# Patient Record
Sex: FEMALE | Race: WHITE | NOT HISPANIC OR LATINO | Employment: OTHER | URBAN - METROPOLITAN AREA
[De-identification: names, ages, dates, MRNs, and addresses within clinical notes are randomized per-mention and may not be internally consistent; named-entity substitution may affect disease eponyms.]

---

## 2017-12-13 ENCOUNTER — TELEPHONE (OUTPATIENT)
Dept: INTERNAL MEDICINE | Facility: CLINIC | Age: 77
End: 2017-12-13

## 2017-12-13 ENCOUNTER — OFFICE VISIT (OUTPATIENT)
Dept: URGENT CARE | Facility: CLINIC | Age: 77
End: 2017-12-13
Payer: MEDICARE

## 2017-12-13 ENCOUNTER — HOSPITAL ENCOUNTER (OUTPATIENT)
Dept: RADIOLOGY | Facility: HOSPITAL | Age: 77
Discharge: HOME | End: 2017-12-13
Attending: NURSE PRACTITIONER
Payer: MEDICARE

## 2017-12-13 VITALS
OXYGEN SATURATION: 93 % | TEMPERATURE: 98 F | HEART RATE: 88 BPM | WEIGHT: 216.69 LBS | HEIGHT: 62 IN | SYSTOLIC BLOOD PRESSURE: 152 MMHG | DIASTOLIC BLOOD PRESSURE: 102 MMHG | BODY MASS INDEX: 39.88 KG/M2

## 2017-12-13 DIAGNOSIS — J22 BACTERIAL LOWER RESPIRATORY INFECTION: ICD-10-CM

## 2017-12-13 DIAGNOSIS — R06.2 WHEEZING: ICD-10-CM

## 2017-12-13 DIAGNOSIS — B96.89 BACTERIAL LOWER RESPIRATORY INFECTION: ICD-10-CM

## 2017-12-13 DIAGNOSIS — R06.02 SHORTNESS OF BREATH: Primary | ICD-10-CM

## 2017-12-13 DIAGNOSIS — R05.9 COUGH: ICD-10-CM

## 2017-12-13 PROCEDURE — 94640 AIRWAY INHALATION TREATMENT: CPT | Mod: PBBFAC,PO

## 2017-12-13 PROCEDURE — 96372 THER/PROPH/DIAG INJ SC/IM: CPT | Mod: PBBFAC,59,PO

## 2017-12-13 PROCEDURE — 99214 OFFICE O/P EST MOD 30 MIN: CPT | Mod: PBBFAC,25,PO | Performed by: NURSE PRACTITIONER

## 2017-12-13 PROCEDURE — 99999 PR PBB SHADOW E&M-EST. PATIENT-LVL IV: CPT | Mod: PBBFAC,,, | Performed by: NURSE PRACTITIONER

## 2017-12-13 PROCEDURE — 71020 XR CHEST PA AND LATERAL: CPT | Mod: 26,,, | Performed by: RADIOLOGY

## 2017-12-13 PROCEDURE — 71020 XR CHEST PA AND LATERAL: CPT | Mod: TC,PO

## 2017-12-13 PROCEDURE — 99215 OFFICE O/P EST HI 40 MIN: CPT | Mod: S$PBB,,, | Performed by: NURSE PRACTITIONER

## 2017-12-13 RX ORDER — BETAMETHASONE SODIUM PHOSPHATE AND BETAMETHASONE ACETATE 3; 3 MG/ML; MG/ML
6 INJECTION, SUSPENSION INTRA-ARTICULAR; INTRALESIONAL; INTRAMUSCULAR; SOFT TISSUE
Status: COMPLETED | OUTPATIENT
Start: 2017-12-13 | End: 2017-12-13

## 2017-12-13 RX ORDER — FUROSEMIDE 20 MG/1
20 TABLET ORAL DAILY
Qty: 3 TABLET | Refills: 0 | Status: SHIPPED | OUTPATIENT
Start: 2017-12-13 | End: 2018-03-20 | Stop reason: SDUPTHER

## 2017-12-13 RX ORDER — IPRATROPIUM BROMIDE AND ALBUTEROL SULFATE 2.5; .5 MG/3ML; MG/3ML
3 SOLUTION RESPIRATORY (INHALATION)
Status: COMPLETED | OUTPATIENT
Start: 2017-12-13 | End: 2017-12-13

## 2017-12-13 RX ORDER — ALBUTEROL SULFATE 90 UG/1
1-2 AEROSOL, METERED RESPIRATORY (INHALATION) EVERY 4 HOURS PRN
Qty: 1 INHALER | Refills: 0 | Status: SHIPPED | OUTPATIENT
Start: 2017-12-13 | End: 2017-12-20

## 2017-12-13 RX ORDER — DOXYCYCLINE HYCLATE 100 MG
100 TABLET ORAL EVERY 12 HOURS
Qty: 20 TABLET | Refills: 0 | Status: SHIPPED | OUTPATIENT
Start: 2017-12-13 | End: 2017-12-20

## 2017-12-13 RX ORDER — PREDNISONE 20 MG/1
20 TABLET ORAL DAILY
Qty: 5 TABLET | Refills: 0 | Status: SHIPPED | OUTPATIENT
Start: 2017-12-14 | End: 2017-12-17

## 2017-12-13 RX ADMIN — BETAMETHASONE ACETATE AND BETAMETHASONE SODIUM PHOSPHATE 6 MG: 3; 3 INJECTION, SUSPENSION INTRA-ARTICULAR; INTRALESIONAL; INTRAMUSCULAR; SOFT TISSUE at 03:12

## 2017-12-13 RX ADMIN — IPRATROPIUM BROMIDE AND ALBUTEROL SULFATE 3 ML: 2.5; .5 SOLUTION RESPIRATORY (INHALATION) at 03:12

## 2017-12-13 NOTE — PATIENT INSTRUCTIONS
Go to the ER for any worsening shortness of breath at all or for oxygen level below 88%.     Shortness of Breath (Dyspnea)  Shortness of breath is the feeling that you can't catch your breath or get enough air. It is also known as dyspnea.  Dyspnea can be caused by many different conditions. They include:  · Acute asthma attack.  · Worsening of chronic lung diseases such as chronic bronchitis and emphysema.  · Heart failure. This is when weak heart muscle allows extra fluid to collect in the lungs.  · Panic attacks or anxiety. Fear can cause rapid breathing (hyperventilation).  · Pneumonia, or an infection in the lung tissue.  · Exposure to toxic substances, fumes, smoke, or certain medicines.  · Blood clot in the lung (pulmonary embolism). This is often from a piece of blood clot in a deep vein of the leg (deep vein thrombosis) that breaks off and travels to the lungs.  · Heart attack or heart-related chest pain (angina).  · Anemia.  · Collapsed lung (pneumothorax).  · Dehydration.  · Pregnancy.  Based on your visit today, the exact cause of your shortness of breath is not certain. Your tests dont show any of the serious causes of dyspnea. You may need other tests to find out if you have a serious problem. Its important to watch for any new symptoms or symptoms that get worse. Follow up with your healthcare provider as directed.  Home care  Follow these tips to take care of yourself at home:  · When your symptoms are better, go back to your usual activities.  · If you smoke, you should stop. Join a quit-smoking program or ask your healthcare provider for help.  · Eat a healthy diet and get plenty of sleep.  · Get regular exercise. Talk with your healthcare provider before starting to exercise, especially if you have other medical problems.  · Cut down on the amount of caffeine and stimulants you consume.  Follow-up care  Follow up with your healthcare provider, or as advised.  If tests were done, you will be told  if your treatment needs to be changed. You can call as directed for the results.  (Note: If an X-ray was taken, a specialist will review it. You will be notified of any new findings that may affect your care.)  Call 911 or get immediate medical care  Shortness of breath may be a sign of a serious medical problem. For example, it may be a problem with your heart or lungs. Call 911 if you have worsening shortness of breath or trouble breathing, especially with any of the symptoms below:  · You are confused or its difficult to wake you.  · You faint or lose consciousness.  · You have a fast heartbeat, or your heartbeat is irregular.  · You are coughing up blood.  · You have pain in your chest, arm, shoulder, neck, or upper back.  · You break out in a sweat.  When to seek medical advice  Call your healthcare provider right away if any of these occur:  · Slight shortness of breath or wheezing  · Redness, pain or swelling in your leg, arm, or other body area  · Swelling in both legs or ankles  · Fast weight gain  · Dizziness or weakness  · Fever of 100.4ºF (38ºC) or higher, or as directed by your healthcare provider  Date Last Reviewed: 9/13/2015  © 8128-1621 [employer]. [address] [place] [address], [place] [address]. All rights reserved. This information is not intended as a substitute for professional medical care. Always follow your healthcare professional's instructions.        Bronchitis with Wheezing (Viral or Bacterial: Adult)    Bronchitis is an infection of the air passages. It often occurs during a cold and is usually caused by a virus. Symptoms include cough with mucus (phlegm) and low-grade fever. This illness is contagious during the first few days and is spread through the air by coughing and sneezing, or by direct contact (touching the sick person and then touching your own eyes, nose, or mouth).  If there is a lot of inflammation, air flow is restricted. The air passages may also go into spasm,  especially if you have asthma. This causes wheezing and difficulty breathing even in people who do not have asthma.  Bronchitis usually lasts 7 to 14 days. The wheezing should improve with treatment during the first week. An inhaler is often prescribed to relax the air passages and stop wheezing. Antibiotics will be prescribed if your doctor thinks there is also a secondary bacterial infection.  Home care  · If symptoms are severe, rest at home for the first 2 to 3 days. When you go back to your usual activities, don't let yourself get too tired.  · Do not smoke. Also avoid being exposed to secondhand smoke.  · You may use over-the-counter medicine to control fever or pain, unless another medicine was prescribed. Note: If you have chronic liver or kidney disease or have ever had a stomach ulcer or gastrointestinal bleeding, talk with your healthcare provider before using these medicines. Also talk to your provider if you are taking medicine to prevent blood clots.) Aspirin should never be given to anyone younger than 18 years of age who is ill with a viral infection or fever. It may cause severe liver or brain damage.  · Your appetite may be poor, so a light diet is fine. Avoid dehydration by drinking 6 to 8 glasses of fluids per day (such as water, soft drinks, sports drinks, juices, tea, or soup). Extra fluids will help loosen secretions in the nose and lungs.  · Over-the-counter cough, cold, and sore-throat medicines will not shorten the length of the illness, but they may be helpful to reduce symptoms. (Note: Do not use decongestants if you have high blood pressure.)  · If you were given an inhaler, use it exactly as directed. If you need to use it more often than prescribed, your condition may be worsening. If this happens, contact your healthcare provider.  · If prescribed, finish all antibiotic medicine, even if you are feeling better after only a few days.  Follow-up care  Follow up with your healthcare  provider, or as advised. If you had an X-ray or ECG (electrocardiogram), a specialist will review it. You will be notified of any new findings that may affect your care.  Note: If you are age 65 or older, or if you have a chronic lung disease or condition that affects your immune system, or you smoke, talk to your healthcare provider about having a pneumococcal vaccinations and a yearly influenza vaccination (flu shot).  When to seek medical advice  Call your healthcare provider right away if any of these occur:  · Fever of 100.4°F (38°C) or higher  · Coughing up increasing amounts of colored sputum  · Weakness, drowsiness, headache, facial pain, ear pain, or a stiff neck  Call 911, or get immediate medical care  Contact emergency services right away if any of these occur.  · Coughing up blood  · Worsening weakness, drowsiness, headache, or stiff neck  · Increased wheezing not helped with medication, shortness of breath, or pain with breathing  Date Last Reviewed: 9/13/2015 © 2000-2017 [employer]. [address] [place] [address]. All rights reserved. This information is not intended as a substitute for professional medical care. Always follow your healthcare professional's instructions.        Discharge Instructions for Heart Failure  The heart is a muscle that pumps oxygen-rich blood to all parts of the body. When you have heart failure, the heart is not able to pump as well as it should. Blood and fluid may back up into the lungs (congestive heart failure), and some parts of the body dont get enough oxygen-rich blood to work normally. These problems lead to the symptoms of heart failure. Heart failure can occur due to an injury to the heart or from natural processes.  You can control symptoms of heart failure with some lifestyle changes and by following your doctor's advice.  Home care  Activity  Ask your healthcare provider about an exercise program. You can benefit from simple  activities such as walking or gardening. Exercising most days of the week can make you feel better. Don't be discouraged if your progress is slow at first. Rest as needed. Stop activity if you develop symptoms such as chest pain, lightheadedness, or significant shortness of breath. Find activities that you enjoy, such as brisk walking, dancing, swimming, or gardening. These will help you stay active and strengthen your heart.  Diet  Follow a heart healthy diet. And make sure to limit the salt (sodium) in your diet. Salt causes your body to hold water. This makes your heart work harder as there is more fluid for the heart to pump. Limit your salt by doing the following:  · Limit canned, dried, packaged, and fast foods.  · Don't add salt to your food.  · Season foods with herbs instead of salt.  · Watch how much liquids you drink. Drinking too much can make heart failure worse. Talk with your health care provider about how much you should drink each day.  · Limit the amount of alcohol you drink. It may harm your heart. Women should have no more than 1 drink a day and men should have no more than 2.  · When you eat out, request that your meals have no added salt.  Tobacco  If you smoke, it's very important to quit. Smoking increases your chances of having a heart attack by harming the blood vessels that provide oxygen to your heart. This makes heart failure worse. Quitting smoking is the number one thing you can do to improve your health. Enroll in a stop-smoking program to improve your chances of success. Talk with your healthcare provider about medicines or nicotine replacement therapy to help you quit smoking. Ask your healthcare provider about smoking cessation support groups.  Medicine  Take your medicines exactly as prescribed. Learn the names and purpose of each of your medicines. Keep an accurate medicine list and current dosages with you at all times. Don't skip doses. If you miss a dose of your medicine, take  it as soon as you remember. If you miss a dose and it's almost time for your next dose, just wait and take your next dose at the normal time. Don't take a double dose. If you are unsure, call your doctor's office. Make sure not to mix up your medicines or forget what you've taken the same day.  Weight monitoring  Weigh yourself every day. A sudden weight gain can mean your heart failure is getting worse. Weigh yourself at the same time of day and in the same kind of clothes. Ideally, weigh yourself first thing in the morning after you empty your bladder, but before you eat breakfast. Your healthcare provider will show you how to track your weight. He or she will also discuss with you when you should call if you have a sudden, unexpected increase in your weight.  In general, your healthcare provider may ask you to report if your weight goes up by more than 2 pounds in 1 day,  5 pounds in 1 week, or whatever weight gain you were told by your doctor. This is a sign that you are retaining more fluid than you should be. Clues to weight gain include checking your ankles for swelling, or noticing you are short of breath when you lie down.  Follow-up care  Make a follow-up appointment as directed. Depending on the type and severity of heart failure you have, you may need follow-up as early as 7 days from hospital discharge. Keep appointments for checkups and lab tests that are needed to check your medicines and condition.  Recognize that your health and even survival depend on your following medical recommendations.  Symptoms  Heart failure can cause a variety of symptoms, including:  · Shortness of breath  · Trouble breathing at night, especially when you lie down  · Swelling in the legs and feet or in the belly (abdomen)  · Becoming easily fatigued  · Irregular or rapid heartbeat  · Weakness or lightheadedness  · Swelling of the neck veins  It is important to know what to do if symptoms get worse or if you develop signs  of worsening heart failure.     When to see your healthcare provider  Call your doctor right away if you have any of these signs of worsening heart failure:  · Sudden weight gain (more than 2 pounds in 1 day or 5 pounds in 1 week, or whatever weight gain you were told to report by your doctor)  · Trouble breathing not related to being active  · New or increased swelling of your legs or ankles  · Swelling or pain in your abdomen  · Breathing trouble at night (waking up short of breath, needing more pillows to breathe)  · Frequent coughing that doesn't go away  · Feeling much more tired than usual  Call 911  Call 911 right away if you have:  · Severe shortness of breath, such that you can't catch your breath even while resting  · Severe chest pain that does not resolve with rest or nitroglycerin  · Pink, foamy mucus with cough and shortness of breath  · A continuous rapid or irregular heartbeat  · Passing out or fainting  · Stroke symptoms such as sudden numbness or weakness on one side of your face, arm, or leg or sudden confusion, trouble speaking or vision changes   Date Last Reviewed: 3/21/2016  © 0240-1842 [employer]. [address] [place] [address], [place] [address]. All rights reserved. This information is not intended as a substitute for professional medical care. Always follow your healthcare professional's instructions.

## 2017-12-13 NOTE — TELEPHONE ENCOUNTER
----- Message from Norma Duff sent at 12/13/2017  8:50 AM CST -----  Contact: Lilian/pt daughter   Call caller regarding pt needing to be seen today by Dr. Andrade. Caller states that pt has a bad cough.   [phone]

## 2017-12-14 ENCOUNTER — TELEPHONE (OUTPATIENT)
Dept: URGENT CARE | Facility: CLINIC | Age: 77
End: 2017-12-14

## 2017-12-14 NOTE — TELEPHONE ENCOUNTER
----- Message from [place]ows NP sent at 12/14/2017  7:25 AM CST -----  I reviewed results of Ms. Singh's x-ray with her and her daughter during yesterday's visit. I was very concerned about her so I wanted to see if someone could call and check on her to make sure she was able to make it through the night without going to the hospital. If so, it looks like she doesn't have a follow up appointment scheduled. I'd like to get her seen by primary care before the end of the week for a recheck. If everyone is booked, she could wait until early next week as long as she doesn't have any worsening shortness of breath, in which case, she'd need to go to the ER. Thanks!

## 2017-12-14 NOTE — TELEPHONE ENCOUNTER
Called and spoke to pt's daughter (Lilian). She stated that she did bring Ms. Singh to the ER because her O2 sats were staying very low. She's currently at [facility] being treated.

## 2017-12-18 ENCOUNTER — TELEPHONE (OUTPATIENT)
Dept: INTERNAL MEDICINE | Facility: CLINIC | Age: 77
End: 2017-12-18

## 2017-12-18 NOTE — TELEPHONE ENCOUNTER
----- Message from Miriam Nicole sent at 12/18/2017  8:13 AM CST -----  Contact: Pt/ Lilian (daughter in law  Please give Lilian a call at [phone] regarding a hospital f/u for the pt.

## 2017-12-20 ENCOUNTER — OFFICE VISIT (OUTPATIENT)
Dept: INTERNAL MEDICINE | Facility: CLINIC | Age: 77
End: 2017-12-20
Payer: MEDICARE

## 2017-12-20 VITALS
DIASTOLIC BLOOD PRESSURE: 60 MMHG | HEART RATE: 83 BPM | HEIGHT: 60 IN | BODY MASS INDEX: 41.72 KG/M2 | TEMPERATURE: 98 F | SYSTOLIC BLOOD PRESSURE: 118 MMHG | OXYGEN SATURATION: 90 % | WEIGHT: 212.5 LBS

## 2017-12-20 DIAGNOSIS — I10 HYPERTENSION, UNSPECIFIED TYPE: ICD-10-CM

## 2017-12-20 DIAGNOSIS — R73.03 PRE-DIABETES: ICD-10-CM

## 2017-12-20 DIAGNOSIS — J44.9 COPD MIXED TYPE: Primary | ICD-10-CM

## 2017-12-20 DIAGNOSIS — R06.02 SOB (SHORTNESS OF BREATH) ON EXERTION: ICD-10-CM

## 2017-12-20 PROCEDURE — 99214 OFFICE O/P EST MOD 30 MIN: CPT | Mod: S$PBB,,, | Performed by: PHYSICIAN ASSISTANT

## 2017-12-20 PROCEDURE — 99214 OFFICE O/P EST MOD 30 MIN: CPT | Mod: PBBFAC,PO | Performed by: PHYSICIAN ASSISTANT

## 2017-12-20 PROCEDURE — 99999 PR PBB SHADOW E&M-EST. PATIENT-LVL IV: CPT | Mod: PBBFAC,,, | Performed by: PHYSICIAN ASSISTANT

## 2017-12-20 RX ORDER — IPRATROPIUM BROMIDE AND ALBUTEROL SULFATE 2.5; .5 MG/3ML; MG/3ML
3 SOLUTION RESPIRATORY (INHALATION) EVERY 6 HOURS PRN
COMMUNITY
End: 2018-01-22 | Stop reason: SDUPTHER

## 2017-12-20 RX ORDER — LISINOPRIL 40 MG/1
TABLET ORAL
COMMUNITY
Start: 2017-12-16 | End: 2018-03-20 | Stop reason: SDUPTHER

## 2017-12-20 NOTE — PATIENT INSTRUCTIONS
Using Herbs and Spices    Herbs and spices add flavor to cooking without adding fat or sodium. That's why they're great for healthy cooking. Try these tips to help create tasty, healthy meals.  How much to use?  If a recipe calls for: 1 Tablespoon of fresh herbs You can use: 1 teaspoon of dried herbs Or: 1/4 teaspoon of powdered herbs   Tips for Getting the Most of Herbs and Spices  · Use kitchen perez or a sharp knife to cut leaves of fresh herbs. Cutting the leaves finely will release the most flavor.  · When using whole spices, don't grind them until you need them. Crushing the berries and seeds with a mortar and pestle or grating whole nutmeg or cinnamon sticks just before adding them to your recipe will guarantee the most flavor.  · Dried herbs pack more flavor for the same quantity than fresh herbs, and powdered herbs are more potent than dried flakes. If you are using powdered herbs in a recipe that calls for fresh, you'll want to decrease the amount you add.  · When adding fresh or dried spices and herbs to cold recipes, such as dips or salad dressings, allow the food to sit in the refrigerator for at least a couple of hours before serving so the flavors can blend.  · Add fresh spices and herbs to hot dishes as close to serving time as possible for the most flavorful results. Dried herbs and spices should be added early in the cooking process to prevent a powdery taste.  · The longer dried herbs and spices sit in your cupboard without being used, the more flavor they lose. Whole spices last longer than ground or powdered spices. Store dried herbs and spices in a cool, dry, dark place. Keep powdered herbs and spices for no longer than a year.  Date Last Reviewed: 6/1/2015  © 5765-4028 [employer]. [address] [place] [address], [place] [address]. All rights reserved. This information is not intended as a substitute for professional medical care. Always follow your healthcare professional's  instructions.        Low-Salt Diet  This diet removes foods that are high in salt. It also limits the amount of salt you use when cooking. It is most often used for people with high blood pressure, edema (fluid retention), and kidney, liver, or heart disease.  Table salt contains the mineral sodium. Your body needs sodium to work normally. But too much sodium can make your health problems worse. Your healthcare provider is recommending a low-salt (also called low-sodium) diet for you. Your total daily allowance of salt is 1,500 to 2,300 milligrams (mg). It is less than 1 teaspoon of table salt. This means you can have only about 500 to 700 mg of sodium at each meal. People with certain health problems should limit salt intake to the lower end of the recommended range.    When you cook, dont add much salt. If you can cook without using salt, even better. Dont add salt to your food at the table.  When shopping, read food labels. Salt is often called sodium on the label. Choose foods that are salt-free, low salt, or very low salt. Note that foods with reduced salt may not lower your salt intake enough.    Beans, potatoes, and pasta  Ok: Dry beans, split peas, lentils, potatoes, rice, macaroni, pasta, spaghetti without added salt  Avoid: Potato chips, tortilla chips, and similar products  Breads and cereals  Ok: Low-sodium breads, rolls, cereals, and cakes; low-salt crackers, matzo crackers  Avoid: Salted crackers, pretzels, popcorn, Danish toast, pancakes, muffins  Dairy  Ok: Milk, chocolate milk, hot chocolate mix, low-salt cheeses, and yogurt  Avoid: Processed cheese and cheese spreads; Roquefort, Camembert, and cottage cheese; buttermilk, instant breakfast drink  Desserts  Ok: Ice cream, frozen yogurt, juice bars, gelatin, cookies and pies, sugar, honey, jelly, hard candy  Avoid: Most pies, cakes and cookies prepared or processed with salt; instant pudding  Drinks  Ok: Tea, coffee, fizzy (carbonated) drinks,  juices  Avoid: Flavored coffees, electrolyte replacement drinks, sports drinks  Meats  Ok: All fresh meat, fish, poultry, low-salt tuna, eggs, egg substitute  Avoid: Smoked, pickled, brine-cured, or salted meats and fish. This includes gold, chipped beef, corned beef, hot dogs, deli meats, ham, kosher meats, salt pork, sausage, canned tuna, salted codfish, smoked salmon, herring, sardines, or anchovies.  Seasonings and spices  Ok: Most seasonings are okay. Good substitutes for salt include: fresh herb blends, hot sauce, lemon, garlic, lombardi, vinegar, dry mustard, parsley, cilantro, horseradish, tomato paste, regular margarine, mayonnaise, unsalted butter, cream cheese, vegetable oil, cream, low-salt salad dressing and gravy.  Avoid: Regular ketchup, relishes, pickles, soy sauce, teriyaki sauce, Worcestershire sauce, BBQ sauce, tartar sauce, meat tenderizer, chili sauce, regular gravy, regular salad dressing, salted butter  Soups  Ok: Low-salt soups and broths made with allowed foods  Avoid: Bouillon cubes, soups with smoked or salted meats, regular soup and broth  Vegetables  Ok: Most vegetables are okay; also low-salt tomato and vegetable juices  Avoid: Sauerkraut and other brine-soaked vegetables; pickles and other pickled vegetables; tomato juice, olives  Date Last Reviewed: 8/1/2016 © 2000-2017 [employer]. [address] [place] [address]. All rights reserved. This information is not intended as a substitute for professional medical care. Always follow your healthcare professional's instructions.

## 2017-12-20 NOTE — PROGRESS NOTES
HPI  Review of Systems    Physical Exam    Subjective:      Maura Singh is a 77 y.o. female newly diagnosed COPD who presents from hospital  with new onset COPD and exacerbation.  She is here today to follow up on her recent hospital stay.  She was having some bronchitis symptoms, seen in UC. Eventually worsened and ended up in ER.  There she was admitted.  Hospital course:  . Current symptoms include: dyspnea after 2 blocks, inability to climb stairs and non-productive cough. Symptoms began several days ago. Patient uses 1 pillows at night. Patient currently is on oxygen at 2 L/min per nasal cannula.   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 And OT  . Doing much better with O2     Also found to have elevated A1c     Review of Systems  Constitutional: negative for chills, fevers, malaise and sweats  Eyes: negative for color blindness, contacts/glasses and redness  Ears, nose, mouth, throat, and face: positive for nasal congestion and snoring  Respiratory: positive for cough and dyspnea on exertion  Cardiovascular: negative for chest pain, chest pressure/discomfort, exertional chest pressure/discomfort, lower extremity edema, palpitations and syncope  Gastrointestinal: negative for abdominal pain, bright red blood per rectum, change in bowel habits, constipation, nausea and odynophagia  Hematologic/lymphatic: negative for bleeding, easy bruising, lymphadenopathy and petechiae  Neurological: negative for coordination problems, dizziness, gait problems, memory problems and weakness  Behavioral/Psych: negative for sleep disturbance  Allergic/Immunologic: negative       Objective:      /60   Pulse 83   Temp 97.5 °F (36.4 °C) (Tympanic)   Ht 5' (1.524 m)   Wt 96.4 kg (212 lb 8.4 oz)   SpO2 (!) 90%   BMI 41.51 kg/m²   Oxygen saturation 91% on 1 liters/min via nasal cannula  /60   Pulse 83   Temp 97.5 °F (36.4 °C) (Tympanic)   Ht 5' (1.524 m)   Wt 96.4 kg (212 lb 8.4 oz)   SpO2 (!) 90%   BMI 41.51 kg/m²    General appearance: alert, appears stated age and cooperative  Head: Normocephalic, without obvious abnormality, atraumatic  Eyes: conjunctivae/corneas clear. PERRL, EOM's intact. Fundi benign.  Ears: normal TM's and external ear canals both ears  Nose: Nares normal. Septum midline. Mucosa normal. No drainage or sinus tenderness.  Throat: lips, mucosa, and tongue normal; teeth and gums normal  Neck: no adenopathy, no carotid bruit, supple, symmetrical, trachea midline and thyroid not enlarged, symmetric, no tenderness/mass/nodules  Lungs: diminished breath sounds bibasilar  Heart: regular rate and rhythm, S1, S2 normal, no murmur, click, rub or gallop  Abdomen: soft, non-tender; bowel sounds normal; no masses,  no organomegaly  Extremities: no edema  Pulses: 2+ and symmetric  Skin: Skin color, texture, turgor normal. No rashes or lesions  Lymph nodes: Cervical, supraclavicular, and axillary nodes normal.  Neurologic: Grossly normal       Assessment:      Emphysema and new onset COPD          COPD mixed type  SOB (shortness of breath) on exertion  Hypertension, unspecified type  Pre-diabetes        Plan:   COPD mixed type- -     Ambulatory referral to Pulmonology  -     Ambulatory Referral to Pulmonary Rehab  -     mucus clearing device (ACAPELLA) Janna; 1 Device by Misc.(Non-Drug; Combo Route) route 3 (three) times daily as needed.      SOB (shortness of breath) on exertion  -     Ambulatory Referral to Pulmonary Rehab    Hypertension, unspecified type- well controlled. Cont lisinopril     Pre-diabetes- per hosp recrods. Will get A1c in 3 months to monitor   Diet changes/weight loss to prevent diabetes   -     Hemoglobin A1c; Future      Patient has HH, on O2   Family with her at time of visit   Improving at this time. Want to set up with pulmonary given new dx   Discussed diagnosis, its evaluation, treatment and usual course.  All questions answered.  Referral to pulmonary .  Follow up with PCP in 1 month

## 2018-01-11 ENCOUNTER — PATIENT OUTREACH (OUTPATIENT)
Dept: ADMINISTRATIVE | Facility: HOSPITAL | Age: 78
End: 2018-01-11

## 2018-01-11 NOTE — LETTER
January 16, 2018    Maura Singh  [address]  [place] [address]           [facility]  [address]  [facility] [address]  Phone: [phone] Dear Mrs. Singh:    [facility] is committed to your overall health.  To help you get the most out of each of your visits, we will review your information to make sure you are up to date on all of your recommended tests and/or procedures.      Yessy Andrade MD has found that you may be due for   Health Maintenance Due   Topic    TETANUS VACCINE     Mammogram     DEXA SCAN     Zoster Vaccine     Pneumococcal (65+) (2 of 2 - PPSV23)    Lipid Panel       If you have had any of the above done at another facility, please bring the records or information with you so that your record at [facility] will be complete.    If you are currently taking medication, please bring it with you to your appointment for review.    We will be happy to assist you with scheduling any necessary appointments or you may contact the [facility] appointment desk at [phone] to schedule at your convenience.     Thank you for choosing [facility] for your healthcare needs,    If you have any questions or concerns, please don't hesitate to call.    Sincerely,    LUIS ALBERTO Paulino  Care Coordination Department  [facility]  [phone]

## 2018-01-12 ENCOUNTER — OFFICE VISIT (OUTPATIENT)
Dept: PULMONOLOGY | Facility: CLINIC | Age: 78
End: 2018-01-12
Payer: MEDICARE

## 2018-01-12 VITALS
OXYGEN SATURATION: 86 % | DIASTOLIC BLOOD PRESSURE: 76 MMHG | SYSTOLIC BLOOD PRESSURE: 120 MMHG | BODY MASS INDEX: 41.72 KG/M2 | HEART RATE: 72 BPM | WEIGHT: 212.5 LBS | RESPIRATION RATE: 18 BRPM | HEIGHT: 60 IN

## 2018-01-12 DIAGNOSIS — R09.02 HYPOXEMIA REQUIRING SUPPLEMENTAL OXYGEN: Primary | ICD-10-CM

## 2018-01-12 DIAGNOSIS — Z99.81 HYPOXEMIA REQUIRING SUPPLEMENTAL OXYGEN: Primary | ICD-10-CM

## 2018-01-12 DIAGNOSIS — J44.9 CHRONIC OBSTRUCTIVE PULMONARY DISEASE, UNSPECIFIED COPD TYPE: ICD-10-CM

## 2018-01-12 PROCEDURE — 99999 PR PBB SHADOW E&M-EST. PATIENT-LVL III: CPT | Mod: PBBFAC,,, | Performed by: NURSE PRACTITIONER

## 2018-01-12 PROCEDURE — 99213 OFFICE O/P EST LOW 20 MIN: CPT | Mod: PBBFAC,PO | Performed by: NURSE PRACTITIONER

## 2018-01-12 PROCEDURE — 99214 OFFICE O/P EST MOD 30 MIN: CPT | Mod: S$PBB,,, | Performed by: NURSE PRACTITIONER

## 2018-01-12 RX ORDER — ALBUTEROL SULFATE 90 UG/1
2 AEROSOL, METERED RESPIRATORY (INHALATION) EVERY 6 HOURS PRN
COMMUNITY
End: 2019-09-05

## 2018-01-12 NOTE — LETTER
January 12, 2018      VICKY Brian  [address] [facility] [place] [address]           [facility]  [address] [facility] [address] [facility] [address] [address] [place] [address]  Phone: [phone]  Fax: [phone]          Patient: Maura Singh   MR Number: [id number]   YOB: 1940   Date of Visit: 1/12/2018       Dear Herlinda Ramirez:    Thank you for referring Maura Singh to me for evaluation. Attached you will find relevant portions of my assessment and plan of care.    If you have questions, please do not hesitate to call me. I look forward to following Maura Singh along with you.    Sincerely,    Sandra Stafford, NP    Enclosure  CC:  No Recipients    If you would like to receive this communication electronically, please contact [email or web address] or [phone] to request more information on [employer] access.    For providers and/or their staff who would like to refer a patient to [facility], please contact us through our one-stop-shop provider referral line, [facility] Dong, at [phone].    If you feel you have received this communication in error or would no longer like to receive these types of communications, please e-mail [email or web address]

## 2018-01-12 NOTE — ASSESSMENT & PLAN NOTE
Qualifying saturation 86% 1/12/2018 on room air at rest.  New order to continue NC 2 lm continuous, and obtain small portable, pulse dose acceptable.   Follow up for pft to evaluate for COPD

## 2018-01-12 NOTE — PROGRESS NOTES
Chief Complaint   Patient presents with    COPD     Subjective:      HPI:  77 year old female presents for initial pulmonary visit related to suspected COPD with history with acute respiratory failure with hospitalization and discharge on home oxygen NC 2 lm.   Former smoker quit in 2003, 1/2 pk/day x 48 hrs.   She has never been told she has COPD. Daughter accompanying reports patient is not good about obtaining medical follow up.   They have home O2 sat monitor and with desats below 88% daughter was able to convince patient to report to ER for recent evaluation.  Has never had pft testing.     Family/Medical/Surgical/Social History  has No Known Allergies.  family history includes ALS in her father; Colon cancer in her mother.  has a current medication list which includes the following prescription(s): lisinopril, mucus clearing device, multivitamin, albuterol, albuterol-ipratropium 2.5mg-0.5mg/3ml, and furosemide.   has a past surgical history that includes Appendectomy and Middle ear surgery.   reports that she quit smoking about 15 years ago. Her smoking use included Cigarettes. She started smoking about 63 years ago. She has a 24.00 pack-year smoking history. She has never used smokeless tobacco. She reports that she does not drink alcohol or use drugs.    Review of Systems  Review of Systems   Constitutional: Negative for fever, chills, weight loss, weight gain, activity change, appetite change, fatigue and night sweats.   HENT: Negative.  Negative for postnasal drip, rhinorrhea, sinus pressure, voice change and congestion.    Eyes: Negative for redness and itching.   Respiratory: Positive for dyspnea on extertion. Negative for snoring, cough, sputum production, chest tightness, wheezing, orthopnea, asthma nighttime symptoms, use of rescue inhaler and somnolence. Shortness of breath: only with exertion.    Cardiovascular: Negative.  Negative for chest pain, palpitations and leg swelling.   Genitourinary:  Negative for difficulty urinating and hematuria.   Endocrine: Negative for cold intolerance and heat intolerance.    Musculoskeletal: Negative for arthralgias, gait problem, joint swelling and myalgias.   Skin: Negative.    Gastrointestinal: Negative for nausea, vomiting, abdominal pain and acid reflux.   Neurological: Negative for dizziness, weakness, light-headedness and headaches.   Hematological: Negative for adenopathy. No excessive bruising.   All other systems reviewed and are negative.    Objective:     /76 (BP Location: Right arm, Patient Position: Sitting)   Pulse 72   Resp 18   Ht 5' (1.524 m)   Wt 96.4 kg (212 lb 8.4 oz)   SpO2 (!) 86% Comment: room air at rest. increased to 95% NC 2 lm  BMI 41.51 kg/m²   Physical Exam   Constitutional: She is oriented to person, place, and time. She appears well-developed and well-nourished. She is active and cooperative.  Non-toxic appearance. She does not appear ill. No distress.   HENT:   Head: Normocephalic.   Right Ear: External ear normal.   Left Ear: External ear normal.   Nose: Nose normal.   Mouth/Throat: Oropharynx is clear and moist. No oropharyngeal exudate.   Neck circumference: 48 cm (19 inches).  Mallampati: 4   Eyes: Conjunctivae are normal.   Neck: Normal range of motion. Neck supple.   Cardiovascular: Normal rate, regular rhythm, normal heart sounds and intact distal pulses.    Pulmonary/Chest: Effort normal and breath sounds normal. No stridor.   Abdominal: Soft.   Musculoskeletal: Normal range of motion.   Lymphadenopathy:     She has no cervical adenopathy.   Neurological: She is alert and oriented to person, place, and time.   Skin: Skin is warm and dry.   Psychiatric: She has a normal mood and affect. Her behavior is normal. Judgment and thought content normal.   Vitals reviewed.    Assessment:     1. Hypoxemia requiring supplemental oxygen    2. Chronic obstructive pulmonary disease, unspecified COPD type      Orders Placed This  Encounter   Procedures    OXYGEN FOR HOME USE     Patient has home oxygen NC 2 lm   She continues to qualify with O2 sat at rest 86% 1/12/2018 office visit [facility].  Needs small portable with conserving device or battery operated unit for exertion.     HME: [facility]     Order Specific Question:   Liter Flow     Answer:   2     Order Specific Question:   Duration     Answer:   Continuous     Order Specific Question:   Qualifying SpO2:     Answer:   86%     Order Specific Question:   Testing done at:     Answer:   Rest     Order Specific Question:   Device     Answer:   home concentrator with portable unit     Order Specific Question:   Length of need (in months):     Answer:   99 mos     Order Specific Question:   Patient condition with qualifying saturation     Answer:   COPD     Order Specific Question:   Height:     Answer:   5' (1.524 m)     Order Specific Question:   Weight:     Answer:   96.4 kg (212 lb 8.4 oz)     Order Specific Question:   Alternative treatment measures have been tried or considered and deemed clinically ineffective.     Answer:   Yes     Order Specific Question:   Vendor:     Answer:   [employer]     Order Specific Question:   Expected Date of Delivery:     Answer:   1/12/2018    PULM - Arterial Blood Gases--in addition to PFT only     Standing Status:   Future     Standing Expiration Date:   1/12/2019    Complete PFT with bronchodilator     Standing Status:   Future     Standing Expiration Date:   1/12/2019    Stress test, pulmonary     Standing Status:   Future     Standing Expiration Date:   1/12/2019     Diagnostic Testing Reviewed  All relevant imaging and labs reviewed.  IMAGING:   Chest xray 12/13/2017  Findings: The lungs are well expanded and grossly clear without focal consolidation although evaluation is somewhat limited by overlying soft tissue prominence.   There is tortuosity and atherosclerosis of the aorta. The cardiomediastinal silhouette is mildly enlarged.    There is mild prominence of the central pulmonary vasculature with slightly increased interstitial markings.   Subsegmental atelectasis is seen in the lingula. Remainder stable.  IMPRESSION: Cardiomegaly with prominence of the central pulmonary vasculature and slightly increased interstitial markings may suggest mild component of interstitial edema. No evidence of angelica cardiac decompensation.    Home oxygen qualification:  At rest sat 86% placed on NC 2 lm with increased saturation 95%. (new order for continued home oxygen continuous with small portable patient was requesting).     Plan:     Problem List Items Addressed This Visit     Hypoxemia requiring supplemental oxygen - Primary     Qualifying saturation 86% 1/12/2018 on room air at rest.  New order to continue NC 2 lm continuous, and obtain small portable, pulse dose acceptable.   Follow up for pft to evaluate for COPD          Relevant Orders    OXYGEN FOR HOME USE    PULM - Arterial Blood Gases--in addition to PFT only    Complete PFT with bronchodilator    Stress test, pulmonary      Other Visit Diagnoses     Chronic obstructive pulmonary disease, unspecified COPD type        Relevant Orders    OXYGEN FOR HOME USE    PULM - Arterial Blood Gases--in addition to PFT only    Complete PFT with bronchodilator    Stress test, pulmonary        Return in about 2 months (around 3/12/2018) for COPD follow up, w/review cpft/pul stress/abg.

## 2018-01-22 ENCOUNTER — LAB VISIT (OUTPATIENT)
Dept: LAB | Facility: HOSPITAL | Age: 78
End: 2018-01-22
Attending: FAMILY MEDICINE
Payer: MEDICARE

## 2018-01-22 ENCOUNTER — OFFICE VISIT (OUTPATIENT)
Dept: INTERNAL MEDICINE | Facility: CLINIC | Age: 78
End: 2018-01-22
Payer: MEDICARE

## 2018-01-22 VITALS
TEMPERATURE: 98 F | BODY MASS INDEX: 41.72 KG/M2 | SYSTOLIC BLOOD PRESSURE: 128 MMHG | HEART RATE: 74 BPM | WEIGHT: 212.5 LBS | DIASTOLIC BLOOD PRESSURE: 70 MMHG | HEIGHT: 60 IN | OXYGEN SATURATION: 96 %

## 2018-01-22 DIAGNOSIS — R09.02 HYPOXEMIA REQUIRING SUPPLEMENTAL OXYGEN: Primary | ICD-10-CM

## 2018-01-22 DIAGNOSIS — I73.9 CLAUDICATION OF BOTH LOWER EXTREMITIES: ICD-10-CM

## 2018-01-22 DIAGNOSIS — R09.02 HYPOXEMIA REQUIRING SUPPLEMENTAL OXYGEN: ICD-10-CM

## 2018-01-22 DIAGNOSIS — Z23 NEED FOR PNEUMOCOCCAL VACCINATION: ICD-10-CM

## 2018-01-22 DIAGNOSIS — R73.09 ABNORMAL GLUCOSE: ICD-10-CM

## 2018-01-22 DIAGNOSIS — J44.9 CHRONIC OBSTRUCTIVE PULMONARY DISEASE, UNSPECIFIED COPD TYPE: ICD-10-CM

## 2018-01-22 DIAGNOSIS — E78.5 HYPERLIPIDEMIA, UNSPECIFIED HYPERLIPIDEMIA TYPE: ICD-10-CM

## 2018-01-22 DIAGNOSIS — M79.604 PAIN IN BOTH LOWER EXTREMITIES: ICD-10-CM

## 2018-01-22 DIAGNOSIS — M79.605 PAIN IN BOTH LOWER EXTREMITIES: ICD-10-CM

## 2018-01-22 DIAGNOSIS — Z99.81 HYPOXEMIA REQUIRING SUPPLEMENTAL OXYGEN: Primary | ICD-10-CM

## 2018-01-22 DIAGNOSIS — Z99.81 HYPOXEMIA REQUIRING SUPPLEMENTAL OXYGEN: ICD-10-CM

## 2018-01-22 DIAGNOSIS — Z13.820 ENCOUNTER FOR SCREENING FOR OSTEOPOROSIS: ICD-10-CM

## 2018-01-22 LAB
BASOPHILS # BLD AUTO: 0.06 K/UL
BASOPHILS NFR BLD: 0.5 %
DIFFERENTIAL METHOD: ABNORMAL
EOSINOPHIL # BLD AUTO: 0.4 K/UL
EOSINOPHIL NFR BLD: 3.6 %
ERYTHROCYTE [DISTWIDTH] IN BLOOD BY AUTOMATED COUNT: 15.4 %
ESTIMATED AVG GLUCOSE: 128 MG/DL
HBA1C MFR BLD HPLC: 6.1 %
HCT VFR BLD AUTO: 46.5 %
HGB BLD-MCNC: 15 G/DL
IMM GRANULOCYTES # BLD AUTO: 0.03 K/UL
IMM GRANULOCYTES NFR BLD AUTO: 0.3 %
LYMPHOCYTES # BLD AUTO: 3.1 K/UL
LYMPHOCYTES NFR BLD: 27.1 %
MCH RBC QN AUTO: 30.3 PG
MCHC RBC AUTO-ENTMCNC: 32.3 G/DL
MCV RBC AUTO: 94 FL
MONOCYTES # BLD AUTO: 1.3 K/UL
MONOCYTES NFR BLD: 11.4 %
NEUTROPHILS # BLD AUTO: 6.5 K/UL
NEUTROPHILS NFR BLD: 57.1 %
NRBC BLD-RTO: 0 /100 WBC
PLATELET # BLD AUTO: 257 K/UL
PMV BLD AUTO: 11.1 FL
RBC # BLD AUTO: 4.95 M/UL
WBC # BLD AUTO: 11.42 K/UL

## 2018-01-22 PROCEDURE — 99214 OFFICE O/P EST MOD 30 MIN: CPT | Mod: S$PBB,,, | Performed by: FAMILY MEDICINE

## 2018-01-22 PROCEDURE — 84443 ASSAY THYROID STIM HORMONE: CPT

## 2018-01-22 PROCEDURE — G0009 ADMIN PNEUMOCOCCAL VACCINE: HCPCS | Mod: PBBFAC,PO

## 2018-01-22 PROCEDURE — 36415 COLL VENOUS BLD VENIPUNCTURE: CPT | Mod: PO

## 2018-01-22 PROCEDURE — 80061 LIPID PANEL: CPT

## 2018-01-22 PROCEDURE — 99214 OFFICE O/P EST MOD 30 MIN: CPT | Mod: PBBFAC,PO,25 | Performed by: FAMILY MEDICINE

## 2018-01-22 PROCEDURE — 83036 HEMOGLOBIN GLYCOSYLATED A1C: CPT

## 2018-01-22 PROCEDURE — 80053 COMPREHEN METABOLIC PANEL: CPT

## 2018-01-22 PROCEDURE — 99999 PR PBB SHADOW E&M-EST. PATIENT-LVL IV: CPT | Mod: PBBFAC,,, | Performed by: FAMILY MEDICINE

## 2018-01-22 PROCEDURE — 85025 COMPLETE CBC W/AUTO DIFF WBC: CPT

## 2018-01-22 PROCEDURE — 84439 ASSAY OF FREE THYROXINE: CPT

## 2018-01-22 RX ORDER — IPRATROPIUM BROMIDE AND ALBUTEROL SULFATE 2.5; .5 MG/3ML; MG/3ML
3 SOLUTION RESPIRATORY (INHALATION) EVERY 6 HOURS PRN
Qty: 90 VIAL | Refills: 3 | Status: SHIPPED | OUTPATIENT
Start: 2018-01-22 | End: 2018-11-06

## 2018-01-22 NOTE — PROGRESS NOTES
Subjective:      Patient ID: Maura Singh is a 77 y.o. female.    Chief Complaint: Follow-up ([facility] f/u)    Disclaimer:  This note is prepared using voice recognition software and as such is likely to have errors and has not been proof read. Please contact me for questions.     Maura Singh is a 77 y.o. female coming in today for 1 month follow-up since recent seeing my physician assistant and due to her hospitalization at [facility].  She was diagnosed with COPD while in the hospital and now is currently requiring oxygen area and her pulse ox was 86% on room air at rest.  With 2 L of O2 she's currently breathing around 96% on room air.  She still gets extremely short of breath with minimal exertion.  However she does feel good.  She is running no fever.  She is currently receiving OT and PT through home health.  She does have albuterol at home but recently stopped it in the past week.  She is didn't think that she needed it however she does report that she's had morbid difficult time trying to get a good breath in.  She gets dyspnea even with walking 200 feet.  She did meet with the nurse practitioner in pulmonary and I have her set up to do Pulmicort function tests as well as arterial blood gases and spirometry in March.  Her daughter and the home health physical therapy and occupational therapy do think that she would benefit from pulmonary rehabilitation however she gets leg pain with minimal exertion.  She describes it as an achiness with walking a little bit.  Not burning discomfort.  There is concern for claudication symptoms.  She is currently on lisinopril at 40 mg and Lasix to help with swelling.  She is not on any statin medications at this time.  She would be willing to do blood work today.     While in the hospital she was found to have an elevated A1c consistent with prediabetes.  She's willing to get this lab work today as well.    She declines wanting to do a  mammogram.    She would be willing to do a bone density however with her next set of lab work.             Lab Results   Component Value Date    WBC 11.24 05/24/2016    HGB 15.0 05/24/2016    HCT 46.8 05/24/2016     05/24/2016    CHOL 163 05/24/2016    TRIG 162 (H) 05/24/2016    HDL 42 05/24/2016    ALT 34 05/24/2016    AST 37 05/24/2016     04/26/2016    K 4.6 04/26/2016     04/26/2016    CREATININE 0.8 04/26/2016    BUN 16 04/26/2016    CO2 25 04/26/2016    TSH 1.736 04/26/2016    HGBA1C 6.1 04/26/2016       Review of Systems   Constitutional: Positive for activity change, appetite change and fatigue. Negative for fever and unexpected weight change.   HENT: Positive for hearing loss. Negative for rhinorrhea and trouble swallowing.    Eyes: Positive for visual disturbance. Negative for discharge.   Respiratory: Positive for shortness of breath. Negative for chest tightness and wheezing.    Cardiovascular: Negative for chest pain and palpitations.        Leg pain with walking     Gastrointestinal: Negative for blood in stool, constipation, diarrhea and vomiting.   Endocrine: Negative for polydipsia and polyuria.   Genitourinary: Negative for difficulty urinating, dysuria, hematuria and menstrual problem.   Musculoskeletal: Positive for gait problem. Negative for arthralgias, joint swelling and neck pain.   Neurological: Negative for weakness and headaches.   Psychiatric/Behavioral: Negative for confusion and dysphoric mood.     Objective:     Vitals:    01/22/18 1349   BP: 128/70   Pulse: 74   Temp: 98.3 °F (36.8 °C)   SpO2: 96%  Comment: 2L   Weight: 96.4 kg (212 lb 8.4 oz)   Height: 5' (1.524 m)     Physical Exam   Constitutional: She appears well-developed and well-nourished.   Central obesity, wearing O2 nasal canula.    HENT:   Head: Normocephalic and atraumatic.   Right Ear: Tympanic membrane normal.   Left Ear: Tympanic membrane normal.   Mouth/Throat: Oropharynx is clear and moist.   Eyes:  Conjunctivae and EOM are normal.   Neck: Normal range of motion. Neck supple.   Cardiovascular: Normal rate, regular rhythm and normal heart sounds.    No edema bilaterally, pain in calves with walking.    Pulmonary/Chest: Effort normal. No respiratory distress. She has wheezes. She exhibits no tenderness.   Skin: Skin is warm. There is erythema.   Psychiatric: She has a normal mood and affect. Her behavior is normal.   Nursing note and vitals reviewed.    Assessment:     1. Hypoxemia requiring supplemental oxygen    2. Pain in both lower extremities    3. Claudication of both lower extremities    4. Encounter for screening for osteoporosis    5. Chronic obstructive pulmonary disease, unspecified COPD type    6. Need for pneumococcal vaccination    7. Hyperlipidemia, unspecified hyperlipidemia type    8. Abnormal glucose      Plan:   Maura was seen today for follow-up.    Diagnoses and all orders for this visit:    Hypoxemia requiring supplemental oxygen-continue with O2 at 2 L at this time.  Would restart albuterol treatments at least twice a day consider 3 times a day dosing.  Also want to rule out claudication symptoms before proceeding forward with her PFTs and 6 minute walk test.  We would also want to make sure this is cleared before she starts pulmonary rehabilitation.  Obtain ABIs and refer to cardiology.  -     TSH; Future  -     T4, free; Future  -     Lipid panel; Future  -     Hemoglobin A1c; Future  -     Comprehensive metabolic panel; Future  -     CBC auto differential; Future    Pain in both lower extremities-new concern for claudication obtain ABIs and refer to cardiology  -     Ambulatory referral to Cardiology  -     Cardiology Lab DANIELA Resting, Lower Extremities; Future  -     TSH; Future  -     T4, free; Future  -     Lipid panel; Future  -     Hemoglobin A1c; Future  -     Comprehensive metabolic panel; Future  -     CBC auto differential; Future    Claudication of both lower extremities-new  suspected obtain ABIs and refer to cardiology  -     Ambulatory referral to Cardiology  -     Cardiology Lab DANIELA Resting, Lower Extremities; Future  -     TSH; Future  -     T4, free; Future  -     Lipid panel; Future  -     Hemoglobin A1c; Future  -     Comprehensive metabolic panel; Future  -     CBC auto differential; Future    Encounter for screening for osteoporosis-doing screen prior to next visit  -     DXA Bone Density Spine And Hip; Future    Chronic obstructive pulmonary disease, unspecified COPD type-new diagnosis now requiring O2 at 2 L.  Restart albuterol twice a day dosing has follow-up scheduled with pulmonary in March  -     TSH; Future  -     T4, free; Future  -     Lipid panel; Future  -     Hemoglobin A1c; Future  -     Comprehensive metabolic panel; Future  -     CBC auto differential; Future    Need for pneumococcal vaccination-update today  -     (In Office Administered) Pneumococcal Polysaccharide Vaccine (23 Valent) (SQ/IM)    Hyperlipidemia, unspecified hyperlipidemia type-lab work today not currently on statin therapy  -     TSH; Future  -     T4, free; Future  -     Lipid panel; Future  -     Hemoglobin A1c; Future  -     Comprehensive metabolic panel; Future  -     CBC auto differential; Future    Abnormal glucose-noted with prediabetes in the hospital repeat A1c today  -     TSH; Future  -     T4, free; Future  -     Lipid panel; Future  -     Hemoglobin A1c; Future  -     Comprehensive metabolic panel; Future  -     CBC auto differential; Future    Other orders  -     Cancel: Mammo Digital Screening Bilateral With CAD; Future  -     albuterol-ipratropium 2.5mg-0.5mg/3mL (DUO-NEB) 0.5 mg-3 mg(2.5 mg base)/3 mL nebulizer solution; Take 3 mLs by nebulization every 6 (six) hours as needed for Wheezing or Shortness of Breath. Rescue            No Follow-up on file.

## 2018-01-23 ENCOUNTER — PATIENT MESSAGE (OUTPATIENT)
Dept: INTERNAL MEDICINE | Facility: CLINIC | Age: 78
End: 2018-01-23

## 2018-01-23 LAB
ALBUMIN SERPL BCP-MCNC: 3.4 G/DL
ALP SERPL-CCNC: 73 U/L
ALT SERPL W/O P-5'-P-CCNC: 26 U/L
ANION GAP SERPL CALC-SCNC: 14 MMOL/L
AST SERPL-CCNC: 24 U/L
BILIRUB SERPL-MCNC: 0.3 MG/DL
BUN SERPL-MCNC: 21 MG/DL
CALCIUM SERPL-MCNC: 10.2 MG/DL
CHLORIDE SERPL-SCNC: 101 MMOL/L
CHOLEST SERPL-MCNC: 278 MG/DL
CHOLEST/HDLC SERPL: 6.6 {RATIO}
CO2 SERPL-SCNC: 27 MMOL/L
CREAT SERPL-MCNC: 0.9 MG/DL
EST. GFR  (AFRICAN AMERICAN): >60 ML/MIN/1.73 M^2
EST. GFR  (NON AFRICAN AMERICAN): >60 ML/MIN/1.73 M^2
GLUCOSE SERPL-MCNC: 68 MG/DL
HDLC SERPL-MCNC: 42 MG/DL
HDLC SERPL: 15.1 %
LDLC SERPL CALC-MCNC: ABNORMAL MG/DL
NONHDLC SERPL-MCNC: 236 MG/DL
POTASSIUM SERPL-SCNC: 4.7 MMOL/L
PROT SERPL-MCNC: 7.3 G/DL
SODIUM SERPL-SCNC: 142 MMOL/L
T4 FREE SERPL-MCNC: 1.08 NG/DL
TRIGL SERPL-MCNC: 471 MG/DL
TSH SERPL DL<=0.005 MIU/L-ACNC: 1.62 UIU/ML

## 2018-01-23 NOTE — TELEPHONE ENCOUNTER
Labs show prediabetes with really high triglycerides (nonfasting cholesterol labs). Needs to consider adding medication metformin to help with sugar and lower triglycerides also. If willing will send in. Please inform. Thyroid labs normal.

## 2018-01-25 ENCOUNTER — CLINICAL SUPPORT (OUTPATIENT)
Dept: CARDIOLOGY | Facility: CLINIC | Age: 78
End: 2018-01-25
Attending: FAMILY MEDICINE
Payer: MEDICARE

## 2018-01-25 DIAGNOSIS — M79.604 PAIN IN BOTH LOWER EXTREMITIES: ICD-10-CM

## 2018-01-25 DIAGNOSIS — M79.605 PAIN IN BOTH LOWER EXTREMITIES: ICD-10-CM

## 2018-01-25 DIAGNOSIS — I73.9 CLAUDICATION OF BOTH LOWER EXTREMITIES: ICD-10-CM

## 2018-01-25 PROCEDURE — 93922 UPR/L XTREMITY ART 2 LEVELS: CPT | Mod: PBBFAC,PO | Performed by: INTERNAL MEDICINE

## 2018-01-26 LAB — VASCULAR ANKLE BRACHIAL INDEX (ABI) LEFT: 1.23 (ref 0.9–1.2)

## 2018-01-26 RX ORDER — METFORMIN HYDROCHLORIDE 500 MG/1
TABLET, EXTENDED RELEASE ORAL
Qty: 120 TABLET | Refills: 5 | Status: SHIPPED | OUTPATIENT
Start: 2018-01-26 | End: 2018-03-20

## 2018-01-26 NOTE — TELEPHONE ENCOUNTER
I called and advised patient of results and she expressed understanding and ios willing and said that you can send metformin to her pharmacy and her daughter will pick it up.aa

## 2018-01-29 ENCOUNTER — TELEPHONE (OUTPATIENT)
Dept: INTERNAL MEDICINE | Facility: CLINIC | Age: 78
End: 2018-01-29

## 2018-01-29 ENCOUNTER — PATIENT MESSAGE (OUTPATIENT)
Dept: INTERNAL MEDICINE | Facility: CLINIC | Age: 78
End: 2018-01-29

## 2018-01-29 NOTE — TELEPHONE ENCOUNTER
Right leg DANIELA is abnormal ( bp reading done recently for blood flow). Please proceed with visit with cardiologist Dr Stinson next week to discuss further for additional testing.     Got denial claim on the duoneb medications. Needs to use medicare part B for meds (outpt portion). I'll add code to the form, but may need to go to [facility] to receive. Let me know if willing to do so and I'll send in Rx.

## 2018-01-29 NOTE — TELEPHONE ENCOUNTER
----- Message from Gordon Lopez sent at 1/29/2018  1:37 PM CST -----  Contact: [facility] [phone]  Would like to consult with nurse regarding prior authorization.  Please call back at  [phone] option 3, case number [id number].

## 2018-01-30 ENCOUNTER — TELEPHONE (OUTPATIENT)
Dept: INTERNAL MEDICINE | Facility: CLINIC | Age: 78
End: 2018-01-30

## 2018-01-30 NOTE — TELEPHONE ENCOUNTER
----- Message from Radha Boswell sent at 1/30/2018  9:25 AM CST -----  Contact: [facility] Laine Lopez/Marjan Wise request a call from regarding a PA on Ipratropium Albuterol, ...  Please contact the caller at [phone] Reference Number [id number]

## 2018-01-30 NOTE — TELEPHONE ENCOUNTER
Called and spoke with [facility]. They stated that they do not have this patient listed as a member and she does not fill medications with them .

## 2018-02-05 ENCOUNTER — TELEPHONE (OUTPATIENT)
Dept: INTERNAL MEDICINE | Facility: CLINIC | Age: 78
End: 2018-02-05

## 2018-02-05 NOTE — TELEPHONE ENCOUNTER
I called medicare part D back and was covered under B at the local pharmacy. I called and left message with jcarlos florentino's son and he states that he will advise his sister of this.

## 2018-02-05 NOTE — TELEPHONE ENCOUNTER
----- Message from Ludy Galvan sent at 2/5/2018  7:13 AM CST -----  Contact: [facility]   [phone],,,,,  Ref# [id number] ,,, needs clinical questions answered for a prior authorization,,, please call back

## 2018-02-08 ENCOUNTER — TELEPHONE (OUTPATIENT)
Dept: INTERNAL MEDICINE | Facility: CLINIC | Age: 78
End: 2018-02-08

## 2018-02-08 ENCOUNTER — CLINICAL SUPPORT (OUTPATIENT)
Dept: CARDIOLOGY | Facility: CLINIC | Age: 78
End: 2018-02-08
Payer: MEDICARE

## 2018-02-08 ENCOUNTER — OFFICE VISIT (OUTPATIENT)
Dept: CARDIOLOGY | Facility: CLINIC | Age: 78
End: 2018-02-08
Payer: MEDICARE

## 2018-02-08 VITALS
BODY MASS INDEX: 41.72 KG/M2 | SYSTOLIC BLOOD PRESSURE: 122 MMHG | DIASTOLIC BLOOD PRESSURE: 66 MMHG | HEART RATE: 80 BPM | HEIGHT: 60 IN | WEIGHT: 212.5 LBS

## 2018-02-08 DIAGNOSIS — I10 ESSENTIAL HYPERTENSION: ICD-10-CM

## 2018-02-08 DIAGNOSIS — M79.605 PAIN IN BOTH LOWER EXTREMITIES: ICD-10-CM

## 2018-02-08 DIAGNOSIS — H90.2 CONDUCTIVE HEARING LOSS: ICD-10-CM

## 2018-02-08 DIAGNOSIS — M79.604 PAIN IN BOTH LOWER EXTREMITIES: ICD-10-CM

## 2018-02-08 DIAGNOSIS — M79.604 PAIN IN BOTH LOWER EXTREMITIES: Primary | ICD-10-CM

## 2018-02-08 DIAGNOSIS — R06.02 SHORTNESS OF BREATH: ICD-10-CM

## 2018-02-08 DIAGNOSIS — Z71.89 HEARING AID CONSULTATION: Primary | ICD-10-CM

## 2018-02-08 DIAGNOSIS — M79.605 PAIN IN BOTH LOWER EXTREMITIES: Primary | ICD-10-CM

## 2018-02-08 PROCEDURE — 99204 OFFICE O/P NEW MOD 45 MIN: CPT | Mod: S$PBB,,, | Performed by: INTERNAL MEDICINE

## 2018-02-08 PROCEDURE — 1159F MED LIST DOCD IN RCRD: CPT | Mod: ,,, | Performed by: INTERNAL MEDICINE

## 2018-02-08 PROCEDURE — 93010 ELECTROCARDIOGRAM REPORT: CPT | Mod: ,,, | Performed by: INTERNAL MEDICINE

## 2018-02-08 PROCEDURE — 99213 OFFICE O/P EST LOW 20 MIN: CPT | Mod: PBBFAC,25,PO | Performed by: INTERNAL MEDICINE

## 2018-02-08 PROCEDURE — 1125F AMNT PAIN NOTED PAIN PRSNT: CPT | Mod: ,,, | Performed by: INTERNAL MEDICINE

## 2018-02-08 PROCEDURE — 99999 PR PBB SHADOW E&M-EST. PATIENT-LVL III: CPT | Mod: PBBFAC,,, | Performed by: INTERNAL MEDICINE

## 2018-02-08 PROCEDURE — 93005 ELECTROCARDIOGRAM TRACING: CPT | Mod: PBBFAC,PO | Performed by: INTERNAL MEDICINE

## 2018-02-08 NOTE — LETTER
February 11, 2018      Yessy Andrade MD  [address] [facility]  [address]  [place] [address]           [place]  [address] [facility] [address]  Phone: [phone]  Fax: [phone]          Patient: Maura Singh   MR Number: [id number]   YOB: 1940   Date of Visit: 2/8/2018       Dear Dr. Yessy Andrade:    Thank you for referring Maura Singh to me for evaluation. Attached you will find relevant portions of my assessment and plan of care.    If you have questions, please do not hesitate to call me. I look forward to following Maura Singh along with you.    Sincerely,    Carlos Stinson MD    Enclosure  CC:  No Recipients    If you would like to receive this communication electronically, please contact [email or web address] or [phone] to request more information on [employer] Link access.    For providers and/or their staff who would like to refer a patient to [facility], please contact us through our one-stop-shop provider referral line, [facility], at [phone].    If you feel you have received this communication in error or would no longer like to receive these types of communications, please e-mail [email or web address]

## 2018-02-08 NOTE — TELEPHONE ENCOUNTER
----- Message from Gill Miller sent at 2/8/2018  2:31 PM CST -----  Contact: Lilian Obrien/daughter  Pt is needing to get a new hearing aid for the rt ear.  Daughter is needing Dr Andrade to send an order for a hearing evaluation and hearing aid to [facility] at fax number [phone] so they can call pt from there.  Please call Lilian cardoso back at [phone] to let her know it's been faxed.

## 2018-02-08 NOTE — PROGRESS NOTES
Subjective:   Patient ID:  Muara Singh is a 78 y.o. female who presents for follow-up of Claudication  Pt with B  leg pain x 2 years. DANIELA is normal.Patient denies CP but with FRAGOSO/SOB.  Echo 2 years which normal.    Hypertension   This is a chronic problem. The current episode started more than 1 year ago. The problem has been gradually improving since onset. Associated symptoms include anxiety and shortness of breath. Pertinent negatives include no chest pain or palpitations. Past treatments include ACE inhibitors. The current treatment provides moderate improvement. Compliance problems include exercise.    Shortness of Breath   This is a recurrent problem. The current episode started more than 1 month ago. The problem occurs intermittently. The problem has been gradually improving. Pertinent negatives include no chest pain or leg swelling. The symptoms are aggravated by any activity. The treatment provided moderate relief.       Review of Systems   Constitution: Negative. Negative for weight gain.   HENT: Negative.    Eyes: Negative.    Cardiovascular: Negative.  Negative for chest pain, leg swelling and palpitations.   Respiratory: Positive for shortness of breath.    Endocrine: Negative.    Hematologic/Lymphatic: Negative.    Skin: Negative.    Musculoskeletal: Negative for muscle weakness.   Gastrointestinal: Negative.    Genitourinary: Negative.    Neurological: Negative.  Negative for dizziness.   Psychiatric/Behavioral: Negative.    Allergic/Immunologic: Negative.      Family History   Problem Relation Age of Onset    Colon cancer Mother     ALS Father      Past Medical History:   Diagnosis Date    Deaf     Hypertension     Lung disease     copd     Current Outpatient Prescriptions on File Prior to Visit   Medication Sig Dispense Refill    albuterol (PROAIR HFA) 90 mcg/actuation inhaler Inhale 2 puffs into the lungs every 6 (six) hours as needed for Shortness of Breath. Rescue       albuterol-ipratropium 2.5mg-0.5mg/3mL (DUO-NEB) 0.5 mg-3 mg(2.5 mg base)/3 mL nebulizer solution Take 3 mLs by nebulization every 6 (six) hours as needed for Wheezing or Shortness of Breath. Rescue 90 vial 3    furosemide (LASIX) 20 MG tablet Take 1 tablet (20 mg total) by mouth once daily. 3 tablet 0    lisinopril (PRINIVIL,ZESTRIL) 40 MG tablet       metFORMIN (GLUCOPHAGE-XR) 500 MG 24 hr tablet 1 tab po daily x 1wk, 2 tab po daily 120 tablet 5    mucus clearing device (ACAPELLA) Janna 1 Device by Misc.(Non-Drug; Combo Route) route 3 (three) times daily as needed. 1 Device 0    multivitamin (THERAGRAN) per tablet Take 1 tablet by mouth once daily.       No current facility-administered medications on file prior to visit.      Review of patient's allergies indicates:  No Known Allergies    Objective:     Physical Exam   Constitutional: She is oriented to person, place, and time. She appears well-developed and well-nourished.   HENT:   Head: Normocephalic and atraumatic.   Eyes: Conjunctivae and EOM are normal. Pupils are equal, round, and reactive to light.   Neck: Normal range of motion. Neck supple.   Cardiovascular: Normal rate, regular rhythm, normal heart sounds and intact distal pulses.    Pulmonary/Chest: Effort normal and breath sounds normal.   Abdominal: Soft. Bowel sounds are normal.   Musculoskeletal: Normal range of motion.   Neurological: She is alert and oriented to person, place, and time.   Skin: Skin is warm and dry.   Psychiatric: She has a normal mood and affect.   Nursing note and vitals reviewed.      Assessment:     1. Pain in both lower extremities    2. HTN  3. DM  4. Obesity  5. Ex smoker  6. SOB  Plan:     Pain in both lower extremities  -     SCHEDULED EKG 12-LEAD (to Muse); Future      Continue lisinopril-htn  Exercise  Asa- primary prevention  Stress test

## 2018-02-09 ENCOUNTER — TELEPHONE (OUTPATIENT)
Dept: INTERNAL MEDICINE | Facility: CLINIC | Age: 78
End: 2018-02-09

## 2018-02-09 NOTE — TELEPHONE ENCOUNTER
----- Message from Marine Beal sent at 2/9/2018 11:33 AM CST -----  Contact: jonathan booker-daughter   Would like to consult with nurse regarding a f/uon hearing evaluation faxed to [facility].Please fax number is [phone] [facility] audiology department. Please call back if it has been taken care of at [phone].      Thanks,  Marine Beal

## 2018-02-09 NOTE — TELEPHONE ENCOUNTER
Pt is needing a referral to the [facility] for an audiology test to proceed with getting the testing done and her hearing aids.

## 2018-02-09 NOTE — TELEPHONE ENCOUNTER
----- Message from Gill Miller sent at 2/8/2018  2:31 PM CST -----  Contact: Lilian Obrien/daughter  Pt is needing to get a new hearing aid for the rt ear.  Daughter is needing Dr Andrade to send an order for a hearing evaluation and hearing aid to [facility] at fax number [phone] so they can call pt from there.  Please call Lilian cardoso back at [phone] to let her know it's been faxed.

## 2018-02-09 NOTE — TELEPHONE ENCOUNTER
I called and advised patient's daughter that referral was printed by Herlinda iniguez and faxed to [facility] and she expressed understanding.aa

## 2018-02-14 ENCOUNTER — TELEPHONE (OUTPATIENT)
Dept: INTERNAL MEDICINE | Facility: CLINIC | Age: 78
End: 2018-02-14

## 2018-02-14 NOTE — TELEPHONE ENCOUNTER
Spoke with patient's daughter Lilian.  Lilian states that she doesn't think the A1C is necessary for the patient to have at this time.  She would like to wait until the patient follows up with Dr. Andrade before having the lab drawn.  Appt cancelled.

## 2018-02-14 NOTE — TELEPHONE ENCOUNTER
----- Message from Marine Beal sent at 2/14/2018  4:11 PM CST -----  Contact: jonathan -daughter  Would like to consult with nurse regarding scheduled labs. Please call back at [phone].      Thanks,  Marine Beal

## 2018-03-11 ENCOUNTER — PATIENT MESSAGE (OUTPATIENT)
Dept: PULMONOLOGY | Facility: CLINIC | Age: 78
End: 2018-03-11

## 2018-03-12 ENCOUNTER — TELEPHONE (OUTPATIENT)
Dept: PULMONOLOGY | Facility: CLINIC | Age: 78
End: 2018-03-12

## 2018-03-12 NOTE — TELEPHONE ENCOUNTER
Returned patient daughter call, who stated patient currently in hosp pancreatitis appt cancel and family instructed to call and reschedule once patient D/c from hosp, daughter Lilian stated understanding

## 2018-03-14 ENCOUNTER — TELEPHONE (OUTPATIENT)
Dept: INTERNAL MEDICINE | Facility: CLINIC | Age: 78
End: 2018-03-14

## 2018-03-14 NOTE — TELEPHONE ENCOUNTER
Which hospital? I don't see it in the care everywhere. If I have opening can be seen next week. If not, let me know, and we could put on the PA's schedule for next week.

## 2018-03-14 NOTE — TELEPHONE ENCOUNTER
----- Message from Nataly Candelaria sent at 3/14/2018  3:30 PM CDT -----  Contact: Pt's Daughter  She is calling in regards to the pt was released from the hospital on 03/13/18 and she needs to see her PCP within 7  Days.  She stated it was for pancreitis?    She is requesting for her to be seen sooner than the scheduled appt and can be reached at [phone]

## 2018-03-20 ENCOUNTER — OFFICE VISIT (OUTPATIENT)
Dept: INTERNAL MEDICINE | Facility: CLINIC | Age: 78
End: 2018-03-20
Payer: MEDICARE

## 2018-03-20 VITALS
OXYGEN SATURATION: 96 % | HEIGHT: 60 IN | DIASTOLIC BLOOD PRESSURE: 78 MMHG | SYSTOLIC BLOOD PRESSURE: 120 MMHG | WEIGHT: 203.94 LBS | BODY MASS INDEX: 40.04 KG/M2 | TEMPERATURE: 97 F | HEART RATE: 80 BPM

## 2018-03-20 DIAGNOSIS — E11.9 CONTROLLED TYPE 2 DIABETES MELLITUS WITHOUT COMPLICATION, WITHOUT LONG-TERM CURRENT USE OF INSULIN: ICD-10-CM

## 2018-03-20 DIAGNOSIS — R79.89 ELEVATED TROPONIN: ICD-10-CM

## 2018-03-20 DIAGNOSIS — I21.4 NSTEMI (NON-ST ELEVATED MYOCARDIAL INFARCTION): ICD-10-CM

## 2018-03-20 DIAGNOSIS — Z99.81 HYPOXEMIA REQUIRING SUPPLEMENTAL OXYGEN: ICD-10-CM

## 2018-03-20 DIAGNOSIS — R09.02 HYPOXEMIA REQUIRING SUPPLEMENTAL OXYGEN: ICD-10-CM

## 2018-03-20 DIAGNOSIS — N20.0 KIDNEY STONE ON LEFT SIDE: ICD-10-CM

## 2018-03-20 DIAGNOSIS — E66.01 MORBID OBESITY: ICD-10-CM

## 2018-03-20 DIAGNOSIS — N28.1 BENIGN CYST OF LEFT KIDNEY: ICD-10-CM

## 2018-03-20 DIAGNOSIS — K85.00 IDIOPATHIC ACUTE PANCREATITIS, UNSPECIFIED COMPLICATION STATUS: Primary | ICD-10-CM

## 2018-03-20 DIAGNOSIS — R06.02 SHORTNESS OF BREATH: ICD-10-CM

## 2018-03-20 PROCEDURE — 99215 OFFICE O/P EST HI 40 MIN: CPT | Mod: S$PBB,,, | Performed by: FAMILY MEDICINE

## 2018-03-20 PROCEDURE — 99213 OFFICE O/P EST LOW 20 MIN: CPT | Mod: PBBFAC,PO | Performed by: FAMILY MEDICINE

## 2018-03-20 PROCEDURE — 99999 PR PBB SHADOW E&M-EST. PATIENT-LVL III: CPT | Mod: PBBFAC,,, | Performed by: FAMILY MEDICINE

## 2018-03-20 RX ORDER — LISINOPRIL 40 MG/1
40 TABLET ORAL DAILY
Qty: 90 TABLET | Refills: 3 | Status: SHIPPED | OUTPATIENT
Start: 2018-03-20 | End: 2019-04-02 | Stop reason: SDUPTHER

## 2018-03-20 RX ORDER — PRAVASTATIN SODIUM 20 MG/1
TABLET ORAL
COMMUNITY
Start: 2018-03-15 | End: 2018-05-02

## 2018-03-20 RX ORDER — FUROSEMIDE 20 MG/1
20 TABLET ORAL DAILY
Qty: 90 TABLET | Refills: 3 | Status: SHIPPED | OUTPATIENT
Start: 2018-03-20 | End: 2019-04-02 | Stop reason: SDUPTHER

## 2018-03-20 RX ORDER — LANCETS
EACH MISCELLANEOUS
Qty: 100 EACH | Refills: 3 | Status: SHIPPED | OUTPATIENT
Start: 2018-03-20 | End: 2018-11-06

## 2018-03-20 RX ORDER — INSULIN PUMP SYRINGE, 3 ML
EACH MISCELLANEOUS
Qty: 1 EACH | Refills: 0 | Status: SHIPPED | OUTPATIENT
Start: 2018-03-20 | End: 2018-11-06

## 2018-03-20 RX ORDER — LANCETS
EACH MISCELLANEOUS
Qty: 100 EACH | Refills: 3 | Status: SHIPPED | OUTPATIENT
Start: 2018-03-20 | End: 2018-03-20 | Stop reason: SDUPTHER

## 2018-03-20 RX ORDER — INSULIN PUMP SYRINGE, 3 ML
EACH MISCELLANEOUS
Qty: 1 EACH | Refills: 0 | Status: SHIPPED | OUTPATIENT
Start: 2018-03-20 | End: 2018-03-20 | Stop reason: SDUPTHER

## 2018-03-20 RX ORDER — PANTOPRAZOLE SODIUM 40 MG/1
TABLET, DELAYED RELEASE ORAL
COMMUNITY
Start: 2018-03-15 | End: 2018-11-06

## 2018-03-20 NOTE — PROGRESS NOTES
Subjective:      Patient ID: Maura Singh is a 78 y.o. female.    Chief Complaint: Hospital Follow Up (BRG) and Medication Refill (lasix)    Disclaimer:  This note is prepared using voice recognition software and as such is likely to have errors and has not been proof read. Please contact me for questions.     Patient's coming in today with her daughter Lilian for follow-up from recent hospitalization at [facility].  She was admitted on March 9, 2018 due to severe abdominal pain nausea and vomiting.  She had acute onset of pain on both sides the abdomen.  There is no burning with urination and no blood in the urine.  She was seen in the emergency room and diagnosed via CT scan with acute unconjugated pancreatitis.  She also had a small left kidney stone and a left renal cysts noted also on the CT scan abdomen.  They basically admitted her place her on IV fluids and pain management and made nothing by mouth.  They discontinued her metformin.  In the hospital they did check an A1c which showed at 6.6.  This was her first setting of diagnosis of diabetes.  Prior to that she had a hemoglobin A1c outside of our office at 6.1.  We had started the metformin back in January and she was tolerating it well.  Of note her triglycerides in the past of been as high as in the 470s.  Upon discharge they discontinued the metformin because they were uncertain if the pancreatic test was related at that time.  At this point I don't believe the metformin has caused it however the patient's does not really want to go back on medications if she does not need to.  Since being discharged she is dramatically changed her diet.  She's doing a lower fat diabetic.  She's also lost some weight as well.  She is down 9 pounds since her last visit in January.  She was continued on the lisinopril 40 mg and Lasix 20 mg.  She was started on pravastatin as well.  She's hoping to receive a diabetic glucometers is so that they can start walking  monitoring this.  She does have chronic care home health with PT and nursing.    She is chronically on oxygen therapy due to hypoxemia.  She was unable to get the further evaluation with pulmonary last month because of this hospitalization.  They did meet with the cardiologist and with this admission she had elevated troponin levels.  Her 3 markers did show 0.212 then 0.296 then 0.279.  It was thought that this was secondary due to ischemia from strain but there were no EKG changes.  The label this as an non-STEMI event.  It was recommended that she have cardiology follow-up out's patient.  She does have a nuclear stress test set up for May however at this time I believe this possibly should be moved up to April so that they can look into this further as she does chronically have shortness of breath.  No active chest pain at this time.    For this reason she be willing to me about her pulmonary appointments to May.  Currently she saturating at 96% on 2 L of O2 via nasal cannula.    She's also been assessed for hearing aids.  They fixed her right hearing aid and they're getting a (8.  She's qualified to the emergency center and to especially fun to get this free of charge based on her financial income.        Lab Results   Component Value Date    WBC 11.42 01/22/2018    HGB 15.0 01/22/2018    HCT 46.5 01/22/2018     01/22/2018    CHOL 278 (H) 01/22/2018    TRIG 471 (H) 01/22/2018    HDL 42 01/22/2018    ALT 26 01/22/2018    AST 24 01/22/2018     01/22/2018    K 4.7 01/22/2018     01/22/2018    CREATININE 0.9 01/22/2018    BUN 21 01/22/2018    CO2 27 01/22/2018    TSH 1.622 01/22/2018    HGBA1C 6.1 (H) 01/22/2018       Review of Systems   Constitutional: Positive for activity change, appetite change and fatigue. Negative for chills, fever and unexpected weight change.   HENT: Positive for hearing loss.    Respiratory: Positive for shortness of breath. Negative for apnea, cough and wheezing.     Cardiovascular: Negative for chest pain and palpitations.   Gastrointestinal: Positive for abdominal pain. Negative for abdominal distention, constipation, diarrhea, nausea and vomiting.   Genitourinary: Negative for difficulty urinating.   Neurological: Negative for light-headedness and headaches.   Psychiatric/Behavioral: Negative for sleep disturbance. The patient is nervous/anxious.      Objective:     Vitals:    03/20/18 1120   BP: 120/78   Pulse: 80   Temp: 97.3 °F (36.3 °C)   SpO2: 96%  Comment: 2l of 02   Weight: 92.5 kg (203 lb 14.8 oz)   Height: 5' (1.524 m)     Physical Exam   Constitutional: She appears well-developed and well-nourished.   MO WF wearing nasal canula   HENT:   Head: Normocephalic and atraumatic.   Right Ear: Tympanic membrane normal.   Left Ear: Tympanic membrane normal.   Mouth/Throat: Oropharynx is clear and moist.   Eyes: Conjunctivae and EOM are normal.   Neck: Normal range of motion. Neck supple.   Cardiovascular: Normal rate and regular rhythm.    Pulmonary/Chest: Effort normal. She has decreased breath sounds. She has no wheezes.   Abdominal: Soft. Bowel sounds are normal. She exhibits no distension and no mass. There is tenderness in the epigastric area. There is no rebound and no guarding. No hernia.   Psychiatric: She has a normal mood and affect. Her behavior is normal.   Nursing note and vitals reviewed.    Assessment:     1. Idiopathic acute pancreatitis, unspecified complication status    2. Elevated troponin    3. NSTEMI (non-ST elevated myocardial infarction)    4. Hypoxemia requiring supplemental oxygen    5. Controlled type 2 diabetes mellitus without complication, without long-term current use of insulin    6. Shortness of breath    7. Benign cyst of left kidney    8. Kidney stone on left side    9. Morbid obesity      Plan:   Maura was seen today for hospital follow up and medication refill.    Diagnoses and all orders for this visit:    Idiopathic acute pancreatitis,  unspecified complication status-status post hospitalization at [facility]. from March 9 to March 13.  Patient is to continue low-fat diet.  Comments:  resolving, suspect related to elevated  TG not metformin.  At this time we'll hold off on reinitiating the metformin.    Elevated troponin ×3 while in hospital suspected ischemia due to strain  Comments:  needs to move up nuclear stress test. currently scheduled in MAY will forward to Dr Stinson to schedule for april.  We'll see if this can be done sooner since she still having some shortness of breath issues.    NSTEMI (non-ST elevated myocardial infarction)  Comments:  needs to move up nuclear stress test. currently scheduled in MAY will forward to Dr Stinson to schedule for april.     Hypoxemia requiring supplemental oxygen  Comments:  on 2L nasal canula, continue with get nebulizers, follow-up with cardiac testing and then proceed with pulmonary workup afterwards.  See if we can reschedule this for May    Controlled type 2 diabetes mellitus without complication, without long-term current use of insulin  Comments:  a1c at 6.6 in hospital. off metformin since pancreatitis. needing glucometer.  We'll send in.  Holding off on metformin at this time.  Changing diet.  Will address foot exam and eye exams at her next visit    Shortness of breath  Comments:  with chronic o2 requirement, but now also with nstemi. needing cardiac workup also.  Continue with Lasix at this time  Orders:  -     furosemide (LASIX) 20 MG tablet; Take 1 tablet (20 mg total) by mouth once daily.    Benign cyst of left kidney  Comments:  on imaging from ct scan at [facility].  Not problematic at this time    Kidney stone on left side  Comments:  on imaging from ct scan at [facility].  Not problematic at this time    Morbid obesity    Other orders  -     Discontinue: blood-glucose meter kit; To check BG 1 times daily, to use with insurance preferred meter  -     Discontinue: lancets Misc; To check BG 1 times  daily, to use with insurance preferred meter  -     Discontinue: blood sugar diagnostic Strp; To check BG 1 times daily, to use with insurance preferred meter  -     lisinopril (PRINIVIL,ZESTRIL) 40 MG tablet; Take 1 tablet (40 mg total) by mouth once daily.  -     blood-glucose meter kit; To check BG 1 times daily, to use with insurance preferred meter  -     blood sugar diagnostic Strp; To check BG 1 times daily, to use with insurance preferred meter  -     lancets Misc; To check BG 1 times daily, to use with insurance preferred meter    Time spent: 45 minutes in face to face discussion concerning diagnosis, prognosis, review of lab and test results, benefits of treatment as well as management of disease, counseling of patient and coordination of care between various health care providers . Greater than half the time spent was used for coordination of care and counseling of patient.           Follow-up if symptoms worsen or fail to improve.

## 2018-03-20 NOTE — PROGRESS NOTES
Patient declined to schedule at this time for pulmonary her daughter verbalized that they are going to wait, they are more concerned with cardiac issues at this time.aa

## 2018-03-20 NOTE — Clinical Note
David, can we move up this patient's nuclear stress test? Was in hospital at [facility] for pancreatitis. Had troponoins elevated x 3 all though still 0.2 level and stated NSTEMI. No chest pain, but with sob still and oxygen/resp issues chronically. After your visit, then would proceed with pulm followup in may. Please let me know if you have further questions.  Sincerely,  Yessy Andrade MD

## 2018-03-21 ENCOUNTER — TELEPHONE (OUTPATIENT)
Dept: CARDIOLOGY | Facility: CLINIC | Age: 78
End: 2018-03-21

## 2018-03-21 NOTE — TELEPHONE ENCOUNTER
Called pt and was told to call pt's daughter, Lilian. I called and scheduled her nuclear stress test for 4-3 at 11:30 am Mailing to pt with instructions for test and she agreed. maranda

## 2018-03-27 ENCOUNTER — TELEPHONE (OUTPATIENT)
Dept: INTERNAL MEDICINE | Facility: CLINIC | Age: 78
End: 2018-03-27

## 2018-03-27 DIAGNOSIS — E66.01 MORBID OBESITY: ICD-10-CM

## 2018-03-27 DIAGNOSIS — Z99.81 HYPOXEMIA REQUIRING SUPPLEMENTAL OXYGEN: Primary | ICD-10-CM

## 2018-03-27 DIAGNOSIS — R09.02 HYPOXEMIA REQUIRING SUPPLEMENTAL OXYGEN: Primary | ICD-10-CM

## 2018-03-27 NOTE — TELEPHONE ENCOUNTER
----- Message from Cameron Balderas sent at 3/27/2018 10:18 AM CDT -----  Contact: leatha sibley/ [facility]  She's calling in regards to a request for a walker with wheels, pls fax this to: [phone] attn: Génesis/ bryan# [phone]

## 2018-04-02 ENCOUNTER — TELEPHONE (OUTPATIENT)
Dept: INTERNAL MEDICINE | Facility: CLINIC | Age: 78
End: 2018-04-02

## 2018-04-02 DIAGNOSIS — R26.9 GAIT ABNORMALITY: ICD-10-CM

## 2018-04-02 DIAGNOSIS — I10 ESSENTIAL HYPERTENSION: ICD-10-CM

## 2018-04-02 DIAGNOSIS — R53.1 WEAKNESS: ICD-10-CM

## 2018-04-02 DIAGNOSIS — E66.01 MORBID OBESITY: ICD-10-CM

## 2018-04-02 DIAGNOSIS — Z99.81 HYPOXEMIA REQUIRING SUPPLEMENTAL OXYGEN: Primary | ICD-10-CM

## 2018-04-02 DIAGNOSIS — R09.02 HYPOXEMIA REQUIRING SUPPLEMENTAL OXYGEN: Primary | ICD-10-CM

## 2018-04-02 NOTE — TELEPHONE ENCOUNTER
----- Message from Miriam Nicole sent at 4/2/2018  2:48 PM CDT -----  Contact: [facility] (Kuldeep)  Please give Kuldeep  A call at [phone] regarding the orders for the pt for a rolling walker and states that pt refused because that's no the one you guys talked about and they need a new order for the roll aider with the seat and breaks. Fax to [phone]

## 2018-04-03 ENCOUNTER — HOSPITAL ENCOUNTER (OUTPATIENT)
Dept: RADIOLOGY | Facility: HOSPITAL | Age: 78
Discharge: HOME | End: 2018-04-03
Attending: INTERNAL MEDICINE
Payer: MEDICARE

## 2018-04-03 ENCOUNTER — CLINICAL SUPPORT (OUTPATIENT)
Dept: CARDIOLOGY | Facility: CLINIC | Age: 78
End: 2018-04-03
Attending: INTERNAL MEDICINE
Payer: MEDICARE

## 2018-04-03 DIAGNOSIS — R06.02 SHORTNESS OF BREATH: ICD-10-CM

## 2018-04-03 LAB — DIASTOLIC DYSFUNCTION: NO

## 2018-04-03 PROCEDURE — 78452 HT MUSCLE IMAGE SPECT MULT: CPT | Mod: 26,,, | Performed by: INTERNAL MEDICINE

## 2018-04-03 PROCEDURE — 93018 CV STRESS TEST I&R ONLY: CPT | Mod: S$PBB,,, | Performed by: INTERNAL MEDICINE

## 2018-04-03 PROCEDURE — 93016 CV STRESS TEST SUPVJ ONLY: CPT | Mod: S$PBB,,, | Performed by: INTERNAL MEDICINE

## 2018-04-03 PROCEDURE — A9502 TC99M TETROFOSMIN: HCPCS | Mod: PO

## 2018-04-06 ENCOUNTER — TELEPHONE (OUTPATIENT)
Dept: CARDIOLOGY | Facility: CLINIC | Age: 78
End: 2018-04-06

## 2018-04-06 NOTE — TELEPHONE ENCOUNTER
----- Message from Carlos Stinson MD sent at 4/5/2018  7:29 AM CDT -----  Please tell pt stress test is negative

## 2018-04-19 ENCOUNTER — TELEPHONE (OUTPATIENT)
Dept: INTERNAL MEDICINE | Facility: CLINIC | Age: 78
End: 2018-04-19

## 2018-04-19 ENCOUNTER — PATIENT OUTREACH (OUTPATIENT)
Dept: ADMINISTRATIVE | Facility: HOSPITAL | Age: 78
End: 2018-04-19

## 2018-04-19 NOTE — TELEPHONE ENCOUNTER
Called and spoke with Cherelle. She stated that she had discharged pt last week from PT. They went through an agency name change and they had readmitted her but Cherelle stated that she doesn't need any PT right now. Just an FYI for us.

## 2018-04-19 NOTE — TELEPHONE ENCOUNTER
----- Message from Norma Duff sent at 4/19/2018 11:20 AM CDT -----  Contact: [facility]/ [place]  Caller is calling regarding releaseing pt from Physicals therapy. Caller states that company name change and is requesting Physical Therapy but pt no longer needs it, pt is doing well. Pt was release last week.    [phone]

## 2018-05-02 ENCOUNTER — LAB VISIT (OUTPATIENT)
Dept: LAB | Facility: HOSPITAL | Age: 78
End: 2018-05-02
Attending: FAMILY MEDICINE
Payer: MEDICARE

## 2018-05-02 ENCOUNTER — OFFICE VISIT (OUTPATIENT)
Dept: INTERNAL MEDICINE | Facility: CLINIC | Age: 78
End: 2018-05-02
Payer: MEDICARE

## 2018-05-02 VITALS
DIASTOLIC BLOOD PRESSURE: 82 MMHG | SYSTOLIC BLOOD PRESSURE: 126 MMHG | WEIGHT: 197.31 LBS | HEIGHT: 60 IN | BODY MASS INDEX: 38.74 KG/M2 | TEMPERATURE: 98 F

## 2018-05-02 DIAGNOSIS — Z99.81 HYPOXEMIA REQUIRING SUPPLEMENTAL OXYGEN: ICD-10-CM

## 2018-05-02 DIAGNOSIS — R09.02 HYPOXEMIA REQUIRING SUPPLEMENTAL OXYGEN: ICD-10-CM

## 2018-05-02 DIAGNOSIS — E78.2 MIXED HYPERLIPIDEMIA: ICD-10-CM

## 2018-05-02 DIAGNOSIS — Z59.9 FINANCIAL DIFFICULTIES: ICD-10-CM

## 2018-05-02 DIAGNOSIS — I10 ESSENTIAL HYPERTENSION: ICD-10-CM

## 2018-05-02 DIAGNOSIS — Z23 NEED FOR SHINGLES VACCINE: ICD-10-CM

## 2018-05-02 DIAGNOSIS — H53.9 VISION CHANGES: ICD-10-CM

## 2018-05-02 DIAGNOSIS — K21.9 GASTROESOPHAGEAL REFLUX DISEASE, ESOPHAGITIS PRESENCE NOT SPECIFIED: ICD-10-CM

## 2018-05-02 DIAGNOSIS — Z12.31 ENCOUNTER FOR SCREENING MAMMOGRAM FOR BREAST CANCER: ICD-10-CM

## 2018-05-02 DIAGNOSIS — Z13.820 ENCOUNTER FOR SCREENING FOR OSTEOPOROSIS: ICD-10-CM

## 2018-05-02 DIAGNOSIS — Z00.00 MEDICARE ANNUAL WELLNESS VISIT, SUBSEQUENT: Primary | ICD-10-CM

## 2018-05-02 LAB
ALBUMIN SERPL BCP-MCNC: 3.8 G/DL
ALP SERPL-CCNC: 74 U/L
ALT SERPL W/O P-5'-P-CCNC: 33 U/L
ANION GAP SERPL CALC-SCNC: 12 MMOL/L
AST SERPL-CCNC: 27 U/L
BILIRUB SERPL-MCNC: 0.4 MG/DL
BUN SERPL-MCNC: 24 MG/DL
CALCIUM SERPL-MCNC: 10.2 MG/DL
CHLORIDE SERPL-SCNC: 102 MMOL/L
CHOLEST SERPL-MCNC: 272 MG/DL
CHOLEST/HDLC SERPL: 7.6 {RATIO}
CO2 SERPL-SCNC: 25 MMOL/L
CREAT SERPL-MCNC: 1.1 MG/DL
EST. GFR  (AFRICAN AMERICAN): 55.2 ML/MIN/1.73 M^2
EST. GFR  (NON AFRICAN AMERICAN): 47.9 ML/MIN/1.73 M^2
GLUCOSE SERPL-MCNC: 82 MG/DL
HDLC SERPL-MCNC: 36 MG/DL
HDLC SERPL: 13.2 %
LDLC SERPL CALC-MCNC: 173.6 MG/DL
NONHDLC SERPL-MCNC: 236 MG/DL
POTASSIUM SERPL-SCNC: 4.8 MMOL/L
PROT SERPL-MCNC: 7.6 G/DL
SODIUM SERPL-SCNC: 139 MMOL/L
TRIGL SERPL-MCNC: 312 MG/DL

## 2018-05-02 PROCEDURE — G0439 PPPS, SUBSEQ VISIT: HCPCS | Mod: ,,, | Performed by: FAMILY MEDICINE

## 2018-05-02 PROCEDURE — 80053 COMPREHEN METABOLIC PANEL: CPT

## 2018-05-02 PROCEDURE — 99214 OFFICE O/P EST MOD 30 MIN: CPT | Mod: PBBFAC,PO | Performed by: FAMILY MEDICINE

## 2018-05-02 PROCEDURE — 36415 COLL VENOUS BLD VENIPUNCTURE: CPT | Mod: PO

## 2018-05-02 PROCEDURE — 99214 OFFICE O/P EST MOD 30 MIN: CPT | Mod: S$PBB,25,, | Performed by: FAMILY MEDICINE

## 2018-05-02 PROCEDURE — 99999 PR PBB SHADOW E&M-EST. PATIENT-LVL IV: CPT | Mod: PBBFAC,,, | Performed by: FAMILY MEDICINE

## 2018-05-02 PROCEDURE — 80061 LIPID PANEL: CPT

## 2018-05-02 SDOH — SOCIAL DETERMINANTS OF HEALTH (SDOH): PROBLEM RELATED TO HOUSING AND ECONOMIC CIRCUMSTANCES, UNSPECIFIED: Z59.9

## 2018-05-02 NOTE — PROGRESS NOTES
Maura Singh presented for a follow-up Medicare AWV today. The following components were reviewed and updated:    · Medical history  · Family History  · Social history  · Allergies and Current Medications  · Health Maintenance  · Patient Care Team:  · Yessy Andrade MD as PCP - General (Family Medicine)   · Dr. Stinson cardiology  · K gone nurse practitioner pulmonary    **See Completed Assessments for Annual Wellness visit with in the encounter summary    · Medicare wellness assessment:  ·   Depression screening  · 1. In the past 2 weeks she has the patient felt down, depressed or hopeless? No  · 2. Over the past 2 weeks as the patient felt little interest or pleasure in doing things?  No  ·  Functional Ability/ Safety Screening  · 1. Was the  Patient's timed up and go test unsteady or longer than 30 seconds?  yes   · 2. Has the patient needed help with transportation shopping preparing meals housework laundry medications are managing money?  yes  · 3.  If the patient's home have rugs in the hallway, back grab bars in the bathroom, or poor lighting? No, not necessary per patient.   · 4.has there been any hearing difficulties?  Yes, new hearing aid  · Advance Directives:  · 1. Patient does not have a living will in place.     Vitals:    05/02/18 1314   BP: 126/82   Temp: 97.6 °F (36.4 °C)   TempSrc: Tympanic   Weight: 89.5 kg (197 lb 5 oz)   Height: 5' (1.524 m)     Body mass index is 38.53 kg/m².   ]      Medicare Annual Wellness Visit, Subsequent   Patient Active Problem List   Diagnosis    Hypoxemia requiring supplemental oxygen    Essential hypertension    Morbid obesity    Kidney stone on left side    Benign cyst of left kidney    Weakness    Gait abnormality    Mixed hyperlipidemia         Provided Maura with a 5-10 year written screening schedule and personal prevention plan. Recommendations were developed using the USPSTF age appropriate recommendations. Education, counseling, and referrals were  provided as needed.  After Visit Summary printed and given to patient which includes a list of additional screenings\tests needed.    Health Maintenance   Topic Date Due    TETANUS VACCINE  01/27/1958    Zoster Vaccine  01/27/1999    DEXA SCAN  05/01/2019 (Originally 1/27/1980)    Influenza Vaccine  08/01/2018    Lipid Panel  01/22/2019    Pneumococcal (65+)  Completed     Will refer patient to financial assistance outpatient case management to help assisting in getting her her bone density and mammograms performed because right now she's financially limited and these are her deterrents for obtaining these preventative measures.  Also discussed the shingles vaccine and seemingly get some financial assistance as well.  Follow-up in about 4 months (around 9/2/2018) for chronic issues Dr Andrade.      Yessy Andrade MD

## 2018-05-02 NOTE — PROGRESS NOTES
"Subjective:      Patient ID: Maura Singh is a 78 y.o. female.    Chief Complaint: Follow-up (Cardiac stress testing, vision problems, labs)    Disclaimer:  This note is prepared using voice recognition software and as such is likely to have errors and has not been proof read. Please contact me for questions.     Patient's coming in today with her daughter Lilian for follow-up in the last 3 months.  Since that time she did meet with cardiology.  She is having a nuclear stress test which was negative.  We also did ABIs which showed normal findings on the left and only minimally elevated on the right which was thought due to hard to compress the vessels.  She does occasionally get some cramps in the right leg but otherwise she's doing well.  She was placed on the pravastatin that initially she felt like it made her "burn from the inside".  On her last set of lab work she had significantly elevated triglycerides.  She also recently had a hospitalization for pancreatitis.  Since then she started doing omega 3 fish also supplements but stopped the statin.  She's willing to give it another try if necessary.  She has a follow-up next week with cardiology but she would prefer to hold off because she is financially strained due to her recent hospitalizations.    She also does have prediabetes.  She's been working hard on her diet and doing some changes.  A1c was at 6.1.  She is now on lisinopril 40 mg as well for blood pressure control.  Blood pressures look good.    She is due to get back into see pulmonary due to her chronic respiratory hypoxemic status.  She is on chronic oxygen.    She's also noticed significant vision changes with flashes and floaters recently.  She has difficult time when going from light to dark in seeing distances.  She's uncertain if she may have a cataract.  She is able to read though but is getting more difficult.    She also recently got a new hearing aid from the [employer] program through the " [facility] [place].  He was called the here now program.  They did provide her with a brand-new hearing aid free of charge.  This is made a dramatic impact in her quality of life.    Her biggest deterrent right now for additional testing or workup thus far has been finances.  She does not qualify for Medicaid.  She does only receives a security.  For this reason she's been hesitant to do bone density studies as well as also mammograms.  When I discussed with her if we could possibly get this covered with some financial assistance she states she would be willing to do the procedures but financially right now she just can't afford to do it.  I believe it would be worthwhile to do an outpatient case management to see about getting her preventative exams performed.              Lab Results   Component Value Date    WBC 11.42 01/22/2018    HGB 15.0 01/22/2018    HCT 46.5 01/22/2018     01/22/2018    CHOL 278 (H) 01/22/2018    TRIG 471 (H) 01/22/2018    HDL 42 01/22/2018    ALT 26 01/22/2018    AST 24 01/22/2018     01/22/2018    K 4.7 01/22/2018     01/22/2018    CREATININE 0.9 01/22/2018    BUN 21 01/22/2018    CO2 27 01/22/2018    TSH 1.622 01/22/2018    HGBA1C 6.1 (H) 01/22/2018       Review of Systems   Constitutional: Positive for fatigue. Negative for activity change, appetite change and unexpected weight change.   Respiratory: Negative for apnea, cough, chest tightness and shortness of breath.    Cardiovascular: Negative for chest pain and palpitations.   Gastrointestinal: Negative for abdominal distention and abdominal pain.   Musculoskeletal: Positive for myalgias. Negative for arthralgias.   Neurological: Negative for weakness.   Psychiatric/Behavioral: Negative for sleep disturbance. The patient is not nervous/anxious.      Objective:     Vitals:    05/02/18 1314   BP: 126/82   Temp: 97.6 °F (36.4 °C)   TempSrc: Tympanic   Weight: 89.5 kg (197 lb 5 oz)   Height: 5' (1.524 m)     Physical  Exam   Constitutional: She appears well-developed and well-nourished.   Central obesity using a walker wearing nasal cannula with hearing aid in the right   HENT:   Head: Normocephalic and atraumatic.   Right Ear: Tympanic membrane normal.   Left Ear: Tympanic membrane normal.   Mouth/Throat: Oropharynx is clear and moist.   Eyes: Conjunctivae and EOM are normal.   Neck: Normal range of motion. Neck supple. No thyromegaly present.   Cardiovascular: Normal rate and regular rhythm.    Pulmonary/Chest: Effort normal and breath sounds normal.   Skin: Skin is warm and dry.   Psychiatric: She has a normal mood and affect. Her behavior is normal.   Nursing note and vitals reviewed.    Assessment:     1. Mixed hyperlipidemia    2. Essential hypertension    3. Hypoxemia requiring supplemental oxygen    4. Gastroesophageal reflux disease, esophagitis presence not specified    5. Vision changes    6. Financial difficulties    7. Encounter for screening mammogram for breast cancer    8. Encounter for screening for osteoporosis    9. Need for shingles vaccine      Plan:   Maura was seen today for follow-up.    Diagnoses and all orders for this visit:    Mixed hyperlipidemia- will retry statin, high tg.  If intolerant again we'll give another trial to a secondary statin.  If still intolerant then would consider something like that he or fenofibrate.  -     Lipid panel; Future  -     Comprehensive metabolic panel; Future  -     Ambulatory referral to Outpatient Case Management    Essential hypertension-stable continue with Lasix and lisinopril.  -     Lipid panel; Future  -     Comprehensive metabolic panel; Future  -     Ambulatory referral to Outpatient Case Management    Hypoxemia requiring supplemental oxygen-needs to be rescheduled for pulmonary also for pulmonary function testing.  -     Ambulatory referral to Outpatient Case Management    Gastroesophageal reflux disease, esophagitis presence not specified-improving now since  being on protonic's test is recent pancreatitis  -     Ambulatory referral to Outpatient Case Management    Vision changes-needing referral to optometry for further evaluation possibly for cataracts  -     Ambulatory referral to Optometry  -     Ambulatory referral to Outpatient Case Management    Financial difficulties-patient reports that she is financially limited only receives a security.  Does not qualify for Medicaid.  Even the 20% copayment for some of her additional studies is to limiting for her.  She would be willing to do the mammogram and bone density if we can possibly get this covered.  I'll refer to outpatient case management to see we have any philanthropy funds that may cover her.  -     Ambulatory referral to Outpatient Case Management  -     Mammo Digital Screening Bilat with CAD; Future    Encounter for screening mammogram for breast cancer  -     Ambulatory referral to Outpatient Case Management  -     Mammo Digital Screening Bilat with CAD; Future    Encounter for screening for osteoporosis  -     Ambulatory referral to Outpatient Case Management  -     Mammo Digital Screening Bilat with CAD; Future    Need for shingles vaccine-also would recommend possibly pharmaceutical assistance for financial assistance in obtaining the new shingles vaccine  -     Ambulatory referral to Outpatient Case Management  -     Mammo Digital Screening Bilat with CAD; Future            Follow-up in about 4 months (around 9/2/2018) for chronic issues Dr Andrade.

## 2018-05-03 ENCOUNTER — OUTPATIENT CASE MANAGEMENT (OUTPATIENT)
Dept: ADMINISTRATIVE | Facility: OTHER | Age: 78
End: 2018-05-03

## 2018-05-03 NOTE — PROGRESS NOTES
Thank you for the referral.  Patient has been assigned to Fatoumata Hernandes LMSW for low risk screening for Outpatient Case Management.     Reason for referral: Mixed hyperlipidemia  Essential hypertension  Hypoxemia requiring supplemental oxygen  Gastroesophageal reflux disease, esophagitis presence not specified  Vision changes  Financial difficulties  Encounter for screening mammogram for breast cancer  Encounter for screening for osteoporosis  Need for shingles vaccine    Please contact OPCM at [phone]. [phone] with any questions.    Thank you,    Fatoumata Hernandes LMSW

## 2018-05-03 NOTE — PROGRESS NOTES
This LMSW received a referral on the above patient.   Reason for referral: Mixed hyperlipidemia, Essential hypertension, Hypoxemia requiring supplemental oxygen, Gastroesophageal reflux disease, esophagitis presence not specified, Vision changes, Financial difficulties, Encounter for screening mammogram for breast cancer, Encounter for screening for osteoporosis, Need for shingles vaccine   Name of the community resource that was provided: Medicaid Office, [facility] ([facility]), [facility] Financial Assistance, [facility]-Emergency Assistance, [facility]- Prescription Services  Resource given to: Patient via US Mail    LMSW contacted patient to complete assessment. LMSW attempted to complete assessment with Pt however Pt reported having difficulty hearing LMSW through telephone. Patient requested LMSW spoke to her son to complete assessment. Pt's son completed assessment with LMSW. Pt's son reports he is living with Pt and Pt is currently independent with ADLs. Pt's son reports that he is currently unemployed and does not contribute financially to household. Pt's son reports patient receives 1097 dollars per month in social security. Patient's son reports patient has outstanding medical bills and co-pays she can not afford. Patient's son reports patient has applied for government assistance and has been declined. LMSW advised patient's son that patient may be eligible for partial medicaid and they may want to consider re-applying. LMSW advised patient's son that application for [facility] Financial Assistance Program, Community programs for financial assistance, and information for [facility] on aging for patient to request financial assistance and/or assist with applying for Medicaid will be mailed out to patient. Patient's son stated patient agreeable to receiving resources and also stated that he would have patient's daughter contact LMSW if she identifies any additional  needs from patient. Referral source notified.

## 2018-05-10 ENCOUNTER — OFFICE VISIT (OUTPATIENT)
Dept: OPHTHALMOLOGY | Facility: CLINIC | Age: 78
End: 2018-05-10
Payer: MEDICARE

## 2018-05-10 DIAGNOSIS — Z13.5 GLAUCOMA SCREENING: ICD-10-CM

## 2018-05-10 DIAGNOSIS — H52.7 REFRACTIVE ERROR: ICD-10-CM

## 2018-05-10 DIAGNOSIS — H25.013 CATARACT CORTICAL, SENILE, BILATERAL: ICD-10-CM

## 2018-05-10 DIAGNOSIS — H25.13 CATARACT, NUCLEAR SCLEROTIC SENILE, BILATERAL: Primary | ICD-10-CM

## 2018-05-10 PROCEDURE — 99211 OFF/OP EST MAY X REQ PHY/QHP: CPT | Mod: PBBFAC,PO | Performed by: OPTOMETRIST

## 2018-05-10 PROCEDURE — 92015 DETERMINE REFRACTIVE STATE: CPT | Mod: ,,, | Performed by: OPTOMETRIST

## 2018-05-10 PROCEDURE — 99999 PR PBB SHADOW E&M-EST. PATIENT-LVL I: CPT | Mod: PBBFAC,,, | Performed by: OPTOMETRIST

## 2018-05-10 PROCEDURE — 92004 COMPRE OPH EXAM NEW PT 1/>: CPT | Mod: S$PBB,,, | Performed by: OPTOMETRIST

## 2018-05-10 NOTE — PROGRESS NOTES
HPI     New Patient  Screening for glaucoma  RE  Decreased distance and near vision  Patient has been having flashes and floaters  Last episode was 1 week ago  Patient states episodes last for a second or two  Patient states that episodes occur in both eyes at the same time  Hearing aid batteries are dead patient is not hearing well today  Patient is with assistant today    Last edited by Agustin Duff MA on 5/10/2018 11:05 AM. (History)            Assessment /Plan     For exam results, see Encounter Report.    Cataract, nuclear sclerotic senile, bilateral    Cataract cortical, senile, bilateral    Glaucoma screening    Refractive error      Dense cataracts OU = needs CE consult with Francisco BOWENS.  BL IOP OU with OK CD OU.  OH OK OU otherwise.  Spec Rx optional as she really needs CE.

## 2018-05-10 NOTE — LETTER
May 10, 2018      Yessy Andrade MD  [address]  [address]  [place] [address]           [facility] - [facility]hthal[facility]  [address]  [place] [address]  Phone: [phone]  Fax: [phone]          Patient: Maura Singh   MR Number: [id number]   YOB: 1940   Date of Visit: 5/10/2018       Dear Dr. Yessy Andrade:    Thank you for referring Maura Singh to me for evaluation. Attached you will find relevant portions of my assessment and plan of care.    If you have questions, please do not hesitate to call me. I look forward to following Maura Singh along with you.    Sincerely,    EULALIA Godfrey, OD    Enclosure  CC:  No Recipients    If you would like to receive this communication electronically, please contact [email or web address] or [phone] to request more information on [employer] Link access.    For providers and/or their staff who would like to refer a patient to [facility], please contact us through our one-stop-shop provider referral line, Chery Dong, at [phone].    If you feel you have received this communication in error or would no longer like to receive these types of communications, please e-mail [email or web address]

## 2018-05-16 ENCOUNTER — TELEPHONE (OUTPATIENT)
Dept: INTERNAL MEDICINE | Facility: CLINIC | Age: 78
End: 2018-05-16

## 2018-05-16 NOTE — TELEPHONE ENCOUNTER
Mercy is a  with HH. She is requesting that we send a letter to [employer] for pt for her to be put on the priority list for her power to be turned back on asap if ever an outage. It just needs to state below with the account number on it    Needs letter from us stating that she is continuous oxygen. [employer] number #[id number].       Fax when done to [phone]

## 2018-05-16 NOTE — LETTER
[facility]  [address] [place] [address]  Phone: [phone]  Fax: [phone] May 17, 2018    Maura Singh  [address]  [address]  [place] [address]      To Whom It May Concern:    Maura Singh is on cnotinuous oxygen and needs to be placed on a priority list for power to be turned back on if ever an outage. Entergy # is [id number].     If you have any questions or concerns, please feel free to call my office.    Sincerely,        Yessy Andrade MD

## 2018-06-05 ENCOUNTER — OFFICE VISIT (OUTPATIENT)
Dept: OPHTHALMOLOGY | Facility: CLINIC | Age: 78
End: 2018-06-05
Payer: MEDICARE

## 2018-06-05 ENCOUNTER — TELEPHONE (OUTPATIENT)
Dept: ADMINISTRATIVE | Facility: CLINIC | Age: 78
End: 2018-06-05

## 2018-06-05 DIAGNOSIS — H25.041 POSTERIOR SUBCAPSULAR POLAR SENILE CATARACT OF RIGHT EYE: Primary | ICD-10-CM

## 2018-06-05 DIAGNOSIS — H25.042 POSTERIOR SUBCAPSULAR POLAR SENILE CATARACT OF LEFT EYE: ICD-10-CM

## 2018-06-05 DIAGNOSIS — H40.013 OPEN ANGLE WITH BORDERLINE FINDINGS OF BOTH EYES: ICD-10-CM

## 2018-06-05 PROCEDURE — 99212 OFFICE O/P EST SF 10 MIN: CPT | Mod: PBBFAC,PO,25 | Performed by: OPHTHALMOLOGY

## 2018-06-05 PROCEDURE — 92014 COMPRE OPH EXAM EST PT 1/>: CPT | Mod: S$PBB,,, | Performed by: OPHTHALMOLOGY

## 2018-06-05 PROCEDURE — 99999 PR PBB SHADOW E&M-EST. PATIENT-LVL II: CPT | Mod: PBBFAC,,, | Performed by: OPHTHALMOLOGY

## 2018-06-05 PROCEDURE — 92136 OPHTHALMIC BIOMETRY: CPT | Mod: PBBFAC,PO,RT | Performed by: OPHTHALMOLOGY

## 2018-06-05 RX ORDER — POLYMYXIN B SULFATE AND TRIMETHOPRIM 1; 10000 MG/ML; [USP'U]/ML
1 SOLUTION OPHTHALMIC EVERY 4 HOURS
Qty: 1 BOTTLE | Refills: 1 | Status: SHIPPED | OUTPATIENT
Start: 2018-06-05 | End: 2018-06-15

## 2018-06-05 RX ORDER — KETOROLAC TROMETHAMINE 5 MG/ML
1 SOLUTION OPHTHALMIC 4 TIMES DAILY
Qty: 1 BOTTLE | Refills: 3 | Status: SHIPPED | OUTPATIENT
Start: 2018-06-05 | End: 2018-06-19

## 2018-06-05 RX ORDER — DIFLUPREDNATE OPHTHALMIC 0.5 MG/ML
1 EMULSION OPHTHALMIC 4 TIMES DAILY
Qty: 1 BOTTLE | Refills: 1 | Status: SHIPPED | OUTPATIENT
Start: 2018-06-05 | End: 2018-07-05

## 2018-06-05 NOTE — PROGRESS NOTES
HPI     Patient referred by [facility]  For cataracts ou,     Last edited by BRADLEY Alba on 6/5/2018  2:20 PM. (History)            Assessment /Plan     For exam results, see Encounter Report.      ICD-10-CM ICD-9-CM    1. Posterior subcapsular polar senile cataract of right eye H25.041 366.14 Visually Significant Cataract OD > OS  Patient reports decreased vision consistent with the clinical amount of lenticular opacity, which reaches the level of visual significance and affects activities of daily living including reading and glare. Risks, benefits, and alternatives to cataract surgery were discussed.   The pt expressed a desire to proceed with surgery with the potential for some reasonable degree of visual improvement.    Discussed IOL options and refractive outcomes for this patient.    Phaco right eye,   Block  monofocal IOL.  Will aim for distance  Referral to [facility] for Ophthalmic surgery  Prescriptions sent for preoperative medications  Durezol, Polytrim, and Ketorolac  Explained that patient may need glasses after surgery.  Discussed that vision may be limited by dense cataract     22.0   D/c aspirin          2. Posterior subcapsular polar senile cataract of left eye H25.042 366.14    3. 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 Will follow after phaco       Return to clinic for phaco OD

## 2018-07-06 ENCOUNTER — OFFICE VISIT (OUTPATIENT)
Dept: OPHTHALMOLOGY | Facility: CLINIC | Age: 78
End: 2018-07-06
Payer: MEDICARE

## 2018-07-06 DIAGNOSIS — Z98.890 POST-OPERATIVE STATE: Primary | ICD-10-CM

## 2018-07-06 DIAGNOSIS — Z98.41 STATUS POST RIGHT CATARACT EXTRACTION: ICD-10-CM

## 2018-07-06 PROCEDURE — 99212 OFFICE O/P EST SF 10 MIN: CPT | Mod: PBBFAC,PO | Performed by: OPHTHALMOLOGY

## 2018-07-06 PROCEDURE — 99999 PR PBB SHADOW E&M-EST. PATIENT-LVL II: CPT | Mod: PBBFAC,,, | Performed by: OPHTHALMOLOGY

## 2018-07-06 PROCEDURE — 99024 POSTOP FOLLOW-UP VISIT: CPT | Mod: POP,,, | Performed by: OPHTHALMOLOGY

## 2018-07-06 NOTE — PROGRESS NOTES
HPI     Post-op Evaluation    Additional comments: 1 day phaco OD no complaints per pt           Comments   Last seen 05/10/18 [facility]      1.PCIOL OD +22.0 7/2/2018 CDE 32.56  2.CAT OS        OD Durezol,key,poly QID       Last edited by Day Grover MA on 7/6/2018  2:44 PM. (History)            Assessment /Plan     For exam results, see Encounter Report.      ICD-10-CM ICD-9-CM    1. Post-operative state Z98.890 V45.89 PO Day 1 S/P Phaco/IOL  right eye  Doing well.    Use Durezol QID  Ketorolac qid  Polymyxin B 4 x day  Reinstructed in importance of absolute compliance with Post-OP instructions including medications, shield at bedtime, and limitation of activities. Follow up appointments in approximately one and six weeks or call immediately for increased pain, redness or vision loss.     One week post op instruction form given to the patient today to start on 7/12/18.       2. Status post right cataract extraction Z98.41 V45.61        RETURN TO CLINIC in one week

## 2018-07-13 ENCOUNTER — OFFICE VISIT (OUTPATIENT)
Dept: OPHTHALMOLOGY | Facility: CLINIC | Age: 78
End: 2018-07-13
Payer: MEDICARE

## 2018-07-13 DIAGNOSIS — Z98.41 STATUS POST RIGHT CATARACT EXTRACTION: Primary | ICD-10-CM

## 2018-07-13 DIAGNOSIS — H25.042 POSTERIOR SUBCAPSULAR POLAR SENILE CATARACT OF LEFT EYE: ICD-10-CM

## 2018-07-13 DIAGNOSIS — H40.013 OPEN ANGLE WITH BORDERLINE FINDINGS OF BOTH EYES: ICD-10-CM

## 2018-07-13 PROCEDURE — 92136 OPHTHALMIC BIOMETRY: CPT | Mod: PBBFAC,PO,LT | Performed by: OPHTHALMOLOGY

## 2018-07-13 PROCEDURE — 99212 OFFICE O/P EST SF 10 MIN: CPT | Mod: PBBFAC,PO | Performed by: OPHTHALMOLOGY

## 2018-07-13 PROCEDURE — 99024 POSTOP FOLLOW-UP VISIT: CPT | Mod: POP,,, | Performed by: OPHTHALMOLOGY

## 2018-07-13 PROCEDURE — 99999 PR PBB SHADOW E&M-EST. PATIENT-LVL II: CPT | Mod: PBBFAC,,, | Performed by: OPHTHALMOLOGY

## 2018-07-13 RX ORDER — TIMOLOL MALEATE 5 MG/ML
1 SOLUTION/ DROPS OPHTHALMIC 2 TIMES DAILY
Qty: 10 ML | Refills: 6 | Status: SHIPPED | OUTPATIENT
Start: 2018-07-13 | End: 2018-11-06

## 2018-07-13 RX ORDER — DIFLUPREDNATE OPHTHALMIC 0.5 MG/ML
1 EMULSION OPHTHALMIC 4 TIMES DAILY
Qty: 1 BOTTLE | Refills: 1 | Status: SHIPPED | OUTPATIENT
Start: 2018-07-13 | End: 2018-07-13 | Stop reason: SDUPTHER

## 2018-07-13 RX ORDER — DIFLUPREDNATE OPHTHALMIC 0.5 MG/ML
1 EMULSION OPHTHALMIC 4 TIMES DAILY
Qty: 1 BOTTLE | Refills: 1 | Status: SHIPPED | OUTPATIENT
Start: 2018-07-13 | End: 2018-08-12

## 2018-07-13 NOTE — PROGRESS NOTES
HPI     Patient states the colors are very bright .    Last seen 05/10/18 MLC    1.PCIOL OD +22.0 7/2/2018 CDE 32.56  2.CAT OS    OD Durezol QID     Last edited by Nicko Singh MD on 7/13/2018  2:29 PM. (History)            Assessment /Plan     For exam results, see Encounter Report.      ICD-10-CM ICD-9-CM    1. Status post right cataract extraction Z98.41 V45.61 PO Week 1 S/P Phaco/ IOL right eye:   Doing well with no evidence of infection or abnormal inflammation.     D/C antibiotic drops and ketorolac  Taper Durezol weekly per instruction sheet.  Resume normal activitites and d/c eye shield.  OTC reading glasses can be used until evaluated for final MR.  D/c Shield at night            2. Posterior subcapsular polar senile cataract of left eye H25.042 366.14 Ambulatory Referral to External Surgery      IOL Master - MOD 26 - OS - Left eye      difluprednate (DUREZOL) 0.05 % Drop ophthalmic solution    Visually Significant Cataract OS  Patient reports decreased vision consistent with the clinical amount of lenticular opacity, which reaches the level of visual significance and affects activities of daily living including reading and glare. Risks, benefits, and alternatives to cataract surgery were discussed.   The pt expressed a desire to proceed with surgery with the potential for some reasonable degree of visual improvement.    Discussed IOL options and refractive outcomes for this patient.    Phaco left eye,   Block, viscoat  monofocal IOL.  Will aim for distance  Referral to [facility] for Ophthalmic surgery  Prescriptions sent for preoperative medications  Durezol, Polytrim, and Ketorolac  Explained that patient may need glasses after surgery.    22.5     3. Open angle with borderline findings of both eyes- IOP elevated both eyes. Will start timolol OU. Coag work up after post op's      Add timolol bid OU   Return to clinic for phaco OS

## 2018-08-03 ENCOUNTER — OFFICE VISIT (OUTPATIENT)
Dept: OPHTHALMOLOGY | Facility: CLINIC | Age: 78
End: 2018-08-03
Payer: MEDICARE

## 2018-08-03 DIAGNOSIS — Z98.42 CATARACT EXTRACTION STATUS, LEFT: Primary | ICD-10-CM

## 2018-08-03 DIAGNOSIS — Z98.41 CATARACT EXTRACTION STATUS, RIGHT: ICD-10-CM

## 2018-08-03 PROCEDURE — 99999 PR PBB SHADOW E&M-EST. PATIENT-LVL I: CPT | Mod: PBBFAC,,, | Performed by: OPHTHALMOLOGY

## 2018-08-03 PROCEDURE — 99211 OFF/OP EST MAY X REQ PHY/QHP: CPT | Mod: PBBFAC,PO | Performed by: OPHTHALMOLOGY

## 2018-08-03 PROCEDURE — 99024 POSTOP FOLLOW-UP VISIT: CPT | Mod: POP,,, | Performed by: OPHTHALMOLOGY

## 2018-08-03 NOTE — PROGRESS NOTES
HPI     Post-op Evaluation    Additional comments: 1 day s/p phaco OS            Comments   1.PCIOL OD +22.0 7/2/2018 CDE 32.56  PCIOL OS 8/2/2018          Timolol bid OU -used os only today   OS: durezol qid, poly qid, ket qid       Last edited by Domi Moya MA on 8/3/2018  2:41 PM. (History)            Assessment /Plan     For exam results, see Encounter Report.      ICD-10-CM ICD-9-CM    1. Cataract extraction status, left Z98.42 V45.61 PO Day 1 S/P Phaco/IOL  left eye  Doing well.     2. Cataract extraction status, right Z98.41 V45.61 Doing well- no timolol since Wednesday    cotinine Timolol for now        PO Day 1 S/P Phaco/IOL  left eye  Doing well.    Timolol BID OU     Use Durezol QID x 8/9 then TID OS   Ketorolac qid and stop on 8/8  Polymyxin B 4 x day  Reinstructed in importance of absolute compliance with Post-OP instructions including medications, shield at bedtime, and limitation of activities. Follow up appointments in approximately 10-12 days

## 2018-08-14 ENCOUNTER — OFFICE VISIT (OUTPATIENT)
Dept: OPHTHALMOLOGY | Facility: CLINIC | Age: 78
End: 2018-08-14
Payer: MEDICARE

## 2018-08-14 ENCOUNTER — TELEPHONE (OUTPATIENT)
Dept: ADMINISTRATIVE | Facility: CLINIC | Age: 78
End: 2018-08-14

## 2018-08-14 DIAGNOSIS — Z98.42 CATARACT EXTRACTION STATUS, LEFT: Primary | ICD-10-CM

## 2018-08-14 DIAGNOSIS — Z98.41 CATARACT EXTRACTION STATUS, RIGHT: ICD-10-CM

## 2018-08-14 PROCEDURE — 99999 PR PBB SHADOW E&M-EST. PATIENT-LVL I: CPT | Mod: PBBFAC,,, | Performed by: OPHTHALMOLOGY

## 2018-08-14 PROCEDURE — 99211 OFF/OP EST MAY X REQ PHY/QHP: CPT | Mod: PBBFAC,PO | Performed by: OPHTHALMOLOGY

## 2018-08-14 PROCEDURE — 99024 POSTOP FOLLOW-UP VISIT: CPT | Mod: POP,,, | Performed by: OPHTHALMOLOGY

## 2018-08-14 NOTE — PROGRESS NOTES
HPI     Post-op Evaluation      Additional comments: s/p phaco OS 8/2/2018              Comments     1.PCIOL OD +22.0 7/2/2018 CDE 32.56  PCIOL OS +22.5 SN60WF / CDE: 24.70 / 8/2/2018      Timolol bid OU -using qd sometimes   OS: durezol TID          Last edited by Nicko Singh MD on 8/14/2018  3:15 PM. (History)            Assessment /Plan     For exam results, see Encounter Report.      ICD-10-CM ICD-9-CM    1. Cataract extraction status, left Z98.42 V45.61    2. Cataract extraction status, right Z98.41 V45.61        Doing well but not using Timolol regularly  Durezol bid OU  Timolol q d OU  Return to clinic 4 weeks

## 2018-08-14 NOTE — TELEPHONE ENCOUNTER
Home Health Recert 06/19/2018 to 08/17/2018 with [facility] , Dr Yessy Andrade.  service.

## 2018-09-18 ENCOUNTER — OFFICE VISIT (OUTPATIENT)
Dept: OPHTHALMOLOGY | Facility: CLINIC | Age: 78
End: 2018-09-18
Payer: MEDICARE

## 2018-09-18 DIAGNOSIS — H43.823 VITREOMACULAR ADHESION OF BOTH EYES: ICD-10-CM

## 2018-09-18 DIAGNOSIS — H35.373 EPIRETINAL MEMBRANE (ERM) OF BOTH EYES: ICD-10-CM

## 2018-09-18 DIAGNOSIS — Z98.42 CATARACT EXTRACTION STATUS, LEFT: ICD-10-CM

## 2018-09-18 DIAGNOSIS — Z98.41 CATARACT EXTRACTION STATUS, RIGHT: Primary | ICD-10-CM

## 2018-09-18 PROCEDURE — 99999 PR PBB SHADOW E&M-EST. PATIENT-LVL I: CPT | Mod: PBBFAC,,, | Performed by: OPHTHALMOLOGY

## 2018-09-18 PROCEDURE — 99211 OFF/OP EST MAY X REQ PHY/QHP: CPT | Mod: PBBFAC,PO,25 | Performed by: OPHTHALMOLOGY

## 2018-09-18 PROCEDURE — 92134 CPTRZ OPH DX IMG PST SGM RTA: CPT | Mod: PBBFAC,PO | Performed by: OPHTHALMOLOGY

## 2018-09-18 PROCEDURE — 99024 POSTOP FOLLOW-UP VISIT: CPT | Mod: POP,,, | Performed by: OPHTHALMOLOGY

## 2018-09-18 NOTE — PROGRESS NOTES
HPI     Patient returns for a one moth p.o. Visit, patient stopped her Timolol at   the same time as her Durezol.        Last seen 05/10/18 MLC      1.PCIOL OD +22.0 7/2/2018 CDE 32.56  PCIOL OS +22.5 SN60WF / CDE: 24.70 / 8/2/2018      Timolol bid OU -using qd ( stopped several weeks ago )    Last edited by BRADLEY Alba on 9/18/2018  2:38 PM. (History)            Assessment /Plan     For exam results, see Encounter Report.      ICD-10-CM ICD-9-CM    1. Cataract extraction status, right Z98.41 V45.61 S/p phaco OD 7/2/18. Doing well    2. Cataract extraction status, left Z98.42 V45.61 S/p phaco OS 8/2/2018   3. Epiretinal membrane (ERM) of both eyes H35.373 362.56 Posterior Segment OCT Retina-Both eyes    On MOCT. 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 Follow        PO Month 1 S/P Phaco/IOL bilateral eye:   Doing Well.    Discontinue medications as per taper sheet.  Dispense OCT readers   RTC 6 months with Dr. EULALIA Godfrey

## 2018-11-01 DIAGNOSIS — R09.02 HYPOXEMIA: Primary | ICD-10-CM

## 2018-11-01 PROCEDURE — 99499 UNLISTED E&M SERVICE: CPT | Mod: S$PBB,,, | Performed by: NURSE PRACTITIONER

## 2018-11-06 ENCOUNTER — OFFICE VISIT (OUTPATIENT)
Dept: PULMONOLOGY | Facility: CLINIC | Age: 78
End: 2018-11-06
Payer: MEDICARE

## 2018-11-06 VITALS
BODY MASS INDEX: 39.56 KG/M2 | SYSTOLIC BLOOD PRESSURE: 114 MMHG | HEART RATE: 77 BPM | OXYGEN SATURATION: 94 % | WEIGHT: 201.5 LBS | HEIGHT: 60 IN | DIASTOLIC BLOOD PRESSURE: 68 MMHG | RESPIRATION RATE: 18 BRPM

## 2018-11-06 DIAGNOSIS — Z87.09 HISTORY OF RESPIRATORY FAILURE: Primary | ICD-10-CM

## 2018-11-06 DIAGNOSIS — R09.02 HYPOXEMIA REQUIRING SUPPLEMENTAL OXYGEN: ICD-10-CM

## 2018-11-06 DIAGNOSIS — Z99.81 HYPOXEMIA REQUIRING SUPPLEMENTAL OXYGEN: ICD-10-CM

## 2018-11-06 PROCEDURE — 99999 PR PBB SHADOW E&M-EST. PATIENT-LVL III: CPT | Mod: PBBFAC,,, | Performed by: NURSE PRACTITIONER

## 2018-11-06 PROCEDURE — 90662 IIV NO PRSV INCREASED AG IM: CPT | Mod: PBBFAC,PO

## 2018-11-06 PROCEDURE — 99214 OFFICE O/P EST MOD 30 MIN: CPT | Mod: S$PBB,,, | Performed by: NURSE PRACTITIONER

## 2018-11-06 PROCEDURE — 99213 OFFICE O/P EST LOW 20 MIN: CPT | Mod: PBBFAC,PO | Performed by: NURSE PRACTITIONER

## 2018-11-06 NOTE — ASSESSMENT & PLAN NOTE
Patient refuses 6mwd or over night oximetry or sleep testing.  Home O2 sat monitoring with present level of activity 94-96% per daughter Lilian who is OT.   Patient request d/c order for home oxygen. Order provided.

## 2018-11-06 NOTE — PROGRESS NOTES
Chief Complaint   Patient presents with    Hypoxemia requiring supplemental oxygen     Subjective:      HPI:  77 year old female presents pulmonary visit self referred related request to discontinue home oxygen.   She presents with her daughter, Lilian. Patient has not used home oxygen over the past at least 3 months.   She reports she is not longer shortness of breath as in past when qualified for home oxygen. She cannot stand to wear at night and has monitored with activity level and O2 sat is 94 - 96% with activity and at rest.   Patient is declining additional 6MWD. She is also refuses to have cpft done to evaluate for COPD.   Former smoker quit in 2003, 1/2 pk/day x 48 hrs.   She has never been told she has COPD.    She was on NC 2lm after diagnosis acute respiratory failure with hospitalization and discharge on home oxygen NC 2 lm in December 2017.   She re qualified for home oxygen in Jan at her initial pulmonary visit.  Patient refuses additional testing with home oxygen saturation monitoring by daughter, Lilian who is OT.   Patient will not agree to over night pulse or to sleep apnea testing.     Family/Medical/Surgical/Social History  is allergic to pravastatin.  family history includes ALS in her father; Colon cancer in her mother; Retinal detachment in her son.  has a current medication list which includes the following prescription(s): furosemide, lisinopril, multivitamin, and proair hfa.   has a past surgical history that includes Appendectomy; Middle ear surgery; and Cataract extraction.   reports that she quit smoking about 15 years ago. Her smoking use included cigarettes. She started smoking about 63 years ago. She has a 24.00 pack-year smoking history. she has never used smokeless tobacco. She reports that she does not drink alcohol or use drugs.    Review of Systems  Review of Systems   Constitutional: Negative for fever, chills, weight loss, weight gain, activity change, appetite change, fatigue  and night sweats.   HENT: Negative.  Negative for postnasal drip, rhinorrhea, sinus pressure, voice change and congestion.    Eyes: Negative for redness and itching.   Respiratory: Negative for snoring, cough, sputum production, chest tightness, shortness of breath, wheezing, orthopnea, asthma nighttime symptoms, dyspnea on extertion, use of rescue inhaler and somnolence.    Cardiovascular: Negative.  Negative for chest pain, palpitations and leg swelling.   Genitourinary: Negative for difficulty urinating and hematuria.   Endocrine: Negative for cold intolerance and heat intolerance.    Musculoskeletal: Negative for arthralgias, gait problem, joint swelling and myalgias.   Skin: Negative.    Gastrointestinal: Negative for nausea, vomiting, abdominal pain and acid reflux.   Neurological: Negative for dizziness, weakness, light-headedness and headaches.   Hematological: Negative for adenopathy. No excessive bruising.   All other systems reviewed and are negative.    Objective:     /68   Pulse 77   Resp 18   Ht 5' (1.524 m)   Wt 91.4 kg (201 lb 8 oz)   SpO2 (!) 94%   BMI 39.35 kg/m²   Physical Exam   Constitutional: She is oriented to person, place, and time. She appears well-developed and well-nourished. She is active and cooperative.  Non-toxic appearance. She does not appear ill. No distress.   HENT:   Head: Normocephalic.   Right Ear: External ear normal.   Left Ear: External ear normal.   Nose: Nose normal.   Mouth/Throat: Oropharynx is clear and moist. No oropharyngeal exudate.   Neck circumference: 48 cm (19 inches).  Mallampati: 4   Eyes: Conjunctivae are normal.   Neck: Normal range of motion. Neck supple.   Cardiovascular: Normal rate, regular rhythm, normal heart sounds and intact distal pulses.   Pulmonary/Chest: Effort normal and breath sounds normal. No stridor.   Abdominal: Soft.   Musculoskeletal: Normal range of motion.   Lymphadenopathy:     She has no cervical adenopathy.   Neurological:  She is alert and oriented to person, place, and time.   Skin: Skin is warm and dry.   Psychiatric: She has a normal mood and affect. Her behavior is normal. Judgment and thought content normal.   Vitals reviewed.    Assessment:     1. History of respiratory failure    2. Hypoxemia requiring supplemental oxygen      Orders Placed This Encounter   Procedures    HME - OTHER     Discontinue home oxygen   HME: [facility].     Order Specific Question:   Type of Equipment:     Answer:   Home oxygen concentrator     Order Specific Question:   Height:     Answer:   5' (1.524 m)     Order Specific Question:   Weight:     Answer:   91.4 kg (201 lb 8 oz)     Diagnostic Testing Reviewed  All relevant imaging and labs reviewed.  IMAGING:   Chest xray 12/13/2017  Findings: The lungs are well expanded and grossly clear without focal consolidation although evaluation is somewhat limited by overlying soft tissue prominence.   There is tortuosity and atherosclerosis of the aorta. The cardiomediastinal silhouette is mildly enlarged.   There is mild prominence of the central pulmonary vasculature with slightly increased interstitial markings.   Subsegmental atelectasis is seen in the lingula. Remainder stable.  IMPRESSION: Cardiomegaly with prominence of the central pulmonary vasculature and slightly increased interstitial markings may suggest mild component of interstitial edema. No evidence of angelica cardiac decompensation.     Plan:     Problem List Items Addressed This Visit     History of respiratory failure - Primary     Patient refuses to use home oxygen any longer.  Home sat monitoring 94-96% on room air with exertion and rest  No longer shortness of breath over past 3 months.   Refuses 6mwd or over night oximetry or sleep testing.   Provided order to d/c home oxygen          Relevant Orders    HME - OTHER    RESOLVED: Hypoxemia requiring supplemental oxygen     Patient refuses 6mwd or over night oximetry or sleep  testing.  Home O2 sat monitoring with present level of activity 94-96% per daughter Lilian who is OT.   Patient request d/c order for home oxygen. Order provided.              Patient refuses pft, 6 mwd, over night oximetry or PSG testing.  She reports she is improved, no longer shortness of breath, no coughing or wheezing.   Has albuterol nebs and inhaler on hand if needed. Has not used Albuterol over past 4-6 months.   Provided order for d/c home oxygen. Request follow up on as needed basis.     Follow-up if needed  .

## 2018-11-06 NOTE — ASSESSMENT & PLAN NOTE
Patient refuses to use home oxygen any longer.  Home sat monitoring 94-96% on room air with exertion and rest  No longer shortness of breath over past 3 months.   Refuses 6mwd or over night oximetry or sleep testing.   Provided order to d/c home oxygen

## 2018-12-05 ENCOUNTER — TELEPHONE (OUTPATIENT)
Dept: INTERNAL MEDICINE | Facility: CLINIC | Age: 78
End: 2018-12-05

## 2018-12-05 NOTE — TELEPHONE ENCOUNTER
----- Message from Mariel Sheriff sent at 12/5/2018  8:37 AM CST -----  Contact: [facility]  [facility] is calling in regards to an outstanding order for pt. Please call back at [facility] at [phone].        Thanks,   Mariel Sheriff

## 2018-12-05 NOTE — TELEPHONE ENCOUNTER
Called and spoke with [facility] from . They stated that there is something on the  hub that has not been signed off on yet for pt. They will fax it to us for you to sign.

## 2019-02-05 ENCOUNTER — TELEPHONE (OUTPATIENT)
Dept: ADMINISTRATIVE | Facility: CLINIC | Age: 79
End: 2019-02-05

## 2019-02-05 NOTE — TELEPHONE ENCOUNTER
[facility] Health [facility]Dr[facility] Yessy Andrade. Pt received  services.

## 2019-02-08 ENCOUNTER — TELEPHONE (OUTPATIENT)
Dept: INTERNAL MEDICINE | Facility: CLINIC | Age: 79
End: 2019-02-08

## 2019-02-08 NOTE — TELEPHONE ENCOUNTER
Called and spoke with [facility]. She will fax order to us and I informed that Dr. Andrade will be back in on Monday and will have her sign off.

## 2019-02-08 NOTE — TELEPHONE ENCOUNTER
----- Message from Gordon Lopez sent at 2/8/2019  9:34 AM CST -----  Contact: [facility] -[phone]  Would like to follow-up  with nurse regarding outstanding orders.  Please call back at [phone].  Md Ayo

## 2019-04-02 ENCOUNTER — OFFICE VISIT (OUTPATIENT)
Dept: OPHTHALMOLOGY | Facility: CLINIC | Age: 79
End: 2019-04-02
Payer: MEDICARE

## 2019-04-02 DIAGNOSIS — H43.823 VITREOMACULAR ADHESION OF BOTH EYES: ICD-10-CM

## 2019-04-02 DIAGNOSIS — Z98.41 CATARACT EXTRACTION STATUS, RIGHT: ICD-10-CM

## 2019-04-02 DIAGNOSIS — Z98.42 CATARACT EXTRACTION STATUS, LEFT: Primary | ICD-10-CM

## 2019-04-02 DIAGNOSIS — Z96.1 PSEUDOPHAKIA OF BOTH EYES: ICD-10-CM

## 2019-04-02 DIAGNOSIS — R06.02 SHORTNESS OF BREATH: ICD-10-CM

## 2019-04-02 DIAGNOSIS — H35.373 EPIRETINAL MEMBRANE (ERM) OF BOTH EYES: ICD-10-CM

## 2019-04-02 PROCEDURE — 99999 PR PBB SHADOW E&M-EST. PATIENT-LVL II: ICD-10-PCS | Mod: PBBFAC,,, | Performed by: OPTOMETRIST

## 2019-04-02 PROCEDURE — 92014 PR EYE EXAM, EST PATIENT,COMPREHESV: ICD-10-PCS | Mod: S$PBB,,, | Performed by: OPTOMETRIST

## 2019-04-02 PROCEDURE — 99999 PR PBB SHADOW E&M-EST. PATIENT-LVL II: CPT | Mod: PBBFAC,,, | Performed by: OPTOMETRIST

## 2019-04-02 PROCEDURE — 92014 COMPRE OPH EXAM EST PT 1/>: CPT | Mod: S$PBB,,, | Performed by: OPTOMETRIST

## 2019-04-02 PROCEDURE — 99212 OFFICE O/P EST SF 10 MIN: CPT | Mod: PBBFAC,PN | Performed by: OPTOMETRIST

## 2019-04-02 RX ORDER — FUROSEMIDE 20 MG/1
TABLET ORAL
Qty: 90 TABLET | Refills: 0 | Status: SHIPPED | OUTPATIENT
Start: 2019-04-02 | End: 2019-07-30 | Stop reason: SDUPTHER

## 2019-04-02 RX ORDER — LISINOPRIL 40 MG/1
TABLET ORAL
Qty: 90 TABLET | Refills: 0 | Status: SHIPPED | OUTPATIENT
Start: 2019-04-02 | End: 2019-07-30 | Stop reason: SDUPTHER

## 2019-04-02 NOTE — PROGRESS NOTES
HPI     Last [facility] exam 05/10/2018  6 month post Op  PCIOL OD 07/02/2018  PCIOL OS 08/02/2018  No visual complaints   Uses OTC Readers +1.50      Last edited by Agustin Duff MA on 4/2/2019  8:47 AM. (History)            Assessment /Plan     For exam results, see Encounter Report.    Cataract extraction status, left    Cataract extraction status, right    Epiretinal membrane (ERM) of both eyes    Vitreomacular adhesion of both eyes    Pseudophakia of both eyes      Stable PIOL OU at 6 months FU.  Stable old ERM OU with traction.  OH OK OU otherwise.

## 2019-05-13 ENCOUNTER — TELEPHONE (OUTPATIENT)
Dept: INTERNAL MEDICINE | Facility: CLINIC | Age: 79
End: 2019-05-13

## 2019-05-13 NOTE — TELEPHONE ENCOUNTER
----- Message from Luis Samuels sent at 5/13/2019  2:39 PM CDT -----  Contact: [facility]   Would like to discuss getting handicap parking tag for pt. pls return call.       [phone]

## 2019-05-13 NOTE — TELEPHONE ENCOUNTER
I returned call and advised daughter per Dr Andrade that due to the amount of time it's been since last office visit patient would need to be seen and she can write for tag at that time. I advised daughter and offered to schedule and daughter verbalized that she was driving and she will call back and schedule. Lv 05/2018

## 2019-07-30 DIAGNOSIS — R06.02 SHORTNESS OF BREATH: ICD-10-CM

## 2019-07-30 RX ORDER — LISINOPRIL 40 MG/1
TABLET ORAL
Qty: 90 TABLET | Refills: 0 | Status: SHIPPED | OUTPATIENT
Start: 2019-07-30 | End: 2019-11-01 | Stop reason: SDUPTHER

## 2019-07-30 RX ORDER — FUROSEMIDE 20 MG/1
TABLET ORAL
Qty: 90 TABLET | Refills: 0 | Status: SHIPPED | OUTPATIENT
Start: 2019-07-30 | End: 2020-02-09

## 2019-07-31 ENCOUNTER — EXTERNAL CHRONIC CARE MANAGEMENT (OUTPATIENT)
Dept: PRIMARY CARE CLINIC | Facility: CLINIC | Age: 79
End: 2019-07-31
Payer: MEDICARE

## 2019-07-31 ENCOUNTER — HOSPITAL ENCOUNTER (OUTPATIENT)
Dept: RADIOLOGY | Facility: HOSPITAL | Age: 79
Discharge: HOME | End: 2019-07-31
Attending: NURSE PRACTITIONER
Payer: MEDICARE

## 2019-07-31 ENCOUNTER — OFFICE VISIT (OUTPATIENT)
Dept: INTERNAL MEDICINE | Facility: CLINIC | Age: 79
End: 2019-07-31
Payer: MEDICARE

## 2019-07-31 VITALS
DIASTOLIC BLOOD PRESSURE: 62 MMHG | HEART RATE: 70 BPM | RESPIRATION RATE: 16 BRPM | SYSTOLIC BLOOD PRESSURE: 144 MMHG | TEMPERATURE: 98 F

## 2019-07-31 DIAGNOSIS — M79.672 ACUTE FOOT PAIN, LEFT: Primary | ICD-10-CM

## 2019-07-31 DIAGNOSIS — L53.9 SKIN ERYTHEMA: ICD-10-CM

## 2019-07-31 DIAGNOSIS — N18.30 CKD (CHRONIC KIDNEY DISEASE) STAGE 3, GFR 30-59 ML/MIN: ICD-10-CM

## 2019-07-31 DIAGNOSIS — M79.672 ACUTE FOOT PAIN, LEFT: ICD-10-CM

## 2019-07-31 PROCEDURE — 73610 X-RAY EXAM OF ANKLE: CPT | Mod: 26,LT,, | Performed by: RADIOLOGY

## 2019-07-31 PROCEDURE — 73630 X-RAY EXAM OF FOOT: CPT | Mod: 26,LT,, | Performed by: RADIOLOGY

## 2019-07-31 PROCEDURE — 73630 XR FOOT COMPLETE 3 VIEW LEFT: ICD-10-PCS | Mod: 26,LT,, | Performed by: RADIOLOGY

## 2019-07-31 PROCEDURE — 99490 CHRNC CARE MGMT STAFF 1ST 20: CPT | Mod: PBBFAC,PO | Performed by: FAMILY MEDICINE

## 2019-07-31 PROCEDURE — 99490 PR CHRONIC CARE MGMT, 1ST 20 MIN: ICD-10-PCS | Mod: S$PBB,,, | Performed by: FAMILY MEDICINE

## 2019-07-31 PROCEDURE — 99999 PR PBB SHADOW E&M-EST. PATIENT-LVL IV: ICD-10-PCS | Mod: PBBFAC,,, | Performed by: NURSE PRACTITIONER

## 2019-07-31 PROCEDURE — 99214 PR OFFICE/OUTPT VISIT, EST, LEVL IV, 30-39 MIN: ICD-10-PCS | Mod: S$PBB,,, | Performed by: NURSE PRACTITIONER

## 2019-07-31 PROCEDURE — 73630 X-RAY EXAM OF FOOT: CPT | Mod: TC,FY,PO,LT

## 2019-07-31 PROCEDURE — 99214 OFFICE O/P EST MOD 30 MIN: CPT | Mod: PBBFAC,25,PO | Performed by: NURSE PRACTITIONER

## 2019-07-31 PROCEDURE — 99999 PR PBB SHADOW E&M-EST. PATIENT-LVL IV: CPT | Mod: PBBFAC,,, | Performed by: NURSE PRACTITIONER

## 2019-07-31 PROCEDURE — 99214 OFFICE O/P EST MOD 30 MIN: CPT | Mod: S$PBB,,, | Performed by: NURSE PRACTITIONER

## 2019-07-31 PROCEDURE — 99490 CHRNC CARE MGMT STAFF 1ST 20: CPT | Mod: S$PBB,,, | Performed by: FAMILY MEDICINE

## 2019-07-31 PROCEDURE — 96372 THER/PROPH/DIAG INJ SC/IM: CPT | Mod: PBBFAC,PO

## 2019-07-31 PROCEDURE — 73610 XR ANKLE COMPLETE 3 VIEW LEFT: ICD-10-PCS | Mod: 26,LT,, | Performed by: RADIOLOGY

## 2019-07-31 PROCEDURE — 73610 X-RAY EXAM OF ANKLE: CPT | Mod: TC,FY,PO,LT

## 2019-07-31 RX ORDER — KETOROLAC TROMETHAMINE 30 MG/ML
30 INJECTION, SOLUTION INTRAMUSCULAR; INTRAVENOUS
Status: DISCONTINUED | OUTPATIENT
Start: 2019-07-31 | End: 2019-07-31

## 2019-07-31 RX ORDER — KETOROLAC TROMETHAMINE 30 MG/ML
30 INJECTION, SOLUTION INTRAMUSCULAR; INTRAVENOUS
Status: COMPLETED | OUTPATIENT
Start: 2019-07-31 | End: 2019-07-31

## 2019-07-31 RX ORDER — COLCHICINE 0.6 MG/1
TABLET ORAL
Qty: 10 TABLET | Refills: 0 | Status: SHIPPED | OUTPATIENT
Start: 2019-07-31 | End: 2019-09-05

## 2019-07-31 RX ADMIN — KETOROLAC TROMETHAMINE 30 MG: 60 INJECTION, SOLUTION INTRAMUSCULAR at 12:07

## 2019-07-31 NOTE — PROGRESS NOTES
Subjective:       Patient ID: Maura Singh is a 79 y.o. female.    Chief Complaint: foot pain    Patient comes in today with concern of pain to the left foot and ankle. No trauma. She wears flip flops often. Area is swollen. The swelling is extending up the leg. There is some mild calf pain as well. The sheets hurt to touch her foot. She ate shrimp recently. No hx of gout. There is a red, warm area to the inner ankle.      BP (!) 144/62 (BP Location: Right arm, Patient Position: Sitting)   Pulse 70   Temp 97.5 °F (36.4 °C) (Tympanic)   Resp 16     Review of Systems   Constitutional: Negative for activity change, appetite change, chills, diaphoresis, fatigue, fever and unexpected weight change.   HENT: Positive for hearing loss. Negative for trouble swallowing.    Eyes: Negative for visual disturbance.   Respiratory: Negative for cough, chest tightness, shortness of breath and wheezing.    Cardiovascular: Negative for chest pain, palpitations and leg swelling.   Gastrointestinal: Negative for abdominal distention, abdominal pain, blood in stool, constipation, diarrhea, nausea and vomiting.   Endocrine: Negative for polydipsia and polyuria.   Genitourinary: Negative for decreased urine volume, difficulty urinating, dysuria, frequency, hematuria, menstrual problem and urgency.   Musculoskeletal: Positive for arthralgias, gait problem and joint swelling. Negative for back pain, myalgias, neck pain and neck stiffness.   Skin: Positive for color change. Negative for pallor, rash and wound.   Neurological: Negative for dizziness, syncope, speech difficulty, weakness, light-headedness and headaches.   Psychiatric/Behavioral: Negative for agitation, confusion, dysphoric mood and hallucinations. The patient is not nervous/anxious.        Objective:      Physical Exam   Constitutional: She is oriented to person, place, and time. She appears well-developed and well-nourished. She is cooperative. No distress.   HENT:    Head: Normocephalic and atraumatic.   Eyes: Conjunctivae are normal. Right eye exhibits no discharge. Left eye exhibits no discharge.   Cardiovascular: Normal rate, regular rhythm and normal heart sounds.   No murmur heard.  Pulmonary/Chest: Effort normal and breath sounds normal. No respiratory distress. She has no wheezes. She has no rales. She exhibits no tenderness.   Abdominal: Soft. She exhibits no distension.   Musculoskeletal: She exhibits edema and tenderness.   Patient in wheelchair. She has extreme tenderness generalized to the entire left foot and ankle. Mild tenderness extending up the lower leg and calf. No calf redness or warmth. Patient has strong dorsi and plantar flexion, but it elicits extreme pain. There is an area of erythema and warmth noted to the inner ankle.   Neurological: She is alert and oriented to person, place, and time.   Skin: Skin is warm and dry. No rash noted. She is not diaphoretic. There is erythema.   Psychiatric: She has a normal mood and affect. Her behavior is normal. Judgment and thought content normal.   Nursing note and vitals reviewed.      Assessment:       1. Acute foot pain, left    2. Skin erythema    3. CKD (chronic kidney disease) stage 3, GFR 30-59 ml/min        Plan:       Maura was seen today for foot pain.    Diagnoses and all orders for this visit:    Acute foot pain, left  -     X-Ray Foot Complete Left; Future  -     X-Ray Ankle Complete Left; Future  -     Uric acid; Future  -     D dimer, quantitative; Future  -     CBC auto differential; Future  -     ketorolac injection 30 mg  -     colchicine (COLCRYS) 0.6 mg tablet; Take 2 tablets once, then 1 tablet in 1 hour.    Skin erythema  -     X-Ray Foot Complete Left; Future  -     X-Ray Ankle Complete Left; Future  -     Uric acid; Future  -     D dimer, quantitative; Future  -     CBC auto differential; Future    CKD (chronic kidney disease) stage 3, GFR 30-59 ml/min    xray shows no fracture  Suspect  gout  toradol x 1 today  Colchicine rx given, use sparingly  Rice therapy discussed  Will get labs today as well    X-Ray Ankle Complete Left  Narrative: EXAMINATION:  XR ANKLE COMPLETE 3 VIEW LEFT    CLINICAL HISTORY:  Pain in left foot    TECHNIQUE:  AP, lateral and oblique views of the left ankle were performed.    COMPARISON:  None    FINDINGS:  There is nonspecific soft tissue edema surrounding the ankle.  No acute fractures or dislocations visualized.  Ankle mortise is well maintained.There is a small plantar surface calcaneal enthesophyte present.  Mild degenerative spurring noted at the distal tip of the medial malleolus.  Impression: As Above    Electronically signed by: Luis Burton MD  Date:    07/31/2019  Time:    11:59  X-Ray Foot Complete Left  Narrative: EXAMINATION:  XR FOOT COMPLETE 3 VIEW LEFT    CLINICAL HISTORY:  .  Pain in left foot    TECHNIQUE:  AP, lateral and oblique views of the left foot were performed.    COMPARISON:  None    FINDINGS:  There is nonspecific diffuse soft tissue edema noted along the dorsal surface of the foot.  No acute fractures or dislocations visualized.  Joint spaces are well preserved.  No erosive changes demonstrated. There is a small plantar surface calcaneal enthesophyte present.  Impression: As above.    Electronically signed by: Luis Burton MD  Date:    07/31/2019  Time:    11:58

## 2019-08-01 ENCOUNTER — PATIENT MESSAGE (OUTPATIENT)
Dept: INTERNAL MEDICINE | Facility: CLINIC | Age: 79
End: 2019-08-01

## 2019-08-31 ENCOUNTER — EXTERNAL CHRONIC CARE MANAGEMENT (OUTPATIENT)
Dept: PRIMARY CARE CLINIC | Facility: CLINIC | Age: 79
End: 2019-08-31
Payer: MEDICARE

## 2019-08-31 PROCEDURE — 99490 CHRNC CARE MGMT STAFF 1ST 20: CPT | Mod: S$PBB,,, | Performed by: FAMILY MEDICINE

## 2019-08-31 PROCEDURE — 99490 CHRNC CARE MGMT STAFF 1ST 20: CPT | Mod: PBBFAC,PO | Performed by: FAMILY MEDICINE

## 2019-08-31 PROCEDURE — 99490 PR CHRONIC CARE MGMT, 1ST 20 MIN: ICD-10-PCS | Mod: S$PBB,,, | Performed by: FAMILY MEDICINE

## 2019-09-05 ENCOUNTER — OFFICE VISIT (OUTPATIENT)
Dept: INTERNAL MEDICINE | Facility: CLINIC | Age: 79
End: 2019-09-05
Payer: MEDICARE

## 2019-09-05 ENCOUNTER — LAB VISIT (OUTPATIENT)
Dept: LAB | Facility: HOSPITAL | Age: 79
End: 2019-09-05
Attending: FAMILY MEDICINE
Payer: MEDICARE

## 2019-09-05 VITALS
DIASTOLIC BLOOD PRESSURE: 78 MMHG | HEIGHT: 60 IN | HEART RATE: 68 BPM | TEMPERATURE: 98 F | SYSTOLIC BLOOD PRESSURE: 116 MMHG | WEIGHT: 211.44 LBS | BODY MASS INDEX: 41.51 KG/M2

## 2019-09-05 DIAGNOSIS — I10 ESSENTIAL HYPERTENSION: ICD-10-CM

## 2019-09-05 DIAGNOSIS — M10.9 ACUTE GOUT OF MULTIPLE SITES, UNSPECIFIED CAUSE: ICD-10-CM

## 2019-09-05 DIAGNOSIS — E78.2 MIXED HYPERLIPIDEMIA: ICD-10-CM

## 2019-09-05 DIAGNOSIS — R73.09 ABNORMAL GLUCOSE: ICD-10-CM

## 2019-09-05 DIAGNOSIS — R26.9 GAIT ABNORMALITY: ICD-10-CM

## 2019-09-05 DIAGNOSIS — M10.9 ACUTE GOUT OF MULTIPLE SITES, UNSPECIFIED CAUSE: Primary | ICD-10-CM

## 2019-09-05 DIAGNOSIS — N18.30 CKD (CHRONIC KIDNEY DISEASE) STAGE 3, GFR 30-59 ML/MIN: ICD-10-CM

## 2019-09-05 LAB
25(OH)D3+25(OH)D2 SERPL-MCNC: 26 NG/ML (ref 30–96)
ALBUMIN SERPL BCP-MCNC: 3.9 G/DL (ref 3.5–5.2)
ALP SERPL-CCNC: 67 U/L (ref 55–135)
ALT SERPL W/O P-5'-P-CCNC: 26 U/L (ref 10–44)
ANION GAP SERPL CALC-SCNC: 10 MMOL/L (ref 8–16)
AST SERPL-CCNC: 24 U/L (ref 10–40)
BASOPHILS # BLD AUTO: 0.06 K/UL (ref 0–0.2)
BASOPHILS NFR BLD: 0.7 % (ref 0–1.9)
BILIRUB SERPL-MCNC: 0.4 MG/DL (ref 0.1–1)
BUN SERPL-MCNC: 21 MG/DL (ref 8–23)
CALCIUM SERPL-MCNC: 9.6 MG/DL (ref 8.7–10.5)
CHLORIDE SERPL-SCNC: 103 MMOL/L (ref 95–110)
CHOLEST SERPL-MCNC: 250 MG/DL (ref 120–199)
CHOLEST/HDLC SERPL: 6.3 {RATIO} (ref 2–5)
CO2 SERPL-SCNC: 28 MMOL/L (ref 23–29)
CREAT SERPL-MCNC: 1 MG/DL (ref 0.5–1.4)
DIFFERENTIAL METHOD: ABNORMAL
EOSINOPHIL # BLD AUTO: 0.3 K/UL (ref 0–0.5)
EOSINOPHIL NFR BLD: 3.2 % (ref 0–8)
ERYTHROCYTE [DISTWIDTH] IN BLOOD BY AUTOMATED COUNT: 15.2 % (ref 11.5–14.5)
EST. GFR  (AFRICAN AMERICAN): >60 ML/MIN/1.73 M^2
EST. GFR  (NON AFRICAN AMERICAN): 53.3 ML/MIN/1.73 M^2
ESTIMATED AVG GLUCOSE: 117 MG/DL (ref 68–131)
GLUCOSE SERPL-MCNC: 84 MG/DL (ref 70–110)
HBA1C MFR BLD HPLC: 5.7 % (ref 4–5.6)
HCT VFR BLD AUTO: 46.9 % (ref 37–48.5)
HDLC SERPL-MCNC: 40 MG/DL (ref 40–75)
HDLC SERPL: 16 % (ref 20–50)
HGB BLD-MCNC: 14 G/DL (ref 12–16)
IMM GRANULOCYTES # BLD AUTO: 0.02 K/UL (ref 0–0.04)
IMM GRANULOCYTES NFR BLD AUTO: 0.2 % (ref 0–0.5)
LDLC SERPL CALC-MCNC: 146.2 MG/DL (ref 63–159)
LYMPHOCYTES # BLD AUTO: 2.4 K/UL (ref 1–4.8)
LYMPHOCYTES NFR BLD: 28 % (ref 18–48)
MCH RBC QN AUTO: 31.3 PG (ref 27–31)
MCHC RBC AUTO-ENTMCNC: 29.9 G/DL (ref 32–36)
MCV RBC AUTO: 105 FL (ref 82–98)
MONOCYTES # BLD AUTO: 1 K/UL (ref 0.3–1)
MONOCYTES NFR BLD: 12.2 % (ref 4–15)
NEUTROPHILS # BLD AUTO: 4.7 K/UL (ref 1.8–7.7)
NEUTROPHILS NFR BLD: 55.7 % (ref 38–73)
NONHDLC SERPL-MCNC: 210 MG/DL
NRBC BLD-RTO: 0 /100 WBC
PLATELET # BLD AUTO: 272 K/UL (ref 150–350)
PMV BLD AUTO: 10.8 FL (ref 9.2–12.9)
POTASSIUM SERPL-SCNC: 4.7 MMOL/L (ref 3.5–5.1)
PROT SERPL-MCNC: 7.7 G/DL (ref 6–8.4)
RBC # BLD AUTO: 4.48 M/UL (ref 4–5.4)
SODIUM SERPL-SCNC: 141 MMOL/L (ref 136–145)
T4 FREE SERPL-MCNC: 1.01 NG/DL (ref 0.71–1.51)
TRIGL SERPL-MCNC: 319 MG/DL (ref 30–150)
TSH SERPL DL<=0.005 MIU/L-ACNC: 1.38 UIU/ML (ref 0.4–4)
URATE SERPL-MCNC: 10.1 MG/DL (ref 2.4–5.7)
WBC # BLD AUTO: 8.44 K/UL (ref 3.9–12.7)

## 2019-09-05 PROCEDURE — 99214 PR OFFICE/OUTPT VISIT, EST, LEVL IV, 30-39 MIN: ICD-10-PCS | Mod: S$PBB,,, | Performed by: FAMILY MEDICINE

## 2019-09-05 PROCEDURE — 84443 ASSAY THYROID STIM HORMONE: CPT

## 2019-09-05 PROCEDURE — 83036 HEMOGLOBIN GLYCOSYLATED A1C: CPT

## 2019-09-05 PROCEDURE — 99214 OFFICE O/P EST MOD 30 MIN: CPT | Mod: S$PBB,,, | Performed by: FAMILY MEDICINE

## 2019-09-05 PROCEDURE — 80053 COMPREHEN METABOLIC PANEL: CPT

## 2019-09-05 PROCEDURE — 80061 LIPID PANEL: CPT

## 2019-09-05 PROCEDURE — 99213 OFFICE O/P EST LOW 20 MIN: CPT | Mod: PBBFAC,PO | Performed by: FAMILY MEDICINE

## 2019-09-05 PROCEDURE — 82306 VITAMIN D 25 HYDROXY: CPT

## 2019-09-05 PROCEDURE — 84550 ASSAY OF BLOOD/URIC ACID: CPT

## 2019-09-05 PROCEDURE — 99999 PR PBB SHADOW E&M-EST. PATIENT-LVL III: CPT | Mod: PBBFAC,,, | Performed by: FAMILY MEDICINE

## 2019-09-05 PROCEDURE — 84439 ASSAY OF FREE THYROXINE: CPT

## 2019-09-05 PROCEDURE — 36415 COLL VENOUS BLD VENIPUNCTURE: CPT | Mod: PO

## 2019-09-05 PROCEDURE — 99999 PR PBB SHADOW E&M-EST. PATIENT-LVL III: ICD-10-PCS | Mod: PBBFAC,,, | Performed by: FAMILY MEDICINE

## 2019-09-05 PROCEDURE — 85025 COMPLETE CBC W/AUTO DIFF WBC: CPT

## 2019-09-05 RX ORDER — COLCHICINE 0.6 MG/1
0.6 TABLET ORAL 2 TIMES DAILY
Qty: 60 TABLET | Refills: 1 | Status: SHIPPED | OUTPATIENT
Start: 2019-09-05 | End: 2020-02-10

## 2019-09-05 NOTE — PATIENT INSTRUCTIONS
Self care for gout  Can try Tart / concentrated cherry juice from supermarket     Rest by elevating the affected joint.    An ice pack may help.  Apply the ice pack for 15 minutes at a time.      -Reduce the amount of animal protein you eat.   Organ meats (liver, brains, kidney and sweetbreads), anchovies, herring and mackerel are particularly high in purines.   -Avoid alcohol. Alcohol can inhibit the excretion of uric acid. If you're having a gout attack, it's best to avoid alcohol completely.   -Drink plenty of liquids. Fluids help dilute uric acid in your blood and urine, so be sure you get enough water and other fluids every day and to help avoid forming kidney stones.    Follow up in 1 week if symptoms do not resolve, sooner if worse.      Gout Diet  Gout is a painful condition caused by an excess of uric acid, a waste product made by the body. Uric acid forms crystals that collect in the joints. The immune response to these crystals brings on symptoms of joint pain and swelling. This is called a gout attack. Often, medications and diet changes are combined to manage gout. Below are some guidelines for changing your diet to help you manage gout and prevent attacks. Your health care provider will help you determine the best eating plan for you.     Eating to manage gout  Weight loss for those who are overweight may help reduce gout attacks.  Eat less of these foods  Eating too many foods containing purines may raise the levels of uric acid in your body. This raises your risk for a gout attack. Try to limit these foods and drinks:  · Alcohol, such as beer and red wine. You may be told to avoid alcohol completely.  · Soft drinks that contain sugar or high fructose corn syrup  · Certain fish, including anchovies, sardines, fish eggs, and herring  · Shellfish  · Certain meats, such as red meat, hot dogs, luncheon meats, and turkey  · Organ meats, such as liver, kidneys, and sweetbreads  · Legumes, such as dried  beans and peas  · Other high fat foods such as gravy, whole milk, and high fat cheeses  · Vegetables such as asparagus, cauliflower, spinach, and mushrooms used to be thought to contribute to an increased risk for a gout attack, but recent studies show that high purine vegetables don't increase the risk for a gout attack.  Eat more of these foods  Other foods may be helpful for people with gout. Add some of these foods to your diet:  · Cherries contain chemicals that may lower uric acid.  · Omega fatty acids. These are found in some fatty fish such as salmon, certain oils (flax, olive, or nut), and nuts themselves. Omega fatty acids may help prevent inflammation due to gout.  · Dairy products that are low-fat or fat-free, such as cheese and yogurt  · Complex carbohydrate foods, including whole grains, brown rice, oats, and beans  · Coffee, in moderation  · Water, approximately 64 ounces per day  Follow-up care  Follow up with your healthcare provider as advised.  When to seek medical advice  Call your healthcare provider right away if any of these occur:  · Return of gout symptoms, usually at night:  · Severe pain, swelling, and heat in a joint, especially the base of the big toe  · Affected joint is hard to move  · Skin of the affected joint is purple or red  · Fever of 100.4°F (38°C) or higher  · Pain that doesn't get better even with prescribed medicine   Date Last Reviewed: 1/12/2016  © 8160-4104 [employer]. [address] [place] [address]. All rights reserved. This information is not intended as a substitute for professional medical care. Always follow your healthcare professional's instructions.        Treating Gout Attacks     Raising the joint above the level of your heart can help reduce gout symptoms.     Gout is a disease that affects the joints. It is caused by excess uric acid in your blood stream that may lead to crystals forming in your joints. Left untreated, it can lead to  painful foot and joint deformities and even kidney problems. But, by treating gout early, you can relieve pain and help prevent future problems. Gout can usually be treated with medication and proper diet. In severe cases, surgery may be needed.  Gout attacks are painful and often happen more than once. Taking medications may reduce pain and prevent attacks in the future. There are also some things you can do at home to relieve symptoms.  Medications for gout  Your healthcare provider may prescribe a daily medication to reduce levels of uric acid. Reducing your uric acid levels may help prevent gout attacks. Allopurinol is one commonly used medication taken daily to reduce uric acid levels. Other medications can help relieve pain and swelling during an acute attack. Medicines such as NSAIDs (nonsteroidal anti-inflammatory medicines), steroids, and colchicine may be prescribed for intermittent use to relieve an acute gout attack. Be sure to take your medication as directed.  What you can do  Below are some things you can do at home to relieve gout symptoms. Your healthcare provider may have other tips.  · Rest the painful joint as much as you can.  · Raise the painful joint so it is at a level higher than your heart.  · Use ice for 10 minutes every 1-2 hours as possible.  How can I prevent gout?  With a little effort, you may be able to prevent gout attacks in the future. Here are some things you can do:  · Avoid foods high in purines  ¨ Certain meats (red meat, processed meat, turkey)  ¨ Organ meats (kidney, liver, sweetbread)  ¨ Shellfish (lobster, crab, shrimp, scallop, mussel)  ¨ Certain fish (anchovy, sardine, herring, mackerel)  · Take any medications prescribed by your healthcare provider.  · Lose weight if you need to.  · Reduce high fructose corn syrup in meals and drinks.  · Reduce or eliminate consumption of alcohol, particularly beer, but also red wine and spirits.  · Control blood pressure, diabetes, and  cholesterol.  · Drink plenty of water to help flush uric acid from your body.  Date Last Reviewed: 2/1/2016 © 2000-2017 [email or web address]. [address] [place] [address], [place] [address]. All rights reserved. This information is not intended as a substitute for professional medical care. Always follow your healthcare professional's instructions.        Gout    Gout is an inflammation of a joint due to a build-up of gout crystals in the joint fluid. This occurs when there is an excess of uric acid (a normal waste product) in the body. Uric acid builds up in the body when the kidneys are unable to filter enough of it from the blood. This may occur with age. It is also associated with kidney disease. Gout occurs more often in people with obesity, diabetes, high blood pressure, or high levels of fats in the blood. It may run in families. Gout tends to come and go. A flare up of gout is called an attack. Drinking alcohol or eating certain foods (such as shellfish or foods with additives such as high-fructose corn syrup) may increase uric acid levels in the blood and cause a gout attack.  During a gout attack, the affected joint may become a hot, red, swollen and painful. If you have had one attack of gout, you are likely to have another. An attack of gout can be treated with medicine. If these attacks become frequent, a daily medicine may be prescribed to help the kidneys remove uric acid from the body.  Home care  During a gout attack:  · Rest painful joints. If gout affects the joints of your foot or leg, you may want to use crutches for the first few days to keep from bearing weight on the affected joint.  · When sitting or lying down, raise the painful joint to a level higher than your heart.  · Apply an ice pack (ice cubes in a plastic bag wrapped in a thin towel) over the injured area for 20 minutes every 1 to 2 hours the first day for pain relief. Continue this 3 to 4 times a day for swelling and pain.  · Avoid  alcohol and foods listed below (see Preventing attacks) during a gout attack. Drink extra fluid to help flush the uric acid through your kidneys.  · If you were prescribed a medicine to treat gout, take it as your healthcare provider has instructed. Don't skip doses.  · Take anti-inflammatory medicine as directed.   · If pain medicines have been prescribed, take them exactly as directed.    Preventing attacks  · Minimize or avoid alcohol use. Excess alcohol intake can cause a gout attack.  · Limit these foods and beverages:  ¨ Organ meats, such as kidneys and liver  ¨ Certain seafoods (anchovies, sardines, shrimp, scallops, herring, mackerel)  ¨ Wild game, meat extracts and meat gravies  ¨ Foods and beverages sweetened with high-fructose corn syrup, such as sodas  · Eat a healthy diet including low-fat and nonfat dairy, whole grains, and vegetables.  · If you are overweight, talk to your healthcare provider about a weight reduction plan. Avoid fasting or extreme low calorie diets (less than 900 calories per day). This will increase uric acid levels in the body.  · If you have diabetes or high blood pressure, work with your doctor to manage these conditions.  · Protect the joint from injury. Trauma can trigger a gout attack.  Follow-up care  Follow up with your healthcare provider, or as advised.   When to seek medical advice  Call your healthcare provider if you have any of the following:  · Fever over 100.4°F (38.ºC) with worsening joint pain  · Increasing redness around the joint  · Pain developing in another joint  · Repeated vomiting, abdominal pain, or blood in the vomit or stool (black or red color)  Date Last Reviewed: 3/1/2017  © 0429-8602 [employer]. [address] [place] [id number]. All rights reserved. This information is not intended as a substitute for professional medical care. Always follow your healthcare professional's instructions.        Eating to Prevent Gout  Gout is a  painful form of arthritis caused by an excess of uric acid. This is a waste product made by the body. It builds up in the body and forms crystals that collect in the joints, bringing on a gout attack. Alcohol and certain foods can trigger a gout attack. Below are some guidelines for changing your diet to help you manage gout. Your healthcare provider can work with you to determine the best eating plan for you. Know that diet is only one part of managing gout. Take your medicines as prescribed and follow the other guidelines your healthcare provider has given you.  Foods to limit  Eating too many foods containing purines may increase the levels of uric acid in your body and increase your risk for a gout attack. It may be best to limit these high-purine foods:  · Alcohol (beer, red wine). You may be told to avoid alcohol completely.  · Certain fish (anchovies, sardines, fish roes, herring, tuna, mussels, codfish, scallops, trout, and kp)  · Certain meats (red meat, processed meat, gold, turkey, wild game, and goose)  · Sauces and gravies made with meat  · Organ meats (such as liver, kidneys, sweetbreads, and tripe)  · Legumes (such as dried beans, peas)  · Mushrooms, spinach, asparagus, and cauliflower  · Yeast and yeast extract supplements  Foods to try  Some foods may be helpful for people with gout. You may want to try adding some of the following foods to your diet:  · Dark berries: These include blueberries, blackberries, and cherries. These berries contain chemicals that may lower uric acid.  · Tofu: Tofu, which is made from soy, is a good source of protein. Studies have shown that it may be a better choice than meat for people with gout.  · Omega fatty acids: These acids are found in fatty fish (such as salmon), certain oils (such as flax, olive, or nut oils), or nuts. They may help prevent inflammation due to gout.  The following guidelines are recommended by the American Medical Association for people  with gout. Your diet should be:  · High in fiber, whole grains, fruits, and vegetables.  · Low in protein (15% of calories should come from protein. Choose lean sources such as soy, lean meats, and poultry).  · Low in fat (no more than 30% of calories should come from fat, with only 10% coming from animal fat).   Date Last Reviewed: 6/17/2015  © 5261-5538 [employer]. [address] [place] [address], [place] [address]. All rights reserved. This information is not intended as a substitute for professional medical care. Always follow your healthcare professional's instructions.

## 2019-09-06 NOTE — PROGRESS NOTES
Subjective:      Patient ID: Maura Singh is a 79 y.o. female.    Chief Complaint: Annual Exam    Disclaimer:  This note is prepared using voice recognition software and as such is likely to have errors and has not been proof read. Please contact me for questions.     Patient's coming in today with her daughter Lilian for follow-up with recent visit with my nurse practitioner for toe pain.  Thought to be secondary to gout.  Was prescribed colchicine but for 10 pills was over 60 dollars.  She did take them which helped initially but was unable to continue on the medication.  Uncertain what may be the trigger but she does eat shrimp and other heavy proteins.  Does not drink alcohol.  Is limiting her ability to walk at times.  Has to use a walker regularly.  Is very painful even to light touch.  Was in the mid foot and ankle of the left foot.  Uric acid levels done at that time were around 8.  Needing to repeat levels again today.    Is still on lisinopril for blood pressure.  Is also on Lasix at this time.  Not having issues with swelling of her legs.  Chronically has shortness of breath but does not use oxygen at this time.    Has hyperlipidemia but with previous statin therapy had significant leg cramps.  Has tried some Omega threes in the past.  Needing lab work today.    Has prediabetes.  Been trying to work hard on the diet.    She's also noticed significant vision changes with flashes and floaters recently.  She has difficult time when going from light to dark in seeing distances.  She's uncertain if she may have a cataract.  She is able to read though but is getting more difficult.    She also recently got a new hearing aid from the emerge program through the [facility].  He was called the here now program.  They did provide her with a brand-new hearing aid free of charge.  This is made a dramatic impact in her quality of life.    Her biggest deterrent right now for additional testing or workup thus  far has been finances.  She does not qualify for Medicaid.  She does only receives a security.  For this reason she's been hesitant to do bone density studies as well as also mammograms.  At this time does not want to do further screening measures for bone density mammograms or colonoscopies.    They are asking for a permanent handicap tag since she does not drive and so her daughter can have it for when she is in the car with her.          Lab Results   Component Value Date    WBC 8.44 09/05/2019    HGB 14.0 09/05/2019    HCT 46.9 09/05/2019     09/05/2019    CHOL 250 (H) 09/05/2019    TRIG 319 (H) 09/05/2019    HDL 40 09/05/2019    ALT 26 09/05/2019    AST 24 09/05/2019     09/05/2019    K 4.7 09/05/2019     09/05/2019    CREATININE 1.0 09/05/2019    BUN 21 09/05/2019    CO2 28 09/05/2019    TSH 1.376 09/05/2019    HGBA1C 5.7 (H) 09/05/2019       X-Ray Ankle Complete Left  Narrative: EXAMINATION:  XR ANKLE COMPLETE 3 VIEW LEFT    CLINICAL HISTORY:  Pain in left foot    TECHNIQUE:  AP, lateral and oblique views of the left ankle were performed.    COMPARISON:  None    FINDINGS:  There is nonspecific soft tissue edema surrounding the ankle.  No acute fractures or dislocations visualized.  Ankle mortise is well maintained.There is a small plantar surface calcaneal enthesophyte present.  Mild degenerative spurring noted at the distal tip of the medial malleolus.  Impression: As Above    Electronically signed by: Luis Burton MD  Date:    07/31/2019  Time:    11:59  X-Ray Foot Complete Left  Narrative: EXAMINATION:  XR FOOT COMPLETE 3 VIEW LEFT    CLINICAL HISTORY:  .  Pain in left foot    TECHNIQUE:  AP, lateral and oblique views of the left foot were performed.    COMPARISON:  None    FINDINGS:  There is nonspecific diffuse soft tissue edema noted along the dorsal surface of the foot.  No acute fractures or dislocations visualized.  Joint spaces are well preserved.  No erosive changes  demonstrated. There is a small plantar surface calcaneal enthesophyte present.  Impression: As above.    Electronically signed by: Luis Burton MD  Date:    07/31/2019  Time:    11:58        Review of Systems   Constitutional: Positive for activity change. Negative for unexpected weight change.   HENT: Positive for rhinorrhea. Negative for hearing loss and trouble swallowing.    Eyes: Positive for discharge. Negative for visual disturbance.   Respiratory: Negative for chest tightness and wheezing.    Cardiovascular: Negative for chest pain and palpitations.   Gastrointestinal: Negative for blood in stool, constipation, diarrhea and vomiting.   Endocrine: Negative for polydipsia and polyuria.   Genitourinary: Negative for difficulty urinating, dysuria, hematuria and menstrual problem.   Musculoskeletal: Positive for arthralgias, back pain and gait problem. Negative for joint swelling and neck pain.   Neurological: Positive for weakness. Negative for headaches.   Psychiatric/Behavioral: Negative for confusion and dysphoric mood.     Objective:     Vitals:    09/05/19 0910   BP: 116/78   Pulse: 68   Temp: 97.7 °F (36.5 °C)   TempSrc: Oral   Weight: 95.9 kg (211 lb 6.7 oz)   Height: 5' (1.524 m)     Physical Exam   Constitutional: She is oriented to person, place, and time. She appears well-developed and well-nourished.   HENT:   Head: Normocephalic and atraumatic.   Right Ear: Tympanic membrane normal.   Left Ear: Tympanic membrane normal.   Mouth/Throat: Oropharynx is clear and moist.   Eyes: Conjunctivae and EOM are normal.   Neck: Normal range of motion. Neck supple.   Cardiovascular: Normal rate and regular rhythm.   Pulmonary/Chest: Effort normal and breath sounds normal.   Musculoskeletal:        Lumbar back: She exhibits decreased range of motion, tenderness and pain. She exhibits no bony tenderness.        Left foot: There is decreased range of motion, tenderness, bony tenderness and swelling.    Neurological: She is alert and oriented to person, place, and time. She displays atrophy. A sensory deficit is present. Coordination and gait abnormal.   Psychiatric: She has a normal mood and affect. Her speech is normal and behavior is normal.   Nursing note and vitals reviewed.    Assessment:     1. Acute gout of multiple sites, unspecified cause    2. CKD (chronic kidney disease) stage 3, GFR 30-59 ml/min    3. Mixed hyperlipidemia    4. Essential hypertension    5. Gait abnormality    6. Abnormal glucose      Plan:   Maura was seen today for annual exam.    Diagnoses and all orders for this visit:    Acute gout of multiple sites, unspecified cause- new. Repeat labs.  Will represcribed colchicine an attempt for generic version.  If not can do colchicine with probenecid.  Repeating lab values which showed at 10.  Once this acute gout flare up has improved will need to start daily preventative medicine such as allopurinol.  Provided handouts on dietary measures to prevent gout as well as tart cherry juice.  -     colchicine (COLCRYS) 0.6 mg tablet; Take 1 tablet (0.6 mg total) by mouth 2 (two) times daily. For acute gout flareups; ok for alt capsule if chepaer  -     TSH; Future  -     T4, free; Future  -     Lipid panel; Future  -     Comprehensive metabolic panel; Future  -     CBC auto differential; Future  -     Vitamin D; Future  -     Hemoglobin A1c; Future  -     Uric acid; Future    CKD (chronic kidney disease) stage 3, GFR 30-59 ml/min-repeating labs today checking kidney function encourage hydration with water  -     TSH; Future  -     T4, free; Future  -     Lipid panel; Future  -     Comprehensive metabolic panel; Future  -     CBC auto differential; Future  -     Vitamin D; Future  -     Hemoglobin A1c; Future  -     Uric acid; Future    Mixed hyperlipidemia- declines statin therapy repeating lab work today.  -     TSH; Future  -     T4, free; Future  -     Lipid panel; Future  -     Comprehensive  metabolic panel; Future  -     CBC auto differential; Future  -     Vitamin D; Future  -     Hemoglobin A1c; Future  -     Uric acid; Future    Essential hypertension stable with lisinopril  -     TSH; Future  -     T4, free; Future  -     Lipid panel; Future  -     Comprehensive metabolic panel; Future  -     CBC auto differential; Future  -     Vitamin D; Future  -     Hemoglobin A1c; Future  -     Uric acid; Future    Gait abnormality OK for permanent handicap tag using a walker  -     TSH; Future  -     T4, free; Future  -     Lipid panel; Future  -     Comprehensive metabolic panel; Future  -     CBC auto differential; Future  -     Vitamin D; Future  -     Hemoglobin A1c; Future  -     Uric acid; Future    Abnormal glucose-known prediabetes screening for worsening function  -     TSH; Future  -     T4, free; Future  -     Lipid panel; Future  -     Comprehensive metabolic panel; Future  -     CBC auto differential; Future  -     Vitamin D; Future  -     Hemoglobin A1c; Future  -     Uric acid; Future            Follow up in about 4 weeks (around 10/3/2019) for F/u aleshia mcgovern meds, gout prevention.    Patient Instructions     Self care for gout  Can try Tart / concentrated cherry juice from [employer]     Rest by elevating the affected joint.    An ice pack may help.  Apply the ice pack for 15 minutes at a time.      -Reduce the amount of animal protein you eat.   Organ meats (liver, brains, kidney and sweetbreads), anchovies, herring and mackerel are particularly high in purines.   -Avoid alcohol. Alcohol can inhibit the excretion of uric acid. If you're having a gout attack, it's best to avoid alcohol completely.   -Drink plenty of liquids. Fluids help dilute uric acid in your blood and urine, so be sure you get enough water and other fluids every day and to help avoid forming kidney stones.    Follow up in 1 week if symptoms do not resolve, sooner if worse.      Gout Diet  Gout is a painful condition caused  by an excess of uric acid, a waste product made by the body. Uric acid forms crystals that collect in the joints. The immune response to these crystals brings on symptoms of joint pain and swelling. This is called a gout attack. Often, medications and diet changes are combined to manage gout. Below are some guidelines for changing your diet to help you manage gout and prevent attacks. Your health care provider will help you determine the best eating plan for you.     Eating to manage gout  Weight loss for those who are overweight may help reduce gout attacks.  Eat less of these foods  Eating too many foods containing purines may raise the levels of uric acid in your body. This raises your risk for a gout attack. Try to limit these foods and drinks:  · Alcohol, such as beer and red wine. You may be told to avoid alcohol completely.  · Soft drinks that contain sugar or high fructose corn syrup  · Certain fish, including anchovies, sardines, fish eggs, and herring  · Shellfish  · Certain meats, such as red meat, hot dogs, luncheon meats, and turkey  · Organ meats, such as liver, kidneys, and sweetbreads  · Legumes, such as dried beans and peas  · Other high fat foods such as gravy, whole milk, and high fat cheeses  · Vegetables such as asparagus, cauliflower, spinach, and mushrooms used to be thought to contribute to an increased risk for a gout attack, but recent studies show that high purine vegetables don't increase the risk for a gout attack.  Eat more of these foods  Other foods may be helpful for people with gout. Add some of these foods to your diet:  · Cherries contain chemicals that may lower uric acid.  · Omega fatty acids. These are found in some fatty fish such as salmon, certain oils (flax, olive, or nut), and nuts themselves. Omega fatty acids may help prevent inflammation due to gout.  · Dairy products that are low-fat or fat-free, such as cheese and yogurt  · Complex carbohydrate foods, including whole  grains, brown rice, oats, and beans  · Coffee, in moderation  · Water, approximately 64 ounces per day  Follow-up care  Follow up with your healthcare provider as advised.  When to seek medical advice  Call your healthcare provider right away if any of these occur:  · Return of gout symptoms, usually at night:  · Severe pain, swelling, and heat in a joint, especially the base of the big toe  · Affected joint is hard to move  · Skin of the affected joint is purple or red  · Fever of 100.4°F (38°C) or higher  · Pain that doesn't get better even with prescribed medicine   Date Last Reviewed: 1/12/2016 © 2000-2017 [employer]. [address] [place] [address], [place] [address]. All rights reserved. This information is not intended as a substitute for professional medical care. Always follow your healthcare professional's instructions.        Treating Gout Attacks     Raising the joint above the level of your heart can help reduce gout symptoms.     Gout is a disease that affects the joints. It is caused by excess uric acid in your blood stream that may lead to crystals forming in your joints. Left untreated, it can lead to painful foot and joint deformities and even kidney problems. But, by treating gout early, you can relieve pain and help prevent future problems. Gout can usually be treated with medication and proper diet. In severe cases, surgery may be needed.  Gout attacks are painful and often happen more than once. Taking medications may reduce pain and prevent attacks in the future. There are also some things you can do at home to relieve symptoms.  Medications for gout  Your healthcare provider may prescribe a daily medication to reduce levels of uric acid. Reducing your uric acid levels may help prevent gout attacks. Allopurinol is one commonly used medication taken daily to reduce uric acid levels. Other medications can help relieve pain and swelling during an acute attack. Medicines such as NSAIDs  (nonsteroidal anti-inflammatory medicines), steroids, and colchicine may be prescribed for intermittent use to relieve an acute gout attack. Be sure to take your medication as directed.  What you can do  Below are some things you can do at home to relieve gout symptoms. Your healthcare provider may have other tips.  · Rest the painful joint as much as you can.  · Raise the painful joint so it is at a level higher than your heart.  · Use ice for 10 minutes every 1-2 hours as possible.  How can I prevent gout?  With a little effort, you may be able to prevent gout attacks in the future. Here are some things you can do:  · Avoid foods high in purines  ¨ Certain meats (red meat, processed meat, turkey)  ¨ Organ meats (kidney, liver, sweetbread)  ¨ Shellfish (lobster, crab, shrimp, scallop, mussel)  ¨ Certain fish (anchovy, sardine, herring, mackerel)  · Take any medications prescribed by your healthcare provider.  · Lose weight if you need to.  · Reduce high fructose corn syrup in meals and drinks.  · Reduce or eliminate consumption of alcohol, particularly beer, but also red wine and spirits.  · Control blood pressure, diabetes, and cholesterol.  · Drink plenty of water to help flush uric acid from your body.  Date Last Reviewed: 2/1/2016  © 8203-5819 [employer]. [address] [place] [address], [place] [address]. All rights reserved. This information is not intended as a substitute for professional medical care. Always follow your healthcare professional's instructions.        Gout    Gout is an inflammation of a joint due to a build-up of gout crystals in the joint fluid. This occurs when there is an excess of uric acid (a normal waste product) in the body. Uric acid builds up in the body when the kidneys are unable to filter enough of it from the blood. This may occur with age. It is also associated with kidney disease. Gout occurs more often in people with obesity, diabetes, high blood  pressure, or high levels of fats in the blood. It may run in families. Gout tends to come and go. A flare up of gout is called an attack. Drinking alcohol or eating certain foods (such as shellfish or foods with additives such as high-fructose corn syrup) may increase uric acid levels in the blood and cause a gout attack.  During a gout attack, the affected joint may become a hot, red, swollen and painful. If you have had one attack of gout, you are likely to have another. An attack of gout can be treated with medicine. If these attacks become frequent, a daily medicine may be prescribed to help the kidneys remove uric acid from the body.  Home care  During a gout attack:  · Rest painful joints. If gout affects the joints of your foot or leg, you may want to use crutches for the first few days to keep from bearing weight on the affected joint.  · When sitting or lying down, raise the painful joint to a level higher than your heart.  · Apply an ice pack (ice cubes in a plastic bag wrapped in a thin towel) over the injured area for 20 minutes every 1 to 2 hours the first day for pain relief. Continue this 3 to 4 times a day for swelling and pain.  · Avoid alcohol and foods listed below (see Preventing attacks) during a gout attack. Drink extra fluid to help flush the uric acid through your kidneys.  · If you were prescribed a medicine to treat gout, take it as your healthcare provider has instructed. Don't skip doses.  · Take anti-inflammatory medicine as directed.   · If pain medicines have been prescribed, take them exactly as directed.    Preventing attacks  · Minimize or avoid alcohol use. Excess alcohol intake can cause a gout attack.  · Limit these foods and beverages:  ¨ Organ meats, such as kidneys and liver  ¨ Certain seafoods (anchovies, sardines, shrimp, scallops, herring, mackerel)  ¨ Wild game, meat extracts and meat gravies  ¨ Foods and beverages sweetened with high-fructose corn syrup, such as  sodas  · Eat a healthy diet including low-fat and nonfat dairy, whole grains, and vegetables.  · If you are overweight, talk to your healthcare provider about a weight reduction plan. Avoid fasting or extreme low calorie diets (less than 900 calories per day). This will increase uric acid levels in the body.  · If you have diabetes or high blood pressure, work with your doctor to manage these conditions.  · Protect the joint from injury. Trauma can trigger a gout attack.  Follow-up care  Follow up with your healthcare provider, or as advised.   When to seek medical advice  Call your healthcare provider if you have any of the following:  · Fever over 100.4°F (38.ºC) with worsening joint pain  · Increasing redness around the joint  · Pain developing in another joint  · Repeated vomiting, abdominal pain, or blood in the vomit or stool (black or red color)  Date Last Reviewed: 3/1/2017  © 5156-7989 [employer]. [address] [place] [address]. All rights reserved. This information is not intended as a substitute for professional medical care. Always follow your healthcare professional's instructions.        Eating to Prevent Gout  Gout is a painful form of arthritis caused by an excess of uric acid. This is a waste product made by the body. It builds up in the body and forms crystals that collect in the joints, bringing on a gout attack. Alcohol and certain foods can trigger a gout attack. Below are some guidelines for changing your diet to help you manage gout. Your healthcare provider can work with you to determine the best eating plan for you. Know that diet is only one part of managing gout. Take your medicines as prescribed and follow the other guidelines your healthcare provider has given you.  Foods to limit  Eating too many foods containing purines may increase the levels of uric acid in your body and increase your risk for a gout attack. It may be best to limit these high-purine  foods:  · Alcohol (beer, red wine). You may be told to avoid alcohol completely.  · Certain fish (anchovies, sardines, fish roes, herring, tuna, mussels, codfish, scallops, trout, and kp)  · Certain meats (red meat, processed meat, gold, turkey, wild game, and goose)  · Sauces and gravies made with meat  · Organ meats (such as liver, kidneys, sweetbreads, and tripe)  · Legumes (such as dried beans, peas)  · Mushrooms, spinach, asparagus, and cauliflower  · Yeast and yeast extract supplements  Foods to try  Some foods may be helpful for people with gout. You may want to try adding some of the following foods to your diet:  · Dark berries: These include blueberries, blackberries, and cherries. These berries contain chemicals that may lower uric acid.  · Tofu: Tofu, which is made from soy, is a good source of protein. Studies have shown that it may be a better choice than meat for people with gout.  · Omega fatty acids: These acids are found in fatty fish (such as salmon), certain oils (such as flax, olive, or nut oils), or nuts. They may help prevent inflammation due to gout.  The following guidelines are recommended by the American Medical Association for people with gout. Your diet should be:  · High in fiber, whole grains, fruits, and vegetables.  · Low in protein (15% of calories should come from protein. Choose lean sources such as soy, lean meats, and poultry).  · Low in fat (no more than 30% of calories should come from fat, with only 10% coming from animal fat).   Date Last Reviewed: 6/17/2015  © 8031-3702 [employer]. [address] [place] [address], [place] [address]. All rights reserved. This information is not intended as a substitute for professional medical care. Always follow your healthcare professional's instructions.

## 2019-09-10 ENCOUNTER — PATIENT OUTREACH (OUTPATIENT)
Dept: ADMINISTRATIVE | Facility: HOSPITAL | Age: 79
End: 2019-09-10

## 2019-09-30 ENCOUNTER — EXTERNAL CHRONIC CARE MANAGEMENT (OUTPATIENT)
Dept: PRIMARY CARE CLINIC | Facility: CLINIC | Age: 79
End: 2019-09-30
Payer: MEDICARE

## 2019-09-30 PROCEDURE — 99490 CHRNC CARE MGMT STAFF 1ST 20: CPT | Mod: PBBFAC,PO | Performed by: FAMILY MEDICINE

## 2019-09-30 PROCEDURE — 99490 PR CHRONIC CARE MGMT, 1ST 20 MIN: ICD-10-PCS | Mod: S$PBB,,, | Performed by: FAMILY MEDICINE

## 2019-09-30 PROCEDURE — 99490 CHRNC CARE MGMT STAFF 1ST 20: CPT | Mod: S$PBB,,, | Performed by: FAMILY MEDICINE

## 2019-10-02 ENCOUNTER — OFFICE VISIT (OUTPATIENT)
Dept: INTERNAL MEDICINE | Facility: CLINIC | Age: 79
End: 2019-10-02
Payer: MEDICARE

## 2019-10-02 VITALS
BODY MASS INDEX: 41.2 KG/M2 | WEIGHT: 209.88 LBS | HEART RATE: 66 BPM | TEMPERATURE: 98 F | SYSTOLIC BLOOD PRESSURE: 118 MMHG | DIASTOLIC BLOOD PRESSURE: 60 MMHG | RESPIRATION RATE: 12 BRPM | HEIGHT: 60 IN

## 2019-10-02 DIAGNOSIS — M10.9 ACUTE GOUT, UNSPECIFIED CAUSE, UNSPECIFIED SITE: Primary | ICD-10-CM

## 2019-10-02 PROCEDURE — 99213 OFFICE O/P EST LOW 20 MIN: CPT | Mod: PBBFAC,PO | Performed by: NURSE PRACTITIONER

## 2019-10-02 PROCEDURE — 99214 OFFICE O/P EST MOD 30 MIN: CPT | Mod: S$PBB,,, | Performed by: NURSE PRACTITIONER

## 2019-10-02 PROCEDURE — 99999 PR PBB SHADOW E&M-EST. PATIENT-LVL III: ICD-10-PCS | Mod: PBBFAC,,, | Performed by: NURSE PRACTITIONER

## 2019-10-02 PROCEDURE — 90662 IIV NO PRSV INCREASED AG IM: CPT | Mod: PBBFAC,PO

## 2019-10-02 PROCEDURE — 99999 PR PBB SHADOW E&M-EST. PATIENT-LVL III: CPT | Mod: PBBFAC,,, | Performed by: NURSE PRACTITIONER

## 2019-10-02 PROCEDURE — 99214 PR OFFICE/OUTPT VISIT, EST, LEVL IV, 30-39 MIN: ICD-10-PCS | Mod: S$PBB,,, | Performed by: NURSE PRACTITIONER

## 2019-10-02 RX ORDER — ALLOPURINOL 100 MG/1
100 TABLET ORAL DAILY
Qty: 30 TABLET | Refills: 1 | Status: SHIPPED | OUTPATIENT
Start: 2019-10-02 | End: 2019-12-20

## 2019-10-02 NOTE — PROGRESS NOTES
Subjective:       Patient ID: Maura Singh is a 79 y.o. female.    Chief Complaint: Gout (follow up )    79 year old female here follow up weight and gout. Wanting to start allopurinol for prevention. Took colchicine for gout flare which helped.  She does enjoy seafood and fish.  She has been trying to change her diet which has been difficult.  Gout flare is resolved at this time and she feels much improved.    Patient wants her flu shot today    Lab Results       Component                Value               Date                       URICACID                 10.1 (H)            09/05/2019              Lab Results       Component                Value               Date                       CREATININE               1.0                 09/05/2019                 BUN                      21                  09/05/2019                 NA                       141                 09/05/2019                 K                        4.7                 09/05/2019                 CL                       103                 09/05/2019                 CO2                      28                  09/05/2019                Wt Readings from Last 10 Encounters:  10/02/19 : 95.2 kg (209 lb 14.1 oz)  09/05/19 : 95.9 kg (211 lb 6.7 oz)  11/06/18 : 91.4 kg (201 lb 8 oz)  05/02/18 : 89.5 kg (197 lb 5 oz)  03/20/18 : 92.5 kg (203 lb 14.8 oz)  02/08/18 : 96.4 kg (212 lb 8.4 oz)  01/22/18 : 96.4 kg (212 lb 8.4 oz)  01/12/18 : 96.4 kg (212 lb 8.4 oz)  12/20/17 : 96.4 kg (212 lb 8.4 oz)  12/13/17 : 98.3 kg (216 lb 11.4 oz)          /60 (BP Location: Right arm, Patient Position: Sitting, BP Method: Large (Manual))   Pulse 66   Temp 97.5 °F (36.4 °C) (Oral)   Resp 12   Ht 5' (1.524 m)   Wt 95.2 kg (209 lb 14.1 oz)   BMI 40.99 kg/m²     Review of Systems   Constitutional: Negative for activity change, appetite change, chills, diaphoresis, fatigue, fever and unexpected weight change.   HENT: Positive for hearing loss.  Negative for trouble swallowing.    Eyes: Negative for discharge and visual disturbance.   Respiratory: Negative for cough, chest tightness, shortness of breath and wheezing.    Cardiovascular: Negative for chest pain, palpitations and leg swelling.   Gastrointestinal: Negative for abdominal distention, abdominal pain, blood in stool, constipation, diarrhea, nausea and vomiting.   Endocrine: Negative for polydipsia and polyuria.   Genitourinary: Negative for decreased urine volume, difficulty urinating, dysuria, frequency, hematuria, menstrual problem and urgency.   Musculoskeletal: Positive for arthralgias. Negative for joint swelling and neck pain.   Neurological: Negative for dizziness, syncope, speech difficulty, weakness and light-headedness.   Psychiatric/Behavioral: Negative for agitation, confusion, dysphoric mood and hallucinations. The patient is not nervous/anxious.        Objective:      Physical Exam   Constitutional: She is oriented to person, place, and time. She appears well-developed and well-nourished. She is cooperative. No distress.   HENT:   Head: Normocephalic and atraumatic.   Eyes: Conjunctivae are normal. Right eye exhibits no discharge. Left eye exhibits no discharge.   Cardiovascular: Normal rate, regular rhythm and normal heart sounds.   No murmur heard.  Pulmonary/Chest: Effort normal and breath sounds normal. No respiratory distress. She has no wheezes. She has no rales. She exhibits no tenderness.   Abdominal: Soft. She exhibits no distension.   Musculoskeletal: Normal range of motion. She exhibits no edema or tenderness.   Neurological: She is alert and oriented to person, place, and time.   Skin: Skin is warm and dry. No rash noted. She is not diaphoretic.   Psychiatric: She has a normal mood and affect. Her behavior is normal. Judgment and thought content normal.   Nursing note and vitals reviewed.      Assessment:       1. Acute gout, unspecified cause, unspecified site         Plan:       Maura was seen today for gout.    Diagnoses and all orders for this visit:    Acute gout, unspecified cause, unspecified site  -     allopurinol (ZYLOPRIM) 100 MG tablet; Take 1 tablet (100 mg total) by mouth once daily.  -     Comprehensive metabolic panel; Future  -     Uric acid; Future     Start allopurinol q.day  Gout diet reviewed  Will recheck metabolic panel with uric acid in 1 month to determine if dose adjustment is necessary  Goal is uric acid less than 6

## 2019-10-02 NOTE — PATIENT INSTRUCTIONS
Eating to Prevent Gout  Gout is a painful form of arthritis caused by an excess of uric acid. This is a waste product made by the body. It builds up in the body and forms crystals that collect in the joints, bringing on a gout attack. Alcohol and certain foods can trigger a gout attack. Below are some guidelines for changing your diet to help you manage gout. Your healthcare provider can work with you to determine the best eating plan for you. Know that diet is only one part of managing gout. Take your medicines as prescribed and follow the other guidelines your healthcare provider has given you.  Foods to limit  Eating too many foods containing purines may increase the levels of uric acid in your body and increase your risk for a gout attack. It may be best to limit these high-purine foods:  · Alcohol (beer, red wine). You may be told to avoid alcohol completely.  · Certain fish (anchovies, sardines, fish roes, herring, tuna, mussels, codfish, scallops, trout, and kp)  · Certain meats (red meat, processed meat, gold, turkey, wild game, and goose)  · Sauces and gravies made with meat  · Organ meats (such as liver, kidneys, sweetbreads, and tripe)  · Legumes (such as dried beans, peas)  · Mushrooms, spinach, asparagus, and cauliflower  · Yeast and yeast extract supplements  Foods to try  Some foods may be helpful for people with gout. You may want to try adding some of the following foods to your diet:  · Dark berries: These include blueberries, blackberries, and cherries. These berries contain chemicals that may lower uric acid.  · Tofu: Tofu, which is made from soy, is a good source of protein. Studies have shown that it may be a better choice than meat for people with gout.  · Omega fatty acids: These acids are found in fatty fish (such as salmon), certain oils (such as flax, olive, or nut oils), or nuts. They may help prevent inflammation due to gout.  The following guidelines are recommended by the  American Medical Association for people with gout. Your diet should be:  · High in fiber, whole grains, fruits, and vegetables.  · Low in protein (15% of calories should come from protein. Choose lean sources such as soy, lean meats, and poultry).  · Low in fat (no more than 30% of calories should come from fat, with only 10% coming from animal fat).   Date Last Reviewed: 6/17/2015  © 8534-1855 [employer] [address] [place] [address], [place] [address]. All rights reserved. This information is not intended as a substitute for professional medical care. Always follow your healthcare professional's instructions.

## 2019-10-25 ENCOUNTER — LAB VISIT (OUTPATIENT)
Dept: LAB | Facility: HOSPITAL | Age: 79
End: 2019-10-25
Attending: NURSE PRACTITIONER
Payer: MEDICARE

## 2019-10-25 DIAGNOSIS — M10.9 ACUTE GOUT, UNSPECIFIED CAUSE, UNSPECIFIED SITE: ICD-10-CM

## 2019-10-25 LAB
ALBUMIN SERPL BCP-MCNC: 3.7 G/DL (ref 3.5–5.2)
ALP SERPL-CCNC: 80 U/L (ref 55–135)
ALT SERPL W/O P-5'-P-CCNC: 30 U/L (ref 10–44)
ANION GAP SERPL CALC-SCNC: 10 MMOL/L (ref 8–16)
AST SERPL-CCNC: 33 U/L (ref 10–40)
BILIRUB SERPL-MCNC: 0.3 MG/DL (ref 0.1–1)
BUN SERPL-MCNC: 29 MG/DL (ref 8–23)
CALCIUM SERPL-MCNC: 9.9 MG/DL (ref 8.7–10.5)
CHLORIDE SERPL-SCNC: 103 MMOL/L (ref 95–110)
CO2 SERPL-SCNC: 27 MMOL/L (ref 23–29)
CREAT SERPL-MCNC: 1.3 MG/DL (ref 0.5–1.4)
EST. GFR  (AFRICAN AMERICAN): 44.8 ML/MIN/1.73 M^2
EST. GFR  (NON AFRICAN AMERICAN): 38.8 ML/MIN/1.73 M^2
GLUCOSE SERPL-MCNC: 110 MG/DL (ref 70–110)
POTASSIUM SERPL-SCNC: 4.8 MMOL/L (ref 3.5–5.1)
PROT SERPL-MCNC: 7.7 G/DL (ref 6–8.4)
SODIUM SERPL-SCNC: 140 MMOL/L (ref 136–145)
URATE SERPL-MCNC: 8.3 MG/DL (ref 2.4–5.7)

## 2019-10-25 PROCEDURE — 84550 ASSAY OF BLOOD/URIC ACID: CPT

## 2019-10-25 PROCEDURE — 36415 COLL VENOUS BLD VENIPUNCTURE: CPT | Mod: PO

## 2019-10-25 PROCEDURE — 80053 COMPREHEN METABOLIC PANEL: CPT

## 2019-10-31 ENCOUNTER — EXTERNAL CHRONIC CARE MANAGEMENT (OUTPATIENT)
Dept: PRIMARY CARE CLINIC | Facility: CLINIC | Age: 79
End: 2019-10-31
Payer: MEDICARE

## 2019-10-31 PROCEDURE — 99490 CHRNC CARE MGMT STAFF 1ST 20: CPT | Mod: PBBFAC,PO | Performed by: FAMILY MEDICINE

## 2019-10-31 PROCEDURE — 99490 CHRNC CARE MGMT STAFF 1ST 20: CPT | Mod: S$PBB,,, | Performed by: FAMILY MEDICINE

## 2019-10-31 PROCEDURE — 99490 PR CHRONIC CARE MGMT, 1ST 20 MIN: ICD-10-PCS | Mod: S$PBB,,, | Performed by: FAMILY MEDICINE

## 2019-11-01 ENCOUNTER — OFFICE VISIT (OUTPATIENT)
Dept: INTERNAL MEDICINE | Facility: CLINIC | Age: 79
End: 2019-11-01
Payer: MEDICARE

## 2019-11-01 VITALS
HEART RATE: 74 BPM | DIASTOLIC BLOOD PRESSURE: 68 MMHG | HEIGHT: 60 IN | WEIGHT: 209.44 LBS | SYSTOLIC BLOOD PRESSURE: 120 MMHG | BODY MASS INDEX: 41.12 KG/M2 | RESPIRATION RATE: 16 BRPM | TEMPERATURE: 98 F

## 2019-11-01 DIAGNOSIS — N18.30 CKD (CHRONIC KIDNEY DISEASE) STAGE 3, GFR 30-59 ML/MIN: ICD-10-CM

## 2019-11-01 DIAGNOSIS — M10.9 ACUTE GOUT, UNSPECIFIED CAUSE, UNSPECIFIED SITE: Primary | ICD-10-CM

## 2019-11-01 DIAGNOSIS — I10 ESSENTIAL HYPERTENSION: ICD-10-CM

## 2019-11-01 PROCEDURE — 99213 PR OFFICE/OUTPT VISIT, EST, LEVL III, 20-29 MIN: ICD-10-PCS | Mod: S$PBB,,, | Performed by: NURSE PRACTITIONER

## 2019-11-01 PROCEDURE — 99214 OFFICE O/P EST MOD 30 MIN: CPT | Mod: PBBFAC,PO | Performed by: NURSE PRACTITIONER

## 2019-11-01 PROCEDURE — 99999 PR PBB SHADOW E&M-EST. PATIENT-LVL IV: ICD-10-PCS | Mod: PBBFAC,,, | Performed by: NURSE PRACTITIONER

## 2019-11-01 PROCEDURE — 99999 PR PBB SHADOW E&M-EST. PATIENT-LVL IV: CPT | Mod: PBBFAC,,, | Performed by: NURSE PRACTITIONER

## 2019-11-01 PROCEDURE — 99213 OFFICE O/P EST LOW 20 MIN: CPT | Mod: S$PBB,,, | Performed by: NURSE PRACTITIONER

## 2019-11-01 RX ORDER — LISINOPRIL 40 MG/1
40 TABLET ORAL DAILY
Qty: 90 TABLET | Refills: 3 | Status: SHIPPED | OUTPATIENT
Start: 2019-11-01 | End: 2021-02-04 | Stop reason: SDUPTHER

## 2019-11-01 NOTE — PROGRESS NOTES
Subjective:       Patient ID: Maura Singh is a 79 y.o. female.    Chief Complaint: Follow-up    Patient comes in today for follow up after starting allopurinol. Allopurinol was started 10/2 for elevated uric acid. Repeat kidney functioning is worsening. She takes lasix daily. No swelling. No shortness of breath. She drinks about 2-3 bottles of water a day. No gout flares.     Lab Results       Component                Value               Date                       URICACID                 8.3 (H)             10/25/2019              Lab Results       Component                Value               Date                       CREATININE               1.3                 10/25/2019                 BUN                      29 (H)              10/25/2019                 NA                       140                 10/25/2019                 K                        4.8                 10/25/2019                 CL                       103                 10/25/2019                 CO2                      27                  10/25/2019                    /68 (BP Location: Left arm, Patient Position: Sitting)   Pulse 74   Temp 97.6 °F (36.4 °C) (Oral)   Resp 16   Ht 5' (1.524 m)   Wt 95 kg (209 lb 7 oz)   BMI 40.90 kg/m²     Review of Systems   Constitutional: Negative for activity change, appetite change, chills, diaphoresis, fatigue, fever and unexpected weight change.   HENT: Negative for trouble swallowing.    Eyes: Negative for visual disturbance.   Respiratory: Negative for cough, chest tightness, shortness of breath and wheezing.    Cardiovascular: Negative for chest pain, palpitations and leg swelling.   Gastrointestinal: Negative for abdominal distention, abdominal pain, blood in stool, constipation, diarrhea, nausea and vomiting.   Endocrine: Negative for polydipsia and polyuria.   Genitourinary: Negative for decreased urine volume, difficulty urinating, dysuria, frequency, hematuria, menstrual  problem and urgency.   Musculoskeletal: Positive for arthralgias. Negative for joint swelling and neck pain.   Neurological: Negative.  Negative for dizziness, syncope, speech difficulty, weakness, light-headedness and headaches.   Psychiatric/Behavioral: Negative for agitation, confusion, dysphoric mood and hallucinations. The patient is not nervous/anxious.        Objective:      Physical Exam   Constitutional: She is oriented to person, place, and time. She appears well-developed and well-nourished. She is cooperative. No distress.   HENT:   Head: Normocephalic and atraumatic.   Eyes: Conjunctivae are normal. Right eye exhibits no discharge. Left eye exhibits no discharge.   Cardiovascular: Normal rate, regular rhythm and normal heart sounds.   No murmur heard.  Pulmonary/Chest: Effort normal and breath sounds normal. No respiratory distress. She has no wheezes. She has no rales. She exhibits no tenderness.   Abdominal: Soft. She exhibits no distension.   Musculoskeletal: Normal range of motion.   Neurological: She is alert and oriented to person, place, and time.   Skin: Skin is warm and dry. No rash noted. She is not diaphoretic.   Psychiatric: She has a normal mood and affect. Her behavior is normal. Judgment and thought content normal.   Nursing note and vitals reviewed.      Assessment:       1. Acute gout, unspecified cause, unspecified site    2. CKD (chronic kidney disease) stage 3, GFR 30-59 ml/min    3. Essential hypertension        Plan:       Maura was seen today for follow-up.    Diagnoses and all orders for this visit:    Acute gout, unspecified cause, unspecified site  -     Comprehensive metabolic panel; Future  -     Uric acid; Future    CKD (chronic kidney disease) stage 3, GFR 30-59 ml/min  -     Comprehensive metabolic panel; Future  -     Uric acid; Future    Essential hypertension  -     lisinopril (PRINIVIL,ZESTRIL) 40 MG tablet; Take 1 tablet (40 mg total) by mouth once daily.      Patient  Instructions   You need 4-5 bottles of water a day  Increase fluids  Repeat labs in 6 weeks    Reviewed labs with Dr. Andrade. She wants patient to hydrate and continue allopurinol at current dose  Low sodium diet

## 2019-11-30 ENCOUNTER — EXTERNAL CHRONIC CARE MANAGEMENT (OUTPATIENT)
Dept: PRIMARY CARE CLINIC | Facility: CLINIC | Age: 79
End: 2019-11-30
Payer: MEDICARE

## 2019-11-30 PROCEDURE — 99490 CHRNC CARE MGMT STAFF 1ST 20: CPT | Mod: S$PBB,,, | Performed by: FAMILY MEDICINE

## 2019-11-30 PROCEDURE — 99490 CHRNC CARE MGMT STAFF 1ST 20: CPT | Mod: PBBFAC,PO | Performed by: FAMILY MEDICINE

## 2019-11-30 PROCEDURE — 99490 PR CHRONIC CARE MGMT, 1ST 20 MIN: ICD-10-PCS | Mod: S$PBB,,, | Performed by: FAMILY MEDICINE

## 2019-12-13 ENCOUNTER — LAB VISIT (OUTPATIENT)
Dept: LAB | Facility: HOSPITAL | Age: 79
End: 2019-12-13
Attending: NURSE PRACTITIONER
Payer: MEDICARE

## 2019-12-13 DIAGNOSIS — N18.30 CKD (CHRONIC KIDNEY DISEASE) STAGE 3, GFR 30-59 ML/MIN: ICD-10-CM

## 2019-12-13 DIAGNOSIS — M10.9 ACUTE GOUT, UNSPECIFIED CAUSE, UNSPECIFIED SITE: ICD-10-CM

## 2019-12-13 LAB
ALBUMIN SERPL BCP-MCNC: 3 G/DL (ref 3.5–5.2)
ALP SERPL-CCNC: 114 U/L (ref 55–135)
ALT SERPL W/O P-5'-P-CCNC: 35 U/L (ref 10–44)
ANION GAP SERPL CALC-SCNC: 8 MMOL/L (ref 8–16)
AST SERPL-CCNC: 37 U/L (ref 10–40)
BILIRUB SERPL-MCNC: 0.5 MG/DL (ref 0.1–1)
BUN SERPL-MCNC: 21 MG/DL (ref 8–23)
CALCIUM SERPL-MCNC: 9.9 MG/DL (ref 8.7–10.5)
CHLORIDE SERPL-SCNC: 102 MMOL/L (ref 95–110)
CO2 SERPL-SCNC: 30 MMOL/L (ref 23–29)
CREAT SERPL-MCNC: 1.1 MG/DL (ref 0.5–1.4)
EST. GFR  (AFRICAN AMERICAN): 54.8 ML/MIN/1.73 M^2
EST. GFR  (NON AFRICAN AMERICAN): 47.5 ML/MIN/1.73 M^2
GLUCOSE SERPL-MCNC: 104 MG/DL (ref 70–110)
POTASSIUM SERPL-SCNC: 4.7 MMOL/L (ref 3.5–5.1)
PROT SERPL-MCNC: 7.5 G/DL (ref 6–8.4)
SODIUM SERPL-SCNC: 140 MMOL/L (ref 136–145)
URATE SERPL-MCNC: 7.8 MG/DL (ref 2.4–5.7)

## 2019-12-13 PROCEDURE — 84550 ASSAY OF BLOOD/URIC ACID: CPT

## 2019-12-13 PROCEDURE — 36415 COLL VENOUS BLD VENIPUNCTURE: CPT | Mod: PO

## 2019-12-13 PROCEDURE — 80053 COMPREHEN METABOLIC PANEL: CPT

## 2019-12-20 DIAGNOSIS — M10.9 ACUTE GOUT, UNSPECIFIED CAUSE, UNSPECIFIED SITE: ICD-10-CM

## 2019-12-20 RX ORDER — ALLOPURINOL 100 MG/1
TABLET ORAL
Qty: 30 TABLET | Refills: 0 | Status: SHIPPED | OUTPATIENT
Start: 2019-12-20 | End: 2020-02-10

## 2019-12-31 ENCOUNTER — EXTERNAL CHRONIC CARE MANAGEMENT (OUTPATIENT)
Dept: PRIMARY CARE CLINIC | Facility: CLINIC | Age: 79
End: 2019-12-31
Payer: MEDICARE

## 2019-12-31 PROCEDURE — 99490 CHRNC CARE MGMT STAFF 1ST 20: CPT | Mod: S$PBB,,, | Performed by: FAMILY MEDICINE

## 2019-12-31 PROCEDURE — 99490 PR CHRONIC CARE MGMT, 1ST 20 MIN: ICD-10-PCS | Mod: S$PBB,,, | Performed by: FAMILY MEDICINE

## 2019-12-31 PROCEDURE — 99490 CHRNC CARE MGMT STAFF 1ST 20: CPT | Mod: PBBFAC,PO | Performed by: FAMILY MEDICINE

## 2020-01-31 ENCOUNTER — EXTERNAL CHRONIC CARE MANAGEMENT (OUTPATIENT)
Dept: PRIMARY CARE CLINIC | Facility: CLINIC | Age: 80
End: 2020-01-31
Payer: MEDICARE

## 2020-01-31 PROCEDURE — 99490 CHRNC CARE MGMT STAFF 1ST 20: CPT | Mod: S$PBB,,, | Performed by: FAMILY MEDICINE

## 2020-01-31 PROCEDURE — 99490 PR CHRONIC CARE MGMT, 1ST 20 MIN: ICD-10-PCS | Mod: S$PBB,,, | Performed by: FAMILY MEDICINE

## 2020-01-31 PROCEDURE — 99490 CHRNC CARE MGMT STAFF 1ST 20: CPT | Mod: PBBFAC,PO | Performed by: FAMILY MEDICINE

## 2020-02-04 ENCOUNTER — HOSPITAL ENCOUNTER (INPATIENT)
Facility: HOSPITAL | Age: 80
LOS: 2 days | Discharge: HOME | DRG: 435 | End: 2020-02-07
Attending: EMERGENCY MEDICINE | Admitting: INTERNAL MEDICINE
Payer: MEDICARE

## 2020-02-04 ENCOUNTER — OFFICE VISIT (OUTPATIENT)
Dept: INTERNAL MEDICINE | Facility: CLINIC | Age: 80
DRG: 435 | End: 2020-02-04
Payer: MEDICARE

## 2020-02-04 VITALS
HEART RATE: 76 BPM | TEMPERATURE: 98 F | SYSTOLIC BLOOD PRESSURE: 136 MMHG | BODY MASS INDEX: 39.35 KG/M2 | WEIGHT: 201.5 LBS | RESPIRATION RATE: 20 BRPM | DIASTOLIC BLOOD PRESSURE: 72 MMHG

## 2020-02-04 DIAGNOSIS — R10.11 RIGHT UPPER QUADRANT PAIN: ICD-10-CM

## 2020-02-04 DIAGNOSIS — R06.02 SHORTNESS OF BREATH: ICD-10-CM

## 2020-02-04 DIAGNOSIS — R06.82 TACHYPNEA: ICD-10-CM

## 2020-02-04 DIAGNOSIS — R59.0 MEDIASTINAL LYMPHADENOPATHY: ICD-10-CM

## 2020-02-04 DIAGNOSIS — R06.02 SOB (SHORTNESS OF BREATH): Primary | ICD-10-CM

## 2020-02-04 DIAGNOSIS — R91.8 LUNG NODULES: Primary | ICD-10-CM

## 2020-02-04 DIAGNOSIS — R06.2 WHEEZING: ICD-10-CM

## 2020-02-04 DIAGNOSIS — R06.00 DYSPNEA: ICD-10-CM

## 2020-02-04 DIAGNOSIS — Z87.891 HISTORY OF SMOKING: ICD-10-CM

## 2020-02-04 DIAGNOSIS — R04.2 HEMOPTYSIS: ICD-10-CM

## 2020-02-04 DIAGNOSIS — R53.1 WEAKNESS: ICD-10-CM

## 2020-02-04 DIAGNOSIS — R06.03 RESPIRATORY DISTRESS: ICD-10-CM

## 2020-02-04 DIAGNOSIS — R63.0 DECREASED APPETITE: ICD-10-CM

## 2020-02-04 DIAGNOSIS — R10.11 RUQ ABDOMINAL PAIN: ICD-10-CM

## 2020-02-04 DIAGNOSIS — I10 ESSENTIAL HYPERTENSION: ICD-10-CM

## 2020-02-04 DIAGNOSIS — R16.0 LIVER MASSES: ICD-10-CM

## 2020-02-04 PROBLEM — C80.1 MALIGNANCY: Status: ACTIVE | Noted: 2020-02-04

## 2020-02-04 LAB
ABO + RH BLD: NORMAL
ALBUMIN SERPL BCP-MCNC: 3.1 G/DL (ref 3.5–5.2)
ALP SERPL-CCNC: 139 U/L (ref 55–135)
ALT SERPL W/O P-5'-P-CCNC: 30 U/L (ref 10–44)
ANION GAP SERPL CALC-SCNC: 11 MMOL/L (ref 8–16)
APTT BLDCRRT: 27.6 SEC (ref 21–32)
AST SERPL-CCNC: 53 U/L (ref 10–40)
BASOPHILS # BLD AUTO: 0.11 K/UL (ref 0–0.2)
BASOPHILS NFR BLD: 1 % (ref 0–1.9)
BILIRUB SERPL-MCNC: 0.5 MG/DL (ref 0.1–1)
BILIRUB UR QL STRIP: NEGATIVE
BLD GP AB SCN CELLS X3 SERPL QL: NORMAL
BNP SERPL-MCNC: 64 PG/ML (ref 0–99)
BUN SERPL-MCNC: 20 MG/DL (ref 8–23)
CALCIUM SERPL-MCNC: 9.6 MG/DL (ref 8.7–10.5)
CHLORIDE SERPL-SCNC: 103 MMOL/L (ref 95–110)
CLARITY UR: CLEAR
CO2 SERPL-SCNC: 23 MMOL/L (ref 23–29)
COLOR UR: YELLOW
CREAT SERPL-MCNC: 1.1 MG/DL (ref 0.5–1.4)
DIFFERENTIAL METHOD: ABNORMAL
EOSINOPHIL # BLD AUTO: 0.4 K/UL (ref 0–0.5)
EOSINOPHIL NFR BLD: 3.2 % (ref 0–8)
ERYTHROCYTE [DISTWIDTH] IN BLOOD BY AUTOMATED COUNT: 14.9 % (ref 11.5–14.5)
EST. GFR  (AFRICAN AMERICAN): 54 ML/MIN/1.73 M^2
EST. GFR  (NON AFRICAN AMERICAN): 47 ML/MIN/1.73 M^2
GLUCOSE SERPL-MCNC: 84 MG/DL (ref 70–110)
GLUCOSE UR QL STRIP: NEGATIVE
HCT VFR BLD AUTO: 46.6 % (ref 37–48.5)
HGB BLD-MCNC: 14.6 G/DL (ref 12–16)
HGB UR QL STRIP: NEGATIVE
IMM GRANULOCYTES # BLD AUTO: 0.05 K/UL (ref 0–0.04)
IMM GRANULOCYTES NFR BLD AUTO: 0.4 % (ref 0–0.5)
INR PPP: 1.1 (ref 0.8–1.2)
KETONES UR QL STRIP: NEGATIVE
LEUKOCYTE ESTERASE UR QL STRIP: NEGATIVE
LYMPHOCYTES # BLD AUTO: 2.6 K/UL (ref 1–4.8)
LYMPHOCYTES NFR BLD: 22.5 % (ref 18–48)
MCH RBC QN AUTO: 28.1 PG (ref 27–31)
MCHC RBC AUTO-ENTMCNC: 31.3 G/DL (ref 32–36)
MCV RBC AUTO: 90 FL (ref 82–98)
MONOCYTES # BLD AUTO: 1.5 K/UL (ref 0.3–1)
MONOCYTES NFR BLD: 12.9 % (ref 4–15)
NEUTROPHILS # BLD AUTO: 6.9 K/UL (ref 1.8–7.7)
NEUTROPHILS NFR BLD: 60 % (ref 38–73)
NITRITE UR QL STRIP: NEGATIVE
NRBC BLD-RTO: 0 /100 WBC
PH UR STRIP: 6 [PH] (ref 5–8)
PLATELET # BLD AUTO: 355 K/UL (ref 150–350)
PMV BLD AUTO: 10.8 FL (ref 9.2–12.9)
POTASSIUM SERPL-SCNC: 4.8 MMOL/L (ref 3.5–5.1)
PROT SERPL-MCNC: 7.8 G/DL (ref 6–8.4)
PROT UR QL STRIP: NEGATIVE
PROTHROMBIN TIME: 11.4 SEC (ref 9–12.5)
RBC # BLD AUTO: 5.19 M/UL (ref 4–5.4)
SODIUM SERPL-SCNC: 137 MMOL/L (ref 136–145)
SP GR UR STRIP: 1.01 (ref 1–1.03)
TROPONIN I SERPL DL<=0.01 NG/ML-MCNC: <0.006 NG/ML (ref 0–0.03)
URN SPEC COLLECT METH UR: NORMAL
UROBILINOGEN UR STRIP-ACNC: 1 EU/DL
WBC # BLD AUTO: 11.49 K/UL (ref 3.9–12.7)

## 2020-02-04 PROCEDURE — 94640 AIRWAY INHALATION TREATMENT: CPT

## 2020-02-04 PROCEDURE — 80053 COMPREHEN METABOLIC PANEL: CPT

## 2020-02-04 PROCEDURE — 25000242 PHARM REV CODE 250 ALT 637 W/ HCPCS: Performed by: EMERGENCY MEDICINE

## 2020-02-04 PROCEDURE — 96361 HYDRATE IV INFUSION ADD-ON: CPT

## 2020-02-04 PROCEDURE — 63600175 PHARM REV CODE 636 W HCPCS: Performed by: EMERGENCY MEDICINE

## 2020-02-04 PROCEDURE — 99999 PR PBB SHADOW E&M-EST. PATIENT-LVL III: CPT | Mod: PBBFAC,,, | Performed by: NURSE PRACTITIONER

## 2020-02-04 PROCEDURE — 85610 PROTHROMBIN TIME: CPT

## 2020-02-04 PROCEDURE — 25000003 PHARM REV CODE 250: Performed by: INTERNAL MEDICINE

## 2020-02-04 PROCEDURE — 99999 PR PBB SHADOW E&M-EST. PATIENT-LVL III: ICD-10-PCS | Mod: PBBFAC,,, | Performed by: NURSE PRACTITIONER

## 2020-02-04 PROCEDURE — 96376 TX/PRO/DX INJ SAME DRUG ADON: CPT | Performed by: EMERGENCY MEDICINE

## 2020-02-04 PROCEDURE — 99285 EMERGENCY DEPT VISIT HI MDM: CPT | Mod: 25,27

## 2020-02-04 PROCEDURE — 85025 COMPLETE CBC W/AUTO DIFF WBC: CPT

## 2020-02-04 PROCEDURE — 99213 OFFICE O/P EST LOW 20 MIN: CPT | Mod: PBBFAC,PO,25 | Performed by: NURSE PRACTITIONER

## 2020-02-04 PROCEDURE — 83880 ASSAY OF NATRIURETIC PEPTIDE: CPT

## 2020-02-04 PROCEDURE — 85730 THROMBOPLASTIN TIME PARTIAL: CPT

## 2020-02-04 PROCEDURE — 96374 THER/PROPH/DIAG INJ IV PUSH: CPT

## 2020-02-04 PROCEDURE — 27000221 HC OXYGEN, UP TO 24 HOURS

## 2020-02-04 PROCEDURE — 93005 ELECTROCARDIOGRAM TRACING: CPT

## 2020-02-04 PROCEDURE — 25500020 PHARM REV CODE 255: Performed by: EMERGENCY MEDICINE

## 2020-02-04 PROCEDURE — 81003 URINALYSIS AUTO W/O SCOPE: CPT

## 2020-02-04 PROCEDURE — 93010 EKG 12-LEAD: ICD-10-PCS | Mod: ,,, | Performed by: INTERNAL MEDICINE

## 2020-02-04 PROCEDURE — 86850 RBC ANTIBODY SCREEN: CPT

## 2020-02-04 PROCEDURE — 99900035 HC TECH TIME PER 15 MIN (STAT)

## 2020-02-04 PROCEDURE — G0378 HOSPITAL OBSERVATION PER HR: HCPCS

## 2020-02-04 PROCEDURE — 99214 OFFICE O/P EST MOD 30 MIN: CPT | Mod: S$PBB,,, | Performed by: NURSE PRACTITIONER

## 2020-02-04 PROCEDURE — 99214 PR OFFICE/OUTPT VISIT, EST, LEVL IV, 30-39 MIN: ICD-10-PCS | Mod: S$PBB,,, | Performed by: NURSE PRACTITIONER

## 2020-02-04 PROCEDURE — 63600175 PHARM REV CODE 636 W HCPCS: Performed by: INTERNAL MEDICINE

## 2020-02-04 PROCEDURE — 93010 ELECTROCARDIOGRAM REPORT: CPT | Mod: ,,, | Performed by: INTERNAL MEDICINE

## 2020-02-04 PROCEDURE — 84484 ASSAY OF TROPONIN QUANT: CPT

## 2020-02-04 RX ORDER — METHYLPREDNISOLONE SOD SUCC 125 MG
125 VIAL (EA) INJECTION
Status: COMPLETED | OUTPATIENT
Start: 2020-02-04 | End: 2020-02-04

## 2020-02-04 RX ORDER — IPRATROPIUM BROMIDE AND ALBUTEROL SULFATE 2.5; .5 MG/3ML; MG/3ML
3 SOLUTION RESPIRATORY (INHALATION) EVERY 6 HOURS
Status: DISCONTINUED | OUTPATIENT
Start: 2020-02-05 | End: 2020-02-07 | Stop reason: HOSPADM

## 2020-02-04 RX ORDER — MAG HYDROX/ALUMINUM HYD/SIMETH 200-200-20
30 SUSPENSION, ORAL (FINAL DOSE FORM) ORAL EVERY 6 HOURS PRN
Status: DISCONTINUED | OUTPATIENT
Start: 2020-02-04 | End: 2020-02-07 | Stop reason: HOSPADM

## 2020-02-04 RX ORDER — DIPHENHYDRAMINE HCL 25 MG
25 CAPSULE ORAL EVERY 6 HOURS PRN
Status: DISCONTINUED | OUTPATIENT
Start: 2020-02-04 | End: 2020-02-07 | Stop reason: HOSPADM

## 2020-02-04 RX ORDER — HYDRALAZINE HYDROCHLORIDE 20 MG/ML
10 INJECTION INTRAMUSCULAR; INTRAVENOUS EVERY 8 HOURS PRN
Status: DISCONTINUED | OUTPATIENT
Start: 2020-02-04 | End: 2020-02-07 | Stop reason: HOSPADM

## 2020-02-04 RX ORDER — GUAIFENESIN 100 MG/5ML
200 SOLUTION ORAL EVERY 4 HOURS PRN
Status: DISCONTINUED | OUTPATIENT
Start: 2020-02-04 | End: 2020-02-07 | Stop reason: HOSPADM

## 2020-02-04 RX ORDER — LABETALOL 200 MG/1
200 TABLET, FILM COATED ORAL EVERY 12 HOURS
Status: DISCONTINUED | OUTPATIENT
Start: 2020-02-04 | End: 2020-02-07 | Stop reason: HOSPADM

## 2020-02-04 RX ORDER — ACETAMINOPHEN 325 MG/1
650 TABLET ORAL EVERY 6 HOURS PRN
Status: DISCONTINUED | OUTPATIENT
Start: 2020-02-04 | End: 2020-02-07 | Stop reason: HOSPADM

## 2020-02-04 RX ORDER — IPRATROPIUM BROMIDE AND ALBUTEROL SULFATE 2.5; .5 MG/3ML; MG/3ML
3 SOLUTION RESPIRATORY (INHALATION)
Status: COMPLETED | OUTPATIENT
Start: 2020-02-04 | End: 2020-02-04

## 2020-02-04 RX ORDER — ONDANSETRON 2 MG/ML
4 INJECTION INTRAMUSCULAR; INTRAVENOUS EVERY 8 HOURS PRN
Status: DISCONTINUED | OUTPATIENT
Start: 2020-02-04 | End: 2020-02-07 | Stop reason: HOSPADM

## 2020-02-04 RX ORDER — LISINOPRIL 20 MG/1
40 TABLET ORAL DAILY
Status: DISCONTINUED | OUTPATIENT
Start: 2020-02-05 | End: 2020-02-07 | Stop reason: HOSPADM

## 2020-02-04 RX ADMIN — IPRATROPIUM BROMIDE AND ALBUTEROL SULFATE 3 ML: .5; 3 SOLUTION RESPIRATORY (INHALATION) at 11:02

## 2020-02-04 RX ADMIN — IPRATROPIUM BROMIDE AND ALBUTEROL SULFATE 3 ML: .5; 3 SOLUTION RESPIRATORY (INHALATION) at 03:02

## 2020-02-04 RX ADMIN — SODIUM CHLORIDE 500 ML: 0.9 INJECTION, SOLUTION INTRAVENOUS at 05:02

## 2020-02-04 RX ADMIN — LABETALOL HYDROCHLORIDE 200 MG: 200 TABLET, FILM COATED ORAL at 11:02

## 2020-02-04 RX ADMIN — IPRATROPIUM BROMIDE AND ALBUTEROL SULFATE 3 ML: .5; 3 SOLUTION RESPIRATORY (INHALATION) at 04:02

## 2020-02-04 RX ADMIN — METHYLPREDNISOLONE SODIUM SUCCINATE 80 MG: 40 INJECTION, POWDER, FOR SOLUTION INTRAMUSCULAR; INTRAVENOUS at 10:02

## 2020-02-04 RX ADMIN — METHYLPREDNISOLONE SODIUM SUCCINATE 125 MG: 125 INJECTION, POWDER, FOR SOLUTION INTRAMUSCULAR; INTRAVENOUS at 05:02

## 2020-02-04 RX ADMIN — IOHEXOL 100 ML: 350 INJECTION, SOLUTION INTRAVENOUS at 05:02

## 2020-02-04 NOTE — PROGRESS NOTES
Subjective:       Patient ID: Maura Singh is a 80 y.o. female.    Chief Complaint: Wheezing    Patient comes in today with family member with concern of respiratory distress, shortness of breath, chest tightness, she does not feel like she is getting enough air.  She states she has had a cough ongoing for a month with wheezing that is gotten worse over the last 2 weeks and respiratory distress has worsen day by day over the last week.  Her sats today are 89-91% on room air.  She is tachypneic and is not able to speak a complete sentence without stopping to take a breath.  She had to be dropped off at the front of the building today and was not able to walk from the parking lot to the building.  She admits that her cough is productive and she coughed up blood a few times over the last few weeks.    She has decreased appetite, nausea, early satiety as well as right upper quadrant/lower rib area pain ongoing for the last week.  She is not able to lay on her right side in the bed due to the pain. She states even that she would hurt to touch the right upper part of her abdomen.    Wheezing    Associated symptoms include abdominal pain, coughing, diarrhea, headaches, shortness of breath and vomiting. Pertinent negatives include no chest pain, chills or fever.   Abdominal Pain   This is a new problem. The current episode started more than 1 month ago. The onset quality is undetermined. The problem occurs daily. The most recent episode lasted 1 hours. The problem has been gradually worsening. The pain is located in the generalized abdominal region. The pain is at a severity of 8/10. The pain is moderate. The quality of the pain is a sensation of fullness. Associated symptoms include arthralgias, belching, diarrhea, flatus, frequency, headaches, myalgias, nausea, vomiting and weight loss. Pertinent negatives include no anorexia, constipation, dysuria, fever, hematochezia, hematuria or melena. The pain is aggravated by  coughing and eating. The pain is relieved by nothing. She has tried nothing for the symptoms. Her past medical history is significant for abdominal surgery, GERD and pancreatitis. There is no history of colon cancer, Crohn's disease, gallstones, irritable bowel syndrome, PUD or ulcerative colitis. Patient's medical history does not include kidney stones and UTI.       /72 (BP Location: Left arm, Patient Position: Sitting)   Pulse 76   Temp 97.5 °F (36.4 °C) (Oral)   Resp 20   Wt 91.4 kg (201 lb 8 oz)   BMI 39.35 kg/m²     Review of Systems   Constitutional: Positive for activity change, appetite change and weight loss. Negative for chills, diaphoresis, fatigue and fever.   HENT: Negative.    Eyes: Negative for visual disturbance.   Respiratory: Positive for cough, chest tightness, shortness of breath and wheezing. Negative for apnea, choking and stridor.    Cardiovascular: Negative for chest pain, palpitations and leg swelling.   Gastrointestinal: Positive for abdominal pain, diarrhea, flatus, nausea and vomiting. Negative for abdominal distention, anorexia, blood in stool, constipation, hematochezia and melena.   Genitourinary: Positive for frequency. Negative for decreased urine volume, difficulty urinating, dysuria, hematuria and urgency.   Musculoskeletal: Positive for arthralgias, gait problem and myalgias.   Neurological: Positive for weakness and headaches. Negative for dizziness, syncope, speech difficulty and light-headedness.   Psychiatric/Behavioral: Negative for agitation, confusion and hallucinations. The patient is not nervous/anxious.        Objective:      Physical Exam   Constitutional: She is oriented to person, place, and time. She appears well-developed and well-nourished. She appears distressed.   HENT:   Head: Normocephalic and atraumatic.   Mouth/Throat: No oropharyngeal exudate.   Eyes: Conjunctivae are normal. Right eye exhibits no discharge. Left eye exhibits no discharge.    Cardiovascular: Normal rate, regular rhythm and normal heart sounds.   No murmur heard.  1+ le edema   Pulmonary/Chest: Accessory muscle usage present. Tachypnea noted. She is in respiratory distress. She has decreased breath sounds (generalized tight breath sounds). She has no wheezes.   Abdominal: Soft. She exhibits no distension. There is tenderness in the right upper quadrant.   Musculoskeletal: Normal range of motion. She exhibits no edema or tenderness.   Neurological: She is alert and oriented to person, place, and time.   Skin: Skin is warm and dry. No rash noted. She is not diaphoretic. No erythema.   Psychiatric: She has a normal mood and affect. Her behavior is normal. Judgment and thought content normal.   Nursing note and vitals reviewed.      Assessment:       1. SOB (shortness of breath)    2. Respiratory distress    3. Tachypnea    4. Hemoptysis    5. RUQ abdominal pain    6. Decreased appetite    7. Weakness        Plan:       Maura was seen today for wheezing.    Diagnoses and all orders for this visit:    SOB (shortness of breath)    Respiratory distress    Tachypnea    Hemoptysis    RUQ abdominal pain    Decreased appetite    Weakness    Patient needs Emergency Room for stat work up for respiratory distress  Family member will bring her to the Emergency Room via private vehicle to [facility]

## 2020-02-04 NOTE — ED PROVIDER NOTES
"SCRIBE #1 NOTE: I, Montserrat Rivero, am scribing for, and in the presence of, Ruth Monroy DO. I have scribed the entire note.       History     Chief Complaint   Patient presents with    Shortness of Breath     85% on room air, placed on 2 L NC in triage, hx of COPD     Review of patient's allergies indicates:   Allergen Reactions    Pravastatin Other (See Comments)     Burning/flushing    Allopurinol analogues Other (See Comments)     "makes me sick and feel crazy"         History of Present Illness     HPI    2/4/2020, 3:27 PM  History obtained from the patient and daughter      History of Present Illness: Maura Singh is a 80 y.o. female patient with a PMHx of HTN, arthritis, DM, COPD, and s/p APPY who presents to the Emergency Department for evaluation of SOB which onset suddenly this afternoon. Pt reports that she went to her doctor today for RUQ abdominal pain with accompanying hemoptysis who noticed that the pt had labored breathing and referred the pt to the ED. Upon pt arrival, she had an O2 sat of 85% on room air. Pt was then placed on 2 L nasal cannula with O2 sat improvement. Pt is not on home oxygen. Additionally, pt states that she has intermittent hemoptysis that developed approximately one month ago with the last occurrence 4 days ago. RUQ pain is rated a 5/10. Symptoms are constant and moderate in severity. No mitigating or exacerbating factors reported. Associated sxs include productive cough, RUQ abdominal pain, nausea, wheezing, and hemoptysis "mostly blood". Patient denies any hematochezia, anticoagulant use, fever/ chills, CP, palpations, numbness, HA, dizziness, rash, wound, diarrhea, emesis, back/ neck pain, sore throat, congestion, dysuria, hematuria, localized weakness, easily bruising, and all other sxs at this time. No prior tx reported. No further complaints or concerns at this time.  Patient has at least a 40 pack year history of smoking    Arrival mode: Personal vehicle  " "    PCP: Yessy Andrade MD        Past Medical History:  Past Medical History:   Diagnosis Date    Arthritis     Deaf     Diabetes mellitus     Hypertension     Lung disease     copd       Past Surgical History:  Past Surgical History:   Procedure Laterality Date    APPENDECTOMY      CATARACT EXTRACTION      MIDDLE EAR SURGERY           Family History:  Family History   Problem Relation Age of Onset    Colon cancer Mother     ALS Father     Retinal detachment Son        Social History:  Social History     Tobacco Use    Smoking status: Former Smoker     Packs/day: 0.50     Years: 48.00     Pack years: 24.00     Types: Cigarettes     Start date:      Last attempt to quit:      Years since quittin.1    Smokeless tobacco: Never Used   Substance and Sexual Activity    Alcohol use: No     Frequency: Never     Drinks per session: Patient refused     Binge frequency: Patient refused    Drug use: No    Sexual activity: Never        Review of Systems     Review of Systems   Constitutional: Negative for chills and fever.        - Anticoagulant use   HENT: Negative for congestion and sore throat.    Respiratory: Positive for cough (productive), shortness of breath and wheezing.         + Hemoptysis "mostly blood"   Cardiovascular: Negative for chest pain and palpitations.   Gastrointestinal: Positive for abdominal pain (RUQ) and nausea. Negative for blood in stool, diarrhea and vomiting.   Genitourinary: Negative for dysuria and hematuria.   Musculoskeletal: Negative for back pain and neck pain.   Skin: Negative for rash and wound.   Neurological: Negative for dizziness, weakness, numbness and headaches.   Hematological: Does not bruise/bleed easily.   All other systems reviewed and are negative.     Physical Exam     Initial Vitals [20 1450]   BP Pulse Resp Temp SpO2   (!) 177/96 81 18 98.2 °F (36.8 °C) (!) 86 %      MAP       --          Physical Exam  Nursing Notes and Vital Signs " Reviewed.   Constitutional: Patient is in no acute distress. Well-developed and well-nourished.  Head: Atraumatic. Normocephalic.  Eyes: PERRL. EOM intact. Conjunctivae are not pale. No scleral icterus.  ENT: Mucous membranes are moist. Oropharynx is clear and symmetric.    Neck: Supple. Full ROM. No lymphadenopathy.  Cardiovascular: Regular rate. Regular rhythm. No murmurs, rubs, or gallops. Distal pulses are 2+ and symmetric.  Pulmonary/Chest: No respiratory distress. No rales. Bilateral wheezes.  Abdominal: Soft and non-distended.  There is RUQ tenderness to palpation.  No rebound, guarding, or rigidity. Good bowel sounds.  Genitourinary: No CVA tenderness.  Musculoskeletal: Moves all extremities. No obvious deformities. No edema. No calf tenderness.  Skin: Warm and dry.  Neurological:  Alert, awake, and appropriate.  Normal speech.  No acute focal neurological deficits are appreciated.  Psychiatric: Normal affect. Good eye contact. Appropriate in content.     ED Course   Procedures  ED Vital Signs:  Vitals:    02/04/20 1450 02/04/20 1500 02/04/20 1516 02/04/20 1556   BP: (!) 177/96      Pulse: 81 75  77   Resp: 18   20   Temp: 98.2 °F (36.8 °C)      TempSrc: Oral      SpO2: (!) 86% 96%  (!) 94%   Weight:   93.5 kg (206 lb 1.6 oz)    Height: 5' (1.524 m)       02/04/20 1601 02/04/20 1602 02/04/20 1615 02/04/20 1616   BP:  (!) 183/86  (!) 164/71   Pulse: 80 80  80   Resp: 20      Temp:       TempSrc:       SpO2: (!) 92% (!) 92% (!) 92%    Weight:       Height:        02/04/20 1630 02/04/20 1631 02/04/20 1701 02/04/20 1716   BP:  (!) 161/74 (!) 163/72 (!) 153/77   Pulse: 83  89 86   Resp:   20 (!) 21   Temp:       TempSrc:       SpO2: (!) 91%  (!) 91% (!) 92%   Weight:       Height:        02/04/20 2030   BP: (!) 197/86   Pulse: 88   Resp: 20   Temp: 97.4 °F (36.3 °C)   TempSrc: Oral   SpO2: (!) 93%   Weight: 93.1 kg (205 lb 4 oz)   Height: 5' (1.524 m)       Abnormal Lab Results:  Labs Reviewed   CBC W/ AUTO  DIFFERENTIAL - Abnormal; Notable for the following components:       Result Value    Mean Corpuscular Hemoglobin Conc 31.3 (*)     RDW 14.9 (*)     Platelets 355 (*)     Immature Grans (Abs) 0.05 (*)     Mono # 1.5 (*)     All other components within normal limits   COMPREHENSIVE METABOLIC PANEL - Abnormal; Notable for the following components:    Albumin 3.1 (*)     Alkaline Phosphatase 139 (*)     AST 53 (*)     eGFR if  54 (*)     eGFR if non  47 (*)     All other components within normal limits   PROTIME-INR   APTT   URINALYSIS, REFLEX TO URINE CULTURE    Narrative:     Preferred Collection Type->Urine, Clean Catch   TROPONIN I   B-TYPE NATRIURETIC PEPTIDE   TYPE & SCREEN        All Lab Results:  Results for orders placed or performed during the hospital encounter of 02/04/20   CBC auto differential   Result Value Ref Range    WBC 11.49 3.90 - 12.70 K/uL    RBC 5.19 4.00 - 5.40 M/uL    Hemoglobin 14.6 12.0 - 16.0 g/dL    Hematocrit 46.6 37.0 - 48.5 %    Mean Corpuscular Volume 90 82 - 98 fL    Mean Corpuscular Hemoglobin 28.1 27.0 - 31.0 pg    Mean Corpuscular Hemoglobin Conc 31.3 (L) 32.0 - 36.0 g/dL    RDW 14.9 (H) 11.5 - 14.5 %    Platelets 355 (H) 150 - 350 K/uL    MPV 10.8 9.2 - 12.9 fL    Immature Granulocytes 0.4 0.0 - 0.5 %    Gran # (ANC) 6.9 1.8 - 7.7 K/uL    Immature Grans (Abs) 0.05 (H) 0.00 - 0.04 K/uL    Lymph # 2.6 1.0 - 4.8 K/uL    Mono # 1.5 (H) 0.3 - 1.0 K/uL    Eos # 0.4 0.0 - 0.5 K/uL    Baso # 0.11 0.00 - 0.20 K/uL    nRBC 0 0 /100 WBC    Gran% 60.0 38.0 - 73.0 %    Lymph% 22.5 18.0 - 48.0 %    Mono% 12.9 4.0 - 15.0 %    Eosinophil% 3.2 0.0 - 8.0 %    Basophil% 1.0 0.0 - 1.9 %    Differential Method Automated    Comprehensive metabolic panel   Result Value Ref Range    Sodium 137 136 - 145 mmol/L    Potassium 4.8 3.5 - 5.1 mmol/L    Chloride 103 95 - 110 mmol/L    CO2 23 23 - 29 mmol/L    Glucose 84 70 - 110 mg/dL    BUN, Bld 20 8 - 23 mg/dL    Creatinine 1.1  0.5 - 1.4 mg/dL    Calcium 9.6 8.7 - 10.5 mg/dL    Total Protein 7.8 6.0 - 8.4 g/dL    Albumin 3.1 (L) 3.5 - 5.2 g/dL    Total Bilirubin 0.5 0.1 - 1.0 mg/dL    Alkaline Phosphatase 139 (H) 55 - 135 U/L    AST 53 (H) 10 - 40 U/L    ALT 30 10 - 44 U/L    Anion Gap 11 8 - 16 mmol/L    eGFR if African American 54 (A) >60 mL/min/1.73 m^2    eGFR if non African American 47 (A) >60 mL/min/1.73 m^2   Protime-INR   Result Value Ref Range    Prothrombin Time 11.4 9.0 - 12.5 sec    INR 1.1 0.8 - 1.2   APTT   Result Value Ref Range    aPTT 27.6 21.0 - 32.0 sec   Urinalysis, Reflex to Urine Culture Urine, Clean Catch   Result Value Ref Range    Specimen UA Urine, Clean Catch     Color, UA Yellow Yellow, Straw, Tiffanie    Appearance, UA Clear Clear    pH, UA 6.0 5.0 - 8.0    Specific Gravity, UA 1.015 1.005 - 1.030    Protein, UA Negative Negative    Glucose, UA Negative Negative    Ketones, UA Negative Negative    Bilirubin (UA) Negative Negative    Occult Blood UA Negative Negative    Nitrite, UA Negative Negative    Urobilinogen, UA 1.0 <2.0 EU/dL    Leukocytes, UA Negative Negative   Troponin I   Result Value Ref Range    Troponin I <0.006 0.000 - 0.026 ng/mL   Brain natriuretic peptide   Result Value Ref Range    BNP 64 0 - 99 pg/mL   Type & Screen   Result Value Ref Range    Group & Rh A POS     Indirect Zachariah NEG          Imaging Results:  Imaging Results          CT Abdomen Pelvis With Contrast (Final result)  Result time 02/04/20 18:32:55   Procedure changed from CT Abdomen Pelvis  Without Contrast     Final result by David Champion MD (02/04/20 18:32:55)                 Impression:      Hepatomegaly with multiple low-density hypoenhancing lesions consistent with hepatic metastatic disease.      Electronically signed by: David Champion MD  Date:    02/04/2020  Time:    18:32             Narrative:    EXAMINATION:  CT ABDOMEN PELVIS WITH CONTRAST    CLINICAL HISTORY:  Right upper quadrant pain.   Cholecystitis.    TECHNIQUE:  Standard contrast enhanced CT performed with 100cc's Omnipaque 350 with reformats.  All CT scans at this facility are performed  using dose modulation techniques as appropriate to performed exam including the following:  automated exposure control; adjustment of mA and/or kV according to the patients size (this includes techniques or standardized protocols for targeted exams where dose is matched to indication/reason for exam: i.e. extremities or head);  iterative reconstruction technique.    COMPARISON:  None    FINDINGS:  The liver is abnormal.  The liver is enlarged with multiple low-attenuation lesions consistent with hepatic metastatic disease.  The gallbladder is distended.    The spleen is nonenlarged.    The adrenal glands appear normal.    Small renal cysts are present.    Pancreas is grossly unremarkable at this time.    Several aortocaval/mesenteric lymph nodes are present.  One measures 1.9 cm in size.    The bowel loops are nondilated.    There is evidence of diverticulosis of the sigmoid colon and of the left colon.    Within the pelvis the uterus is present.    Lumbar degenerative disc disease.                               CTA Chest Non-Coronary (PE Study) (Final result)  Result time 02/04/20 18:29:33    Final result by David Champion MD (02/04/20 18:29:33)                 Impression:      No evidence of acute pulmonary artery embolism.    Cardiomegaly with aortic atherosclerosis.    Mediastinal lymphadenopathy with right hilar soft tissue mass/lymphadenopathy.  Associated postobstructive collapse right anterior segment lower lobe.  Considerations include sarcoidosis versus neoplasm.  Follow-up suggested.    All CT scans at this facility use dose modulation, iterative reconstruction and/or weight based dosing when appropriate to reduce radiation dose to as low as reasonably achievable.      Electronically signed by: David Champion  MD  Date:    02/04/2020  Time:    18:29             Narrative:    EXAMINATION:  CTA CHEST NON CORONARY    CLINICAL HISTORY:  Shortness of breath.  Hemoptysis;    TECHNIQUE:  Standard CTA of the chest performed with 100 ml Omnipaque 350  utilizing 3-D MIP reformations.    COMPARISON:  Chest x-ray 12/13/2017.    FINDINGS:  Cardiac size is enlarged with evidence of coronary artery and aortic vascular calcification.    The pulmonary arteries reveal normal enhancement with no definitive filling defects or evidence of acute pulmonary artery embolism.    There are enlarged mediastinal lymph nodes.  Left AP window lymph node 2.2 cm in size.  Right hilar lymph node 2.6 cm in size.  Subcarinal lymph node 2.5 cm in size.  Right infrahilar soft tissue 3.3 x 3.0 cm in size with associated postobstructive collapse involving the anterior segments of the right lower lobe.    Note that prior chest x-ray reveal prominence of the hilar regions.  Considerations include lymphadenopathy secondary to sarcoidosis.  Neoplasm is also consideration and follow-up suggested.    No acute infiltrate is seen at this time.    In the upper abdomen the liver appears enlarged.    No pleural effusion.    No pneumothorax.                               US Abdomen Limited (Final result)  Result time 02/04/20 17:59:14    Final result by David Champion MD (02/04/20 17:59:14)                 Impression:      Multiple liver lesions suspicious for hepatic metastatic disease.  Follow-up CT of the abdomen and pelvis with contrast recommended.      Electronically signed by: David Champion MD  Date:    02/04/2020  Time:    17:59             Narrative:    EXAMINATION:  US ABDOMEN LIMITED    CLINICAL HISTORY:  Right upper quadrant pain    COMPARISON:  None    FINDINGS:  The liver is grossly abnormal.  There are multiple focal lesions most compatible with hepatic metastatic disease.  The largest is up to 7.2 cm in size.  The liver is overall enlarged as  well.  The gallbladder reveals mild wall thickening with minimal sludge.  No stones.  The common bile duct is 4.3 mm.    The portal vein is grossly normal.  The pancreas is normal.  The right kidney is normal at 10.6 cm.    No ascites.  The aorta and IVC are normal.  The adrenals are not included.                               X-Ray Chest AP Portable (Final result)  Result time 02/04/20 16:03:29    Final result by AJAY Castaneda Sr., MD (02/04/20 16:03:29)                 Impression:      1. There is no focal pulmonary infiltrate visualized.  2. The size of the heart is prominent.  This may be secondary to magnification.  .      Electronically signed by: Harris Castaneda MD  Date:    02/04/2020  Time:    16:03             Narrative:    EXAMINATION:  XR CHEST AP PORTABLE    CLINICAL HISTORY:  Dyspnea, unspecified    COMPARISON:  12/13/2017    FINDINGS:  The size of the heart is prominent.  There is no focal pulmonary infiltrate visualized.  There is no pneumothorax.  The costophrenic angles are sharp.                                 The EKG was ordered, reviewed, and independently interpreted by the ED provider.  Interpretation time: 19:14  Rate: 81 BPM  Rhythm: SR with occasional PVCs  Interpretation: Septal infarct, age undetermined. Abnormal ECG. No STEMI.             The Emergency Provider reviewed the vital signs and test results, which are outlined above.     ED Discussion     7:23 PM: Re-evaluated pt. I have discussed test results, shared treatment plan, and the need for admission with patient and family at bedside. Pt and family express understanding at this time and agree with all information. All questions answered. Pt and family have no further questions or concerns at this time. Pt is ready for admit.    7:23 PM: Discussed case with Elodia Mcclain NP, (Hospital Medicine). Elodia Mcclain NP, agrees with current care and management of pt and accepts admission.   Admitting Service: [facility]  Medicine  Admitting Physician: Dr. Berkowitz  Admit to: Observation/ Tele    7:34 PM: Re-evaluated pt. Discussed in detail CT findings, including hepatic metastatic disease. All questions were answered. Pt had no episodes of hemoptysis during ED stay.         Medical Decision Making:   Initial Assessment:   Patient is a 80-year-old female with 40 pack year history of tobacco abuse presenting to emergency department with 1 month cough, recent hemoptysis, and right upper quadrant pain.  Differential Diagnosis:   Cholecystitis, biliary colic, pulmonary embolism, pneumonia, endobronchial mass, lung cancer,  Independently Interpreted Test(s):   I have ordered and independently interpreted EKG Reading(s) - see prior notes  Clinical Tests:   Lab Tests: Ordered and Reviewed  Radiological Study: Ordered and Reviewed  Medical Tests: Ordered and Reviewed  ED Management:  Patient noted to be wheezing.  Patient given 125 mg of Solu-Medrol as well as breathing treatment.  Patient underwent CTA of the chest which was negative for pulmonary embolism.  Postobstructive atelectasis was noted as well as significant mediastinal lymphadenopathy.  Ultrasound of the gallbladder showed liver Mets.  CTA of the abdomen was concerning for metastasis.  Given patient requiring breathing treatments and occasional supplemental oxygen, was recommended that the patient be placed in the hospital.  Case was discussed with hospital medicine recommended observation status.           ED Medication(s):  Medications   hydrALAZINE injection 10 mg (has no administration in time range)   acetaminophen tablet 650 mg (has no administration in time range)   ondansetron injection 4 mg (has no administration in time range)   diphenhydrAMINE capsule 25 mg (has no administration in time range)   aluminum-magnesium hydroxide-simethicone 200-200-20 mg/5 mL suspension 30 mL (has no administration in time range)   albuterol-ipratropium 2.5 mg-0.5 mg/3 mL nebulizer solution  3 mL (has no administration in time range)   guaifenesin 100 mg/5 ml syrup 200 mg (has no administration in time range)   methylPREDNISolone sodium succinate injection 80 mg (has no administration in time range)   albuterol-ipratropium 2.5 mg-0.5 mg/3 mL nebulizer solution 3 mL (3 mLs Nebulization Given 2/4/20 1601)   sodium chloride 0.9% bolus 500 mL (0 mLs Intravenous Stopped 2/4/20 2004)   methylPREDNISolone sodium succinate injection 125 mg (125 mg Intravenous Given 2/4/20 1746)   iohexol (OMNIPAQUE 350) injection 100 mL (100 mLs Intravenous Given 2/4/20 1756)       Current Discharge Medication List                  Scribe Attestation:   Scribe #1: I performed the above scribed service and the documentation accurately describes the services I performed. I attest to the accuracy of the note.     Attending:   Physician Attestation Statement for Scribe #1: I, Ruth Monroy DO, personally performed the services described in this documentation, as scribed by Montserrat Rivero, in my presence, and it is both accurate and complete.           Clinical Impression       ICD-10-CM ICD-9-CM   1. Hemoptysis R04.2 786.30   2. Right upper quadrant pain R10.11 789.01   3. Dyspnea R06.00 786.09   4. Liver masses R16.0 573.8   5. Mediastinal lymphadenopathy R59.0 785.6   6. Wheezing R06.2 786.07   7. History of smoking Z87.891 V15.82       Disposition:   Disposition: Placed in Observation  Condition: Fair         Ruth Monroy DO  02/04/20 2201

## 2020-02-05 PROBLEM — R91.8 LUNG NODULES: Status: ACTIVE | Noted: 2020-02-05

## 2020-02-05 PROBLEM — R04.2 HEMOPTYSIS: Status: ACTIVE | Noted: 2020-02-05

## 2020-02-05 PROBLEM — J98.11 ATELECTASIS OF RIGHT LUNG: Status: ACTIVE | Noted: 2020-02-05

## 2020-02-05 PROBLEM — R06.02 SHORTNESS OF BREATH: Status: ACTIVE | Noted: 2020-02-05

## 2020-02-05 PROBLEM — R59.0 MEDIASTINAL LYMPHADENOPATHY: Status: ACTIVE | Noted: 2020-02-05

## 2020-02-05 LAB
ALBUMIN SERPL BCP-MCNC: 2.9 G/DL (ref 3.5–5.2)
ALP SERPL-CCNC: 123 U/L (ref 55–135)
ALT SERPL W/O P-5'-P-CCNC: 27 U/L (ref 10–44)
ANION GAP SERPL CALC-SCNC: 10 MMOL/L (ref 8–16)
AST SERPL-CCNC: 43 U/L (ref 10–40)
BASOPHILS # BLD AUTO: 0.02 K/UL (ref 0–0.2)
BASOPHILS NFR BLD: 0.3 % (ref 0–1.9)
BILIRUB SERPL-MCNC: 0.4 MG/DL (ref 0.1–1)
BUN SERPL-MCNC: 20 MG/DL (ref 8–23)
CALCIUM SERPL-MCNC: 9.5 MG/DL (ref 8.7–10.5)
CHLORIDE SERPL-SCNC: 106 MMOL/L (ref 95–110)
CO2 SERPL-SCNC: 23 MMOL/L (ref 23–29)
CREAT SERPL-MCNC: 1 MG/DL (ref 0.5–1.4)
DIFFERENTIAL METHOD: ABNORMAL
EOSINOPHIL # BLD AUTO: 0 K/UL (ref 0–0.5)
EOSINOPHIL NFR BLD: 0 % (ref 0–8)
ERYTHROCYTE [DISTWIDTH] IN BLOOD BY AUTOMATED COUNT: 15.2 % (ref 11.5–14.5)
EST. GFR  (AFRICAN AMERICAN): >60 ML/MIN/1.73 M^2
EST. GFR  (NON AFRICAN AMERICAN): 53 ML/MIN/1.73 M^2
GLUCOSE SERPL-MCNC: 151 MG/DL (ref 70–110)
HCT VFR BLD AUTO: 45.1 % (ref 37–48.5)
HGB BLD-MCNC: 13.8 G/DL (ref 12–16)
IMM GRANULOCYTES # BLD AUTO: 0.03 K/UL (ref 0–0.04)
IMM GRANULOCYTES NFR BLD AUTO: 0.5 % (ref 0–0.5)
LYMPHOCYTES # BLD AUTO: 0.8 K/UL (ref 1–4.8)
LYMPHOCYTES NFR BLD: 12.6 % (ref 18–48)
MAGNESIUM SERPL-MCNC: 2 MG/DL (ref 1.6–2.6)
MCH RBC QN AUTO: 27.8 PG (ref 27–31)
MCHC RBC AUTO-ENTMCNC: 30.6 G/DL (ref 32–36)
MCV RBC AUTO: 91 FL (ref 82–98)
MONOCYTES # BLD AUTO: 0.1 K/UL (ref 0.3–1)
MONOCYTES NFR BLD: 1.6 % (ref 4–15)
NEUTROPHILS # BLD AUTO: 5.5 K/UL (ref 1.8–7.7)
NEUTROPHILS NFR BLD: 85 % (ref 38–73)
NRBC BLD-RTO: 0 /100 WBC
PHOSPHATE SERPL-MCNC: 4.5 MG/DL (ref 2.7–4.5)
PLATELET # BLD AUTO: 323 K/UL (ref 150–350)
PMV BLD AUTO: 10.4 FL (ref 9.2–12.9)
POTASSIUM SERPL-SCNC: 4.6 MMOL/L (ref 3.5–5.1)
PROT SERPL-MCNC: 7.6 G/DL (ref 6–8.4)
RBC # BLD AUTO: 4.96 M/UL (ref 4–5.4)
SODIUM SERPL-SCNC: 139 MMOL/L (ref 136–145)
WBC # BLD AUTO: 6.43 K/UL (ref 3.9–12.7)

## 2020-02-05 PROCEDURE — 27000646 HC AEROBIKA DEVICE

## 2020-02-05 PROCEDURE — 96376 TX/PRO/DX INJ SAME DRUG ADON: CPT | Performed by: EMERGENCY MEDICINE

## 2020-02-05 PROCEDURE — 27000190 HC CPAP FULL FACE MASK W/VALVE

## 2020-02-05 PROCEDURE — 25000242 PHARM REV CODE 250 ALT 637 W/ HCPCS: Performed by: INTERNAL MEDICINE

## 2020-02-05 PROCEDURE — 63600175 PHARM REV CODE 636 W HCPCS: Performed by: INTERNAL MEDICINE

## 2020-02-05 PROCEDURE — 85025 COMPLETE CBC W/AUTO DIFF WBC: CPT

## 2020-02-05 PROCEDURE — 99223 PR INITIAL HOSPITAL CARE,LEVL III: ICD-10-PCS | Mod: ,,, | Performed by: INTERNAL MEDICINE

## 2020-02-05 PROCEDURE — 94640 AIRWAY INHALATION TREATMENT: CPT

## 2020-02-05 PROCEDURE — 25000003 PHARM REV CODE 250: Performed by: INTERNAL MEDICINE

## 2020-02-05 PROCEDURE — 27000221 HC OXYGEN, UP TO 24 HOURS

## 2020-02-05 PROCEDURE — 99223 1ST HOSP IP/OBS HIGH 75: CPT | Mod: ,,, | Performed by: INTERNAL MEDICINE

## 2020-02-05 PROCEDURE — 84100 ASSAY OF PHOSPHORUS: CPT

## 2020-02-05 PROCEDURE — 94660 CPAP INITIATION&MGMT: CPT

## 2020-02-05 PROCEDURE — 83735 ASSAY OF MAGNESIUM: CPT

## 2020-02-05 PROCEDURE — 21400001 HC TELEMETRY ROOM

## 2020-02-05 PROCEDURE — 94761 N-INVAS EAR/PLS OXIMETRY MLT: CPT

## 2020-02-05 PROCEDURE — 94664 DEMO&/EVAL PT USE INHALER: CPT

## 2020-02-05 PROCEDURE — 36415 COLL VENOUS BLD VENIPUNCTURE: CPT

## 2020-02-05 PROCEDURE — 80053 COMPREHEN METABOLIC PANEL: CPT

## 2020-02-05 PROCEDURE — 99900035 HC TECH TIME PER 15 MIN (STAT)

## 2020-02-05 RX ORDER — SODIUM CHLORIDE FOR INHALATION 3 %
4 VIAL, NEBULIZER (ML) INHALATION EVERY 12 HOURS
Status: DISCONTINUED | OUTPATIENT
Start: 2020-02-05 | End: 2020-02-07 | Stop reason: HOSPADM

## 2020-02-05 RX ORDER — AZITHROMYCIN 250 MG/1
500 TABLET, FILM COATED ORAL DAILY
Status: DISCONTINUED | OUTPATIENT
Start: 2020-02-05 | End: 2020-02-07 | Stop reason: HOSPADM

## 2020-02-05 RX ADMIN — SODIUM CHLORIDE 30 MG/ML INHALATION SOLUTION: 30 SOLUTION INHALANT at 07:02

## 2020-02-05 RX ADMIN — METHYLPREDNISOLONE SODIUM SUCCINATE 80 MG: 40 INJECTION, POWDER, FOR SOLUTION INTRAMUSCULAR; INTRAVENOUS at 02:02

## 2020-02-05 RX ADMIN — METHYLPREDNISOLONE SODIUM SUCCINATE 80 MG: 40 INJECTION, POWDER, FOR SOLUTION INTRAMUSCULAR; INTRAVENOUS at 10:02

## 2020-02-05 RX ADMIN — IPRATROPIUM BROMIDE AND ALBUTEROL SULFATE 3 ML: .5; 3 SOLUTION RESPIRATORY (INHALATION) at 07:02

## 2020-02-05 RX ADMIN — LABETALOL HYDROCHLORIDE 200 MG: 200 TABLET, FILM COATED ORAL at 08:02

## 2020-02-05 RX ADMIN — LISINOPRIL 40 MG: 20 TABLET ORAL at 08:02

## 2020-02-05 RX ADMIN — METHYLPREDNISOLONE SODIUM SUCCINATE 80 MG: 40 INJECTION, POWDER, FOR SOLUTION INTRAMUSCULAR; INTRAVENOUS at 05:02

## 2020-02-05 RX ADMIN — IPRATROPIUM BROMIDE AND ALBUTEROL SULFATE 3 ML: .5; 3 SOLUTION RESPIRATORY (INHALATION) at 02:02

## 2020-02-05 RX ADMIN — AZITHROMYCIN MONOHYDRATE 500 MG: 250 TABLET ORAL at 04:02

## 2020-02-05 NOTE — ASSESSMENT & PLAN NOTE
No known prior history of malignancy.  CT chest/CT abdomen today reveals right hilar mass concerning for neoplasm with hypodense lesions in the liver concerning for metastases.  Consult Oncology for evaluation/recommendations.

## 2020-02-05 NOTE — ASSESSMENT & PLAN NOTE
Etiology unclear. SARCOIDOSIS suspected.     OBSERVATION:  Recommend completing a 14 day course of antibiotics.   Followed by  O/P PET scan.

## 2020-02-05 NOTE — HPI
Ms. Singh is a 80-year-old elderly  female with PMH significant for COPD, former smoker, HTN, has been complaining of worsening shortness of breath, dyspnea on exertion for the past few weeks.  She was seen at her PCPs office earlier today and she was in mild respiratory distress with wheezing, hence sent to the ED for further evaluation.  Patient is little hard of hearing especially on her right.  Most of the information obtained from speaking to family members at the bedside.  Patient denies fever, chills, but complained of 1-2 episodes of hemoptysis yesterday and earlier today.  Denies chest pain, but complains of intermittently dry nonproductive cough.  In the ED CTA chest revealed mediastinal lymphadenopathy with right hilar soft tissue mass/lymphadenopathy.  Associated postobstructive collapse right anterior segment lower lobe.  Considerations include sarcoidosis versus neoplasm.  CT abdomen reveals hypodense lesions in the liver, concerning for metastatic disease.  Patient has no known prior history of malignancy.  Patient not on blood thinners.    Admitting diagnosis hemoptysis, CT chest/abdomen concerning for metastatic disease.

## 2020-02-05 NOTE — ASSESSMENT & PLAN NOTE
Hold home dose ACEI due to cough.  Monitor BP closely and make adjustments as needed.  Hydralazine 10 mg IV every 6 hr if SBP greater than 160.

## 2020-02-05 NOTE — HOSPITAL COURSE
2/5: Seen and examined at bedside. Hospital chart reviewed. No acute interval detrimental events noted. She reports that she  has slightly improved. Denies any hemoptysis.     2/6: Seen and examined at bedside. Hospital chart reviewed. Interval developments noted. Liver metastatic disease suspected. She reports that she has moderately improved. Denies hemoptysis.     2/7: Seen and examined at bedside. Hospital chart reviewed. Interval developments noted. S/P CT liver biopsy. She reports that she has significantly  improved. Denies hemoptysis.

## 2020-02-05 NOTE — PLAN OF CARE
Pt AAOx4 .POC reviewed with pt daughter. Pt verbalized understanding.   Pt remains free of injuries and falls; fall precaution in place   Family reports pt is Deaf. Hearing aid in right ear only.  SR on tele monitor. Hr 70's-80's   3L O2 via NC  IV saline locked; intact  No C/O of pain  Pt turns independently   Bed low, side rails up x2,   BSC placed b/c pt is extremely SOB with activity   non slip socks refused, call light in reach   Reminded to call for assistance  Hourly rounding complete will continue to monitor

## 2020-02-05 NOTE — SUBJECTIVE & OBJECTIVE
"Oncology Treatment Plan:   [No treatment plan]    Medications:  Continuous Infusions:  Scheduled Meds:   albuterol-ipratropium  3 mL Nebulization Q6H    azithromycin  500 mg Oral Daily    labetalol  200 mg Oral Q12H    lisinopril  40 mg Oral Daily    methylPREDNISolone sodium succinate  80 mg Intravenous Q8H    sodium chloride 3%  4 mL Nebulization Q12H     PRN Meds:acetaminophen, aluminum-magnesium hydroxide-simethicone, diphenhydrAMINE, guaifenesin 100 mg/5 ml, hydrALAZINE, ondansetron     Review of patient's allergies indicates:   Allergen Reactions    Pravastatin Other (See Comments)     Burning/flushing    Allopurinol analogues Other (See Comments)     "makes me sick and feel crazy"        Past Medical History:   Diagnosis Date    Arthritis     Deaf     Diabetes mellitus     Hypertension     Lung disease     copd     Past Surgical History:   Procedure Laterality Date    APPENDECTOMY      CATARACT EXTRACTION      MIDDLE EAR SURGERY       Family History     Problem Relation (Age of Onset)    ALS Father    Colon cancer Mother    Retinal detachment Son        Tobacco Use    Smoking status: Former Smoker     Packs/day: 0.50     Years: 48.00     Pack years: 24.00     Types: Cigarettes     Start date:      Last attempt to quit:      Years since quittin.1    Smokeless tobacco: Never Used   Substance and Sexual Activity    Alcohol use: No     Frequency: Never     Drinks per session: Patient refused     Binge frequency: Patient refused    Drug use: No    Sexual activity: Never       Review of Systems   Constitutional: Positive for activity change, appetite change and fatigue. Negative for chills, diaphoresis, fever and unexpected weight change.   HENT: Negative for congestion, mouth sores, nosebleeds, sore throat, trouble swallowing and voice change.    Eyes: Negative for photophobia and visual disturbance.   Respiratory: Positive for cough (1-2 episodes of hemoptysis ), shortness of " breath and wheezing. Negative for chest tightness.    Cardiovascular: Negative for chest pain and leg swelling.   Gastrointestinal: Positive for abdominal pain. Negative for abdominal distention, anal bleeding, blood in stool, constipation, diarrhea, nausea, rectal pain and vomiting.   Genitourinary: Negative for difficulty urinating, dysuria and hematuria.   Musculoskeletal: Negative for arthralgias, back pain and myalgias.   Skin: Negative for pallor, rash and wound.   Neurological: Positive for weakness. Negative for dizziness, syncope and headaches.   Hematological: Negative for adenopathy. Does not bruise/bleed easily.   Psychiatric/Behavioral: The patient is nervous/anxious.      Objective:     Vital Signs (Most Recent):  Temp: 97.4 °F (36.3 °C) (02/05/20 1603)  Pulse: 82 (02/05/20 1603)  Resp: 18 (02/05/20 1603)  BP: (!) 117/59 (02/05/20 1603)  SpO2: (!) 90 % (02/05/20 1603) Vital Signs (24h Range):  Temp:  [97.4 °F (36.3 °C)-98.1 °F (36.7 °C)] 97.4 °F (36.3 °C)  Pulse:  [67-89] 82  Resp:  [18-21] 18  SpO2:  [88 %-96 %] 90 %  BP: (117-197)/(59-86) 117/59     Weight: 91.4 kg (201 lb 8 oz)  Body mass index is 39.35 kg/m².  Body surface area is 1.97 meters squared.      Intake/Output Summary (Last 24 hours) at 2/5/2020 1643  Last data filed at 2/5/2020 0400  Gross per 24 hour   Intake 480 ml   Output 700 ml   Net -220 ml       Physical Exam   Constitutional: She is oriented to person, place, and time. She appears well-developed and well-nourished. She is cooperative. She appears ill. Nasal cannula in place.   HENT:   Head: Normocephalic.   Right Ear: External ear normal. Decreased hearing (hearing aid) is noted.   Left Ear: External ear normal. Decreased hearing is noted.   Nose: Nose normal.   Mouth/Throat: Oropharynx is clear and moist.   Eyes: Conjunctivae, EOM and lids are normal. Right eye exhibits no discharge. Left eye exhibits no discharge. No scleral icterus.   Neck: Normal range of motion. No thyroid  mass present.   Cardiovascular: Normal rate, regular rhythm and normal heart sounds.   Pulmonary/Chest: Effort normal. No respiratory distress. She has decreased breath sounds. She has wheezes. She has no rhonchi. She has no rales.   Abdominal: Soft. She exhibits no distension. Bowel sounds are decreased. There is tenderness.   Genitourinary:   Genitourinary Comments: deferred   Musculoskeletal: Normal range of motion. She exhibits no edema.   Lymphadenopathy:        Head (right side): No submandibular, no preauricular and no posterior auricular adenopathy present.        Head (left side): No submandibular, no preauricular and no posterior auricular adenopathy present.        Right cervical: No superficial cervical adenopathy present.       Left cervical: No superficial cervical adenopathy present.   Neurological: She is alert and oriented to person, place, and time.   Skin: Skin is warm and dry.   Psychiatric: She has a normal mood and affect. Her speech is normal and behavior is normal. Thought content normal.   Vitals reviewed.      Significant Labs:   BMP:   Recent Labs   Lab 02/04/20  1547 02/05/20 0524   GLU 84 151*    139   K 4.8 4.6    106   CO2 23 23   BUN 20 20   CREATININE 1.1 1.0   CALCIUM 9.6 9.5   MG  --  2.0   , CBC:   Recent Labs   Lab 02/04/20  1547 02/05/20  0524   WBC 11.49 6.43   HGB 14.6 13.8   HCT 46.6 45.1   * 323   , LFTs:   Recent Labs   Lab 02/04/20  1547 02/05/20  0524   ALT 30 27   AST 53* 43*   ALKPHOS 139* 123   BILITOT 0.5 0.4   PROT 7.8 7.6   ALBUMIN 3.1* 2.9*    and All pertinent labs from the last 24 hours have been reviewed.    Diagnostic Results:  I have reviewed all pertinent imaging results/findings within the past 24 hours.

## 2020-02-05 NOTE — ASSESSMENT & PLAN NOTE
Right infrahilar soft tissue 3.3 x 3.0 cm in size with associated postobstructive collapse involving the anterior segments of the right lower lobe.  Etiology unclear. Infectious,Inflammatory vrs Malignancy.      Recommend completing a 14 day course of antibiotics.   Followed by  O/P PET scan.

## 2020-02-05 NOTE — HPI
80 y.o female seen by PCP on yesterday with c/o gradually worsening SOB. Associated symptoms include cough with hemoptysis, intermittent abdominal pain, decreased appetite, fatigue. Patient was advised to report to Emergency Department for further evaluation of shortness of breath. Symptoms are constant and moderate in severity. SOB worse with exertion. No relieving factors reported.     ED workup significant for: Abdominal US revealed: multiple liver lesions concerning for hepatic metastatic disease. CTA was negative for PE but did reveal Mediastinal lymphadenopathy with right hilar soft tissue mass/lymphadenopathy.  Associated postobstructive collapse right anterior segment lower lobe.  Considerations include sarcoidosis versus neoplasm. CT abdomen/pelvis showed: Hepatomegaly with multiple low-density hypoenhancing lesions consistent with hepatic metastatic disease.    Patient admits to not being up to date with routine preventative screenings. Has never had Colonoscopy. Reports having mammogram several years ago. Unable to recall date.     Hematology/Oncology consulted for further evaluation of abnormal findings discovered on imaging studies.

## 2020-02-05 NOTE — ASSESSMENT & PLAN NOTE
Complaining of RUQ pain  Abdominal ultrasound reveals multiple liver lesions suspicious for hepatic metastatic disease.

## 2020-02-05 NOTE — H&P
[facility] Hospital Medicine  History & Physical    Patient Name: Maura Singh  MRN: [id number]  Admission Date: 2/4/2020  Attending Physician: Christian Berkowitz MD  Primary Care Provider: Yessy Andrade MD         Patient information was obtained from patient, relative(s), past medical records and ER records.     Subjective:     Principal Problem:Cough with hemoptysis    Chief Complaint:   Chief Complaint   Patient presents with    Shortness of Breath     85% on room air, placed on 2 L NC in triage, hx of COPD        HPI: Ms. Singh is a 80-year-old elderly  female with PMH significant for COPD, former smoker, HTN, has been complaining of worsening shortness of breath, dyspnea on exertion for the past few weeks.  She was seen at her PCPs office earlier today and she was in mild respiratory distress with wheezing, hence sent to the ED for further evaluation.  Patient is little hard of hearing especially on her right.  Most of the information obtained from speaking to family members at the bedside.  Patient denies fever, chills, but complained of 1-2 episodes of hemoptysis yesterday and earlier today.  Denies chest pain, but complains of intermittently dry nonproductive cough.  In the ED CTA chest revealed mediastinal lymphadenopathy with right hilar soft tissue mass/lymphadenopathy.  Associated postobstructive collapse right anterior segment lower lobe.  Considerations include sarcoidosis versus neoplasm.  CT abdomen reveals hypodense lesions in the liver, concerning for metastatic disease.  Patient has no known prior history of malignancy.  Patient not on blood thinners.    Admitting diagnosis hemoptysis, CT chest/abdomen concerning for metastatic disease.    Past Medical History:   Diagnosis Date    Arthritis     Deaf     Diabetes mellitus     Hypertension     Lung disease     copd       Past Surgical History:   Procedure Laterality Date    APPENDECTOMY      CATARACT EXTRACTION   "    MIDDLE EAR SURGERY         Review of patient's allergies indicates:   Allergen Reactions    Pravastatin Other (See Comments)     Burning/flushing    Allopurinol analogues Other (See Comments)     "makes me sick and feel crazy"       No current facility-administered medications on file prior to encounter.      Current Outpatient Medications on File Prior to Encounter   Medication Sig    allopurinol (ZYLOPRIM) 100 MG tablet TAKE 1 TABLET BY MOUTH ONCE DAILY    colchicine (COLCRYS) 0.6 mg tablet Take 1 tablet (0.6 mg total) by mouth 2 (two) times daily. For acute gout flareups; ok for alt capsule if chepaer    furosemide (LASIX) 20 MG tablet TAKE 1 TABLET BY MOUTH ONCE DAILY    lisinopril (PRINIVIL,ZESTRIL) 40 MG tablet Take 1 tablet (40 mg total) by mouth once daily.    multivitamin (THERAGRAN) per tablet Take 1 tablet by mouth once daily.     Family History     Problem Relation (Age of Onset)    ALS Father    Colon cancer Mother    Retinal detachment Son        Tobacco Use    Smoking status: Former Smoker     Packs/day: 0.50     Years: 48.00     Pack years: 24.00     Types: Cigarettes     Start date:      Last attempt to quit:      Years since quittin.1    Smokeless tobacco: Never Used   Substance and Sexual Activity    Alcohol use: No     Frequency: Never     Drinks per session: Patient refused     Binge frequency: Patient refused    Drug use: No    Sexual activity: Never     Review of Systems   Constitutional: Positive for fatigue. Negative for chills, diaphoresis and fever.   HENT: Negative.  Negative for congestion, nosebleeds and sinus pressure.    Eyes: Negative.  Negative for visual disturbance.   Respiratory: Positive for cough (blood), shortness of breath and wheezing. Negative for chest tightness.    Cardiovascular: Negative.  Negative for chest pain, palpitations and leg swelling.   Gastrointestinal: Positive for abdominal pain (generalized). Negative for diarrhea, nausea and " vomiting.   Endocrine: Negative.  Negative for polyuria.   Genitourinary: Negative.  Negative for dysuria, flank pain, frequency and urgency.   Musculoskeletal: Negative.  Negative for back pain, joint swelling and neck stiffness.   Skin: Negative.  Negative for color change, pallor and rash.   Allergic/Immunologic: Negative.  Negative for immunocompromised state.   Neurological: Negative.  Negative for dizziness, syncope, speech difficulty, numbness and headaches.   Hematological: Negative.  Negative for adenopathy. Does not bruise/bleed easily.   Psychiatric/Behavioral: Negative.  Negative for confusion, decreased concentration and hallucinations. The patient is not nervous/anxious.    All other systems reviewed and are negative.    Objective:     Vital Signs (Most Recent):  Temp: 97.4 °F (36.3 °C) (02/04/20 2030)  Pulse: 88 (02/04/20 2030)  Resp: 20 (02/04/20 2030)  BP: (!) 197/86 (02/04/20 2030)  SpO2: (!) 93 % (02/04/20 2030) Vital Signs (24h Range):  Temp:  [97.4 °F (36.3 °C)-98.2 °F (36.8 °C)] 97.4 °F (36.3 °C)  Pulse:  [75-89] 88  Resp:  [18-21] 20  SpO2:  [86 %-96 %] 93 %  BP: (136-197)/(71-96) 197/86     Weight: 93.1 kg (205 lb 4 oz)  Body mass index is 40.08 kg/m².    Physical Exam   Constitutional: She is oriented to person, place, and time. No distress.   Appears comfortable, in no respiratory distress.  Currently on oxygen nasal cannula.  Multiple family members at the bedside.   HENT:   Head: Normocephalic and atraumatic.   Eyes: Conjunctivae and EOM are normal. No scleral icterus.   Neck: Normal range of motion. Neck supple. No thyromegaly present.   Cardiovascular: Normal rate, regular rhythm, normal heart sounds and intact distal pulses.   No murmur heard.  Pulmonary/Chest: Effort normal. No respiratory distress. She has wheezes. She has no rales. She exhibits no tenderness.   Abdominal: Soft. She exhibits no distension. There is tenderness (RUQ).   Musculoskeletal: Normal range of motion. She  exhibits no edema or tenderness.   Neurological: She is alert and oriented to person, place, and time. No cranial nerve deficit. She exhibits normal muscle tone. Coordination normal.   Skin: Skin is warm and dry. She is not diaphoretic. No erythema.   Psychiatric: She has a normal mood and affect. Her behavior is normal. Thought content normal.   Nursing note and vitals reviewed.        CRANIAL NERVES     CN III, IV, VI   Extraocular motions are normal.        Significant Labs:   Bilirubin:   Recent Labs   Lab 02/04/20  1547   BILITOT 0.5     CBC:   Recent Labs   Lab 02/04/20  1547   WBC 11.49   HGB 14.6   HCT 46.6   *     CMP:   Recent Labs   Lab 02/04/20  1547      K 4.8      CO2 23   GLU 84   BUN 20   CREATININE 1.1   CALCIUM 9.6   PROT 7.8   ALBUMIN 3.1*   BILITOT 0.5   ALKPHOS 139*   AST 53*   ALT 30   ANIONGAP 11   EGFRNONAA 47*     Cardiac Markers:   Recent Labs   Lab 02/04/20  1547   BNP 64     Lactic Acid: No results for input(s): LACTATE in the last 48 hours.  Lipase: No results for input(s): LIPASE in the last 48 hours.  Urine Studies:   Recent Labs   Lab 02/04/20  1600   COLORU Yellow   APPEARANCEUA Clear   PHUR 6.0   SPECGRAV 1.015   PROTEINUA Negative   GLUCUA Negative   KETONESU Negative   BILIRUBINUA Negative   OCCULTUA Negative   NITRITE Negative   UROBILINOGEN 1.0   LEUKOCYTESUR Negative     All pertinent labs within the past 24 hours have been reviewed.    Significant Imaging: I have reviewed and interpreted all pertinent imaging results/findings within the past 24 hours.     Imaging Results          CT Abdomen Pelvis With Contrast (Final result)  Result time 02/04/20 18:32:55   Procedure changed from CT Abdomen Pelvis  Without Contrast     Final result by David Champion MD (02/04/20 18:32:55)                 Impression:      Hepatomegaly with multiple low-density hypoenhancing lesions consistent with hepatic metastatic disease.      Electronically signed by: David  MD Akira  Date:    02/04/2020  Time:    18:32             Narrative:    EXAMINATION:  CT ABDOMEN PELVIS WITH CONTRAST    CLINICAL HISTORY:  Right upper quadrant pain.  Cholecystitis.    TECHNIQUE:  Standard contrast enhanced CT performed with 100cc's Omnipaque 350 with reformats.  All CT scans at this facility are performed  using dose modulation techniques as appropriate to performed exam including the following:  automated exposure control; adjustment of mA and/or kV according to the patients size (this includes techniques or standardized protocols for targeted exams where dose is matched to indication/reason for exam: i.e. extremities or head);  iterative reconstruction technique.    COMPARISON:  None    FINDINGS:  The liver is abnormal.  The liver is enlarged with multiple low-attenuation lesions consistent with hepatic metastatic disease.  The gallbladder is distended.    The spleen is nonenlarged.    The adrenal glands appear normal.    Small renal cysts are present.    Pancreas is grossly unremarkable at this time.    Several aortocaval/mesenteric lymph nodes are present.  One measures 1.9 cm in size.    The bowel loops are nondilated.    There is evidence of diverticulosis of the sigmoid colon and of the left colon.    Within the pelvis the uterus is present.    Lumbar degenerative disc disease.                               CTA Chest Non-Coronary (PE Study) (Final result)  Result time 02/04/20 18:29:33    Final result by David Champion MD (02/04/20 18:29:33)                 Impression:      No evidence of acute pulmonary artery embolism.    Cardiomegaly with aortic atherosclerosis.    Mediastinal lymphadenopathy with right hilar soft tissue mass/lymphadenopathy.  Associated postobstructive collapse right anterior segment lower lobe.  Considerations include sarcoidosis versus neoplasm.  Follow-up suggested.    All CT scans at this facility use dose modulation, iterative reconstruction and/or weight  based dosing when appropriate to reduce radiation dose to as low as reasonably achievable.      Electronically signed by: David Champion MD  Date:    02/04/2020  Time:    18:29             Narrative:    EXAMINATION:  CTA CHEST NON CORONARY    CLINICAL HISTORY:  Shortness of breath.  Hemoptysis;    TECHNIQUE:  Standard CTA of the chest performed with 100 ml Omnipaque 350  utilizing 3-D MIP reformations.    COMPARISON:  Chest x-ray 12/13/2017.    FINDINGS:  Cardiac size is enlarged with evidence of coronary artery and aortic vascular calcification.    The pulmonary arteries reveal normal enhancement with no definitive filling defects or evidence of acute pulmonary artery embolism.    There are enlarged mediastinal lymph nodes.  Left AP window lymph node 2.2 cm in size.  Right hilar lymph node 2.6 cm in size.  Subcarinal lymph node 2.5 cm in size.  Right infrahilar soft tissue 3.3 x 3.0 cm in size with associated postobstructive collapse involving the anterior segments of the right lower lobe.    Note that prior chest x-ray reveal prominence of the hilar regions.  Considerations include lymphadenopathy secondary to sarcoidosis.  Neoplasm is also consideration and follow-up suggested.    No acute infiltrate is seen at this time.    In the upper abdomen the liver appears enlarged.    No pleural effusion.    No pneumothorax.                               US Abdomen Limited (Final result)  Result time 02/04/20 17:59:14    Final result by David Champion MD (02/04/20 17:59:14)                 Impression:      Multiple liver lesions suspicious for hepatic metastatic disease.  Follow-up CT of the abdomen and pelvis with contrast recommended.      Electronically signed by: David Champion MD  Date:    02/04/2020  Time:    17:59             Narrative:    EXAMINATION:  US ABDOMEN LIMITED    CLINICAL HISTORY:  Right upper quadrant pain    COMPARISON:  None    FINDINGS:  The liver is grossly abnormal.  There are multiple  focal lesions most compatible with hepatic metastatic disease.  The largest is up to 7.2 cm in size.  The liver is overall enlarged as well.  The gallbladder reveals mild wall thickening with minimal sludge.  No stones.  The common bile duct is 4.3 mm.    The portal vein is grossly normal.  The pancreas is normal.  The right kidney is normal at 10.6 cm.    No ascites.  The aorta and IVC are normal.  The adrenals are not included.                               X-Ray Chest AP Portable (Final result)  Result time 02/04/20 16:03:29    Final result by AJAY Castaneda Sr., MD (02/04/20 16:03:29)                 Impression:      1. There is no focal pulmonary infiltrate visualized.  2. The size of the heart is prominent.  This may be secondary to magnification.  .      Electronically signed by: Harris Castaneda MD  Date:    02/04/2020  Time:    16:03             Narrative:    EXAMINATION:  XR CHEST AP PORTABLE    CLINICAL HISTORY:  Dyspnea, unspecified    COMPARISON:  12/13/2017    FINDINGS:  The size of the heart is prominent.  There is no focal pulmonary infiltrate visualized.  There is no pneumothorax.  The costophrenic angles are sharp.                                I have independently reviewed and interpreted the EKG.     I have independently reviewed all pertinent labs within the past 24 hours.    I have independently reviewed, visualized and interpreted all pertinent imaging results within the past 24 hours and discussed the findings with the ED physician, Dr. REYES            Assessment/Plan:     * Cough with hemoptysis  Former smoker quit about 15 years ago.  At least two episodes of hemoptysis over the last two days.  Consult Pulmonary.  CT chest/abdomen concerning for metastatic disease (new)      Malignancy  No known prior history of malignancy.  CT chest/CT abdomen today reveals right hilar mass concerning for neoplasm with hypodense lesions in the liver concerning for metastases.  Consult Oncology for  evaluation/recommendations.        Right upper quadrant pain  Complaining of RUQ pain  Abdominal ultrasound reveals multiple liver lesions suspicious for hepatic metastatic disease.      Essential hypertension  Hold home dose ACEI due to cough.  Monitor BP closely and make adjustments as needed.  Hydralazine 10 mg IV every 6 hr if SBP greater than 160.        VTE Risk Mitigation (From admission, onward)         Ordered     Place sequential compression device  Until discontinued      02/04/20 2041                   Christian Berkowitz MD  Department of Hospital Medicine   [facility]

## 2020-02-05 NOTE — ASSESSMENT & PLAN NOTE
2/5/20  --Patient reports noting 2 episodes in the past of hemoptysis. No hemoptysis at present. Monitor

## 2020-02-05 NOTE — HPI
80 year old female who  has a past medical history of Arthritis, Deaf, Diabetes mellitus, Hypertension, and Lung disease admitted with progressive dyspnea and an intermittent  Non productive cough with  1-2 episodes of hemoptysis. Denies chest pain. She has not had any episode of hemoptysis in the past 5 days.

## 2020-02-05 NOTE — H&P (VIEW-ONLY)
[facility] -   Hematology/Oncology  Consult Note    Patient Name: Maura Singh  MRN: [id number]  Admission Date: 2/4/2020  Hospital Length of Stay: 0 days  Code Status: No Order   Attending Provider: Richard Brink MD  Consulting Provider: Ana Callejas NP  Primary Care Physician: Yessy Andrade MD  Principal Problem:Shortness of breath    Consults  Subjective:     HPI:  80 y.o female seen by PCP on yesterday with c/o gradually worsening SOB. Associated symptoms include cough with hemoptysis, intermittent abdominal pain, decreased appetite, fatigue. Patient was advised to report to Emergency Department for further evaluation of shortness of breath. Symptoms are constant and moderate in severity. SOB worse with exertion. No relieving factors reported.     ED workup significant for: Abdominal US revealed: multiple liver lesions concerning for hepatic metastatic disease. CTA was negative for PE but did reveal Mediastinal lymphadenopathy with right hilar soft tissue mass/lymphadenopathy.  Associated postobstructive collapse right anterior segment lower lobe.  Considerations include sarcoidosis versus neoplasm. CT abdomen/pelvis showed: Hepatomegaly with multiple low-density hypoenhancing lesions consistent with hepatic metastatic disease.    Patient admits to not being up to date with routine preventative screenings. Has never had Colonoscopy. Reports having mammogram several years ago. Unable to recall date.     Hematology/Oncology consulted for further evaluation of abnormal findings discovered on imaging studies.     Oncology Treatment Plan:   [No treatment plan]    Medications:  Continuous Infusions:  Scheduled Meds:   albuterol-ipratropium  3 mL Nebulization Q6H    azithromycin  500 mg Oral Daily    labetalol  200 mg Oral Q12H    lisinopril  40 mg Oral Daily    methylPREDNISolone sodium succinate  80 mg Intravenous Q8H    sodium chloride 3%  4 mL Nebulization Q12H     PRN  "Meds:acetaminophen, aluminum-magnesium hydroxide-simethicone, diphenhydrAMINE, guaifenesin 100 mg/5 ml, hydrALAZINE, ondansetron     Review of patient's allergies indicates:   Allergen Reactions    Pravastatin Other (See Comments)     Burning/flushing    Allopurinol analogues Other (See Comments)     "makes me sick and feel crazy"        Past Medical History:   Diagnosis Date    Arthritis     Deaf     Diabetes mellitus     Hypertension     Lung disease     copd     Past Surgical History:   Procedure Laterality Date    APPENDECTOMY      CATARACT EXTRACTION      MIDDLE EAR SURGERY       Family History     Problem Relation (Age of Onset)    ALS Father    Colon cancer Mother    Retinal detachment Son        Tobacco Use    Smoking status: Former Smoker     Packs/day: 0.50     Years: 48.00     Pack years: 24.00     Types: Cigarettes     Start date:      Last attempt to quit:      Years since quittin.1    Smokeless tobacco: Never Used   Substance and Sexual Activity    Alcohol use: No     Frequency: Never     Drinks per session: Patient refused     Binge frequency: Patient refused    Drug use: No    Sexual activity: Never       Review of Systems   Constitutional: Positive for activity change, appetite change and fatigue. Negative for chills, diaphoresis, fever and unexpected weight change.   HENT: Negative for congestion, mouth sores, nosebleeds, sore throat, trouble swallowing and voice change.    Eyes: Negative for photophobia and visual disturbance.   Respiratory: Positive for cough (1-2 episodes of hemoptysis ), shortness of breath and wheezing. Negative for chest tightness.    Cardiovascular: Negative for chest pain and leg swelling.   Gastrointestinal: Positive for abdominal pain. Negative for abdominal distention, anal bleeding, blood in stool, constipation, diarrhea, nausea, rectal pain and vomiting.   Genitourinary: Negative for difficulty urinating, dysuria and hematuria. "   Musculoskeletal: Negative for arthralgias, back pain and myalgias.   Skin: Negative for pallor, rash and wound.   Neurological: Positive for weakness. Negative for dizziness, syncope and headaches.   Hematological: Negative for adenopathy. Does not bruise/bleed easily.   Psychiatric/Behavioral: The patient is nervous/anxious.      Objective:     Vital Signs (Most Recent):  Temp: 97.4 °F (36.3 °C) (02/05/20 1603)  Pulse: 82 (02/05/20 1603)  Resp: 18 (02/05/20 1603)  BP: (!) 117/59 (02/05/20 1603)  SpO2: (!) 90 % (02/05/20 1603) Vital Signs (24h Range):  Temp:  [97.4 °F (36.3 °C)-98.1 °F (36.7 °C)] 97.4 °F (36.3 °C)  Pulse:  [67-89] 82  Resp:  [18-21] 18  SpO2:  [88 %-96 %] 90 %  BP: (117-197)/(59-86) 117/59     Weight: 91.4 kg (201 lb 8 oz)  Body mass index is 39.35 kg/m².  Body surface area is 1.97 meters squared.      Intake/Output Summary (Last 24 hours) at 2/5/2020 1643  Last data filed at 2/5/2020 0400  Gross per 24 hour   Intake 480 ml   Output 700 ml   Net -220 ml       Physical Exam   Constitutional: She is oriented to person, place, and time. She appears well-developed and well-nourished. She is cooperative. She appears ill. Nasal cannula in place.   HENT:   Head: Normocephalic.   Right Ear: External ear normal. Decreased hearing (hearing aid) is noted.   Left Ear: External ear normal. Decreased hearing is noted.   Nose: Nose normal.   Mouth/Throat: Oropharynx is clear and moist.   Eyes: Conjunctivae, EOM and lids are normal. Right eye exhibits no discharge. Left eye exhibits no discharge. No scleral icterus.   Neck: Normal range of motion. No thyroid mass present.   Cardiovascular: Normal rate, regular rhythm and normal heart sounds.   Pulmonary/Chest: Effort normal. No respiratory distress. She has decreased breath sounds. She has wheezes. She has no rhonchi. She has no rales.   Abdominal: Soft. She exhibits no distension. Bowel sounds are decreased. There is tenderness.   Genitourinary:   Genitourinary  Comments: deferred   Musculoskeletal: Normal range of motion. She exhibits no edema.   Lymphadenopathy:        Head (right side): No submandibular, no preauricular and no posterior auricular adenopathy present.        Head (left side): No submandibular, no preauricular and no posterior auricular adenopathy present.        Right cervical: No superficial cervical adenopathy present.       Left cervical: No superficial cervical adenopathy present.   Neurological: She is alert and oriented to person, place, and time.   Skin: Skin is warm and dry.   Psychiatric: She has a normal mood and affect. Her speech is normal and behavior is normal. Thought content normal.   Vitals reviewed.      Significant Labs:   BMP:   Recent Labs   Lab 02/04/20  1547 02/05/20  0524   GLU 84 151*    139   K 4.8 4.6    106   CO2 23 23   BUN 20 20   CREATININE 1.1 1.0   CALCIUM 9.6 9.5   MG  --  2.0   , CBC:   Recent Labs   Lab 02/04/20  1547 02/05/20  0524   WBC 11.49 6.43   HGB 14.6 13.8   HCT 46.6 45.1   * 323   , LFTs:   Recent Labs   Lab 02/04/20  1547 02/05/20  0524   ALT 30 27   AST 53* 43*   ALKPHOS 139* 123   BILITOT 0.5 0.4   PROT 7.8 7.6   ALBUMIN 3.1* 2.9*    and All pertinent labs from the last 24 hours have been reviewed.    Diagnostic Results:  I have reviewed all pertinent imaging results/findings within the past 24 hours.    Assessment/Plan:     Mediastinal lymphadenopathy  2/5/20  ------CT chest shows---Mediastinal lymphadenopathy with right hilar soft tissue mass/lymphadenopathy.  Associated postobstructive collapse right anterior segment lower lobe.  Considerations include sarcoidosis versus neoplasm.  CT abdomen shows--Hepatomegaly with multiple low-density hypoenhancing lesions consistent with hepatic metastatic disease.  --Findings are concerning for potential metastatic disease versus inflammatory. Pulmonology following and treating patient for possible sarcoidosis with IV antibiotics and steroids.  Would repeat imaging of chest following antibiotic course. Would recommend IR consult for liver biopsy. Would do PET CT outpatient for further evaluation. Will need tissue diagnosis prior to any treatment recommendations. Discussed in detail that biopsy warranted to rule out or confirm potential malignancy  --daily Cbc, cmp  --supplemental oxygen PRN  --supportive care     Cough with hemoptysis  2/5/20  --Patient reports noting 2 episodes in the past of hemoptysis. No hemoptysis at present. Monitor         Thank you for your consult. I will follow-up with patient. Please contact us if you have any additional questions.    Ana Callejas NP  Hematology/Oncology  [facility]

## 2020-02-05 NOTE — PLAN OF CARE
SW met with patient at bedside to assess for discharge planning.  Patient was alert and oriented.  Patient reported having to use oxygen and a rollator on a consistent basis before coming to the hospital, but denied the use of all other assistive equipment and home health services.  Patient stated that she lives at home alone, and does not have any help at home, but stated that she does have ongoing support from her daughter who also helps her to manage her healthcare.  Patient denied the need for any assistive equipment, home health services, and all other community resources upon discharge unless recommended by hospital staff.  Patient needs are undetermined at this time.  SW provided a transitional care folder, information on advanced directives, information on pharmacy bedside delivery, and discharge planning begins on admission with contact information for any needs/questions.     D/C Plan:  Home  PCP:  Dr. Yessy Andrade  Preferred Pharmacy:    [facility] [id number] [address] [facility]  [address] [facility] [address]  Phone: [phone] Fax: [phone]    Discharge transportation:  Family  [facility]:  Active  Pharmacy Bedside Delivery:  Yes       02/05/20 4570   Discharge Assessment   Assessment Type Discharge Planning Assessment   Confirmed/corrected address and phone number on facesheet? Yes   Assessment information obtained from? Patient   Expected Length of Stay (days)   (TBD)   Communicated expected length of stay with patient/caregiver yes   Prior to hospitilization cognitive status: Alert/Oriented   Prior to hospitalization functional status: Independent   Current cognitive status: Alert/Oriented   Current Functional Status: Independent   Facility Arrived From: Home   Lives With alone   Able to Return to Prior Arrangements no   Is patient able to care for self after discharge? Yes   Who are your caregiver(s) and their phone number(s)? Lilian Obrien (daughter)  [phone]   Patient's perception of discharge disposition home or selfcare   Readmission Within the Last 30 Days no previous admission in last 30 days   Patient currently being followed by outpatient case management? No   Patient currently receives any other outside agency services? No   Equipment Currently Used at Home rollator   Do you have any problems affording any of your prescribed medications? No   Is the patient taking medications as prescribed? yes   Does the patient have transportation home? Yes   Transportation Anticipated family or friend will provide   Dialysis Name and Scheduled days n/a   Does the patient receive services at the [facility]? No   Discharge Plan A Home

## 2020-02-05 NOTE — ASSESSMENT & PLAN NOTE
2/5/20  ------CT chest shows---Mediastinal lymphadenopathy with right hilar soft tissue mass/lymphadenopathy.  Associated postobstructive collapse right anterior segment lower lobe.  Considerations include sarcoidosis versus neoplasm.  CT abdomen shows--Hepatomegaly with multiple low-density hypoenhancing lesions consistent with hepatic metastatic disease.  --Findings are concerning for potential metastatic disease versus inflammatory. Pulmonology following and treating patient for possible sarcoidosis with IV antibiotics and steroids. Would repeat imaging of chest following antibiotic course. Would recommend IR consult for liver biopsy. Would do PET CT outpatient for further evaluation. Will need tissue diagnosis prior to any treatment recommendations. Discussed in detail that biopsy warranted to rule out or confirm potential malignancy  --daily Cbc, cmp  --supplemental oxygen PRN  --supportive care

## 2020-02-05 NOTE — CONSULTS
[facility] -   Hematology/Oncology  Consult Note    Patient Name: Maura Singh  MRN: [id number]  Admission Date: 2/4/2020  Hospital Length of Stay: 0 days  Code Status: No Order   Attending Provider: Richard Brink MD  Consulting Provider: Ana Callejas NP  Primary Care Physician: Yessy Andrade MD  Principal Problem:Shortness of breath    Consults  Subjective:     HPI:  80 y.o female seen by PCP on yesterday with c/o gradually worsening SOB. Associated symptoms include cough with hemoptysis, intermittent abdominal pain, decreased appetite, fatigue. Patient was advised to report to Emergency Department for further evaluation of shortness of breath. Symptoms are constant and moderate in severity. SOB worse with exertion. No relieving factors reported.     ED workup significant for: Abdominal US revealed: multiple liver lesions concerning for hepatic metastatic disease. CTA was negative for PE but did reveal Mediastinal lymphadenopathy with right hilar soft tissue mass/lymphadenopathy.  Associated postobstructive collapse right anterior segment lower lobe.  Considerations include sarcoidosis versus neoplasm. CT abdomen/pelvis showed: Hepatomegaly with multiple low-density hypoenhancing lesions consistent with hepatic metastatic disease.    Patient admits to not being up to date with routine preventative screenings. Has never had Colonoscopy. Reports having mammogram several years ago. Unable to recall date.     Hematology/Oncology consulted for further evaluation of abnormal findings discovered on imaging studies.     Oncology Treatment Plan:   [No treatment plan]    Medications:  Continuous Infusions:  Scheduled Meds:   albuterol-ipratropium  3 mL Nebulization Q6H    azithromycin  500 mg Oral Daily    labetalol  200 mg Oral Q12H    lisinopril  40 mg Oral Daily    methylPREDNISolone sodium succinate  80 mg Intravenous Q8H    sodium chloride 3%  4 mL Nebulization Q12H     PRN  "Meds:acetaminophen, aluminum-magnesium hydroxide-simethicone, diphenhydrAMINE, guaifenesin 100 mg/5 ml, hydrALAZINE, ondansetron     Review of patient's allergies indicates:   Allergen Reactions    Pravastatin Other (See Comments)     Burning/flushing    Allopurinol analogues Other (See Comments)     "makes me sick and feel crazy"        Past Medical History:   Diagnosis Date    Arthritis     Deaf     Diabetes mellitus     Hypertension     Lung disease     copd     Past Surgical History:   Procedure Laterality Date    APPENDECTOMY      CATARACT EXTRACTION      MIDDLE EAR SURGERY       Family History     Problem Relation (Age of Onset)    ALS Father    Colon cancer Mother    Retinal detachment Son        Tobacco Use    Smoking status: Former Smoker     Packs/day: 0.50     Years: 48.00     Pack years: 24.00     Types: Cigarettes     Start date:      Last attempt to quit:      Years since quittin.1    Smokeless tobacco: Never Used   Substance and Sexual Activity    Alcohol use: No     Frequency: Never     Drinks per session: Patient refused     Binge frequency: Patient refused    Drug use: No    Sexual activity: Never       Review of Systems   Constitutional: Positive for activity change, appetite change and fatigue. Negative for chills, diaphoresis, fever and unexpected weight change.   HENT: Negative for congestion, mouth sores, nosebleeds, sore throat, trouble swallowing and voice change.    Eyes: Negative for photophobia and visual disturbance.   Respiratory: Positive for cough (1-2 episodes of hemoptysis ), shortness of breath and wheezing. Negative for chest tightness.    Cardiovascular: Negative for chest pain and leg swelling.   Gastrointestinal: Positive for abdominal pain. Negative for abdominal distention, anal bleeding, blood in stool, constipation, diarrhea, nausea, rectal pain and vomiting.   Genitourinary: Negative for difficulty urinating, dysuria and hematuria. "   Musculoskeletal: Negative for arthralgias, back pain and myalgias.   Skin: Negative for pallor, rash and wound.   Neurological: Positive for weakness. Negative for dizziness, syncope and headaches.   Hematological: Negative for adenopathy. Does not bruise/bleed easily.   Psychiatric/Behavioral: The patient is nervous/anxious.      Objective:     Vital Signs (Most Recent):  Temp: 97.4 °F (36.3 °C) (02/05/20 1603)  Pulse: 82 (02/05/20 1603)  Resp: 18 (02/05/20 1603)  BP: (!) 117/59 (02/05/20 1603)  SpO2: (!) 90 % (02/05/20 1603) Vital Signs (24h Range):  Temp:  [97.4 °F (36.3 °C)-98.1 °F (36.7 °C)] 97.4 °F (36.3 °C)  Pulse:  [67-89] 82  Resp:  [18-21] 18  SpO2:  [88 %-96 %] 90 %  BP: (117-197)/(59-86) 117/59     Weight: 91.4 kg (201 lb 8 oz)  Body mass index is 39.35 kg/m².  Body surface area is 1.97 meters squared.      Intake/Output Summary (Last 24 hours) at 2/5/2020 1643  Last data filed at 2/5/2020 0400  Gross per 24 hour   Intake 480 ml   Output 700 ml   Net -220 ml       Physical Exam   Constitutional: She is oriented to person, place, and time. She appears well-developed and well-nourished. She is cooperative. She appears ill. Nasal cannula in place.   HENT:   Head: Normocephalic.   Right Ear: External ear normal. Decreased hearing (hearing aid) is noted.   Left Ear: External ear normal. Decreased hearing is noted.   Nose: Nose normal.   Mouth/Throat: Oropharynx is clear and moist.   Eyes: Conjunctivae, EOM and lids are normal. Right eye exhibits no discharge. Left eye exhibits no discharge. No scleral icterus.   Neck: Normal range of motion. No thyroid mass present.   Cardiovascular: Normal rate, regular rhythm and normal heart sounds.   Pulmonary/Chest: Effort normal. No respiratory distress. She has decreased breath sounds. She has wheezes. She has no rhonchi. She has no rales.   Abdominal: Soft. She exhibits no distension. Bowel sounds are decreased. There is tenderness.   Genitourinary:   Genitourinary  Comments: deferred   Musculoskeletal: Normal range of motion. She exhibits no edema.   Lymphadenopathy:        Head (right side): No submandibular, no preauricular and no posterior auricular adenopathy present.        Head (left side): No submandibular, no preauricular and no posterior auricular adenopathy present.        Right cervical: No superficial cervical adenopathy present.       Left cervical: No superficial cervical adenopathy present.   Neurological: She is alert and oriented to person, place, and time.   Skin: Skin is warm and dry.   Psychiatric: She has a normal mood and affect. Her speech is normal and behavior is normal. Thought content normal.   Vitals reviewed.      Significant Labs:   BMP:   Recent Labs   Lab 02/04/20  1547 02/05/20  0524   GLU 84 151*    139   K 4.8 4.6    106   CO2 23 23   BUN 20 20   CREATININE 1.1 1.0   CALCIUM 9.6 9.5   MG  --  2.0   , CBC:   Recent Labs   Lab 02/04/20  1547 02/05/20  0524   WBC 11.49 6.43   HGB 14.6 13.8   HCT 46.6 45.1   * 323   , LFTs:   Recent Labs   Lab 02/04/20  1547 02/05/20  0524   ALT 30 27   AST 53* 43*   ALKPHOS 139* 123   BILITOT 0.5 0.4   PROT 7.8 7.6   ALBUMIN 3.1* 2.9*    and All pertinent labs from the last 24 hours have been reviewed.    Diagnostic Results:  I have reviewed all pertinent imaging results/findings within the past 24 hours.    Assessment/Plan:     Mediastinal lymphadenopathy  2/5/20  ------CT chest shows---Mediastinal lymphadenopathy with right hilar soft tissue mass/lymphadenopathy.  Associated postobstructive collapse right anterior segment lower lobe.  Considerations include sarcoidosis versus neoplasm.  CT abdomen shows--Hepatomegaly with multiple low-density hypoenhancing lesions consistent with hepatic metastatic disease.  --Findings are concerning for potential metastatic disease versus inflammatory. Pulmonology following and treating patient for possible sarcoidosis with IV antibiotics and steroids.  Would repeat imaging of chest following antibiotic course. Would recommend IR consult for liver biopsy. Would do PET CT outpatient for further evaluation. Will need tissue diagnosis prior to any treatment recommendations. Discussed in detail that biopsy warranted to rule out or confirm potential malignancy  --daily Cbc, cmp  --supplemental oxygen PRN  --supportive care     Cough with hemoptysis  2/5/20  --Patient reports noting 2 episodes in the past of hemoptysis. No hemoptysis at present. Monitor         Thank you for your consult. I will follow-up with patient. Please contact us if you have any additional questions.    Ana Callejas NP  Hematology/Oncology  [facility]

## 2020-02-05 NOTE — CONSULTS
"[facility] -   Pulmonology  Consult Note    Patient Name: Maura Singh  MRN: [id number]  Admission Date: 2020  Hospital Length of Stay: 0 days  Code Status: No Order  Attending Physician: Richard Brink MD  Primary Care Provider: Yessy Andrade MD   Principal Problem: Shortness of breath      Subjective:     HPI:  80 year old female who  has a past medical history of Arthritis, Deaf, Diabetes mellitus, Hypertension, and Lung disease admitted with progressive dyspnea and an intermittent  Non productive cough with  1-2 episodes of hemoptysis. Denies chest pain. She has not had any episode of hemoptysis in the past 5 days.     Past Medical History:   Diagnosis Date    Arthritis     Deaf     Diabetes mellitus     Hypertension     Lung disease     copd       Past Surgical History:   Procedure Laterality Date    APPENDECTOMY      CATARACT EXTRACTION      MIDDLE EAR SURGERY         Review of patient's allergies indicates:   Allergen Reactions    Pravastatin Other (See Comments)     Burning/flushing    Allopurinol analogues Other (See Comments)     "makes me sick and feel crazy"           Medications:  Continuous Infusions:  Scheduled Meds:   albuterol-ipratropium  3 mL Nebulization Q6H    labetalol  200 mg Oral Q12H    lisinopril  40 mg Oral Daily    methylPREDNISolone sodium succinate  80 mg Intravenous Q8H     PRN Meds:acetaminophen, aluminum-magnesium hydroxide-simethicone, diphenhydrAMINE, guaifenesin 100 mg/5 ml, hydrALAZINE, ondansetron       Family History     Problem Relation (Age of Onset)    ALS Father    Colon cancer Mother    Retinal detachment Son        Tobacco Use    Smoking status: Former Smoker     Packs/day: 0.50     Years: 48.00     Pack years: 24.00     Types: Cigarettes     Start date:      Last attempt to quit:      Years since quittin.1    Smokeless tobacco: Never Used   Substance and Sexual Activity    Alcohol use: No     Frequency: Never     " Drinks per session: Patient refused     Binge frequency: Patient refused    Drug use: No    Sexual activity: Never         Review of Systems  Objective:     Vital Signs (Most Recent):  Temp: 97.5 °F (36.4 °C) (02/05/20 0723)  Pulse: 67 (02/05/20 0900)  Resp: 19 (02/05/20 0723)  BP: 135/61 (02/05/20 0723)  SpO2: 95 % (02/05/20 0723) Vital Signs (24h Range):  Temp:  [97.4 °F (36.3 °C)-98.2 °F (36.8 °C)] 97.5 °F (36.4 °C)  Pulse:  [67-89] 67  Resp:  [18-21] 19  SpO2:  [86 %-96 %] 95 %  BP: (128-197)/(61-96) 135/61     Weight: 91.4 kg (201 lb 8 oz)  Body mass index is 39.35 kg/m².      Intake/Output Summary (Last 24 hours) at 2/5/2020 0954  Last data filed at 2/5/2020 0400  Gross per 24 hour   Intake 480 ml   Output 700 ml   Net -220 ml       Physical Exam   Constitutional: She is oriented to person, place, and time. No distress.   HENT:   Head: Normocephalic and atraumatic.   Eyes: Conjunctivae and EOM are normal. No scleral icterus.   Neck: Normal range of motion. Neck supple. No thyromegaly present.   Cardiovascular: Normal rate, regular rhythm, normal heart sounds and intact distal pulses.   No murmur heard.  Pulmonary/Chest: Effort normal. No respiratory distress. She has wheezes. She has no rales. She exhibits no tenderness.   Abdominal: Soft. She exhibits no distension. There is tenderness (RUQ).   Musculoskeletal: Normal range of motion. She exhibits no edema or tenderness.   Neurological: She is alert and oriented to person, place, and time. No cranial nerve deficit. She exhibits normal muscle tone. Coordination normal.   Skin: Skin is warm and dry. She is not diaphoretic. No erythema.   Psychiatric: She has a normal mood and affect. Her behavior is normal. Thought content normal.   Nursing note and vitals reviewed.      Vents:  Oxygen Concentration (%): 32 (02/05/20 0704)    Lines/Drains/Airways     Peripheral Intravenous Line                 Peripheral IV - Single Lumen 02/04/20 1548 20 G Left Antecubital  less than 1 day                Significant Labs:    CBC/Anemia Profile:  Recent Labs   Lab 02/04/20  1547 02/05/20  0524   WBC 11.49 6.43   HGB 14.6 13.8   HCT 46.6 45.1   * 323   MCV 90 91   RDW 14.9* 15.2*        Chemistries:  Recent Labs   Lab 02/04/20  1547 02/05/20  0524    139   K 4.8 4.6    106   CO2 23 23   BUN 20 20   CREATININE 1.1 1.0   CALCIUM 9.6 9.5   ALBUMIN 3.1* 2.9*   PROT 7.8 7.6   BILITOT 0.5 0.4   ALKPHOS 139* 123   ALT 30 27   AST 53* 43*   MG  --  2.0   PHOS  --  4.5     Bilirubin:   Recent Labs   Lab 02/04/20  1547 02/05/20  0524   BILITOT 0.5 0.4     Coagulation:   Recent Labs   Lab 02/04/20  1547   INR 1.1   APTT 27.6     Troponin:   Recent Labs   Lab 02/04/20  1547   TROPONINI <0.006     Urine Studies:   Recent Labs   Lab 02/04/20  1600   COLORU Yellow   APPEARANCEUA Clear   PHUR 6.0   SPECGRAV 1.015   PROTEINUA Negative   GLUCUA Negative   KETONESU Negative   BILIRUBINUA Negative   OCCULTUA Negative   NITRITE Negative   UROBILINOGEN 1.0   LEUKOCYTESUR Negative       Significant Imaging:     CTA CHEST NON CORONARY: 2/4/20:   Cardiac size is enlarged with evidence of coronary artery and aortic vascular calcification.    The pulmonary arteries reveal normal enhancement with no definitive filling defects or evidence of acute pulmonary artery embolism.    There are enlarged mediastinal lymph nodes.  Left AP window lymph node 2.2 cm in size.  Right hilar lymph node 2.6 cm in size.  Subcarinal lymph node 2.5 cm in size.  Right infrahilar soft tissue 3.3 x 3.0 cm in size with associated postobstructive collapse involving the anterior segments of the right lower lobe.    Note that prior chest x-ray reveal prominence of the hilar regions.  Considerations include lymphadenopathy secondary to sarcoidosis.  Neoplasm is also consideration and follow-up suggested.    No acute infiltrate is seen at this time.    In the upper abdomen the liver appears enlarged.    No pleural  effusion.    No pneumothorax.        Assessment/Plan:     * Shortness of breath  Supplement oxygenation. Keep SAO2 >= 92%.   Consider BIPAP 10/5 QHS and PRN.   Bronchodilators per orders.  AZITHROMYCIN 500 mg PO DAILY X 10 days.   Systemic steroids per orders.         Mediastinal lymphadenopathy  Etiology unclear. SARCOIDOSIS suspected.     OBSERVATION:  Recommend completing a 14 day course of antibiotics.   Followed by  O/P PET scan.    Lung nodules  Right infrahilar soft tissue 3.3 x 3.0 cm in size with associated postobstructive collapse involving the anterior segments of the right lower lobe.  Etiology unclear. Infectious,Inflammatory vrs Malignancy.      Recommend completing a 14 day course of antibiotics.   Followed by  O/P PET scan.      Atelectasis of right lung    HYPERSAL NEBS BID  ACAPELLA Rx BID  Respiratory therapy    Cough with hemoptysis  She has not had any hemoptysis in the past 5 days.   Continue to observe for recurrence.           Thank you for your consult. I will follow-up with patient. Please contact us if you have any additional questions.     Jaden Hobson MD  Pulmonology  [facility] -

## 2020-02-05 NOTE — SUBJECTIVE & OBJECTIVE
"Past Medical History:   Diagnosis Date    Arthritis     Deaf     Diabetes mellitus     Hypertension     Lung disease     copd       Past Surgical History:   Procedure Laterality Date    APPENDECTOMY      CATARACT EXTRACTION      MIDDLE EAR SURGERY         Review of patient's allergies indicates:   Allergen Reactions    Pravastatin Other (See Comments)     Burning/flushing    Allopurinol analogues Other (See Comments)     "makes me sick and feel crazy"           Medications:  Continuous Infusions:  Scheduled Meds:   albuterol-ipratropium  3 mL Nebulization Q6H    labetalol  200 mg Oral Q12H    lisinopril  40 mg Oral Daily    methylPREDNISolone sodium succinate  80 mg Intravenous Q8H     PRN Meds:acetaminophen, aluminum-magnesium hydroxide-simethicone, diphenhydrAMINE, guaifenesin 100 mg/5 ml, hydrALAZINE, ondansetron       Family History     Problem Relation (Age of Onset)    ALS Father    Colon cancer Mother    Retinal detachment Son        Tobacco Use    Smoking status: Former Smoker     Packs/day: 0.50     Years: 48.00     Pack years: 24.00     Types: Cigarettes     Start date:      Last attempt to quit:      Years since quittin.1    Smokeless tobacco: Never Used   Substance and Sexual Activity    Alcohol use: No     Frequency: Never     Drinks per session: Patient refused     Binge frequency: Patient refused    Drug use: No    Sexual activity: Never         Review of Systems  Objective:     Vital Signs (Most Recent):  Temp: 97.5 °F (36.4 °C) (20 0723)  Pulse: 67 (20 0900)  Resp: 19 (20 0723)  BP: 135/61 (20 0723)  SpO2: 95 % (20 07) Vital Signs (24h Range):  Temp:  [97.4 °F (36.3 °C)-98.2 °F (36.8 °C)] 97.5 °F (36.4 °C)  Pulse:  [67-89] 67  Resp:  [18-21] 19  SpO2:  [86 %-96 %] 95 %  BP: (128-197)/(61-96) 135/61     Weight: 91.4 kg (201 lb 8 oz)  Body mass index is 39.35 kg/m².      Intake/Output Summary (Last 24 hours) at 2020 0954  Last data " filed at 2/5/2020 0400  Gross per 24 hour   Intake 480 ml   Output 700 ml   Net -220 ml       Physical Exam   Constitutional: She is oriented to person, place, and time. No distress.   HENT:   Head: Normocephalic and atraumatic.   Eyes: Conjunctivae and EOM are normal. No scleral icterus.   Neck: Normal range of motion. Neck supple. No thyromegaly present.   Cardiovascular: Normal rate, regular rhythm, normal heart sounds and intact distal pulses.   No murmur heard.  Pulmonary/Chest: Effort normal. No respiratory distress. She has wheezes. She has no rales. She exhibits no tenderness.   Abdominal: Soft. She exhibits no distension. There is tenderness (RUQ).   Musculoskeletal: Normal range of motion. She exhibits no edema or tenderness.   Neurological: She is alert and oriented to person, place, and time. No cranial nerve deficit. She exhibits normal muscle tone. Coordination normal.   Skin: Skin is warm and dry. She is not diaphoretic. No erythema.   Psychiatric: She has a normal mood and affect. Her behavior is normal. Thought content normal.   Nursing note and vitals reviewed.      Vents:  Oxygen Concentration (%): 32 (02/05/20 0704)    Lines/Drains/Airways     Peripheral Intravenous Line                 Peripheral IV - Single Lumen 02/04/20 1548 20 G Left Antecubital less than 1 day                Significant Labs:    CBC/Anemia Profile:  Recent Labs   Lab 02/04/20  1547 02/05/20  0524   WBC 11.49 6.43   HGB 14.6 13.8   HCT 46.6 45.1   * 323   MCV 90 91   RDW 14.9* 15.2*        Chemistries:  Recent Labs   Lab 02/04/20  1547 02/05/20  0524    139   K 4.8 4.6    106   CO2 23 23   BUN 20 20   CREATININE 1.1 1.0   CALCIUM 9.6 9.5   ALBUMIN 3.1* 2.9*   PROT 7.8 7.6   BILITOT 0.5 0.4   ALKPHOS 139* 123   ALT 30 27   AST 53* 43*   MG  --  2.0   PHOS  --  4.5     Bilirubin:   Recent Labs   Lab 02/04/20  1547 02/05/20  0524   BILITOT 0.5 0.4     Coagulation:   Recent Labs   Lab 02/04/20  1547   INR 1.1    APTT 27.6     Troponin:   Recent Labs   Lab 02/04/20  1547   TROPONINI <0.006     Urine Studies:   Recent Labs   Lab 02/04/20  1600   COLORU Yellow   APPEARANCEUA Clear   PHUR 6.0   SPECGRAV 1.015   PROTEINUA Negative   GLUCUA Negative   KETONESU Negative   BILIRUBINUA Negative   OCCULTUA Negative   NITRITE Negative   UROBILINOGEN 1.0   LEUKOCYTESUR Negative       Significant Imaging:     CTA CHEST NON CORONARY: 2/4/20:   Cardiac size is enlarged with evidence of coronary artery and aortic vascular calcification.    The pulmonary arteries reveal normal enhancement with no definitive filling defects or evidence of acute pulmonary artery embolism.    There are enlarged mediastinal lymph nodes.  Left AP window lymph node 2.2 cm in size.  Right hilar lymph node 2.6 cm in size.  Subcarinal lymph node 2.5 cm in size.  Right infrahilar soft tissue 3.3 x 3.0 cm in size with associated postobstructive collapse involving the anterior segments of the right lower lobe.    Note that prior chest x-ray reveal prominence of the hilar regions.  Considerations include lymphadenopathy secondary to sarcoidosis.  Neoplasm is also consideration and follow-up suggested.    No acute infiltrate is seen at this time.    In the upper abdomen the liver appears enlarged.    No pleural effusion.    No pneumothorax.

## 2020-02-05 NOTE — SUBJECTIVE & OBJECTIVE
"Past Medical History:   Diagnosis Date    Arthritis     Deaf     Diabetes mellitus     Hypertension     Lung disease     copd       Past Surgical History:   Procedure Laterality Date    APPENDECTOMY      CATARACT EXTRACTION      MIDDLE EAR SURGERY         Review of patient's allergies indicates:   Allergen Reactions    Pravastatin Other (See Comments)     Burning/flushing    Allopurinol analogues Other (See Comments)     "makes me sick and feel crazy"       No current facility-administered medications on file prior to encounter.      Current Outpatient Medications on File Prior to Encounter   Medication Sig    allopurinol (ZYLOPRIM) 100 MG tablet TAKE 1 TABLET BY MOUTH ONCE DAILY    colchicine (COLCRYS) 0.6 mg tablet Take 1 tablet (0.6 mg total) by mouth 2 (two) times daily. For acute gout flareups; ok for alt capsule if chepaer    furosemide (LASIX) 20 MG tablet TAKE 1 TABLET BY MOUTH ONCE DAILY    lisinopril (PRINIVIL,ZESTRIL) 40 MG tablet Take 1 tablet (40 mg total) by mouth once daily.    multivitamin (THERAGRAN) per tablet Take 1 tablet by mouth once daily.     Family History     Problem Relation (Age of Onset)    ALS Father    Colon cancer Mother    Retinal detachment Son        Tobacco Use    Smoking status: Former Smoker     Packs/day: 0.50     Years: 48.00     Pack years: 24.00     Types: Cigarettes     Start date:      Last attempt to quit:      Years since quittin.1    Smokeless tobacco: Never Used   Substance and Sexual Activity    Alcohol use: No     Frequency: Never     Drinks per session: Patient refused     Binge frequency: Patient refused    Drug use: No    Sexual activity: Never     Review of Systems   Constitutional: Positive for fatigue. Negative for chills, diaphoresis and fever.   HENT: Negative.  Negative for congestion, nosebleeds and sinus pressure.    Eyes: Negative.  Negative for visual disturbance.   Respiratory: Positive for cough (blood), shortness of " breath and wheezing. Negative for chest tightness.    Cardiovascular: Negative.  Negative for chest pain, palpitations and leg swelling.   Gastrointestinal: Positive for abdominal pain (generalized). Negative for diarrhea, nausea and vomiting.   Endocrine: Negative.  Negative for polyuria.   Genitourinary: Negative.  Negative for dysuria, flank pain, frequency and urgency.   Musculoskeletal: Negative.  Negative for back pain, joint swelling and neck stiffness.   Skin: Negative.  Negative for color change, pallor and rash.   Allergic/Immunologic: Negative.  Negative for immunocompromised state.   Neurological: Negative.  Negative for dizziness, syncope, speech difficulty, numbness and headaches.   Hematological: Negative.  Negative for adenopathy. Does not bruise/bleed easily.   Psychiatric/Behavioral: Negative.  Negative for confusion, decreased concentration and hallucinations. The patient is not nervous/anxious.    All other systems reviewed and are negative.    Objective:     Vital Signs (Most Recent):  Temp: 97.4 °F (36.3 °C) (02/04/20 2030)  Pulse: 88 (02/04/20 2030)  Resp: 20 (02/04/20 2030)  BP: (!) 197/86 (02/04/20 2030)  SpO2: (!) 93 % (02/04/20 2030) Vital Signs (24h Range):  Temp:  [97.4 °F (36.3 °C)-98.2 °F (36.8 °C)] 97.4 °F (36.3 °C)  Pulse:  [75-89] 88  Resp:  [18-21] 20  SpO2:  [86 %-96 %] 93 %  BP: (136-197)/(71-96) 197/86     Weight: 93.1 kg (205 lb 4 oz)  Body mass index is 40.08 kg/m².    Physical Exam   Constitutional: She is oriented to person, place, and time. No distress.   Appears comfortable, in no respiratory distress.  Currently on oxygen nasal cannula.  Multiple family members at the bedside.   HENT:   Head: Normocephalic and atraumatic.   Eyes: Conjunctivae and EOM are normal. No scleral icterus.   Neck: Normal range of motion. Neck supple. No thyromegaly present.   Cardiovascular: Normal rate, regular rhythm, normal heart sounds and intact distal pulses.   No murmur  heard.  Pulmonary/Chest: Effort normal. No respiratory distress. She has wheezes. She has no rales. She exhibits no tenderness.   Abdominal: Soft. She exhibits no distension. There is tenderness (RUQ).   Musculoskeletal: Normal range of motion. She exhibits no edema or tenderness.   Neurological: She is alert and oriented to person, place, and time. No cranial nerve deficit. She exhibits normal muscle tone. Coordination normal.   Skin: Skin is warm and dry. She is not diaphoretic. No erythema.   Psychiatric: She has a normal mood and affect. Her behavior is normal. Thought content normal.   Nursing note and vitals reviewed.        CRANIAL NERVES     CN III, IV, VI   Extraocular motions are normal.        Significant Labs:   Bilirubin:   Recent Labs   Lab 02/04/20  1547   BILITOT 0.5     CBC:   Recent Labs   Lab 02/04/20  1547   WBC 11.49   HGB 14.6   HCT 46.6   *     CMP:   Recent Labs   Lab 02/04/20  1547      K 4.8      CO2 23   GLU 84   BUN 20   CREATININE 1.1   CALCIUM 9.6   PROT 7.8   ALBUMIN 3.1*   BILITOT 0.5   ALKPHOS 139*   AST 53*   ALT 30   ANIONGAP 11   EGFRNONAA 47*     Cardiac Markers:   Recent Labs   Lab 02/04/20  1547   BNP 64     Lactic Acid: No results for input(s): LACTATE in the last 48 hours.  Lipase: No results for input(s): LIPASE in the last 48 hours.  Urine Studies:   Recent Labs   Lab 02/04/20  1600   COLORU Yellow   APPEARANCEUA Clear   PHUR 6.0   SPECGRAV 1.015   PROTEINUA Negative   GLUCUA Negative   KETONESU Negative   BILIRUBINUA Negative   OCCULTUA Negative   NITRITE Negative   UROBILINOGEN 1.0   LEUKOCYTESUR Negative     All pertinent labs within the past 24 hours have been reviewed.    Significant Imaging: I have reviewed and interpreted all pertinent imaging results/findings within the past 24 hours.     Imaging Results          CT Abdomen Pelvis With Contrast (Final result)  Result time 02/04/20 18:32:55   Procedure changed from CT Abdomen Pelvis  Without  Contrast     Final result by David Champion MD (02/04/20 18:32:55)                 Impression:      Hepatomegaly with multiple low-density hypoenhancing lesions consistent with hepatic metastatic disease.      Electronically signed by: David Champion MD  Date:    02/04/2020  Time:    18:32             Narrative:    EXAMINATION:  CT ABDOMEN PELVIS WITH CONTRAST    CLINICAL HISTORY:  Right upper quadrant pain.  Cholecystitis.    TECHNIQUE:  Standard contrast enhanced CT performed with 100cc's Omnipaque 350 with reformats.  All CT scans at this facility are performed  using dose modulation techniques as appropriate to performed exam including the following:  automated exposure control; adjustment of mA and/or kV according to the patients size (this includes techniques or standardized protocols for targeted exams where dose is matched to indication/reason for exam: i.e. extremities or head);  iterative reconstruction technique.    COMPARISON:  None    FINDINGS:  The liver is abnormal.  The liver is enlarged with multiple low-attenuation lesions consistent with hepatic metastatic disease.  The gallbladder is distended.    The spleen is nonenlarged.    The adrenal glands appear normal.    Small renal cysts are present.    Pancreas is grossly unremarkable at this time.    Several aortocaval/mesenteric lymph nodes are present.  One measures 1.9 cm in size.    The bowel loops are nondilated.    There is evidence of diverticulosis of the sigmoid colon and of the left colon.    Within the pelvis the uterus is present.    Lumbar degenerative disc disease.                               CTA Chest Non-Coronary (PE Study) (Final result)  Result time 02/04/20 18:29:33    Final result by David Champion MD (02/04/20 18:29:33)                 Impression:      No evidence of acute pulmonary artery embolism.    Cardiomegaly with aortic atherosclerosis.    Mediastinal lymphadenopathy with right hilar soft tissue  mass/lymphadenopathy.  Associated postobstructive collapse right anterior segment lower lobe.  Considerations include sarcoidosis versus neoplasm.  Follow-up suggested.    All CT scans at this facility use dose modulation, iterative reconstruction and/or weight based dosing when appropriate to reduce radiation dose to as low as reasonably achievable.      Electronically signed by: David Champion MD  Date:    02/04/2020  Time:    18:29             Narrative:    EXAMINATION:  CTA CHEST NON CORONARY    CLINICAL HISTORY:  Shortness of breath.  Hemoptysis;    TECHNIQUE:  Standard CTA of the chest performed with 100 ml Omnipaque 350  utilizing 3-D MIP reformations.    COMPARISON:  Chest x-ray 12/13/2017.    FINDINGS:  Cardiac size is enlarged with evidence of coronary artery and aortic vascular calcification.    The pulmonary arteries reveal normal enhancement with no definitive filling defects or evidence of acute pulmonary artery embolism.    There are enlarged mediastinal lymph nodes.  Left AP window lymph node 2.2 cm in size.  Right hilar lymph node 2.6 cm in size.  Subcarinal lymph node 2.5 cm in size.  Right infrahilar soft tissue 3.3 x 3.0 cm in size with associated postobstructive collapse involving the anterior segments of the right lower lobe.    Note that prior chest x-ray reveal prominence of the hilar regions.  Considerations include lymphadenopathy secondary to sarcoidosis.  Neoplasm is also consideration and follow-up suggested.    No acute infiltrate is seen at this time.    In the upper abdomen the liver appears enlarged.    No pleural effusion.    No pneumothorax.                               US Abdomen Limited (Final result)  Result time 02/04/20 17:59:14    Final result by David Champion MD (02/04/20 17:59:14)                 Impression:      Multiple liver lesions suspicious for hepatic metastatic disease.  Follow-up CT of the abdomen and pelvis with contrast recommended.      Electronically  signed by: David Champion MD  Date:    02/04/2020  Time:    17:59             Narrative:    EXAMINATION:  US ABDOMEN LIMITED    CLINICAL HISTORY:  Right upper quadrant pain    COMPARISON:  None    FINDINGS:  The liver is grossly abnormal.  There are multiple focal lesions most compatible with hepatic metastatic disease.  The largest is up to 7.2 cm in size.  The liver is overall enlarged as well.  The gallbladder reveals mild wall thickening with minimal sludge.  No stones.  The common bile duct is 4.3 mm.    The portal vein is grossly normal.  The pancreas is normal.  The right kidney is normal at 10.6 cm.    No ascites.  The aorta and IVC are normal.  The adrenals are not included.                               X-Ray Chest AP Portable (Final result)  Result time 02/04/20 16:03:29    Final result by AJAY Castaneda Sr., MD (02/04/20 16:03:29)                 Impression:      1. There is no focal pulmonary infiltrate visualized.  2. The size of the heart is prominent.  This may be secondary to magnification.  .      Electronically signed by: Harris Castaneda MD  Date:    02/04/2020  Time:    16:03             Narrative:    EXAMINATION:  XR CHEST AP PORTABLE    CLINICAL HISTORY:  Dyspnea, unspecified    COMPARISON:  12/13/2017    FINDINGS:  The size of the heart is prominent.  There is no focal pulmonary infiltrate visualized.  There is no pneumothorax.  The costophrenic angles are sharp.                                I have independently reviewed and interpreted the EKG.     I have independently reviewed all pertinent labs within the past 24 hours.    I have independently reviewed, visualized and interpreted all pertinent imaging results within the past 24 hours and discussed the findings with the ED physician, Dr. REYES

## 2020-02-05 NOTE — ASSESSMENT & PLAN NOTE
Supplement oxygenation. Keep SAO2 >= 92%.   Consider BIPAP 10/5 QHS and PRN.   Bronchodilators per orders.  AZITHROMYCIN 500 mg PO DAILY X 10 days.   Systemic steroids per orders.

## 2020-02-06 LAB
ALBUMIN SERPL BCP-MCNC: 3.1 G/DL (ref 3.5–5.2)
ALP SERPL-CCNC: 108 U/L (ref 55–135)
ALT SERPL W/O P-5'-P-CCNC: 25 U/L (ref 10–44)
ANION GAP SERPL CALC-SCNC: 10 MMOL/L (ref 8–16)
AST SERPL-CCNC: 33 U/L (ref 10–40)
BILIRUB SERPL-MCNC: 0.4 MG/DL (ref 0.1–1)
BUN SERPL-MCNC: 33 MG/DL (ref 8–23)
CALCIUM SERPL-MCNC: 10 MG/DL (ref 8.7–10.5)
CHLORIDE SERPL-SCNC: 106 MMOL/L (ref 95–110)
CO2 SERPL-SCNC: 23 MMOL/L (ref 23–29)
CREAT SERPL-MCNC: 1 MG/DL (ref 0.5–1.4)
EST. GFR  (AFRICAN AMERICAN): >60 ML/MIN/1.73 M^2
EST. GFR  (NON AFRICAN AMERICAN): 53 ML/MIN/1.73 M^2
GLUCOSE SERPL-MCNC: 146 MG/DL (ref 70–110)
POTASSIUM SERPL-SCNC: 4.6 MMOL/L (ref 3.5–5.1)
PROT SERPL-MCNC: 7.6 G/DL (ref 6–8.4)
SODIUM SERPL-SCNC: 139 MMOL/L (ref 136–145)

## 2020-02-06 PROCEDURE — 88307 TISSUE EXAM BY PATHOLOGIST: CPT | Mod: 26,,, | Performed by: PATHOLOGY

## 2020-02-06 PROCEDURE — 27000646 HC AEROBIKA DEVICE

## 2020-02-06 PROCEDURE — 36415 COLL VENOUS BLD VENIPUNCTURE: CPT

## 2020-02-06 PROCEDURE — 25000003 PHARM REV CODE 250: Performed by: INTERNAL MEDICINE

## 2020-02-06 PROCEDURE — 99900035 HC TECH TIME PER 15 MIN (STAT)

## 2020-02-06 PROCEDURE — 94761 N-INVAS EAR/PLS OXIMETRY MLT: CPT

## 2020-02-06 PROCEDURE — 88341 PR IHC OR ICC EACH ADD'L SINGLE ANTIBODY  STAINPR: ICD-10-PCS | Mod: 26,,, | Performed by: PATHOLOGY

## 2020-02-06 PROCEDURE — 21400001 HC TELEMETRY ROOM

## 2020-02-06 PROCEDURE — 80053 COMPREHEN METABOLIC PANEL: CPT

## 2020-02-06 PROCEDURE — 88341 IMHCHEM/IMCYTCHM EA ADD ANTB: CPT | Mod: 26,,, | Performed by: PATHOLOGY

## 2020-02-06 PROCEDURE — 63600175 PHARM REV CODE 636 W HCPCS: Performed by: INTERNAL MEDICINE

## 2020-02-06 PROCEDURE — 99232 PR SUBSEQUENT HOSPITAL CARE,LEVL II: ICD-10-PCS | Mod: ,,, | Performed by: INTERNAL MEDICINE

## 2020-02-06 PROCEDURE — 27000221 HC OXYGEN, UP TO 24 HOURS

## 2020-02-06 PROCEDURE — 99232 SBSQ HOSP IP/OBS MODERATE 35: CPT | Mod: ,,, | Performed by: INTERNAL MEDICINE

## 2020-02-06 PROCEDURE — 88341 IMHCHEM/IMCYTCHM EA ADD ANTB: CPT | Mod: 59 | Performed by: PATHOLOGY

## 2020-02-06 PROCEDURE — 63600175 PHARM REV CODE 636 W HCPCS: Performed by: EMERGENCY MEDICINE

## 2020-02-06 PROCEDURE — 99233 SBSQ HOSP IP/OBS HIGH 50: CPT | Mod: ,,, | Performed by: INTERNAL MEDICINE

## 2020-02-06 PROCEDURE — 94640 AIRWAY INHALATION TREATMENT: CPT

## 2020-02-06 PROCEDURE — 88307 PR  SURG PATH,LEVEL V: ICD-10-PCS | Mod: 26,,, | Performed by: PATHOLOGY

## 2020-02-06 PROCEDURE — 94664 DEMO&/EVAL PT USE INHALER: CPT

## 2020-02-06 PROCEDURE — 25000242 PHARM REV CODE 250 ALT 637 W/ HCPCS: Performed by: INTERNAL MEDICINE

## 2020-02-06 PROCEDURE — 25000003 PHARM REV CODE 250: Performed by: EMERGENCY MEDICINE

## 2020-02-06 PROCEDURE — 94799 UNLISTED PULMONARY SVC/PX: CPT

## 2020-02-06 PROCEDURE — 88342 IMHCHEM/IMCYTCHM 1ST ANTB: CPT | Performed by: PATHOLOGY

## 2020-02-06 PROCEDURE — 88307 TISSUE EXAM BY PATHOLOGIST: CPT | Performed by: PATHOLOGY

## 2020-02-06 PROCEDURE — 99233 PR SUBSEQUENT HOSPITAL CARE,LEVL III: ICD-10-PCS | Mod: ,,, | Performed by: INTERNAL MEDICINE

## 2020-02-06 PROCEDURE — 88342 IMHCHEM/IMCYTCHM 1ST ANTB: CPT | Mod: 26,,, | Performed by: PATHOLOGY

## 2020-02-06 PROCEDURE — 63600175 PHARM REV CODE 636 W HCPCS: Performed by: RADIOLOGY

## 2020-02-06 PROCEDURE — 88342 CHG IMMUNOCYTOCHEMISTRY: ICD-10-PCS | Mod: 26,,, | Performed by: PATHOLOGY

## 2020-02-06 RX ORDER — FENTANYL CITRATE 50 UG/ML
INJECTION, SOLUTION INTRAMUSCULAR; INTRAVENOUS CODE/TRAUMA/SEDATION MEDICATION
Status: COMPLETED | OUTPATIENT
Start: 2020-02-06 | End: 2020-02-06

## 2020-02-06 RX ORDER — MIDAZOLAM HYDROCHLORIDE 1 MG/ML
INJECTION INTRAMUSCULAR; INTRAVENOUS CODE/TRAUMA/SEDATION MEDICATION
Status: COMPLETED | OUTPATIENT
Start: 2020-02-06 | End: 2020-02-06

## 2020-02-06 RX ADMIN — LISINOPRIL 40 MG: 20 TABLET ORAL at 08:02

## 2020-02-06 RX ADMIN — FOLIC ACID: 5 INJECTION, SOLUTION INTRAMUSCULAR; INTRAVENOUS; SUBCUTANEOUS at 05:02

## 2020-02-06 RX ADMIN — DIPHENHYDRAMINE HYDROCHLORIDE 25 MG: 25 CAPSULE ORAL at 10:02

## 2020-02-06 RX ADMIN — AZITHROMYCIN MONOHYDRATE 500 MG: 250 TABLET ORAL at 08:02

## 2020-02-06 RX ADMIN — FENTANYL CITRATE 25 MCG: 0.05 INJECTION, SOLUTION INTRAMUSCULAR; INTRAVENOUS at 12:02

## 2020-02-06 RX ADMIN — IPRATROPIUM BROMIDE AND ALBUTEROL SULFATE 3 ML: .5; 3 SOLUTION RESPIRATORY (INHALATION) at 07:02

## 2020-02-06 RX ADMIN — METHYLPREDNISOLONE SODIUM SUCCINATE 80 MG: 40 INJECTION, POWDER, FOR SOLUTION INTRAMUSCULAR; INTRAVENOUS at 06:02

## 2020-02-06 RX ADMIN — SODIUM CHLORIDE 30 MG/ML INHALATION SOLUTION: 30 SOLUTION INHALANT at 07:02

## 2020-02-06 RX ADMIN — IPRATROPIUM BROMIDE AND ALBUTEROL SULFATE 3 ML: .5; 3 SOLUTION RESPIRATORY (INHALATION) at 01:02

## 2020-02-06 RX ADMIN — LABETALOL HYDROCHLORIDE 200 MG: 200 TABLET, FILM COATED ORAL at 10:02

## 2020-02-06 RX ADMIN — SODIUM CHLORIDE 30 MG/ML INHALATION SOLUTION 4 ML: 30 SOLUTION INHALANT at 07:02

## 2020-02-06 RX ADMIN — METHYLPREDNISOLONE SODIUM SUCCINATE 80 MG: 40 INJECTION, POWDER, FOR SOLUTION INTRAMUSCULAR; INTRAVENOUS at 02:02

## 2020-02-06 RX ADMIN — MIDAZOLAM HYDROCHLORIDE 0.5 MG: 1 INJECTION, SOLUTION INTRAMUSCULAR; INTRAVENOUS at 12:02

## 2020-02-06 RX ADMIN — LABETALOL HYDROCHLORIDE 200 MG: 200 TABLET, FILM COATED ORAL at 08:02

## 2020-02-06 NOTE — ASSESSMENT & PLAN NOTE
Right infrahilar soft tissue 3.3 x 3.0 cm in size with associated postobstructive collapse involving the anterior segments of the right lower lobe.  Etiology unclear. Infectious,Inflammatory vrs Malignancy.      Recommend completing a 14 day course of antibiotics.   Followed by  O/P PET scan.    Bronchoscopy will be considered after PET scan.

## 2020-02-06 NOTE — SUBJECTIVE & OBJECTIVE
Interval History: She has not had any episode of hemoptysis in the past 5 days. Has abnormal CT Chest and Abdomen with R hilar mass and Post obstuctive pneumonia and hypodense Liver lesions. Pt is feeling fine, on IV Abx. Cough, SOB improved. Appreciate Dr. Hobson and Jenifer.     Review of Systems   Constitutional: Positive for activity change, appetite change and fatigue. Negative for chills, diaphoresis, fever and unexpected weight change.   HENT: Negative for congestion, mouth sores, nosebleeds, sore throat, trouble swallowing and voice change.    Eyes: Negative for photophobia and visual disturbance.   Respiratory: Positive for cough (1-2 episodes of hemoptysis ), shortness of breath and wheezing. Negative for chest tightness.    Cardiovascular: Negative for chest pain and leg swelling.   Gastrointestinal: Positive for abdominal pain. Negative for abdominal distention, anal bleeding, blood in stool, constipation, diarrhea, nausea, rectal pain and vomiting.   Genitourinary: Negative for difficulty urinating, dysuria and hematuria.   Musculoskeletal: Negative for arthralgias, back pain and myalgias.   Skin: Negative for pallor, rash and wound.   Neurological: Positive for weakness. Negative for dizziness, syncope and headaches.   Hematological: Negative for adenopathy. Does not bruise/bleed easily.   Psychiatric/Behavioral: The patient is nervous/anxious.      Objective:     Vital Signs (Most Recent):  Temp: 97.4 °F (36.3 °C) (02/05/20 1603)  Pulse: 82 (02/05/20 1603)  Resp: 18 (02/05/20 1603)  BP: (!) 117/59 (02/05/20 1603)  SpO2: (!) 90 % (02/05/20 1603) Vital Signs (24h Range):  Temp:  [97.4 °F (36.3 °C)-98.1 °F (36.7 °C)] 97.4 °F (36.3 °C)  Pulse:  [67-88] 82  Resp:  [18-20] 18  SpO2:  [88 %-96 %] 90 %  BP: (117-197)/(59-86) 117/59     Weight: 91.4 kg (201 lb 8 oz)  Body mass index is 39.35 kg/m².    Intake/Output Summary (Last 24 hours) at 2/5/2020 1827  Last data filed at 2/5/2020 0400  Gross per 24 hour    Intake 480 ml   Output 700 ml   Net -220 ml      Physical Exam   Constitutional: She is oriented to person, place, and time. She appears well-developed and well-nourished. She is cooperative. She appears ill. Nasal cannula in place.   HENT:   Head: Normocephalic.   Right Ear: External ear normal. Decreased hearing (hearing aid) is noted.   Left Ear: External ear normal. Decreased hearing is noted.   Nose: Nose normal.   Mouth/Throat: Oropharynx is clear and moist.   Eyes: Conjunctivae, EOM and lids are normal. Right eye exhibits no discharge. Left eye exhibits no discharge. No scleral icterus.   Neck: Normal range of motion. No thyroid mass present.   Cardiovascular: Normal rate, regular rhythm and normal heart sounds.   Pulmonary/Chest: Effort normal. No respiratory distress. She has decreased breath sounds. She has wheezes. She has no rhonchi. She has no rales.   Abdominal: Soft. She exhibits no distension. Bowel sounds are decreased. There is tenderness.   Genitourinary:   Genitourinary Comments: deferred   Musculoskeletal: Normal range of motion. She exhibits no edema.   Lymphadenopathy:        Head (right side): No submandibular, no preauricular and no posterior auricular adenopathy present.        Head (left side): No submandibular, no preauricular and no posterior auricular adenopathy present.        Right cervical: No superficial cervical adenopathy present.       Left cervical: No superficial cervical adenopathy present.   Neurological: She is alert and oriented to person, place, and time.   Skin: Skin is warm and dry.   Psychiatric: She has a normal mood and affect. Her speech is normal and behavior is normal. Thought content normal.   Nursing note and vitals reviewed.      Significant Labs:   ABGs:   Blood Culture:   BMP:   Recent Labs   Lab 02/05/20  0524   *      K 4.6      CO2 23   BUN 20   CREATININE 1.0   CALCIUM 9.5   MG 2.0     CBC:   Recent Labs   Lab 02/04/20  1547  02/05/20  0524   WBC 11.49 6.43   HGB 14.6 13.8   HCT 46.6 45.1   * 323     CMP:   Recent Labs   Lab 02/04/20  1547 02/05/20 0524    139   K 4.8 4.6    106   CO2 23 23   GLU 84 151*   BUN 20 20   CREATININE 1.1 1.0   CALCIUM 9.6 9.5   PROT 7.8 7.6   ALBUMIN 3.1* 2.9*   BILITOT 0.5 0.4   ALKPHOS 139* 123   AST 53* 43*   ALT 30 27   ANIONGAP 11 10   EGFRNONAA 47* 53*     Lactic Acid:   Magnesium:   Recent Labs   Lab 02/05/20 0524   MG 2.0     Respiratory Culture:   Troponin:   Recent Labs   Lab 02/04/20  1547   TROPONINI <0.006     All pertinent labs within the past 24 hours have been reviewed.    Significant Imaging: I have reviewed all pertinent imaging results/findings within the past 24 hours.

## 2020-02-06 NOTE — SUBJECTIVE & OBJECTIVE
Interval History: Seen and examined at bedside. Hospital chart reviewed. Interval developments noted. Liver metastatic disease suspected. She reports that she has moderately improved. Denies hemoptysis.     Review of Systems   Constitutional: Positive for activity change, appetite change and fatigue. Negative for chills, diaphoresis, fever and unexpected weight change.   HENT: Negative for congestion, mouth sores, nosebleeds, sore throat, trouble swallowing and voice change.    Eyes: Negative for photophobia and visual disturbance.   Respiratory: Positive for cough (1-2 episodes of hemoptysis ), shortness of breath and wheezing. Negative for chest tightness.    Cardiovascular: Negative for chest pain and leg swelling.   Gastrointestinal: Positive for abdominal pain. Negative for abdominal distention, anal bleeding, blood in stool, constipation, diarrhea, nausea, rectal pain and vomiting.   Genitourinary: Negative for difficulty urinating, dysuria and hematuria.   Musculoskeletal: Negative for arthralgias, back pain and myalgias.   Skin: Negative for pallor, rash and wound.   Neurological: Positive for weakness. Negative for dizziness, syncope and headaches.   Hematological: Negative for adenopathy. Does not bruise/bleed easily.   Psychiatric/Behavioral: The patient is nervous/anxious.          Medications:  Continuous Infusions:  Scheduled Meds:   albuterol-ipratropium  3 mL Nebulization Q6H    azithromycin  500 mg Oral Daily    labetalol  200 mg Oral Q12H    lisinopril  40 mg Oral Daily    methylPREDNISolone sodium succinate  80 mg Intravenous Q8H    sodium chloride 3%  4 mL Nebulization Q12H     PRN Meds:acetaminophen, aluminum-magnesium hydroxide-simethicone, diphenhydrAMINE, guaifenesin 100 mg/5 ml, hydrALAZINE, ondansetron       Objective:     Vital Signs (Most Recent):  Temp: 97.7 °F (36.5 °C) (02/06/20 0744)  Pulse: 77 (02/06/20 0744)  Resp: 16 (02/06/20 0744)  BP: 134/62 (02/06/20 0744)  SpO2: (!) 91 %  (02/06/20 0744) Vital Signs (24h Range):  Temp:  [97.4 °F (36.3 °C)-98.9 °F (37.2 °C)] 97.7 °F (36.5 °C)  Pulse:  [71-91] 77  Resp:  [16-24] 16  SpO2:  [88 %-92 %] 91 %  BP: (117-135)/(58-76) 134/62     Weight: 94.1 kg (207 lb 7.3 oz)  Body mass index is 40.52 kg/m².      Intake/Output Summary (Last 24 hours) at 2/6/2020 1003  Last data filed at 2/6/2020 0600  Gross per 24 hour   Intake 506 ml   Output 1200 ml   Net -694 ml       Physical Exam   Constitutional: She is oriented to person, place, and time. No distress.   HENT:   Head: Normocephalic and atraumatic.   Eyes: Conjunctivae and EOM are normal. No scleral icterus.   Neck: Normal range of motion. Neck supple. No thyromegaly present.   Cardiovascular: Normal rate, regular rhythm, normal heart sounds and intact distal pulses.   No murmur heard.  Pulmonary/Chest: Effort normal. No respiratory distress. She has wheezes. She has no rales. She exhibits no tenderness.   Abdominal: Soft. She exhibits no distension. There is tenderness (RUQ).   Musculoskeletal: Normal range of motion. She exhibits no edema or tenderness.   Neurological: She is alert and oriented to person, place, and time. No cranial nerve deficit. She exhibits normal muscle tone. Coordination normal.   Skin: Skin is warm and dry. She is not diaphoretic. No erythema.   Psychiatric: She has a normal mood and affect. Her behavior is normal. Thought content normal.   Nursing note and vitals reviewed.      Vents:  Oxygen Concentration (%): 40 (02/06/20 0720)    Lines/Drains/Airways     Peripheral Intravenous Line                 Peripheral IV - Single Lumen 02/04/20 1548 20 G Left Antecubital 1 day                Significant Labs:    CBC/Anemia Profile:  Recent Labs   Lab 02/04/20  1547 02/05/20  0524   WBC 11.49 6.43   HGB 14.6 13.8   HCT 46.6 45.1   * 323   MCV 90 91   RDW 14.9* 15.2*        Chemistries:  Recent Labs   Lab 02/04/20  1547 02/05/20  0524    139   K 4.8 4.6    106   CO2  23 23   BUN 20 20   CREATININE 1.1 1.0   CALCIUM 9.6 9.5   ALBUMIN 3.1* 2.9*   PROT 7.8 7.6   BILITOT 0.5 0.4   ALKPHOS 139* 123   ALT 30 27   AST 53* 43*   MG  --  2.0   PHOS  --  4.5           Significant Imaging:      CT Abdomen Pelvis With Contrast : 02/04/20:      The liver is abnormal.  The liver is enlarged with multiple low-attenuation lesions consistent with hepatic metastatic disease.  The gallbladder is distended.    The spleen is nonenlarged.    The adrenal glands appear normal.    Small renal cysts are present.    Pancreas is grossly unremarkable at this time.    Several aortocaval/mesenteric lymph nodes are present.  One measures 1.9 cm in size.    The bowel loops are nondilated.    There is evidence of diverticulosis of the sigmoid colon and of the left colon.    Within the pelvis the uterus is present.    Lumbar degenerative disc disease.    CTA Chest Non-Coronary (PE Study) : 02/04/20:       No evidence of acute pulmonary artery embolism.    Cardiomegaly with aortic atherosclerosis.    Mediastinal lymphadenopathy with right hilar soft tissue mass/lymphadenopathy.  Associated postobstructive collapse right anterior segment lower lobe.  Considerations include sarcoidosis versus neoplasm.  Follow-up suggested.

## 2020-02-06 NOTE — INTERVAL H&P NOTE
The patient has been examined and the H&P has been reviewed:    I concur with the findings and no changes have occurred since H&P was written.        Active Hospital Problems    Diagnosis  POA    *Shortness of breath [R06.02]  Yes    Atelectasis of right lung [J98.11]  Yes    Lung nodules [R91.8]  Yes    Mediastinal lymphadenopathy [R59.0]  Yes    Right upper quadrant pain [R10.11]  Yes    Cough with hemoptysis [R04.2]  Yes    Malignancy [C80.1]  Yes    Essential hypertension [I10]  Yes      Resolved Hospital Problems   No resolved problems to display.

## 2020-02-06 NOTE — PLAN OF CARE
Pt tolerates nebs and 02 well, did not tolerate Bipap, NC in use, family at [facility].

## 2020-02-06 NOTE — PLAN OF CARE
Problem: Adult Inpatient Plan of Care  Goal: Plan of Care Review  Outcome: Ongoing, Progressing   Pt in bed AAOx4.   Plan of Care reviewed, Verbalized understanding.  Patient remained free from falls, fall precautions in place.   Pt is NSR on monitor. VSS.   PIV CDI.   Call bell and personal belongings within reach.   Hourly rounding complete. Reminded to call for assistance.   No complaints at this time.   Will continue to monitor.

## 2020-02-06 NOTE — ASSESSMENT & PLAN NOTE
Former smoker quit about 15 years ago.  At least two episodes of hemoptysis over the last two days.  Consult Pulmonary.  CT chest/abdomen concerning for metastatic disease (new)    Await further evaluation

## 2020-02-06 NOTE — ASSESSMENT & PLAN NOTE
--Patient reports noting 2 episodes in the past of hemoptysis. No hemoptysis at present. Monitor

## 2020-02-06 NOTE — ASSESSMENT & PLAN NOTE
Complaining of RUQ pain  Abdominal ultrasound reveals multiple liver lesions suspicious for hepatic metastatic disease.    Will review with Radiology and oncology again

## 2020-02-06 NOTE — ASSESSMENT & PLAN NOTE
No known prior history of malignancy.  CT chest/CT abdomen today reveals right hilar mass concerning for neoplasm with hypodense lesions in the liver concerning for metastases.  Consult Oncology for evaluation/recommendations.    May need Liver biopsy or a trial of abx followed by out pt PET/CT to r/o malignancy

## 2020-02-06 NOTE — ASSESSMENT & PLAN NOTE
Etiology unclear. SARCOIDOSIS suspected.     OBSERVATION:  Recommend completing a 14 day course of antibiotics.   Followed by  O/P PET scan.    Bronchoscopy with tissue sampling will be considered after PET scan.

## 2020-02-06 NOTE — PLAN OF CARE
Patient in bed resting quiet. Easily aroused. AAOX4. Denies chest pain, SOB, palpitations, dizziness, weakness, numbness, tingling. No acute distress noted. Cardiac monitoring HR 80s sinus rhythm. Turn q2. 2L oxygen in use.  Plan of care reviewed, verbalized understanding. Patient educated on frequent weight shifting to prevent pressure injuries. Safety measures in place, bed in lowest position, wheels locked, call light with in reach, non-skid socks in use, SCDs in use. Will continue to monitor.

## 2020-02-06 NOTE — PROGRESS NOTES
[facility] Hospital Medicine  Progress Note    Patient Name: Maura Singh  MRN: [id number]  Patient Class: IP- Inpatient   Admission Date: 2/4/2020  Length of Stay: 0 days  Attending Physician: Richard Brink MD  Primary Care Provider: Yessy Andrade MD        Subjective:     Principal Problem:Shortness of breath        HPI:  Ms. Singh is a 80-year-old elderly  female with PMH significant for COPD, former smoker, HTN, has been complaining of worsening shortness of breath, dyspnea on exertion for the past few weeks.  She was seen at her PCPs office earlier today and she was in mild respiratory distress with wheezing, hence sent to the ED for further evaluation.  Patient is little hard of hearing especially on her right.  Most of the information obtained from speaking to family members at the bedside.  Patient denies fever, chills, but complained of 1-2 episodes of hemoptysis yesterday and earlier today.  Denies chest pain, but complains of intermittently dry nonproductive cough.  In the ED CTA chest revealed mediastinal lymphadenopathy with right hilar soft tissue mass/lymphadenopathy.  Associated postobstructive collapse right anterior segment lower lobe.  Considerations include sarcoidosis versus neoplasm.  CT abdomen reveals hypodense lesions in the liver, concerning for metastatic disease.  Patient has no known prior history of malignancy.  Patient not on blood thinners.    Admitting diagnosis hemoptysis, CT chest/abdomen concerning for metastatic disease.    Overview/Hospital Course:  80 year old female w hx of Arthritis, Deaf, DM, HTN, and Lung disease, ex smoker, quit 15 years ago, admitted with progressive dyspnea and an intermittent non productive cough with 1-2 episodes of hemoptysis. Denies chest pain. She has not had any episode of hemoptysis in the past 5 days. Has abnormal CT Chest and Abdomen with R hilar mass and Post obstuctive pneumonia and hypodense Liver lesions.  Pt is feeling fine, on IV Abx. Cough, SOB improved. Appreciate Dr. Hobson and Jenifer.        Interval History: She has not had any episode of hemoptysis in the past 5 days. Has abnormal CT Chest and Abdomen with R hilar mass and Post obstuctive pneumonia and hypodense Liver lesions. Pt is feeling fine, on IV Abx. Cough, SOB improved. Appreciate Dr. Hobson and Jenifer.     Review of Systems   Constitutional: Positive for activity change, appetite change and fatigue. Negative for chills, diaphoresis, fever and unexpected weight change.   HENT: Negative for congestion, mouth sores, nosebleeds, sore throat, trouble swallowing and voice change.    Eyes: Negative for photophobia and visual disturbance.   Respiratory: Positive for cough (1-2 episodes of hemoptysis ), shortness of breath and wheezing. Negative for chest tightness.    Cardiovascular: Negative for chest pain and leg swelling.   Gastrointestinal: Positive for abdominal pain. Negative for abdominal distention, anal bleeding, blood in stool, constipation, diarrhea, nausea, rectal pain and vomiting.   Genitourinary: Negative for difficulty urinating, dysuria and hematuria.   Musculoskeletal: Negative for arthralgias, back pain and myalgias.   Skin: Negative for pallor, rash and wound.   Neurological: Positive for weakness. Negative for dizziness, syncope and headaches.   Hematological: Negative for adenopathy. Does not bruise/bleed easily.   Psychiatric/Behavioral: The patient is nervous/anxious.      Objective:     Vital Signs (Most Recent):  Temp: 97.4 °F (36.3 °C) (02/05/20 1603)  Pulse: 82 (02/05/20 1603)  Resp: 18 (02/05/20 1603)  BP: (!) 117/59 (02/05/20 1603)  SpO2: (!) 90 % (02/05/20 1603) Vital Signs (24h Range):  Temp:  [97.4 °F (36.3 °C)-98.1 °F (36.7 °C)] 97.4 °F (36.3 °C)  Pulse:  [67-88] 82  Resp:  [18-20] 18  SpO2:  [88 %-96 %] 90 %  BP: (117-197)/(59-86) 117/59     Weight: 91.4 kg (201 lb 8 oz)  Body mass index is 39.35 kg/m².    Intake/Output  Summary (Last 24 hours) at 2/5/2020 1827  Last data filed at 2/5/2020 0400  Gross per 24 hour   Intake 480 ml   Output 700 ml   Net -220 ml      Physical Exam   Constitutional: She is oriented to person, place, and time. She appears well-developed and well-nourished. She is cooperative. She appears ill. Nasal cannula in place.   HENT:   Head: Normocephalic.   Right Ear: External ear normal. Decreased hearing (hearing aid) is noted.   Left Ear: External ear normal. Decreased hearing is noted.   Nose: Nose normal.   Mouth/Throat: Oropharynx is clear and moist.   Eyes: Conjunctivae, EOM and lids are normal. Right eye exhibits no discharge. Left eye exhibits no discharge. No scleral icterus.   Neck: Normal range of motion. No thyroid mass present.   Cardiovascular: Normal rate, regular rhythm and normal heart sounds.   Pulmonary/Chest: Effort normal. No respiratory distress. She has decreased breath sounds. She has wheezes. She has no rhonchi. She has no rales.   Abdominal: Soft. She exhibits no distension. Bowel sounds are decreased. There is tenderness.   Genitourinary:   Genitourinary Comments: deferred   Musculoskeletal: Normal range of motion. She exhibits no edema.   Lymphadenopathy:        Head (right side): No submandibular, no preauricular and no posterior auricular adenopathy present.        Head (left side): No submandibular, no preauricular and no posterior auricular adenopathy present.        Right cervical: No superficial cervical adenopathy present.       Left cervical: No superficial cervical adenopathy present.   Neurological: She is alert and oriented to person, place, and time.   Skin: Skin is warm and dry.   Psychiatric: She has a normal mood and affect. Her speech is normal and behavior is normal. Thought content normal.   Nursing note and vitals reviewed.      Significant Labs:   ABGs:   Blood Culture:   BMP:   Recent Labs   Lab 02/05/20  0524   *      K 4.6      CO2 23   BUN 20    CREATININE 1.0   CALCIUM 9.5   MG 2.0     CBC:   Recent Labs   Lab 02/04/20  1547 02/05/20  0524   WBC 11.49 6.43   HGB 14.6 13.8   HCT 46.6 45.1   * 323     CMP:   Recent Labs   Lab 02/04/20  1547 02/05/20  0524    139   K 4.8 4.6    106   CO2 23 23   GLU 84 151*   BUN 20 20   CREATININE 1.1 1.0   CALCIUM 9.6 9.5   PROT 7.8 7.6   ALBUMIN 3.1* 2.9*   BILITOT 0.5 0.4   ALKPHOS 139* 123   AST 53* 43*   ALT 30 27   ANIONGAP 11 10   EGFRNONAA 47* 53*     Lactic Acid:   Magnesium:   Recent Labs   Lab 02/05/20  0524   MG 2.0     Respiratory Culture:   Troponin:   Recent Labs   Lab 02/04/20  1547   TROPONINI <0.006     All pertinent labs within the past 24 hours have been reviewed.    Significant Imaging: I have reviewed all pertinent imaging results/findings within the past 24 hours.      Assessment/Plan:      * Shortness of breath        Malignancy  No known prior history of malignancy.  CT chest/CT abdomen today reveals right hilar mass concerning for neoplasm with hypodense lesions in the liver concerning for metastases.  Consult Oncology for evaluation/recommendations.    May need Liver biopsy or a trial of abx followed by out pt PET/CT to r/o malignancy      Cough with hemoptysis  Former smoker quit about 15 years ago.  At least two episodes of hemoptysis over the last two days.  Consult Pulmonary.  CT chest/abdomen concerning for metastatic disease (new)    Await further evaluation       Right upper quadrant pain  Complaining of RUQ pain  Abdominal ultrasound reveals multiple liver lesions suspicious for hepatic metastatic disease.    Will review with Radiology and oncology again     Essential hypertension  Hold home dose ACEI due to cough.  Monitor BP closely and make adjustments as needed.  Hydralazine 10 mg IV every 6 hr if SBP greater than 160.        VTE Risk Mitigation (From admission, onward)         Ordered     Place sequential compression device  Until discontinued      02/04/20 2041                       Richard Brink MD  Department of [facility]   [facility]

## 2020-02-06 NOTE — ASSESSMENT & PLAN NOTE
2/6/20  ------CT chest shows---Mediastinal lymphadenopathy with right hilar soft tissue mass/lymphadenopathy.  Associated postobstructive collapse right anterior segment lower lobe.  Considerations include sarcoidosis versus neoplasm.  CT abdomen shows--Hepatomegaly with multiple low-density hypoenhancing lesions consistent with hepatic metastatic disease.  --Findings are concerning for potential metastatic disease versus inflammatory. Pulmonology following and treating patient for possible sarcoidosis with IV antibiotics and steroids. Would repeat imaging of chest following antibiotic course.  Patient having CT guided liver biopsy today per IR. Will await pathology for further recommendations. Will plan for outpatient PET  --daily Cbc, cmp  --supplemental oxygen PRN  --supportive care

## 2020-02-06 NOTE — PROGRESS NOTES
[facility] -   Hematology/Oncology  Progress Note    Patient Name: Maura Singh  Admission Date: 2/4/2020  Hospital Length of Stay: 1 days  Code Status: No Order     Subjective:     HPI:  80 y.o female seen by PCP on yesterday with c/o gradually worsening SOB. Associated symptoms include cough with hemoptysis, intermittent abdominal pain, decreased appetite, fatigue. Patient was advised to report to Emergency Department for further evaluation of shortness of breath. Symptoms are constant and moderate in severity. SOB worse with exertion. No relieving factors reported.     ED workup significant for: Abdominal US revealed: multiple liver lesions concerning for hepatic metastatic disease. CTA was negative for PE but did reveal Mediastinal lymphadenopathy with right hilar soft tissue mass/lymphadenopathy.  Associated postobstructive collapse right anterior segment lower lobe.  Considerations include sarcoidosis versus neoplasm. CT abdomen/pelvis showed: Hepatomegaly with multiple low-density hypoenhancing lesions consistent with hepatic metastatic disease.    Patient admits to not being up to date with routine preventative screenings. Has never had Colonoscopy. Reports having mammogram several years ago. Unable to recall date.     Hematology/Oncology consulted for further evaluation of abnormal findings discovered on imaging studies.     No new subjective & objective note has been filed under this hospital service since the last note was generated.    Assessment/Plan:     * Shortness of breath  --Increased oxygen requirements overnight. Discussed with hospital medicine team recommendations for repeat chest x ray. Supplemental oxygen PRN    Mediastinal lymphadenopathy  2/6/20  ------CT chest shows---Mediastinal lymphadenopathy with right hilar soft tissue mass/lymphadenopathy.  Associated postobstructive collapse right anterior segment lower lobe.  Considerations include sarcoidosis versus  neoplasm.  CT abdomen shows--Hepatomegaly with multiple low-density hypoenhancing lesions consistent with hepatic metastatic disease.  --Findings are concerning for potential metastatic disease versus inflammatory. Pulmonology following and treating patient for possible sarcoidosis with IV antibiotics and steroids. Would repeat imaging of chest following antibiotic course.  Patient having CT guided liver biopsy today per IR. Will await pathology for further recommendations. Will plan for outpatient PET  --daily Cbc, cmp  --supplemental oxygen PRN  --supportive care     Cough with hemoptysis    --Patient reports noting 2 episodes in the past of hemoptysis. No hemoptysis at present. Monitor       Thank you for your consult. I will follow-up with patient. Please contact us if you have any additional questions.     Ana Callejas NP  Hematology/Oncology  [facility] [place]

## 2020-02-06 NOTE — SUBJECTIVE & OBJECTIVE
"Interval history: Increase in oxygen requirements overnight to do oxygen desaturation. Reports intolerance to Bipap due to discomfort which caused her oxygen saturation to decrease. Supplemental oxygen increased from 3 to 5L per nasal cannula. Plans for CT guided liver biopsy today per IR.   Oncology Treatment Plan:   [No treatment plan]    Medications:  Continuous Infusions:  Scheduled Meds:   albuterol-ipratropium  3 mL Nebulization Q6H    azithromycin  500 mg Oral Daily    labetalol  200 mg Oral Q12H    lisinopril  40 mg Oral Daily    methylPREDNISolone sodium succinate  80 mg Intravenous Q8H    sodium chloride 3%  4 mL Nebulization Q12H     PRN Meds:acetaminophen, aluminum-magnesium hydroxide-simethicone, diphenhydrAMINE, fentaNYL, guaifenesin 100 mg/5 ml, hydrALAZINE, midazolam, ondansetron     Review of patient's allergies indicates:   Allergen Reactions    Pravastatin Other (See Comments)     Burning/flushing    Allopurinol analogues Other (See Comments)     "makes me sick and feel crazy"        Past Medical History:   Diagnosis Date    Arthritis     Deaf     Diabetes mellitus     Hypertension     Lung disease     copd     Past Surgical History:   Procedure Laterality Date    APPENDECTOMY      CATARACT EXTRACTION      MIDDLE EAR SURGERY       Family History     Problem Relation (Age of Onset)    ALS Father    Colon cancer Mother    Retinal detachment Son        Tobacco Use    Smoking status: Former Smoker     Packs/day: 0.50     Years: 48.00     Pack years: 24.00     Types: Cigarettes     Start date:      Last attempt to quit:      Years since quittin.1    Smokeless tobacco: Never Used   Substance and Sexual Activity    Alcohol use: No     Frequency: Never     Drinks per session: Patient refused     Binge frequency: Patient refused    Drug use: No    Sexual activity: Never       Review of Systems   Constitutional: Positive for activity change, appetite change and fatigue. " Negative for chills, diaphoresis, fever and unexpected weight change.   HENT: Negative for congestion, mouth sores, nosebleeds, sore throat, trouble swallowing and voice change.    Eyes: Negative for photophobia and visual disturbance.   Respiratory: Positive for cough (1-2 episodes of hemoptysis ) and shortness of breath. Negative for chest tightness and wheezing.    Cardiovascular: Negative for chest pain, palpitations and leg swelling.   Gastrointestinal: Positive for abdominal pain. Negative for abdominal distention, anal bleeding, blood in stool, constipation, diarrhea, nausea, rectal pain and vomiting.   Genitourinary: Negative for difficulty urinating, dysuria and hematuria.   Musculoskeletal: Negative for arthralgias, back pain and myalgias.   Skin: Negative for pallor, rash and wound.   Neurological: Positive for weakness. Negative for dizziness, syncope and headaches.   Hematological: Negative for adenopathy. Does not bruise/bleed easily.   Psychiatric/Behavioral: The patient is nervous/anxious.      Objective:     Vital Signs (Most Recent):  Temp: 97.4 °F (36.3 °C) (02/06/20 1303)  Pulse: 81 (02/06/20 1303)  Resp: (!) 25 (02/06/20 1246)  BP: (!) 159/75 (02/06/20 1303)  SpO2: (!) 91 % (02/06/20 1303) Vital Signs (24h Range):  Temp:  [97.4 °F (36.3 °C)-98.9 °F (37.2 °C)] 97.4 °F (36.3 °C)  Pulse:  [70-91] 81  Resp:  [16-25] 25  SpO2:  [88 %-92 %] 91 %  BP: (115-159)/(58-75) 159/75     Weight: 94.1 kg (207 lb 7.3 oz)  Body mass index is 40.52 kg/m².  Body surface area is 2 meters squared.      Intake/Output Summary (Last 24 hours) at 2/6/2020 1315  Last data filed at 2/6/2020 0600  Gross per 24 hour   Intake 506 ml   Output 1200 ml   Net -694 ml       Physical Exam   Constitutional: She is oriented to person, place, and time. She appears well-developed and well-nourished. She is cooperative. She appears ill. Nasal cannula in place.   HENT:   Head: Normocephalic.   Right Ear: External ear normal. Decreased  hearing (hearing aid) is noted.   Left Ear: External ear normal. Decreased hearing is noted.   Nose: Nose normal.   Mouth/Throat: Oropharynx is clear and moist.   Eyes: Conjunctivae, EOM and lids are normal. Right eye exhibits no discharge. Left eye exhibits no discharge. No scleral icterus.   Neck: Normal range of motion. No thyroid mass present.   Cardiovascular: Normal rate, regular rhythm and normal heart sounds.   Pulmonary/Chest: Effort normal. No respiratory distress. She has decreased breath sounds. She has wheezes. She has no rhonchi. She has no rales.   Abdominal: Soft. She exhibits no distension. Bowel sounds are decreased. There is tenderness.   Genitourinary:   Genitourinary Comments: deferred   Musculoskeletal: Normal range of motion. She exhibits no edema.   Lymphadenopathy:        Head (right side): No submandibular, no preauricular and no posterior auricular adenopathy present.        Head (left side): No submandibular, no preauricular and no posterior auricular adenopathy present.        Right cervical: No superficial cervical adenopathy present.       Left cervical: No superficial cervical adenopathy present.   Neurological: She is alert and oriented to person, place, and time.   Skin: Skin is warm and dry.   Psychiatric: She has a normal mood and affect. Her speech is normal and behavior is normal. Thought content normal.   Vitals reviewed.      Significant Labs:   BMP:   Recent Labs   Lab 02/04/20  1547 02/05/20 0524   GLU 84 151*    139   K 4.8 4.6    106   CO2 23 23   BUN 20 20   CREATININE 1.1 1.0   CALCIUM 9.6 9.5   MG  --  2.0   , CBC:   Recent Labs   Lab 02/04/20  1547 02/05/20 0524   WBC 11.49 6.43   HGB 14.6 13.8   HCT 46.6 45.1   * 323   , LFTs:   Recent Labs   Lab 02/04/20  1547 02/05/20 0524   ALT 30 27   AST 53* 43*   ALKPHOS 139* 123   BILITOT 0.5 0.4   PROT 7.8 7.6   ALBUMIN 3.1* 2.9*    and All pertinent labs from the last 24 hours have been  reviewed.    Diagnostic Results:  I have reviewed all pertinent imaging results/findings within the past 24 hours.

## 2020-02-06 NOTE — PROGRESS NOTES
[facility] -   Hematology/Oncology  Progress Note    Patient Name: Maura Singh  Admission Date: 2/4/2020  Hospital Length of Stay: 1 days  Code Status: No Order     Subjective:     HPI:  80 y.o female seen by PCP on yesterday with c/o gradually worsening SOB. Associated symptoms include cough with hemoptysis, intermittent abdominal pain, decreased appetite, fatigue. Patient was advised to report to Emergency Department for further evaluation of shortness of breath. Symptoms are constant and moderate in severity. SOB worse with exertion. No relieving factors reported.     ED workup significant for: Abdominal US revealed: multiple liver lesions concerning for hepatic metastatic disease. CTA was negative for PE but did reveal Mediastinal lymphadenopathy with right hilar soft tissue mass/lymphadenopathy.  Associated postobstructive collapse right anterior segment lower lobe.  Considerations include sarcoidosis versus neoplasm. CT abdomen/pelvis showed: Hepatomegaly with multiple low-density hypoenhancing lesions consistent with hepatic metastatic disease.    Patient admits to not being up to date with routine preventative screenings. Has never had Colonoscopy. Reports having mammogram several years ago. Unable to recall date.     Hematology/Oncology consulted for further evaluation of abnormal findings discovered on imaging studies.     Interval history: Increase in oxygen requirements overnight to do oxygen desaturation. Reports intolerance to Bipap due to discomfort which caused her oxygen saturation to decrease. Supplemental oxygen increased from 3 to 5L per nasal cannula. Plans for CT guided liver biopsy today per IR.   Oncology Treatment Plan:   [No treatment plan]    Medications:  Continuous Infusions:  Scheduled Meds:   albuterol-ipratropium  3 mL Nebulization Q6H    azithromycin  500 mg Oral Daily    labetalol  200 mg Oral Q12H    lisinopril  40 mg Oral Daily    methylPREDNISolone sodium  "succinate  80 mg Intravenous Q8H    sodium chloride 3%  4 mL Nebulization Q12H     PRN Meds:acetaminophen, aluminum-magnesium hydroxide-simethicone, diphenhydrAMINE, fentaNYL, guaifenesin 100 mg/5 ml, hydrALAZINE, midazolam, ondansetron     Review of patient's allergies indicates:   Allergen Reactions    Pravastatin Other (See Comments)     Burning/flushing    Allopurinol analogues Other (See Comments)     "makes me sick and feel crazy"        Past Medical History:   Diagnosis Date    Arthritis     Deaf     Diabetes mellitus     Hypertension     Lung disease     copd     Past Surgical History:   Procedure Laterality Date    APPENDECTOMY      CATARACT EXTRACTION      MIDDLE EAR SURGERY       Family History     Problem Relation (Age of Onset)    ALS Father    Colon cancer Mother    Retinal detachment Son        Tobacco Use    Smoking status: Former Smoker     Packs/day: 0.50     Years: 48.00     Pack years: 24.00     Types: Cigarettes     Start date:      Last attempt to quit:      Years since quittin.1    Smokeless tobacco: Never Used   Substance and Sexual Activity    Alcohol use: No     Frequency: Never     Drinks per session: Patient refused     Binge frequency: Patient refused    Drug use: No    Sexual activity: Never       Review of Systems   Constitutional: Positive for activity change, appetite change and fatigue. Negative for chills, diaphoresis, fever and unexpected weight change.   HENT: Negative for congestion, mouth sores, nosebleeds, sore throat, trouble swallowing and voice change.    Eyes: Negative for photophobia and visual disturbance.   Respiratory: Positive for cough (1-2 episodes of hemoptysis ) and shortness of breath. Negative for chest tightness and wheezing.    Cardiovascular: Negative for chest pain, palpitations and leg swelling.   Gastrointestinal: Positive for abdominal pain. Negative for abdominal distention, anal bleeding, blood in stool, constipation, " diarrhea, nausea, rectal pain and vomiting.   Genitourinary: Negative for difficulty urinating, dysuria and hematuria.   Musculoskeletal: Negative for arthralgias, back pain and myalgias.   Skin: Negative for pallor, rash and wound.   Neurological: Positive for weakness. Negative for dizziness, syncope and headaches.   Hematological: Negative for adenopathy. Does not bruise/bleed easily.   Psychiatric/Behavioral: The patient is nervous/anxious.      Objective:     Vital Signs (Most Recent):  Temp: 97.4 °F (36.3 °C) (02/06/20 1303)  Pulse: 81 (02/06/20 1303)  Resp: (!) 25 (02/06/20 1246)  BP: (!) 159/75 (02/06/20 1303)  SpO2: (!) 91 % (02/06/20 1303) Vital Signs (24h Range):  Temp:  [97.4 °F (36.3 °C)-98.9 °F (37.2 °C)] 97.4 °F (36.3 °C)  Pulse:  [70-91] 81  Resp:  [16-25] 25  SpO2:  [88 %-92 %] 91 %  BP: (115-159)/(58-75) 159/75     Weight: 94.1 kg (207 lb 7.3 oz)  Body mass index is 40.52 kg/m².  Body surface area is 2 meters squared.      Intake/Output Summary (Last 24 hours) at 2/6/2020 1315  Last data filed at 2/6/2020 0600  Gross per 24 hour   Intake 506 ml   Output 1200 ml   Net -694 ml       Physical Exam   Constitutional: She is oriented to person, place, and time. She appears well-developed and well-nourished. She is cooperative. She appears ill. Nasal cannula in place.   HENT:   Head: Normocephalic.   Right Ear: External ear normal. Decreased hearing (hearing aid) is noted.   Left Ear: External ear normal. Decreased hearing is noted.   Nose: Nose normal.   Mouth/Throat: Oropharynx is clear and moist.   Eyes: Conjunctivae, EOM and lids are normal. Right eye exhibits no discharge. Left eye exhibits no discharge. No scleral icterus.   Neck: Normal range of motion. No thyroid mass present.   Cardiovascular: Normal rate, regular rhythm and normal heart sounds.   Pulmonary/Chest: Effort normal. No respiratory distress. She has decreased breath sounds. She has wheezes. She has no rhonchi. She has no rales.    Abdominal: Soft. She exhibits no distension. Bowel sounds are decreased. There is tenderness.   Genitourinary:   Genitourinary Comments: deferred   Musculoskeletal: Normal range of motion. She exhibits no edema.   Lymphadenopathy:        Head (right side): No submandibular, no preauricular and no posterior auricular adenopathy present.        Head (left side): No submandibular, no preauricular and no posterior auricular adenopathy present.        Right cervical: No superficial cervical adenopathy present.       Left cervical: No superficial cervical adenopathy present.   Neurological: She is alert and oriented to person, place, and time.   Skin: Skin is warm and dry.   Psychiatric: She has a normal mood and affect. Her speech is normal and behavior is normal. Thought content normal.   Vitals reviewed.      Significant Labs:   BMP:   Recent Labs   Lab 02/04/20 1547 02/05/20 0524   GLU 84 151*    139   K 4.8 4.6    106   CO2 23 23   BUN 20 20   CREATININE 1.1 1.0   CALCIUM 9.6 9.5   MG  --  2.0   , CBC:   Recent Labs   Lab 02/04/20 1547 02/05/20 0524   WBC 11.49 6.43   HGB 14.6 13.8   HCT 46.6 45.1   * 323   , LFTs:   Recent Labs   Lab 02/04/20  1547 02/05/20 0524   ALT 30 27   AST 53* 43*   ALKPHOS 139* 123   BILITOT 0.5 0.4   PROT 7.8 7.6   ALBUMIN 3.1* 2.9*    and All pertinent labs from the last 24 hours have been reviewed.    Diagnostic Results:  I have reviewed all pertinent imaging results/findings within the past 24 hours.    Assessment/Plan:     * Shortness of breath  --Increased oxygen requirements overnight. Discussed with hospital medicine team recommendations for repeat chest x ray. Supplemental oxygen PRN    Mediastinal lymphadenopathy  2/6/20  ------CT chest shows---Mediastinal lymphadenopathy with right hilar soft tissue mass/lymphadenopathy.  Associated postobstructive collapse right anterior segment lower lobe.  Considerations include sarcoidosis versus neoplasm.  CT abdomen  shows--Hepatomegaly with multiple low-density hypoenhancing lesions consistent with hepatic metastatic disease.  --Findings are concerning for potential metastatic disease versus inflammatory. Pulmonology following and treating patient for possible sarcoidosis with IV antibiotics and steroids. Would repeat imaging of chest following antibiotic course.  Patient having CT guided liver biopsy today per IR. Will await pathology for further recommendations. Will plan for outpatient PET  --daily Cbc, cmp  --supplemental oxygen PRN  --supportive care     Cough with hemoptysis    --Patient reports noting 2 episodes in the past of hemoptysis. No hemoptysis at present. Monitor         Thank you for your consult. I will follow-up with patient. Please contact us if you have any additional questions.     Ana Callejas NP  Hematology/Oncology  [facility]

## 2020-02-06 NOTE — PLAN OF CARE
CM met with patient/daughter at the bedside to discuss the discharge plan and needs.  Patient is having more testing to determine plan of care.  Daughter states that she may end up needing home hospice but is unsure a this time.  They would likely use [facility] if hospice is the plan.  Patient will have equipment needs, including oxygen, at discharge.  CM will continue to follow.     02/06/20 1024   Discharge Reassessment   Assessment Type Discharge Planning Reassessment   Provided patient/caregiver education on the expected discharge date and the discharge plan Yes   Do you have any problems affording any of your prescribed medications? No   Discharge Plan A Home with family;Home Health   Discharge Plan B Hospice/home   DME Needed Upon Discharge  oxygen;wheelchair   Patient choice form signed by patient/caregiver N/A   Anticipated Discharge Disposition Home-Health   Can the patient answer the patient profile reliably? Yes, cognitively intact

## 2020-02-06 NOTE — ASSESSMENT & PLAN NOTE
--Increased oxygen requirements overnight. Discussed with hospital medicine team recommendations for repeat chest x ray. Supplemental oxygen PRN

## 2020-02-06 NOTE — PROGRESS NOTES
Pt tolerated Bipap for 2hours and went back to NC, resting quietly Bipap at bedside, family with pt.

## 2020-02-06 NOTE — HOSPITAL COURSE
80 year old female w hx of Arthritis, Deaf, DM, HTN, and Lung disease, ex smoker, quit 15 years ago, admitted with progressive dyspnea and an intermittent non productive cough with 1-2 episodes of hemoptysis. Denies chest pain. She has not had any episode of hemoptysis in the past 5 days. Has abnormal CT Chest and Abdomen with R hilar mass and Post obstuctive pneumonia and hypodense Liver lesions. Pt is feeling fine, on IV Abx. Cough, SOB improved. Appreciate Dr. Hobson and Jenifer.     2/6- looks and feels much better, good spirits, just had Liver Biopsy by IR- tolerated it well, no post op pain. Eating drinking well, oxygenating well, hemodynamically stable.   2/7- looks better, sitting up comfortably in bed, no abd pain, no cough or hemoptysis. She qualified for home O2. She is eating drinking well, walking around well. Pulm and oncology have seen and cleared her already. She was seen and examined and deemed stable for discharge home today. She will have her Pathology results in about 5-7 days and will then follow up with Dr. Burgess and Joce. She will be on a 2 week course of oral Zithromax and may get out pt PET scan then.

## 2020-02-06 NOTE — SEDATION DOCUMENTATION
Pt in ct on table supine with bilateral arms above head, vs monitor in place, vss.  Pt and family verbalized understanding of procedure.

## 2020-02-06 NOTE — PLAN OF CARE
Patient requested a wheel chair to assist her in being mobile during long distances.  Hospital staff was notified.

## 2020-02-06 NOTE — OP NOTE
Pre Op Diagnosis: liver mass     Post Op Diagnosis: same     Procedure:  Liver mass biopsy     Procedure performed by: Kristal CRAIG, Marine RUDOLPH     Written Informed Consent Obtained: Yes     Specimen Removed:  yes     Estimated Blood Loss:  minimal     Findings: Local anesthesia and moderate sedation were used.     The patient tolerated the procedure well and there were no complications.      Sterile technique was performed in the mid upper abdomen, lidocaine was used as a local anesthetic.  Multiple samples taken from the liver mass.  Pt tolerated the procedure well without immediate complications.  Please see radiologist report for details. F/u with PCP and/or ordering physician.

## 2020-02-06 NOTE — PROGRESS NOTES
[facility] -   Pulmonology  Progress Note    Patient Name: Maura Singh  MRN: [id number]  Admission Date: 2/4/2020  Hospital Length of Stay: 1 days  Code Status: No Order  Attending Provider: Richard Brink MD  Primary Care Provider: Yessy Andrade MD   Principal Problem: Shortness of breath    Subjective:     Interval History: Seen and examined at bedside. Hospital chart reviewed. Interval developments noted. Liver metastatic disease suspected. She reports that she has moderately improved. Denies hemoptysis.     Review of Systems   Constitutional: Positive for activity change, appetite change and fatigue. Negative for chills, diaphoresis, fever and unexpected weight change.   HENT: Negative for congestion, mouth sores, nosebleeds, sore throat, trouble swallowing and voice change.    Eyes: Negative for photophobia and visual disturbance.   Respiratory: Positive for cough (1-2 episodes of hemoptysis ), shortness of breath and wheezing. Negative for chest tightness.    Cardiovascular: Negative for chest pain and leg swelling.   Gastrointestinal: Positive for abdominal pain. Negative for abdominal distention, anal bleeding, blood in stool, constipation, diarrhea, nausea, rectal pain and vomiting.   Genitourinary: Negative for difficulty urinating, dysuria and hematuria.   Musculoskeletal: Negative for arthralgias, back pain and myalgias.   Skin: Negative for pallor, rash and wound.   Neurological: Positive for weakness. Negative for dizziness, syncope and headaches.   Hematological: Negative for adenopathy. Does not bruise/bleed easily.   Psychiatric/Behavioral: The patient is nervous/anxious.          Medications:  Continuous Infusions:  Scheduled Meds:   albuterol-ipratropium  3 mL Nebulization Q6H    azithromycin  500 mg Oral Daily    labetalol  200 mg Oral Q12H    lisinopril  40 mg Oral Daily    methylPREDNISolone sodium succinate  80 mg Intravenous Q8H    sodium chloride 3%  4 mL  Nebulization Q12H     PRN Meds:acetaminophen, aluminum-magnesium hydroxide-simethicone, diphenhydrAMINE, guaifenesin 100 mg/5 ml, hydrALAZINE, ondansetron       Objective:     Vital Signs (Most Recent):  Temp: 97.7 °F (36.5 °C) (02/06/20 0744)  Pulse: 77 (02/06/20 0744)  Resp: 16 (02/06/20 0744)  BP: 134/62 (02/06/20 0744)  SpO2: (!) 91 % (02/06/20 0744) Vital Signs (24h Range):  Temp:  [97.4 °F (36.3 °C)-98.9 °F (37.2 °C)] 97.7 °F (36.5 °C)  Pulse:  [71-91] 77  Resp:  [16-24] 16  SpO2:  [88 %-92 %] 91 %  BP: (117-135)/(58-76) 134/62     Weight: 94.1 kg (207 lb 7.3 oz)  Body mass index is 40.52 kg/m².      Intake/Output Summary (Last 24 hours) at 2/6/2020 1003  Last data filed at 2/6/2020 0600  Gross per 24 hour   Intake 506 ml   Output 1200 ml   Net -694 ml       Physical Exam   Constitutional: She is oriented to person, place, and time. No distress.   HENT:   Head: Normocephalic and atraumatic.   Eyes: Conjunctivae and EOM are normal. No scleral icterus.   Neck: Normal range of motion. Neck supple. No thyromegaly present.   Cardiovascular: Normal rate, regular rhythm, normal heart sounds and intact distal pulses.   No murmur heard.  Pulmonary/Chest: Effort normal. No respiratory distress. She has wheezes. She has no rales. She exhibits no tenderness.   Abdominal: Soft. She exhibits no distension. There is tenderness (RUQ).   Musculoskeletal: Normal range of motion. She exhibits no edema or tenderness.   Neurological: She is alert and oriented to person, place, and time. No cranial nerve deficit. She exhibits normal muscle tone. Coordination normal.   Skin: Skin is warm and dry. She is not diaphoretic. No erythema.   Psychiatric: She has a normal mood and affect. Her behavior is normal. Thought content normal.   Nursing note and vitals reviewed.      Vents:  Oxygen Concentration (%): 40 (02/06/20 0720)    Lines/Drains/Airways     Peripheral Intravenous Line                 Peripheral IV - Single Lumen 02/04/20 6120  20 G Left Antecubital 1 day                Significant Labs:    CBC/Anemia Profile:  Recent Labs   Lab 02/04/20  1547 02/05/20  0524   WBC 11.49 6.43   HGB 14.6 13.8   HCT 46.6 45.1   * 323   MCV 90 91   RDW 14.9* 15.2*        Chemistries:  Recent Labs   Lab 02/04/20  1547 02/05/20  0524    139   K 4.8 4.6    106   CO2 23 23   BUN 20 20   CREATININE 1.1 1.0   CALCIUM 9.6 9.5   ALBUMIN 3.1* 2.9*   PROT 7.8 7.6   BILITOT 0.5 0.4   ALKPHOS 139* 123   ALT 30 27   AST 53* 43*   MG  --  2.0   PHOS  --  4.5           Significant Imaging:      CT Abdomen Pelvis With Contrast : 02/04/20:      The liver is abnormal.  The liver is enlarged with multiple low-attenuation lesions consistent with hepatic metastatic disease.  The gallbladder is distended.    The spleen is nonenlarged.    The adrenal glands appear normal.    Small renal cysts are present.    Pancreas is grossly unremarkable at this time.    Several aortocaval/mesenteric lymph nodes are present.  One measures 1.9 cm in size.    The bowel loops are nondilated.    There is evidence of diverticulosis of the sigmoid colon and of the left colon.    Within the pelvis the uterus is present.    Lumbar degenerative disc disease.    CTA Chest Non-Coronary (PE Study) : 02/04/20:       No evidence of acute pulmonary artery embolism.    Cardiomegaly with aortic atherosclerosis.    Mediastinal lymphadenopathy with right hilar soft tissue mass/lymphadenopathy.  Associated postobstructive collapse right anterior segment lower lobe.  Considerations include sarcoidosis versus neoplasm.  Follow-up suggested.                Assessment/Plan:     * Shortness of breath  Supplement oxygenation. Keep SAO2 >= 92%.   Consider BIPAP 10/5 QHS and PRN.   Bronchodilators per orders.  AZITHROMYCIN 500 mg PO DAILY X 10 days.   Systemic steroids per orders.         Mediastinal lymphadenopathy  Etiology unclear. SARCOIDOSIS suspected.     OBSERVATION:  Recommend completing a 14 day  course of antibiotics.   Followed by  O/P PET scan.    Bronchoscopy with tissue sampling will be considered after PET scan.     Lung nodules  Right infrahilar soft tissue 3.3 x 3.0 cm in size with associated postobstructive collapse involving the anterior segments of the right lower lobe.  Etiology unclear. Infectious,Inflammatory vrs Malignancy.      Recommend completing a 14 day course of antibiotics.   Followed by  O/P PET scan.    Bronchoscopy will be considered after PET scan.      Atelectasis of right lung    HYPERSAL NEBS BID  ACAPELLA Rx BID  Respiratory therapy    Cough with hemoptysis  She has not had any hemoptysis in the past 5 days.   Continue to observe for recurrence.              Jaden Hobson MD  Pulmonology  [facility] [place]

## 2020-02-06 NOTE — PLAN OF CARE
POC reviewed with pt verbalized understanding  Pt remained free from fall, precautions in place  Pt is on heart monitor   VS monitor   Pt able to turn self   Pt had liver bx today and did well   Not other complaints at this time. Call bell in belongings in reach, hourly rounding complete, reminded to call for assist will continue monitor.

## 2020-02-07 VITALS
WEIGHT: 202.19 LBS | TEMPERATURE: 97 F | OXYGEN SATURATION: 100 % | HEART RATE: 68 BPM | HEIGHT: 60 IN | DIASTOLIC BLOOD PRESSURE: 54 MMHG | BODY MASS INDEX: 39.69 KG/M2 | SYSTOLIC BLOOD PRESSURE: 108 MMHG | RESPIRATION RATE: 18 BRPM

## 2020-02-07 DIAGNOSIS — R06.02 SHORTNESS OF BREATH: ICD-10-CM

## 2020-02-07 PROBLEM — R04.2 COUGH WITH HEMOPTYSIS: Status: RESOLVED | Noted: 2020-02-04 | Resolved: 2020-02-07

## 2020-02-07 PROBLEM — R10.11 RIGHT UPPER QUADRANT PAIN: Status: RESOLVED | Noted: 2020-02-04 | Resolved: 2020-02-07

## 2020-02-07 LAB
BASOPHILS # BLD AUTO: 0.01 K/UL (ref 0–0.2)
BASOPHILS NFR BLD: 0.1 % (ref 0–1.9)
DIFFERENTIAL METHOD: ABNORMAL
EOSINOPHIL # BLD AUTO: 0 K/UL (ref 0–0.5)
EOSINOPHIL NFR BLD: 0 % (ref 0–8)
ERYTHROCYTE [DISTWIDTH] IN BLOOD BY AUTOMATED COUNT: 15.6 % (ref 11.5–14.5)
HCT VFR BLD AUTO: 43.5 % (ref 37–48.5)
HGB BLD-MCNC: 13.3 G/DL (ref 12–16)
IMM GRANULOCYTES # BLD AUTO: 0.08 K/UL (ref 0–0.04)
IMM GRANULOCYTES NFR BLD AUTO: 0.5 % (ref 0–0.5)
LYMPHOCYTES # BLD AUTO: 1.1 K/UL (ref 1–4.8)
LYMPHOCYTES NFR BLD: 6.9 % (ref 18–48)
MCH RBC QN AUTO: 28.2 PG (ref 27–31)
MCHC RBC AUTO-ENTMCNC: 30.6 G/DL (ref 32–36)
MCV RBC AUTO: 92 FL (ref 82–98)
MONOCYTES # BLD AUTO: 1.2 K/UL (ref 0.3–1)
MONOCYTES NFR BLD: 7.6 % (ref 4–15)
NEUTROPHILS # BLD AUTO: 13 K/UL (ref 1.8–7.7)
NEUTROPHILS NFR BLD: 84.9 % (ref 38–73)
NRBC BLD-RTO: 0 /100 WBC
PLATELET # BLD AUTO: 333 K/UL (ref 150–350)
PMV BLD AUTO: 10.8 FL (ref 9.2–12.9)
RBC # BLD AUTO: 4.72 M/UL (ref 4–5.4)
WBC # BLD AUTO: 15.28 K/UL (ref 3.9–12.7)

## 2020-02-07 PROCEDURE — 85025 COMPLETE CBC W/AUTO DIFF WBC: CPT

## 2020-02-07 PROCEDURE — 27000646 HC AEROBIKA DEVICE

## 2020-02-07 PROCEDURE — 99232 SBSQ HOSP IP/OBS MODERATE 35: CPT | Mod: ,,, | Performed by: INTERNAL MEDICINE

## 2020-02-07 PROCEDURE — 25000242 PHARM REV CODE 250 ALT 637 W/ HCPCS: Performed by: INTERNAL MEDICINE

## 2020-02-07 PROCEDURE — 94664 DEMO&/EVAL PT USE INHALER: CPT

## 2020-02-07 PROCEDURE — 99233 PR SUBSEQUENT HOSPITAL CARE,LEVL III: ICD-10-PCS | Mod: ,,, | Performed by: INTERNAL MEDICINE

## 2020-02-07 PROCEDURE — 94640 AIRWAY INHALATION TREATMENT: CPT

## 2020-02-07 PROCEDURE — 36415 COLL VENOUS BLD VENIPUNCTURE: CPT

## 2020-02-07 PROCEDURE — 99900035 HC TECH TIME PER 15 MIN (STAT)

## 2020-02-07 PROCEDURE — 94799 UNLISTED PULMONARY SVC/PX: CPT

## 2020-02-07 PROCEDURE — 27000221 HC OXYGEN, UP TO 24 HOURS

## 2020-02-07 PROCEDURE — 25000003 PHARM REV CODE 250: Performed by: INTERNAL MEDICINE

## 2020-02-07 PROCEDURE — 99232 PR SUBSEQUENT HOSPITAL CARE,LEVL II: ICD-10-PCS | Mod: ,,, | Performed by: INTERNAL MEDICINE

## 2020-02-07 PROCEDURE — 94761 N-INVAS EAR/PLS OXIMETRY MLT: CPT

## 2020-02-07 PROCEDURE — 99233 SBSQ HOSP IP/OBS HIGH 50: CPT | Mod: ,,, | Performed by: INTERNAL MEDICINE

## 2020-02-07 RX ORDER — AZITHROMYCIN 500 MG/1
500 TABLET, FILM COATED ORAL DAILY
Qty: 12 TABLET | Refills: 0 | Status: SHIPPED | OUTPATIENT
Start: 2020-02-08 | End: 2020-03-05

## 2020-02-07 RX ORDER — LABETALOL 200 MG/1
200 TABLET, FILM COATED ORAL EVERY 12 HOURS
Qty: 60 TABLET | Refills: 11 | Status: SHIPPED | OUTPATIENT
Start: 2020-02-07 | End: 2020-02-10

## 2020-02-07 RX ADMIN — LISINOPRIL 40 MG: 20 TABLET ORAL at 08:02

## 2020-02-07 RX ADMIN — SODIUM CHLORIDE 30 MG/ML INHALATION SOLUTION 4 ML: 30 SOLUTION INHALANT at 07:02

## 2020-02-07 RX ADMIN — IPRATROPIUM BROMIDE AND ALBUTEROL SULFATE 3 ML: .5; 3 SOLUTION RESPIRATORY (INHALATION) at 07:02

## 2020-02-07 RX ADMIN — IPRATROPIUM BROMIDE AND ALBUTEROL SULFATE 3 ML: .5; 3 SOLUTION RESPIRATORY (INHALATION) at 12:02

## 2020-02-07 RX ADMIN — LABETALOL HYDROCHLORIDE 200 MG: 200 TABLET, FILM COATED ORAL at 08:02

## 2020-02-07 RX ADMIN — AZITHROMYCIN MONOHYDRATE 500 MG: 250 TABLET ORAL at 08:02

## 2020-02-07 NOTE — PLAN OF CARE
CM met with patient and Lilian (daughter on phone) regarding the discharge plan. Patient does not feel home health services are necessary at this time.  Lilian stated that she can check vitals and oxygen levels as needed.  Patient will need home oxygen and has been made aware that the portable oxygen will be delivered to the bedside prior to discharge.  Patient already has a bedside commode and rollator.  No other needs at this time.     02/07/20 1111   Discharge Reassessment   Assessment Type Discharge Planning Reassessment   Provided patient/caregiver education on the expected discharge date and the discharge plan Yes   Do you have any problems affording any of your prescribed medications? No   Discharge Plan A Home with family   DME Needed Upon Discharge  oxygen   Patient choice form signed by patient/caregiver N/A   Anticipated Discharge Disposition Home   Can the patient/caregiver answer the patient profile reliably? Yes, cognitively intact

## 2020-02-07 NOTE — SUBJECTIVE & OBJECTIVE
Interval History: looks and feels much better, good spirits, just had Liver Biopsy by IR- tolerated it well, no post op pain. Eating drinking well, oxygenating well, hemodynamically stable.     Review of Systems   Constitutional: Positive for activity change, appetite change and fatigue. Negative for chills, diaphoresis, fever and unexpected weight change.   HENT: Negative for congestion, mouth sores, nosebleeds, sore throat, trouble swallowing and voice change.    Eyes: Negative for photophobia and visual disturbance.   Respiratory: Negative for cough (1-2 episodes of hemoptysis ), chest tightness, shortness of breath and wheezing.    Cardiovascular: Negative for chest pain, palpitations and leg swelling.   Gastrointestinal: Positive for abdominal pain. Negative for abdominal distention, anal bleeding, blood in stool, constipation, diarrhea, nausea, rectal pain and vomiting.   Genitourinary: Negative for difficulty urinating, dysuria and hematuria.   Musculoskeletal: Negative for arthralgias, back pain and myalgias.   Skin: Negative for pallor, rash and wound.   Neurological: Positive for weakness. Negative for dizziness, syncope and headaches.   Hematological: Negative for adenopathy. Does not bruise/bleed easily.   Psychiatric/Behavioral: The patient is not nervous/anxious.      Objective:     Vital Signs (Most Recent):  Temp: 97.6 °F (36.4 °C) (02/06/20 1642)  Pulse: 80 (02/06/20 1642)  Resp: 18 (02/06/20 1357)  BP: (!) 150/67 (02/06/20 1642)  SpO2: 95 % (02/06/20 1642) Vital Signs (24h Range):  Temp:  [97.4 °F (36.3 °C)-98.9 °F (37.2 °C)] 97.6 °F (36.4 °C)  Pulse:  [70-91] 80  Resp:  [16-25] 18  SpO2:  [89 %-95 %] 95 %  BP: (115-159)/(58-75) 150/67     Weight: 94.1 kg (207 lb 7.3 oz)  Body mass index is 40.52 kg/m².    Intake/Output Summary (Last 24 hours) at 2/6/2020 1807  Last data filed at 2/6/2020 0600  Gross per 24 hour   Intake --   Output 700 ml   Net -700 ml      Physical Exam   Constitutional: She is  oriented to person, place, and time. She appears well-developed and well-nourished. She is cooperative. She appears ill. Nasal cannula in place.   HENT:   Head: Normocephalic.   Right Ear: External ear normal. Decreased hearing (hearing aid) is noted.   Left Ear: External ear normal. Decreased hearing is noted.   Nose: Nose normal.   Mouth/Throat: Oropharynx is clear and moist.   Eyes: Conjunctivae, EOM and lids are normal. Right eye exhibits no discharge. Left eye exhibits no discharge. No scleral icterus.   Neck: Normal range of motion. No thyroid mass present.   Cardiovascular: Normal rate, regular rhythm and normal heart sounds.   Pulmonary/Chest: Effort normal. No respiratory distress. She has decreased breath sounds. She has no wheezes. She has no rhonchi. She has no rales.   Abdominal: Soft. She exhibits no distension. Bowel sounds are decreased. There is tenderness.   Genitourinary:   Genitourinary Comments: deferred   Musculoskeletal: Normal range of motion. She exhibits no edema.   Lymphadenopathy:        Head (right side): No submandibular, no preauricular and no posterior auricular adenopathy present.        Head (left side): No submandibular, no preauricular and no posterior auricular adenopathy present.        Right cervical: No superficial cervical adenopathy present.       Left cervical: No superficial cervical adenopathy present.   Neurological: She is alert and oriented to person, place, and time.   Skin: Skin is warm and dry.   Psychiatric: She has a normal mood and affect. Her speech is normal and behavior is normal. Thought content normal.   Nursing note and vitals reviewed.      Significant Labs:   BMP:   Recent Labs   Lab 02/05/20 0524 02/06/20  1344   * 146*    139   K 4.6 4.6    106   CO2 23 23   BUN 20 33*   CREATININE 1.0 1.0   CALCIUM 9.5 10.0   MG 2.0  --      CBC:   Recent Labs   Lab 02/05/20 0524   WBC 6.43   HGB 13.8   HCT 45.1        CMP:   Recent Labs    Lab 02/05/20  0524 02/06/20  1344    139   K 4.6 4.6    106   CO2 23 23   * 146*   BUN 20 33*   CREATININE 1.0 1.0   CALCIUM 9.5 10.0   PROT 7.6 7.6   ALBUMIN 2.9* 3.1*   BILITOT 0.4 0.4   ALKPHOS 123 108   AST 43* 33   ALT 27 25   ANIONGAP 10 10   EGFRNONAA 53* 53*     Coagulation:   Lactic Acid:   Magnesium:   Recent Labs   Lab 02/05/20 0524   MG 2.0     All pertinent labs within the past 24 hours have been reviewed.    Significant Imaging: I have reviewed all pertinent imaging results/findings within the past 24 hours.

## 2020-02-07 NOTE — PROGRESS NOTES
Home Oxygen Evaluation    Date Performed: 2/7/2020    1) Patient's Home O2 Sat on room air, while at rest: 88%        If O2 sats on room air at rest are 88% or below, patient qualifies. No additional testing needed. Document N/A in steps 2 and 3. If 89% or above, complete steps 2.      2) Patient's O2 Sat on room air while exercising:         If O2 sats on room air while exercising remain 89% or above patient does not qualify, no further testing needed Document N/A in step 3. If O2 sats on room air while exercising are 88% or below, continue to step 3.      3) Patient's O2 Sat while exercising on O2: na       (Must show improvement from #2 for patients to qualify)    If O2 sats improve on oxygen, patient qualifies for portable oxygen. If not, the patient does not qualify.      Pt dropped to 88% post 1 min on ra at rest.

## 2020-02-07 NOTE — ASSESSMENT & PLAN NOTE
Supplement oxygenation. Keep SAO2 >= 92%.   Consider BIPAP 10/5 QHS and PRN.   Bronchodilators per orders.  AZITHROMYCIN 500 mg PO DAILY X 10 days.

## 2020-02-07 NOTE — ASSESSMENT & PLAN NOTE
--Patient reports significant improvement in ease of breathing today with supplemental oxygen per nasal cannula. Desats on RA but will discharge with home Supplemental oxygen PRN

## 2020-02-07 NOTE — PLAN OF CARE
Dx sob, hemoptysis  Poc instructed on dbc 10 x q1h wa. Instructed on turnign q2h. Instructed on neb tx. Instructed on iv site care. O2 at 5l nc. Up to bsc with assist. Instructed on fall risk and precautions. Water, phone and call light in reach.

## 2020-02-07 NOTE — ASSESSMENT & PLAN NOTE
Former smoker quit about 15 years ago.  At least two episodes of hemoptysis over the last two days.  Consult Pulmonary.  CT chest/abdomen concerning for metastatic disease (new)    Await further evaluation     Improved, s/p Liver biopsy  On Zithrimax

## 2020-02-07 NOTE — SUBJECTIVE & OBJECTIVE
Interval History: Seen and examined at bedside. Hospital chart reviewed. Interval developments noted. S/P CT liver biopsy. She reports that she has significantly  improved. Denies hemoptysis.     Review of Systems   Constitutional: Positive for activity change, appetite change and fatigue. Negative for chills, diaphoresis, fever and unexpected weight change.   HENT: Negative for congestion, mouth sores, nosebleeds, sore throat, trouble swallowing and voice change.    Eyes: Negative for photophobia and visual disturbance.   Respiratory: Positive for cough (1-2 episodes of hemoptysis ), shortness of breath and wheezing. Negative for chest tightness.    Cardiovascular: Negative for chest pain and leg swelling.   Gastrointestinal: Positive for abdominal pain. Negative for abdominal distention, anal bleeding, blood in stool, constipation, diarrhea, nausea, rectal pain and vomiting.   Genitourinary: Negative for difficulty urinating, dysuria and hematuria.   Musculoskeletal: Negative for arthralgias, back pain and myalgias.   Skin: Negative for pallor, rash and wound.   Neurological: Positive for weakness. Negative for dizziness, syncope and headaches.   Hematological: Negative for adenopathy. Does not bruise/bleed easily.   Psychiatric/Behavioral: The patient is nervous/anxious.            Medications:  Continuous Infusions:  Scheduled Meds:   albuterol-ipratropium  3 mL Nebulization Q6H    azithromycin  500 mg Oral Daily    labetalol  200 mg Oral Q12H    lisinopril  40 mg Oral Daily    sodium chloride 3%  4 mL Nebulization Q12H     PRN Meds:acetaminophen, aluminum-magnesium hydroxide-simethicone, diphenhydrAMINE, fentaNYL, guaifenesin 100 mg/5 ml, hydrALAZINE, midazolam, ondansetron       Objective:     Vital Signs (Most Recent):  Temp: 97.4 °F (36.3 °C) (02/07/20 0718)  Pulse: 79 (02/07/20 0718)  Resp: 19 (02/07/20 0718)  BP: (!) 133/59 (02/07/20 0718)  SpO2: (!) 92 % (02/07/20 0718) Vital Signs (24h  Range):  Temp:  [97 °F (36.1 °C)-97.6 °F (36.4 °C)] 97.4 °F (36.3 °C)  Pulse:  [71-88] 79  Resp:  [17-25] 19  SpO2:  [89 %-97 %] 92 %  BP: (114-159)/(56-75) 133/59     Weight: 91.7 kg (202 lb 2.6 oz)  Body mass index is 39.48 kg/m².      Intake/Output Summary (Last 24 hours) at 2/7/2020 0905  Last data filed at 2/7/2020 0400  Gross per 24 hour   Intake 360 ml   Output 700 ml   Net -340 ml       Physical Exam   Constitutional: She is oriented to person, place, and time. No distress.   HENT:   Head: Normocephalic and atraumatic.   Eyes: Conjunctivae and EOM are normal. No scleral icterus.   Neck: Normal range of motion. Neck supple. No thyromegaly present.   Cardiovascular: Normal rate, regular rhythm, normal heart sounds and intact distal pulses.   No murmur heard.  Pulmonary/Chest: Effort normal. No respiratory distress. She has wheezes. She has no rales. She exhibits no tenderness.   Abdominal: Soft. She exhibits no distension. There is tenderness (RUQ).   Musculoskeletal: Normal range of motion. She exhibits no edema or tenderness.   Neurological: She is alert and oriented to person, place, and time. No cranial nerve deficit. She exhibits normal muscle tone. Coordination normal.   Skin: Skin is warm and dry. She is not diaphoretic. No erythema.   Psychiatric: She has a normal mood and affect. Her behavior is normal. Thought content normal.   Nursing note and vitals reviewed.      Vents:  Oxygen Concentration (%): 40 (02/07/20 0709)    Lines/Drains/Airways     Peripheral Intravenous Line                 Peripheral IV - Single Lumen 02/04/20 1548 20 G Left Antecubital 2 days                Significant Labs:    CBC/Anemia Profile:  Recent Labs   Lab 02/07/20  0410   WBC 15.28*   HGB 13.3   HCT 43.5      MCV 92   RDW 15.6*        Chemistries:  Recent Labs   Lab 02/06/20  1344      K 4.6      CO2 23   BUN 33*   CREATININE 1.0   CALCIUM 10.0   ALBUMIN 3.1*   PROT 7.6   BILITOT 0.4   ALKPHOS 108   ALT  25   AST 33     Bilirubin:   Recent Labs   Lab 02/04/20  1547 02/05/20  0524 02/06/20  1344   BILITOT 0.5 0.4 0.4       Significant Imaging:    Reviewed recent imaging studies. Appear stable other than abnormalities addressed in plan.

## 2020-02-07 NOTE — ASSESSMENT & PLAN NOTE
No known prior history of malignancy.  CT chest/CT abdomen today reveals right hilar mass concerning for neoplasm with hypodense lesions in the liver concerning for metastases.  Consult Oncology for evaluation/recommendations.    May need Liver biopsy or a trial of abx followed by out pt PET/CT to r/o malignancy    S/p Liver biopsy

## 2020-02-07 NOTE — PROGRESS NOTES
[facility] -   Pulmonology  Progress Note    Patient Name: Maura Singh  MRN: [id number]  Admission Date: 2/4/2020  Hospital Length of Stay: 2 days  Code Status: No Order  Attending Provider: Richard Brink MD  Primary Care Provider: Yessy Andrade MD   Principal Problem: Cough with hemoptysis    Subjective:     Interval History: Seen and examined at bedside. Hospital chart reviewed. Interval developments noted. S/P CT liver biopsy. She reports that she has significantly  improved. Denies hemoptysis.     Review of Systems   Constitutional: Positive for activity change, appetite change and fatigue. Negative for chills, diaphoresis, fever and unexpected weight change.   HENT: Negative for congestion, mouth sores, nosebleeds, sore throat, trouble swallowing and voice change.    Eyes: Negative for photophobia and visual disturbance.   Respiratory: Positive for cough (1-2 episodes of hemoptysis ), shortness of breath and wheezing. Negative for chest tightness.    Cardiovascular: Negative for chest pain and leg swelling.   Gastrointestinal: Positive for abdominal pain. Negative for abdominal distention, anal bleeding, blood in stool, constipation, diarrhea, nausea, rectal pain and vomiting.   Genitourinary: Negative for difficulty urinating, dysuria and hematuria.   Musculoskeletal: Negative for arthralgias, back pain and myalgias.   Skin: Negative for pallor, rash and wound.   Neurological: Positive for weakness. Negative for dizziness, syncope and headaches.   Hematological: Negative for adenopathy. Does not bruise/bleed easily.   Psychiatric/Behavioral: The patient is nervous/anxious.            Medications:  Continuous Infusions:  Scheduled Meds:   albuterol-ipratropium  3 mL Nebulization Q6H    azithromycin  500 mg Oral Daily    labetalol  200 mg Oral Q12H    lisinopril  40 mg Oral Daily    sodium chloride 3%  4 mL Nebulization Q12H     PRN Meds:acetaminophen, aluminum-magnesium  hydroxide-simethicone, diphenhydrAMINE, fentaNYL, guaifenesin 100 mg/5 ml, hydrALAZINE, midazolam, ondansetron       Objective:     Vital Signs (Most Recent):  Temp: 97.4 °F (36.3 °C) (02/07/20 0718)  Pulse: 79 (02/07/20 0718)  Resp: 19 (02/07/20 0718)  BP: (!) 133/59 (02/07/20 0718)  SpO2: (!) 92 % (02/07/20 0718) Vital Signs (24h Range):  Temp:  [97 °F (36.1 °C)-97.6 °F (36.4 °C)] 97.4 °F (36.3 °C)  Pulse:  [71-88] 79  Resp:  [17-25] 19  SpO2:  [89 %-97 %] 92 %  BP: (114-159)/(56-75) 133/59     Weight: 91.7 kg (202 lb 2.6 oz)  Body mass index is 39.48 kg/m².      Intake/Output Summary (Last 24 hours) at 2/7/2020 0905  Last data filed at 2/7/2020 0400  Gross per 24 hour   Intake 360 ml   Output 700 ml   Net -340 ml       Physical Exam   Constitutional: She is oriented to person, place, and time. No distress.   HENT:   Head: Normocephalic and atraumatic.   Eyes: Conjunctivae and EOM are normal. No scleral icterus.   Neck: Normal range of motion. Neck supple. No thyromegaly present.   Cardiovascular: Normal rate, regular rhythm, normal heart sounds and intact distal pulses.   No murmur heard.  Pulmonary/Chest: Effort normal. No respiratory distress. She has wheezes. She has no rales. She exhibits no tenderness.   Abdominal: Soft. She exhibits no distension. There is tenderness (RUQ).   Musculoskeletal: Normal range of motion. She exhibits no edema or tenderness.   Neurological: She is alert and oriented to person, place, and time. No cranial nerve deficit. She exhibits normal muscle tone. Coordination normal.   Skin: Skin is warm and dry. She is not diaphoretic. No erythema.   Psychiatric: She has a normal mood and affect. Her behavior is normal. Thought content normal.   Nursing note and vitals reviewed.      Vents:  Oxygen Concentration (%): 40 (02/07/20 0709)    Lines/Drains/Airways     Peripheral Intravenous Line                 Peripheral IV - Single Lumen 02/04/20 1548 20 G Left Antecubital 2 days                 Significant Labs:    CBC/Anemia Profile:  Recent Labs   Lab 02/07/20  0410   WBC 15.28*   HGB 13.3   HCT 43.5      MCV 92   RDW 15.6*        Chemistries:  Recent Labs   Lab 02/06/20  1344      K 4.6      CO2 23   BUN 33*   CREATININE 1.0   CALCIUM 10.0   ALBUMIN 3.1*   PROT 7.6   BILITOT 0.4   ALKPHOS 108   ALT 25   AST 33     Bilirubin:   Recent Labs   Lab 02/04/20  1547 02/05/20  0524 02/06/20  1344   BILITOT 0.5 0.4 0.4       Significant Imaging:    CTA Chest Non-Coronary (PE Study): 02/04/20:    Cardiac size is enlarged with evidence of coronary artery and aortic vascular calcification.    The pulmonary arteries reveal normal enhancement with no definitive filling defects or evidence of acute pulmonary artery embolism.    There are enlarged mediastinal lymph nodes.  Left AP window lymph node 2.2 cm in size.  Right hilar lymph node 2.6 cm in size.  Subcarinal lymph node 2.5 cm in size.  Right infrahilar soft tissue 3.3 x 3.0 cm in size with associated postobstructive collapse involving the anterior segments of the right lower lobe.    Note that prior chest x-ray reveal prominence of the hilar regions.  Considerations include lymphadenopathy secondary to sarcoidosis.  Neoplasm is also consideration and follow-up suggested.    No acute infiltrate is seen at this time.    In the upper abdomen the liver appears enlarged.    No pleural effusion.    No pneumothorax              Assessment/Plan:     * Cough with hemoptysis  She has not had any hemoptysis in the past 5 days.   Continue to observe for recurrence.       Liver masses  S/P CT liver biopsy.  Pathology awaited.  Oncology evaluating and assisting with management.     Shortness of breath  Supplement oxygenation. Keep SAO2 >= 92%.   Consider BIPAP 10/5 QHS and PRN.   Bronchodilators per orders.  AZITHROMYCIN 500 mg PO DAILY X 10 days.           Mediastinal lymphadenopathy  Etiology unclear. SARCOIDOSIS suspected.      OBSERVATION:  Recommend completing a 14 day course of antibiotics.   Followed by  O/P PET scan.    Bronchoscopy with tissue sampling will be considered after PET scan.     Lung nodules  Right infrahilar soft tissue 3.3 x 3.0 cm in size with associated postobstructive collapse involving the anterior segments of the right lower lobe.  Etiology unclear. Infectious,Inflammatory vrs Malignancy.      Recommend completing a 14 day course of antibiotics.   Followed by  O/P PET scan.    Bronchoscopy will be considered after PET scan.      Atelectasis of right lung    HYPERSAL NEBS BID  ACAPELLA Rx BID  Respiratory therapy         Jaden Hobson MD  Pulmonology  [facility] -

## 2020-02-07 NOTE — NURSING
Discharge instructions reviewed with patient and family, family and patient verbalize understanding,  PIV removed, catheter intact.  Tele box removed and returned to monitor room.  Stressed importance to keep and attend all follow up appointments.  Educated patient on the need to make prescribed medication meds.  Instructed patient to call when Oxygen arrives.

## 2020-02-07 NOTE — NURSING
Patient's O2 has been delivered to the bedside. Daughter and granddaughter at bedside to transport patient home. All belongings sent with patient. Pt wheeled down to front lobby for discharge.

## 2020-02-07 NOTE — SUBJECTIVE & OBJECTIVE
"Interval history: No acute events overnight. S/p Ct guided liver biopsy with pending resuts. Reports significant improvement in SOB. Breathing comfortably on 5L supplemental O2 per nasal cannula. Home oxygen eval complete and she will discharge with home O2. Reports resolution of abdominal pain. Feels well overall. Sitting up in chair.   Oncology Treatment Plan:   [No treatment plan]    Medications:  Continuous Infusions:  Scheduled Meds:   albuterol-ipratropium  3 mL Nebulization Q6H    azithromycin  500 mg Oral Daily    labetalol  200 mg Oral Q12H    lisinopril  40 mg Oral Daily    sodium chloride 3%  4 mL Nebulization Q12H     PRN Meds:acetaminophen, aluminum-magnesium hydroxide-simethicone, diphenhydrAMINE, fentaNYL, guaifenesin 100 mg/5 ml, hydrALAZINE, midazolam, ondansetron     Review of patient's allergies indicates:   Allergen Reactions    Pravastatin Other (See Comments)     Burning/flushing    Allopurinol analogues Other (See Comments)     "makes me sick and feel crazy"        Past Medical History:   Diagnosis Date    Arthritis     Deaf     Diabetes mellitus     Hypertension     Lung disease     copd     Past Surgical History:   Procedure Laterality Date    APPENDECTOMY      CATARACT EXTRACTION      MIDDLE EAR SURGERY       Family History     Problem Relation (Age of Onset)    ALS Father    Colon cancer Mother    Retinal detachment Son        Tobacco Use    Smoking status: Former Smoker     Packs/day: 0.50     Years: 48.00     Pack years: 24.00     Types: Cigarettes     Start date:      Last attempt to quit:      Years since quittin.1    Smokeless tobacco: Never Used   Substance and Sexual Activity    Alcohol use: No     Frequency: Never     Drinks per session: Patient refused     Binge frequency: Patient refused    Drug use: No    Sexual activity: Never       Review of Systems   Constitutional: Positive for activity change and appetite change. Negative for chills, " diaphoresis, fatigue, fever and unexpected weight change.   HENT: Negative for congestion, mouth sores, nosebleeds, sore throat, trouble swallowing and voice change.    Eyes: Negative for photophobia and visual disturbance.   Respiratory: Positive for cough (1-2 episodes of hemoptysis ). Negative for chest tightness, shortness of breath and wheezing.    Cardiovascular: Negative for chest pain, palpitations and leg swelling.   Gastrointestinal: Negative for abdominal distention, abdominal pain, anal bleeding, blood in stool, constipation, diarrhea, nausea, rectal pain and vomiting.   Genitourinary: Negative for difficulty urinating, dysuria and hematuria.   Musculoskeletal: Negative for arthralgias, back pain and myalgias.   Skin: Negative for pallor, rash and wound.   Neurological: Positive for weakness. Negative for dizziness, syncope and headaches.   Hematological: Negative for adenopathy. Does not bruise/bleed easily.   Psychiatric/Behavioral: The patient is nervous/anxious.      Objective:     Vital Signs (Most Recent):  Temp: 97.4 °F (36.3 °C) (02/07/20 0718)  Pulse: 75 (02/07/20 0945)  Resp: 19 (02/07/20 0718)  BP: 136/68 (02/07/20 0945)  SpO2: (!) 94 % (02/07/20 0945) Vital Signs (24h Range):  Temp:  [97 °F (36.1 °C)-97.6 °F (36.4 °C)] 97.4 °F (36.3 °C)  Pulse:  [71-88] 75  Resp:  [17-25] 19  SpO2:  [89 %-97 %] 94 %  BP: (114-159)/(56-75) 136/68     Weight: 91.7 kg (202 lb 2.6 oz)  Body mass index is 39.48 kg/m².  Body surface area is 1.97 meters squared.      Intake/Output Summary (Last 24 hours) at 2/7/2020 1054  Last data filed at 2/7/2020 0400  Gross per 24 hour   Intake 360 ml   Output 700 ml   Net -340 ml       Physical Exam   Constitutional: She is oriented to person, place, and time. She appears well-developed and well-nourished. She is cooperative. She appears ill. Nasal cannula in place.   HENT:   Head: Normocephalic.   Right Ear: External ear normal. Decreased hearing (hearing aid) is noted.   Left  Ear: External ear normal. Decreased hearing is noted.   Nose: Nose normal.   Mouth/Throat: Oropharynx is clear and moist.   Eyes: Conjunctivae, EOM and lids are normal. Right eye exhibits no discharge. Left eye exhibits no discharge. No scleral icterus.   Neck: Normal range of motion. No thyroid mass present.   Cardiovascular: Normal rate, regular rhythm and normal heart sounds.   Pulmonary/Chest: Effort normal. No respiratory distress. She has decreased breath sounds. She has no wheezes. She has no rhonchi. She has no rales.   Abdominal: Soft. She exhibits no distension. Bowel sounds are decreased. There is no tenderness.   Genitourinary:   Genitourinary Comments: deferred   Musculoskeletal: Normal range of motion. She exhibits no edema.   Lymphadenopathy:        Head (right side): No submandibular, no preauricular and no posterior auricular adenopathy present.        Head (left side): No submandibular, no preauricular and no posterior auricular adenopathy present.        Right cervical: No superficial cervical adenopathy present.       Left cervical: No superficial cervical adenopathy present.   Neurological: She is alert and oriented to person, place, and time.   Skin: Skin is warm and dry.   Psychiatric: She has a normal mood and affect. Her speech is normal and behavior is normal. Thought content normal.   Vitals reviewed.      Significant Labs:   BMP:   Recent Labs   Lab 02/06/20  1344   *      K 4.6      CO2 23   BUN 33*   CREATININE 1.0   CALCIUM 10.0   , CBC:   Recent Labs   Lab 02/07/20  0410   WBC 15.28*   HGB 13.3   HCT 43.5      , LFTs:   Recent Labs   Lab 02/06/20  1344   ALT 25   AST 33   ALKPHOS 108   BILITOT 0.4   PROT 7.6   ALBUMIN 3.1*    and All pertinent labs from the last 24 hours have been reviewed.    Diagnostic Results:  I have reviewed all pertinent imaging results/findings within the past 24 hours.

## 2020-02-07 NOTE — PROGRESS NOTES
RT states pt refuse bipap. Pt states she can not wear it. No distress noted at this time.pt up to br./ tolerated.

## 2020-02-07 NOTE — PROGRESS NOTES
[facility] Hospital Medicine  Progress Note    Patient Name: Maura Singh  MRN: [id number]  Patient Class: IP- Inpatient   Admission Date: 2/4/2020  Length of Stay: 1 days  Attending Physician: Richard Brink MD  Primary Care Provider: Yessy Andrade MD        Subjective:     Principal Problem:Cough with hemoptysis        HPI:  Ms. Singh is a 80-year-old elderly  female with PMH significant for COPD, former smoker, HTN, has been complaining of worsening shortness of breath, dyspnea on exertion for the past few weeks.  She was seen at her PCPs office earlier today and she was in mild respiratory distress with wheezing, hence sent to the ED for further evaluation.  Patient is little hard of hearing especially on her right.  Most of the information obtained from speaking to family members at the bedside.  Patient denies fever, chills, but complained of 1-2 episodes of hemoptysis yesterday and earlier today.  Denies chest pain, but complains of intermittently dry nonproductive cough.  In the ED CTA chest revealed mediastinal lymphadenopathy with right hilar soft tissue mass/lymphadenopathy.  Associated postobstructive collapse right anterior segment lower lobe.  Considerations include sarcoidosis versus neoplasm.  CT abdomen reveals hypodense lesions in the liver, concerning for metastatic disease.  Patient has no known prior history of malignancy.  Patient not on blood thinners.    Admitting diagnosis hemoptysis, CT chest/abdomen concerning for metastatic disease.    Overview/Hospital Course:  80 year old female w hx of Arthritis, Deaf, DM, HTN, and Lung disease, ex smoker, quit 15 years ago, admitted with progressive dyspnea and an intermittent non productive cough with 1-2 episodes of hemoptysis. Denies chest pain. She has not had any episode of hemoptysis in the past 5 days. Has abnormal CT Chest and Abdomen with R hilar mass and Post obstuctive pneumonia and hypodense Liver  lesions. Pt is feeling fine, on IV Abx. Cough, SOB improved. Appreciate Dr. Hobson and Jenifer.     2/6- looks and feels much better, good spirits, just had Liver Biopsy by IR- tolerated it well, no post op pain. Eating drinking well, oxygenating well, hemodynamically stable.       Interval History: looks and feels much better, good spirits, just had Liver Biopsy by IR- tolerated it well, no post op pain. Eating drinking well, oxygenating well, hemodynamically stable.     Review of Systems   Constitutional: Positive for activity change, appetite change and fatigue. Negative for chills, diaphoresis, fever and unexpected weight change.   HENT: Negative for congestion, mouth sores, nosebleeds, sore throat, trouble swallowing and voice change.    Eyes: Negative for photophobia and visual disturbance.   Respiratory: Negative for cough (1-2 episodes of hemoptysis ), chest tightness, shortness of breath and wheezing.    Cardiovascular: Negative for chest pain, palpitations and leg swelling.   Gastrointestinal: Positive for abdominal pain. Negative for abdominal distention, anal bleeding, blood in stool, constipation, diarrhea, nausea, rectal pain and vomiting.   Genitourinary: Negative for difficulty urinating, dysuria and hematuria.   Musculoskeletal: Negative for arthralgias, back pain and myalgias.   Skin: Negative for pallor, rash and wound.   Neurological: Positive for weakness. Negative for dizziness, syncope and headaches.   Hematological: Negative for adenopathy. Does not bruise/bleed easily.   Psychiatric/Behavioral: The patient is not nervous/anxious.      Objective:     Vital Signs (Most Recent):  Temp: 97.6 °F (36.4 °C) (02/06/20 1642)  Pulse: 80 (02/06/20 1642)  Resp: 18 (02/06/20 1357)  BP: (!) 150/67 (02/06/20 1642)  SpO2: 95 % (02/06/20 1642) Vital Signs (24h Range):  Temp:  [97.4 °F (36.3 °C)-98.9 °F (37.2 °C)] 97.6 °F (36.4 °C)  Pulse:  [70-91] 80  Resp:  [16-25] 18  SpO2:  [89 %-95 %] 95 %  BP:  (115-159)/(58-75) 150/67     Weight: 94.1 kg (207 lb 7.3 oz)  Body mass index is 40.52 kg/m².    Intake/Output Summary (Last 24 hours) at 2/6/2020 1807  Last data filed at 2/6/2020 0600  Gross per 24 hour   Intake --   Output 700 ml   Net -700 ml      Physical Exam   Constitutional: She is oriented to person, place, and time. She appears well-developed and well-nourished. She is cooperative. She appears ill. Nasal cannula in place.   HENT:   Head: Normocephalic.   Right Ear: External ear normal. Decreased hearing (hearing aid) is noted.   Left Ear: External ear normal. Decreased hearing is noted.   Nose: Nose normal.   Mouth/Throat: Oropharynx is clear and moist.   Eyes: Conjunctivae, EOM and lids are normal. Right eye exhibits no discharge. Left eye exhibits no discharge. No scleral icterus.   Neck: Normal range of motion. No thyroid mass present.   Cardiovascular: Normal rate, regular rhythm and normal heart sounds.   Pulmonary/Chest: Effort normal. No respiratory distress. She has decreased breath sounds. She has no wheezes. She has no rhonchi. She has no rales.   Abdominal: Soft. She exhibits no distension. Bowel sounds are decreased. There is tenderness.   Genitourinary:   Genitourinary Comments: deferred   Musculoskeletal: Normal range of motion. She exhibits no edema.   Lymphadenopathy:        Head (right side): No submandibular, no preauricular and no posterior auricular adenopathy present.        Head (left side): No submandibular, no preauricular and no posterior auricular adenopathy present.        Right cervical: No superficial cervical adenopathy present.       Left cervical: No superficial cervical adenopathy present.   Neurological: She is alert and oriented to person, place, and time.   Skin: Skin is warm and dry.   Psychiatric: She has a normal mood and affect. Her speech is normal and behavior is normal. Thought content normal.   Nursing note and vitals reviewed.      Significant Labs:   BMP:    Recent Labs   Lab 02/05/20  0524 02/06/20  1344   * 146*    139   K 4.6 4.6    106   CO2 23 23   BUN 20 33*   CREATININE 1.0 1.0   CALCIUM 9.5 10.0   MG 2.0  --      CBC:   Recent Labs   Lab 02/05/20  0524   WBC 6.43   HGB 13.8   HCT 45.1        CMP:   Recent Labs   Lab 02/05/20  0524 02/06/20  1344    139   K 4.6 4.6    106   CO2 23 23   * 146*   BUN 20 33*   CREATININE 1.0 1.0   CALCIUM 9.5 10.0   PROT 7.6 7.6   ALBUMIN 2.9* 3.1*   BILITOT 0.4 0.4   ALKPHOS 123 108   AST 43* 33   ALT 27 25   ANIONGAP 10 10   EGFRNONAA 53* 53*     Coagulation:   Lactic Acid:   Magnesium:   Recent Labs   Lab 02/05/20  0524   MG 2.0     All pertinent labs within the past 24 hours have been reviewed.    Significant Imaging: I have reviewed all pertinent imaging results/findings within the past 24 hours.      Assessment/Plan:      * Cough with hemoptysis/ R/O Metastatic Lung Ca  Former smoker quit about 15 years ago.  At least two episodes of hemoptysis over the last two days.  Consult Pulmonary.  CT chest/abdomen concerning for metastatic disease (new)    Await further evaluation     Improved, s/p Liver biopsy  On Zithrimax    Malignancy  No known prior history of malignancy.  CT chest/CT abdomen today reveals right hilar mass concerning for neoplasm with hypodense lesions in the liver concerning for metastases.  Consult Oncology for evaluation/recommendations.    May need Liver biopsy or a trial of abx followed by out pt PET/CT to r/o malignancy    S/p Liver biopsy      R Post Obstructive Pneumonia sec to R Hilar mass/ nodule  Need tissue diagnosis for lung ca dx  S/p Liver biopsy  Started on Zithromax, nebs  Will add IS, Acapela  May need XRT once diagnosis is established if obstruction persists      Right upper quadrant pain  Complaining of RUQ pain  Abdominal ultrasound reveals multiple liver lesions suspicious for hepatic metastatic disease.    Will review with Radiology and oncology  again     Under control with current meds    Shortness of breath        Essential hypertension  Hold home dose ACEI due to cough.  Monitor BP closely and make adjustments as needed.  Hydralazine 10 mg IV every 6 hr if SBP greater than 160.        VTE Risk Mitigation (From admission, onward)         Ordered     Place sequential compression device  Until discontinued      02/04/20 2041                      Richard Brink MD  Department of Hospital Medicine   [facility]

## 2020-02-07 NOTE — ASSESSMENT & PLAN NOTE
Complaining of RUQ pain  Abdominal ultrasound reveals multiple liver lesions suspicious for hepatic metastatic disease.    Will review with Radiology and oncology again     Under control with current meds

## 2020-02-07 NOTE — ASSESSMENT & PLAN NOTE
Need tissue diagnosis for lung ca dx  S/p Liver biopsy  Started on Zithromax, nebs  Will add IS, Acapela  May need XRT once diagnosis is established if obstruction persists

## 2020-02-07 NOTE — ASSESSMENT & PLAN NOTE
2/7/20  ------CT chest shows---Mediastinal lymphadenopathy with right hilar soft tissue mass/lymphadenopathy.  Associated postobstructive collapse right anterior segment lower lobe.  Considerations include sarcoidosis versus neoplasm.  CT abdomen shows--Hepatomegaly with multiple low-density hypoenhancing lesions consistent with hepatic metastatic disease.  --Findings are concerning for potential metastatic disease versus inflammatory. Pulmonology following and treating patient for possible sarcoidosis with IV antibiotics and steroids. Patient improving clinically.  Patient s/p CT guided liver biopsy on yesterday. Awaiting pathology for further recommendations. Will plan for outpatient PET. Per review of Pulmonology note possible bronchoscopy outpatient  --daily Cbc, cmp. Laboratory review stable. Increase in WBC today. No fever. Likely steroid induced. No S&S to suggest infectious etiology   --supplemental oxygen PRN. Home O2 eval complete. Will discharge with home O2  --supportive care

## 2020-02-07 NOTE — DISCHARGE SUMMARY
[facility] Hospital Medicine  Discharge Summary      Patient Name: Maura Singh  MRN: [id number]  Admission Date: 2/4/2020  Hospital Length of Stay: 2 days  Discharge Date and Time:  02/07/2020 11:50 AM  Attending Physician: Richard Brink MD   Discharging Provider: Richard Brink MD  Primary Care Provider: Yessy Andrade MD      HPI:   Ms. Singh is a 80-year-old elderly  female with PMH significant for COPD, former smoker, HTN, has been complaining of worsening shortness of breath, dyspnea on exertion for the past few weeks.  She was seen at her PCPs office earlier today and she was in mild respiratory distress with wheezing, hence sent to the ED for further evaluation.  Patient is little hard of hearing especially on her right.  Most of the information obtained from speaking to family members at the bedside.  Patient denies fever, chills, but complained of 1-2 episodes of hemoptysis yesterday and earlier today.  Denies chest pain, but complains of intermittently dry nonproductive cough.  In the ED CTA chest revealed mediastinal lymphadenopathy with right hilar soft tissue mass/lymphadenopathy.  Associated postobstructive collapse right anterior segment lower lobe.  Considerations include sarcoidosis versus neoplasm.  CT abdomen reveals hypodense lesions in the liver, concerning for metastatic disease.  Patient has no known prior history of malignancy.  Patient not on blood thinners.    Admitting diagnosis hemoptysis, CT chest/abdomen concerning for metastatic disease.    * No surgery found *      Hospital Course:   80 year old female w hx of Arthritis, Deaf, DM, HTN, and Lung disease, ex smoker, quit 15 years ago, admitted with progressive dyspnea and an intermittent non productive cough with 1-2 episodes of hemoptysis. Denies chest pain. She has not had any episode of hemoptysis in the past 5 days. Has abnormal CT Chest and Abdomen with R hilar mass and Post obstuctive pneumonia  and hypodense Liver lesions. Pt is feeling fine, on IV Abx. Cough, SOB improved. Appreciate Dr. Gomez.     2/6- looks and feels much better, good spirits, just had Liver Biopsy by IR- tolerated it well, no post op pain. Eating drinking well, oxygenating well, hemodynamically stable.   2/7- looks better, sitting up comfortably in bed, no abd pain, no cough or hemoptysis. She qualified for home O2. She is eating drinking well, walking around well. Pulm and oncology have seen and cleared her already. She was seen and examined and deemed stable for discharge home today. She will have her Pathology results in about 5-7 days and will then follow up with Dr. Burgess and Joce. She will be on a 2 week course of oral Zithromax and may get out pt PET scan then.       Consults:   Consults (From admission, onward)        Status Ordering Provider     Inpatient consult to Oncology  Once     Provider:  Gill Burgess MD    Acknowledged ELISA CASTILLO     Inpatient consult to Pulmonology  Once     Provider:  Jaden Hobson MD    Acknowledged ELISA CASTILLO     IP consult to case management  Once     Provider:  (Not yet assigned)    Completed ELISA CASTILLO          No new Assessment & Plan notes have been filed under this hospital service since the last note was generated.  Service: Hospital Medicine    Final Active Diagnoses:    Diagnosis Date Noted POA    Malignancy [C80.1] 02/04/2020 Yes    Atelectasis of right lung [J98.11] 02/05/2020 Yes    Mediastinal lymphadenopathy [R59.0] 02/05/2020 Yes    Liver masses [R16.0]  Yes    Lung nodules [R91.8] 02/05/2020 Yes    Shortness of breath [R06.02] 02/05/2020 Yes    Essential hypertension [I10] 02/08/2018 Yes      Problems Resolved During this Admission:    Diagnosis Date Noted Date Resolved POA    PRINCIPAL PROBLEM:  Cough with hemoptysis [R04.2] 02/04/2020 02/07/2020 Yes    Right upper quadrant pain [R10.11] 02/04/2020 02/07/2020 Yes       Discharged Condition:  stable    Disposition: Home or Self Care    Follow Up:  Follow-up Information     Yessy Andrade MD. Schedule an appointment as soon as possible for a visit in 3 days.    Specialty:  Family Medicine  Why:  Hospital follow up  Contact information:  [id number] [facility]  [address]  [place] [id number] [phone]             Jaden Hobson MD. Schedule an appointment as soon as possible for a visit in 2 weeks.    Specialty:  Pulmonary Disease  Why:  Hospital follow up  Contact information:  [id number]  [place] [id number] [phone]                 Patient Instructions:      OXYGEN FOR HOME USE     Order Specific Question Answer Comments   Liter Flow 2    Duration Continuous    Qualifying SpO2: 88    Testing done at: Rest    Route nasal cannula    Portable mode: pulse dose acceptable    Device home concentrator with portable unit    Length of need (in months): 99 mos    Patient condition with qualifying saturation COPD lung cancer   Height: 5' (1.524 m)    Weight: 91.7 kg (202 lb 2.6 oz)    Does patient have medical equipment at home? rollator    Alternative treatment measures have been tried or considered and deemed clinically ineffective. Yes      Diet Cardiac     Activity as tolerated       Significant Diagnostic Studies: Labs:   Results for orders placed or performed during the hospital encounter of 02/04/20   CBC auto differential   Result Value Ref Range    WBC 11.49 3.90 - 12.70 K/uL    RBC 5.19 4.00 - 5.40 M/uL    Hemoglobin 14.6 12.0 - 16.0 g/dL    Hematocrit 46.6 37.0 - 48.5 %    Mean Corpuscular Volume 90 82 - 98 fL    Mean Corpuscular Hemoglobin 28.1 27.0 - 31.0 pg    Mean Corpuscular Hemoglobin Conc 31.3 (L) 32.0 - 36.0 g/dL    RDW 14.9 (H) 11.5 - 14.5 %    Platelets 355 (H) 150 - 350 K/uL    MPV 10.8 9.2 - 12.9 fL    Immature Granulocytes 0.4 0.0 - 0.5 %    Gran # (ANC) 6.9 1.8 - 7.7 K/uL    Immature Grans (Abs) 0.05 (H) 0.00 - 0.04 K/uL    Lymph # 2.6 1.0 - 4.8 K/uL    Mono # 1.5 (H) 0.3 -  1.0 K/uL    Eos # 0.4 0.0 - 0.5 K/uL    Baso # 0.11 0.00 - 0.20 K/uL    nRBC 0 0 /100 WBC    Gran% 60.0 38.0 - 73.0 %    Lymph% 22.5 18.0 - 48.0 %    Mono% 12.9 4.0 - 15.0 %    Eosinophil% 3.2 0.0 - 8.0 %    Basophil% 1.0 0.0 - 1.9 %    Differential Method Automated    Comprehensive metabolic panel   Result Value Ref Range    Sodium 137 136 - 145 mmol/L    Potassium 4.8 3.5 - 5.1 mmol/L    Chloride 103 95 - 110 mmol/L    CO2 23 23 - 29 mmol/L    Glucose 84 70 - 110 mg/dL    BUN, Bld 20 8 - 23 mg/dL    Creatinine 1.1 0.5 - 1.4 mg/dL    Calcium 9.6 8.7 - 10.5 mg/dL    Total Protein 7.8 6.0 - 8.4 g/dL    Albumin 3.1 (L) 3.5 - 5.2 g/dL    Total Bilirubin 0.5 0.1 - 1.0 mg/dL    Alkaline Phosphatase 139 (H) 55 - 135 U/L    AST 53 (H) 10 - 40 U/L    ALT 30 10 - 44 U/L    Anion Gap 11 8 - 16 mmol/L    eGFR if African American 54 (A) >60 mL/min/1.73 m^2    eGFR if non African American 47 (A) >60 mL/min/1.73 m^2   Protime-INR   Result Value Ref Range    Prothrombin Time 11.4 9.0 - 12.5 sec    INR 1.1 0.8 - 1.2   APTT   Result Value Ref Range    aPTT 27.6 21.0 - 32.0 sec   Urinalysis, Reflex to Urine Culture Urine, Clean Catch   Result Value Ref Range    Specimen UA Urine, Clean Catch     Color, UA Yellow Yellow, Straw, Tiffanie    Appearance, UA Clear Clear    pH, UA 6.0 5.0 - 8.0    Specific Gravity, UA 1.015 1.005 - 1.030    Protein, UA Negative Negative    Glucose, UA Negative Negative    Ketones, UA Negative Negative    Bilirubin (UA) Negative Negative    Occult Blood UA Negative Negative    Nitrite, UA Negative Negative    Urobilinogen, UA 1.0 <2.0 EU/dL    Leukocytes, UA Negative Negative   Troponin I   Result Value Ref Range    Troponin I <0.006 0.000 - 0.026 ng/mL   Brain natriuretic peptide   Result Value Ref Range    BNP 64 0 - 99 pg/mL   CBC auto differential   Result Value Ref Range    WBC 6.43 3.90 - 12.70 K/uL    RBC 4.96 4.00 - 5.40 M/uL    Hemoglobin 13.8 12.0 - 16.0 g/dL    Hematocrit 45.1 37.0 - 48.5 %    Mean  Corpuscular Volume 91 82 - 98 fL    Mean Corpuscular Hemoglobin 27.8 27.0 - 31.0 pg    Mean Corpuscular Hemoglobin Conc 30.6 (L) 32.0 - 36.0 g/dL    RDW 15.2 (H) 11.5 - 14.5 %    Platelets 323 150 - 350 K/uL    MPV 10.4 9.2 - 12.9 fL    Immature Granulocytes 0.5 0.0 - 0.5 %    Gran # (ANC) 5.5 1.8 - 7.7 K/uL    Immature Grans (Abs) 0.03 0.00 - 0.04 K/uL    Lymph # 0.8 (L) 1.0 - 4.8 K/uL    Mono # 0.1 (L) 0.3 - 1.0 K/uL    Eos # 0.0 0.0 - 0.5 K/uL    Baso # 0.02 0.00 - 0.20 K/uL    nRBC 0 0 /100 WBC    Gran% 85.0 (H) 38.0 - 73.0 %    Lymph% 12.6 (L) 18.0 - 48.0 %    Mono% 1.6 (L) 4.0 - 15.0 %    Eosinophil% 0.0 0.0 - 8.0 %    Basophil% 0.3 0.0 - 1.9 %    Differential Method Automated    Magnesium   Result Value Ref Range    Magnesium 2.0 1.6 - 2.6 mg/dL   Phosphorus   Result Value Ref Range    Phosphorus 4.5 2.7 - 4.5 mg/dL   Comprehensive metabolic panel   Result Value Ref Range    Sodium 139 136 - 145 mmol/L    Potassium 4.6 3.5 - 5.1 mmol/L    Chloride 106 95 - 110 mmol/L    CO2 23 23 - 29 mmol/L    Glucose 151 (H) 70 - 110 mg/dL    BUN, Bld 20 8 - 23 mg/dL    Creatinine 1.0 0.5 - 1.4 mg/dL    Calcium 9.5 8.7 - 10.5 mg/dL    Total Protein 7.6 6.0 - 8.4 g/dL    Albumin 2.9 (L) 3.5 - 5.2 g/dL    Total Bilirubin 0.4 0.1 - 1.0 mg/dL    Alkaline Phosphatase 123 55 - 135 U/L    AST 43 (H) 10 - 40 U/L    ALT 27 10 - 44 U/L    Anion Gap 10 8 - 16 mmol/L    eGFR if African American >60 >60 mL/min/1.73 m^2    eGFR if non African American 53 (A) >60 mL/min/1.73 m^2   Comprehensive metabolic panel   Result Value Ref Range    Sodium 139 136 - 145 mmol/L    Potassium 4.6 3.5 - 5.1 mmol/L    Chloride 106 95 - 110 mmol/L    CO2 23 23 - 29 mmol/L    Glucose 146 (H) 70 - 110 mg/dL    BUN, Bld 33 (H) 8 - 23 mg/dL    Creatinine 1.0 0.5 - 1.4 mg/dL    Calcium 10.0 8.7 - 10.5 mg/dL    Total Protein 7.6 6.0 - 8.4 g/dL    Albumin 3.1 (L) 3.5 - 5.2 g/dL    Total Bilirubin 0.4 0.1 - 1.0 mg/dL    Alkaline Phosphatase 108 55 - 135 U/L    AST  33 10 - 40 U/L    ALT 25 10 - 44 U/L    Anion Gap 10 8 - 16 mmol/L    eGFR if African American >60 >60 mL/min/1.73 m^2    eGFR if non African American 53 (A) >60 mL/min/1.73 m^2   CBC auto differential   Result Value Ref Range    WBC 15.28 (H) 3.90 - 12.70 K/uL    RBC 4.72 4.00 - 5.40 M/uL    Hemoglobin 13.3 12.0 - 16.0 g/dL    Hematocrit 43.5 37.0 - 48.5 %    Mean Corpuscular Volume 92 82 - 98 fL    Mean Corpuscular Hemoglobin 28.2 27.0 - 31.0 pg    Mean Corpuscular Hemoglobin Conc 30.6 (L) 32.0 - 36.0 g/dL    RDW 15.6 (H) 11.5 - 14.5 %    Platelets 333 150 - 350 K/uL    MPV 10.8 9.2 - 12.9 fL    Immature Granulocytes 0.5 0.0 - 0.5 %    Gran # (ANC) 13.0 (H) 1.8 - 7.7 K/uL    Immature Grans (Abs) 0.08 (H) 0.00 - 0.04 K/uL    Lymph # 1.1 1.0 - 4.8 K/uL    Mono # 1.2 (H) 0.3 - 1.0 K/uL    Eos # 0.0 0.0 - 0.5 K/uL    Baso # 0.01 0.00 - 0.20 K/uL    nRBC 0 0 /100 WBC    Gran% 84.9 (H) 38.0 - 73.0 %    Lymph% 6.9 (L) 18.0 - 48.0 %    Mono% 7.6 4.0 - 15.0 %    Eosinophil% 0.0 0.0 - 8.0 %    Basophil% 0.1 0.0 - 1.9 %    Differential Method Automated    Type & Screen   Result Value Ref Range    Group & Rh A POS     Indirect Zachariah NEG      All labs within the past 24 hours have been reviewed   Imaging Results          CT Abdomen Pelvis With Contrast (Final result)  Result time 02/04/20 18:32:55   Procedure changed from CT Abdomen Pelvis  Without Contrast     Final result by David Champion MD (02/04/20 18:32:55)                 Impression:      Hepatomegaly with multiple low-density hypoenhancing lesions consistent with hepatic metastatic disease.      Electronically signed by: David Champion MD  Date:    02/04/2020  Time:    18:32             Narrative:    EXAMINATION:  CT ABDOMEN PELVIS WITH CONTRAST    CLINICAL HISTORY:  Right upper quadrant pain.  Cholecystitis.    TECHNIQUE:  Standard contrast enhanced CT performed with 100cc's Omnipaque 350 with reformats.  All CT scans at this facility are performed  using  dose modulation techniques as appropriate to performed exam including the following:  automated exposure control; adjustment of mA and/or kV according to the patients size (this includes techniques or standardized protocols for targeted exams where dose is matched to indication/reason for exam: i.e. extremities or head);  iterative reconstruction technique.    COMPARISON:  None    FINDINGS:  The liver is abnormal.  The liver is enlarged with multiple low-attenuation lesions consistent with hepatic metastatic disease.  The gallbladder is distended.    The spleen is nonenlarged.    The adrenal glands appear normal.    Small renal cysts are present.    Pancreas is grossly unremarkable at this time.    Several aortocaval/mesenteric lymph nodes are present.  One measures 1.9 cm in size.    The bowel loops are nondilated.    There is evidence of diverticulosis of the sigmoid colon and of the left colon.    Within the pelvis the uterus is present.    Lumbar degenerative disc disease.                               CTA Chest Non-Coronary (PE Study) (Final result)  Result time 02/04/20 18:29:33    Final result by David Champion MD (02/04/20 18:29:33)                 Impression:      No evidence of acute pulmonary artery embolism.    Cardiomegaly with aortic atherosclerosis.    Mediastinal lymphadenopathy with right hilar soft tissue mass/lymphadenopathy.  Associated postobstructive collapse right anterior segment lower lobe.  Considerations include sarcoidosis versus neoplasm.  Follow-up suggested.    All CT scans at this facility use dose modulation, iterative reconstruction and/or weight based dosing when appropriate to reduce radiation dose to as low as reasonably achievable.      Electronically signed by: David Champion MD  Date:    02/04/2020  Time:    18:29             Narrative:    EXAMINATION:  CTA CHEST NON CORONARY    CLINICAL HISTORY:  Shortness of breath.  Hemoptysis;    TECHNIQUE:  Standard CTA of the  chest performed with 100 ml Omnipaque 350  utilizing 3-D MIP reformations.    COMPARISON:  Chest x-ray 12/13/2017.    FINDINGS:  Cardiac size is enlarged with evidence of coronary artery and aortic vascular calcification.    The pulmonary arteries reveal normal enhancement with no definitive filling defects or evidence of acute pulmonary artery embolism.    There are enlarged mediastinal lymph nodes.  Left AP window lymph node 2.2 cm in size.  Right hilar lymph node 2.6 cm in size.  Subcarinal lymph node 2.5 cm in size.  Right infrahilar soft tissue 3.3 x 3.0 cm in size with associated postobstructive collapse involving the anterior segments of the right lower lobe.    Note that prior chest x-ray reveal prominence of the hilar regions.  Considerations include lymphadenopathy secondary to sarcoidosis.  Neoplasm is also consideration and follow-up suggested.    No acute infiltrate is seen at this time.    In the upper abdomen the liver appears enlarged.    No pleural effusion.    No pneumothorax.                               US Abdomen Limited (Final result)  Result time 02/04/20 17:59:14    Final result by David Champion MD (02/04/20 17:59:14)                 Impression:      Multiple liver lesions suspicious for hepatic metastatic disease.  Follow-up CT of the abdomen and pelvis with contrast recommended.      Electronically signed by: David Champion MD  Date:    02/04/2020  Time:    17:59             Narrative:    EXAMINATION:  US ABDOMEN LIMITED    CLINICAL HISTORY:  Right upper quadrant pain    COMPARISON:  None    FINDINGS:  The liver is grossly abnormal.  There are multiple focal lesions most compatible with hepatic metastatic disease.  The largest is up to 7.2 cm in size.  The liver is overall enlarged as well.  The gallbladder reveals mild wall thickening with minimal sludge.  No stones.  The common bile duct is 4.3 mm.    The portal vein is grossly normal.  The pancreas is normal.  The right kidney  is normal at 10.6 cm.    No ascites.  The aorta and IVC are normal.  The adrenals are not included.                               X-Ray Chest AP Portable (Final result)  Result time 02/04/20 16:03:29    Final result by AJAY Castaneda Sr., MD (02/04/20 16:03:29)                 Impression:      1. There is no focal pulmonary infiltrate visualized.  2. The size of the heart is prominent.  This may be secondary to magnification.  .      Electronically signed by: Harris Castaneda MD  Date:    02/04/2020  Time:    16:03             Narrative:    EXAMINATION:  XR CHEST AP PORTABLE    CLINICAL HISTORY:  Dyspnea, unspecified    COMPARISON:  12/13/2017    FINDINGS:  The size of the heart is prominent.  There is no focal pulmonary infiltrate visualized.  There is no pneumothorax.  The costophrenic angles are sharp.                                Radiology:   Pending Diagnostic Studies:     Procedure Component Value Units Date/Time    5-HIAA Plasma (Neuroendocrine) [[id number]]     Order Status:  Sent Lab Status:  No result     Specimen:  Blood     Specimen to Pathology, Radiology Liver biopsy [[id number]] Collected:  02/06/20 1230    Order Status:  Sent Lab Status:  In process Updated:  02/06/20 1310         Medications:  Reconciled Home Medications:      Medication List      START taking these medications    azithromycin 500 MG tablet  Commonly known as:  ZITHROMAX  Take 1 tablet (500 mg total) by mouth once daily.  Start taking on:  February 8, 2020     labetalol 200 MG tablet  Commonly known as:  NORMODYNE  Take 1 tablet (200 mg total) by mouth every 12 (twelve) hours.        CONTINUE taking these medications    allopurinoL 100 MG tablet  Commonly known as:  ZYLOPRIM  TAKE 1 TABLET BY MOUTH ONCE DAILY     colchicine 0.6 mg tablet  Commonly known as:  COLCRYS  Take 1 tablet (0.6 mg total) by mouth 2 (two) times daily. For acute gout flareups; ok for alt capsule if chepaer     furosemide 20 MG tablet  Commonly known as:   LASIX  TAKE 1 TABLET BY MOUTH ONCE DAILY     lisinopril 40 MG tablet  Commonly known as:  PRINIVIL,ZESTRIL  Take 1 tablet (40 mg total) by mouth once daily.     multivitamin per tablet  Commonly known as:  THERAGRAN  Take 1 tablet by mouth once daily.            Indwelling Lines/Drains at time of discharge:   Lines/Drains/Airways     None                 Time spent on the discharge of patient: 51 minutes  Patient was seen and examined on the date of discharge and determined to be suitable for discharge.         Richard Brink MD  Department of Hospital Medicine  [facility]

## 2020-02-07 NOTE — PLAN OF CARE
CM received a secure chat from [facility].  Oxygen can not be provided by [facility] because patient had home oxygen through [facility] for 11 months.  Alicja will contact [facility] and forward the order to [facility].

## 2020-02-07 NOTE — PROGRESS NOTES
[facility] -   Hematology/Oncology  Progress Note    Patient Name: Maura Singh  Admission Date: 2/4/2020  Hospital Length of Stay: 2 days  Code Status: No Order     Subjective:     HPI:  80 y.o female seen by PCP on yesterday with c/o gradually worsening SOB. Associated symptoms include cough with hemoptysis, intermittent abdominal pain, decreased appetite, fatigue. Patient was advised to report to Emergency Department for further evaluation of shortness of breath. Symptoms are constant and moderate in severity. SOB worse with exertion. No relieving factors reported.     ED workup significant for: Abdominal US revealed: multiple liver lesions concerning for hepatic metastatic disease. CTA was negative for PE but did reveal Mediastinal lymphadenopathy with right hilar soft tissue mass/lymphadenopathy.  Associated postobstructive collapse right anterior segment lower lobe.  Considerations include sarcoidosis versus neoplasm. CT abdomen/pelvis showed: Hepatomegaly with multiple low-density hypoenhancing lesions consistent with hepatic metastatic disease.    Patient admits to not being up to date with routine preventative screenings. Has never had Colonoscopy. Reports having mammogram several years ago. Unable to recall date.     Hematology/Oncology consulted for further evaluation of abnormal findings discovered on imaging studies.     Interval history: No acute events overnight. S/p Ct guided liver biopsy with pending resuts. Reports significant improvement in SOB. Breathing comfortably on 5L supplemental O2 per nasal cannula. Home oxygen eval complete and she will discharge with home O2. Reports resolution of abdominal pain. Feels well overall. Sitting up in chair.   Oncology Treatment Plan:   [No treatment plan]    Medications:  Continuous Infusions:  Scheduled Meds:   albuterol-ipratropium  3 mL Nebulization Q6H    azithromycin  500 mg Oral Daily    labetalol  200 mg Oral Q12H    lisinopril   "40 mg Oral Daily    sodium chloride 3%  4 mL Nebulization Q12H     PRN Meds:acetaminophen, aluminum-magnesium hydroxide-simethicone, diphenhydrAMINE, fentaNYL, guaifenesin 100 mg/5 ml, hydrALAZINE, midazolam, ondansetron     Review of patient's allergies indicates:   Allergen Reactions    Pravastatin Other (See Comments)     Burning/flushing    Allopurinol analogues Other (See Comments)     "makes me sick and feel crazy"        Past Medical History:   Diagnosis Date    Arthritis     Deaf     Diabetes mellitus     Hypertension     Lung disease     copd     Past Surgical History:   Procedure Laterality Date    APPENDECTOMY      CATARACT EXTRACTION      MIDDLE EAR SURGERY       Family History     Problem Relation (Age of Onset)    ALS Father    Colon cancer Mother    Retinal detachment Son        Tobacco Use    Smoking status: Former Smoker     Packs/day: 0.50     Years: 48.00     Pack years: 24.00     Types: Cigarettes     Start date:      Last attempt to quit:      Years since quittin.1    Smokeless tobacco: Never Used   Substance and Sexual Activity    Alcohol use: No     Frequency: Never     Drinks per session: Patient refused     Binge frequency: Patient refused    Drug use: No    Sexual activity: Never       Review of Systems   Constitutional: Positive for activity change and appetite change. Negative for chills, diaphoresis, fatigue, fever and unexpected weight change.   HENT: Negative for congestion, mouth sores, nosebleeds, sore throat, trouble swallowing and voice change.    Eyes: Negative for photophobia and visual disturbance.   Respiratory: Positive for cough (1-2 episodes of hemoptysis ). Negative for chest tightness, shortness of breath and wheezing.    Cardiovascular: Negative for chest pain, palpitations and leg swelling.   Gastrointestinal: Negative for abdominal distention, abdominal pain, anal bleeding, blood in stool, constipation, diarrhea, nausea, rectal pain and " vomiting.   Genitourinary: Negative for difficulty urinating, dysuria and hematuria.   Musculoskeletal: Negative for arthralgias, back pain and myalgias.   Skin: Negative for pallor, rash and wound.   Neurological: Positive for weakness. Negative for dizziness, syncope and headaches.   Hematological: Negative for adenopathy. Does not bruise/bleed easily.   Psychiatric/Behavioral: The patient is nervous/anxious.      Objective:     Vital Signs (Most Recent):  Temp: 97.4 °F (36.3 °C) (02/07/20 0718)  Pulse: 75 (02/07/20 0945)  Resp: 19 (02/07/20 0718)  BP: 136/68 (02/07/20 0945)  SpO2: (!) 94 % (02/07/20 0945) Vital Signs (24h Range):  Temp:  [97 °F (36.1 °C)-97.6 °F (36.4 °C)] 97.4 °F (36.3 °C)  Pulse:  [71-88] 75  Resp:  [17-25] 19  SpO2:  [89 %-97 %] 94 %  BP: (114-159)/(56-75) 136/68     Weight: 91.7 kg (202 lb 2.6 oz)  Body mass index is 39.48 kg/m².  Body surface area is 1.97 meters squared.      Intake/Output Summary (Last 24 hours) at 2/7/2020 1054  Last data filed at 2/7/2020 0400  Gross per 24 hour   Intake 360 ml   Output 700 ml   Net -340 ml       Physical Exam   Constitutional: She is oriented to person, place, and time. She appears well-developed and well-nourished. She is cooperative. She appears ill. Nasal cannula in place.   HENT:   Head: Normocephalic.   Right Ear: External ear normal. Decreased hearing (hearing aid) is noted.   Left Ear: External ear normal. Decreased hearing is noted.   Nose: Nose normal.   Mouth/Throat: Oropharynx is clear and moist.   Eyes: Conjunctivae, EOM and lids are normal. Right eye exhibits no discharge. Left eye exhibits no discharge. No scleral icterus.   Neck: Normal range of motion. No thyroid mass present.   Cardiovascular: Normal rate, regular rhythm and normal heart sounds.   Pulmonary/Chest: Effort normal. No respiratory distress. She has decreased breath sounds. She has no wheezes. She has no rhonchi. She has no rales.   Abdominal: Soft. She exhibits no  distension. Bowel sounds are decreased. There is no tenderness.   Genitourinary:   Genitourinary Comments: deferred   Musculoskeletal: Normal range of motion. She exhibits no edema.   Lymphadenopathy:        Head (right side): No submandibular, no preauricular and no posterior auricular adenopathy present.        Head (left side): No submandibular, no preauricular and no posterior auricular adenopathy present.        Right cervical: No superficial cervical adenopathy present.       Left cervical: No superficial cervical adenopathy present.   Neurological: She is alert and oriented to person, place, and time.   Skin: Skin is warm and dry.   Psychiatric: She has a normal mood and affect. Her speech is normal and behavior is normal. Thought content normal.   Vitals reviewed.      Significant Labs:   BMP:   Recent Labs   Lab 02/06/20  1344   *      K 4.6      CO2 23   BUN 33*   CREATININE 1.0   CALCIUM 10.0   , CBC:   Recent Labs   Lab 02/07/20  0410   WBC 15.28*   HGB 13.3   HCT 43.5      , LFTs:   Recent Labs   Lab 02/06/20  1344   ALT 25   AST 33   ALKPHOS 108   BILITOT 0.4   PROT 7.6   ALBUMIN 3.1*    and All pertinent labs from the last 24 hours have been reviewed.    Diagnostic Results:  I have reviewed all pertinent imaging results/findings within the past 24 hours.    Assessment/Plan:     Liver masses  --s/p liver biopsy with pending pathology    Shortness of breath  --Patient reports significant improvement in ease of breathing today with supplemental oxygen per nasal cannula. Desats on RA but will discharge with home Supplemental oxygen PRN    Mediastinal lymphadenopathy  2/7/20  ------CT chest shows---Mediastinal lymphadenopathy with right hilar soft tissue mass/lymphadenopathy.  Associated postobstructive collapse right anterior segment lower lobe.  Considerations include sarcoidosis versus neoplasm.  CT abdomen shows--Hepatomegaly with multiple low-density hypoenhancing lesions  consistent with hepatic metastatic disease.  --Findings are concerning for potential metastatic disease versus inflammatory. Pulmonology following and treating patient for possible sarcoidosis with IV antibiotics and steroids. Patient improving clinically.  Patient s/p CT guided liver biopsy on yesterday. Awaiting pathology for further recommendations. Will plan for outpatient PET. Per review of Pulmonology note possible bronchoscopy outpatient  --daily Cbc, cmp. Laboratory review stable. Increase in WBC today. No fever. Likely steroid induced. No S&S to suggest infectious etiology   --supplemental oxygen PRN. Home O2 eval complete. Will discharge with home O2  --supportive care         Thank you for your consult. I will follow-up with patient. Please contact us if you have any additional questions.     Ana Callejas NP  Hematology/Oncology  [facility] [place]

## 2020-02-08 ENCOUNTER — PATIENT MESSAGE (OUTPATIENT)
Dept: INTERNAL MEDICINE | Facility: CLINIC | Age: 80
End: 2020-02-08

## 2020-02-09 RX ORDER — FUROSEMIDE 20 MG/1
TABLET ORAL
Qty: 90 TABLET | Refills: 0 | Status: SHIPPED | OUTPATIENT
Start: 2020-02-09 | End: 2020-02-10

## 2020-02-10 ENCOUNTER — OFFICE VISIT (OUTPATIENT)
Dept: INTERNAL MEDICINE | Facility: CLINIC | Age: 80
End: 2020-02-10
Payer: MEDICARE

## 2020-02-10 ENCOUNTER — PATIENT MESSAGE (OUTPATIENT)
Dept: INTERNAL MEDICINE | Facility: CLINIC | Age: 80
End: 2020-02-10

## 2020-02-10 ENCOUNTER — TELEPHONE (OUTPATIENT)
Dept: HEMATOLOGY/ONCOLOGY | Facility: CLINIC | Age: 80
End: 2020-02-10

## 2020-02-10 VITALS
HEIGHT: 60 IN | TEMPERATURE: 98 F | OXYGEN SATURATION: 93 % | BODY MASS INDEX: 39.48 KG/M2 | HEART RATE: 70 BPM | SYSTOLIC BLOOD PRESSURE: 90 MMHG | DIASTOLIC BLOOD PRESSURE: 48 MMHG

## 2020-02-10 DIAGNOSIS — R16.0 LIVER MASSES: ICD-10-CM

## 2020-02-10 DIAGNOSIS — J44.9 CHRONIC OBSTRUCTIVE PULMONARY DISEASE, UNSPECIFIED COPD TYPE: ICD-10-CM

## 2020-02-10 DIAGNOSIS — E66.01 MORBID (SEVERE) OBESITY DUE TO EXCESS CALORIES: ICD-10-CM

## 2020-02-10 DIAGNOSIS — I95.9 HYPOTENSION, UNSPECIFIED HYPOTENSION TYPE: Primary | ICD-10-CM

## 2020-02-10 DIAGNOSIS — R53.1 WEAKNESS: ICD-10-CM

## 2020-02-10 DIAGNOSIS — I50.32 CHRONIC DIASTOLIC CONGESTIVE HEART FAILURE: ICD-10-CM

## 2020-02-10 DIAGNOSIS — R91.8 LUNG NODULES: ICD-10-CM

## 2020-02-10 DIAGNOSIS — E11.9 TYPE 2 DIABETES MELLITUS WITHOUT COMPLICATION, WITHOUT LONG-TERM CURRENT USE OF INSULIN: ICD-10-CM

## 2020-02-10 DIAGNOSIS — R59.0 MEDIASTINAL LYMPHADENOPATHY: ICD-10-CM

## 2020-02-10 DIAGNOSIS — A09 DIARRHEA OF INFECTIOUS ORIGIN: ICD-10-CM

## 2020-02-10 DIAGNOSIS — R06.02 SHORTNESS OF BREATH: ICD-10-CM

## 2020-02-10 PROCEDURE — S0119 ONDANSETRON 4 MG: HCPCS | Mod: PBBFAC,PO

## 2020-02-10 PROCEDURE — 99214 OFFICE O/P EST MOD 30 MIN: CPT | Mod: PBBFAC,PO | Performed by: FAMILY MEDICINE

## 2020-02-10 PROCEDURE — 99999 PR PBB SHADOW E&M-EST. PATIENT-LVL IV: CPT | Mod: PBBFAC,,, | Performed by: FAMILY MEDICINE

## 2020-02-10 PROCEDURE — 99999 PR PBB SHADOW E&M-EST. PATIENT-LVL IV: ICD-10-PCS | Mod: PBBFAC,,, | Performed by: FAMILY MEDICINE

## 2020-02-10 PROCEDURE — 99215 PR OFFICE/OUTPT VISIT, EST, LEVL V, 40-54 MIN: ICD-10-PCS | Mod: S$PBB,,, | Performed by: FAMILY MEDICINE

## 2020-02-10 PROCEDURE — 99215 OFFICE O/P EST HI 40 MIN: CPT | Mod: S$PBB,,, | Performed by: FAMILY MEDICINE

## 2020-02-10 RX ORDER — ONDANSETRON 4 MG/1
4 TABLET, ORALLY DISINTEGRATING ORAL ONCE
Qty: 1 TABLET | Refills: 0 | Status: SHIPPED | OUTPATIENT
Start: 2020-02-10 | End: 2020-02-10

## 2020-02-10 RX ORDER — ONDANSETRON 4 MG/1
4 TABLET, ORALLY DISINTEGRATING ORAL
Status: COMPLETED | OUTPATIENT
Start: 2020-02-10 | End: 2020-02-10

## 2020-02-10 RX ADMIN — ONDANSETRON 4 MG: 4 TABLET, ORALLY DISINTEGRATING ORAL at 02:02

## 2020-02-10 NOTE — TELEPHONE ENCOUNTER
----- Message from Gill Burgess MD sent at 2/7/2020  4:30 PM CST -----  Regarding: RE: New Patient  Yes that is correct, as long as she is discharged as expected would be best later I week and please place reminder day before to check path is back. No labs needed  ty  ----- Message -----  From: Marine Zamudio MA  Sent: 2/7/2020   3:52 PM CST  To: Gill Burgess MD  Subject: New Patient                                      I received a call from this patients grand daughter. She stated that this patient has been seeing you in the Hosp and had a liver bx yesterday. Wanted to schedule an appt with you late next week to go over the path results, once they come back. I didn't see any documentation about a Hemo/Onc follow up needed, so I wanted to get your ok before scheduling the patient. Please advise.  Thanks    Would like return call - Lilian (Patients daughter) [phone]

## 2020-02-10 NOTE — PLAN OF CARE
Feb10 Hospital Follow Up with Yessy Andrade MD   Monday Feb 10, 2020 1:00 PM   Arrive at check-in approximately 15 minutes before your scheduled appointment time. Bring all outside medical records and imaging, along with a list of your current medications and insurance card. [facility]   [id number]  [place] [address]   [phone]      Oxygen delivered by [facility].  Patient declined home health services     02/10/20 0713   Final Note   Assessment Type Final Discharge Note   Anticipated Discharge Disposition Home   Hospital Follow Up  Appt(s) scheduled? Yes   Right Care Referral Info   Post Acute Recommendation No Care

## 2020-02-10 NOTE — PROGRESS NOTES
Subjective:      Patient ID: Maura Singh is a 80 y.o. female.    Chief Complaint: Hospital Follow Up    Disclaimer:  This note is prepared using voice recognition software and as such is likely to have errors and has not been proof read. Please contact me for questions.     Transitional Care Note    Family and/or Caretaker present at visit?  Yes.  Diagnostic tests reviewed/disposition: I have reviewed all completed as well as pending diagnostic tests at the time of discharge.  Disease/illness education:  Metastatic liver condition, lung collapse  Home health/community services discussion/referrals: Patient does not have home health established from hospital visit.  They do not need home health.  If needed, we will set up home health for the patient.   Establishment or re-establishment of referral orders for community resources: No other necessary community resources.   Discussion with other health care providers: will discuss with pulm and hem/onc.     Wt Readings from Last 10 Encounters:  02/06/20 : 91.7 kg (202 lb 2.6 oz)  02/04/20 : 91.4 kg (201 lb 8 oz)  11/01/19 : 95 kg (209 lb 7 oz)  10/02/19 : 95.2 kg (209 lb 14.1 oz)  09/05/19 : 95.9 kg (211 lb 6.7 oz)  11/06/18 : 91.4 kg (201 lb 8 oz)  05/02/18 : 89.5 kg (197 lb 5 oz)  03/20/18 : 92.5 kg (203 lb 14.8 oz)  02/08/18 : 96.4 kg (212 lb 8.4 oz)  01/22/18 : 96.4 kg (212 lb 8.4 oz)    Daughter Lilian is here with her today.  Basically the patient came home on Friday right after her liver biopsy.  Had a lot of significant pain and discomfort but was not sent home with any pain medicine.  While in the hospital she did have significantly elevated blood pressure upon admission due to significant amount of right upper quadrant pain thus she was sent home also with the addition of labetalol true 100 mg twice daily.  Since coming home they sent a message is well stating that she was having a lot of still discomfort in the right upper quadrant with some difficulty  breathing at times but she has been doing her 5 L oxygen.  Today she is actually much more comfortable.  They have been giving her Tylenol seems to be doing okay for her however this morning she had extremely loose bowel movement where she had to get into the bathtub get cleaned up as well.  They were concerned about potentially antibiotic induced diarrhea.  She has had very limited p.o. intake.  She cannot tolerate intake sweet.  Everything seems to make her very nauseated as well.  She does not have any nausea medicine.  At home initially was 102/58 however they did go ahead and give her her lisinopril 40 and the labetalol this a.m..  She did not take a Lasix though.  Initially blood pressure 90/48 here in confirmed at 86/48.  She is in a wheelchair we are elevated Ng her legs and she is able to do okay.  She has not been taking much p.o. intake though.  She is willing to try to take in some salt to her foods as well.  Ultimately in the hospital she had significant amount of right upper quadrant pain due to metastatic lesions noted in the liver with a collapsed lung on the right.  She is scheduled to see Pulmonary and Hematology and Oncology next week.  They declined home health as her daughter is a physical therapist with several home health agencies and they feel like they have a good support system at this time.  The patient herself does not want to do any invasive procedures or treatments even in the setting of a cancerous process.  They just want to be able to keep her comfortable as well.  She is not currently taking any of her gout medicines at this time.  She did take the azithromycin the 500 mg tablet this a.m. on an empty stomach.  We did discuss that this may be part of the issue with the nausea as well and that they will start to move this during lunchtime as well.  They are willing to take also Zofran today to see if this will help with her p.o. intake as well.  Otherwise below is a copy of her  discharge summary at this time.    Patient Name: Maura Singh  MRN: [id number]  Admission Date: 2/4/2020  Hospital Length of Stay: 2 days  Discharge Date and Time:  02/07/2020 11:50 AM  Attending Physician: Richard Brink MD   Discharging Provider: Richard Brikn MD  Primary Care Provider: Yessy Andrade MD        HPI:   Ms. Singh is a 80-year-old elderly  female with PMH significant for COPD, former smoker, HTN, has been complaining of worsening shortness of breath, dyspnea on exertion for the past few weeks.  She was seen at her PCPs office earlier today and she was in mild respiratory distress with wheezing, hence sent to the ED for further evaluation.  Patient is little hard of hearing especially on her right.  Most of the information obtained from speaking to family members at the bedside.  Patient denies fever, chills, but complained of 1-2 episodes of hemoptysis yesterday and earlier today.  Denies chest pain, but complains of intermittently dry nonproductive cough.  In the ED CTA chest revealed mediastinal lymphadenopathy with right hilar soft tissue mass/lymphadenopathy.  Associated postobstructive collapse right anterior segment lower lobe.  Considerations include sarcoidosis versus neoplasm.  CT abdomen reveals hypodense lesions in the liver, concerning for metastatic disease.  Patient has no known prior history of malignancy.  Patient not on blood thinners.     Admitting diagnosis hemoptysis, CT chest/abdomen concerning for metastatic disease.     * No surgery found *       Hospital Course:   80 year old female w hx of Arthritis, Deaf, DM, HTN, and Lung disease, ex smoker, quit 15 years ago, admitted with progressive dyspnea and an intermittent non productive cough with 1-2 episodes of hemoptysis. Denies chest pain. She has not had any episode of hemoptysis in the past 5 days. Has abnormal CT Chest and Abdomen with R hilar mass and Post obstuctive pneumonia and hypodense Liver lesions. Pt  is feeling fine, on IV Abx. Cough, SOB improved. Appreciate Dr. Gomez.     2/6- looks and feels much better, good spirits, just had Liver Biopsy by IR- tolerated it well, no post op pain. Eating drinking well, oxygenating well, hemodynamically stable.   2/7- looks better, sitting up comfortably in bed, no abd pain, no cough or hemoptysis. She qualified for home O2. She is eating drinking well, walking around well. Pulm and oncology have seen and cleared her already. She was seen and examined and deemed stable for discharge home today. She will have her Pathology results in about 5-7 days and will then follow up with Dr. Burgess and Joce. She will be on a 2 week course of oral Zithromax and may get out pt PET scan then.        Consults:   Consults (From admission, onward)        Status Ordering Provider        Inpatient consult to Oncology  Once    Provider:  Gill Burgess MD   Acknowledged ELISA CASTILLO        Inpatient consult to Pulmonology  Once    Provider:  Jaden Hobson MD   Acknowledged ELISA CASTILLO        IP consult to case management  Once    Provider:  (Not yet assigned)   Completed ELISA CASTILLO           No new Assessment & Plan notes have been filed under this hospital service since the last note was generated.  Service: Hospital Medicine     Final Active Diagnoses:    Diagnosis Date Noted POA   Malignancy (C80.1) 02/04/2020 Yes   Atelectasis of right lung (J98.11) 02/05/2020 Yes   Mediastinal lymphadenopathy (R59.0) 02/05/2020 Yes   Liver masses (R16.0)   Yes   Lung nodules (R91.8) 02/05/2020 Yes   Shortness of breath (R06.02) 02/05/2020 Yes   Essential hypertension (I10) 02/08/2018 Yes     Problems Resolved During this Admission:    Diagnosis Date Noted Date Resolved POA   PRINCIPAL PROBLEM:  Cough with hemoptysis (R04.2) 02/04/2020 02/07/2020 Yes   Right upper quadrant pain (R10.11) 02/04/2020 02/07/2020 Yes        Discharged Condition: stable     Disposition: Home or Self  Care     Follow Up:  Follow-up Information       Yessy Andrade MD. Schedule an appointment as soon as possible for a visit in 3 days.   Specialty:  Family Medicine  Why:  Hospital follow up  Contact information:  [address]  [address]  [place] [id number] [phone]                   Jaden Hobson MD. Schedule an appointment as soon as possible for a visit in 2 weeks.   Specialty:  Pulmonary Disease  Why:  Hospital follow up  Contact information:  [address]  [place] [id number] [phone]             Lab Results   Component Value Date    WBC 15.28 (H) 02/07/2020    HGB 13.3 02/07/2020    HCT 43.5 02/07/2020     02/07/2020    CHOL 250 (H) 09/05/2019    TRIG 319 (H) 09/05/2019    HDL 40 09/05/2019    ALT 25 02/06/2020    AST 33 02/06/2020     02/06/2020    K 4.6 02/06/2020     02/06/2020    CREATININE 1.0 02/06/2020    BUN 33 (H) 02/06/2020    CO2 23 02/06/2020    TSH 1.376 09/05/2019    INR 1.1 02/04/2020    HGBA1C 5.7 (H) 09/05/2019       CT Biopsy Liver  Narrative: EXAMINATION:  CT BIOPSY LIVER (XPD)    CLINICAL HISTORY:  LIVER LESIONS;    TECHNIQUE:  After the risks, benefits and options of the planned procedure were explained in full detail, signed informed written consent was obtained. The patient was placed supine on the CT table. The left hepatic lobe was adequately localized. The skin was marked with indelible ink. The area was prepped and draped in the usual sterile fashion. A timeout was performed, documented in the nurse's notes.    SEDATION TIME: 30 minutes.  Under physician supervision, IV Versed 1 mg and fentanyl 0.5 mg was administered for moderate sedation.  Pulse oximetry, heart rate and blood pressure were continuously monitored by an independent trained observer present spending the entire length of the procedure time face to face with the patient.    Using CT guidance, an 18 gauge coaxial needle was advanced into the left hepatic lobe liver mass.  Four  18-gauge core biopsies were obtained. The biopsy specimens were then sent to pathology confirming satisfactory tissue sampling.  Pathologist was present during the exam to confirm specimens.  After all needles were removed, post biopsy CT images demonstrates no complications. A sterile dressing was applied    FINDINGS:  The patient tolerated the procedure well without complication, departing the CT suite in unchanged condition.  Impression: 1. Successful CT guided general biopsy of the left hepatic lobe liver mass.  Analysis of the tissue is pending.    All CT scans at this facility use dose modulation, iterative reconstruction, and/or weight based dosing when appropriate to reduce radiation dose to as low as reasonably achievable.    Electronically signed by: Ross Giraldo MD  Date:    02/06/2020  Time:    14:26        Review of Systems   Constitutional: Positive for activity change, appetite change, fatigue and unexpected weight change. Negative for chills, diaphoresis and fever.   HENT: Positive for hearing loss.    Respiratory: Positive for shortness of breath. Negative for cough and chest tightness.    Cardiovascular: Negative for chest pain.   Gastrointestinal: Positive for abdominal distention, abdominal pain, diarrhea, nausea and vomiting. Negative for constipation.   Neurological: Positive for weakness. Negative for dizziness and headaches.   Psychiatric/Behavioral: Negative for decreased concentration, dysphoric mood and sleep disturbance.     Objective:     Vitals:    02/10/20 1326   BP: (!) 90/48   BP Location: Left arm   Patient Position: Sitting   BP Method: Large (Manual)   Pulse: 70   Temp: 98.2 °F (36.8 °C)   SpO2: (!) 93%  Comment: 5L   Weight: Comment: unable to stand no entergy bp low   Height: 5' (1.524 m)     Physical Exam   Constitutional: She appears well-developed and well-nourished. No distress.   In wheelchair, wearing nasal canula, on 5L. No acute distress.    HENT:   Head: Normocephalic  and atraumatic.   Right Ear: Tympanic membrane normal.   Left Ear: Tympanic membrane normal.   Mouth/Throat: Oropharynx is clear and moist.   Eyes: Conjunctivae and EOM are normal.   Neck: Normal range of motion. Neck supple.   Cardiovascular: Normal rate and regular rhythm.   Pulmonary/Chest: Effort normal. She has wheezes. She exhibits no tenderness.   Abdominal: Bowel sounds are normal. She exhibits distension and mass. There is tenderness. There is guarding.   RUQ   Psychiatric: She has a normal mood and affect. Her behavior is normal.   Nursing note and vitals reviewed.    Assessment:     1. Hypotension, unspecified hypotension type    2. Liver masses    3. Lung nodules    4. Mediastinal lymphadenopathy    5. Shortness of breath    6. Weakness    7. Diarrhea of infectious origin    8. Type 2 diabetes mellitus without complication, without long-term current use of insulin    9. Morbid (severe) obesity due to excess calories    10. Chronic diastolic congestive heart failure    11. Chronic obstructive pulmonary disease, unspecified COPD type      Plan:   Maura was seen today for hospital follow up.    Diagnoses and all orders for this visit:    Hypotension, unspecified hypotension type- new. D/c labetalol. Hold lasix, hold lisiopril for sbp < 120/80. Increase po intake. Given zofran in office. followup if not improving in 1-2 days. also with diarrhea this am. followup with hem/on an pulm next week. Declines hh. Discussed pallative care with Dr Villareal after biopsy results.     Liver masses- new diagnosis. Awaiting biopsy results.     Lung nodules- new diagnosis. Awaiting biopsy results.     Mediastinal lymphadenopathy- new diagnosis. Awaiting biopsy results.     Shortness of breath- new diagnosis. Awaiting biopsy results. On 5 l Nasal canula. Seeing pulm next week.     Weakness- due to above, hypotension, poor po intake.     Diarrhea of infectious origin- screen with abx, send for culture.   -     CLOSTRIDIUM  DIFFICILE; Future  -     Stool culture; Future    Type 2 diabetes mellitus without complication, without long-term current use of insulin - labs reviewed.     Morbid (severe) obesity due to excess calories- labs reviewed, unable to exercise at this time.     Chronic diastolic congestive heart failure- monitor po intake. If bp, not improveing, then return for 500mL of ns in 24 hrs.     Chronic obstructive pulmonary disease, unspecified COPD type- noted.     Other orders  -     ondansetron disintegrating tablet 4 mg  -     ondansetron (ZOFRAN-ODT) 4 MG TbDL; Take 1 tablet (4 mg total) by mouth once. for 1 dose            Follow up in about 4 weeks (around 3/9/2020) for F/u WT, Labs, Meds Dr Andrade.    Patient Instructions   k[place]uc[place] - fermented tea in produce section.   Wholly guacomole- single servings.     2 % greek yogurt to use as replacement for sour cream.   Chicken broth  Rice buttered.    Mashed potatoes.   rotissarie chicken.     Hold bp meds if BP < 120/80.  Stop labetalol.     To consult with Dr Cyn Villareal at [facility].         Time spent: 40 minutes in face to face discussion concerning diagnosis, prognosis, review of lab and test results, benefits of treatment as well as management of disease, counseling of patient and coordination of care between various health care providers . Greater than half the time spent was used for coordination of care and counseling of patient.

## 2020-02-10 NOTE — PATIENT INSTRUCTIONS
k[place]uc[place] - fermented tea in produce section.   Wholly guacomole- single servings.     2 % greek yogurt to use as replacement for sour cream.   Chicken broth  Rice buttered.    Mashed potatoes.   rotissarie chicken.     Hold bp meds if BP < 120/80.  Stop labetalol.     To consult with [place] Cyn Villareal [place] [facility] [place]

## 2020-02-10 NOTE — TELEPHONE ENCOUNTER
New patient appt made 2/18/20. Called and spoke with Daughter, Lilian and advised of Date/Time. Verbalized understanding//bdm

## 2020-02-12 LAB
FINAL PATHOLOGIC DIAGNOSIS: NORMAL
GROSS: NORMAL

## 2020-02-16 NOTE — PHYSICIAN QUERY
PT Name: Maura Singh  MR #: [id number]    Physician Query Form - Pathology Findings Clarification     CDS/: Tiffanie Bailon               Contact information:[email or web address]  This form is a permanent document in the medical record.     Query Date: February 16, 2020      By submitting this query, we are merely seeking further clarification of documentation.  Please utilize your independent clinical judgment when addressing the question(s) below.      The medical record contains the following:     Findings Supporting Clinical Information Location in Medical Record   Consistent with hepatocellular carcinoma, moderate to poorly differentiated  The biopsy shows nests of tumor cells with deeply eosinophilic granular   cytoplasm. Immunostains performed shows results as:   HepPar - Focal granular positive   CK7 and TTF-1 - Negative    CT abdomen reveals hypodense lesions in the liver, concerning for metastatic disease. Path report 2/6              DS 2/7     Please document the clinical significance of the Pathologists findings of __hepatocellular carcinoma__.    [ xx  ] I agree with the Pathology Findings   [   ] I do not agree with the Pathology Findings   [   ] Other/Clarification of Findings:   [  ] Clinically Undetermined       Please document in your progress notes daily for the duration of treatment until resolved and include in your discharge summary.

## 2020-02-17 ENCOUNTER — OFFICE VISIT (OUTPATIENT)
Dept: PULMONOLOGY | Facility: CLINIC | Age: 80
End: 2020-02-17
Payer: MEDICARE

## 2020-02-17 ENCOUNTER — LAB VISIT (OUTPATIENT)
Dept: LAB | Facility: HOSPITAL | Age: 80
End: 2020-02-17
Attending: INTERNAL MEDICINE
Payer: MEDICARE

## 2020-02-17 VITALS
RESPIRATION RATE: 20 BRPM | HEIGHT: 60 IN | WEIGHT: 198.75 LBS | OXYGEN SATURATION: 97 % | SYSTOLIC BLOOD PRESSURE: 132 MMHG | BODY MASS INDEX: 39.02 KG/M2 | HEART RATE: 66 BPM | DIASTOLIC BLOOD PRESSURE: 70 MMHG

## 2020-02-17 DIAGNOSIS — R06.00 DYSPNEA AND RESPIRATORY ABNORMALITIES: Chronic | ICD-10-CM

## 2020-02-17 DIAGNOSIS — R06.89 DYSPNEA AND RESPIRATORY ABNORMALITIES: Chronic | ICD-10-CM

## 2020-02-17 DIAGNOSIS — R59.0 MEDIASTINAL LYMPHADENOPATHY: Primary | Chronic | ICD-10-CM

## 2020-02-17 DIAGNOSIS — C22.0 HEPATOCELLULAR CARCINOMA: Chronic | ICD-10-CM

## 2020-02-17 LAB
CRP SERPL-MCNC: 123.3 MG/L (ref 0–8.2)
ERYTHROCYTE [SEDIMENTATION RATE] IN BLOOD BY WESTERGREN METHOD: 71 MM/HR (ref 0–20)

## 2020-02-17 PROCEDURE — 86255 FLUORESCENT ANTIBODY SCREEN: CPT | Mod: 91

## 2020-02-17 PROCEDURE — 99215 OFFICE O/P EST HI 40 MIN: CPT | Mod: S$PBB,,, | Performed by: INTERNAL MEDICINE

## 2020-02-17 PROCEDURE — 99215 PR OFFICE/OUTPT VISIT, EST, LEVL V, 40-54 MIN: ICD-10-PCS | Mod: S$PBB,,, | Performed by: INTERNAL MEDICINE

## 2020-02-17 PROCEDURE — 86140 C-REACTIVE PROTEIN: CPT

## 2020-02-17 PROCEDURE — 85651 RBC SED RATE NONAUTOMATED: CPT

## 2020-02-17 PROCEDURE — 86038 ANTINUCLEAR ANTIBODIES: CPT

## 2020-02-17 PROCEDURE — 36415 COLL VENOUS BLD VENIPUNCTURE: CPT

## 2020-02-17 PROCEDURE — 99999 PR PBB SHADOW E&M-EST. PATIENT-LVL III: ICD-10-PCS | Mod: PBBFAC,,, | Performed by: INTERNAL MEDICINE

## 2020-02-17 PROCEDURE — 99213 OFFICE O/P EST LOW 20 MIN: CPT | Mod: PBBFAC | Performed by: INTERNAL MEDICINE

## 2020-02-17 PROCEDURE — 99999 PR PBB SHADOW E&M-EST. PATIENT-LVL III: CPT | Mod: PBBFAC,,, | Performed by: INTERNAL MEDICINE

## 2020-02-17 NOTE — ASSESSMENT & PLAN NOTE
Etiology unclear.  Multifactorial etiology suspected.  Likely contributors to etiology (checked)    [x] Pulmonary airway disease    [x]  Pulmonary parenchymal disease  [x] Pulmonary vascular disease   [x] Pleural disease  [] Pulmonary vasculitis  [] Hypoventilation ( chest wall deformity, neuromuscular disease, obesity etc)  [] Anemia  [] Thyroid disease.  [] Cardiac illess  []         Sleep disorder    EVALUATION:  [x]        Complete PFT with bronchodilator.  []        Bronchial challenge with methacholine.   [x]        Stress test, pulmonary.  [x]        PULM - Arterial Blood Gases  []        Chest X Ray  []        CT scan of chest.   [x]        [facility]  [x]        Sedimentation rate  [x]        C-reactive protein  [x]        Anti-neutrophilic cytoplasmic antibody          PLAN:  Discussed diagnosis, its evaluation, treatment and usual course.  All questions answered.        Call if shortness of breath worsens, blood in sputum, change in character of cough, development of fever or chills, inability to maintain nutrition and hydration.     Re evaluate in four weeks with results.

## 2020-02-17 NOTE — PROGRESS NOTES
Post hospital discharge pulmonary follow up note      Maura Singh is a 80 y.o. female    ADMIT DATE: 2/4/20    DISCHARGE DATE: 2/7/20     ADMISSION DIAGNOSIS:  Progressive dyspnea and an intermittent non productive cough with 1-2 episodes of hemoptysis    DISCHARGE DIAGNOSIS: Same    Procedures Performed During Admission: CT biopsy LIVER    Treatment at Discharge: .       Medication List           Accurate as of February 17, 2020  3:16 PM. If you have any questions, ask your nurse or doctor.               CONTINUE taking these medications    azithromycin 500 MG tablet  Commonly known as:  ZITHROMAX  Take 1 tablet (500 mg total) by mouth once daily.     lisinopril 40 MG tablet  Commonly known as:  PRINIVIL,ZESTRIL  Take 1 tablet (40 mg total) by mouth once daily.     multivitamin per tablet  Commonly known as:  THERAGRAN          Today she reports that she is 75% improved.      Problem list     Patient Active Problem List   Diagnosis    Essential hypertension    Morbid obesity    Kidney stone on left side    Benign cyst of left kidney    Weakness    Gait abnormality    Mixed hyperlipidemia    Gastroesophageal reflux disease    Vision changes    Financial difficulties    History of respiratory failure    Hemoptysis    Atelectasis of right lung    Lung nodules    Mediastinal lymphadenopathy    Dyspnea and respiratory abnormalities    Liver masses    Type 2 diabetes mellitus without complication, without long-term current use of insulin    Chronic diastolic congestive heart failure    Chronic obstructive pulmonary disease    Hepatocellular carcinoma       Problem list has been reviewed.    Subjective:       HPI    Patients reports stable  NYHA II dyspnea    The patient does not have currently have symptoms / an exacerbation.       No shortness or breath.     Dyspnea Characteristics:   Exertional Dyspnea   Dyspnea Duration:  Chronic  Dyspnea Severity:  STAN 6  Dyspnea Timing:  Exertional  only  Dyspnea Contributing Factors:  Tobacco Abuse   Dyspnea Associated Symptoms:   Cough and  Wheezing       Modified Davon Dyspnea Scale      0  Nothing at all    0.5  Very, very slight (just noticeable)    1  Very slight    2  Slight    3  Moderate    4 Somewhat severe    5 Severe      6    7  Very severe    8    9   Very, very severe (almost maximal)  10  Maximal       Immunization status reviewed and up to date.      A full  review of systems, past , family  and social histories was performed except as mentioned in the note above, these are non contributory to the main issues discussed today.      Review of Systems   Constitutional: Positive for weight loss (4lbs). Negative for chills and fever.   HENT: Positive for hearing loss. Negative for congestion, ear pain, nosebleeds and sinus pain.    Eyes: Negative for blurred vision, double vision and photophobia.   Respiratory: Positive for shortness of breath. Negative for cough, hemoptysis, sputum production and wheezing.    Cardiovascular: Positive for orthopnea. Negative for chest pain, palpitations and claudication.   Gastrointestinal: Negative for abdominal pain, diarrhea, heartburn, nausea and vomiting.   Genitourinary: Negative for dysuria and urgency.   Musculoskeletal: Positive for joint pain. Negative for back pain, myalgias and neck pain.   Neurological: Positive for dizziness. Negative for tingling and headaches.   Endo/Heme/Allergies: Negative for environmental allergies. Does not bruise/bleed easily.   Psychiatric/Behavioral: The patient is nervous/anxious.        Objective:      Vitals:    02/17/20 1426   BP: 132/70   Pulse: 66   Resp: 20   SpO2: 97%   Weight: 90.2 kg (198 lb 11.9 oz)   Height: 5' (1.524 m)     Body mass index is 38.81 kg/m².    Physical Exam   Constitutional: She is oriented to person, place, and time. She appears well-developed and well-nourished. She is active and cooperative.  Non-toxic appearance. She does not appear ill. No  "distress.   HENT:   Head: Normocephalic.   Mouth/Throat: No oropharyngeal exudate.     Mallampati: 4   Eyes: Conjunctivae are normal.   Neck: Normal range of motion. Neck supple.   16"   Cardiovascular: Normal rate and regular rhythm.   Pulmonary/Chest: Effort normal and breath sounds normal. No stridor.   Abdominal: Soft. Bowel sounds are normal.   Musculoskeletal: Normal range of motion.   Lymphadenopathy:     She has no cervical adenopathy.   Neurological: She is alert and oriented to person, place, and time.   Skin: Skin is warm and dry. Capillary refill takes less than 2 seconds.   Psychiatric: She has a normal mood and affect. Her behavior is normal. Judgment and thought content normal.   Vitals reviewed.           Lab Review   Lab Results   Component Value Date     02/06/2020    K 4.6 02/06/2020     02/06/2020    CO2 23 02/06/2020    BUN 33 (H) 02/06/2020    CREATININE 1.0 02/06/2020    CALCIUM 10.0 02/06/2020     Lab Results   Component Value Date    TROPONINI <0.006 02/04/2020     Lab Results   Component Value Date    WBC 15.28 (H) 02/07/2020    HGB 13.3 02/07/2020    HCT 43.5 02/07/2020    MCV 92 02/07/2020     02/07/2020     Lab Results   Component Value Date    CHOL 250 (H) 09/05/2019    TRIG 319 (H) 09/05/2019    HDL 40 09/05/2019          CTA Chest Non-Coronary (PE Study): 02/04/20:     Cardiac size is enlarged with evidence of coronary artery and aortic vascular calcification.    The pulmonary arteries reveal normal enhancement with no definitive filling defects or evidence of acute pulmonary artery embolism.    There are enlarged mediastinal lymph nodes.  Left AP window lymph node 2.2 cm in size.  Right hilar lymph node 2.6 cm in size.  Subcarinal lymph node 2.5 cm in size.  Right infrahilar soft tissue 3.3 x 3.0 cm in size with associated postobstructive collapse involving the anterior segments of the right lower lobe.    Note that prior chest x-ray reveal prominence of the hilar " regions.  Considerations include lymphadenopathy secondary to sarcoidosis.  Neoplasm is also consideration and follow-up suggested.    No acute infiltrate is seen at this time.    In the upper abdomen the liver appears enlarged.    No pleural effusion.    No pneumothorax  Assessment / Plan :       Problem List Items Addressed This Visit        Oncology    Hepatocellular carcinoma    Overview     Consistent with hepatocellular carcinoma, moderate to poorly differentiated     The biopsy shows nests of tumor cells with deeply eosinophilic granular   cytoplasm. Immunostains performed shows results as:   HepPar - Focal granular positive   CK7 and TTF-1 - Negative          Current Assessment & Plan     Plan of management per ONCOLOGY.         Relevant Orders    NM PET CT Routine Skull to Mid Thigh       Other    Mediastinal lymphadenopathy - Primary    Current Assessment & Plan     NM PET to further evaluate.    After PET scan will refer for EBUS TBNA .               Relevant Orders    NM PET CT Routine Skull to Mid Thigh    Dyspnea and respiratory abnormalities    Current Assessment & Plan     Etiology unclear.  Multifactorial etiology suspected.  Likely contributors to etiology (checked)    [x] Pulmonary airway disease    [x]  Pulmonary parenchymal disease  [x] Pulmonary vascular disease   [x] Pleural disease  [] Pulmonary vasculitis  [] Hypoventilation ( chest wall deformity, neuromuscular disease, obesity etc)  [] Anemia  [] Thyroid disease.  [] Cardiac illess  []         Sleep disorder    EVALUATION:  [x]        Complete PFT with bronchodilator.  []        Bronchial challenge with methacholine.   [x]        Stress test, pulmonary.  [x]        PULM - Arterial Blood Gases  []        Chest X Ray  []        CT scan of chest.   [x]        CAMILLE  [x]        Sedimentation rate  [x]        C-reactive protein  [x]        Anti-neutrophilic cytoplasmic antibody          PLAN:  Discussed diagnosis, its evaluation, treatment and  usual course.  All questions answered.        Call if shortness of breath worsens, blood in sputum, change in character of cough, development of fever or chills, inability to maintain nutrition and hydration.     Re evaluate in four weeks with results.               Relevant Orders    CAMILLE Screen w/Reflex    C-reactive protein    Sedimentation rate    Anti-neutrophilic cytoplasmic antibody    Complete PFT with bronchodilator    PULM - Arterial Blood Gases--in addition to PFT only    Pulmonary stress test            TIME SPENT WITH PATIENT: Time spent: 50 minutes in face to face  discussion concerning diagnosis, prognosis, review of lab and test results, benefits of treatment as well as management of disease, counseling of patient and coordination of care between various health  care providers . Greater than half the time spent was used for coordination of care and counseling of patient.     Follow up in about 1 month (around 3/17/2020) for Shortness of breath, Mediastinal Adenopathy, HCC.

## 2020-02-17 NOTE — PATIENT INSTRUCTIONS
Lung Anatomy  Your lungs take air in to give your body oxygen, which the body needs to work. Your lungs, like all the tissues in your body, are made up of billions of tiny specialized cells. Old lung cells die and are replaced by new, identical lung cells. This natural process helps ensure healthy lungs.    Date Last Reviewed: 11/1/2016  © 9728-3491 [employer]. [address] [place] [address], [place] [address]. All rights reserved. This information is not intended as a substitute for professional medical care. Always follow your healthcare professional's instructions.

## 2020-02-18 ENCOUNTER — OFFICE VISIT (OUTPATIENT)
Dept: HEMATOLOGY/ONCOLOGY | Facility: CLINIC | Age: 80
End: 2020-02-18
Payer: MEDICARE

## 2020-02-18 ENCOUNTER — TELEPHONE (OUTPATIENT)
Dept: RADIOLOGY | Facility: HOSPITAL | Age: 80
End: 2020-02-18

## 2020-02-18 VITALS
DIASTOLIC BLOOD PRESSURE: 61 MMHG | OXYGEN SATURATION: 96 % | HEIGHT: 60 IN | TEMPERATURE: 97 F | WEIGHT: 199.5 LBS | BODY MASS INDEX: 39.17 KG/M2 | HEART RATE: 67 BPM | SYSTOLIC BLOOD PRESSURE: 135 MMHG

## 2020-02-18 DIAGNOSIS — R59.0 MEDIASTINAL LYMPHADENOPATHY: ICD-10-CM

## 2020-02-18 DIAGNOSIS — Z99.81 OXYGEN DEPENDENT: ICD-10-CM

## 2020-02-18 DIAGNOSIS — R10.9 ABDOMINAL PAIN, UNSPECIFIED ABDOMINAL LOCATION: ICD-10-CM

## 2020-02-18 DIAGNOSIS — R91.8 MASS OF RIGHT LUNG: ICD-10-CM

## 2020-02-18 DIAGNOSIS — C22.0 HCC (HEPATOCELLULAR CARCINOMA): Primary | ICD-10-CM

## 2020-02-18 LAB — ANA SER QL IF: NORMAL

## 2020-02-18 PROCEDURE — 99215 OFFICE O/P EST HI 40 MIN: CPT | Mod: S$PBB,,, | Performed by: INTERNAL MEDICINE

## 2020-02-18 PROCEDURE — 99215 OFFICE O/P EST HI 40 MIN: CPT | Mod: PBBFAC | Performed by: INTERNAL MEDICINE

## 2020-02-18 PROCEDURE — 99999 PR PBB SHADOW E&M-EST. PATIENT-LVL V: ICD-10-PCS | Mod: PBBFAC,,, | Performed by: INTERNAL MEDICINE

## 2020-02-18 PROCEDURE — 99215 PR OFFICE/OUTPT VISIT, EST, LEVL V, 40-54 MIN: ICD-10-PCS | Mod: S$PBB,,, | Performed by: INTERNAL MEDICINE

## 2020-02-18 PROCEDURE — 99999 PR PBB SHADOW E&M-EST. PATIENT-LVL V: CPT | Mod: PBBFAC,,, | Performed by: INTERNAL MEDICINE

## 2020-02-18 RX ORDER — LIDOCAINE 50 MG/G
1 PATCH TOPICAL DAILY
Qty: 15 PATCH | Refills: 0 | Status: SHIPPED | OUTPATIENT
Start: 2020-02-18 | End: 2020-02-28

## 2020-02-19 ENCOUNTER — HOSPITAL ENCOUNTER (OUTPATIENT)
Dept: RADIOLOGY | Facility: HOSPITAL | Age: 80
Discharge: HOME | End: 2020-02-19
Attending: INTERNAL MEDICINE
Payer: MEDICARE

## 2020-02-19 DIAGNOSIS — R59.0 MEDIASTINAL LYMPHADENOPATHY: ICD-10-CM

## 2020-02-19 DIAGNOSIS — C22.0 HEPATOCELLULAR CARCINOMA: Chronic | ICD-10-CM

## 2020-02-19 DIAGNOSIS — R59.0 MEDIASTINAL LYMPHADENOPATHY: Chronic | ICD-10-CM

## 2020-02-19 DIAGNOSIS — C22.0 HEPATOCELLULAR CARCINOMA: ICD-10-CM

## 2020-02-19 LAB
ANCA AB TITR SER IF: NORMAL TITER
P-ANCA TITR SER IF: NORMAL TITER

## 2020-02-19 PROCEDURE — 78815 PET IMAGE W/CT SKULL-THIGH: CPT | Mod: 26,PI,, | Performed by: RADIOLOGY

## 2020-02-19 PROCEDURE — 78815 PET IMAGE W/CT SKULL-THIGH: CPT | Mod: TC

## 2020-02-19 PROCEDURE — A9552 F18 FDG: HCPCS

## 2020-02-19 PROCEDURE — 78815 NM PET CT ROUTINE: ICD-10-PCS | Mod: 26,PI,, | Performed by: RADIOLOGY

## 2020-02-20 ENCOUNTER — PATIENT MESSAGE (OUTPATIENT)
Dept: HEMATOLOGY/ONCOLOGY | Facility: CLINIC | Age: 80
End: 2020-02-20

## 2020-02-20 PROBLEM — C22.0 HCC (HEPATOCELLULAR CARCINOMA): Status: ACTIVE | Noted: 2020-02-20

## 2020-02-20 PROCEDURE — G0180 MD CERTIFICATION HHA PATIENT: HCPCS | Mod: ,,, | Performed by: INTERNAL MEDICINE

## 2020-02-20 PROCEDURE — G0180 PR HOME HEALTH MD CERTIFICATION: ICD-10-PCS | Mod: ,,, | Performed by: INTERNAL MEDICINE

## 2020-02-20 NOTE — PLAN OF CARE
START OFF PATHWAY REGIMEN - Other Dx            Nivolumab (Opdivo(R))           Additional Orders: Severe immune-mediated reactions can occur (e.g.   pneumonitis, colitis, and hepatitis). See prescribing information for more   details including monitoring and required immediate management with steroids.   Monitor thyroid, renal, liver function tests, glucose, and sodium at baseline   and periodically during therapy.    **Always confirm dose/schedule in your pharmacy ordering system**    Patient Characteristics:  Intent of Therapy:  Non-Curative / Palliative Intent, Discussed with Patient

## 2020-02-20 NOTE — PROGRESS NOTES
Subjective:      DATE OF VISIT: 2/18/20     ?  Patient ID:?Maura Singh is a 80 y.o. female.?? MR#: [id number]   ?   REFERRING PROVIDER: Gill Burgess MD  [id number] [place] [address]     ? Primary Care Providers:  Yessy Andrade MD, MD (General)     CHIEF COMPLAINT: ???  Outpatient follow-up, establish care with Medical Oncology?   ?   ONCOLOGIC DIAGNOSIS:  Metastatic malignancy, biopsy proven hepatocellular carcinoma in liver  ?   CURRENT TREATMENT: TBD    ?   ONCOLOGIC HISTORY:    Ms. Singh was admitted to [facility] on 02/04/2020 with gradually progressive shortness of breath, cough with hemoptysis along with intermittent abdominal pain, anorexia and fatigue.  In the emergency room she had workup including abdominal ultrasound showing multiple liver lesions concerning for metastatic disease.  CTA was negative for pulmonary embolism but revealed mediastinal adenopathy with right hilar soft tissue mass cannot exclude lymphadenopathy along with postobstructive collapse of right anterior segment lower lobe.  Hepatomegaly was noted with multiple low-density hypoenhancing lesions consistent with hepatic metastatic disease.    During her admission she was treated with antibiotics for postobstructive pneumonia and recommended to continue 14 day course per Dr. Hobson in pulmonology who was consulted.    Note: She never had a colonoscopy.  Mammogram last several years ago.      HPI    Today I had the pleasure of seeing again in 1st outpatient follow-up Ms. Singh and her family who I met while she was inpatient during initial finding of metastatic disease.  She continues on 3 L continuous oxygen to maintain oxygen saturations are 97%.  She does note some improvement in shortness of breath/dyspnea on exertion but does have some residual pain at right /central abdominal region.  She has bilateral ankle edema which she was suspicious of gout but denies any prior history of gout no  notable associated joint tenderness/erythema.  No fevers or chills.  She has completed antibiotic course.    Review of Systems    ?   A comprehensive 14-point review of systems was reviewed with patient and was negative other than as specified above.   ?   PAST MEDICAL HISTORY:   Past Medical History:   Diagnosis Date    Arthritis     Deaf     Diabetes mellitus     Hypertension     Lung disease     copd    ?     PAST SURGICAL HISTORY:   Past Surgical History:   Procedure Laterality Date    APPENDECTOMY      CATARACT EXTRACTION      MIDDLE EAR SURGERY        ?   ALLERGIES:   Allergies as of 2020 - Reviewed 2020   Allergen Reaction Noted    Pravastatin Other (See Comments) 2018    Allopurinol analogues Other (See Comments) 2020      ?   MEDICATIONS:?   No outpatient medications have been marked as taking for the 20 encounter (Office Visit) with Gill Burgess MD.      ?   SOCIAL HISTORY:?   Social History     Tobacco Use    Smoking status: Former Smoker     Packs/day: 0.50     Years: 48.00     Pack years: 24.00     Types: Cigarettes     Start date:      Last attempt to quit:      Years since quittin.1    Smokeless tobacco: Never Used   Substance Use Topics    Alcohol use: No     Frequency: Never     Drinks per session: Patient refused     Binge frequency: Patient refused      ?      ?   FAMILY HISTORY:   family history includes ALS in her father; Colon cancer in her mother; Retinal detachment in her son.   ?        Objective:      Physical Exam      ?   Vitals:    20 1435   BP: 135/61   Pulse: 67   Temp: 97.4 °F (36.3 °C)      ?   ECOG:?1-2  General appearance: Generally well appearing, in no acute distress, 3 L supplemental oxygen.   Head, eyes, ears, nose, and throat: Pupils round and equally reactive to light. Oropharynx clear with moist mucous membranes.   Lymph: No palpable cervical or supraclavicular lymphadenopathy.   Cardiovascular: Regular rate  and rhythm, S1, S2, no audible murmurs.   Respiratory:  Diminished air movement in right lower lung field, occasional rhonchi.    Abdomen: Bowel sounds present, soft, nontender, nondistended. No palpable hepatosplenomegaly.   Extremities: Warm, with 1+ symmetric edema in ankles  Neurologic: Alert and oriented.  Sitting in wheelchair  Skin: No rashes, ecchymoses or petechial lesion.     ?   Laboratory:  ?   No visits with results within 1 Day(s) from this visit.   Latest known visit with results is:   Lab Visit on 02/17/2020   Component Date Value Ref Range Status    CAMILLE Screen 02/17/2020 Negative <1:80  Negative <1:80 Final    CRP 02/17/2020 123.3* 0.0 - 8.2 mg/L Final    Sed Rate 02/17/2020 71* 0 - 20 mm/Hr Final    Cytoplasmic Neutrophilic Ab 02/17/2020 <1:20  <1:20 Titer Final    Perinuclear (P-ANCA) 02/17/2020 <1:20  <1:20 Titer Final      ?   Tumor markers   ? No results found for: AFP    ?   Imaging:  ?  02/19/2019   NM PET CT ROUTINE    CLINICAL HISTORY:  Lung nodules;  Localized enlarged lymph nodes    TECHNIQUE:  Segmented attenuation corrected 3-D PET imaging was obtained from the skull base through the mid thighs utilizing 12.26 mCi F-18-FDG.  Noncontrast CT imaging was performed for attenuation correction, diagnosis, and anatomical fusion with PET.    COMPARISON:  02/04/2020 CT chest abdomen pelvis.    FINDINGS:  Head/neck: There is normal physiologic FDG uptake noted within the visualized brain parenchyma. Right supraclavicular lymphadenopathy with SUV max of 4.0.  No other FDG avid lymphadenopathy within the neck.    Chest: Hypermetabolic right infrahilar lung mass measuring 3.5 cm with SUV max of 10.3.  Contiguous tumor or lymphadenopathy in the more central right hilum with SUV max of 8.7.  Partial postobstructive collapse of the anterior segment right lower lobe with associated small focus of hypermetabolism exhibiting an SUV max of 2.9. Hypermetabolic lymphadenopathy in the subcarinal space  with SUV max of 9.0 and right and left prevascular space with SUV max of 7.3.  Multiple additional small mildly FDG avid nodes elsewhere in the mediastinum and left hilum.    Abdomen/Pelvis: Enlarged liver with innumerable conglomerated hypermetabolic masses throughout the right and left hepatic lobes.  SUV max is 15.3.  Multiple FDG avid abdominal lymph nodes with SUV max 3.0 left retrocrural, 4.1 pericaval, 3.7 portacaval, 3.7 gastrohepatic, and 4.7 left periaortic.  Additional mildly enlarged FDG avid left inguinal node with SUV max of 3.3. In the ascending colon with SUV max of 10.8.  No bowel obstruction.    Skeletal: Multiple hypermetabolic intraosseous lesions involving the cervical and thoracic spine, bilateral ribs, left L5-S1 facet joint, right acetabulum, left sacrum, left iliac bone, and right proximal femoral shaft.      Impression       1. Hypermetabolic right infrahilar lung mass and associated partial postobstructive collapse right lower lobe.  2. Small hypermetabolic focus right lower lobe possibly early metastasis.  3. Hypermetabolic masslike thickening ascending colon suspicious for neoplasm.  4. Multifocal FDG avid lissette metastases neck, chest, abdomen, and pelvis as noted.  5. Extensive FDG avid hepatic metastases.  6. Widespread FDG avid osseous metastases.            Pathology:    02/06/2020 liver biopsy  LIVER, BIOPSY:   Consistent with hepatocellular carcinoma, moderate to poorly differentiated     The biopsy shows nests of tumor cells with deeply eosinophilic granular   cytoplasm. Immunostains performed shows results as:   HepPar - Focal granular positive   CK7 and TTF-1 - Negative    ?   Assessment/Plan:       1. HCC (hepatocellular carcinoma)    2. Abdominal pain, unspecified abdominal location    3. Mass of right lung    4. Mediastinal lymphadenopathy    5. Oxygen dependent          Plan:     # metastatic disease:  I had detailed discussion today with patient and her family regarding  imaging findings including multifocal liver lesions, right hilar mass with associated adenopathy.  I reviewed results of CT-guided biopsy of liver lesion which was felt consistent with hepatocellular carcinoma.  I did discuss my concern for 2nd primary lung malignancy given pattern of spread and smoking history.  I have discussed his case with her pulmonologist Dr. Hobson and requested consideration of endobronchial biopsy of right hilar tissue to clarify whether there is a secondary pulmonary malignancy.  Additionally given intraluminal mass I would like to discuss whether any pulmonary intervention may be considered to open up the airway to relieve postobstructive collapse as she currently requires 3-5 L supplemental oxygen.  Inpatient family understand importance of clarifying pulmonary process prior to initiation of systemic therapy which will be guided by Pulmonary biopsy. I would like to initiate therapy for her metastatic malignancy as soon as possible. I reached out to pulmonology to discuss these concerns.   Following our visit patient had PET-CT per Dr. Hobson which is notable for avid right hilar soft tissue mass as well as diffuse lymphadenopathy involving right hilar region with postobstructive collapse.  There is also diffuse avid lymphadenopathy in bilateral mediastinum including subcarinal and left prevascular region.  Multifocal hepatic avid lesions.   I have also reached out to pathology to be sure no additional markers may be needed to clarify this diagnosis on liver pathology.    # abdominal pain:  Very focal abdominal pain may be related to tumor burden.  Prescribed trial of lidocaine patch.  Referral to palliative care.    # oxygen dependence, functional status:  Referral to home health.      Follow-Up:   Following pulmonary biopsy pathology result    Addendum:  Since our visit patient had PET-CT showing diffuse metastatic disease multifocal with large disease burden in liver as well as in lung  multiple lymph nodes and bones. I discussed case with Dr. Elkins; while bronchoscopy can be considered given multiple other disease sites amenable to biopsy and would not be planned for intervention/stent given risk with such intervention I agree that should we feel repeat biopsy needed alternative site is favored. I did write to reading pathologist and was told that immunoprofile is suggestive of liver primary.  I discussed at length with patient and her daughter that it is reasonable given predominant burden of disease in liver and multifocal disease with multiple metastatic deposits that this could all be single process of hepatocellular carcinoma.  Patient agreed and was not interested in pursuing additional biopsies.  AFP tumor marker lab not drawn during last visit will get when she is next in the building.  She does understand that we would not be able to exclude 2nd type of malignancy however given her current available biopsy most suggestive of hepatocellular carcinoma it does make sense to proceed with treatment.  I am concerned about extent of her disease and further progression; additional biopsy would delay initiation of therapy.  She is in agreement with proceeding.  I discussed use of immunotherapy which I would prefer given her other medical comorbidities and functional status.  Treatment plan placed for first-line immunotherapy with nivolumab for palliative treatment of her hepatocellular carcinoma.

## 2020-02-21 ENCOUNTER — OFFICE VISIT (OUTPATIENT)
Dept: HEMATOLOGY/ONCOLOGY | Facility: CLINIC | Age: 80
End: 2020-02-21
Payer: MEDICARE

## 2020-02-21 ENCOUNTER — LAB VISIT (OUTPATIENT)
Dept: LAB | Facility: HOSPITAL | Age: 80
End: 2020-02-21
Attending: INTERNAL MEDICINE
Payer: MEDICARE

## 2020-02-21 ENCOUNTER — DOCUMENTATION ONLY (OUTPATIENT)
Dept: HEMATOLOGY/ONCOLOGY | Facility: CLINIC | Age: 80
End: 2020-02-21

## 2020-02-21 DIAGNOSIS — C22.0 HCC (HEPATOCELLULAR CARCINOMA): ICD-10-CM

## 2020-02-21 DIAGNOSIS — R59.0 MEDIASTINAL LYMPHADENOPATHY: ICD-10-CM

## 2020-02-21 DIAGNOSIS — N28.1 BENIGN CYST OF LEFT KIDNEY: ICD-10-CM

## 2020-02-21 DIAGNOSIS — C22.0 HEPATOCELLULAR CARCINOMA: Primary | ICD-10-CM

## 2020-02-21 DIAGNOSIS — E78.2 MIXED HYPERLIPIDEMIA: ICD-10-CM

## 2020-02-21 DIAGNOSIS — J44.9 CHRONIC OBSTRUCTIVE PULMONARY DISEASE, UNSPECIFIED COPD TYPE: ICD-10-CM

## 2020-02-21 DIAGNOSIS — I50.32 CHRONIC DIASTOLIC CONGESTIVE HEART FAILURE: ICD-10-CM

## 2020-02-21 DIAGNOSIS — R91.8 LUNG NODULES: ICD-10-CM

## 2020-02-21 LAB
AFP SERPL-MCNC: 1.8 NG/ML (ref 0–8.4)
ALBUMIN SERPL BCP-MCNC: 2.4 G/DL (ref 3.5–5.2)
ALP SERPL-CCNC: 178 U/L (ref 55–135)
ALT SERPL W/O P-5'-P-CCNC: 22 U/L (ref 10–44)
ANION GAP SERPL CALC-SCNC: 9 MMOL/L (ref 8–16)
AST SERPL-CCNC: 48 U/L (ref 10–40)
BASOPHILS # BLD AUTO: 0.06 K/UL (ref 0–0.2)
BASOPHILS NFR BLD: 0.4 % (ref 0–1.9)
BILIRUB SERPL-MCNC: 0.4 MG/DL (ref 0.1–1)
BUN SERPL-MCNC: 13 MG/DL (ref 8–23)
CALCIUM SERPL-MCNC: 9.6 MG/DL (ref 8.7–10.5)
CHLORIDE SERPL-SCNC: 98 MMOL/L (ref 95–110)
CO2 SERPL-SCNC: 33 MMOL/L (ref 23–29)
CREAT SERPL-MCNC: 0.7 MG/DL (ref 0.5–1.4)
DIFFERENTIAL METHOD: ABNORMAL
EOSINOPHIL # BLD AUTO: 0.1 K/UL (ref 0–0.5)
EOSINOPHIL NFR BLD: 0.6 % (ref 0–8)
ERYTHROCYTE [DISTWIDTH] IN BLOOD BY AUTOMATED COUNT: 15.3 % (ref 11.5–14.5)
EST. GFR  (AFRICAN AMERICAN): >60 ML/MIN/1.73 M^2
EST. GFR  (NON AFRICAN AMERICAN): >60 ML/MIN/1.73 M^2
GLUCOSE SERPL-MCNC: 92 MG/DL (ref 70–110)
HCT VFR BLD AUTO: 42.4 % (ref 37–48.5)
HGB BLD-MCNC: 12.8 G/DL (ref 12–16)
IMM GRANULOCYTES # BLD AUTO: 0.12 K/UL (ref 0–0.04)
IMM GRANULOCYTES NFR BLD AUTO: 0.7 % (ref 0–0.5)
LYMPHOCYTES # BLD AUTO: 1.6 K/UL (ref 1–4.8)
LYMPHOCYTES NFR BLD: 10.1 % (ref 18–48)
MCH RBC QN AUTO: 27.7 PG (ref 27–31)
MCHC RBC AUTO-ENTMCNC: 30.2 G/DL (ref 32–36)
MCV RBC AUTO: 92 FL (ref 82–98)
MONOCYTES # BLD AUTO: 1.7 K/UL (ref 0.3–1)
MONOCYTES NFR BLD: 10.2 % (ref 4–15)
NEUTROPHILS # BLD AUTO: 12.8 K/UL (ref 1.8–7.7)
NEUTROPHILS NFR BLD: 78.7 % (ref 38–73)
NRBC BLD-RTO: 0 /100 WBC
PLATELET # BLD AUTO: 310 K/UL (ref 150–350)
PMV BLD AUTO: 9.9 FL (ref 9.2–12.9)
POTASSIUM SERPL-SCNC: 4.4 MMOL/L (ref 3.5–5.1)
PROT SERPL-MCNC: 7.1 G/DL (ref 6–8.4)
RBC # BLD AUTO: 4.62 M/UL (ref 4–5.4)
SODIUM SERPL-SCNC: 140 MMOL/L (ref 136–145)
WBC # BLD AUTO: 16.19 K/UL (ref 3.9–12.7)

## 2020-02-21 PROCEDURE — 99999 PR PBB SHADOW E&M-EST. PATIENT-LVL II: CPT | Mod: PBBFAC,,, | Performed by: NURSE PRACTITIONER

## 2020-02-21 PROCEDURE — 99999 PR PBB SHADOW E&M-EST. PATIENT-LVL II: ICD-10-PCS | Mod: PBBFAC,,, | Performed by: NURSE PRACTITIONER

## 2020-02-21 PROCEDURE — 80053 COMPREHEN METABOLIC PANEL: CPT

## 2020-02-21 PROCEDURE — 99215 OFFICE O/P EST HI 40 MIN: CPT | Mod: S$PBB,,, | Performed by: NURSE PRACTITIONER

## 2020-02-21 PROCEDURE — 36415 COLL VENOUS BLD VENIPUNCTURE: CPT

## 2020-02-21 PROCEDURE — 99215 PR OFFICE/OUTPT VISIT, EST, LEVL V, 40-54 MIN: ICD-10-PCS | Mod: S$PBB,,, | Performed by: NURSE PRACTITIONER

## 2020-02-21 PROCEDURE — 85025 COMPLETE CBC W/AUTO DIFF WBC: CPT

## 2020-02-21 PROCEDURE — 82105 ALPHA-FETOPROTEIN SERUM: CPT

## 2020-02-21 PROCEDURE — 99212 OFFICE O/P EST SF 10 MIN: CPT | Mod: PBBFAC | Performed by: NURSE PRACTITIONER

## 2020-02-21 RX ORDER — HYDROCODONE BITARTRATE AND ACETAMINOPHEN 5; 325 MG/1; MG/1
1 TABLET ORAL EVERY 6 HOURS PRN
Qty: 30 TABLET | Refills: 0 | Status: SHIPPED | OUTPATIENT
Start: 2020-02-21 | End: 2020-03-05 | Stop reason: SDUPTHER

## 2020-02-21 RX ORDER — ONDANSETRON 8 MG/1
8 TABLET, ORALLY DISINTEGRATING ORAL ONCE
Qty: 1 TABLET | Refills: 0 | Status: SHIPPED | OUTPATIENT
Start: 2020-02-21 | End: 2020-02-21

## 2020-02-21 NOTE — NURSING
Spoke with daughter Lilian over the phone as per pt request as she is hard of hearing , in a wheelchair , and uses Oxygen and wants Daughter to take all calls. We reviewed the NP chemo education session and lab appt which are to take place today and she will also see the  while she is here for the teaching.  Opdivo infusion is set for 3/2/2020 as per pt and daughter request as the daughter is going to be out of town until then and wished to start that date.  Lab, MD and chemo infusion appt set with daughter and all are in agreement to date time and location.  Message sent to referral center for auth process to begin and pharmacy and social work notified of new start.  Pt understands auth approval process. My role as nurse navigator reviewed as well as my direct contact information given ..  They will call back with any further needs.   Oncology Navigation   Intake  Cancer Type: GI (HCC)  MD Assigned: Kovtun  Initial Nurse Navigator Contact: 20  Contact Method: Pool basket  Date Worked: 20  First Appointment Available: 20  Multiple appointments: Yes  Reason if booked > 7 days after scheduling: Other  Other reason booked > 7 days: waiting on pulmonary workup   Start of Treatment: 20 (pt request for daughter transportation)  Reason for Treatment Delay: Further work-up  Further Work-Up: pulmonary     Treatment  Treatment Type(s): Other (Comment)  Other Treatment: OPdivo          Support Systems: Children  Barriers of Care: Comorbidities  Concerns: pt in wheelchair and uses oxygen     Acuity  Systemic Treatment - predicted or initiated: Other (+1)  Treatment Tolerability: Has not started treatment yet/treatment fully completed and side effects resolved  ECO  Comorbidities in Medical History: 2   Needed: 0  Support: 0  Transportation: 0  Verbalizes the need for more education: 1  Navigation Acuity: 10     Follow Up  Follow up in about 6 days (around 2020).

## 2020-02-21 NOTE — PROGRESS NOTES
I have reviewed your recent PET scan.    Results:      1. Hypermetabolic right infrahilar lung mass and associated partial postobstructive collapse right lower lobe.  2. Small hypermetabolic focus right lower lobe possibly early metastasis.  3. Hypermetabolic masslike thickening ascending colon suspicious for neoplasm.  4. Multifocal FDG avid lissette metastases neck, chest, abdomen, and pelvis as noted.  5. Extensive FDG avid hepatic metastases.  6. Widespread FDG avid osseous metastases.    Follow up is needed - Follow up with oncology as scheduled.     Please do well to keep your follow up appointments as scheduled.     Do not hesitate to call my office at [phone] with any questions and concerns.    Thanks for trusting us with your healthcare needs and using [facility]. If you want to ask us a question, you can do so by replying to this message or by calling [phone].     Sincerely,     Jaden Hobson M.D.   [facility]

## 2020-02-24 NOTE — PATIENT INSTRUCTIONS
Nutrition During Chemotherapy     Drink plenty of liquids, such as water.     During chemotherapy, the energy provided by a healthy diet can help you rebuild normal cells. It can also help you keep up your strength and fight infection. As a result, you may feel better and be more able to cope with side effects. Ask your doctor about your nutrition needs.  Drink plenty of fluids  · Fluids help the body produce urine and decrease constipation. They help prevent kidney and bladder problems. They also help replace fluids lost from vomiting and diarrhea.  · Try water, unsweetened juices, and other flavored drinks without caffeine. They flush toxins from the body.  Get enough calories  · Calories are fuel. The body uses this fuel to perform all of its functions, including healing.  · Its OK to be lean, but be sure you are not underweight. If you are, try eating more calories.  · Eat calorie-dense foods such as avocados, peanut butter, eggs, and ice cream.  · If you need extra calories, add butter, gravy, and sauces to foods (if tolerated).  · If you don't need the extra calories, try to limit foods that are fried, greasy, or high in fat or added sugar.  Eat protein, fruits, and vegetables  · Protein builds muscle, bone, skin, and blood. It helps your body heal and fight infection. It also helps boost your energy level.  · Good protein choices include yogurt, eggs, chicken, lean meats, beans, and peanut butter.  · Fruits and vegetables are full of important vitamins, minerals, and fiber to help your body function properly.  · Try to eat a variety of vegetables, fruits, whole grains, and beans.  · Ask your doctor about instant protein powder or other supplements.  Eating right during treatment  Side effects may make it a little harder to eat well on some days. The following tips will help you continue to get the nutrition you need:  · Be open to new foods and recipes.  · Eat small portions often and slowly.  · Have a  healthy snack instead of a meal if you are not very hungry.  · Try eating in a new setting.  · Physical activity, such as walking, can help increase your appetite. Try to be active for at least 30 minutes each day.  · Boost your diet by getting the vitamins and minerals you need from fruits, vegetables, and whole grains.  · If you live alone and are not up to cooking, ask your healthcare provider about Meals on Wheels or other outreach programs.  · Sometimes, it is best to follow your appetitie. Eat when you are hungry, but when you ar enot, forcing yourself to eat can make you feel bad, nauseated, or even cause you to vomit.   Date Last Reviewed: 1/6/2016  © 5512-0738 [employer]. [address] [place] [address], [place] [address]. All rights reserved. This information is not intended as a substitute for professional medical care. Always follow your healthcare professional's instructions.

## 2020-02-24 NOTE — PROGRESS NOTES
81 y/o female for chemotherapy teaching. Pt given the Navigation Notebook. Explained how to use notebook. Discussed with pt  rationale for chemotherapy, how it works, the process of treatment, potential side effects and symptoms to report.     Reviewed the specific medication and gave them a handout describing the side effects of Opdivo.     Dx: HCC     Discussed potential side effects such as:  Nausea and vomiting  Myelosuppression  Fatigue  Anorexia  Alopecia  Stomatitis  Diarrhea  Cystitis  Gastritis  Fever > 100.5  Antiemetics instructions  Skin care  Constipation  Rash  Hyperpigmentation  Rash  Photosensitivity  Sunscreen  Small freq meals  Increased protein  Increased calories  Vitamin support   Taste alterations  Neuropathys  Hydration  Renal toxicity  Port management  Community resources  Thrombocytopenia precautions  Hand and foot syndrome- symptoms and self care tips  Importance of monitoring blood sugars if diabetic and reporting elevations or lows    Face to Face time spent with patient: >60  Minutes, time spent in education and support.

## 2020-02-25 ENCOUNTER — DOCUMENTATION ONLY (OUTPATIENT)
Dept: PHARMACY | Facility: HOSPITAL | Age: 80
End: 2020-02-25

## 2020-02-26 ENCOUNTER — DOCUMENTATION ONLY (OUTPATIENT)
Dept: HEMATOLOGY/ONCOLOGY | Facility: CLINIC | Age: 80
End: 2020-02-26

## 2020-02-26 NOTE — PROGRESS NOTES
Nurse went to cover-my-meds web site, filled out auth request, pt was approved, nurse called pharmacy [facility], they ran the script and it was approved. Nurse called pt left voice message to  script.

## 2020-02-27 ENCOUNTER — EXTERNAL HOME HEALTH (OUTPATIENT)
Dept: HOME HEALTH SERVICES | Facility: HOSPITAL | Age: 80
End: 2020-02-27
Payer: MEDICARE

## 2020-02-27 ENCOUNTER — TELEPHONE (OUTPATIENT)
Dept: HEMATOLOGY/ONCOLOGY | Facility: CLINIC | Age: 80
End: 2020-02-27

## 2020-02-27 NOTE — TELEPHONE ENCOUNTER
SW intern spoke with pt to briefly introduce to SW services. Pt directed me to speak with pt's daughter as pt cannot hear well. SW intern briefly reviewed services for FA, transportation assistance, and emotional support. Pt' daughter stated they have no needs at this time. SW intern provided pt's daughter with SW contact information. Pt's daughter stated that pt begins treatment on Monday, 3/2/20. SW intern informed pt that SW will meet with pt to provide with SW checklist and services packet. Pt was appreciative of call and SW will f/u with pt at next appt.

## 2020-02-28 ENCOUNTER — PATIENT OUTREACH (OUTPATIENT)
Dept: ADMINISTRATIVE | Facility: HOSPITAL | Age: 80
End: 2020-02-28

## 2020-02-28 DIAGNOSIS — E11.9 TYPE 2 DIABETES MELLITUS WITHOUT COMPLICATION, WITHOUT LONG-TERM CURRENT USE OF INSULIN: Primary | ICD-10-CM

## 2020-02-28 DIAGNOSIS — E78.2 MIXED HYPERLIPIDEMIA: ICD-10-CM

## 2020-02-29 ENCOUNTER — EXTERNAL CHRONIC CARE MANAGEMENT (OUTPATIENT)
Dept: PRIMARY CARE CLINIC | Facility: CLINIC | Age: 80
End: 2020-02-29
Payer: MEDICARE

## 2020-02-29 PROCEDURE — 99490 CHRNC CARE MGMT STAFF 1ST 20: CPT | Mod: PBBFAC,PN | Performed by: FAMILY MEDICINE

## 2020-02-29 PROCEDURE — 99490 CHRNC CARE MGMT STAFF 1ST 20: CPT | Mod: S$PBB,,, | Performed by: FAMILY MEDICINE

## 2020-02-29 PROCEDURE — 99490 PR CHRONIC CARE MGMT, 1ST 20 MIN: ICD-10-PCS | Mod: S$PBB,,, | Performed by: FAMILY MEDICINE

## 2020-03-02 ENCOUNTER — OFFICE VISIT (OUTPATIENT)
Dept: HEMATOLOGY/ONCOLOGY | Facility: CLINIC | Age: 80
End: 2020-03-02
Payer: MEDICARE

## 2020-03-02 ENCOUNTER — SOCIAL WORK (OUTPATIENT)
Dept: HEMATOLOGY/ONCOLOGY | Facility: CLINIC | Age: 80
End: 2020-03-02

## 2020-03-02 ENCOUNTER — INFUSION (OUTPATIENT)
Dept: INFUSION THERAPY | Facility: HOSPITAL | Age: 80
End: 2020-03-02
Attending: INTERNAL MEDICINE
Payer: MEDICARE

## 2020-03-02 ENCOUNTER — HOSPITAL ENCOUNTER (OUTPATIENT)
Dept: RADIOLOGY | Facility: HOSPITAL | Age: 80
Discharge: HOME | End: 2020-03-02
Attending: INTERNAL MEDICINE
Payer: MEDICARE

## 2020-03-02 VITALS
HEART RATE: 81 BPM | OXYGEN SATURATION: 95 % | DIASTOLIC BLOOD PRESSURE: 66 MMHG | TEMPERATURE: 99 F | HEIGHT: 60 IN | SYSTOLIC BLOOD PRESSURE: 134 MMHG | RESPIRATION RATE: 20 BRPM | BODY MASS INDEX: 37.49 KG/M2 | WEIGHT: 190.94 LBS

## 2020-03-02 VITALS — HEART RATE: 82 BPM | DIASTOLIC BLOOD PRESSURE: 74 MMHG | SYSTOLIC BLOOD PRESSURE: 143 MMHG

## 2020-03-02 DIAGNOSIS — C22.0 HEPATOCELLULAR CARCINOMA: ICD-10-CM

## 2020-03-02 DIAGNOSIS — R60.0 LEG EDEMA, RIGHT: Primary | ICD-10-CM

## 2020-03-02 DIAGNOSIS — R60.0 LEG EDEMA, RIGHT: ICD-10-CM

## 2020-03-02 DIAGNOSIS — Z08 ENCOUNTER FOR FOLLOW-UP EXAMINATION AFTER COMPLETED TREATMENT FOR MALIGNANT NEOPLASM: ICD-10-CM

## 2020-03-02 DIAGNOSIS — C22.0 HEPATOCELLULAR CARCINOMA: Primary | ICD-10-CM

## 2020-03-02 PROCEDURE — 99215 OFFICE O/P EST HI 40 MIN: CPT | Mod: S$PBB,,, | Performed by: INTERNAL MEDICINE

## 2020-03-02 PROCEDURE — 93971 EXTREMITY STUDY: CPT | Mod: TC,RT

## 2020-03-02 PROCEDURE — 99999 PR PBB SHADOW E&M-EST. PATIENT-LVL IV: ICD-10-PCS | Mod: PBBFAC,,, | Performed by: INTERNAL MEDICINE

## 2020-03-02 PROCEDURE — 99214 OFFICE O/P EST MOD 30 MIN: CPT | Mod: PBBFAC,25 | Performed by: INTERNAL MEDICINE

## 2020-03-02 PROCEDURE — 63600175 PHARM REV CODE 636 W HCPCS: Performed by: INTERNAL MEDICINE

## 2020-03-02 PROCEDURE — 96413 CHEMO IV INFUSION 1 HR: CPT

## 2020-03-02 PROCEDURE — 99999 PR PBB SHADOW E&M-EST. PATIENT-LVL IV: CPT | Mod: PBBFAC,,, | Performed by: INTERNAL MEDICINE

## 2020-03-02 PROCEDURE — 99215 PR OFFICE/OUTPT VISIT, EST, LEVL V, 40-54 MIN: ICD-10-PCS | Mod: S$PBB,,, | Performed by: INTERNAL MEDICINE

## 2020-03-02 RX ORDER — SODIUM CHLORIDE 0.9 % (FLUSH) 0.9 %
10 SYRINGE (ML) INJECTION
Status: CANCELLED | OUTPATIENT
Start: 2020-03-02

## 2020-03-02 RX ORDER — HEPARIN 100 UNIT/ML
500 SYRINGE INTRAVENOUS
Status: CANCELLED | OUTPATIENT
Start: 2020-03-02

## 2020-03-02 RX ORDER — ONDANSETRON 8 MG/1
TABLET, ORALLY DISINTEGRATING ORAL
COMMUNITY
Start: 2020-02-21 | End: 2020-03-10

## 2020-03-02 RX ADMIN — SODIUM CHLORIDE 240 MG: 9 INJECTION, SOLUTION INTRAVENOUS at 04:03

## 2020-03-02 RX ADMIN — SODIUM CHLORIDE: 9 INJECTION, SOLUTION INTRAVENOUS at 04:03

## 2020-03-02 NOTE — PROGRESS NOTES
Subjective:      DATE OF VISIT: 3/2/20     ?  Patient ID:?Maura Singh is a 80 y.o. female.?? MR#: [id number]   ?   PRIMARY ONCOLOGIST: Dr. Burgess    ? Primary Care Providers:  Yessy Andrade MD, MD (General)     CHIEF COMPLAINT: ???  Cycle 1 day 1 palliative chemotherapy with nivolumab  ?   ONCOLOGIC DIAGNOSIS:  Metastatic hepatocellular carcinoma  ?   CURRENT TREATMENT:  Nivolumab Q 2 weeks, cycle 1 day 1, 03/02/2020  ?   ONCOLOGIC HISTORY:    Ms. Singh was admitted to [facility] on 02/04/2020 with gradually progressive shortness of breath, cough with hemoptysis along with intermittent abdominal pain, anorexia and fatigue.  In the emergency room she had workup including abdominal ultrasound showing multiple liver lesions concerning for metastatic disease.  CTA was negative for pulmonary embolism but revealed mediastinal adenopathy with right hilar soft tissue mass cannot exclude lymphadenopathy along with postobstructive collapse of right anterior segment lower lobe.  Hepatomegaly was noted with multiple low-density hypoenhancing lesions consistent with hepatic metastatic disease.    2/19/20 PET-CT:    1. Hypermetabolic right infrahilar lung mass and associated partial postobstructive collapse right lower lobe.  2. Small hypermetabolic focus right lower lobe possibly early metastasis.  3. Hypermetabolic masslike thickening ascending colon suspicious for neoplasm.  4. Multifocal FDG avid lissette metastases neck, chest, abdomen, and pelvis as noted.  5. Extensive FDG avid hepatic metastases.  6. Widespread FDG avid osseous metastases.     02/06/2020 liver biopsy  Pathology:  Consistent with hepatocellular carcinoma, moderate to poorly differentiated     The biopsy shows nests of tumor cells with deeply eosinophilic granular   cytoplasm. Immunostains performed shows results as:   HepPar - Focal granular positive   CK7 and TTF-1 - Negative    03/02/2020 cycle 1 day 1 nivolumab Q 2 weeks palliative  immunotherapy    INTERVAL EVENTS    Ms. Singh presents with her daughter after having completed chemotherapy teaching/consent.  She is in stable medical condition with 3 L of continuous oxygen saturation she notes mild improvement in CT chest congestion.  No fevers or chills.  She does endorse right lower extremity edema worse than left.    Review of Systems    ?   A comprehensive 14-point review of systems was reviewed with patient and was negative other than as specified above.   ?      Objective:      Physical Exam      ?   Vitals:    03/02/20 1451   BP: 134/66   Pulse: 81   Resp: 20   Temp: 98.8 °F (37.1 °C)      ?   ECOG:?2  General appearance: Generally well appearing, in no acute distress, 3 L supplemental oxygen.   Head, eyes, ears, nose, and throat: Oropharynx clear with moist mucous membranes.  In  Cardiovascular: Regular rate and rhythm, S1, S2, no audible murmurs.   Respiratory:  Diminished air movement in right lower lung field, occasional rhonchi.    Abdomen: Bowel sounds present, soft, nontender, nondistended. No palpable hepatosplenomegaly.   Extremities: Warm, with right greater than left lower extremity edema 1+   Neurologic: Alert and oriented.  Sitting in wheelchair  Skin: No rashes, ecchymoses or petechial lesion.     ?   Laboratory:  ?   No visits with results within 1 Day(s) from this visit.   Latest known visit with results is:   Lab Visit on 02/21/2020   Component Date Value Ref Range Status    Sodium 02/21/2020 140  136 - 145 mmol/L Final    Potassium 02/21/2020 4.4  3.5 - 5.1 mmol/L Final    Chloride 02/21/2020 98  95 - 110 mmol/L Final    CO2 02/21/2020 33* 23 - 29 mmol/L Final    Glucose 02/21/2020 92  70 - 110 mg/dL Final    BUN, Bld 02/21/2020 13  8 - 23 mg/dL Final    Creatinine 02/21/2020 0.7  0.5 - 1.4 mg/dL Final    Calcium 02/21/2020 9.6  8.7 - 10.5 mg/dL Final    Total Protein 02/21/2020 7.1  6.0 - 8.4 g/dL Final    Albumin 02/21/2020 2.4* 3.5 - 5.2 g/dL Final     Total Bilirubin 02/21/2020 0.4  0.1 - 1.0 mg/dL Final    Alkaline Phosphatase 02/21/2020 178* 55 - 135 U/L Final    AST 02/21/2020 48* 10 - 40 U/L Final    ALT 02/21/2020 22  10 - 44 U/L Final    Anion Gap 02/21/2020 9  8 - 16 mmol/L Final    eGFR if African American 02/21/2020 >60  >60 mL/min/1.73 m^2 Final    eGFR if non African American 02/21/2020 >60  >60 mL/min/1.73 m^2 Final    AFP 02/21/2020 1.8  0.0 - 8.4 ng/mL Final    WBC 02/21/2020 16.19* 3.90 - 12.70 K/uL Final    RBC 02/21/2020 4.62  4.00 - 5.40 M/uL Final    Hemoglobin 02/21/2020 12.8  12.0 - 16.0 g/dL Final    Hematocrit 02/21/2020 42.4  37.0 - 48.5 % Final    Mean Corpuscular Volume 02/21/2020 92  82 - 98 fL Final    Mean Corpuscular Hemoglobin 02/21/2020 27.7  27.0 - 31.0 pg Final    Mean Corpuscular Hemoglobin Conc 02/21/2020 30.2* 32.0 - 36.0 g/dL Final    RDW 02/21/2020 15.3* 11.5 - 14.5 % Final    Platelets 02/21/2020 310  150 - 350 K/uL Final    MPV 02/21/2020 9.9  9.2 - 12.9 fL Final    Immature Granulocytes 02/21/2020 0.7* 0.0 - 0.5 % Final    Gran # (ANC) 02/21/2020 12.8* 1.8 - 7.7 K/uL Final    Immature Grans (Abs) 02/21/2020 0.12* 0.00 - 0.04 K/uL Final    Lymph # 02/21/2020 1.6  1.0 - 4.8 K/uL Final    Mono # 02/21/2020 1.7* 0.3 - 1.0 K/uL Final    Eos # 02/21/2020 0.1  0.0 - 0.5 K/uL Final    Baso # 02/21/2020 0.06  0.00 - 0.20 K/uL Final    nRBC 02/21/2020 0  0 /100 WBC Final    Gran% 02/21/2020 78.7* 38.0 - 73.0 % Final    Lymph% 02/21/2020 10.1* 18.0 - 48.0 % Final    Mono% 02/21/2020 10.2  4.0 - 15.0 % Final    Eosinophil% 02/21/2020 0.6  0.0 - 8.0 % Final    Basophil% 02/21/2020 0.4  0.0 - 1.9 % Final    Differential Method 02/21/2020 Automated   Final      ?   Tumor markers   ?   Lab Results   Component Value Date    AFP 1.8 02/21/2020       ?   Imaging:  ?  02/19/2019   NM PET CT ROUTINE    CLINICAL HISTORY:  Lung nodules;  Localized enlarged lymph nodes    TECHNIQUE:  Segmented attenuation corrected  3-D PET imaging was obtained from the skull base through the mid thighs utilizing 12.26 mCi F-18-FDG.  Noncontrast CT imaging was performed for attenuation correction, diagnosis, and anatomical fusion with PET.    COMPARISON:  02/04/2020 CT chest abdomen pelvis.    FINDINGS:  Head/neck: There is normal physiologic FDG uptake noted within the visualized brain parenchyma. Right supraclavicular lymphadenopathy with SUV max of 4.0.  No other FDG avid lymphadenopathy within the neck.    Chest: Hypermetabolic right infrahilar lung mass measuring 3.5 cm with SUV max of 10.3.  Contiguous tumor or lymphadenopathy in the more central right hilum with SUV max of 8.7.  Partial postobstructive collapse of the anterior segment right lower lobe with associated small focus of hypermetabolism exhibiting an SUV max of 2.9. Hypermetabolic lymphadenopathy in the subcarinal space with SUV max of 9.0 and right and left prevascular space with SUV max of 7.3.  Multiple additional small mildly FDG avid nodes elsewhere in the mediastinum and left hilum.    Abdomen/Pelvis: Enlarged liver with innumerable conglomerated hypermetabolic masses throughout the right and left hepatic lobes.  SUV max is 15.3.  Multiple FDG avid abdominal lymph nodes with SUV max 3.0 left retrocrural, 4.1 pericaval, 3.7 portacaval, 3.7 gastrohepatic, and 4.7 left periaortic.  Additional mildly enlarged FDG avid left inguinal node with SUV max of 3.3. In the ascending colon with SUV max of 10.8.  No bowel obstruction.    Skeletal: Multiple hypermetabolic intraosseous lesions involving the cervical and thoracic spine, bilateral ribs, left L5-S1 facet joint, right acetabulum, left sacrum, left iliac bone, and right proximal femoral shaft.      Impression       1. Hypermetabolic right infrahilar lung mass and associated partial postobstructive collapse right lower lobe.  2. Small hypermetabolic focus right lower lobe possibly early metastasis.  3. Hypermetabolic masslike  thickening ascending colon suspicious for neoplasm.  4. Multifocal FDG avid lissette metastases neck, chest, abdomen, and pelvis as noted.  5. Extensive FDG avid hepatic metastases.  6. Widespread FDG avid osseous metastases.            Pathology:    02/06/2020 liver biopsy  LIVER, BIOPSY:   Consistent with hepatocellular carcinoma, moderate to poorly differentiated     The biopsy shows nests of tumor cells with deeply eosinophilic granular   cytoplasm. Immunostains performed shows results as:   HepPar - Focal granular positive   CK7 and TTF-1 - Negative    ?   Assessment/Plan:       1. Leg edema, right    2. Hepatocellular carcinoma    3. Encounter for follow-up examination after completed treatment for malignant neoplasm           Plan:     # metastatic hepatocellular carcinoma:  Presents today for initiation of first-line palliative immunotherapy with nivolumab.  She has had chemotherapy teaching and consent this last week.  Labs reviewed okay to proceed with treatment for 02/21/2020 lab work and no interval events, clinically stable aside from lower extremity edema, see below.      # right lower extremity edema:  DVTs scan obtained after clinic today negative for DVT.  Using wraps and leg elevation, home health previously ordered.    # oxygen dependence, functional status:  Referral to home health.      Follow-Up:   - nivolumab C1D1 today  - RV in 2 weeks with labs prior

## 2020-03-02 NOTE — PLAN OF CARE
Pt states she feels ok today but SOB.    Infection precautions reviewed.  Pt states full understanding to call with any sign of infection, including temp >100.4.  Thermometer given to pt.

## 2020-03-02 NOTE — DISCHARGE INSTRUCTIONS
"[facility]  [address] [address].  or  [address] [address]  [phone] phone     [phone] fax  Hours of Operation: Monday- Friday 8:00am- 5:00pm  After hours phone  [phone]  Hematology / Oncology Physicians on call      Dr. Santiago Vaughn, MISAEL Fisher NP Tyesha Taylor, NP    Please call with any concerns regarding your appointment today.  Support Groups/Classes    Support groups and classes are being offered at the   [facility] and [place]!!    "Cooking with Cancer" (Nutrition Class):  Second Wednesday of each month   at noon at the [facility].  Metastatic Support Group:  Third Tuesday of each month   at noon at the [facility].  Next Steps Class/Group: Second and fourth Thursday of each month at noon at the [facility].  [facility] (Breast Cancer Support Group): First Tuesday of each month   at 5:30pm at the [facility] location.  [facility]: [facility]: Second and third Tuesday of each month from 7:30am - 2pm.  [facility]: First and fourth Tuesday of each month from 7:30am - 2pm    If you are interested in attending or would like more information please ask our social workers or your nurse!  FALL PREVENTION   Falls often occur due to slipping, tripping or losing your balance. Here are ways to reduce your risk of falling again.   Was there anything that caused your fall that can be fixed, removed or replaced?   Make your home safe by keeping walkways clear of objects you may trip over.   Use non-slip pads under rugs.   Do not walk in poorly lit areas.   Do not stand on chairs or wobbly ladders.   Use caution when reaching overhead or looking upward. This position can cause a loss of balance.   Be sure your shoes fit properly, have non-slip bottoms and are in good condition.   Be cautious when going up and down stairs, curbs, and when walking on uneven " sidewalks.   If your balance is poor, consider using a cane or walker.   If your fall was related to alcohol use, stop or limit alcohol intake.   If your fall was related to use of sleeping medicines, talk to your doctor about this. You may need to reduce your dosage at bedtime if you awaken during the night to go to the bathroom.   To reduce the need for nighttime bathroom trips:   Avoid drinking fluids for several hours before going to bed   Empty your bladder before going to bed   Men can keep a urinal at the bedside   © 2485-8395 [facility], [address] [place] [address]. All rights reserved. This information is not intended as a substitute for professional medical care. Always follow your healthcare professional's instructions.    WAYS TO HELP PREVENT INFECTION         WASH YOUR HANDS OFTEN DURING THE DAY, ESPECIALLY BEFORE YOU EAT, AFTER USING THE BATHROOM, AND AFTER TOUCHING ANIMALS     STAY AWAY FROM PEOPLE WHO HAVE ILLNESSES YOU CAN CATCH; SUCH AS COLDS, FLU, CHICKEN POX     TRY TO AVOID CROWDS     STAY AWAY FROM CHILDREN WHO RECENTLY HAVE RECEIVED LIVE VIRUS VACCINES     MAINTAIN GOOD MOUTH CARE     DO NOT SQUEEZE OR SCRATCH PIMPLES     CLEAN CUTS & SCRAPES RIGHT AWAY AND DAILY UNTIL HEALED WITH WARM WATER, SOAP & AN ANTISEPTIC     AVOID CONTACT WITH LITTER BOXES, BIRD CAGES, & FISH TANKS     AVOID STANDING WATER, IE., BIRD BATHS, FLOWER POTS/VASES, OR HUMIDIFIERS     WEAR GLOVES WHEN GARDENING OR CLEANING UP AFTER OTHERS, ESPECIALLY BABIES & SMALL CHILDREN     DO NOT EAT RAW FISH, SEAFOOD, MEAT, OR EGGS

## 2020-03-02 NOTE — PROGRESS NOTES
Met with pt who was accompanied by her daughter, Lilian. Pt is very hard of hearing so at times, Lilian had to help make sure the patient understood what SW was saying.     First, addressed Distress Screening pt completed today, score 7/10. There is a note that says that she has two sons living with her who don't get along and this creates a lot of stress. When SW asked about this, she said she sees no way to rectify the situation because they are both in their 50s and have had lifelong problems. She also endorsed problems with bathing/dressing and pain. Normalized pain in light of a cancer diagnosis; daughter, Lilian, is an occupational therapist and plans to be with pt at all of her appts. She also helps pt in the home and there are some logistical issues in the home but according to daughter they are things that cannot be fixed.     Reviewed Supportive Care packet. Pt would like to take home to review checklist. Provided with a case of Boost for pt to try and see if she likes it (at request of infusion nurse).     Advance Care Planning   Reviewed [facility] Advance Care Planning packet. Discussed importance of having HCPOA and living will on file. Patient and daughter will review; encouraged to return completed documentation to be put in EMR.        Pt was provided with SW contact info. She will return in 2 weeks and SW will plan to f/u then, but daughter knows that they can call if needed for anything sooner.

## 2020-03-03 ENCOUNTER — PATIENT MESSAGE (OUTPATIENT)
Dept: INTERNAL MEDICINE | Facility: CLINIC | Age: 80
End: 2020-03-03

## 2020-03-03 NOTE — TELEPHONE ENCOUNTER
Dr. Hobson, Can you assist with this patient in getting additional oxygen for appointments for continuous o2? Thanks.   Yessy Andrade MD

## 2020-03-04 DIAGNOSIS — R09.02 HYPOXEMIA: Primary | ICD-10-CM

## 2020-03-05 ENCOUNTER — INITIAL CONSULT (OUTPATIENT)
Dept: PALLIATIVE MEDICINE | Facility: CLINIC | Age: 80
End: 2020-03-05
Payer: MEDICARE

## 2020-03-05 ENCOUNTER — TELEPHONE (OUTPATIENT)
Dept: HEMATOLOGY/ONCOLOGY | Facility: CLINIC | Age: 80
End: 2020-03-05

## 2020-03-05 VITALS
DIASTOLIC BLOOD PRESSURE: 64 MMHG | OXYGEN SATURATION: 95 % | SYSTOLIC BLOOD PRESSURE: 122 MMHG | BODY MASS INDEX: 37.29 KG/M2 | HEART RATE: 82 BPM | HEIGHT: 60 IN

## 2020-03-05 DIAGNOSIS — J44.9 CHRONIC OBSTRUCTIVE PULMONARY DISEASE, UNSPECIFIED COPD TYPE: ICD-10-CM

## 2020-03-05 DIAGNOSIS — C22.0 HCC (HEPATOCELLULAR CARCINOMA): ICD-10-CM

## 2020-03-05 DIAGNOSIS — R06.89 DYSPNEA AND RESPIRATORY ABNORMALITIES: Primary | ICD-10-CM

## 2020-03-05 DIAGNOSIS — C22.0 HEPATOCELLULAR CARCINOMA: ICD-10-CM

## 2020-03-05 DIAGNOSIS — R06.02 SHORTNESS OF BREATH: ICD-10-CM

## 2020-03-05 DIAGNOSIS — G89.3 CANCER ASSOCIATED PAIN: ICD-10-CM

## 2020-03-05 DIAGNOSIS — R06.00 DYSPNEA AND RESPIRATORY ABNORMALITIES: Primary | ICD-10-CM

## 2020-03-05 PROCEDURE — 99497 PR ADVNCD CARE PLAN 30 MIN: ICD-10-PCS | Mod: S$PBB,25,, | Performed by: FAMILY MEDICINE

## 2020-03-05 PROCEDURE — 99999 PR PBB SHADOW E&M-EST. PATIENT-LVL IV: ICD-10-PCS | Mod: PBBFAC,,, | Performed by: FAMILY MEDICINE

## 2020-03-05 PROCEDURE — 99214 OFFICE O/P EST MOD 30 MIN: CPT | Mod: S$PBB,,, | Performed by: FAMILY MEDICINE

## 2020-03-05 PROCEDURE — 99214 OFFICE O/P EST MOD 30 MIN: CPT | Mod: PBBFAC,25 | Performed by: FAMILY MEDICINE

## 2020-03-05 PROCEDURE — 99497 ADVNCD CARE PLAN 30 MIN: CPT | Mod: S$PBB,25,, | Performed by: FAMILY MEDICINE

## 2020-03-05 PROCEDURE — 99214 PR OFFICE/OUTPT VISIT, EST, LEVL IV, 30-39 MIN: ICD-10-PCS | Mod: S$PBB,,, | Performed by: FAMILY MEDICINE

## 2020-03-05 PROCEDURE — 99999 PR PBB SHADOW E&M-EST. PATIENT-LVL IV: CPT | Mod: PBBFAC,,, | Performed by: FAMILY MEDICINE

## 2020-03-05 PROCEDURE — 99497 ADVNCD CARE PLAN 30 MIN: CPT | Mod: PBBFAC | Performed by: FAMILY MEDICINE

## 2020-03-05 RX ORDER — HYDROCODONE BITARTRATE AND ACETAMINOPHEN 5; 325 MG/1; MG/1
1 TABLET ORAL EVERY 6 HOURS PRN
Qty: 60 TABLET | Refills: 0 | Status: SHIPPED | OUTPATIENT
Start: 2020-03-05 | End: 2020-04-28 | Stop reason: SDUPTHER

## 2020-03-05 RX ORDER — FUROSEMIDE 20 MG/1
20 TABLET ORAL DAILY
Qty: 90 TABLET | Refills: 0
Start: 2020-03-05 | End: 2021-03-17 | Stop reason: SDUPTHER

## 2020-03-05 RX ORDER — ALBUTEROL SULFATE 0.83 MG/ML
2.5 SOLUTION RESPIRATORY (INHALATION)
Qty: 270 ML | Refills: 11 | Status: SHIPPED | OUTPATIENT
Start: 2020-03-05 | End: 2020-03-10 | Stop reason: SDUPTHER

## 2020-03-05 NOTE — PATIENT INSTRUCTIONS
Pain medication can cause constipation. Miralax is best for this, Senna would be the next step if this isn't effective.

## 2020-03-05 NOTE — PROGRESS NOTES
Subjective:       Patient ID: Maura Singh is a 80 y.o. female.    Chief Complaint: Consult    79 yo female here for initial palliative medicine consultation. She is accompanied by her daughter who works as an occupational therapist. We discussed the role of palliative care; patient wishes to discuss symptoms and complete ACP documents today. Also requests wheelchair for home use due to worsening FRAGOSO and difficulty with ambulation. She has hepatocellular carcinoma as well as oxygen dependent COPD. Has worsening dyspnea particularly with ascites. She lives in her own home with 2 adult children who are disabled. Has RUQ pain relieved with hydrocodone 5 which she is reluctant to take; she has noticed that the medication helps relieve her sensation of dyspnea as well. Typically takes once daily around bedtime. Otherwise denies n/v/d/c. Denies significant musculoskeletal pain.     Advance Care Planning    Code Status  In light of the patients advanced and life limiting illness,I engaged the the patient and family in a conversation about the patient's preferences for care  at the very end of life. The patient wishes to have a natural, peaceful death.  Along those lines, the patient does not wish to have CPR or other invasive treatments performed when her heart and/or breathing stops. I communicated to the patient and family that a [place] form was completed..  I spent a total of 20 minutes engaging the patient in this advance care planning discussion.       Advance Care Planning    Power of   I initiated the process of advance care planning today and explained the importance of this process to the patient.  I introduced the concept of advance directives to the patient, as well. Then the patient received detailed information about the importance of designating a Health Care Power of  (HCPOA). She was also instructed to communicate with this person about their wishes for future healthcare, should she  become sick and lose decision-making capacity. The patient has not previously appointed a HCPOA. After our discussion, the patient has decided to complete a HCPOA and has appointed her daughter and NAME: Lilian Obrien.  I spent a total time of 10 minutes discussing this issue with the patient.             does not have any pertinent problems on file.  Past Medical History:   Diagnosis Date    Arthritis     Deaf     Diabetes mellitus     Hypertension     Lung disease     copd     Past Surgical History:   Procedure Laterality Date    APPENDECTOMY      CATARACT EXTRACTION      MIDDLE EAR SURGERY       Family History   Problem Relation Age of Onset    Colon cancer Mother     ALS Father     Retinal detachment Son      Social History     Socioeconomic History    Marital status:      Spouse name: Not on file    Number of children: 5    Years of education: Not on file    Highest education level: Not on file   Occupational History    Occupation: homemaker   Social Needs    Financial resource strain: Very hard    Food insecurity:     Worry: Often true     Inability: Often true    Transportation needs:     Medical: Yes     Non-medical: Patient refused   Tobacco Use    Smoking status: Former Smoker     Packs/day: 0.50     Years: 48.00     Pack years: 24.00     Types: Cigarettes     Start date:      Last attempt to quit:      Years since quittin.1    Smokeless tobacco: Never Used   Substance and Sexual Activity    Alcohol use: No     Frequency: Never     Drinks per session: Patient refused     Binge frequency: Patient refused    Drug use: No    Sexual activity: Never   Lifestyle    Physical activity:     Days per week: 0 days     Minutes per session: 0 min    Stress: Only a little   Relationships    Social connections:     Talks on phone: More than three times a week     Gets together: Twice a week     Attends [employer] service: Not on file     Active member of club or  organization: No     Attends meetings of clubs or organizations: Never     Relationship status:    Other Topics Concern    Not on file   Social History Narrative    Not on file     Review of Systems   A comprehensive 14-point review of systems was reviewed with patient and was negative other than as specified above.     Objective:     Vitals:    03/05/20 1110   BP: 122/64   Pulse: 82        Physical Exam   Constitutional: She is oriented to person, place, and time. She appears well-developed and well-nourished.   HENT:   Head: Normocephalic and atraumatic.   Eyes: Pupils are equal, round, and reactive to light. EOM are normal.   Neck: Normal range of motion. Neck supple.   Cardiovascular: Normal rate and regular rhythm.   Pulmonary/Chest: Breath sounds normal. She is in respiratory distress.   Abdominal: Soft. Bowel sounds are normal. She exhibits distension.   Musculoskeletal: Normal range of motion. She exhibits no deformity.   Seated in wheelchair; poor mobility due to respiratory symptoms and abdominal ascites/generalized weakness   Neurological: She is alert and oriented to person, place, and time.   Skin: Skin is warm and dry.   Psychiatric: She has a normal mood and affect. Her behavior is normal.   Nursing note and vitals reviewed.      Assessment:       1. Dyspnea and respiratory abnormalities    2. HCC (hepatocellular carcinoma)    3. Hepatocellular carcinoma    4. Shortness of breath    5. Cancer associated pain    6. Chronic obstructive pulmonary disease, unspecified COPD type        Plan:           Problem List Items Addressed This Visit        Pulmonary    Chronic obstructive pulmonary disease    Relevant Orders    WHEELCHAIR FOR HOME USE       Oncology    Hepatocellular carcinoma    Overview     Consistent with hepatocellular carcinoma, moderate to poorly differentiated     The biopsy shows nests of tumor cells with deeply eosinophilic granular   cytoplasm. Immunostains performed shows  results as:   HepPar - Focal granular positive   CK7 and TTF-1 - Negative          Relevant Medications    HYDROcodone-acetaminophen (NORCO) 5-325 mg per tablet    HCC (hepatocellular carcinoma)    Relevant Orders    WHEELCHAIR FOR HOME USE       Other    Dyspnea and respiratory abnormalities - Primary    Relevant Medications    furosemide (LASIX) 20 MG tablet    albuterol (PROVENTIL) 2.5 mg /3 mL (0.083 %) nebulizer solution      Other Visit Diagnoses     Shortness of breath        with chronic o2 requirement, but now also with nstemi. needing cardiac workup also.     Relevant Medications    furosemide (LASIX) 20 MG tablet    Cancer associated pain        Relevant Medications    HYDROcodone-acetaminophen (NORCO) 5-325 mg per tablet

## 2020-03-05 NOTE — LETTER
March 9, 2020      Gill Burgess MD  [address] [place] [address]           [facility]  [address] [facility] [address]  [facility] [address]  Phone: [phone]  Fax: [phone]          Patient: Maura Singh   MR Number: [id number]   YOB: 1940   Date of Visit: 3/5/2020       Dear Dr. Gill Burgess:    Thank you for referring Maura Singh to me for evaluation. Attached you will find relevant portions of my assessment and plan of care.    If you have questions, please do not hesitate to call me. I look forward to following Maura Singh along with you.    Sincerely,    Hoa Villareal MD    Enclosure  CC:  No Recipients    If you would like to receive this communication electronically, please contact [email or web address] or [phone] to request more information on [email or web address] Link access.    For providers and/or their staff who would like to refer a patient to [facility], please contact us through our one-stop-shop provider referral line, [facility], at [phone].    If you feel you have received this communication in error or would no longer like to receive these types of communications, please e-mail [email or web address]

## 2020-03-06 ENCOUNTER — PATIENT MESSAGE (OUTPATIENT)
Dept: PALLIATIVE MEDICINE | Facility: CLINIC | Age: 80
End: 2020-03-06

## 2020-03-06 ENCOUNTER — PATIENT MESSAGE (OUTPATIENT)
Dept: INTERNAL MEDICINE | Facility: CLINIC | Age: 80
End: 2020-03-06

## 2020-03-10 ENCOUNTER — OFFICE VISIT (OUTPATIENT)
Dept: INTERNAL MEDICINE | Facility: CLINIC | Age: 80
End: 2020-03-10
Payer: MEDICARE

## 2020-03-10 ENCOUNTER — PATIENT MESSAGE (OUTPATIENT)
Dept: PULMONOLOGY | Facility: CLINIC | Age: 80
End: 2020-03-10

## 2020-03-10 ENCOUNTER — PATIENT MESSAGE (OUTPATIENT)
Dept: PALLIATIVE MEDICINE | Facility: CLINIC | Age: 80
End: 2020-03-10

## 2020-03-10 VITALS
HEART RATE: 88 BPM | HEIGHT: 60 IN | SYSTOLIC BLOOD PRESSURE: 108 MMHG | DIASTOLIC BLOOD PRESSURE: 52 MMHG | BODY MASS INDEX: 37.05 KG/M2 | WEIGHT: 188.69 LBS | TEMPERATURE: 98 F | OXYGEN SATURATION: 93 %

## 2020-03-10 DIAGNOSIS — J98.19 COLLAPSED LUNG: ICD-10-CM

## 2020-03-10 DIAGNOSIS — J44.9 CHRONIC OBSTRUCTIVE PULMONARY DISEASE, UNSPECIFIED COPD TYPE: ICD-10-CM

## 2020-03-10 DIAGNOSIS — R26.9 GAIT ABNORMALITY: ICD-10-CM

## 2020-03-10 DIAGNOSIS — E78.2 MIXED HYPERLIPIDEMIA: ICD-10-CM

## 2020-03-10 DIAGNOSIS — R54 FRAILTY SYNDROME IN GERIATRIC PATIENT: ICD-10-CM

## 2020-03-10 DIAGNOSIS — R06.00 DYSPNEA AND RESPIRATORY ABNORMALITIES: ICD-10-CM

## 2020-03-10 DIAGNOSIS — R06.89 DYSPNEA AND RESPIRATORY ABNORMALITIES: ICD-10-CM

## 2020-03-10 DIAGNOSIS — C22.0 HEPATOCELLULAR CARCINOMA: Primary | ICD-10-CM

## 2020-03-10 DIAGNOSIS — C22.0 HEPATOCELLULAR CARCINOMA: ICD-10-CM

## 2020-03-10 DIAGNOSIS — C79.9 METASTATIC NEOPLASTIC DISEASE: ICD-10-CM

## 2020-03-10 DIAGNOSIS — R53.1 WEAKNESS: ICD-10-CM

## 2020-03-10 PROCEDURE — 99215 OFFICE O/P EST HI 40 MIN: CPT | Mod: S$PBB,,, | Performed by: FAMILY MEDICINE

## 2020-03-10 PROCEDURE — 99999 PR PBB SHADOW E&M-EST. PATIENT-LVL III: CPT | Mod: PBBFAC,,, | Performed by: FAMILY MEDICINE

## 2020-03-10 PROCEDURE — 99215 PR OFFICE/OUTPT VISIT, EST, LEVL V, 40-54 MIN: ICD-10-PCS | Mod: S$PBB,,, | Performed by: FAMILY MEDICINE

## 2020-03-10 PROCEDURE — 99999 PR PBB SHADOW E&M-EST. PATIENT-LVL III: ICD-10-PCS | Mod: PBBFAC,,, | Performed by: FAMILY MEDICINE

## 2020-03-10 PROCEDURE — 99213 OFFICE O/P EST LOW 20 MIN: CPT | Mod: PBBFAC,PO | Performed by: FAMILY MEDICINE

## 2020-03-10 RX ORDER — LIDOCAINE 50 MG/G
1 PATCH TOPICAL
Qty: 30 PATCH | Refills: 4 | Status: SHIPPED | OUTPATIENT
Start: 2020-03-10 | End: 2021-09-16

## 2020-03-10 RX ORDER — LIDOCAINE 50 MG/G
1 PATCH TOPICAL
COMMUNITY
End: 2020-03-10 | Stop reason: SDUPTHER

## 2020-03-10 RX ORDER — ALBUTEROL SULFATE 0.83 MG/ML
2.5 SOLUTION RESPIRATORY (INHALATION)
Qty: 270 ML | Refills: 11 | OUTPATIENT
Start: 2020-03-10

## 2020-03-10 RX ORDER — ALBUTEROL SULFATE 0.83 MG/ML
2.5 SOLUTION RESPIRATORY (INHALATION)
Qty: 270 ML | Refills: 11 | Status: SHIPPED | OUTPATIENT
Start: 2020-03-10 | End: 2021-01-24

## 2020-03-10 NOTE — TELEPHONE ENCOUNTER
"I saw patient in clinic today and sent over the albuterol and the lidoderm patchs to [facility] pharmacy to see if we can get these covered for her under medicare part B for albuterol.     Lilian the daughter reported that the nurse navigator ( dwayne) for Dr. Gallardo told her to "contact pcp" as well as Dr Villareal's MA for these above issues. Daughter is ok to just to send messages, but feels that this additional feedback should be given directly to the providers to better help coordinate her care since she is on pallitave care now with HCC diagnosis, so I am just making you all aware so you can inform your staff accordingly. We need to limit her visits to the office now if possible.      Thanks.   Faye Andrade MD      "

## 2020-03-10 NOTE — Clinical Note
Can you take her off all the health modifiers I tried to do myself she is now on palliative care due to metastatic cancer.  She declines all further interventions for prevention or lab work.  Thanks.Yessy Andrade MD

## 2020-03-10 NOTE — TELEPHONE ENCOUNTER
----- Message from Malini Galvan sent at 3/10/2020  9:22 AM CDT -----  Contact: Ms Sanford Kristian [phone]  Would like to consult with the nurse, calling concerning patient Dosage, code on Rx is not right  Would like to speak with the nurse as soon as possible concerning this, Please call back at [phone], thanks s

## 2020-03-10 NOTE — PROGRESS NOTES
Subjective:      Patient ID: Maura Singh is a 80 y.o. female.    Chief Complaint: Follow-up (4 week )    Disclaimer:  This note is prepared using voice recognition software and as such is likely to have errors and has not been proof read. Please contact me for questions.     Pt is here for 1 month followup from hospitalization.  Here with her daughter Lilian.  Actually doing okay today.  Here for 4 week follow-up.  Does have oxygen via nasal cannula.  Having difficulty getting her albuterol and DuoNeb nebulizer treatments.  Mainly due to her Medicare part B coverage.  Went in met with Dr. Villareal for hospice and palliative care.  Has known now hepatic cellular carcinoma with known lesions extensively throughout the abdomen and now also skeletal in the femur and ribcage as well.  Currently using lidocaine patches at this time to help with the rib discomfort but needing refills as well.  Is also on hydrocodone low-dose at this time.  Seems to be actually doing better overall though with better blood pressure.  Better coloration as well.    The patient is somewhat frustrated because she gets easily full fast.  Uncertain why.  Trying to eat more at this time to keep up her nutritional strength.    Does not want to do any further interventions such as blood work or preventative health measures at this time.  Has known cancer diagnosis and wants to limit her visit outside as well.    Is breathing better with oxygen but is dependent on it at all times.    Is also having right leg pain as well with some difficulty lifting her leg also noted some swelling also.  Trying to do some Lasix but still also doing lisinopril noted to have some lower blood pressures previously.  With some hypotension also at times.  Willing to hold the lisinopril also.    Is now in with Pulmonary extensively also.  Has messages in to Dr. Hobson regarding neb treatments.   Wt Readings from Last 10 Encounters:  03/10/20 : 85.6 kg (188 lb 11.4  oz)  20 : 86.6 kg (190 lb 14.7 oz)  20 : 90.5 kg (199 lb 8.3 oz)  20 : 90.2 kg (198 lb 11.9 oz)  20 : 91.7 kg (202 lb 2.6 oz)  20 : 91.4 kg (201 lb 8 oz)  19 : 95 kg (209 lb 7 oz)  10/02/19 : 95.2 kg (209 lb 14.1 oz)  19 : 95.9 kg (211 lb 6.7 oz)  18 : 91.4 kg (201 lb 8 oz)      Lower few the patient messages from the daughter Lilian which I addressed today with them.    When we went to Dr Villareal for palliative visit she tried ordering albuterol for mom to have breathing treatments due to SOB and upper respiratory infection but we have been having difficulty due to the following:   Wanted to update you on the prescription for albuterol. We still do not have it. She is using an  prescription. I just got off the phone with Nancy( for the 5th time over the course of several days) and they said her insurance will not cover this medicine with the diagnosis code provided by her doctor of unspecified dyspnea. She has lung cancer with the tumors pressing and blocking the airway she also has a collapsing right lung with pneumonia. Would any of these diagnoses qualify? The Med tech send me a message stating Nancy needed her new Medicare card which we had provided but they said she still needed prior authorization. I'm not sure who to ask for what. My mom has an appointment today with Dr Andrade and we will mention this but I don't think she will address it as she had passed a message to Dr Naidu. Should I message him or his nurse? Thank you   Lilian Micah   [phone]       Hi Jadon,   I just got off the phone with customer service from [employer] and she said  prior authorization is needed not a new card. Her card is updated according to Medicare customer service and that if they issued her another card it's still the same number and she will need prior authorization. I have driven back and forth from [place] and am completely wore out. Please have the  doctor authorize this medication. Thank you. I will contact Nancy again but if this doesn't work can we use [facility]? Thank you   Lilian Obrien   [phone]    Jadon Mari MA   to Maura Singh Ann Arielle            3/6/20 9:39 AM   Good morning, I spoke with the pharmacy this am about the albuterol . They need a copy of her new medicare card which is why it is not covered .     79 yo female here for initial palliative medicine consultation. She is accompanied by her daughter who works as an occupational therapist. We discussed the role of palliative care; patient wishes to discuss symptoms and complete ACP documents today. Also requests wheelchair for home use due to worsening FRAGOSO and difficulty with ambulation. She has hepatocellular carcinoma as well as oxygen dependent COPD. Has worsening dyspnea particularly with ascites. She lives in her own home with 2 adult children who are disabled. Has RUQ pain relieved with hydrocodone 5 which she is reluctant to take; she has noticed that the medication helps relieve her sensation of dyspnea as well. Typically takes once daily around bedtime. Otherwise denies n/v/d/c. Denies significant musculoskeletal pain.      Advance Care Planning     Code Status  In light of the patient's advanced and life limiting illness,I engaged the the patient and family in a conversation about the patient's preferences for care  at the very end of life. The patient wishes to have a natural, peaceful death.  Along those lines, the patient does not wish to have CPR or other invasive treatments performed when her heart and/or breathing stops. I communicated to the patient and family that a [place] form was completed..  I spent a total of 20 minutes engaging the patient in this advance care planning discussion.        Advance Care Planning     Power of   I initiated the process of advance care planning today and explained the importance of this process to the patient.  I  introduced the concept of advance directives to the patient, as well. Then the patient received detailed information about the importance of designating a Health Care Power of  (HCPOA). She was also instructed to communicate with this person about their wishes for future healthcare, should she become sick and lose decision-making capacity. The patient has not previously appointed a HCPOA. After our discussion, the patient has decided to complete a HCPOA and has appointed her daughter and NAME: Lilian Obrien.  I spent a total time of 10 minutes discussing this issue with the patient.      Assessment:      1. Dyspnea and respiratory abnormalities   2. HCC (hepatocellular carcinoma)   3. Hepatocellular carcinoma   4. Shortness of breath   5. Cancer associated pain   6. Chronic obstructive pulmonary disease, unspecified COPD type       Plan:          Problem List Items Addressed This Visit          Pulmonary     Chronic obstructive pulmonary disease     Relevant Orders     WHEELCHAIR FOR HOME USE          Oncology     Hepatocellular carcinoma     Overview         Consistent with hepatocellular carcinoma, moderate to poorly differentiated     The biopsy shows nests of tumor cells with deeply eosinophilic granular   cytoplasm. Immunostains performed shows results as:   HepPar - Focal granular positive   CK7 and TTF-1 - Negative           Relevant Medications     HYDROcodone-acetaminophen (NORCO) 5-325 mg per tablet     HCC (hepatocellular carcinoma)     Relevant Orders     WHEELCHAIR FOR HOME USE          Other     Dyspnea and respiratory abnormalities - Primary     Relevant Medications     furosemide (LASIX) 20 MG tablet     albuterol (PROVENTIL) 2.5 mg /3 mL (0.083 %) nebulizer solution      Other Visit Diagnoses     Shortness of breath        with chronic o2 requirement, but now also with nstemi. needing cardiac workup also.     Relevant Medications    furosemide (LASIX) 20 MG tablet    Cancer associated pain         Relevant Medications    HYDROcodone-acetaminophen (NORCO) 5-325 mg per tablet    Narrative    EXAMINATION:  NM PET CT ROUTINE    CLINICAL HISTORY:  Lung nodules;  Localized enlarged lymph nodes    TECHNIQUE:  Segmented attenuation corrected 3-D PET imaging was obtained from the skull base through the mid thighs utilizing 12.26 mCi F-18-FDG.  Noncontrast CT imaging was performed for attenuation correction, diagnosis, and anatomical fusion with PET.    COMPARISON:  02/04/2020 CT chest abdomen pelvis.    FINDINGS:  Head/neck: There is normal physiologic FDG uptake noted within the visualized brain parenchyma. Right supraclavicular lymphadenopathy with SUV max of 4.0.  No other FDG avid lymphadenopathy within the neck.    Chest: Hypermetabolic right infrahilar lung mass measuring 3.5 cm with SUV max of 10.3.  Contiguous tumor or lymphadenopathy in the more central right hilum with SUV max of 8.7.  Partial postobstructive collapse of the anterior segment right lower lobe with associated small focus of hypermetabolism exhibiting an SUV max of 2.9. Hypermetabolic lymphadenopathy in the subcarinal space with SUV max of 9.0 and right and left prevascular space with SUV max of 7.3.  Multiple additional small mildly FDG avid nodes elsewhere in the mediastinum and left hilum.    Abdomen/Pelvis: Enlarged liver with innumerable conglomerated hypermetabolic masses throughout the right and left hepatic lobes.  SUV max is 15.3.  Multiple FDG avid abdominal lymph nodes with SUV max 3.0 left retrocrural, 4.1 pericaval, 3.7 portacaval, 3.7 gastrohepatic, and 4.7 left periaortic.  Additional mildly enlarged FDG avid left inguinal node with SUV max of 3.3. In the ascending colon with SUV max of 10.8.  No bowel obstruction.    Skeletal: Multiple hypermetabolic intraosseous lesions involving the cervical and thoracic spine, bilateral ribs, left L5-S1 facet joint, right acetabulum, left sacrum, left iliac bone, and right proximal  femoral shaft.    Impression      1. Hypermetabolic right infrahilar lung mass and associated partial postobstructive collapse right lower lobe.  2. Small hypermetabolic focus right lower lobe possibly early metastasis.  3. Hypermetabolic masslike thickening ascending colon suspicious for neoplasm.  4. Multifocal FDG avid lissette metastases neck, chest, abdomen, and pelvis as noted.  5. Extensive FDG avid hepatic metastases.  6. Widespread FDG avid osseous metastases.      Electronically signed by: SMILEY Do MD  Date: 02/19/2020  Time: 16:09    Is having right femur and leg pain. Has known bony lesions on femur.                   Lab Results   Component Value Date    WBC 16.19 (H) 02/21/2020    HGB 12.8 02/21/2020    HCT 42.4 02/21/2020     02/21/2020    CHOL 250 (H) 09/05/2019    TRIG 319 (H) 09/05/2019    HDL 40 09/05/2019    ALT 22 02/21/2020    AST 48 (H) 02/21/2020     02/21/2020    K 4.4 02/21/2020    CL 98 02/21/2020    CREATININE 0.7 02/21/2020    BUN 13 02/21/2020    CO2 33 (H) 02/21/2020    TSH 1.376 09/05/2019    INR 1.1 02/04/2020    HGBA1C 5.7 (H) 09/05/2019       US Lower Extremity Veins Right  Narrative: EXAMINATION:  US LOWER EXTREMITY VEINS RIGHT    CLINICAL HISTORY:  RLE pain and edema; Localized edema    TECHNIQUE:  Duplex and color flow Doppler evaluation and graded compression of the right lower extremity veins was performed.    COMPARISON:  None    FINDINGS:  Right thigh veins: The common femoral, femoral, popliteal, upper greater saphenous, and deep femoral veins are patent and free of thrombus. The veins are normally compressible and have normal phasic flow and augmentation response.    Right calf veins: The visualized calf veins are patent.    Contralateral CFV: The contralateral (left) common femoral vein is patent and free of thrombus.    Miscellaneous: None  Impression: No evidence of deep venous thrombosis in the right lower extremity.    Electronically signed by: Alan  MD Maddy  Date:    03/02/2020  Time:    17:33        Review of Systems   Constitutional: Positive for activity change, appetite change, fatigue and unexpected weight change.   HENT: Positive for hearing loss and trouble swallowing. Negative for congestion, tinnitus and voice change.    Eyes: Negative for discharge.   Respiratory: Positive for cough, chest tightness, shortness of breath and wheezing.    Cardiovascular: Positive for chest pain. Negative for palpitations.   Gastrointestinal: Positive for abdominal pain. Negative for constipation, diarrhea and vomiting.   Genitourinary: Negative for difficulty urinating and hematuria.   Musculoskeletal: Positive for arthralgias, back pain, gait problem, joint swelling and myalgias.   Neurological: Positive for weakness. Negative for dizziness, light-headedness and headaches.   Psychiatric/Behavioral: Positive for dysphoric mood. Negative for sleep disturbance. The patient is not nervous/anxious.      Objective:     Vitals:    03/10/20 1249   BP: (!) 108/52   BP Location: Left arm   Patient Position: Sitting   BP Method: Large (Manual)   Pulse: 88   Temp: 98.4 °F (36.9 °C)   TempSrc: Oral   SpO2: (!) 93%   Weight: 85.6 kg (188 lb 11.4 oz)   Height: 5' (1.524 m)     Physical Exam   Constitutional: She appears well-developed and well-nourished.   Morbid obese white female on nasal oxygen BMI 36 in a wheelchair   HENT:   Head: Normocephalic and atraumatic.   Right Ear: Tympanic membrane normal.   Left Ear: Tympanic membrane normal.   Mouth/Throat: Oropharynx is clear and moist.   Eyes: Pupils are equal, round, and reactive to light. Conjunctivae and EOM are normal.   Neck: Normal range of motion. Neck supple.   Cardiovascular: Normal rate and regular rhythm.   Pulmonary/Chest: Effort normal. She has wheezes.   Abdominal: Soft. Bowel sounds are normal. There is tenderness in the right upper quadrant and left upper quadrant. There is no rigidity, no rebound, no guarding  and no CVA tenderness.       Musculoskeletal:        Right hip: She exhibits decreased range of motion, decreased strength, tenderness and bony tenderness.        Legs:  Psychiatric: She has a normal mood and affect. Her behavior is normal.   Nursing note and vitals reviewed.    Assessment:     1. Hepatocellular carcinoma    2. Dyspnea and respiratory abnormalities    3. Chronic obstructive pulmonary disease, unspecified COPD type    4. Collapsed lung    5. Weakness    6. Gait abnormality    7. Mixed hyperlipidemia    8. Frailty syndrome in geriatric patient    9. Metastatic neoplastic disease      Plan:   Maura was seen today for follow-up.    Diagnoses and all orders for this visit:    Hepatocellular carcinoma  Comments:  Now on palliative care.  With known metastatic disease.  Refilled lidocaine patches for patient.  Seeing Dr. Villareal declines further preventative care or labs  Orders:  -     albuterol (PROVENTIL) 2.5 mg /3 mL (0.083 %) nebulizer solution; Take 3 mLs (2.5 mg total) by nebulization every 6 (six) hours while awake. Rescue  -     lidocaine (LIDODERM) 5 %; Place 1 patch onto the skin every 24 hours as needed. Code: R06.00 -collapsed lung, hepatocellular carcinoma c22.0    Dyspnea and respiratory abnormalities  Comments:  On now nasal cannula and oxygen at this time.  Refilled albuterol for the patient due to difficulties obtaining prescription  Orders:  -     albuterol (PROVENTIL) 2.5 mg /3 mL (0.083 %) nebulizer solution; Take 3 mLs (2.5 mg total) by nebulization every 6 (six) hours while awake. Rescue  -     lidocaine (LIDODERM) 5 %; Place 1 patch onto the skin every 24 hours as needed. Code: R06.00 -collapsed lung, hepatocellular carcinoma c22.0    Chronic obstructive pulmonary disease, unspecified COPD type  Comments:  Refilled nebulizer medications for the patient due to difficulties obtaining the prescription benefitting her from a symptom standpoint  Orders:  -     albuterol (PROVENTIL) 2.5 mg  /3 mL (0.083 %) nebulizer solution; Take 3 mLs (2.5 mg total) by nebulization every 6 (six) hours while awake. Rescue  -     lidocaine (LIDODERM) 5 %; Place 1 patch onto the skin every 24 hours as needed. Code: R06.00 -collapsed lung, hepatocellular carcinoma c22.0    Collapsed lung  Comments:  -noted also with pain and discomfort needing chronic 0 to nebulizers as well as lidocaine patches  Orders:  -     albuterol (PROVENTIL) 2.5 mg /3 mL (0.083 %) nebulizer solution; Take 3 mLs (2.5 mg total) by nebulization every 6 (six) hours while awake. Rescue  -     lidocaine (LIDODERM) 5 %; Place 1 patch onto the skin every 24 hours as needed. Code: R06.00 -collapsed lung, hepatocellular carcinoma c22.0    Weakness  Comments:  Noted and plans to worsen with further progression of metastatic HCC    Gait abnormality  Comments:  In a wheelchair today currently on palliation    Mixed hyperlipidemia  Comments:  Declines additional testing or lab work    Frailty syndrome in geriatric patient  Comments:  Noted declines additional interventions for health maintenance or prevention at this time now on palliative care    Metastatic neoplastic disease  Comments:  Noted in the right femur as well where she is having discomfort and pain.  Has pain medications through palliative care reviewed PET scan          Time spent: 40 minutes in face to face discussion concerning diagnosis, prognosis, review of lab and test results, benefits of treatment as well as management of disease, counseling of patient and coordination of care between various health care providers . Greater than half the time spent was used for coordination of care and counseling of patient.     Follow up if symptoms worsen or fail to improve.    There are no Patient Instructions on file for this visit.

## 2020-03-16 ENCOUNTER — OFFICE VISIT (OUTPATIENT)
Dept: HEMATOLOGY/ONCOLOGY | Facility: CLINIC | Age: 80
End: 2020-03-16
Payer: MEDICARE

## 2020-03-16 ENCOUNTER — INFUSION (OUTPATIENT)
Dept: INFUSION THERAPY | Facility: HOSPITAL | Age: 80
End: 2020-03-16
Attending: INTERNAL MEDICINE
Payer: MEDICARE

## 2020-03-16 VITALS
RESPIRATION RATE: 18 BRPM | TEMPERATURE: 97 F | WEIGHT: 186.31 LBS | SYSTOLIC BLOOD PRESSURE: 123 MMHG | BODY MASS INDEX: 36.58 KG/M2 | HEART RATE: 84 BPM | HEIGHT: 60 IN | OXYGEN SATURATION: 93 % | DIASTOLIC BLOOD PRESSURE: 67 MMHG

## 2020-03-16 VITALS — BODY MASS INDEX: 36.58 KG/M2 | HEIGHT: 60 IN | WEIGHT: 186.31 LBS

## 2020-03-16 DIAGNOSIS — C22.0 HEPATOCELLULAR CARCINOMA: Primary | ICD-10-CM

## 2020-03-16 DIAGNOSIS — C22.0 HEPATOCELLULAR CARCINOMA: ICD-10-CM

## 2020-03-16 DIAGNOSIS — C22.0 HCC (HEPATOCELLULAR CARCINOMA): Primary | ICD-10-CM

## 2020-03-16 PROCEDURE — 63600175 PHARM REV CODE 636 W HCPCS: Mod: JG | Performed by: INTERNAL MEDICINE

## 2020-03-16 PROCEDURE — 99214 OFFICE O/P EST MOD 30 MIN: CPT | Mod: PBBFAC,25 | Performed by: INTERNAL MEDICINE

## 2020-03-16 PROCEDURE — 99215 OFFICE O/P EST HI 40 MIN: CPT | Mod: S$PBB,,, | Performed by: INTERNAL MEDICINE

## 2020-03-16 PROCEDURE — 96413 CHEMO IV INFUSION 1 HR: CPT

## 2020-03-16 PROCEDURE — 99999 PR PBB SHADOW E&M-EST. PATIENT-LVL IV: CPT | Mod: PBBFAC,,, | Performed by: INTERNAL MEDICINE

## 2020-03-16 PROCEDURE — 99999 PR PBB SHADOW E&M-EST. PATIENT-LVL IV: ICD-10-PCS | Mod: PBBFAC,,, | Performed by: INTERNAL MEDICINE

## 2020-03-16 PROCEDURE — 99215 PR OFFICE/OUTPT VISIT, EST, LEVL V, 40-54 MIN: ICD-10-PCS | Mod: S$PBB,,, | Performed by: INTERNAL MEDICINE

## 2020-03-16 RX ORDER — SODIUM CHLORIDE 0.9 % (FLUSH) 0.9 %
10 SYRINGE (ML) INJECTION
Status: CANCELLED | OUTPATIENT
Start: 2020-03-16

## 2020-03-16 RX ORDER — HEPARIN 100 UNIT/ML
500 SYRINGE INTRAVENOUS
Status: CANCELLED | OUTPATIENT
Start: 2020-03-16

## 2020-03-16 RX ADMIN — SODIUM CHLORIDE 240 MG: 9 INJECTION, SOLUTION INTRAVENOUS at 01:03

## 2020-03-16 NOTE — PROGRESS NOTES
Subjective:      DATE OF VISIT: 3/16/20     ?  Patient ID:?Maura Singh is a 80 y.o. female.?? MR#: [id number]   ?   PRIMARY ONCOLOGIST: Dr. Burgess    ? Primary Care Providers:  Yessy Andrade MD, MD (General)     CHIEF COMPLAINT: ???  Cycle 2 day 1 palliative chemotherapy with nivolumab  ?   ONCOLOGIC DIAGNOSIS:  Metastatic hepatocellular carcinoma  ?   CURRENT TREATMENT:  Nivolumab Q 2 weeks, cycle 1 day 1, 03/02/2020  ?   ONCOLOGIC HISTORY:    Ms. Singh was admitted to [facility] on 02/04/2020 with gradually progressive shortness of breath, cough with hemoptysis along with intermittent abdominal pain, anorexia and fatigue.  In the emergency room she had workup including abdominal ultrasound showing multiple liver lesions concerning for metastatic disease.  CTA was negative for pulmonary embolism but revealed mediastinal adenopathy with right hilar soft tissue mass cannot exclude lymphadenopathy along with postobstructive collapse of right anterior segment lower lobe.  Hepatomegaly was noted with multiple low-density hypoenhancing lesions consistent with hepatic metastatic disease.    2/19/20 PET-CT:    1. Hypermetabolic right infrahilar lung mass and associated partial postobstructive collapse right lower lobe.  2. Small hypermetabolic focus right lower lobe possibly early metastasis.  3. Hypermetabolic masslike thickening ascending colon suspicious for neoplasm.  4. Multifocal FDG avid lissette metastases neck, chest, abdomen, and pelvis as noted.  5. Extensive FDG avid hepatic metastases.  6. Widespread FDG avid osseous metastases.     02/06/2020 liver biopsy  Pathology:  Consistent with hepatocellular carcinoma, moderate to poorly differentiated     The biopsy shows nests of tumor cells with deeply eosinophilic granular   cytoplasm. Immunostains performed shows results as:   HepPar - Focal granular positive   CK7 and TTF-1 - Negative    03/02/2020 cycle 1 day 1 nivolumab Q 2 weeks palliative  immunotherapy    03/16/2020 cycle 2 day 1 nivolumab    INTERVAL EVENTS    Ms. Singh presents with her daughter for follow-up.  She notes feeling significantly better after 1st infusion.  She is using breathing treatments and notes some improvement in shortness of breath.  Mild fatigue not notably worsened since therapy.  One episode of diarrhea unclear if related to treatment.  No rash or other new concerning symptoms.    Review of Systems    ?   A comprehensive 14-point review of systems was reviewed with patient and was negative other than as specified above.   ?      Objective:      Physical Exam      ?   Vitals:    03/16/20 1245   BP: 123/67   Pulse: 84   Resp: 18   Temp: 97.1 °F (36.2 °C)      ?   ECOG:?2  General appearance: Generally well appearing, in no acute distress, 3 L supplemental oxygen.   Head, eyes, ears, nose, and throat: Oropharynx clear with moist mucous membranes.    Cardiovascular: Regular rate and rhythm, S1, S2, no audible murmurs.   Respiratory:  Diminished air movement in right lower lung field, occasional rhonchi.    Abdomen: Bowel sounds present, soft, nontender, nondistended. No palpable hepatosplenomegaly.   Extremities: Warm, with right greater than left lower extremity edema 1+, mildly improved   Neurologic: Alert and oriented.  Sitting in wheelchair  Skin: No rashes, ecchymoses or petechial lesion.     ?   Laboratory:  ?   Lab Visit on 03/16/2020   Component Date Value Ref Range Status    WBC 03/16/2020 11.10  3.90 - 12.70 K/uL Final    RBC 03/16/2020 4.92  4.00 - 5.40 M/uL Final    Hemoglobin 03/16/2020 13.8  12.0 - 16.0 g/dL Final    Hematocrit 03/16/2020 44.1  37.0 - 48.5 % Final    Mean Corpuscular Volume 03/16/2020 90  82 - 98 fL Final    Mean Corpuscular Hemoglobin 03/16/2020 28.0  27.0 - 31.0 pg Final    Mean Corpuscular Hemoglobin Conc 03/16/2020 31.3* 32.0 - 36.0 g/dL Final    RDW 03/16/2020 17.6* 11.5 - 14.5 % Final    Platelets 03/16/2020 282  150 - 350 K/uL  Final    MPV 03/16/2020 10.2  9.2 - 12.9 fL Final    Immature Granulocytes 03/16/2020 0.3  0.0 - 0.5 % Final    Gran # (ANC) 03/16/2020 7.5  1.8 - 7.7 K/uL Final    Immature Grans (Abs) 03/16/2020 0.03  0.00 - 0.04 K/uL Final    Lymph # 03/16/2020 2.1  1.0 - 4.8 K/uL Final    Mono # 03/16/2020 1.3* 0.3 - 1.0 K/uL Final    Eos # 03/16/2020 0.1  0.0 - 0.5 K/uL Final    Baso # 03/16/2020 0.13  0.00 - 0.20 K/uL Final    nRBC 03/16/2020 0  0 /100 WBC Final    Gran% 03/16/2020 67.7  38.0 - 73.0 % Final    Lymph% 03/16/2020 18.7  18.0 - 48.0 % Final    Mono% 03/16/2020 11.4  4.0 - 15.0 % Final    Eosinophil% 03/16/2020 1.0  0.0 - 8.0 % Final    Basophil% 03/16/2020 1.2  0.0 - 1.9 % Final    Differential Method 03/16/2020 Automated   Final    Sodium 03/16/2020 138  136 - 145 mmol/L Final    Potassium 03/16/2020 4.4  3.5 - 5.1 mmol/L Final    Chloride 03/16/2020 99  95 - 110 mmol/L Final    CO2 03/16/2020 28  23 - 29 mmol/L Final    Glucose 03/16/2020 92  70 - 110 mg/dL Final    BUN, Bld 03/16/2020 18  8 - 23 mg/dL Final    Creatinine 03/16/2020 1.1  0.5 - 1.4 mg/dL Final    Calcium 03/16/2020 9.5  8.7 - 10.5 mg/dL Final    Total Protein 03/16/2020 8.1  6.0 - 8.4 g/dL Final    Albumin 03/16/2020 2.7* 3.5 - 5.2 g/dL Final    Total Bilirubin 03/16/2020 0.4  0.1 - 1.0 mg/dL Final    Alkaline Phosphatase 03/16/2020 136* 55 - 135 U/L Final    AST 03/16/2020 56* 10 - 40 U/L Final    ALT 03/16/2020 17  10 - 44 U/L Final    Anion Gap 03/16/2020 11  8 - 16 mmol/L Final    eGFR if African American 03/16/2020 54* >60 mL/min/1.73 m^2 Final    eGFR if non African American 03/16/2020 47* >60 mL/min/1.73 m^2 Final      ?   Tumor markers   ?   Lab Results   Component Value Date    AFP 1.8 02/21/2020       ?   Imaging:  ?  02/19/2019   NM PET CT ROUTINE    CLINICAL HISTORY:  Lung nodules;  Localized enlarged lymph nodes    TECHNIQUE:  Segmented attenuation corrected 3-D PET imaging was obtained from the skull  base through the mid thighs utilizing 12.26 mCi F-18-FDG.  Noncontrast CT imaging was performed for attenuation correction, diagnosis, and anatomical fusion with PET.    COMPARISON:  02/04/2020 CT chest abdomen pelvis.    FINDINGS:  Head/neck: There is normal physiologic FDG uptake noted within the visualized brain parenchyma. Right supraclavicular lymphadenopathy with SUV max of 4.0.  No other FDG avid lymphadenopathy within the neck.    Chest: Hypermetabolic right infrahilar lung mass measuring 3.5 cm with SUV max of 10.3.  Contiguous tumor or lymphadenopathy in the more central right hilum with SUV max of 8.7.  Partial postobstructive collapse of the anterior segment right lower lobe with associated small focus of hypermetabolism exhibiting an SUV max of 2.9. Hypermetabolic lymphadenopathy in the subcarinal space with SUV max of 9.0 and right and left prevascular space with SUV max of 7.3.  Multiple additional small mildly FDG avid nodes elsewhere in the mediastinum and left hilum.    Abdomen/Pelvis: Enlarged liver with innumerable conglomerated hypermetabolic masses throughout the right and left hepatic lobes.  SUV max is 15.3.  Multiple FDG avid abdominal lymph nodes with SUV max 3.0 left retrocrural, 4.1 pericaval, 3.7 portacaval, 3.7 gastrohepatic, and 4.7 left periaortic.  Additional mildly enlarged FDG avid left inguinal node with SUV max of 3.3. In the ascending colon with SUV max of 10.8.  No bowel obstruction.    Skeletal: Multiple hypermetabolic intraosseous lesions involving the cervical and thoracic spine, bilateral ribs, left L5-S1 facet joint, right acetabulum, left sacrum, left iliac bone, and right proximal femoral shaft.      Impression       1. Hypermetabolic right infrahilar lung mass and associated partial postobstructive collapse right lower lobe.  2. Small hypermetabolic focus right lower lobe possibly early metastasis.  3. Hypermetabolic masslike thickening ascending colon suspicious for  neoplasm.  4. Multifocal FDG avid lissette metastases neck, chest, abdomen, and pelvis as noted.  5. Extensive FDG avid hepatic metastases.  6. Widespread FDG avid osseous metastases.            Pathology:    02/06/2020 liver biopsy  LIVER, BIOPSY:   Consistent with hepatocellular carcinoma, moderate to poorly differentiated     The biopsy shows nests of tumor cells with deeply eosinophilic granular   cytoplasm. Immunostains performed shows results as:   HepPar - Focal granular positive   CK7 and TTF-1 - Negative    ?   Assessment/Plan:       1. Hepatocellular carcinoma          Plan:     # metastatic hepatocellular carcinoma:  initiated first-line palliative immunotherapy with nivolumab 03/02/2020.  Thus far she is tolerating her 1st cycle well and presents today for 2nd infusion, labs reviewed okay to proceed.  We will see her in 2 weeks for her 3rd infusion and check in.    # right lower extremity edema:  DVT scan negative for DVT.  Using wraps and leg elevation.  Appears mildly improved on exam today.  Continue to monitor.    # oxygen dependence, functional status:  Enrolled in home health, using breathing treatments, improvement in dyspnea, follow-up with pulmonology p.r.n., scheduled for PFTs and will reach out to her pulmonologist to determine if necessary at this time.      Follow-Up:   - nivolumab C2D1 today  - RV in 2 weeks with labs prior

## 2020-03-16 NOTE — DISCHARGE INSTRUCTIONS
.[facility]  [address] [address]  [address]  [phone] phone     [phone] fax  Hours of Operation: Monday- Friday 8:00am- 5:00pm  After hours phone  [phone]  Hematology / Oncology Physicians on call      Dr. Santiago Vaughn, MISAEL Fisher NP Tyesha Taylor, NP    Please call with any concerns regarding your appointment today.  .FALL PREVENTION   Falls often occur due to slipping, tripping or losing your balance. Here are ways to reduce your risk of falling again.   Was there anything that caused your fall that can be fixed, removed or replaced?   Make your home safe by keeping walkways clear of objects you may trip over.   Use non-slip pads under rugs.   Do not walk in poorly lit areas.   Do not stand on chairs or wobbly ladders.   Use caution when reaching overhead or looking upward. This position can cause a loss of balance.   Be sure your shoes fit properly, have non-slip bottoms and are in good condition.   Be cautious when going up and down stairs, curbs, and when walking on uneven sidewalks.   If your balance is poor, consider using a cane or walker.   If your fall was related to alcohol use, stop or limit alcohol intake.   If your fall was related to use of sleeping medicines, talk to your doctor about this. You may need to reduce your dosage at bedtime if you awaken during the night to go to the bathroom.   To reduce the need for nighttime bathroom trips:   Avoid drinking fluids for several hours before going to bed   Empty your bladder before going to bed   Men can keep a urinal at the bedside   © 6261-6669 [facility], [address] [place] [address], [place] [address]. All rights reserved. This information is not intended as a substitute for professional medical care. Always follow your healthcare professional's instructions.    .WAYS TO HELP PREVENT  INFECTION         WASH YOUR HANDS OFTEN DURING THE DAY, ESPECIALLY BEFORE YOU EAT, AFTER USING THE BATHROOM, AND AFTER TOUCHING ANIMALS     STAY AWAY FROM PEOPLE WHO HAVE ILLNESSES YOU CAN CATCH; SUCH AS COLDS, FLU, CHICKEN POX     TRY TO AVOID CROWDS     STAY AWAY FROM CHILDREN WHO RECENTLY HAVE RECEIVED LIVE VIRUS VACCINES     MAINTAIN GOOD MOUTH CARE     DO NOT SQUEEZE OR SCRATCH PIMPLES     CLEAN CUTS & SCRAPES RIGHT AWAY AND DAILY UNTIL HEALED WITH WARM WATER, SOAP & AN ANTISEPTIC     AVOID CONTACT WITH LITTER BOXES, BIRD CAGES, & FISH TANKS     AVOID STANDING WATER, IE., BIRD BATHS, FLOWER POTS/VASES, OR HUMIDIFIERS     WEAR GLOVES WHEN GARDENING OR CLEANING UP AFTER OTHERS, ESPECIALLY BABIES & SMALL CHILDREN     DO NOT EAT RAW FISH, SEAFOOD, MEAT, OR EGGS  .HOME CARE AFTER CHEMOTHERAPY   Meals   Many patients feel sick and lose their appetites during treatment. Eat small meals several times a day. Choose bland foods with little taste or smell if you have problems with nausea. Be sure to cook all food thoroughly. This kills bacteria and helps you avoid intestinal infection. Soft foods are easier to swallow and digest.   Activity   Exercise keeps you strong and keeps your heart and lungs active. Talk to your doctor about an appropriate exercise program for you.   Skin Care   To prevent a skin infection, bathe or shower once a day. Use a moisturizing soap and wash with warm water. Avoid very hot or cold water. Chemotherapy can make your skin dry . Apply moisturizing lotion to help relieve dry skin. Some drugs used in high doses can cause slight burns to appear (like sunburn). Ask for a special cream to help relieve the burn and protect your skin.   Prevent Mouth Sores   During chemotherapy, many people get mouth sores. Do the following to help prevent mouth sores or to ease discomfort.   Brush your teeth with a soft-bristle toothbrush after every meal.  Don't use dental floss if your platelet count  "is below 50,000. Your doctor or nurse will tell you if this is the case.  Use an oral swab or special soft toothbrush if your gums bleed during regular brushing.  Use mouthwash as directed. If you can't tolerate commercial mouthwash, use salt and baking soda to clean your mouth. Mix 1 teaspoon of salt and 1 teaspoon of baking soda into a glass of water. Swish and spit.  Call your doctor or return to this facility if you develop any of the following:   Sore throat   White patches in the mouth or throat   Fever of 100.4ºF (38ºC) or higher, or as directed by your healthcare provider  © 2267-1037 [facility], [address] [place] [address], [place] [id number]. All rights reserved. This information is not intended as a substitute for professional medical care. Always follow your healthcare professional's     .Support Groups/Classes    Support groups and classes are being offered at the   [facility] and [facility]!!    "Cooking with Cancer" (Nutrition Class):  Second Wednesday of each month   at noon at the [facility].  Metastatic Support Group:  Third Tuesday of each month   at noon at the [facility].  Next Steps Class/Group: Second and fourth Thursday of each month at noon at the [facility].  [facility] (Breast Cancer Support Group): First Tuesday of each month   at 5:30pm at the [facility] location.  [facility]: [facility]: Second and third Tuesday of each month from 7:30am - 2pm.  [facility]: First and fourth Tuesday of each month from 7:30am - 2pm    If you are interested in attending or would like more information please ask our social workers or your nurse!    "

## 2020-03-25 ENCOUNTER — TELEPHONE (OUTPATIENT)
Dept: HOME HEALTH SERVICES | Facility: HOSPITAL | Age: 80
End: 2020-03-25

## 2020-03-30 ENCOUNTER — INFUSION (OUTPATIENT)
Dept: INFUSION THERAPY | Facility: HOSPITAL | Age: 80
End: 2020-03-30
Attending: INTERNAL MEDICINE
Payer: MEDICARE

## 2020-03-30 ENCOUNTER — SOCIAL WORK (OUTPATIENT)
Dept: HEMATOLOGY/ONCOLOGY | Facility: CLINIC | Age: 80
End: 2020-03-30

## 2020-03-30 ENCOUNTER — OFFICE VISIT (OUTPATIENT)
Dept: HEMATOLOGY/ONCOLOGY | Facility: CLINIC | Age: 80
End: 2020-03-30
Payer: MEDICARE

## 2020-03-30 VITALS
DIASTOLIC BLOOD PRESSURE: 72 MMHG | WEIGHT: 187.19 LBS | HEART RATE: 79 BPM | BODY MASS INDEX: 36.75 KG/M2 | SYSTOLIC BLOOD PRESSURE: 138 MMHG | OXYGEN SATURATION: 94 % | HEIGHT: 60 IN | TEMPERATURE: 98 F | RESPIRATION RATE: 16 BRPM

## 2020-03-30 VITALS
DIASTOLIC BLOOD PRESSURE: 82 MMHG | HEART RATE: 71 BPM | RESPIRATION RATE: 18 BRPM | OXYGEN SATURATION: 93 % | SYSTOLIC BLOOD PRESSURE: 154 MMHG | TEMPERATURE: 98 F

## 2020-03-30 DIAGNOSIS — R59.0 MEDIASTINAL LYMPHADENOPATHY: ICD-10-CM

## 2020-03-30 DIAGNOSIS — Z99.81 OXYGEN DEPENDENT: ICD-10-CM

## 2020-03-30 DIAGNOSIS — C22.0 HEPATOCELLULAR CARCINOMA: Primary | ICD-10-CM

## 2020-03-30 DIAGNOSIS — R60.0 LEG EDEMA, RIGHT: ICD-10-CM

## 2020-03-30 DIAGNOSIS — I50.32 CHRONIC DIASTOLIC CONGESTIVE HEART FAILURE: ICD-10-CM

## 2020-03-30 DIAGNOSIS — J44.9 CHRONIC OBSTRUCTIVE PULMONARY DISEASE, UNSPECIFIED COPD TYPE: ICD-10-CM

## 2020-03-30 PROCEDURE — 99215 OFFICE O/P EST HI 40 MIN: CPT | Mod: S$PBB,,, | Performed by: INTERNAL MEDICINE

## 2020-03-30 PROCEDURE — 99999 PR PBB SHADOW E&M-EST. PATIENT-LVL IV: ICD-10-PCS | Mod: PBBFAC,,, | Performed by: INTERNAL MEDICINE

## 2020-03-30 PROCEDURE — 99999 PR PBB SHADOW E&M-EST. PATIENT-LVL IV: CPT | Mod: PBBFAC,,, | Performed by: INTERNAL MEDICINE

## 2020-03-30 PROCEDURE — 63600175 PHARM REV CODE 636 W HCPCS: Mod: JG | Performed by: INTERNAL MEDICINE

## 2020-03-30 PROCEDURE — 96413 CHEMO IV INFUSION 1 HR: CPT

## 2020-03-30 PROCEDURE — 99215 PR OFFICE/OUTPT VISIT, EST, LEVL V, 40-54 MIN: ICD-10-PCS | Mod: S$PBB,,, | Performed by: INTERNAL MEDICINE

## 2020-03-30 PROCEDURE — 99214 OFFICE O/P EST MOD 30 MIN: CPT | Mod: PBBFAC | Performed by: INTERNAL MEDICINE

## 2020-03-30 RX ORDER — HEPARIN 100 UNIT/ML
500 SYRINGE INTRAVENOUS
Status: CANCELLED | OUTPATIENT
Start: 2020-03-30

## 2020-03-30 RX ORDER — SODIUM CHLORIDE 0.9 % (FLUSH) 0.9 %
10 SYRINGE (ML) INJECTION
Status: DISCONTINUED | OUTPATIENT
Start: 2020-03-30 | End: 2020-03-30 | Stop reason: HOSPADM

## 2020-03-30 RX ORDER — SODIUM CHLORIDE 0.9 % (FLUSH) 0.9 %
10 SYRINGE (ML) INJECTION
Status: CANCELLED | OUTPATIENT
Start: 2020-03-30

## 2020-03-30 RX ADMIN — SODIUM CHLORIDE: 9 INJECTION, SOLUTION INTRAVENOUS at 02:03

## 2020-03-30 RX ADMIN — SODIUM CHLORIDE 480 MG: 9 INJECTION, SOLUTION INTRAVENOUS at 02:03

## 2020-03-30 NOTE — DISCHARGE INSTRUCTIONS
[facility]  [address] [address]  [address]  [phone] phone     [phone] fax  Hours of Operation: Monday- Friday 8:00am- 5:00pm  After hours phone  [phone]  Hematology / Oncology Physicians on call      Dr. Santiago Vaughn, MISAEL Fisher NP Tyesha Taylor, NP    Please call with any concerns regarding your appointment today.      FALL PREVENTION   Falls often occur due to slipping, tripping or losing your balance. Here are ways to reduce your risk of falling again.   Was there anything that caused your fall that can be fixed, removed or replaced?   Make your home safe by keeping walkways clear of objects you may trip over.   Use non-slip pads under rugs.   Do not walk in poorly lit areas.   Do not stand on chairs or wobbly ladders.   Use caution when reaching overhead or looking upward. This position can cause a loss of balance.   Be sure your shoes fit properly, have non-slip bottoms and are in good condition.   Be cautious when going up and down stairs, curbs, and when walking on uneven sidewalks.   If your balance is poor, consider using a cane or walker.   If your fall was related to alcohol use, stop or limit alcohol intake.   If your fall was related to use of sleeping medicines, talk to your doctor about this. You may need to reduce your dosage at bedtime if you awaken during the night to go to the bathroom.   To reduce the need for nighttime bathroom trips:   Avoid drinking fluids for several hours before going to bed   Empty your bladder before going to bed   Men can keep a urinal at the bedside   © 2373-3124 [facility], [address] [place] [address], [place] [address]. All rights reserved. This information is not intended as a substitute for professional medical care. Always follow your healthcare professional's instructions.

## 2020-03-30 NOTE — PLAN OF CARE
Problem: Adult Inpatient Plan of Care  Goal: Plan of Care Review  Outcome: Ongoing, Progressing  Flowsheets (Taken 3/30/2020 1530)  Plan of Care Reviewed With: patient; daughter  Goal: Patient-Specific Goal (Individualization)  Outcome: Ongoing, Progressing  Flowsheets (Taken 3/30/2020 1530)  Individualized Care Needs: pt likes pillows and blanket  Anxieties, Fears or Concerns: none  Patient-Specific Goals (Include Timeframe): pt will tolerate Opdivo 480mg dose today    Patient stable today. NAD noted. Opdivo 480mg administered per MD order. Patient to RV in 4 weeks as scheduled.

## 2020-03-30 NOTE — PROGRESS NOTES
Subjective:      DATE OF VISIT: 3/30/20     ?  Patient ID:?Maura Singh is a 80 y.o. female.?? MR#: [id number]   ?   PRIMARY ONCOLOGIST: Dr. Burgess    ? Primary Care Providers:  Yessy Andrade MD, MD (General)     CHIEF COMPLAINT: ???  Cycle 3 day 1 palliative chemotherapy with nivolumab  ?   ONCOLOGIC DIAGNOSIS:  Metastatic hepatocellular carcinoma  ?   CURRENT TREATMENT:  Nivolumab Q 2 weeks, cycle 1 day 1, 03/02/2020  ?   ONCOLOGIC HISTORY:    Ms. Singh was admitted to [facility] on 02/04/2020 with gradually progressive shortness of breath, cough with hemoptysis along with intermittent abdominal pain, anorexia and fatigue.  In the emergency room she had workup including abdominal ultrasound showing multiple liver lesions concerning for metastatic disease.  CTA was negative for pulmonary embolism but revealed mediastinal adenopathy with right hilar soft tissue mass cannot exclude lymphadenopathy along with postobstructive collapse of right anterior segment lower lobe.  Hepatomegaly was noted with multiple low-density hypoenhancing lesions consistent with hepatic metastatic disease.    2/19/20 PET-CT:    1. Hypermetabolic right infrahilar lung mass and associated partial postobstructive collapse right lower lobe.  2. Small hypermetabolic focus right lower lobe possibly early metastasis.  3. Hypermetabolic masslike thickening ascending colon suspicious for neoplasm.  4. Multifocal FDG avid lissette metastases neck, chest, abdomen, and pelvis as noted.  5. Extensive FDG avid hepatic metastases.  6. Widespread FDG avid osseous metastases.     02/06/2020 liver biopsy  Pathology:  Consistent with hepatocellular carcinoma, moderate to poorly differentiated     The biopsy shows nests of tumor cells with deeply eosinophilic granular   cytoplasm. Immunostains performed shows results as:   HepPar - Focal granular positive   CK7 and TTF-1 - Negative    03/02/2020 cycle 1 day 1 nivolumab Q 2 weeks palliative  immunotherapy    03/16/2020 cycle 2 day 1 nivolumab    03/30/2020 cycle 3 day 1 nivolumab, note: dose of 480 mg [id number] to limit clinic visits due to COVID-19 concerns    INTERVAL EVENTS    Ms. Singh presents with her daughter for follow-up.  She notes feeling well with good appetite improved.  She is walking a bit around the house.  No rash or diarrhea of note but some increase in stool frequency.    Review of Systems    ?   A comprehensive 14-point review of systems was reviewed with patient and was negative other than as specified above.   ?      Objective:      Physical Exam      ?   Vitals:    03/30/20 1334   BP: 138/72   Pulse: 79   Resp: 16   Temp: 97.7 °F (36.5 °C)      ?   ECOG:?2  General appearance: Generally well appearing, in no acute distress, 3 L supplemental oxygen.   Head, eyes, ears, nose, and throat: Oropharynx clear with moist mucous membranes.    Cardiovascular: Regular rate and rhythm, S1, S2, no audible murmurs.   Respiratory:  Breathing comfortably, no increased work of breathing.    Abdomen:  Nondistended  Extremities: Warm, with mild edema  Neurologic: Alert and oriented.  Sitting in wheelchair  Skin: No rashes, ecchymoses or petechial lesion.     ?   Laboratory:  ?   Lab Visit on 03/30/2020   Component Date Value Ref Range Status    WBC 03/30/2020 8.55  3.90 - 12.70 K/uL Final    RBC 03/30/2020 4.79  4.00 - 5.40 M/uL Final    Hemoglobin 03/30/2020 13.1  12.0 - 16.0 g/dL Final    Hematocrit 03/30/2020 43.2  37.0 - 48.5 % Final    Mean Corpuscular Volume 03/30/2020 90  82 - 98 fL Final    Mean Corpuscular Hemoglobin 03/30/2020 27.3  27.0 - 31.0 pg Final    Mean Corpuscular Hemoglobin Conc 03/30/2020 30.3* 32.0 - 36.0 g/dL Final    RDW 03/30/2020 17.6* 11.5 - 14.5 % Final    Platelets 03/30/2020 342  150 - 350 K/uL Final    MPV 03/30/2020 10.3  9.2 - 12.9 fL Final    Immature Granulocytes 03/30/2020 0.1  0.0 - 0.5 % Final    Gran # (ANC) 03/30/2020 5.1  1.8 - 7.7 K/uL Final     Immature Grans (Abs) 03/30/2020 0.01  0.00 - 0.04 K/uL Final    Lymph # 03/30/2020 2.1  1.0 - 4.8 K/uL Final    Mono # 03/30/2020 1.0  0.3 - 1.0 K/uL Final    Eos # 03/30/2020 0.3  0.0 - 0.5 K/uL Final    Baso # 03/30/2020 0.11  0.00 - 0.20 K/uL Final    nRBC 03/30/2020 0  0 /100 WBC Final    Gran% 03/30/2020 59.2  38.0 - 73.0 % Final    Lymph% 03/30/2020 24.0  18.0 - 48.0 % Final    Mono% 03/30/2020 11.7  4.0 - 15.0 % Final    Eosinophil% 03/30/2020 3.7  0.0 - 8.0 % Final    Basophil% 03/30/2020 1.3  0.0 - 1.9 % Final    Differential Method 03/30/2020 Automated   Final    Sodium 03/30/2020 138  136 - 145 mmol/L Final    Potassium 03/30/2020 4.3  3.5 - 5.1 mmol/L Final    Chloride 03/30/2020 103  95 - 110 mmol/L Final    CO2 03/30/2020 27  23 - 29 mmol/L Final    Glucose 03/30/2020 120* 70 - 110 mg/dL Final    BUN, Bld 03/30/2020 16  8 - 23 mg/dL Final    Creatinine 03/30/2020 0.9  0.5 - 1.4 mg/dL Final    Calcium 03/30/2020 9.8  8.7 - 10.5 mg/dL Final    Total Protein 03/30/2020 7.9  6.0 - 8.4 g/dL Final    Albumin 03/30/2020 3.1* 3.5 - 5.2 g/dL Final    Total Bilirubin 03/30/2020 0.3  0.1 - 1.0 mg/dL Final    Alkaline Phosphatase 03/30/2020 119  55 - 135 U/L Final    AST 03/30/2020 48* 10 - 40 U/L Final    ALT 03/30/2020 26  10 - 44 U/L Final    Anion Gap 03/30/2020 8  8 - 16 mmol/L Final    eGFR if African American 03/30/2020 >60  >60 mL/min/1.73 m^2 Final    eGFR if non African American 03/30/2020 >60  >60 mL/min/1.73 m^2 Final    TSH 03/30/2020 1.576  0.400 - 4.000 uIU/mL Final      ?   Tumor markers   ?   Lab Results   Component Value Date    AFP 1.8 02/21/2020       ?   Imaging:  ?  02/19/2019   NM PET CT ROUTINE    CLINICAL HISTORY:  Lung nodules;  Localized enlarged lymph nodes    TECHNIQUE:  Segmented attenuation corrected 3-D PET imaging was obtained from the skull base through the mid thighs utilizing 12.26 mCi F-18-FDG.  Noncontrast CT imaging was performed for attenuation  correction, diagnosis, and anatomical fusion with PET.    COMPARISON:  02/04/2020 CT chest abdomen pelvis.    FINDINGS:  Head/neck: There is normal physiologic FDG uptake noted within the visualized brain parenchyma. Right supraclavicular lymphadenopathy with SUV max of 4.0.  No other FDG avid lymphadenopathy within the neck.    Chest: Hypermetabolic right infrahilar lung mass measuring 3.5 cm with SUV max of 10.3.  Contiguous tumor or lymphadenopathy in the more central right hilum with SUV max of 8.7.  Partial postobstructive collapse of the anterior segment right lower lobe with associated small focus of hypermetabolism exhibiting an SUV max of 2.9. Hypermetabolic lymphadenopathy in the subcarinal space with SUV max of 9.0 and right and left prevascular space with SUV max of 7.3.  Multiple additional small mildly FDG avid nodes elsewhere in the mediastinum and left hilum.    Abdomen/Pelvis: Enlarged liver with innumerable conglomerated hypermetabolic masses throughout the right and left hepatic lobes.  SUV max is 15.3.  Multiple FDG avid abdominal lymph nodes with SUV max 3.0 left retrocrural, 4.1 pericaval, 3.7 portacaval, 3.7 gastrohepatic, and 4.7 left periaortic.  Additional mildly enlarged FDG avid left inguinal node with SUV max of 3.3. In the ascending colon with SUV max of 10.8.  No bowel obstruction.    Skeletal: Multiple hypermetabolic intraosseous lesions involving the cervical and thoracic spine, bilateral ribs, left L5-S1 facet joint, right acetabulum, left sacrum, left iliac bone, and right proximal femoral shaft.      Impression       1. Hypermetabolic right infrahilar lung mass and associated partial postobstructive collapse right lower lobe.  2. Small hypermetabolic focus right lower lobe possibly early metastasis.  3. Hypermetabolic masslike thickening ascending colon suspicious for neoplasm.  4. Multifocal FDG avid lissette metastases neck, chest, abdomen, and pelvis as noted.  5. Extensive FDG  avid hepatic metastases.  6. Widespread FDG avid osseous metastases.            Pathology:    02/06/2020 liver biopsy  LIVER, BIOPSY:   Consistent with hepatocellular carcinoma, moderate to poorly differentiated     The biopsy shows nests of tumor cells with deeply eosinophilic granular   cytoplasm. Immunostains performed shows results as:   HepPar - Focal granular positive   CK7 and TTF-1 - Negative    ?   Assessment/Plan:       1. Hepatocellular carcinoma    2. Chronic obstructive pulmonary disease, unspecified COPD type    3. Mediastinal lymphadenopathy    4. Chronic diastolic congestive heart failure    5. Leg edema, right    6. Oxygen dependent          Plan:     # metastatic hepatocellular carcinoma:  initiated first-line palliative immunotherapy with nivolumab 03/02/2020.  Thus far she is tolerating her 1st two cycle well.  Given COVID-19 concern I did discuss with her that her immunotherapy agent nivolumab has been used at higher dose 480mg every 4 weeks and feel reasonable to use in setting of COVID-19 outbreak to be able to push her next follow-up for a month as she is a high risk patient with respiratory status, malignancy and age; she and daughter expressed understanding and consented to this change. We will continually assess COVID-19 situation and if safe would ideally switch back to 240mg q2week dosing since most well studied in context of hepatocellular carcinoma.     # right lower extremity edema:  Improved, DVT scan negative for DVT.  Continue to monitor.    # oxygen dependence, functional status:  Stable oxygenation, subjective improvement in shortness of breath.  Continue to monitor      Follow-Up:   - nivolumab C3D1 today, dose 480 mg with revisit in 4 weeks and labs prior due to COVID-19 concern

## 2020-03-31 ENCOUNTER — EXTERNAL CHRONIC CARE MANAGEMENT (OUTPATIENT)
Dept: PRIMARY CARE CLINIC | Facility: CLINIC | Age: 80
End: 2020-03-31
Payer: MEDICARE

## 2020-03-31 PROCEDURE — 99490 CHRNC CARE MGMT STAFF 1ST 20: CPT | Mod: S$PBB,,, | Performed by: FAMILY MEDICINE

## 2020-03-31 PROCEDURE — 99490 CHRNC CARE MGMT STAFF 1ST 20: CPT | Mod: PBBFAC,PN | Performed by: FAMILY MEDICINE

## 2020-03-31 PROCEDURE — 99490 PR CHRONIC CARE MGMT, 1ST 20 MIN: ICD-10-PCS | Mod: S$PBB,,, | Performed by: FAMILY MEDICINE

## 2020-03-31 NOTE — PROGRESS NOTES
Met with pt and daughter to f/u. Pt smiling and reports to be feeling better. Daughter mentioned getting lightweight wheelchair/transport chair for pt (current w/c is for daughter and it is bulky and heavy). She asked about ramp resources. Unfortunately these are scarce and she is aware the best help she may get is through [facility]. After speaking to pt/daughter spoke to Dr. Burgess's nurse who is handling ordering of w/c. SW will continue to follow pt and assist with any ongoing needs that arise.

## 2020-04-23 DIAGNOSIS — Z03.818 ENCOUNTER FOR OBSERVATION FOR SUSPECTED EXPOSURE TO OTHER BIOLOGICAL AGENTS RULED OUT: Primary | ICD-10-CM

## 2020-04-27 ENCOUNTER — INFUSION (OUTPATIENT)
Dept: INFUSION THERAPY | Facility: HOSPITAL | Age: 80
End: 2020-04-27
Attending: INTERNAL MEDICINE
Payer: MEDICARE

## 2020-04-27 ENCOUNTER — OFFICE VISIT (OUTPATIENT)
Dept: HEMATOLOGY/ONCOLOGY | Facility: CLINIC | Age: 80
End: 2020-04-27
Payer: MEDICARE

## 2020-04-27 ENCOUNTER — CLINICAL SUPPORT (OUTPATIENT)
Dept: HEMATOLOGY/ONCOLOGY | Facility: CLINIC | Age: 80
End: 2020-04-27
Payer: MEDICARE

## 2020-04-27 VITALS
BODY MASS INDEX: 37.69 KG/M2 | OXYGEN SATURATION: 93 % | HEART RATE: 80 BPM | WEIGHT: 192 LBS | HEIGHT: 60 IN | DIASTOLIC BLOOD PRESSURE: 69 MMHG | SYSTOLIC BLOOD PRESSURE: 144 MMHG | TEMPERATURE: 97 F | RESPIRATION RATE: 18 BRPM

## 2020-04-27 VITALS — BODY MASS INDEX: 37.69 KG/M2 | HEIGHT: 60 IN | WEIGHT: 192 LBS

## 2020-04-27 DIAGNOSIS — R59.0 MEDIASTINAL LYMPHADENOPATHY: ICD-10-CM

## 2020-04-27 DIAGNOSIS — C22.0 HEPATOCELLULAR CARCINOMA: ICD-10-CM

## 2020-04-27 DIAGNOSIS — Z03.818 ENCOUNTER FOR OBSERVATION FOR SUSPECTED EXPOSURE TO OTHER BIOLOGICAL AGENTS RULED OUT: Primary | ICD-10-CM

## 2020-04-27 DIAGNOSIS — C22.0 HCC (HEPATOCELLULAR CARCINOMA): Primary | ICD-10-CM

## 2020-04-27 DIAGNOSIS — J44.9 CHRONIC OBSTRUCTIVE PULMONARY DISEASE, UNSPECIFIED COPD TYPE: ICD-10-CM

## 2020-04-27 DIAGNOSIS — Z99.81 OXYGEN DEPENDENT: ICD-10-CM

## 2020-04-27 LAB — SARS-COV-2 RDRP RESP QL NAA+PROBE: NEGATIVE

## 2020-04-27 PROCEDURE — U0002 COVID-19 LAB TEST NON-CDC: HCPCS

## 2020-04-27 PROCEDURE — 99214 OFFICE O/P EST MOD 30 MIN: CPT | Mod: PBBFAC,25 | Performed by: INTERNAL MEDICINE

## 2020-04-27 PROCEDURE — 99999 PR PBB SHADOW E&M-EST. PATIENT-LVL IV: CPT | Mod: PBBFAC,,, | Performed by: INTERNAL MEDICINE

## 2020-04-27 PROCEDURE — 99215 OFFICE O/P EST HI 40 MIN: CPT | Mod: S$PBB,,, | Performed by: INTERNAL MEDICINE

## 2020-04-27 PROCEDURE — 25000003 PHARM REV CODE 250: Performed by: INTERNAL MEDICINE

## 2020-04-27 PROCEDURE — 99215 PR OFFICE/OUTPT VISIT, EST, LEVL V, 40-54 MIN: ICD-10-PCS | Mod: S$PBB,,, | Performed by: INTERNAL MEDICINE

## 2020-04-27 PROCEDURE — 63600175 PHARM REV CODE 636 W HCPCS: Mod: JG | Performed by: INTERNAL MEDICINE

## 2020-04-27 PROCEDURE — 96413 CHEMO IV INFUSION 1 HR: CPT

## 2020-04-27 PROCEDURE — 99999 PR PBB SHADOW E&M-EST. PATIENT-LVL IV: ICD-10-PCS | Mod: PBBFAC,,, | Performed by: INTERNAL MEDICINE

## 2020-04-27 RX ORDER — HEPARIN 100 UNIT/ML
500 SYRINGE INTRAVENOUS
Status: CANCELLED | OUTPATIENT
Start: 2020-04-27

## 2020-04-27 RX ORDER — SODIUM CHLORIDE 0.9 % (FLUSH) 0.9 %
10 SYRINGE (ML) INJECTION
Status: CANCELLED | OUTPATIENT
Start: 2020-04-27

## 2020-04-27 RX ADMIN — SODIUM CHLORIDE: 9 INJECTION, SOLUTION INTRAVENOUS at 10:04

## 2020-04-27 RX ADMIN — SODIUM CHLORIDE 480 MG: 9 INJECTION, SOLUTION INTRAVENOUS at 10:04

## 2020-04-27 NOTE — PROGRESS NOTES
Subjective:      DATE OF VISIT: 4/27/20     ?  Patient ID:?Maura Singh is a 80 y.o. female.?? MR#: [id number]   ?   PRIMARY ONCOLOGIST: Dr. Burgess    ? Primary Care Providers:  Yessy Andrade MD, MD (General)     CHIEF COMPLAINT: ???  Cycle 4 day 1 palliative chemotherapy with nivolumab  ?   ONCOLOGIC DIAGNOSIS:  Metastatic hepatocellular carcinoma  ?   CURRENT TREATMENT:  Nivolumab Q 2 weeks, cycle 1 day 1, 03/02/2020; currently 480mg q4 weeks due to COVID-19  ?   ONCOLOGIC HISTORY:    Ms. Singh was admitted to [facility] on 02/04/2020 with gradually progressive shortness of breath, cough with hemoptysis along with intermittent abdominal pain, anorexia and fatigue.  In the emergency room she had workup including abdominal ultrasound showing multiple liver lesions concerning for metastatic disease.  CTA was negative for pulmonary embolism but revealed mediastinal adenopathy with right hilar soft tissue mass cannot exclude lymphadenopathy along with postobstructive collapse of right anterior segment lower lobe.  Hepatomegaly was noted with multiple low-density hypoenhancing lesions consistent with hepatic metastatic disease.    2/19/20 PET-CT:    1. Hypermetabolic right infrahilar lung mass and associated partial postobstructive collapse right lower lobe.  2. Small hypermetabolic focus right lower lobe possibly early metastasis.  3. Hypermetabolic masslike thickening ascending colon suspicious for neoplasm.  4. Multifocal FDG avid lissette metastases neck, chest, abdomen, and pelvis as noted.  5. Extensive FDG avid hepatic metastases.  6. Widespread FDG avid osseous metastases.     02/06/2020 liver biopsy  Pathology:  Consistent with hepatocellular carcinoma, moderate to poorly differentiated     The biopsy shows nests of tumor cells with deeply eosinophilic granular   cytoplasm. Immunostains performed shows results as:   HepPar - Focal granular positive   CK7 and TTF-1 - Negative    03/02/2020 cycle 1  day 1 nivolumab Q 2 weeks palliative immunotherapy    03/16/2020 cycle 2 day 1 nivolumab    03/30/2020 cycle 3 day 1 nivolumab, note: dose of 480 mg [id number] to limit clinic visits due to COVID-19 concerns          INTERVAL EVENTS    Ms. Singh presents with her daughter for follow-up.  She is in excellent spirits with good energy and appetite.  She notes improvement in breathing and lower extremity edema.    Review of Systems    ?   A comprehensive 14-point review of systems was reviewed with patient and was negative other than as specified above.   ?      Objective:      Physical Exam      ?   Vitals:    04/27/20 0815   BP: (!) 144/69   Pulse: 80   Resp: 18   Temp: 97.3 °F (36.3 °C)      ?   ECOG:?2  General appearance: Generally well appearing, in no acute distress,2 L supplemental oxygen.   Head, eyes, ears, nose, and throat: Oropharynx clear with moist mucous membranes.    Cardiovascular: Regular rate and rhythm, S1, S2, no audible murmurs.   Respiratory:  Limited air movement without wheezing or rhonchi, breathing comfortably.  Abdomen:  Nondistended  Extremities: Warm, with minimal symmetric bilateral ankle edema  Neurologic: Alert and oriented.  Sitting in wheelchair  Skin: No rashes, ecchymoses or petechial lesion.     ?   Laboratory:  ?   Lab Visit on 04/27/2020   Component Date Value Ref Range Status    WBC 04/27/2020 8.01  3.90 - 12.70 K/uL Final    RBC 04/27/2020 4.79  4.00 - 5.40 M/uL Final    Hemoglobin 04/27/2020 13.5  12.0 - 16.0 g/dL Final    Hematocrit 04/27/2020 45.5  37.0 - 48.5 % Final    Mean Corpuscular Volume 04/27/2020 95  82 - 98 fL Final    Mean Corpuscular Hemoglobin 04/27/2020 28.2  27.0 - 31.0 pg Final    Mean Corpuscular Hemoglobin Conc 04/27/2020 29.7* 32.0 - 36.0 g/dL Final    RDW 04/27/2020 17.2* 11.5 - 14.5 % Final    Platelets 04/27/2020 311  150 - 350 K/uL Final    MPV 04/27/2020 9.9  9.2 - 12.9 fL Final    Immature Granulocytes 04/27/2020 0.4  0.0 - 0.5 % Final     Gran # (ANC) 04/27/2020 4.8  1.8 - 7.7 K/uL Final    Immature Grans (Abs) 04/27/2020 0.03  0.00 - 0.04 K/uL Final    Lymph # 04/27/2020 1.7  1.0 - 4.8 K/uL Final    Mono # 04/27/2020 0.8  0.3 - 1.0 K/uL Final    Eos # 04/27/2020 0.7* 0.0 - 0.5 K/uL Final    Baso # 04/27/2020 0.07  0.00 - 0.20 K/uL Final    nRBC 04/27/2020 0  0 /100 WBC Final    Gran% 04/27/2020 59.7  38.0 - 73.0 % Final    Lymph% 04/27/2020 20.7  18.0 - 48.0 % Final    Mono% 04/27/2020 9.4  4.0 - 15.0 % Final    Eosinophil% 04/27/2020 8.9* 0.0 - 8.0 % Final    Basophil% 04/27/2020 0.9  0.0 - 1.9 % Final    Differential Method 04/27/2020 Automated   Final    Sodium 04/27/2020 142  136 - 145 mmol/L Final    Potassium 04/27/2020 5.0  3.5 - 5.1 mmol/L Final    Chloride 04/27/2020 102  95 - 110 mmol/L Final    CO2 04/27/2020 27  23 - 29 mmol/L Final    Glucose 04/27/2020 127* 70 - 110 mg/dL Final    BUN, Bld 04/27/2020 14  8 - 23 mg/dL Final    Creatinine 04/27/2020 0.9  0.5 - 1.4 mg/dL Final    Calcium 04/27/2020 9.8  8.7 - 10.5 mg/dL Final    Total Protein 04/27/2020 8.0  6.0 - 8.4 g/dL Final    Albumin 04/27/2020 3.2* 3.5 - 5.2 g/dL Final    Total Bilirubin 04/27/2020 0.4  0.1 - 1.0 mg/dL Final    Alkaline Phosphatase 04/27/2020 105  55 - 135 U/L Final    AST 04/27/2020 44* 10 - 40 U/L Final    ALT 04/27/2020 29  10 - 44 U/L Final    Anion Gap 04/27/2020 13  8 - 16 mmol/L Final    eGFR if African American 04/27/2020 >60  >60 mL/min/1.73 m^2 Final    eGFR if non African American 04/27/2020 >60  >60 mL/min/1.73 m^2 Final    TSH 04/27/2020 1.690  0.400 - 4.000 uIU/mL Final      ?   Tumor markers   ?   Lab Results   Component Value Date    AFP 1.8 02/21/2020       ?   Imaging:  ?  02/19/2019   NM PET CT ROUTINE    CLINICAL HISTORY:  Lung nodules;  Localized enlarged lymph nodes    TECHNIQUE:  Segmented attenuation corrected 3-D PET imaging was obtained from the skull base through the mid thighs utilizing 12.26 mCi F-18-FDG.   Noncontrast CT imaging was performed for attenuation correction, diagnosis, and anatomical fusion with PET.    COMPARISON:  02/04/2020 CT chest abdomen pelvis.    FINDINGS:  Head/neck: There is normal physiologic FDG uptake noted within the visualized brain parenchyma. Right supraclavicular lymphadenopathy with SUV max of 4.0.  No other FDG avid lymphadenopathy within the neck.    Chest: Hypermetabolic right infrahilar lung mass measuring 3.5 cm with SUV max of 10.3.  Contiguous tumor or lymphadenopathy in the more central right hilum with SUV max of 8.7.  Partial postobstructive collapse of the anterior segment right lower lobe with associated small focus of hypermetabolism exhibiting an SUV max of 2.9. Hypermetabolic lymphadenopathy in the subcarinal space with SUV max of 9.0 and right and left prevascular space with SUV max of 7.3.  Multiple additional small mildly FDG avid nodes elsewhere in the mediastinum and left hilum.    Abdomen/Pelvis: Enlarged liver with innumerable conglomerated hypermetabolic masses throughout the right and left hepatic lobes.  SUV max is 15.3.  Multiple FDG avid abdominal lymph nodes with SUV max 3.0 left retrocrural, 4.1 pericaval, 3.7 portacaval, 3.7 gastrohepatic, and 4.7 left periaortic.  Additional mildly enlarged FDG avid left inguinal node with SUV max of 3.3. In the ascending colon with SUV max of 10.8.  No bowel obstruction.    Skeletal: Multiple hypermetabolic intraosseous lesions involving the cervical and thoracic spine, bilateral ribs, left L5-S1 facet joint, right acetabulum, left sacrum, left iliac bone, and right proximal femoral shaft.      Impression       1. Hypermetabolic right infrahilar lung mass and associated partial postobstructive collapse right lower lobe.  2. Small hypermetabolic focus right lower lobe possibly early metastasis.  3. Hypermetabolic masslike thickening ascending colon suspicious for neoplasm.  4. Multifocal FDG avid lissette metastases neck,  chest, abdomen, and pelvis as noted.  5. Extensive FDG avid hepatic metastases.  6. Widespread FDG avid osseous metastases.            Pathology:    02/06/2020 liver biopsy  LIVER, BIOPSY:   Consistent with hepatocellular carcinoma, moderate to poorly differentiated     The biopsy shows nests of tumor cells with deeply eosinophilic granular   cytoplasm. Immunostains performed shows results as:   HepPar - Focal granular positive   CK7 and TTF-1 - Negative    ?   Assessment/Plan:       1. HCC (hepatocellular carcinoma)    2. Chronic obstructive pulmonary disease, unspecified COPD type    3. Mediastinal lymphadenopathy    4. Oxygen dependent          Plan:     # metastatic hepatocellular carcinoma:  initiated first-line palliative immunotherapy with nivolumab 03/02/2020.  Thus far she is tolerating her 1st two cycle well.  Given COVID-19 concern I did discuss with her that her immunotherapy agent nivolumab has been used at higher dose 480mg every 4 weeks and feel reasonable to use in setting of COVID-19 outbreak to be able to push her next follow-up for a month as she is a high risk patient with respiratory status, malignancy and age; she and daughter expressed understanding and consented to this change. We will continually assess COVID-19 situation and if safe would ideally switch back to 240mg q2week dosing since most well studied in context of hepatocellular carcinoma.   She tolerated 40 mg q.4 weeks dosing very well last cycle and will continue today due to continue COVID-19 concerns.  She will need COVID-19 rapid testing  Will plan on restaging after 3 months therapy around June 2020.    # right lower extremity edema:  Improved, DVT scan negative for DVT.  Continue to monitor.  Relatively stable/mildly improved today.  Continue to monitor.    # oxygen dependence, functional status:  Stable oxygenation, subjective improvement in shortness of breath.  Continue to monitor      Follow-Up:   - nivolumab C4D1 today,  dose 480 mg with revisit in 4 weeks and labs prior due to COVID-19 concern  Pending COVID-19 rapid test

## 2020-04-27 NOTE — PATIENT INSTRUCTIONS
- nivolumab C4D1 today, dose 480 mg with revisit in 4 weeks and labs prior due to COVID-19 concern  Pending CMP

## 2020-04-28 DIAGNOSIS — G89.3 CANCER ASSOCIATED PAIN: ICD-10-CM

## 2020-04-28 DIAGNOSIS — C22.0 HEPATOCELLULAR CARCINOMA: ICD-10-CM

## 2020-04-28 RX ORDER — HYDROCODONE BITARTRATE AND ACETAMINOPHEN 5; 325 MG/1; MG/1
1 TABLET ORAL EVERY 6 HOURS PRN
Qty: 60 TABLET | Refills: 0 | Status: SHIPPED | OUTPATIENT
Start: 2020-04-28 | End: 2020-06-19 | Stop reason: SDUPTHER

## 2020-04-29 ENCOUNTER — TELEPHONE (OUTPATIENT)
Dept: HEMATOLOGY/ONCOLOGY | Facility: CLINIC | Age: 80
End: 2020-04-29

## 2020-04-29 DIAGNOSIS — C22.0 HEPATOCELLULAR CARCINOMA: ICD-10-CM

## 2020-04-29 DIAGNOSIS — G89.3 CANCER ASSOCIATED PAIN: ICD-10-CM

## 2020-04-29 RX ORDER — HYDROCODONE BITARTRATE AND ACETAMINOPHEN 5; 325 MG/1; MG/1
1 TABLET ORAL EVERY 6 HOURS PRN
Qty: 60 TABLET | Refills: 0 | OUTPATIENT
Start: 2020-04-29

## 2020-04-29 NOTE — TELEPHONE ENCOUNTER
----- Message from Madhavi Davis sent at 4/29/2020 11:44 AM CDT -----  Contact: patient  Patient called to speak with a nurse concerning her pain medication.      She would like a callback at [phone]    Thanks  KB

## 2020-04-30 ENCOUNTER — EXTERNAL CHRONIC CARE MANAGEMENT (OUTPATIENT)
Dept: PRIMARY CARE CLINIC | Facility: CLINIC | Age: 80
End: 2020-04-30
Payer: MEDICARE

## 2020-04-30 PROCEDURE — 99490 CHRNC CARE MGMT STAFF 1ST 20: CPT | Mod: PBBFAC,PN | Performed by: FAMILY MEDICINE

## 2020-04-30 PROCEDURE — 99490 CHRNC CARE MGMT STAFF 1ST 20: CPT | Mod: S$PBB,,, | Performed by: FAMILY MEDICINE

## 2020-04-30 PROCEDURE — 99490 PR CHRONIC CARE MGMT, 1ST 20 MIN: ICD-10-PCS | Mod: S$PBB,,, | Performed by: FAMILY MEDICINE

## 2020-05-08 ENCOUNTER — PATIENT MESSAGE (OUTPATIENT)
Dept: HEMATOLOGY/ONCOLOGY | Facility: CLINIC | Age: 80
End: 2020-05-08

## 2020-05-08 DIAGNOSIS — R21 RASH AND NONSPECIFIC SKIN ERUPTION: Primary | ICD-10-CM

## 2020-05-08 RX ORDER — HYDROCORTISONE VALERATE 2 MG/G
OINTMENT TOPICAL 2 TIMES DAILY
Qty: 1 TUBE | Refills: 0 | Status: SHIPPED | OUTPATIENT
Start: 2020-05-08 | End: 2020-05-18

## 2020-05-18 ENCOUNTER — TELEPHONE (OUTPATIENT)
Dept: ADMINISTRATIVE | Facility: HOSPITAL | Age: 80
End: 2020-05-18

## 2020-05-18 RX ORDER — TRIAMCINOLONE ACETONIDE 1 MG/G
CREAM TOPICAL 2 TIMES DAILY
Qty: 80 G | Refills: 3 | Status: SHIPPED | OUTPATIENT
Start: 2020-05-18 | End: 2021-02-04 | Stop reason: SDUPTHER

## 2020-05-18 NOTE — TELEPHONE ENCOUNTER
----- Message -----   From: Janessa Peña   Sent: 5/18/2020  11:35 AM CDT   To: Annie Baez LPN   Subject: Medication cost (MD hydrocortisone valerate *     Good morning pt is under the care of Yessy Andrade. Pt was prescribed hydrocortisone valerate (WEST-SHAR) 0.2 % ointment--Apply topically 2 (two) times daily. As needed for rash---by Gill Burgess MD. Pt reports not able to afford the cost of  $110. Pt reports daughter purchased OTC hydrocortisone cream but it is not helping. Pt local pharmacy is [address] [address], [phone].Please contact pt at the number above with further instructions.   Thank You.   Janessa ESTEBAN LPN   Care Coordinator   [facility]   [phone]

## 2020-05-21 ENCOUNTER — LAB VISIT (OUTPATIENT)
Dept: OTOLARYNGOLOGY | Facility: CLINIC | Age: 80
End: 2020-05-21
Payer: MEDICARE

## 2020-05-21 DIAGNOSIS — Z03.818 ENCOUNTER FOR OBSERVATION FOR SUSPECTED EXPOSURE TO OTHER BIOLOGICAL AGENTS RULED OUT: ICD-10-CM

## 2020-05-21 PROCEDURE — U0003 INFECTIOUS AGENT DETECTION BY NUCLEIC ACID (DNA OR RNA); SEVERE ACUTE RESPIRATORY SYNDROME CORONAVIRUS 2 (SARS-COV-2) (CORONAVIRUS DISEASE [COVID-19]), AMPLIFIED PROBE TECHNIQUE, MAKING USE OF HIGH THROUGHPUT TECHNOLOGIES AS DESCRIBED BY CMS-2020-01-R: HCPCS

## 2020-05-22 LAB — SARS-COV-2 RNA RESP QL NAA+PROBE: NOT DETECTED

## 2020-05-25 ENCOUNTER — INFUSION (OUTPATIENT)
Dept: INFUSION THERAPY | Facility: HOSPITAL | Age: 80
End: 2020-05-25
Attending: INTERNAL MEDICINE
Payer: MEDICARE

## 2020-05-25 ENCOUNTER — OFFICE VISIT (OUTPATIENT)
Dept: HEMATOLOGY/ONCOLOGY | Facility: CLINIC | Age: 80
End: 2020-05-25
Payer: MEDICARE

## 2020-05-25 VITALS
TEMPERATURE: 98 F | RESPIRATION RATE: 16 BRPM | DIASTOLIC BLOOD PRESSURE: 72 MMHG | HEART RATE: 71 BPM | OXYGEN SATURATION: 96 % | SYSTOLIC BLOOD PRESSURE: 143 MMHG

## 2020-05-25 VITALS
BODY MASS INDEX: 37.69 KG/M2 | HEIGHT: 60 IN | DIASTOLIC BLOOD PRESSURE: 68 MMHG | TEMPERATURE: 98 F | OXYGEN SATURATION: 97 % | WEIGHT: 192 LBS | SYSTOLIC BLOOD PRESSURE: 139 MMHG | HEART RATE: 73 BPM

## 2020-05-25 DIAGNOSIS — Z03.818 ENCOUNTER FOR OBSERVATION FOR SUSPECTED EXPOSURE TO OTHER BIOLOGICAL AGENTS RULED OUT: ICD-10-CM

## 2020-05-25 DIAGNOSIS — J44.9 CHRONIC OBSTRUCTIVE PULMONARY DISEASE, UNSPECIFIED COPD TYPE: ICD-10-CM

## 2020-05-25 DIAGNOSIS — R59.0 MEDIASTINAL LYMPHADENOPATHY: ICD-10-CM

## 2020-05-25 DIAGNOSIS — C22.0 HEPATOCELLULAR CARCINOMA: Primary | ICD-10-CM

## 2020-05-25 DIAGNOSIS — C22.0 HCC (HEPATOCELLULAR CARCINOMA): Primary | ICD-10-CM

## 2020-05-25 DIAGNOSIS — R21 RASH AND NONSPECIFIC SKIN ERUPTION: ICD-10-CM

## 2020-05-25 PROCEDURE — 25000003 PHARM REV CODE 250: Performed by: INTERNAL MEDICINE

## 2020-05-25 PROCEDURE — 99999 PR PBB SHADOW E&M-EST. PATIENT-LVL IV: ICD-10-PCS | Mod: PBBFAC,,, | Performed by: INTERNAL MEDICINE

## 2020-05-25 PROCEDURE — 99214 OFFICE O/P EST MOD 30 MIN: CPT | Mod: PBBFAC,25 | Performed by: INTERNAL MEDICINE

## 2020-05-25 PROCEDURE — 99215 PR OFFICE/OUTPT VISIT, EST, LEVL V, 40-54 MIN: ICD-10-PCS | Mod: S$PBB,,, | Performed by: INTERNAL MEDICINE

## 2020-05-25 PROCEDURE — 96413 CHEMO IV INFUSION 1 HR: CPT

## 2020-05-25 PROCEDURE — 63600175 PHARM REV CODE 636 W HCPCS: Mod: JG | Performed by: INTERNAL MEDICINE

## 2020-05-25 PROCEDURE — 99215 OFFICE O/P EST HI 40 MIN: CPT | Mod: S$PBB,,, | Performed by: INTERNAL MEDICINE

## 2020-05-25 PROCEDURE — 99999 PR PBB SHADOW E&M-EST. PATIENT-LVL IV: CPT | Mod: PBBFAC,,, | Performed by: INTERNAL MEDICINE

## 2020-05-25 RX ORDER — SODIUM CHLORIDE 0.9 % (FLUSH) 0.9 %
10 SYRINGE (ML) INJECTION
Status: CANCELLED | OUTPATIENT
Start: 2020-05-25

## 2020-05-25 RX ORDER — HEPARIN 100 UNIT/ML
500 SYRINGE INTRAVENOUS
Status: CANCELLED | OUTPATIENT
Start: 2020-05-25

## 2020-05-25 RX ADMIN — SODIUM CHLORIDE 480 MG: 9 INJECTION, SOLUTION INTRAVENOUS at 02:05

## 2020-05-25 NOTE — DISCHARGE INSTRUCTIONS
[facility]  [address] [facility]  [address] [facility] [address]  [phone] phone     [phone] fax  Hours of Operation: Monday- Friday 8:00am- 5:00pm  After hours phone  [phone]  Hematology / Oncology Physicians on call      MISAEL Merino Dr., Dr., Dr., Dr., NP Sydney Prescott, NP Tyesha Taylor, NP    Please call with any concerns regarding your appointment today.    HOME CARE AFTER CHEMOTHERAPY   Meals   Many patients feel sick and lose their appetites during treatment. Eat small meals several times a day. Choose bland foods with little taste or smell if you have problems with nausea. Be sure to cook all food thoroughly. This kills bacteria and helps you avoid intestinal infection. Soft foods are easier to swallow and digest.   Activity   Exercise keeps you strong and keeps your heart and lungs active. Talk to your doctor about an appropriate exercise program for you.   Skin Care   To prevent a skin infection, bathe or shower once a day. Use a moisturizing soap and wash with warm water. Avoid very hot or cold water. Chemotherapy can make your skin dry . Apply moisturizing lotion to help relieve dry skin. Some drugs used in high doses can cause slight burns to appear (like sunburn). Ask for a special cream to help relieve the burn and protect your skin.   Prevent Mouth Sores   During chemotherapy, many people get mouth sores. Do the following to help prevent mouth sores or to ease discomfort.   Brush your teeth with a soft-bristle toothbrush after every meal.  Don't use dental floss if your platelet count is below 50,000. Your doctor or nurse will tell you if this is the case.  Use an oral swab or special soft toothbrush if your gums bleed during regular brushing.  Use mouthwash as directed. If you can't tolerate commercial mouthwash, use salt and baking soda to clean your mouth. Mix 1 teaspoon of salt and 1  teaspoon of baking soda into a glass of water. Swish and spit.  Call your doctor or return to this facility if you develop any of the following:   Sore throat   White patches in the mouth or throat   Fever of 100.4ºF (38ºC) or higher, or as directed by your healthcare provider  © 2000-2011 [facility], [address] [place] [address]. All rights reserved. This information is not intended as a substitute for professional medical care. Always follow your healthcare professional's   FALL PREVENTION   Falls often occur due to slipping, tripping or losing your balance. Here are ways to reduce your risk of falling again.   Was there anything that caused your fall that can be fixed, removed or replaced?   Make your home safe by keeping walkways clear of objects you may trip over.   Use non-slip pads under rugs.   Do not walk in poorly lit areas.   Do not stand on chairs or wobbly ladders.   Use caution when reaching overhead or looking upward. This position can cause a loss of balance.   Be sure your shoes fit properly, have non-slip bottoms and are in good condition.   Be cautious when going up and down stairs, curbs, and when walking on uneven sidewalks.   If your balance is poor, consider using a cane or walker.   If your fall was related to alcohol use, stop or limit alcohol intake.   If your fall was related to use of sleeping medicines, talk to your doctor about this. You may need to reduce your dosage at bedtime if you awaken during the night to go to the bathroom.   To reduce the need for nighttime bathroom trips:   Avoid drinking fluids for several hours before going to bed   Empty your bladder before going to bed   Men can keep a urinal at the bedside   © 2000-2011 [facility], [address] [place] [address]. All rights reserved. This information is not intended as a substitute for professional medical care. Always follow your healthcare professional's instructions.  WAYS TO HELP  PREVENT INFECTION         WASH YOUR HANDS OFTEN DURING THE DAY, ESPECIALLY BEFORE YOU EAT, AFTER USING THE BATHROOM, AND AFTER TOUCHING ANIMALS     STAY AWAY FROM PEOPLE WHO HAVE ILLNESSES YOU CAN CATCH; SUCH AS COLDS, FLU, CHICKEN POX     TRY TO AVOID CROWDS     STAY AWAY FROM CHILDREN WHO RECENTLY HAVE RECEIVED LIVE VIRUS VACCINES     MAINTAIN GOOD MOUTH CARE     DO NOT SQUEEZE OR SCRATCH PIMPLES     CLEAN CUTS & SCRAPES RIGHT AWAY AND DAILY UNTIL HEALED WITH WARM WATER, SOAP & AN ANTISEPTIC     AVOID CONTACT WITH LITTER BOXES, BIRD CAGES, & FISH TANKS     AVOID STANDING WATER, IE., BIRD BATHS, FLOWER POTS/VASES, OR HUMIDIFIERS     WEAR GLOVES WHEN GARDENING OR CLEANING UP AFTER OTHERS, ESPECIALLY BABIES & SMALL CHILDREN     DO NOT EAT RAW FISH, SEAFOOD, MEAT, OR EGGS

## 2020-05-25 NOTE — PROGRESS NOTES
Subjective:      DATE OF VISIT: 5/25/20     ?  Patient ID:?Maura Singh is a 80 y.o. female.?? MR#: [id number]   ?   PRIMARY ONCOLOGIST: Dr. Burgess    ? Primary Care Providers:  Yessy Andrade MD, MD (General)     CHIEF COMPLAINT: ???  Cycle 5 day 1 palliative chemotherapy with nivolumab  ?   ONCOLOGIC DIAGNOSIS:  Metastatic hepatocellular carcinoma  ?   CURRENT TREATMENT:  Nivolumab Q 2 weeks, cycle 1 day 1, 03/02/2020; currently 480mg q4 weeks due to COVID-19  ?   ONCOLOGIC HISTORY:    Ms. Singh was admitted to [facility] on 02/04/2020 with gradually progressive shortness of breath, cough with hemoptysis along with intermittent abdominal pain, anorexia and fatigue.  In the emergency room she had workup including abdominal ultrasound showing multiple liver lesions concerning for metastatic disease.  CTA was negative for pulmonary embolism but revealed mediastinal adenopathy with right hilar soft tissue mass cannot exclude lymphadenopathy along with postobstructive collapse of right anterior segment lower lobe.  Hepatomegaly was noted with multiple low-density hypoenhancing lesions consistent with hepatic metastatic disease.    2/19/20 PET-CT:    To tele I was thinking to me that you already have 1 extra due as thinking last Monday for she 1. Hypermetabolic right infrahilar lung mass and associated partial postobstructive collapse right lower lobe.  2. Small hypermetabolic focus right lower lobe possibly early metastasis.  3. Hypermetabolic masslike thickening ascending colon suspicious for neoplasm.  4. Multifocal FDG avid lissette metastases neck, chest, abdomen, and pelvis as noted.  5. Extensive FDG avid hepatic metastases.  6. Widespread FDG avid osseous metastases.     02/06/2020 liver biopsy  Pathology:  Consistent with hepatocellular carcinoma, moderate to poorly differentiated     The biopsy shows nests of tumor cells with deeply eosinophilic granular   cytoplasm. Immunostains performed shows  results as:   HepPar - Focal granular positive   CK7 and TTF-1 - Negative    03/02/2020 cycle 1 day 1 nivolumab Q 2 weeks palliative immunotherapy    03/16/2020 cycle 2 day 1 nivolumab    03/30/2020 cycle 3 day 1 nivolumab, note: dose of 480 mg [id number] to limit clinic visits due to COVID-19 concerns    05/25/2020 cycle 5 day 1 nivolumab      INTERVAL EVENTS    Ms. Singh presents with her daughter for follow-up.  She notes improvement in rash which 1st appear following last cycle with triamcinolone cream.  Breathing is stable.  No notable fatigue. She has been trying to be more active although limited by COVID-19 quarantine.  Denies diarrhea or other GI issues.    Review of Systems    ?   A comprehensive 14-point review of systems was reviewed with patient and was negative other than as specified above.   ?      Objective:      Physical Exam      ?   Vitals:    05/25/20 1257   BP: 139/68   Pulse: 73   Temp: 98 °F (36.7 °C)      ?   ECOG:?2  General appearance: Generally well appearing, in no acute distress  Head, eyes, ears, nose, and throat:  moist mucous membranes.    Cardiovascular: Regular rate and rhythm, S1, S2, no audible murmurs.   Respiratory:  Breathing comfortably, normal work of breathing  Abdomen:  Nondistended  Extremities: Warm, without notable edema.  Neurologic: Alert and oriented.  Sitting in wheelchair  Skin: No rashes, ecchymoses or petechial lesion.     ?   Laboratory:  ?   Lab Visit on 05/25/2020   Component Date Value Ref Range Status    WBC 05/25/2020 9.05  3.90 - 12.70 K/uL Final    RBC 05/25/2020 4.50  4.00 - 5.40 M/uL Final    Hemoglobin 05/25/2020 13.2  12.0 - 16.0 g/dL Final    Hematocrit 05/25/2020 42.2  37.0 - 48.5 % Final    Mean Corpuscular Volume 05/25/2020 94  82 - 98 fL Final    Mean Corpuscular Hemoglobin 05/25/2020 29.3  27.0 - 31.0 pg Final    Mean Corpuscular Hemoglobin Conc 05/25/2020 31.3* 32.0 - 36.0 g/dL Final    RDW 05/25/2020 16.0* 11.5 - 14.5 % Final     Platelets 05/25/2020 299  150 - 350 K/uL Final    MPV 05/25/2020 10.1  9.2 - 12.9 fL Final    Immature Granulocytes 05/25/2020 0.2  0.0 - 0.5 % Final    Gran # (ANC) 05/25/2020 5.1  1.8 - 7.7 K/uL Final    Immature Grans (Abs) 05/25/2020 0.02  0.00 - 0.04 K/uL Final    Lymph # 05/25/2020 2.2  1.0 - 4.8 K/uL Final    Mono # 05/25/2020 1.0  0.3 - 1.0 K/uL Final    Eos # 05/25/2020 0.6* 0.0 - 0.5 K/uL Final    Baso # 05/25/2020 0.10  0.00 - 0.20 K/uL Final    nRBC 05/25/2020 0  0 /100 WBC Final    Gran% 05/25/2020 56.6  38.0 - 73.0 % Final    Lymph% 05/25/2020 24.1  18.0 - 48.0 % Final    Mono% 05/25/2020 10.9  4.0 - 15.0 % Final    Eosinophil% 05/25/2020 7.1  0.0 - 8.0 % Final    Basophil% 05/25/2020 1.1  0.0 - 1.9 % Final    Differential Method 05/25/2020 Automated   Final    Sodium 05/25/2020 141  136 - 145 mmol/L Final    Potassium 05/25/2020 4.6  3.5 - 5.1 mmol/L Final    Chloride 05/25/2020 103  95 - 110 mmol/L Final    CO2 05/25/2020 28  23 - 29 mmol/L Final    Glucose 05/25/2020 106  70 - 110 mg/dL Final    BUN, Bld 05/25/2020 21  8 - 23 mg/dL Final    Creatinine 05/25/2020 1.0  0.5 - 1.4 mg/dL Final    Calcium 05/25/2020 9.8  8.7 - 10.5 mg/dL Final    Total Protein 05/25/2020 7.8  6.0 - 8.4 g/dL Final    Albumin 05/25/2020 3.6  3.5 - 5.2 g/dL Final    Total Bilirubin 05/25/2020 0.3  0.1 - 1.0 mg/dL Final    Alkaline Phosphatase 05/25/2020 89  55 - 135 U/L Final    AST 05/25/2020 32  10 - 40 U/L Final    ALT 05/25/2020 28  10 - 44 U/L Final    Anion Gap 05/25/2020 10  8 - 16 mmol/L Final    eGFR if African American 05/25/2020 >60  >60 mL/min/1.73 m^2 Final    eGFR if non African American 05/25/2020 53* >60 mL/min/1.73 m^2 Final    TSH 05/25/2020 1.179  0.400 - 4.000 uIU/mL Final      ?   Tumor markers   ?   Lab Results   Component Value Date    AFP 1.8 02/21/2020       ?   Imaging:  ?  02/19/2019   NM PET CT ROUTINE    CLINICAL HISTORY:  Lung nodules;  Localized enlarged lymph  nodes    TECHNIQUE:  Segmented attenuation corrected 3-D PET imaging was obtained from the skull base through the mid thighs utilizing 12.26 mCi F-18-FDG.  Noncontrast CT imaging was performed for attenuation correction, diagnosis, and anatomical fusion with PET.    COMPARISON:  02/04/2020 CT chest abdomen pelvis.    FINDINGS:  Head/neck: There is normal physiologic FDG uptake noted within the visualized brain parenchyma. Right supraclavicular lymphadenopathy with SUV max of 4.0.  No other FDG avid lymphadenopathy within the neck.    Chest: Hypermetabolic right infrahilar lung mass measuring 3.5 cm with SUV max of 10.3.  Contiguous tumor or lymphadenopathy in the more central right hilum with SUV max of 8.7.  Partial postobstructive collapse of the anterior segment right lower lobe with associated small focus of hypermetabolism exhibiting an SUV max of 2.9. Hypermetabolic lymphadenopathy in the subcarinal space with SUV max of 9.0 and right and left prevascular space with SUV max of 7.3.  Multiple additional small mildly FDG avid nodes elsewhere in the mediastinum and left hilum.    Abdomen/Pelvis: Enlarged liver with innumerable conglomerated hypermetabolic masses throughout the right and left hepatic lobes.  SUV max is 15.3.  Multiple FDG avid abdominal lymph nodes with SUV max 3.0 left retrocrural, 4.1 pericaval, 3.7 portacaval, 3.7 gastrohepatic, and 4.7 left periaortic.  Additional mildly enlarged FDG avid left inguinal node with SUV max of 3.3. In the ascending colon with SUV max of 10.8.  No bowel obstruction.    Skeletal: Multiple hypermetabolic intraosseous lesions involving the cervical and thoracic spine, bilateral ribs, left L5-S1 facet joint, right acetabulum, left sacrum, left iliac bone, and right proximal femoral shaft.      Impression       1. Hypermetabolic right infrahilar lung mass and associated partial postobstructive collapse right lower lobe.  2. Small hypermetabolic focus right lower lobe  possibly early metastasis.  3. Hypermetabolic masslike thickening ascending colon suspicious for neoplasm.  4. Multifocal FDG avid lissette metastases neck, chest, abdomen, and pelvis as noted.  5. Extensive FDG avid hepatic metastases.  6. Widespread FDG avid osseous metastases.            Pathology:    02/06/2020 liver biopsy  LIVER, BIOPSY:   Consistent with hepatocellular carcinoma, moderate to poorly differentiated     The biopsy shows nests of tumor cells with deeply eosinophilic granular   cytoplasm. Immunostains performed shows results as:   HepPar - Focal granular positive   CK7 and TTF-1 - Negative    ?   Assessment/Plan:       1. HCC (hepatocellular carcinoma)    2. Rash and nonspecific skin eruption    3. Chronic obstructive pulmonary disease, unspecified COPD type    4. Mediastinal lymphadenopathy          Plan:     # metastatic hepatocellular carcinoma:  initiated first-line palliative immunotherapy with nivolumab 03/02/2020.  Thus far she is tolerating her 1st two cycle well.  Given COVID-19 concern I did discuss with her that her immunotherapy agent nivolumab has been used at higher dose 480mg every 4 weeks and feel reasonable to use in setting of COVID-19 outbreak to be able to push her next follow-up for a month as she is a high risk patient with respiratory status, malignancy and age; she and daughter expressed understanding and consented to this change. We will continually assess COVID-19 situation and if safe would ideally switch back to 240mg q2week dosing since most well studied in context of hepatocellular carcinoma.   She is tolerating q.4 weeks dosing very well last cycle and will continue today due to continue COVID-19 concerns.  I have ordered restaging scans prior to next visit.    # rash: May be associated with her immunotherapy, well-controlled and resolved with triamcinolone cream, use p.r.n..  Continue to monitor.    # oxygen dependence, functional status:  Stable oxygenation,  subjective improvement in shortness of breath.  Continue to monitor      Follow-Up:   - nivolumab C5D1 today, dose 480 mg with revisit in 4 weeks and labs prior due to COVID-19 concern   Restaging CT chest abdomen pelvis prior.

## 2020-05-25 NOTE — PATIENT INSTRUCTIONS
Pembrolizumab today  Revisit in 1 month with labs prior and planned immunotherapy  PET-CT prior to next visit in 3-4 wks

## 2020-05-31 ENCOUNTER — EXTERNAL CHRONIC CARE MANAGEMENT (OUTPATIENT)
Dept: PRIMARY CARE CLINIC | Facility: CLINIC | Age: 80
End: 2020-05-31
Payer: MEDICARE

## 2020-05-31 PROCEDURE — 99490 CHRNC CARE MGMT STAFF 1ST 20: CPT | Mod: S$PBB,,, | Performed by: FAMILY MEDICINE

## 2020-05-31 PROCEDURE — 99490 PR CHRONIC CARE MGMT, 1ST 20 MIN: ICD-10-PCS | Mod: S$PBB,,, | Performed by: FAMILY MEDICINE

## 2020-05-31 PROCEDURE — 99490 CHRNC CARE MGMT STAFF 1ST 20: CPT | Mod: PBBFAC,PO | Performed by: FAMILY MEDICINE

## 2020-06-15 ENCOUNTER — TELEPHONE (OUTPATIENT)
Dept: RADIOLOGY | Facility: HOSPITAL | Age: 80
End: 2020-06-15

## 2020-06-16 ENCOUNTER — HOSPITAL ENCOUNTER (OUTPATIENT)
Dept: RADIOLOGY | Facility: HOSPITAL | Age: 80
Discharge: HOME | End: 2020-06-16
Attending: INTERNAL MEDICINE
Payer: MEDICARE

## 2020-06-16 DIAGNOSIS — R59.0 MEDIASTINAL LYMPHADENOPATHY: ICD-10-CM

## 2020-06-16 DIAGNOSIS — R21 RASH AND NONSPECIFIC SKIN ERUPTION: ICD-10-CM

## 2020-06-16 DIAGNOSIS — J44.9 CHRONIC OBSTRUCTIVE PULMONARY DISEASE, UNSPECIFIED COPD TYPE: ICD-10-CM

## 2020-06-16 DIAGNOSIS — C22.0 HCC (HEPATOCELLULAR CARCINOMA): ICD-10-CM

## 2020-06-16 PROCEDURE — 71260 CT THORAX DX C+: CPT | Mod: 26,,, | Performed by: RADIOLOGY

## 2020-06-16 PROCEDURE — 71260 CT CHEST ABDOMEN PELVIS W W/O CONTRAST (XPD): ICD-10-PCS | Mod: 26,,, | Performed by: RADIOLOGY

## 2020-06-16 PROCEDURE — 25500020 PHARM REV CODE 255: Performed by: INTERNAL MEDICINE

## 2020-06-16 PROCEDURE — 74178 CT ABD&PLV WO CNTR FLWD CNTR: CPT | Mod: 26,,, | Performed by: RADIOLOGY

## 2020-06-16 PROCEDURE — 74178 CT ABD&PLV WO CNTR FLWD CNTR: CPT | Mod: TC

## 2020-06-16 PROCEDURE — 74178 CT CHEST ABDOMEN PELVIS W W/O CONTRAST (XPD): ICD-10-PCS | Mod: 26,,, | Performed by: RADIOLOGY

## 2020-06-16 RX ADMIN — IOHEXOL 100 ML: 350 INJECTION, SOLUTION INTRAVENOUS at 03:06

## 2020-06-16 RX ADMIN — IOHEXOL 30 ML: 350 INJECTION, SOLUTION INTRAVENOUS at 02:06

## 2020-06-19 DIAGNOSIS — G89.3 CANCER ASSOCIATED PAIN: ICD-10-CM

## 2020-06-19 DIAGNOSIS — C22.0 HEPATOCELLULAR CARCINOMA: ICD-10-CM

## 2020-06-19 NOTE — TELEPHONE ENCOUNTER
Per care harmony nurse:  From: Janessa Peña   Sent: 6/19/2020  12:31 PM CDT   To: Annie Baez LPN   Subject: Hydrocodone/Acetaminophen 5-325 mg refill         Good morning pt is under the care of Dr.Melissa RENALDO Andrade. Pt is requesting  a refill for Hydrocodone/Acetaminophen 5-325 mg. The prescription was prescribed by  Dr. Hoa Villareal (Hem/Onc). Pt current pharmacy is [id number] [place] [id number] [address] [facility] , [address] [address] [address], [phone].  Please contact pt at the number above with further instructions.   Thank you.   Janessa ESTEBAN LPN   Care Coordinator   [facility]   [phone]

## 2020-06-22 RX ORDER — HYDROCODONE BITARTRATE AND ACETAMINOPHEN 5; 325 MG/1; MG/1
1 TABLET ORAL EVERY 6 HOURS PRN
Qty: 60 TABLET | Refills: 0 | Status: SHIPPED | OUTPATIENT
Start: 2020-06-22 | End: 2020-07-24 | Stop reason: SDUPTHER

## 2020-06-26 ENCOUNTER — LAB VISIT (OUTPATIENT)
Dept: LAB | Facility: HOSPITAL | Age: 80
End: 2020-06-26
Attending: INTERNAL MEDICINE
Payer: MEDICARE

## 2020-06-26 ENCOUNTER — INFUSION (OUTPATIENT)
Dept: INFUSION THERAPY | Facility: HOSPITAL | Age: 80
End: 2020-06-26
Attending: INTERNAL MEDICINE
Payer: MEDICARE

## 2020-06-26 ENCOUNTER — OFFICE VISIT (OUTPATIENT)
Dept: HEMATOLOGY/ONCOLOGY | Facility: CLINIC | Age: 80
End: 2020-06-26
Payer: MEDICARE

## 2020-06-26 VITALS
OXYGEN SATURATION: 97 % | TEMPERATURE: 97 F | HEIGHT: 60 IN | WEIGHT: 193.31 LBS | DIASTOLIC BLOOD PRESSURE: 73 MMHG | BODY MASS INDEX: 37.95 KG/M2 | HEART RATE: 69 BPM | SYSTOLIC BLOOD PRESSURE: 158 MMHG

## 2020-06-26 VITALS
TEMPERATURE: 98 F | RESPIRATION RATE: 18 BRPM | OXYGEN SATURATION: 98 % | SYSTOLIC BLOOD PRESSURE: 134 MMHG | DIASTOLIC BLOOD PRESSURE: 60 MMHG | HEART RATE: 73 BPM

## 2020-06-26 DIAGNOSIS — C78.7 SECONDARY MALIGNANT NEOPLASM OF LIVER: ICD-10-CM

## 2020-06-26 DIAGNOSIS — C22.0 HEPATOCELLULAR CARCINOMA: ICD-10-CM

## 2020-06-26 DIAGNOSIS — C22.0 HEPATOCELLULAR CARCINOMA: Primary | ICD-10-CM

## 2020-06-26 DIAGNOSIS — I10 HYPERTENSION, UNSPECIFIED TYPE: ICD-10-CM

## 2020-06-26 DIAGNOSIS — E66.01 MORBID OBESITY: ICD-10-CM

## 2020-06-26 DIAGNOSIS — I50.9 CONGESTIVE HEART FAILURE, UNSPECIFIED HF CHRONICITY, UNSPECIFIED HEART FAILURE TYPE: ICD-10-CM

## 2020-06-26 DIAGNOSIS — I70.0 AORTIC ATHEROSCLEROSIS: ICD-10-CM

## 2020-06-26 DIAGNOSIS — Z08 ENCOUNTER FOR FOLLOW-UP EXAMINATION AFTER COMPLETED TREATMENT FOR MALIGNANT NEOPLASM: ICD-10-CM

## 2020-06-26 DIAGNOSIS — Z87.891 FORMER SMOKER: ICD-10-CM

## 2020-06-26 DIAGNOSIS — C22.0 HCC (HEPATOCELLULAR CARCINOMA): Primary | ICD-10-CM

## 2020-06-26 DIAGNOSIS — C79.51 SECONDARY MALIGNANT NEOPLASM OF BONE: ICD-10-CM

## 2020-06-26 DIAGNOSIS — E11.9 TYPE 2 DIABETES MELLITUS WITHOUT COMPLICATION, WITHOUT LONG-TERM CURRENT USE OF INSULIN: ICD-10-CM

## 2020-06-26 DIAGNOSIS — C77.8 SECONDARY MALIGNANT NEOPLASM OF LYMPH NODES OF MULTIPLE SITES: ICD-10-CM

## 2020-06-26 LAB
ALBUMIN SERPL BCP-MCNC: 3.5 G/DL (ref 3.5–5.2)
ALP SERPL-CCNC: 88 U/L (ref 55–135)
ALT SERPL W/O P-5'-P-CCNC: 26 U/L (ref 10–44)
ANION GAP SERPL CALC-SCNC: 10 MMOL/L (ref 8–16)
AST SERPL-CCNC: 26 U/L (ref 10–40)
BASOPHILS # BLD AUTO: 0.07 K/UL (ref 0–0.2)
BASOPHILS NFR BLD: 0.8 % (ref 0–1.9)
BILIRUB SERPL-MCNC: 0.2 MG/DL (ref 0.1–1)
BUN SERPL-MCNC: 16 MG/DL (ref 8–23)
CALCIUM SERPL-MCNC: 9.6 MG/DL (ref 8.7–10.5)
CHLORIDE SERPL-SCNC: 101 MMOL/L (ref 95–110)
CO2 SERPL-SCNC: 29 MMOL/L (ref 23–29)
CREAT SERPL-MCNC: 0.9 MG/DL (ref 0.5–1.4)
DIFFERENTIAL METHOD: ABNORMAL
EOSINOPHIL # BLD AUTO: 0.5 K/UL (ref 0–0.5)
EOSINOPHIL NFR BLD: 5.7 % (ref 0–8)
ERYTHROCYTE [DISTWIDTH] IN BLOOD BY AUTOMATED COUNT: 15.1 % (ref 11.5–14.5)
EST. GFR  (AFRICAN AMERICAN): >60 ML/MIN/1.73 M^2
EST. GFR  (NON AFRICAN AMERICAN): >60 ML/MIN/1.73 M^2
GLUCOSE SERPL-MCNC: 93 MG/DL (ref 70–110)
HCT VFR BLD AUTO: 44 % (ref 37–48.5)
HGB BLD-MCNC: 13.3 G/DL (ref 12–16)
IMM GRANULOCYTES # BLD AUTO: 0.03 K/UL (ref 0–0.04)
IMM GRANULOCYTES NFR BLD AUTO: 0.3 % (ref 0–0.5)
LYMPHOCYTES # BLD AUTO: 2.3 K/UL (ref 1–4.8)
LYMPHOCYTES NFR BLD: 25.8 % (ref 18–48)
MCH RBC QN AUTO: 28.8 PG (ref 27–31)
MCHC RBC AUTO-ENTMCNC: 30.2 G/DL (ref 32–36)
MCV RBC AUTO: 95 FL (ref 82–98)
MONOCYTES # BLD AUTO: 1.1 K/UL (ref 0.3–1)
MONOCYTES NFR BLD: 12.3 % (ref 4–15)
NEUTROPHILS # BLD AUTO: 4.8 K/UL (ref 1.8–7.7)
NEUTROPHILS NFR BLD: 55.1 % (ref 38–73)
NRBC BLD-RTO: 0 /100 WBC
PLATELET # BLD AUTO: 310 K/UL (ref 150–350)
PMV BLD AUTO: 9.8 FL (ref 9.2–12.9)
POTASSIUM SERPL-SCNC: 4.8 MMOL/L (ref 3.5–5.1)
PROT SERPL-MCNC: 7.6 G/DL (ref 6–8.4)
RBC # BLD AUTO: 4.62 M/UL (ref 4–5.4)
SODIUM SERPL-SCNC: 140 MMOL/L (ref 136–145)
TSH SERPL DL<=0.005 MIU/L-ACNC: 2.56 UIU/ML (ref 0.4–4)
WBC # BLD AUTO: 8.76 K/UL (ref 3.9–12.7)

## 2020-06-26 PROCEDURE — 96413 CHEMO IV INFUSION 1 HR: CPT

## 2020-06-26 PROCEDURE — 63600175 PHARM REV CODE 636 W HCPCS: Mod: JG | Performed by: INTERNAL MEDICINE

## 2020-06-26 PROCEDURE — 99215 OFFICE O/P EST HI 40 MIN: CPT | Mod: S$PBB,,, | Performed by: INTERNAL MEDICINE

## 2020-06-26 PROCEDURE — 80053 COMPREHEN METABOLIC PANEL: CPT

## 2020-06-26 PROCEDURE — 85025 COMPLETE CBC W/AUTO DIFF WBC: CPT

## 2020-06-26 PROCEDURE — 25000003 PHARM REV CODE 250: Performed by: INTERNAL MEDICINE

## 2020-06-26 PROCEDURE — 84443 ASSAY THYROID STIM HORMONE: CPT

## 2020-06-26 PROCEDURE — 99215 PR OFFICE/OUTPT VISIT, EST, LEVL V, 40-54 MIN: ICD-10-PCS | Mod: S$PBB,,, | Performed by: INTERNAL MEDICINE

## 2020-06-26 PROCEDURE — 36415 COLL VENOUS BLD VENIPUNCTURE: CPT

## 2020-06-26 PROCEDURE — 99999 PR PBB SHADOW E&M-EST. PATIENT-LVL III: CPT | Mod: PBBFAC,,, | Performed by: INTERNAL MEDICINE

## 2020-06-26 PROCEDURE — 99213 OFFICE O/P EST LOW 20 MIN: CPT | Mod: PBBFAC,25 | Performed by: INTERNAL MEDICINE

## 2020-06-26 PROCEDURE — 99999 PR PBB SHADOW E&M-EST. PATIENT-LVL III: ICD-10-PCS | Mod: PBBFAC,,, | Performed by: INTERNAL MEDICINE

## 2020-06-26 RX ORDER — SODIUM CHLORIDE 0.9 % (FLUSH) 0.9 %
10 SYRINGE (ML) INJECTION
Status: CANCELLED | OUTPATIENT
Start: 2020-06-26

## 2020-06-26 RX ORDER — HEPARIN 100 UNIT/ML
500 SYRINGE INTRAVENOUS
Status: CANCELLED | OUTPATIENT
Start: 2020-06-26

## 2020-06-26 RX ADMIN — SODIUM CHLORIDE 480 MG: 9 INJECTION, SOLUTION INTRAVENOUS at 02:06

## 2020-06-26 NOTE — DISCHARGE INSTRUCTIONS
[facility]  [address] [facility]  [address] [address]  [phone] phone     [phone] fax  Hours of Operation: Monday- Friday 8:00am- 5:00pm  After hours phone  [phone]  Hematology / Oncology Physicians on call      MISAEL Merino Dr., Dr., Dr., Dr., NP Sydney Prescott, NP Tyesha Taylor, NP    Please call with any concerns regarding your appointment today.    HOME CARE AFTER CHEMOTHERAPY   Meals   Many patients feel sick and lose their appetites during treatment. Eat small meals several times a day. Choose bland foods with little taste or smell if you have problems with nausea. Be sure to cook all food thoroughly. This kills bacteria and helps you avoid intestinal infection. Soft foods are easier to swallow and digest.   Activity   Exercise keeps you strong and keeps your heart and lungs active. Talk to your doctor about an appropriate exercise program for you.   Skin Care   To prevent a skin infection, bathe or shower once a day. Use a moisturizing soap and wash with warm water. Avoid very hot or cold water. Chemotherapy can make your skin dry . Apply moisturizing lotion to help relieve dry skin. Some drugs used in high doses can cause slight burns to appear (like sunburn). Ask for a special cream to help relieve the burn and protect your skin.   Prevent Mouth Sores   During chemotherapy, many people get mouth sores. Do the following to help prevent mouth sores or to ease discomfort.   Brush your teeth with a soft-bristle toothbrush after every meal.  Don't use dental floss if your platelet count is below 50,000. Your doctor or nurse will tell you if this is the case.  Use an oral swab or special soft toothbrush if your gums bleed during regular brushing.  Use mouthwash as directed. If you can't tolerate commercial mouthwash, use salt and baking soda to clean your mouth. Mix 1 teaspoon of salt and 1  teaspoon of baking soda into a glass of water. Swish and spit.  Call your doctor or return to this facility if you develop any of the following:   Sore throat   White patches in the mouth or throat   Fever of 100.4ºF (38ºC) or higher, or as directed by your healthcare provider  © 2000-2011 [facility], [address] [place] [address]. All rights reserved. This information is not intended as a substitute for professional medical care. Always follow your healthcare professional's   FALL PREVENTION   Falls often occur due to slipping, tripping or losing your balance. Here are ways to reduce your risk of falling again.   Was there anything that caused your fall that can be fixed, removed or replaced?   Make your home safe by keeping walkways clear of objects you may trip over.   Use non-slip pads under rugs.   Do not walk in poorly lit areas.   Do not stand on chairs or wobbly ladders.   Use caution when reaching overhead or looking upward. This position can cause a loss of balance.   Be sure your shoes fit properly, have non-slip bottoms and are in good condition.   Be cautious when going up and down stairs, curbs, and when walking on uneven sidewalks.   If your balance is poor, consider using a cane or walker.   If your fall was related to alcohol use, stop or limit alcohol intake.   If your fall was related to use of sleeping medicines, talk to your doctor about this. You may need to reduce your dosage at bedtime if you awaken during the night to go to the bathroom.   To reduce the need for nighttime bathroom trips:   Avoid drinking fluids for several hours before going to bed   Empty your bladder before going to bed   Men can keep a urinal at the bedside   © 2000-2011 [facility], [address] [place] [address]. All rights reserved. This information is not intended as a substitute for professional medical care. Always follow your healthcare professional's instructions.  WAYS TO HELP  PREVENT INFECTION         WASH YOUR HANDS OFTEN DURING THE DAY, ESPECIALLY BEFORE YOU EAT, AFTER USING THE BATHROOM, AND AFTER TOUCHING ANIMALS     STAY AWAY FROM PEOPLE WHO HAVE ILLNESSES YOU CAN CATCH; SUCH AS COLDS, FLU, CHICKEN POX     TRY TO AVOID CROWDS     STAY AWAY FROM CHILDREN WHO RECENTLY HAVE RECEIVED LIVE VIRUS VACCINES     MAINTAIN GOOD MOUTH CARE     DO NOT SQUEEZE OR SCRATCH PIMPLES     CLEAN CUTS & SCRAPES RIGHT AWAY AND DAILY UNTIL HEALED WITH WARM WATER, SOAP & AN ANTISEPTIC     AVOID CONTACT WITH LITTER BOXES, BIRD CAGES, & FISH TANKS     AVOID STANDING WATER, IE., BIRD BATHS, FLOWER POTS/VASES, OR HUMIDIFIERS     WEAR GLOVES WHEN GARDENING OR CLEANING UP AFTER OTHERS, ESPECIALLY BABIES & SMALL CHILDREN     DO NOT EAT RAW FISH, SEAFOOD, MEAT, OR EGGS

## 2020-06-26 NOTE — PROGRESS NOTES
Subjective:      DATE OF VISIT: 6/26/20     ?  Patient ID:?Maura Singh is a 80 y.o. female.?? MR#: [id number]   ?   PRIMARY ONCOLOGIST: Dr. Burgess    ? Primary Care Providers:  Yessy Andrade MD, MD (General)     CHIEF COMPLAINT: ???  Cycle 6 day 1 palliative chemotherapy with nivolumab  ?   ONCOLOGIC DIAGNOSIS:  Metastatic hepatocellular carcinoma  ?   CURRENT TREATMENT:  Nivolumab Q 2 weeks, cycle 1 day 1, 03/02/2020; currently 480mg q4 weeks due to COVID-19, Xgeva   ?   ONCOLOGIC HISTORY:    Ms. Singh was admitted to [facility] on 02/04/2020 with gradually progressive shortness of breath, cough with hemoptysis along with intermittent abdominal pain, anorexia and fatigue.  In the emergency room she had workup including abdominal ultrasound showing multiple liver lesions concerning for metastatic disease.  CTA was negative for pulmonary embolism but revealed mediastinal adenopathy with right hilar soft tissue mass cannot exclude lymphadenopathy along with postobstructive collapse of right anterior segment lower lobe.  Hepatomegaly was noted with multiple low-density hypoenhancing lesions consistent with hepatic metastatic disease.    2/19/20 PET-CT:    To tele I was thinking to me that you already have 1 extra due as thinking last Monday for she 1. Hypermetabolic right infrahilar lung mass and associated partial postobstructive collapse right lower lobe.  2. Small hypermetabolic focus right lower lobe possibly early metastasis.  3. Hypermetabolic masslike thickening ascending colon suspicious for neoplasm.  4. Multifocal FDG avid lissette metastases neck, chest, abdomen, and pelvis as noted.  5. Extensive FDG avid hepatic metastases.  6. Widespread FDG avid osseous metastases.     02/06/2020 liver biopsy  Pathology:  Consistent with hepatocellular carcinoma, moderate to poorly differentiated     The biopsy shows nests of tumor cells with deeply eosinophilic granular   cytoplasm. Immunostains  performed shows results as:   HepPar - Focal granular positive   CK7 and TTF-1 - Negative    03/02/2020 cycle 1 day 1 nivolumab Q 2 weeks palliative immunotherapy    03/16/2020 cycle 2 day 1 nivolumab    03/30/2020 cycle 3 day 1 nivolumab, note: dose of 480 mg [id number] to limit clinic visits due to COVID-19 concerns    05/25/2020 cycle 5 day 1 nivolumab    6/16/20 inteval CT scan with response in lymphadenopathy, see below    INTERVAL EVENTS    Ms. Singh presents with her daughter for follow-up.  She continues to do well with improvement in breathing sustaining saturated oxygen level over 90% without supplemental oxygen use but does require 2 L with activity.  She continues to use steroid cream minimally but this has been providing rash reaction.  She did have a couple days of diarrhea after scan p.o. contrast.    Review of Systems    ?   A comprehensive 14-point review of systems was reviewed with patient and was negative other than as specified above.   ?      Objective:      Physical Exam      ?   Vitals:    06/26/20 1246   BP: (!) 158/73   Pulse: 69   Temp: 97.1 °F (36.2 °C)      ?   ECOG:?2  General appearance: Generally well appearing, in no acute distress, using 2 L supplemental oxygen  Head, eyes, ears, nose, and throat:  moist mucous membranes.    Cardiovascular: Regular rate and rhythm, S1, S2, no audible murmurs.   Respiratory:  Breathing comfortably, clear to auscultation bilaterally, no wheezing or rhonchi  Abdomen:  Nondistended  Extremities: Warm, without notable edema.  Neurologic: Alert and oriented.  Sitting in wheelchair  Skin: No rashes, ecchymoses or petechial lesion.     ?   Laboratory:  ?   Lab Visit on 06/26/2020   Component Date Value Ref Range Status    WBC 06/26/2020 8.76  3.90 - 12.70 K/uL Final    RBC 06/26/2020 4.62  4.00 - 5.40 M/uL Final    Hemoglobin 06/26/2020 13.3  12.0 - 16.0 g/dL Final    Hematocrit 06/26/2020 44.0  37.0 - 48.5 % Final    Mean Corpuscular Volume 06/26/2020  95  82 - 98 fL Final    Mean Corpuscular Hemoglobin 06/26/2020 28.8  27.0 - 31.0 pg Final    Mean Corpuscular Hemoglobin Conc 06/26/2020 30.2* 32.0 - 36.0 g/dL Final    RDW 06/26/2020 15.1* 11.5 - 14.5 % Final    Platelets 06/26/2020 310  150 - 350 K/uL Final    MPV 06/26/2020 9.8  9.2 - 12.9 fL Final    Immature Granulocytes 06/26/2020 0.3  0.0 - 0.5 % Final    Gran # (ANC) 06/26/2020 4.8  1.8 - 7.7 K/uL Final    Immature Grans (Abs) 06/26/2020 0.03  0.00 - 0.04 K/uL Final    Lymph # 06/26/2020 2.3  1.0 - 4.8 K/uL Final    Mono # 06/26/2020 1.1* 0.3 - 1.0 K/uL Final    Eos # 06/26/2020 0.5  0.0 - 0.5 K/uL Final    Baso # 06/26/2020 0.07  0.00 - 0.20 K/uL Final    nRBC 06/26/2020 0  0 /100 WBC Final    Gran% 06/26/2020 55.1  38.0 - 73.0 % Final    Lymph% 06/26/2020 25.8  18.0 - 48.0 % Final    Mono% 06/26/2020 12.3  4.0 - 15.0 % Final    Eosinophil% 06/26/2020 5.7  0.0 - 8.0 % Final    Basophil% 06/26/2020 0.8  0.0 - 1.9 % Final    Differential Method 06/26/2020 Automated   Final    Sodium 06/26/2020 140  136 - 145 mmol/L Final    Potassium 06/26/2020 4.8  3.5 - 5.1 mmol/L Final    Chloride 06/26/2020 101  95 - 110 mmol/L Final    CO2 06/26/2020 29  23 - 29 mmol/L Final    Glucose 06/26/2020 93  70 - 110 mg/dL Final    BUN, Bld 06/26/2020 16  8 - 23 mg/dL Final    Creatinine 06/26/2020 0.9  0.5 - 1.4 mg/dL Final    Calcium 06/26/2020 9.6  8.7 - 10.5 mg/dL Final    Total Protein 06/26/2020 7.6  6.0 - 8.4 g/dL Final    Albumin 06/26/2020 3.5  3.5 - 5.2 g/dL Final    Total Bilirubin 06/26/2020 0.2  0.1 - 1.0 mg/dL Final    Alkaline Phosphatase 06/26/2020 88  55 - 135 U/L Final    AST 06/26/2020 26  10 - 40 U/L Final    ALT 06/26/2020 26  10 - 44 U/L Final    Anion Gap 06/26/2020 10  8 - 16 mmol/L Final    eGFR if African American 06/26/2020 >60  >60 mL/min/1.73 m^2 Final    eGFR if non African American 06/26/2020 >60  >60 mL/min/1.73 m^2 Final      ?   Tumor markers   ?   Lab Results    Component Value Date    AFP 1.8 02/21/2020       ?   Imaging:  ?    6/16/20  CT CHEST ABDOMEN PELVIS W W/O CONTRAST (XPD)     CLINICAL HISTORY:  restaging;Liver cell carcinoma     TECHNIQUE:  Low dose axial, sagittal and coronal reformations were performed from the thoracic inlet to the pubic symphysis following the IV administration of 100 mL of Omnipaque 350.  The chest images are with  contrast and the abdomen images are with and without contrast.  30 mL of oral Omnipaque was given.     COMPARISON:  Chest abdomen pelvis CT from February 4, 2020. PET-CT from February 19, 2020.     FINDINGS:  There is been interval decrease in size of a right supraclavicular lymph node which was hypermetabolic on the recent PET-CT.  It measured 9 mm short axis on the CT from February 4, 2020.  On today's exam, it measures 5 mm in short axis.  An AP window lymph node measuring 2.2 cm on the CT has also decreased in size and only small scattered nodes are now seen in this area.  Interval decrease in subcarinal lymph node which now measures 1.2 cm in short axis compared with 2.3 cm in short axis on CT.  A 2.6 cm right perihilar lymph node has also improved and currently, no enlarged lymph node is seen in this region.  Right infrahilar soft tissue mass measuring up to 3.3 cm on image 220 series 2 on the CT from 2/4/20 has also markedly improved with no measurable soft tissue mass seen in this region on today's study.  There is also interval improvement in previously present postobstructive atelectasis in the right lower lobe.  Respiratory motion artifact is present with bilateral areas of scarring and/or atelectasis.     Heart size is stable.  No pericardial effusion.  Thoracic aortic calcifications are seen.  Trachea is patent.  Thyroid gland appears normal.     Motion artifact in the abdomen pelvis limits detail.     Liver remains enlarged with innumerable hypoattenuating masses.  Many multifocal lesions are seen in the liver on  image 115 series 3 for example.  These were not well visualized on the CT from 02/04/2020 although component of this finding likely relates to technique.  Appearance is not thought substantially changed when compared to the PET-CT.     No gallstones.  The spleen and pancreas are normal.  Adrenal glands are within normal limits.  Probable left renal cysts.  There is no hydronephrosis.  Calcified plaque in the aorta.  IVC appears normal.     Stomach and small bowel are normal without evidence of bowel obstruction.  Colonic diverticulosis without convincing evidence to suggest diverticulitis.     Urinary bladder is within normal limits.  The uterus is present.  No ascites or drainable fluid collections.     Abdominal lymphadenopathy is again seen.  A retrocrural lymph node image 95 of series 3 which was hot on the PET scan is smaller on today's exam and measures 6 x 4 mm compared with 14 x 7 mm.  A pericaval lymph node which previously measured 14 mm in short axis image 46 series 3 on the CT from 02/04/2020 currently measures 5 mm in short axis.  A portacaval lymph node is essentially stable and measures on the order of 12 mm.  Gastrohepatic lymph node measures 12 mm in short axis, not significantly changed.  A left inguinal lymph node also appears smaller contains a fatty hilum.  There are lymph nodes seen in the right hemiabdomen inferior to the liver image 132 of series 3 for example measuring up to 9 mm in short axis which do appear increased in size.     Focal area of increased uptake in the ascending colon was best appreciated on the PET-CT.     Some sclerotic areas in the ribs are seen demonstrating uptake on the PET.  Other osseous metastatic foci per best appreciated on the PET-CT.  Discogenic degenerative changes of the spine are noted.  There is a small hypoattenuating collection in the posterior subcutaneous soft tissues of the upper back adjacent to staples, likely correlating to a seroma or hematoma for  example.  This is not significantly changed.     Impression:     Marked interval improvement in intrathoracic disease as above.  Mixed response of abdominal lymph nodes as detailed above.  Persistent diffuse multifocal hepatic lesions.  Osseous metastatic disease was best appreciated on the recent PET-CT.  Multiple additional findings as aboveCT CHEST ABDOMEN PELVIS W W/O CONTRAST (XPD)     CLINICAL HISTORY:  restaging;Liver cell carcinoma     TECHNIQUE:  Low dose axial, sagittal and coronal reformations were performed from the thoracic inlet to the pubic symphysis following the IV administration of 100 mL of Omnipaque 350.  The chest images are with  contrast and the abdomen images are with and without contrast.  30 mL of oral Omnipaque was given.     COMPARISON:  Chest abdomen pelvis CT from February 4, 2020. PET-CT from February 19, 2020.     FINDINGS:  There is been interval decrease in size of a right supraclavicular lymph node which was hypermetabolic on the recent PET-CT.  It measured 9 mm short axis on the CT from February 4, 2020.  On today's exam, it measures 5 mm in short axis.  An AP window lymph node measuring 2.2 cm on the CT has also decreased in size and only small scattered nodes are now seen in this area.  Interval decrease in subcarinal lymph node which now measures 1.2 cm in short axis compared with 2.3 cm in short axis on CT.  A 2.6 cm right perihilar lymph node has also improved and currently, no enlarged lymph node is seen in this region.  Right infrahilar soft tissue mass measuring up to 3.3 cm on image 220 series 2 on the CT from 2/4/20 has also markedly improved with no measurable soft tissue mass seen in this region on today's study.  There is also interval improvement in previously present postobstructive atelectasis in the right lower lobe.  Respiratory motion artifact is present with bilateral areas of scarring and/or atelectasis.     Heart size is stable.  No pericardial effusion.   Thoracic aortic calcifications are seen.  Trachea is patent.  Thyroid gland appears normal.     Motion artifact in the abdomen pelvis limits detail.     Liver remains enlarged with innumerable hypoattenuating masses.  Many multifocal lesions are seen in the liver on image 115 series 3 for example.  These were not well visualized on the CT from 02/04/2020 although component of this finding likely relates to technique.  Appearance is not thought substantially changed when compared to the PET-CT.     No gallstones.  The spleen and pancreas are normal.  Adrenal glands are within normal limits.  Probable left renal cysts.  There is no hydronephrosis.  Calcified plaque in the aorta.  IVC appears normal.     Stomach and small bowel are normal without evidence of bowel obstruction.  Colonic diverticulosis without convincing evidence to suggest diverticulitis.     Urinary bladder is within normal limits.  The uterus is present.  No ascites or drainable fluid collections.     Abdominal lymphadenopathy is again seen.  A retrocrural lymph node image 95 of series 3 which was hot on the PET scan is smaller on today's exam and measures 6 x 4 mm compared with 14 x 7 mm.  A pericaval lymph node which previously measured 14 mm in short axis image 46 series 3 on the CT from 02/04/2020 currently measures 5 mm in short axis.  A portacaval lymph node is essentially stable and measures on the order of 12 mm.  Gastrohepatic lymph node measures 12 mm in short axis, not significantly changed.  A left inguinal lymph node also appears smaller contains a fatty hilum.  There are lymph nodes seen in the right hemiabdomen inferior to the liver image 132 of series 3 for example measuring up to 9 mm in short axis which do appear increased in size.     Focal area of increased uptake in the ascending colon was best appreciated on the PET-CT.     Some sclerotic areas in the ribs are seen demonstrating uptake on the PET.  Other osseous metastatic foci  per best appreciated on the PET-CT.  Discogenic degenerative changes of the spine are noted.  There is a small hypoattenuating collection in the posterior subcutaneous soft tissues of the upper back adjacent to staples, likely correlating to a seroma or hematoma for example.  This is not significantly changed.     Impression:     Marked interval improvement in intrathoracic disease as above.  Mixed response of abdominal lymph nodes as detailed above.  Persistent diffuse multifocal hepatic lesions.  Osseous metastatic disease was best appreciated on the recent PET-CT.  Multiple additional findings as above       Pathology:    02/06/2020 liver biopsy  LIVER, BIOPSY:   Consistent with hepatocellular carcinoma, moderate to poorly differentiated     The biopsy shows nests of tumor cells with deeply eosinophilic granular   cytoplasm. Immunostains performed shows results as:   HepPar - Focal granular positive   CK7 and TTF-1 - Negative    ?   Assessment/Plan:       1. HCC (hepatocellular carcinoma)    2. 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 Secondary malignant neoplasm of bone    5. Congestive heart failure, unspecified HF chronicity, unspecified heart failure type    6. Hypertension, unspecified type    7. Type 2 diabetes mellitus without complication, without long-term current use of insulin    8. Aortic atherosclerosis    9. Morbid obesity    10. Former smoker          Plan:     # metastatic hepatocellular carcinoma:  initiated first-line palliative immunotherapy with nivolumab 03/02/2020.   Given COVID-19 concern I did discuss with her that her immunotherapy agent nivolumab has been used at higher dose 480mg every 4 weeks and feel reasonable.  I have personally reviewed and shared with her and daughter in clinic today results of restaging CT showing improvement in lymphadenopathy and relatively stable disease in liver and bones.  As she is doing  well and enjoys convenience of every 4 week treatments with COVID-19 agreed continuation on this regimen.  She is tolerating well with minimal rash, see below.  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  Will plan for this during her next visit.    # rash: May be associated with her immunotherapy, well-controlled and resolved with triamcinolone cream, use p.r.n..  Continue to monitor.    # oxygen dependence, functional status:  Improved oxygenation with improved shortness of breath as reflected by restaging scans.  No longer requires at rest but does use supplemental oxygen with activity.  Follow-up with pulmonology p.r.n..    CHF, hypertension, type 2 diabetes, aortic atherosclerosis, morbid obesity, former smoker:  Elevated blood pressure, recommend follow-up with primary care.  Comorbidities recommend continued follow-up with primary care.      Follow-Up:   - nivolumab C6D1 today, dose 480 mg with revisit in 4 weeks and labs prior due to COVID-19 concern    - plan for Xgeva to start next cycle in 4 weeks.

## 2020-06-30 ENCOUNTER — EXTERNAL CHRONIC CARE MANAGEMENT (OUTPATIENT)
Dept: PRIMARY CARE CLINIC | Facility: CLINIC | Age: 80
End: 2020-06-30
Payer: MEDICARE

## 2020-06-30 PROCEDURE — 99490 CHRNC CARE MGMT STAFF 1ST 20: CPT | Mod: PBBFAC,PO | Performed by: FAMILY MEDICINE

## 2020-06-30 PROCEDURE — 99490 PR CHRONIC CARE MGMT, 1ST 20 MIN: ICD-10-PCS | Mod: S$PBB,,, | Performed by: FAMILY MEDICINE

## 2020-06-30 PROCEDURE — 99490 CHRNC CARE MGMT STAFF 1ST 20: CPT | Mod: S$PBB,,, | Performed by: FAMILY MEDICINE

## 2020-07-24 ENCOUNTER — INFUSION (OUTPATIENT)
Dept: INFUSION THERAPY | Facility: HOSPITAL | Age: 80
End: 2020-07-24
Attending: INTERNAL MEDICINE
Payer: MEDICARE

## 2020-07-24 ENCOUNTER — OFFICE VISIT (OUTPATIENT)
Dept: HEMATOLOGY/ONCOLOGY | Facility: CLINIC | Age: 80
End: 2020-07-24
Payer: MEDICARE

## 2020-07-24 VITALS
SYSTOLIC BLOOD PRESSURE: 148 MMHG | DIASTOLIC BLOOD PRESSURE: 76 MMHG | TEMPERATURE: 98 F | OXYGEN SATURATION: 98 % | WEIGHT: 192.88 LBS | RESPIRATION RATE: 18 BRPM | HEART RATE: 86 BPM | BODY MASS INDEX: 36.42 KG/M2 | HEIGHT: 61 IN

## 2020-07-24 VITALS
OXYGEN SATURATION: 98 % | TEMPERATURE: 98 F | HEART RATE: 70 BPM | WEIGHT: 192.88 LBS | BODY MASS INDEX: 36.42 KG/M2 | SYSTOLIC BLOOD PRESSURE: 140 MMHG | HEIGHT: 61 IN | DIASTOLIC BLOOD PRESSURE: 69 MMHG | RESPIRATION RATE: 18 BRPM

## 2020-07-24 DIAGNOSIS — C79.51 SECONDARY MALIGNANT NEOPLASM OF BONE: ICD-10-CM

## 2020-07-24 DIAGNOSIS — C22.0 HEPATOCELLULAR CARCINOMA: Primary | ICD-10-CM

## 2020-07-24 DIAGNOSIS — G89.3 CANCER ASSOCIATED PAIN: ICD-10-CM

## 2020-07-24 PROCEDURE — 99215 PR OFFICE/OUTPT VISIT, EST, LEVL V, 40-54 MIN: ICD-10-PCS | Mod: S$PBB,,, | Performed by: INTERNAL MEDICINE

## 2020-07-24 PROCEDURE — 25000003 PHARM REV CODE 250: Performed by: INTERNAL MEDICINE

## 2020-07-24 PROCEDURE — 96413 CHEMO IV INFUSION 1 HR: CPT

## 2020-07-24 PROCEDURE — 99215 OFFICE O/P EST HI 40 MIN: CPT | Mod: S$PBB,,, | Performed by: INTERNAL MEDICINE

## 2020-07-24 PROCEDURE — 96372 THER/PROPH/DIAG INJ SC/IM: CPT | Mod: 59

## 2020-07-24 PROCEDURE — 99999 PR PBB SHADOW E&M-EST. PATIENT-LVL IV: ICD-10-PCS | Mod: PBBFAC,,, | Performed by: INTERNAL MEDICINE

## 2020-07-24 PROCEDURE — 99214 OFFICE O/P EST MOD 30 MIN: CPT | Mod: PBBFAC | Performed by: INTERNAL MEDICINE

## 2020-07-24 PROCEDURE — 63600175 PHARM REV CODE 636 W HCPCS: Mod: JG | Performed by: INTERNAL MEDICINE

## 2020-07-24 PROCEDURE — 99999 PR PBB SHADOW E&M-EST. PATIENT-LVL IV: CPT | Mod: PBBFAC,,, | Performed by: INTERNAL MEDICINE

## 2020-07-24 RX ORDER — HEPARIN 100 UNIT/ML
500 SYRINGE INTRAVENOUS
Status: CANCELLED | OUTPATIENT
Start: 2020-07-24

## 2020-07-24 RX ORDER — SODIUM CHLORIDE 0.9 % (FLUSH) 0.9 %
10 SYRINGE (ML) INJECTION
Status: CANCELLED | OUTPATIENT
Start: 2020-07-24

## 2020-07-24 RX ORDER — HYDROCODONE BITARTRATE AND ACETAMINOPHEN 5; 325 MG/1; MG/1
1 TABLET ORAL EVERY 8 HOURS PRN
Qty: 90 TABLET | Refills: 0 | Status: SHIPPED | OUTPATIENT
Start: 2020-07-24 | End: 2020-09-03 | Stop reason: SDUPTHER

## 2020-07-24 RX ADMIN — SODIUM CHLORIDE 480 MG: 9 INJECTION, SOLUTION INTRAVENOUS at 10:07

## 2020-07-24 RX ADMIN — DENOSUMAB 120 MG: 120 INJECTION SUBCUTANEOUS at 11:07

## 2020-07-24 NOTE — PROGRESS NOTES
Subjective:      DATE OF VISIT: 7/24/20     ?  Patient ID:?Maura Singh is a 80 y.o. female.?? MR#: [id number]   ?   PRIMARY ONCOLOGIST: Dr. Burgess    ? Primary Care Providers:  Yessy Andrade MD, MD (General)     CHIEF COMPLAINT: ???  HCC   ?   ONCOLOGIC DIAGNOSIS:  Metastatic hepatocellular carcinoma  ?   CURRENT TREATMENT:  Nivolumab Q 2 weeks, cycle 1 day 1, 03/02/2020; currently 480mg q4 weeks due to COVID-19, Xgeva   ?   ONCOLOGIC HISTORY:    Ms. Singh was admitted to [facility] on 02/04/2020 with gradually progressive shortness of breath, cough with hemoptysis along with intermittent abdominal pain, anorexia and fatigue.  In the emergency room she had workup including abdominal ultrasound showing multiple liver lesions concerning for metastatic disease.  CTA was negative for pulmonary embolism but revealed mediastinal adenopathy with right hilar soft tissue mass cannot exclude lymphadenopathy along with postobstructive collapse of right anterior segment lower lobe.  Hepatomegaly was noted with multiple low-density hypoenhancing lesions consistent with hepatic metastatic disease.    2/19/20 PET-CT:    To tele I was thinking to me that you already have 1 extra due as thinking last Monday for she 1. Hypermetabolic right infrahilar lung mass and associated partial postobstructive collapse right lower lobe.  2. Small hypermetabolic focus right lower lobe possibly early metastasis.  3. Hypermetabolic masslike thickening ascending colon suspicious for neoplasm.  4. Multifocal FDG avid lissette metastases neck, chest, abdomen, and pelvis as noted.  5. Extensive FDG avid hepatic metastases.  6. Widespread FDG avid osseous metastases.     02/06/2020 liver biopsy  Pathology:  Consistent with hepatocellular carcinoma, moderate to poorly differentiated     The biopsy shows nests of tumor cells with deeply eosinophilic granular   cytoplasm. Immunostains performed shows results as:   HepPar - Focal granular  positive   CK7 and TTF-1 - Negative    03/02/2020 cycle 1 day 1 nivolumab Q 2 weeks palliative immunotherapy    03/16/2020 cycle 2 day 1 nivolumab    03/30/2020 cycle 3 day 1 nivolumab, note: dose of 480 mg [id number] to limit clinic visits due to COVID-19 concerns    05/25/2020 cycle 5 day 1 nivolumab    6/16/20 inteval CT scan with response in lymphadenopathy, see below    INTERVAL EVENTS    Ms. Singh presents with her daughter for follow-up.  She notes poorly controlled pain diffuse abdominal bilateral hips primarily.  Pain is relieved well with Norco however she has not been taking during the day due to concern of using upper medication.  She is not requested medication refill however.  Stable respiratory status in oxygenation requirement.  No cough, hemoptysis, hematemesis, nausea/vomiting.  Stable exertional fatigue/dyspnea.    Review of Systems    ?   A comprehensive 14-point review of systems was reviewed with patient and was negative other than as specified above.   ?      Objective:      Physical Exam      ?   Vitals:    07/24/20 0932   BP: (!) 148/76   Pulse: 86   Resp: 18   Temp: 98.2 °F (36.8 °C)      ?   ECOG:?2  General appearance: Generally well appearing, in no acute distress, using 2 L supplemental oxygen  Head, eyes, ears, nose, and throat:  moist mucous membranes.    Cardiovascular: Regular rate and rhythm, S1, S2, no audible murmurs.   Respiratory:  Breathing comfortably, clear to auscultation bilaterally, no wheezing or rhonchi  Abdomen:  Nondistended  Extremities: Warm, without notable edema.  Neurologic: Alert and oriented.  Sitting in wheelchair  Skin: No rashes, ecchymoses or petechial lesion.     ?   Laboratory:  ?   Lab Visit on 07/24/2020   Component Date Value Ref Range Status    WBC 07/24/2020 7.09  3.90 - 12.70 K/uL Final    RBC 07/24/2020 4.54  4.00 - 5.40 M/uL Final    Hemoglobin 07/24/2020 13.3  12.0 - 16.0 g/dL Final    Hematocrit 07/24/2020 43.9  37.0 - 48.5 % Final    Mean  Corpuscular Volume 07/24/2020 97  82 - 98 fL Final    Mean Corpuscular Hemoglobin 07/24/2020 29.3  27.0 - 31.0 pg Final    Mean Corpuscular Hemoglobin Conc 07/24/2020 30.3* 32.0 - 36.0 g/dL Final    RDW 07/24/2020 14.8* 11.5 - 14.5 % Final    Platelets 07/24/2020 290  150 - 350 K/uL Final    MPV 07/24/2020 9.7  9.2 - 12.9 fL Final    Immature Granulocytes 07/24/2020 0.6* 0.0 - 0.5 % Final    Gran # (ANC) 07/24/2020 3.7  1.8 - 7.7 K/uL Final    Immature Grans (Abs) 07/24/2020 0.04  0.00 - 0.04 K/uL Final    Lymph # 07/24/2020 1.9  1.0 - 4.8 K/uL Final    Mono # 07/24/2020 0.9  0.3 - 1.0 K/uL Final    Eos # 07/24/2020 0.4  0.0 - 0.5 K/uL Final    Baso # 07/24/2020 0.07  0.00 - 0.20 K/uL Final    nRBC 07/24/2020 0  0 /100 WBC Final    Gran% 07/24/2020 52.7  38.0 - 73.0 % Final    Lymph% 07/24/2020 27.1  18.0 - 48.0 % Final    Mono% 07/24/2020 12.8  4.0 - 15.0 % Final    Eosinophil% 07/24/2020 5.8  0.0 - 8.0 % Final    Basophil% 07/24/2020 1.0  0.0 - 1.9 % Final    Differential Method 07/24/2020 Automated   Final    Sodium 07/24/2020 142  136 - 145 mmol/L Final    Potassium 07/24/2020 4.8  3.5 - 5.1 mmol/L Final    Chloride 07/24/2020 102  95 - 110 mmol/L Final    CO2 07/24/2020 29  23 - 29 mmol/L Final    Glucose 07/24/2020 81  70 - 110 mg/dL Final    BUN, Bld 07/24/2020 17  8 - 23 mg/dL Final    Creatinine 07/24/2020 0.9  0.5 - 1.4 mg/dL Final    Calcium 07/24/2020 9.8  8.7 - 10.5 mg/dL Final    Total Protein 07/24/2020 7.7  6.0 - 8.4 g/dL Final    Albumin 07/24/2020 3.6  3.5 - 5.2 g/dL Final    Total Bilirubin 07/24/2020 0.4  0.1 - 1.0 mg/dL Final    Alkaline Phosphatase 07/24/2020 76  55 - 135 U/L Final    AST 07/24/2020 19  10 - 40 U/L Final    ALT 07/24/2020 20  10 - 44 U/L Final    Anion Gap 07/24/2020 11  8 - 16 mmol/L Final    eGFR if African American 07/24/2020 >60  >60 mL/min/1.73 m^2 Final    eGFR if non African American 07/24/2020 >60  >60 mL/min/1.73 m^2 Final    TSH  07/24/2020 1.090  0.400 - 4.000 uIU/mL Final      ?   Tumor markers   ?   Lab Results   Component Value Date    AFP 1.8 02/21/2020       ?   Imaging:  ?    6/16/20  CT CHEST ABDOMEN PELVIS W W/O CONTRAST (XPD)     CLINICAL HISTORY:  restaging;Liver cell carcinoma     TECHNIQUE:  Low dose axial, sagittal and coronal reformations were performed from the thoracic inlet to the pubic symphysis following the IV administration of 100 mL of Omnipaque 350.  The chest images are with  contrast and the abdomen images are with and without contrast.  30 mL of oral Omnipaque was given.     COMPARISON:  Chest abdomen pelvis CT from February 4, 2020. PET-CT from February 19, 2020.     FINDINGS:  There is been interval decrease in size of a right supraclavicular lymph node which was hypermetabolic on the recent PET-CT.  It measured 9 mm short axis on the CT from February 4, 2020.  On today's exam, it measures 5 mm in short axis.  An AP window lymph node measuring 2.2 cm on the CT has also decreased in size and only small scattered nodes are now seen in this area.  Interval decrease in subcarinal lymph node which now measures 1.2 cm in short axis compared with 2.3 cm in short axis on CT.  A 2.6 cm right perihilar lymph node has also improved and currently, no enlarged lymph node is seen in this region.  Right infrahilar soft tissue mass measuring up to 3.3 cm on image 220 series 2 on the CT from 2/4/20 has also markedly improved with no measurable soft tissue mass seen in this region on today's study.  There is also interval improvement in previously present postobstructive atelectasis in the right lower lobe.  Respiratory motion artifact is present with bilateral areas of scarring and/or atelectasis.     Heart size is stable.  No pericardial effusion.  Thoracic aortic calcifications are seen.  Trachea is patent.  Thyroid gland appears normal.     Motion artifact in the abdomen pelvis limits detail.     Liver remains enlarged with  innumerable hypoattenuating masses.  Many multifocal lesions are seen in the liver on image 115 series 3 for example.  These were not well visualized on the CT from 02/04/2020 although component of this finding likely relates to technique.  Appearance is not thought substantially changed when compared to the PET-CT.     No gallstones.  The spleen and pancreas are normal.  Adrenal glands are within normal limits.  Probable left renal cysts.  There is no hydronephrosis.  Calcified plaque in the aorta.  IVC appears normal.     Stomach and small bowel are normal without evidence of bowel obstruction.  Colonic diverticulosis without convincing evidence to suggest diverticulitis.     Urinary bladder is within normal limits.  The uterus is present.  No ascites or drainable fluid collections.     Abdominal lymphadenopathy is again seen.  A retrocrural lymph node image 95 of series 3 which was hot on the PET scan is smaller on today's exam and measures 6 x 4 mm compared with 14 x 7 mm.  A pericaval lymph node which previously measured 14 mm in short axis image 46 series 3 on the CT from 02/04/2020 currently measures 5 mm in short axis.  A portacaval lymph node is essentially stable and measures on the order of 12 mm.  Gastrohepatic lymph node measures 12 mm in short axis, not significantly changed.  A left inguinal lymph node also appears smaller contains a fatty hilum.  There are lymph nodes seen in the right hemiabdomen inferior to the liver image 132 of series 3 for example measuring up to 9 mm in short axis which do appear increased in size.     Focal area of increased uptake in the ascending colon was best appreciated on the PET-CT.     Some sclerotic areas in the ribs are seen demonstrating uptake on the PET.  Other osseous metastatic foci per best appreciated on the PET-CT.  Discogenic degenerative changes of the spine are noted.  There is a small hypoattenuating collection in the posterior subcutaneous soft tissues  of the upper back adjacent to staples, likely correlating to a seroma or hematoma for example.  This is not significantly changed.     Impression:     Marked interval improvement in intrathoracic disease as above.  Mixed response of abdominal lymph nodes as detailed above.  Persistent diffuse multifocal hepatic lesions.  Osseous metastatic disease was best appreciated on the recent PET-CT.  Multiple additional findings as aboveCT CHEST ABDOMEN PELVIS W W/O CONTRAST (XPD)     CLINICAL HISTORY:  restaging;Liver cell carcinoma     TECHNIQUE:  Low dose axial, sagittal and coronal reformations were performed from the thoracic inlet to the pubic symphysis following the IV administration of 100 mL of Omnipaque 350.  The chest images are with  contrast and the abdomen images are with and without contrast.  30 mL of oral Omnipaque was given.     COMPARISON:  Chest abdomen pelvis CT from February 4, 2020. PET-CT from February 19, 2020.     FINDINGS:  There is been interval decrease in size of a right supraclavicular lymph node which was hypermetabolic on the recent PET-CT.  It measured 9 mm short axis on the CT from February 4, 2020.  On today's exam, it measures 5 mm in short axis.  An AP window lymph node measuring 2.2 cm on the CT has also decreased in size and only small scattered nodes are now seen in this area.  Interval decrease in subcarinal lymph node which now measures 1.2 cm in short axis compared with 2.3 cm in short axis on CT.  A 2.6 cm right perihilar lymph node has also improved and currently, no enlarged lymph node is seen in this region.  Right infrahilar soft tissue mass measuring up to 3.3 cm on image 220 series 2 on the CT from 2/4/20 has also markedly improved with no measurable soft tissue mass seen in this region on today's study.  There is also interval improvement in previously present postobstructive atelectasis in the right lower lobe.  Respiratory motion artifact is present with bilateral areas of  scarring and/or atelectasis.     Heart size is stable.  No pericardial effusion.  Thoracic aortic calcifications are seen.  Trachea is patent.  Thyroid gland appears normal.     Motion artifact in the abdomen pelvis limits detail.     Liver remains enlarged with innumerable hypoattenuating masses.  Many multifocal lesions are seen in the liver on image 115 series 3 for example.  These were not well visualized on the CT from 02/04/2020 although component of this finding likely relates to technique.  Appearance is not thought substantially changed when compared to the PET-CT.     No gallstones.  The spleen and pancreas are normal.  Adrenal glands are within normal limits.  Probable left renal cysts.  There is no hydronephrosis.  Calcified plaque in the aorta.  IVC appears normal.     Stomach and small bowel are normal without evidence of bowel obstruction.  Colonic diverticulosis without convincing evidence to suggest diverticulitis.     Urinary bladder is within normal limits.  The uterus is present.  No ascites or drainable fluid collections.     Abdominal lymphadenopathy is again seen.  A retrocrural lymph node image 95 of series 3 which was hot on the PET scan is smaller on today's exam and measures 6 x 4 mm compared with 14 x 7 mm.  A pericaval lymph node which previously measured 14 mm in short axis image 46 series 3 on the CT from 02/04/2020 currently measures 5 mm in short axis.  A portacaval lymph node is essentially stable and measures on the order of 12 mm.  Gastrohepatic lymph node measures 12 mm in short axis, not significantly changed.  A left inguinal lymph node also appears smaller contains a fatty hilum.  There are lymph nodes seen in the right hemiabdomen inferior to the liver image 132 of series 3 for example measuring up to 9 mm in short axis which do appear increased in size.     Focal area of increased uptake in the ascending colon was best appreciated on the PET-CT.     Some sclerotic areas in  the ribs are seen demonstrating uptake on the PET.  Other osseous metastatic foci per best appreciated on the PET-CT.  Discogenic degenerative changes of the spine are noted.  There is a small hypoattenuating collection in the posterior subcutaneous soft tissues of the upper back adjacent to staples, likely correlating to a seroma or hematoma for example.  This is not significantly changed.     Impression:     Marked interval improvement in intrathoracic disease as above.  Mixed response of abdominal lymph nodes as detailed above.  Persistent diffuse multifocal hepatic lesions.  Osseous metastatic disease was best appreciated on the recent PET-CT.  Multiple additional findings as above       Pathology:    02/06/2020 liver biopsy  LIVER, BIOPSY:   Consistent with hepatocellular carcinoma, moderate to poorly differentiated     The biopsy shows nests of tumor cells with deeply eosinophilic granular   cytoplasm. Immunostains performed shows results as:   HepPar - Focal granular positive   CK7 and TTF-1 - Negative    ?   Assessment/Plan:       1. Hepatocellular carcinoma    2. Cancer associated pain    3. Secondary malignant neoplasm of bone          Plan:     # metastatic hepatocellular carcinoma:  initiated first-line palliative immunotherapy with nivolumab 03/02/2020.   Given COVID-19 concern I did discuss with her that her immunotherapy agent nivolumab has been used at higher dose 480mg every 4 weeks and feel reasonable.  I have personally reviewed and shared with her and daughter in clinic today results of restaging CT showing improvement in lymphadenopathy and relatively stable disease in liver and bones.  As she is doing well and enjoys convenience of every 4 week treatments with COVID-19 agreed continuation on this regimen.  She is tolerating well with minimal rash, see below.  - nivolumab q.4 weeks, labs within normal limits today okay to proceed with treatment.    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  She has not yet started calcium and vitamin-D.  Calcium and renal function within normal limits okay to proceed today and will initiate supplementation today, reinforced, along with Xgeva.    # malignancy associated pain:  Norco adequately controls pain however she has not been taking as needed frequency.  She does tolerate well and does not have drowsiness side effect, reinforce importance of taking as needed t.i.d. and can refill next month.    # oxygen dependence, functional status:  Improved oxygenation with improved shortness of breath as reflected by restaging scans.  No longer requires at rest but does use supplemental oxygen with activity.  Follow-up with pulmonology p.r.n..    CHF, hypertension, type 2 diabetes, aortic atherosclerosis, morbid obesity, former smoker:  Elevated blood pressure, recommend follow-up with primary care.  Comorbidities recommend continued follow-up with primary care.      Follow-Up:   - nivolumab Q 4 weeks    - Xgeva

## 2020-07-31 ENCOUNTER — EXTERNAL CHRONIC CARE MANAGEMENT (OUTPATIENT)
Dept: PRIMARY CARE CLINIC | Facility: CLINIC | Age: 80
End: 2020-07-31
Payer: MEDICARE

## 2020-07-31 PROCEDURE — 99490 PR CHRONIC CARE MGMT, 1ST 20 MIN: ICD-10-PCS | Mod: S$PBB,,, | Performed by: FAMILY MEDICINE

## 2020-07-31 PROCEDURE — 99490 CHRNC CARE MGMT STAFF 1ST 20: CPT | Mod: S$PBB,,, | Performed by: FAMILY MEDICINE

## 2020-07-31 PROCEDURE — 99490 CHRNC CARE MGMT STAFF 1ST 20: CPT | Mod: PBBFAC,PN | Performed by: FAMILY MEDICINE

## 2020-08-21 ENCOUNTER — OFFICE VISIT (OUTPATIENT)
Dept: HEMATOLOGY/ONCOLOGY | Facility: CLINIC | Age: 80
End: 2020-08-21
Payer: MEDICARE

## 2020-08-21 ENCOUNTER — INFUSION (OUTPATIENT)
Dept: INFUSION THERAPY | Facility: HOSPITAL | Age: 80
End: 2020-08-21
Attending: INTERNAL MEDICINE
Payer: MEDICARE

## 2020-08-21 VITALS
HEIGHT: 61 IN | SYSTOLIC BLOOD PRESSURE: 151 MMHG | TEMPERATURE: 97 F | DIASTOLIC BLOOD PRESSURE: 70 MMHG | BODY MASS INDEX: 35.88 KG/M2 | OXYGEN SATURATION: 95 % | WEIGHT: 190.06 LBS | HEART RATE: 70 BPM

## 2020-08-21 VITALS
OXYGEN SATURATION: 95 % | RESPIRATION RATE: 18 BRPM | SYSTOLIC BLOOD PRESSURE: 163 MMHG | TEMPERATURE: 98 F | DIASTOLIC BLOOD PRESSURE: 76 MMHG | HEART RATE: 71 BPM

## 2020-08-21 DIAGNOSIS — C22.0 HEPATOCELLULAR CARCINOMA: Primary | ICD-10-CM

## 2020-08-21 DIAGNOSIS — Z99.81 OXYGEN DEPENDENT: ICD-10-CM

## 2020-08-21 DIAGNOSIS — C22.0 HCC (HEPATOCELLULAR CARCINOMA): Primary | ICD-10-CM

## 2020-08-21 DIAGNOSIS — C79.51 SECONDARY MALIGNANT NEOPLASM OF BONE: ICD-10-CM

## 2020-08-21 DIAGNOSIS — T50.905A ADVERSE EFFECT OF DRUG, INITIAL ENCOUNTER: ICD-10-CM

## 2020-08-21 PROCEDURE — 99213 OFFICE O/P EST LOW 20 MIN: CPT | Mod: PBBFAC,25 | Performed by: INTERNAL MEDICINE

## 2020-08-21 PROCEDURE — 63600175 PHARM REV CODE 636 W HCPCS: Mod: JG | Performed by: INTERNAL MEDICINE

## 2020-08-21 PROCEDURE — 99999 PR PBB SHADOW E&M-EST. PATIENT-LVL III: ICD-10-PCS | Mod: PBBFAC,,, | Performed by: INTERNAL MEDICINE

## 2020-08-21 PROCEDURE — 96413 CHEMO IV INFUSION 1 HR: CPT

## 2020-08-21 PROCEDURE — 25000003 PHARM REV CODE 250: Performed by: INTERNAL MEDICINE

## 2020-08-21 PROCEDURE — 99215 PR OFFICE/OUTPT VISIT, EST, LEVL V, 40-54 MIN: ICD-10-PCS | Mod: S$PBB,,, | Performed by: INTERNAL MEDICINE

## 2020-08-21 PROCEDURE — 99999 PR PBB SHADOW E&M-EST. PATIENT-LVL III: CPT | Mod: PBBFAC,,, | Performed by: INTERNAL MEDICINE

## 2020-08-21 PROCEDURE — 99215 OFFICE O/P EST HI 40 MIN: CPT | Mod: S$PBB,,, | Performed by: INTERNAL MEDICINE

## 2020-08-21 RX ORDER — HEPARIN 100 UNIT/ML
500 SYRINGE INTRAVENOUS
Status: CANCELLED | OUTPATIENT
Start: 2020-08-21

## 2020-08-21 RX ORDER — SODIUM CHLORIDE 0.9 % (FLUSH) 0.9 %
10 SYRINGE (ML) INJECTION
Status: DISCONTINUED | OUTPATIENT
Start: 2020-08-21 | End: 2020-08-21 | Stop reason: HOSPADM

## 2020-08-21 RX ORDER — SODIUM CHLORIDE 0.9 % (FLUSH) 0.9 %
10 SYRINGE (ML) INJECTION
Status: CANCELLED | OUTPATIENT
Start: 2020-08-21

## 2020-08-21 RX ADMIN — SODIUM CHLORIDE 480 MG: 9 INJECTION, SOLUTION INTRAVENOUS at 02:08

## 2020-08-21 NOTE — PROGRESS NOTES
Subjective:      DATE OF VISIT: 8/21/20     ?  Patient ID:?Maura Singh is a 80 y.o. female.?? MR#: [id number]   ?   PRIMARY ONCOLOGIST: Dr. Burgess    ? Primary Care Providers:  Yessy Andrade MD, MD (General)     CHIEF COMPLAINT: ???  HCC   ?   ONCOLOGIC DIAGNOSIS:  Metastatic hepatocellular carcinoma  ?   CURRENT TREATMENT:  Nivolumab Q 2 weeks, cycle 1 day 1, 03/02/2020; currently 480mg q4 weeks due to COVID-19, Xgeva   ?   ONCOLOGIC HISTORY:    Ms. Singh was admitted to [facility] on 02/04/2020 with gradually progressive shortness of breath, cough with hemoptysis along with intermittent abdominal pain, anorexia and fatigue.  In the emergency room she had workup including abdominal ultrasound showing multiple liver lesions concerning for metastatic disease.  CTA was negative for pulmonary embolism but revealed mediastinal adenopathy with right hilar soft tissue mass cannot exclude lymphadenopathy along with postobstructive collapse of right anterior segment lower lobe.  Hepatomegaly was noted with multiple low-density hypoenhancing lesions consistent with hepatic metastatic disease.    2/19/20 PET-CT:    To tele I was thinking to me that you already have 1 extra due as thinking last Monday for she 1. Hypermetabolic right infrahilar lung mass and associated partial postobstructive collapse right lower lobe.  2. Small hypermetabolic focus right lower lobe possibly early metastasis.  3. Hypermetabolic masslike thickening ascending colon suspicious for neoplasm.  4. Multifocal FDG avid lissette metastases neck, chest, abdomen, and pelvis as noted.  5. Extensive FDG avid hepatic metastases.  6. Widespread FDG avid osseous metastases.     02/06/2020 liver biopsy  Pathology:  Consistent with hepatocellular carcinoma, moderate to poorly differentiated     The biopsy shows nests of tumor cells with deeply eosinophilic granular   cytoplasm. Immunostains performed shows results as:   HepPar - Focal granular  positive   CK7 and TTF-1 - Negative    03/02/2020 cycle 1 day 1 nivolumab Q 2 weeks palliative immunotherapy    03/16/2020 cycle 2 day 1 nivolumab    03/30/2020 cycle 3 day 1 nivolumab, note: dose of 480 mg [id number] to limit clinic visits due to COVID-19 concerns    05/25/2020 cycle 5 day 1 nivolumab    6/16/20 inteval CT scan with response in lymphadenopathy, see below    INTERVAL EVENTS    Ms. Singh presents with her daughter for follow-up.  She received an os of mab for 1st time with last cycle and notes subsequent severe diffuse pain including in mouth making difficulty swallowing.  She is not interested in continuing this medication but will continue calcium vitamin-D.  Listed as allergy and removed from treatment plan today.  She notes this pain has been gradually improving almost back to her prior baseline.  Oxygen use is stable.    Review of Systems    ?   A comprehensive 14-point review of systems was reviewed with patient and was negative other than as specified above.   ?      Objective:      Physical Exam      ?   Vitals:    08/21/20 1236   BP: (!) 151/70   Pulse: 70   Temp: 96.8 °F (36 °C)      ?   ECOG:?2  General appearance: Generally well appearing, in no acute distress, using 2 L supplemental oxygen  Head, eyes, ears, nose, and throat:  moist mucous membranes.    Cardiovascular: Regular rate and rhythm, S1, S2, no audible murmurs.   Respiratory:  Breathing comfortably, clear to auscultation bilaterally, no wheezing or rhonchi  Abdomen:  Nondistended  Extremities: Warm, without notable edema.  Neurologic: Alert and oriented.  Sitting in wheelchair  Skin: No rashes, ecchymoses or petechial lesion.     ?   Laboratory:  ?   Lab Visit on 08/21/2020   Component Date Value Ref Range Status    WBC 08/21/2020 9.28  3.90 - 12.70 K/uL Final    RBC 08/21/2020 4.26  4.00 - 5.40 M/uL Final    Hemoglobin 08/21/2020 13.0  12.0 - 16.0 g/dL Final    Hematocrit 08/21/2020 41.1  37.0 - 48.5 % Final    Mean  Corpuscular Volume 08/21/2020 97  82 - 98 fL Final    Mean Corpuscular Hemoglobin 08/21/2020 30.5  27.0 - 31.0 pg Final    Mean Corpuscular Hemoglobin Conc 08/21/2020 31.6* 32.0 - 36.0 g/dL Final    RDW 08/21/2020 14.2  11.5 - 14.5 % Final    Platelets 08/21/2020 300  150 - 350 K/uL Final    MPV 08/21/2020 9.8  9.2 - 12.9 fL Final    Immature Granulocytes 08/21/2020 0.2  0.0 - 0.5 % Final    Gran # (ANC) 08/21/2020 6.0  1.8 - 7.7 K/uL Final    Immature Grans (Abs) 08/21/2020 0.02  0.00 - 0.04 K/uL Final    Lymph # 08/21/2020 1.9  1.0 - 4.8 K/uL Final    Mono # 08/21/2020 0.9  0.3 - 1.0 K/uL Final    Eos # 08/21/2020 0.4  0.0 - 0.5 K/uL Final    Baso # 08/21/2020 0.06  0.00 - 0.20 K/uL Final    nRBC 08/21/2020 0  0 /100 WBC Final    Gran% 08/21/2020 64.8  38.0 - 73.0 % Final    Lymph% 08/21/2020 20.7  18.0 - 48.0 % Final    Mono% 08/21/2020 9.7  4.0 - 15.0 % Final    Eosinophil% 08/21/2020 4.0  0.0 - 8.0 % Final    Basophil% 08/21/2020 0.6  0.0 - 1.9 % Final    Differential Method 08/21/2020 Automated   Final    Sodium 08/21/2020 140  136 - 145 mmol/L Final    Potassium 08/21/2020 4.1  3.5 - 5.1 mmol/L Final    Chloride 08/21/2020 105  95 - 110 mmol/L Final    CO2 08/21/2020 27  23 - 29 mmol/L Final    Glucose 08/21/2020 93  70 - 110 mg/dL Final    BUN, Bld 08/21/2020 13  8 - 23 mg/dL Final    Creatinine 08/21/2020 0.8  0.5 - 1.4 mg/dL Final    Calcium 08/21/2020 9.4  8.7 - 10.5 mg/dL Final    Total Protein 08/21/2020 7.6  6.0 - 8.4 g/dL Final    Albumin 08/21/2020 3.5  3.5 - 5.2 g/dL Final    Total Bilirubin 08/21/2020 0.4  0.1 - 1.0 mg/dL Final    Alkaline Phosphatase 08/21/2020 77  55 - 135 U/L Final    AST 08/21/2020 24  10 - 40 U/L Final    ALT 08/21/2020 20  10 - 44 U/L Final    Anion Gap 08/21/2020 8  8 - 16 mmol/L Final    eGFR if African American 08/21/2020 >60  >60 mL/min/1.73 m^2 Final    eGFR if non African American 08/21/2020 >60  >60 mL/min/1.73 m^2 Final    TSH  08/21/2020 1.418  0.400 - 4.000 uIU/mL Final      ?   Tumor markers   ?   Lab Results   Component Value Date    AFP 1.8 02/21/2020       ?   Imaging:  ?    6/16/20  CT CHEST ABDOMEN PELVIS W W/O CONTRAST (XPD)     CLINICAL HISTORY:  restaging;Liver cell carcinoma     TECHNIQUE:  Low dose axial, sagittal and coronal reformations were performed from the thoracic inlet to the pubic symphysis following the IV administration of 100 mL of Omnipaque 350.  The chest images are with  contrast and the abdomen images are with and without contrast.  30 mL of oral Omnipaque was given.     COMPARISON:  Chest abdomen pelvis CT from February 4, 2020. PET-CT from February 19, 2020.     FINDINGS:  There is been interval decrease in size of a right supraclavicular lymph node which was hypermetabolic on the recent PET-CT.  It measured 9 mm short axis on the CT from February 4, 2020.  On today's exam, it measures 5 mm in short axis.  An AP window lymph node measuring 2.2 cm on the CT has also decreased in size and only small scattered nodes are now seen in this area.  Interval decrease in subcarinal lymph node which now measures 1.2 cm in short axis compared with 2.3 cm in short axis on CT.  A 2.6 cm right perihilar lymph node has also improved and currently, no enlarged lymph node is seen in this region.  Right infrahilar soft tissue mass measuring up to 3.3 cm on image 220 series 2 on the CT from 2/4/20 has also markedly improved with no measurable soft tissue mass seen in this region on today's study.  There is also interval improvement in previously present postobstructive atelectasis in the right lower lobe.  Respiratory motion artifact is present with bilateral areas of scarring and/or atelectasis.     Heart size is stable.  No pericardial effusion.  Thoracic aortic calcifications are seen.  Trachea is patent.  Thyroid gland appears normal.     Motion artifact in the abdomen pelvis limits detail.     Liver remains enlarged with  innumerable hypoattenuating masses.  Many multifocal lesions are seen in the liver on image 115 series 3 for example.  These were not well visualized on the CT from 02/04/2020 although component of this finding likely relates to technique.  Appearance is not thought substantially changed when compared to the PET-CT.     No gallstones.  The spleen and pancreas are normal.  Adrenal glands are within normal limits.  Probable left renal cysts.  There is no hydronephrosis.  Calcified plaque in the aorta.  IVC appears normal.     Stomach and small bowel are normal without evidence of bowel obstruction.  Colonic diverticulosis without convincing evidence to suggest diverticulitis.     Urinary bladder is within normal limits.  The uterus is present.  No ascites or drainable fluid collections.     Abdominal lymphadenopathy is again seen.  A retrocrural lymph node image 95 of series 3 which was hot on the PET scan is smaller on today's exam and measures 6 x 4 mm compared with 14 x 7 mm.  A pericaval lymph node which previously measured 14 mm in short axis image 46 series 3 on the CT from 02/04/2020 currently measures 5 mm in short axis.  A portacaval lymph node is essentially stable and measures on the order of 12 mm.  Gastrohepatic lymph node measures 12 mm in short axis, not significantly changed.  A left inguinal lymph node also appears smaller contains a fatty hilum.  There are lymph nodes seen in the right hemiabdomen inferior to the liver image 132 of series 3 for example measuring up to 9 mm in short axis which do appear increased in size.     Focal area of increased uptake in the ascending colon was best appreciated on the PET-CT.     Some sclerotic areas in the ribs are seen demonstrating uptake on the PET.  Other osseous metastatic foci per best appreciated on the PET-CT.  Discogenic degenerative changes of the spine are noted.  There is a small hypoattenuating collection in the posterior subcutaneous soft tissues  of the upper back adjacent to staples, likely correlating to a seroma or hematoma for example.  This is not significantly changed.     Impression:     Marked interval improvement in intrathoracic disease as above.  Mixed response of abdominal lymph nodes as detailed above.  Persistent diffuse multifocal hepatic lesions.  Osseous metastatic disease was best appreciated on the recent PET-CT.  Multiple additional findings as aboveCT CHEST ABDOMEN PELVIS W W/O CONTRAST (XPD)     CLINICAL HISTORY:  restaging;Liver cell carcinoma     TECHNIQUE:  Low dose axial, sagittal and coronal reformations were performed from the thoracic inlet to the pubic symphysis following the IV administration of 100 mL of Omnipaque 350.  The chest images are with  contrast and the abdomen images are with and without contrast.  30 mL of oral Omnipaque was given.     COMPARISON:  Chest abdomen pelvis CT from February 4, 2020. PET-CT from February 19, 2020.     FINDINGS:  There is been interval decrease in size of a right supraclavicular lymph node which was hypermetabolic on the recent PET-CT.  It measured 9 mm short axis on the CT from February 4, 2020.  On today's exam, it measures 5 mm in short axis.  An AP window lymph node measuring 2.2 cm on the CT has also decreased in size and only small scattered nodes are now seen in this area.  Interval decrease in subcarinal lymph node which now measures 1.2 cm in short axis compared with 2.3 cm in short axis on CT.  A 2.6 cm right perihilar lymph node has also improved and currently, no enlarged lymph node is seen in this region.  Right infrahilar soft tissue mass measuring up to 3.3 cm on image 220 series 2 on the CT from 2/4/20 has also markedly improved with no measurable soft tissue mass seen in this region on today's study.  There is also interval improvement in previously present postobstructive atelectasis in the right lower lobe.  Respiratory motion artifact is present with bilateral areas of  scarring and/or atelectasis.     Heart size is stable.  No pericardial effusion.  Thoracic aortic calcifications are seen.  Trachea is patent.  Thyroid gland appears normal.     Motion artifact in the abdomen pelvis limits detail.     Liver remains enlarged with innumerable hypoattenuating masses.  Many multifocal lesions are seen in the liver on image 115 series 3 for example.  These were not well visualized on the CT from 02/04/2020 although component of this finding likely relates to technique.  Appearance is not thought substantially changed when compared to the PET-CT.     No gallstones.  The spleen and pancreas are normal.  Adrenal glands are within normal limits.  Probable left renal cysts.  There is no hydronephrosis.  Calcified plaque in the aorta.  IVC appears normal.     Stomach and small bowel are normal without evidence of bowel obstruction.  Colonic diverticulosis without convincing evidence to suggest diverticulitis.     Urinary bladder is within normal limits.  The uterus is present.  No ascites or drainable fluid collections.     Abdominal lymphadenopathy is again seen.  A retrocrural lymph node image 95 of series 3 which was hot on the PET scan is smaller on today's exam and measures 6 x 4 mm compared with 14 x 7 mm.  A pericaval lymph node which previously measured 14 mm in short axis image 46 series 3 on the CT from 02/04/2020 currently measures 5 mm in short axis.  A portacaval lymph node is essentially stable and measures on the order of 12 mm.  Gastrohepatic lymph node measures 12 mm in short axis, not significantly changed.  A left inguinal lymph node also appears smaller contains a fatty hilum.  There are lymph nodes seen in the right hemiabdomen inferior to the liver image 132 of series 3 for example measuring up to 9 mm in short axis which do appear increased in size.     Focal area of increased uptake in the ascending colon was best appreciated on the PET-CT.     Some sclerotic areas in  the ribs are seen demonstrating uptake on the PET.  Other osseous metastatic foci per best appreciated on the PET-CT.  Discogenic degenerative changes of the spine are noted.  There is a small hypoattenuating collection in the posterior subcutaneous soft tissues of the upper back adjacent to staples, likely correlating to a seroma or hematoma for example.  This is not significantly changed.     Impression:     Marked interval improvement in intrathoracic disease as above.  Mixed response of abdominal lymph nodes as detailed above.  Persistent diffuse multifocal hepatic lesions.  Osseous metastatic disease was best appreciated on the recent PET-CT.  Multiple additional findings as above       Pathology:    02/06/2020 liver biopsy  LIVER, BIOPSY:   Consistent with hepatocellular carcinoma, moderate to poorly differentiated     The biopsy shows nests of tumor cells with deeply eosinophilic granular   cytoplasm. Immunostains performed shows results as:   HepPar - Focal granular positive   CK7 and TTF-1 - Negative    ?   Assessment/Plan:       1. HCC (hepatocellular carcinoma)    2. Secondary malignant neoplasm of bone    3. Adverse effect of drug, initial encounter    4. Oxygen dependent          Plan:     # metastatic hepatocellular carcinoma:  initiated first-line palliative immunotherapy with nivolumab 03/02/2020.   Given COVID-19 concern I did discuss with her that her immunotherapy agent nivolumab has been used at higher dose 480mg every 4 weeks and feel reasonable.  I have personally reviewed and shared with her and daughter in clinic today results of restaging CT showing improvement in lymphadenopathy and relatively stable disease in liver and bones.  As she is doing well and enjoys convenience of every 4 week treatments with COVID-19 agreed continuation on this regimen.  She is tolerating well.  Labs reviewed okay to proceed today.  - nivolumab q.4 weeks, labs within normal limits today okay to proceed with  treatment.    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  Have listed as allergy and removed from her treatment plan.  Discussed risks and benefits bone protective agents unclear if she would tolerate zoledronic acid better but hesitant to proceed with other medications at this time.  Will proceed with bone protection calcium vitamin-D alone.    # malignancy associated pain:  Norco adequately controls pain however she has not been taking as needed frequency.  Due to escalation of pain with reaction to Xgeva use 3 times per day and had been poorly controlled although this reaction is resolving.  Encouraged her to follow up palliative care if persistent poor pain control.    # oxygen dependence, functional status:  Improved oxygenation with improved shortness of breath as reflected by restaging scans.  No longer requires at rest but does use supplemental oxygen with activity.  Follow-up with pulmonology p.r.n..    CHF, hypertension, type 2 diabetes, aortic atherosclerosis, morbid obesity, former smoker:  Elevated blood pressure, recommend follow-up with primary care.  Comorbidities recommend continued follow-up with primary care.      Follow-Up:   - nivolumab Q 4 weeks   Revisit in 1 month with labs prior and planned nivolumab.

## 2020-08-21 NOTE — DISCHARGE INSTRUCTIONS
[facility]  [address] [facility]  [address]  [phone] phone     [phone] fax  Hours of Operation: Monday- Friday 8:00am- 5:00pm  After hours phone  [phone]  Hematology / Oncology Physicians on call      MISAEL Merino Dr., Dr., Dr., Dr., NP Sydney Prescott, NP Tyesha Taylor, NP    Please call with any concerns regarding your appointment today.Discharge Instructions for Chemotherapy   Your doctor prescribed a type of medication therapy for you called chemotherapy. Doctors prescribe chemotherapy for many different types of illnesses, including cancer. There are many types of chemotherapy. This sheet provides general guidelines on how you can take care of yourself after your chemotherapy.   Mouth Care   Dont be discouraged if you get mouth sores, even if you are following all your doctors instructions. Many people get mouth sores as a side effect of chemotherapy. Heres what you can do to prevent mouth sores:    Keep your mouth clean. Brush your teeth with a soft-bristle toothbrush after every meal.    Use an oral swab or special soft toothbrush if your gums bleed during regular brushing.    Dont use dental floss if your platelet count is below 50,000. Your doctor or nurse will tell you if this is the case.    Use any mouthwashes given to you as directed.    If you cant tolerate regular methods, use salt and baking soda to clean your mouth. Mix 1 teaspoon(s) of salt and 1 teaspoon(s) of baking soda into an 8-ounce glass of warm water. Swish and spit.    Watch your mouth and tongue for white patches. This is a sign of fungal infection, a common side effect of chemotherapy. Be sure to tell your doctor about these patches. Medication can be prescribed to help you fight the fungal infection.  Other Home Care    Try to exercise. Exercise keeps you strong and keeps your heart and lungs  active. Walk as much as you can without becoming dizzy or weak.    Keep clean. During chemotherapy, your body cant fight infection very well. Take short baths or showers.    Use moisturizing soap. Chemotherapy can make your skin dry.    Apply moisturizing lotion several times a day to help relieve dry skin.    Dont take very hot or very cold showers or baths.    Dont be surprised if your chemotherapy causes slight burns to your skin--usually on the hands and feet. Some drugs used in high doses cause this to happen. Ask for a special cream to help relieve the burn and protect your skin.    Let your doctor know if your throat is sore. You may have an infection that needs treatment.    Remember, many patients feel sick and lose their appetites during treatment. Eat small meals several times a day to keep your strength up.    Choose bland foods with little taste or smell if you are reacting strongly to food.    Be sure to cook all food thoroughly. This kills bacteria and helps you avoid infection.    Eat foods that are soft. Soft foods are less likely to cause stomach irritation.   Follow-Up   Make a follow-up appointment as directed by our staff.   When to Call Your Doctor   Call your doctor right away if you have any of the following:    Unexplained bleeding    Trouble concentrating    Ongoing fatigue    Shortness of breath, wheezing, or trouble breathing    Rapid, irregular heartbeat; chest pain    Dizziness, lightheadedness    Constant feeling of being cold    Hives or a cut or rash that swells, turns red, feels hot or painful, or begins to ooze    Fever of 100.4°F or higher, or chills    © 7133-8219 [facility], [address] [place] [address], [place] [address]. All rights reserved. This information is not intended as a substitute for professional medical care. FALL PREVENTION   Falls often occur due to slipping, tripping or losing your balance. Here are ways to reduce your risk of falling again.    Was there anything that caused your fall that can be fixed, removed or replaced?   Make your home safe by keeping walkways clear of objects you may trip over.   Use non-slip pads under rugs.   Do not walk in poorly lit areas.   Do not stand on chairs or wobbly ladders.   Use caution when reaching overhead or looking upward. This position can cause a loss of balance.   Be sure your shoes fit properly, have non-slip bottoms and are in good condition.   Be cautious when going up and down stairs, curbs, and when walking on uneven sidewalks.   If your balance is poor, consider using a cane or walker.   If your fall was related to alcohol use, stop or limit alcohol intake.   If your fall was related to use of sleeping medicines, talk to your doctor about this. You may need to reduce your dosage at bedtime if you awaken during the night to go to the bathroom.   To reduce the need for nighttime bathroom trips:   Avoid drinking fluids for several hours before going to bed   Empty your bladder before going to bed   Men can keep a urinal at the bedside   © 7386-7798 [facility], [address] [place] [address], [place] [address]. All rights reserved. This information is not intended as a substitute for professional medical care. Always follow your healthcare professional's instructions.

## 2020-08-21 NOTE — PLAN OF CARE
Patient voices no concerns today. Patient's daughter is at her side. Infection precautions reviewed.  Pt states full understanding to call with any sign of infection, including temp >100.4.

## 2020-08-31 ENCOUNTER — EXTERNAL CHRONIC CARE MANAGEMENT (OUTPATIENT)
Dept: PRIMARY CARE CLINIC | Facility: CLINIC | Age: 80
End: 2020-08-31
Payer: MEDICARE

## 2020-08-31 PROCEDURE — 99490 CHRNC CARE MGMT STAFF 1ST 20: CPT | Mod: PBBFAC,PN | Performed by: FAMILY MEDICINE

## 2020-08-31 PROCEDURE — 99490 PR CHRONIC CARE MGMT, 1ST 20 MIN: ICD-10-PCS | Mod: S$PBB,,, | Performed by: FAMILY MEDICINE

## 2020-08-31 PROCEDURE — 99490 CHRNC CARE MGMT STAFF 1ST 20: CPT | Mod: S$PBB,,, | Performed by: FAMILY MEDICINE

## 2020-09-02 ENCOUNTER — PATIENT MESSAGE (OUTPATIENT)
Dept: HEMATOLOGY/ONCOLOGY | Facility: CLINIC | Age: 80
End: 2020-09-02

## 2020-09-02 DIAGNOSIS — G89.3 CANCER ASSOCIATED PAIN: ICD-10-CM

## 2020-09-02 DIAGNOSIS — C22.0 HEPATOCELLULAR CARCINOMA: ICD-10-CM

## 2020-09-03 DIAGNOSIS — C22.0 HEPATOCELLULAR CARCINOMA: ICD-10-CM

## 2020-09-03 DIAGNOSIS — G89.3 CANCER ASSOCIATED PAIN: ICD-10-CM

## 2020-09-03 RX ORDER — HYDROCODONE BITARTRATE AND ACETAMINOPHEN 5; 325 MG/1; MG/1
1 TABLET ORAL EVERY 12 HOURS PRN
Qty: 28 TABLET | Refills: 0 | Status: SHIPPED | OUTPATIENT
Start: 2020-09-03 | End: 2020-09-10

## 2020-09-03 RX ORDER — HYDROCODONE BITARTRATE AND ACETAMINOPHEN 5; 325 MG/1; MG/1
1 TABLET ORAL EVERY 12 HOURS PRN
Qty: 28 TABLET | Refills: 0 | Status: SHIPPED | OUTPATIENT
Start: 2020-09-03 | End: 2020-09-03 | Stop reason: SDUPTHER

## 2020-09-03 NOTE — TELEPHONE ENCOUNTER
Called to notify patient and daughter that Dr. Villareal has sent in a refill for pain medication to [facility] pharmacy as requested.  No answer, left voicemail.    Kellen Driscoll RN

## 2020-09-03 NOTE — TELEPHONE ENCOUNTER
Palliative Care:    Spoke with patient's daughter who states that patient would like to request a refill on her Hydrocodone.  We have scheduled an appointment for next Thursday but they only have a few pills left.  They would like her to have enough for TID.    Send to [facility] pharmacy.

## 2020-09-04 ENCOUNTER — PATIENT MESSAGE (OUTPATIENT)
Dept: HEMATOLOGY/ONCOLOGY | Facility: CLINIC | Age: 80
End: 2020-09-04

## 2020-09-04 DIAGNOSIS — R19.7 DIARRHEA, UNSPECIFIED TYPE: ICD-10-CM

## 2020-09-04 DIAGNOSIS — C22.0 HEPATOCELLULAR CARCINOMA: Primary | ICD-10-CM

## 2020-09-04 RX ORDER — DIPHENOXYLATE HYDROCHLORIDE AND ATROPINE SULFATE 2.5; .025 MG/1; MG/1
1 TABLET ORAL 4 TIMES DAILY PRN
Qty: 30 TABLET | Refills: 1 | Status: SHIPPED | OUTPATIENT
Start: 2020-09-04 | End: 2020-09-14

## 2020-09-08 ENCOUNTER — TELEPHONE (OUTPATIENT)
Dept: HEMATOLOGY/ONCOLOGY | Facility: CLINIC | Age: 80
End: 2020-09-08

## 2020-09-08 NOTE — TELEPHONE ENCOUNTER
"Called to check on pt.for appointment, spoke to daughter Lilian. Lilian states she was unaware of today's appt. States when she looked back at portal, she didn't see an appointment.  states she was currently still at work, and would still have to go and  mo, Ms Francisco.. I offered if she would like to reschedule, she verbalized, " that wd be great.. Rescheduled appt for zee. Mrs. Quintana verbalized understanding to new appt date time and location. w labs.  "

## 2020-09-08 NOTE — TELEPHONE ENCOUNTER
"Spoke w daughter Lilian again on mom's behalf.. Informed her PER NP Sherin Núñez, that it would be best to take mom to after hour or ER to be checked, since she was not able to make sooner appt. Considering the fact it was explained to be an " urgent care " visit.. so late in the evening.. Dulce Quintana, verbalized never mind, that's ok. She did not want to put her mom thru going to the ER, or after hours, she finds mom's condition is getting a little bit better, still weak, still coherent, she states her sister is a nurse, and they both will monitor mom's condition. She was most concern about mom being / getting dehydrated..   "

## 2020-09-09 ENCOUNTER — OFFICE VISIT (OUTPATIENT)
Dept: HEMATOLOGY/ONCOLOGY | Facility: CLINIC | Age: 80
End: 2020-09-09
Payer: MEDICARE

## 2020-09-09 ENCOUNTER — INFUSION (OUTPATIENT)
Dept: INFUSION THERAPY | Facility: HOSPITAL | Age: 80
End: 2020-09-09
Attending: INTERNAL MEDICINE
Payer: MEDICARE

## 2020-09-09 VITALS
DIASTOLIC BLOOD PRESSURE: 70 MMHG | OXYGEN SATURATION: 96 % | SYSTOLIC BLOOD PRESSURE: 131 MMHG | HEIGHT: 60 IN | RESPIRATION RATE: 14 BRPM | TEMPERATURE: 98 F | BODY MASS INDEX: 36.05 KG/M2 | HEART RATE: 65 BPM | WEIGHT: 183.63 LBS

## 2020-09-09 DIAGNOSIS — R53.1 WEAKNESS: Primary | ICD-10-CM

## 2020-09-09 DIAGNOSIS — R91.8 LUNG NODULES: ICD-10-CM

## 2020-09-09 DIAGNOSIS — E83.51 HYPOCALCEMIA: ICD-10-CM

## 2020-09-09 DIAGNOSIS — I50.32 CHRONIC DIASTOLIC CONGESTIVE HEART FAILURE: ICD-10-CM

## 2020-09-09 DIAGNOSIS — C79.51 SECONDARY MALIGNANT NEOPLASM OF BONE: ICD-10-CM

## 2020-09-09 DIAGNOSIS — J44.9 CHRONIC OBSTRUCTIVE PULMONARY DISEASE, UNSPECIFIED COPD TYPE: ICD-10-CM

## 2020-09-09 DIAGNOSIS — Z03.818 ENCOUNTER FOR OBSERVATION FOR SUSPECTED EXPOSURE TO OTHER BIOLOGICAL AGENTS RULED OUT: Primary | ICD-10-CM

## 2020-09-09 DIAGNOSIS — K21.9 GASTROESOPHAGEAL REFLUX DISEASE, ESOPHAGITIS PRESENCE NOT SPECIFIED: ICD-10-CM

## 2020-09-09 DIAGNOSIS — E66.01 MORBID OBESITY: ICD-10-CM

## 2020-09-09 DIAGNOSIS — R19.7 DIARRHEA, UNSPECIFIED TYPE: ICD-10-CM

## 2020-09-09 DIAGNOSIS — C22.0 HCC (HEPATOCELLULAR CARCINOMA): ICD-10-CM

## 2020-09-09 LAB — SARS-COV-2 RDRP RESP QL NAA+PROBE: NEGATIVE

## 2020-09-09 PROCEDURE — U0002 COVID-19 LAB TEST NON-CDC: HCPCS

## 2020-09-09 PROCEDURE — 99215 OFFICE O/P EST HI 40 MIN: CPT | Mod: PBBFAC | Performed by: NURSE PRACTITIONER

## 2020-09-09 PROCEDURE — 99999 PR PBB SHADOW E&M-EST. PATIENT-LVL V: ICD-10-PCS | Mod: PBBFAC,,, | Performed by: NURSE PRACTITIONER

## 2020-09-09 PROCEDURE — 99999 PR PBB SHADOW E&M-EST. PATIENT-LVL V: CPT | Mod: PBBFAC,,, | Performed by: NURSE PRACTITIONER

## 2020-09-09 PROCEDURE — 99215 PR OFFICE/OUTPT VISIT, EST, LEVL V, 40-54 MIN: ICD-10-PCS | Mod: S$PBB,,, | Performed by: NURSE PRACTITIONER

## 2020-09-09 PROCEDURE — 99215 OFFICE O/P EST HI 40 MIN: CPT | Mod: S$PBB,,, | Performed by: NURSE PRACTITIONER

## 2020-09-09 NOTE — NURSING
Patient denies feeling sick, cough, SOB, or having fever.  Rapid Covid swab screening done after verifying Two patient identifiers.  Patient tolerated well.

## 2020-09-10 ENCOUNTER — OFFICE VISIT (OUTPATIENT)
Dept: PALLIATIVE MEDICINE | Facility: CLINIC | Age: 80
End: 2020-09-10
Payer: MEDICARE

## 2020-09-10 DIAGNOSIS — G89.3 CANCER ASSOCIATED PAIN: Primary | ICD-10-CM

## 2020-09-10 PROCEDURE — 99214 PR OFFICE/OUTPT VISIT, EST, LEVL IV, 30-39 MIN: ICD-10-PCS | Mod: 95,,, | Performed by: FAMILY MEDICINE

## 2020-09-10 PROCEDURE — 99214 OFFICE O/P EST MOD 30 MIN: CPT | Mod: 95,,, | Performed by: FAMILY MEDICINE

## 2020-09-10 RX ORDER — OXYCODONE AND ACETAMINOPHEN 10; 325 MG/1; MG/1
1 TABLET ORAL EVERY 4 HOURS PRN
Qty: 60 TABLET | Refills: 0 | Status: SHIPPED | OUTPATIENT
Start: 2020-09-10 | End: 2020-10-01 | Stop reason: SDUPTHER

## 2020-09-10 NOTE — PROGRESS NOTES
Subjective:       Patient ID: Maura Singh is a 80 y.o. female.    Chief Complaint: palliative f/u    81 yo female with hepatocellular carcinoma and chronic respiratory failure secondary to COPD. She is seen alongside her daughter. She has had worsening of pain, though to be due to bone metastases in her left hip and lower back. She is taking hydrocodone 5/325 twice daily and reports no change in her pain level after taking it. She was started on low dose medication due to history of multiple drug sensitivities. She most recently has had a drug reaction to Xgeva characterized by acute pain crisis and mouth pain. She also reports 7 day h/o diarrhea; saw oncology NP to address this yesterday. She has taken oxycodone in the past and tolerated this well. She is open to switching to this medication for pain control.         does not have any pertinent problems on file.  Past Medical History:   Diagnosis Date    Arthritis     Deaf     Diabetes mellitus     Hypertension     Lung disease     copd     Past Surgical History:   Procedure Laterality Date    APPENDECTOMY      CATARACT EXTRACTION      MIDDLE EAR SURGERY       Family History   Problem Relation Age of Onset    Colon cancer Mother     ALS Father     Retinal detachment Son      Social History     Socioeconomic History    Marital status:      Spouse name: Not on file    Number of children: 5    Years of education: Not on file    Highest education level: Not on file   Occupational History    Occupation: homemaker   Social Needs    Financial resource strain: Very hard    Food insecurity     Worry: Often true     Inability: Often true    Transportation needs     Medical: Yes     Non-medical: Patient refused   Tobacco Use    Smoking status: Former Smoker     Packs/day: 0.50     Years: 48.00     Pack years: 24.00     Types: Cigarettes     Start date:      Quit date:      Years since quittin.7    Smokeless tobacco: Never Used    Substance and Sexual Activity    Alcohol use: No     Frequency: Never     Drinks per session: Patient refused     Binge frequency: Patient refused    Drug use: No    Sexual activity: Never   Lifestyle    Physical activity     Days per week: 0 days     Minutes per session: 0 min    Stress: Only a little   Relationships    Social connections     Talks on phone: More than three times a week     Gets together: Twice a week     Attends Baptism service: Not on file     Active member of club or organization: No     Attends meetings of clubs or organizations: Never     Relationship status:    Other Topics Concern    Not on file   Social History Narrative    Not on file     Review of Systems    Objective:   There were no vitals filed for this visit.     Physical Exam  Constitutional:       General: She is not in acute distress.     Appearance: She is well-developed.   HENT:      Head: Normocephalic and atraumatic.   Eyes:      General: No scleral icterus.  Neck:      Musculoskeletal: Normal range of motion and neck supple.   Pulmonary:      Effort: Pulmonary effort is normal. No respiratory distress.   Musculoskeletal: Normal range of motion.   Skin:     Findings: No rash.   Neurological:      Mental Status: She is alert and oriented to person, place, and time.   Psychiatric:         Behavior: Behavior normal.         Thought Content: Thought content normal.         Review of Symptoms    Symptom Assessment (ESAS 0-10 Scale)  Pain:  9  Dyspnea:  6  Anxiety:  3  Nausea:  0  Depression:  0  Anorexia:  2  Fatigue:  9  Insomnia:  1  Restlessness:  0  Agitation:  0     CAM / Delirium:  Negative  Constipation:  Negative  Diarrhea:  Negative and Positive          ECOG Performance Status Grade:  3 - Confined to bed or chair 50% of waking hours    Living Arrangements:  Lives with family    Advance Care Planning   Advance Directives:   [place]: Yes    Medical Power of : Yes      Decision Making:  Patient  answered questions and Family answered questions         Assessment:       1. Cancer associated pain        Plan:           Problem List Items Addressed This Visit     None      Visit Diagnoses     Cancer associated pain    -  Primary    Relevant Medications    oxyCODONE-acetaminophen (PERCOCET)  mg per tablet        Will change from hydrocodone to oxycodone with dose increase as well; asked patient/family to log usage so we can add a long-acting medication at our next visit due to pain progression. Discussed goals of care and she is happy with current treatment plan.     Hoa Villareal MD  > 50% of   40   min visit spent in chart review, face to face discussion of goals of care, symptom assessment, coordination of care and emotional support

## 2020-09-11 NOTE — PROGRESS NOTES
Subjective:       Patient ID: Maura Singh is a 80 y.o. female.    Chief Complaint: Follow-up      ONCOLOGIC DIAGNOSIS:  Metastatic hepatocellular carcinoma    CURRENT TREATMENT:  Nivolumab Q 2 weeks, cycle 1 day 1, 03/02/2020; currently 480mg q4 weeks due to COVID-19, Xgeva     ONCOLOGIC HISTORY:     Ms. Singh was admitted to [facility] on 02/04/2020 with gradually progressive shortness of breath, cough with hemoptysis along with intermittent abdominal pain, anorexia and fatigue.  In the emergency room she had workup including abdominal ultrasound showing multiple liver lesions concerning for metastatic disease.  CTA was negative for pulmonary embolism but revealed mediastinal adenopathy with right hilar soft tissue mass cannot exclude lymphadenopathy along with postobstructive collapse of right anterior segment lower lobe.  Hepatomegaly was noted with multiple low-density hypoenhancing lesions consistent with hepatic metastatic disease.    Today's visit:  Per patient and daughter, patient has had significant nausea vomiting and diarrhea over the past week.  Has seemed to improve yesterday evening into today.  Patient has been consuming adequate fluids with Pedialyte over the last 48-72 hours.  It is felt she was dehydrated prior to this but has significantly improved with the use of the Pedialyte in recent days.  Patient has been able to eat light foods today.  Has not had diarrhea in over 24 hr.  Patient denies significant abdominal pain or cramping today.    Review of Systems   Constitutional: Positive for activity change, appetite change and fatigue. Negative for chills, diaphoresis, fever and unexpected weight change.   HENT: Negative for congestion, hearing loss, mouth sores, postnasal drip, rhinorrhea, sore throat and trouble swallowing.    Eyes: Negative for discharge and visual disturbance.   Respiratory: Negative for cough, chest tightness and shortness of breath.    Cardiovascular:  Negative for chest pain, palpitations and leg swelling.   Gastrointestinal: Positive for abdominal distention. Negative for abdominal pain, anal bleeding, blood in stool, constipation, diarrhea, nausea and vomiting.   Endocrine: Negative for cold intolerance and heat intolerance.   Genitourinary: Negative for difficulty urinating, dyspareunia, flank pain and hematuria.   Musculoskeletal: Positive for arthralgias, back pain and gait problem. Negative for myalgias.   Skin: Negative.    Neurological: Negative for dizziness, weakness, light-headedness and headaches.   Hematological: Negative for adenopathy. Does not bruise/bleed easily.   Psychiatric/Behavioral: Negative for agitation, behavioral problems and confusion. The patient is not nervous/anxious.        Medication List with Changes/Refills   New Medications    OXYCODONE-ACETAMINOPHEN (PERCOCET)  MG PER TABLET    Take 1 tablet by mouth every 4 (four) hours as needed for Pain.   Current Medications    ALBUTEROL (PROVENTIL) 2.5 MG /3 ML (0.083 %) NEBULIZER SOLUTION    Take 3 mLs (2.5 mg total) by nebulization every 6 (six) hours while awake. Rescue    ALBUTEROL SULFATE ORAL    1 vial: EVERY 6 HOURS WHILE AWAKE    DIPHENOXYLATE-ATROPINE 2.5-0.025 MG (LOMOTIL) 2.5-0.025 MG PER TABLET    Take 1 tablet by mouth 4 (four) times daily as needed for Diarrhea.    FUROSEMIDE (LASIX) 20 MG TABLET    Take 1 tablet (20 mg total) by mouth once daily.    LIDOCAINE (LIDODERM) 5 %    Place 1 patch onto the skin every 24 hours as needed. Code: R06.00 -collapsed lung, hepatocellular carcinoma c22.0    LISINOPRIL (PRINIVIL,ZESTRIL) 40 MG TABLET    Take 1 tablet (40 mg total) by mouth once daily.    MULTIVITAMIN (THERAGRAN) PER TABLET    Take 1 tablet by mouth once daily.    OXYGEN-AIR DELIVERY SYSTEMS MISC    2 liters    TRIAMCINOLONE ACETONIDE 0.1% (KENALOG) 0.1 % CREAM    Apply topically 2 (two) times daily.   Discontinued Medications    HYDROCODONE-ACETAMINOPHEN (NORCO)  5-325 MG PER TABLET    Take 1 tablet by mouth every 12 (twelve) hours as needed for Pain.     Objective:     Vitals:    09/09/20 1553   BP: 131/70   Pulse: 65   Resp: 14   Temp: 97.6 °F (36.4 °C)     Physical Exam  Vitals signs reviewed.   Constitutional:       General: She is not in acute distress.     Appearance: She is well-developed. She is not ill-appearing.   HENT:      Head: Normocephalic and atraumatic.      Right Ear: Hearing and external ear normal.      Left Ear: Hearing and external ear normal.      Nose: No rhinorrhea.      Right Sinus: No maxillary sinus tenderness or frontal sinus tenderness.      Left Sinus: No maxillary sinus tenderness or frontal sinus tenderness.      Mouth/Throat:      Mouth: No oral lesions.      Pharynx: Uvula midline.   Eyes:      General:         Right eye: No discharge.         Left eye: No discharge.      Conjunctiva/sclera: Conjunctivae normal.      Pupils: Pupils are equal, round, and reactive to light.   Neck:      Musculoskeletal: Normal range of motion.      Thyroid: No thyromegaly.      Vascular: No carotid bruit.      Trachea: No tracheal deviation.   Cardiovascular:      Rate and Rhythm: Normal rate and regular rhythm.      Pulses:           Dorsalis pedis pulses are 2+ on the right side and 2+ on the left side.      Heart sounds: Normal heart sounds, S1 normal and S2 normal. No murmur.   Pulmonary:      Effort: Pulmonary effort is normal. No respiratory distress.      Breath sounds: Normal breath sounds.   Abdominal:      General: Bowel sounds are normal. There is no distension.      Palpations: Abdomen is soft. There is no mass.      Tenderness: There is no abdominal tenderness.   Musculoskeletal: Normal range of motion.      Comments: Patient in wheelchair   Lymphadenopathy:      Cervical: No cervical adenopathy.      Upper Body:      Right upper body: No supraclavicular adenopathy.      Left upper body: No supraclavicular adenopathy.   Skin:     General: Skin is  warm and dry.      Capillary Refill: Capillary refill takes less than 2 seconds.      Coloration: Skin is pale.      Findings: No rash.      Comments: Good skin turgor   Neurological:      Mental Status: She is alert and oriented to person, place, and time.      Sensory: No sensory deficit.      Coordination: Coordination normal.      Gait: Gait normal.   Psychiatric:         Mood and Affect: Mood is not anxious or depressed.         Speech: Speech normal.         Behavior: Behavior normal.         Thought Content: Thought content normal.         Judgment: Judgment normal.         Assessment:       Problem List Items Addressed This Visit        Pulmonary    Lung nodules    Chronic obstructive pulmonary disease       Cardiac/Vascular    Chronic diastolic congestive heart failure       Oncology    HCC (hepatocellular carcinoma)    Secondary malignant neoplasm of bone       Endocrine    Morbid obesity       GI    Gastroesophageal reflux disease       Other    Weakness - Primary    Relevant Orders    COVID-19 Rapid Screening    Stool Exam-Ova,Cysts,Parasites    Stool culture    STOOL FOR C DIFF      Other Visit Diagnoses     Diarrhea, unspecified type        Relevant Orders    Stool Exam-Ova,Cysts,Parasites    Stool culture    STOOL FOR C DIFF    CBC auto differential    Comprehensive metabolic panel    Hypocalcemia        Relevant Orders    CBC auto differential    Comprehensive metabolic panel          Plan:       Diarrhea:  --stool studies per orders    Hypocalcemia:  --called patient daughter advised to give patient 2 Tums b.i.d.  --repeat labs later this week to evaluate for calcium levels    COVID-19:  --rapid COVID test to rule out COVID-19- results are negative for infection    HCC:  --is not felt these symptoms are related to administration of Opdivo due to timing in relation to last infusion, will evaluate patient status on next infusion day    Follow-up:  Keep all scheduled appointments, patient and daughter  know to call clinic and be seen earlier if needed

## 2020-09-18 ENCOUNTER — OFFICE VISIT (OUTPATIENT)
Dept: HEMATOLOGY/ONCOLOGY | Facility: CLINIC | Age: 80
End: 2020-09-18
Payer: MEDICARE

## 2020-09-18 ENCOUNTER — INFUSION (OUTPATIENT)
Dept: INFUSION THERAPY | Facility: HOSPITAL | Age: 80
End: 2020-09-18
Attending: INTERNAL MEDICINE
Payer: MEDICARE

## 2020-09-18 VITALS
RESPIRATION RATE: 18 BRPM | HEIGHT: 60 IN | TEMPERATURE: 97 F | HEART RATE: 54 BPM | SYSTOLIC BLOOD PRESSURE: 139 MMHG | DIASTOLIC BLOOD PRESSURE: 68 MMHG | OXYGEN SATURATION: 96 % | WEIGHT: 174.81 LBS | BODY MASS INDEX: 34.32 KG/M2

## 2020-09-18 VITALS
DIASTOLIC BLOOD PRESSURE: 68 MMHG | BODY MASS INDEX: 34.32 KG/M2 | OXYGEN SATURATION: 96 % | HEART RATE: 65 BPM | HEIGHT: 60 IN | TEMPERATURE: 98 F | WEIGHT: 174.81 LBS | RESPIRATION RATE: 18 BRPM | SYSTOLIC BLOOD PRESSURE: 137 MMHG

## 2020-09-18 DIAGNOSIS — C22.0 HCC (HEPATOCELLULAR CARCINOMA): Primary | ICD-10-CM

## 2020-09-18 DIAGNOSIS — C22.0 HEPATOCELLULAR CARCINOMA: ICD-10-CM

## 2020-09-18 DIAGNOSIS — C22.0 HEPATOCELLULAR CARCINOMA: Primary | ICD-10-CM

## 2020-09-18 DIAGNOSIS — R19.7 DIARRHEA OF PRESUMED INFECTIOUS ORIGIN: ICD-10-CM

## 2020-09-18 DIAGNOSIS — C79.51 SECONDARY MALIGNANT NEOPLASM OF BONE: ICD-10-CM

## 2020-09-18 PROCEDURE — 99215 OFFICE O/P EST HI 40 MIN: CPT | Mod: S$PBB,,, | Performed by: INTERNAL MEDICINE

## 2020-09-18 PROCEDURE — 99999 PR PBB SHADOW E&M-EST. PATIENT-LVL IV: ICD-10-PCS | Mod: PBBFAC,,, | Performed by: INTERNAL MEDICINE

## 2020-09-18 PROCEDURE — 63600175 PHARM REV CODE 636 W HCPCS: Mod: JG | Performed by: INTERNAL MEDICINE

## 2020-09-18 PROCEDURE — 25000003 PHARM REV CODE 250: Performed by: INTERNAL MEDICINE

## 2020-09-18 PROCEDURE — 99214 OFFICE O/P EST MOD 30 MIN: CPT | Mod: PBBFAC,25 | Performed by: INTERNAL MEDICINE

## 2020-09-18 PROCEDURE — 99215 PR OFFICE/OUTPT VISIT, EST, LEVL V, 40-54 MIN: ICD-10-PCS | Mod: S$PBB,,, | Performed by: INTERNAL MEDICINE

## 2020-09-18 PROCEDURE — 99999 PR PBB SHADOW E&M-EST. PATIENT-LVL IV: CPT | Mod: PBBFAC,,, | Performed by: INTERNAL MEDICINE

## 2020-09-18 PROCEDURE — A4216 STERILE WATER/SALINE, 10 ML: HCPCS | Performed by: INTERNAL MEDICINE

## 2020-09-18 PROCEDURE — 96413 CHEMO IV INFUSION 1 HR: CPT

## 2020-09-18 RX ORDER — HEPARIN 100 UNIT/ML
500 SYRINGE INTRAVENOUS
Status: CANCELLED | OUTPATIENT
Start: 2020-09-18

## 2020-09-18 RX ORDER — SODIUM CHLORIDE 0.9 % (FLUSH) 0.9 %
10 SYRINGE (ML) INJECTION
Status: DISCONTINUED | OUTPATIENT
Start: 2020-09-18 | End: 2020-09-18 | Stop reason: HOSPADM

## 2020-09-18 RX ORDER — SODIUM CHLORIDE 0.9 % (FLUSH) 0.9 %
10 SYRINGE (ML) INJECTION
Status: CANCELLED | OUTPATIENT
Start: 2020-09-18

## 2020-09-18 RX ADMIN — SODIUM CHLORIDE 480 MG: 9 INJECTION, SOLUTION INTRAVENOUS at 02:09

## 2020-09-18 RX ADMIN — Medication 10 ML: at 02:09

## 2020-09-18 RX ADMIN — SODIUM CHLORIDE: 9 INJECTION, SOLUTION INTRAVENOUS at 02:09

## 2020-09-18 NOTE — DISCHARGE INSTRUCTIONS
[facility]  [address] [address]  [address] [address]  [phone] phone     [phone] fax  Hours of Operation: Monday- Friday 8:00am- 5:00pm  After hours phone  [phone]  Hematology / Oncology Physicians on call      Dr. Santiago Vaughn, MISAEL Fisher NP Tyesha Taylor, NP    Please call with any concerns regarding your appointment today.FALL PREVENTION   Falls often occur due to slipping, tripping or losing your balance. Here are ways to reduce your risk of falling again.   Was there anything that caused your fall that can be fixed, removed or replaced?   Make your home safe by keeping walkways clear of objects you may trip over.   Use non-slip pads under rugs.   Do not walk in poorly lit areas.   Do not stand on chairs or wobbly ladders.   Use caution when reaching overhead or looking upward. This position can cause a loss of balance.   Be sure your shoes fit properly, have non-slip bottoms and are in good condition.   Be cautious when going up and down stairs, curbs, and when walking on uneven sidewalks.   If your balance is poor, consider using a cane or walker.   If your fall was related to alcohol use, stop or limit alcohol intake.   If your fall was related to use of sleeping medicines, talk to your doctor about this. You may need to reduce your dosage at bedtime if you awaken during the night to go to the bathroom.   To reduce the need for nighttime bathroom trips:   Avoid drinking fluids for several hours before going to bed   Empty your bladder before going to bed   Men can keep a urinal at the bedside   © 0379-9532 [employer] [facility], [address] [place] [address], [place] [address]. All rights reserved. This information is not intended as a substitute for professional medical care. Always follow your healthcare professional's instructions.  HOME CARE AFTER CHEMOTHERAPY   Meals    Many patients feel sick and lose their appetites during treatment. Eat small meals several times a day. Choose bland foods with little taste or smell if you have problems with nausea. Be sure to cook all food thoroughly. This kills bacteria and helps you avoid intestinal infection. Soft foods are easier to swallow and digest.   Activity   Exercise keeps you strong and keeps your heart and lungs active. Talk to your doctor about an appropriate exercise program for you.   Skin Care   To prevent a skin infection, bathe or shower once a day. Use a moisturizing soap and wash with warm water. Avoid very hot or cold water. Chemotherapy can make your skin dry . Apply moisturizing lotion to help relieve dry skin. Some drugs used in high doses can cause slight burns to appear (like sunburn). Ask for a special cream to help relieve the burn and protect your skin.   Prevent Mouth Sores   During chemotherapy, many people get mouth sores. Do the following to help prevent mouth sores or to ease discomfort.   Brush your teeth with a soft-bristle toothbrush after every meal.  Don't use dental floss if your platelet count is below 50,000. Your doctor or nurse will tell you if this is the case.  Use an oral swab or special soft toothbrush if your gums bleed during regular brushing.  Use mouthwash as directed. If you can't tolerate commercial mouthwash, use salt and baking soda to clean your mouth. Mix 1 teaspoon of salt and 1 teaspoon of baking soda into a glass of water. Swish and spit.  Call your doctor or return to this facility if you develop any of the following:   Sore throat   White patches in the mouth or throat   Fever of 100.4ºF (38ºC) or higher, or as directed by your healthcare provider  © 2914-7919 [employer], [address] [place] [address], [place] [address]. All rights reserved. This information is not intended as a substitute for professional medical care. Always follow your healthcare professional's   Support  "Groups/Classes    Support groups and classes are being offered at the   [facility] and [facility]!!    "Cooking with Cancer" (Nutrition Class):  Second Wednesday of each month   at noon at the Cancer Center.  Metastatic Support Group:  Third Tuesday of each month   at noon at the Cancer Center.  Next Steps Class/Group: Second and fourth Thursday of each month at noon at the Cancer Center.  [facility] (Breast Cancer Support Group): First Tuesday of each month   at 5:30pm at the [facility] location.  [employer]: [facility]: Second and third Tuesday of each month from 7:30am - 2pm.  [facility]: First and fourth Tuesday of each month from 7:30am - 2pm    If you are interested in attending or would like more information please ask our social workers or your nurse!  "

## 2020-09-18 NOTE — PATIENT INSTRUCTIONS
- nivolumab Q 4 weeks   - Revisit in 1 month with planned nivolumab.  - restaging CT prior to that visit with labs same day

## 2020-09-18 NOTE — PROGRESS NOTES
Subjective:      DATE OF VISIT: 9/18/20     ?  Patient ID:?Maura Singh is a 80 y.o. female.?? MR#: [id number]   ?   PRIMARY ONCOLOGIST: Dr. Burgess    ? Primary Care Providers:  Yessy Andrade MD, MD (General)     CHIEF COMPLAINT: ???  Follow-up, plan treatment   ?   ONCOLOGIC DIAGNOSIS:  Metastatic hepatocellular carcinoma  ?   CURRENT TREATMENT:  Nivolumab Q 2 weeks, cycle 1 day 1, 03/02/2020; currently 480mg q4 weeks due to COVID-19  ?   ONCOLOGIC HISTORY:    Ms. Singh was admitted to [facility] on 02/04/2020 with gradually progressive shortness of breath, cough with hemoptysis along with intermittent abdominal pain, anorexia and fatigue.  In the emergency room she had workup including abdominal ultrasound showing multiple liver lesions concerning for metastatic disease.  CTA was negative for pulmonary embolism but revealed mediastinal adenopathy with right hilar soft tissue mass cannot exclude lymphadenopathy along with postobstructive collapse of right anterior segment lower lobe.  Hepatomegaly was noted with multiple low-density hypoenhancing lesions consistent with hepatic metastatic disease.    2/19/20 PET-CT:    To tele I was thinking to me that you already have 1 extra due as thinking last Monday for she 1. Hypermetabolic right infrahilar lung mass and associated partial postobstructive collapse right lower lobe.  2. Small hypermetabolic focus right lower lobe possibly early metastasis.  3. Hypermetabolic masslike thickening ascending colon suspicious for neoplasm.  4. Multifocal FDG avid lissette metastases neck, chest, abdomen, and pelvis as noted.  5. Extensive FDG avid hepatic metastases.  6. Widespread FDG avid osseous metastases.     02/06/2020 liver biopsy  Pathology:  Consistent with hepatocellular carcinoma, moderate to poorly differentiated     The biopsy shows nests of tumor cells with deeply eosinophilic granular   cytoplasm. Immunostains performed shows results as:   HepPar -  Focal granular positive   CK7 and TTF-1 - Negative    03/02/2020 cycle 1 day 1 nivolumab Q 2 weeks palliative immunotherapy    03/16/2020 cycle 2 day 1 nivolumab    03/30/2020 cycle 3 day 1 nivolumab, note: dose of 480 mg [id number] to limit clinic visits due to COVID-19 concerns    05/25/2020 cycle 5 day 1 nivolumab    6/16/20 inteval CT scan with response in lymphadenopathy, see below    INTERVAL EVENTS    Ms. Singh returns with her daughter.  She did have interval visit with nurse practitioner due to severe diarrhea improved with Imodium and Lomotil and has at this time resolved.  She thinks this was due to food poisoning.  No one in family otherwise was ill.  She is currently eating and drinking well.  She notes significant improvement in pain management with recent change by palliative care to oxycodone and higher dose.    Review of Systems    ?   A comprehensive 14-point review of systems was reviewed with patient and was negative other than as specified above.   ?    Current Outpatient Medications   Medication Sig Dispense Refill    albuterol (PROVENTIL) 2.5 mg /3 mL (0.083 %) nebulizer solution Take 3 mLs (2.5 mg total) by nebulization every 6 (six) hours while awake. Rescue 270 mL 11    ALBUTEROL SULFATE ORAL 1 vial: EVERY 6 HOURS WHILE AWAKE      furosemide (LASIX) 20 MG tablet Take 1 tablet (20 mg total) by mouth once daily. 90 tablet 0    lidocaine (LIDODERM) 5 % Place 1 patch onto the skin every 24 hours as needed. Code: R06.00 -collapsed lung, hepatocellular carcinoma c22.0 30 patch 4    lisinopril (PRINIVIL,ZESTRIL) 40 MG tablet Take 1 tablet (40 mg total) by mouth once daily. 90 tablet 3    multivitamin (THERAGRAN) per tablet Take 1 tablet by mouth once daily.      oxyCODONE-acetaminophen (PERCOCET)  mg per tablet Take 1 tablet by mouth every 4 (four) hours as needed for Pain. 60 tablet 0    OXYGEN-AIR DELIVERY SYSTEMS MISC 2 liters      triamcinolone acetonide 0.1% (KENALOG) 0.1 %  cream Apply topically 2 (two) times daily. 80 g 3     No current facility-administered medications for this visit.          Objective:      Physical Exam      ?   Vitals:    09/18/20 1315   BP: 139/68   Pulse: (!) 54   Resp: 18   Temp: 97.4 °F (36.3 °C)      ?   ECOG:?2  General appearance: Generally well appearing, in no acute distress, using 2 L supplemental oxygen  Head, eyes, ears, nose, and throat:  moist mucous membranes.    Cardiovascular: Regular rate and rhythm, S1, S2, no audible murmurs.   Respiratory:  Breathing comfortably, clear to auscultation bilaterally, no wheezing or rhonchi  Abdomen:  Nondistended  Extremities: Warm, without notable edema.  Neurologic: Alert and oriented.  Sitting in wheelchair  Skin: No rashes, ecchymoses or petechial lesion.     ?   Laboratory:  ?   Lab Visit on 09/18/2020   Component Date Value Ref Range Status    WBC 09/18/2020 8.59  3.90 - 12.70 K/uL Final    RBC 09/18/2020 4.20  4.00 - 5.40 M/uL Final    Hemoglobin 09/18/2020 12.8  12.0 - 16.0 g/dL Final    Hematocrit 09/18/2020 40.8  37.0 - 48.5 % Final    Mean Corpuscular Volume 09/18/2020 97  82 - 98 fL Final    Mean Corpuscular Hemoglobin 09/18/2020 30.5  27.0 - 31.0 pg Final    Mean Corpuscular Hemoglobin Conc 09/18/2020 31.4* 32.0 - 36.0 g/dL Final    RDW 09/18/2020 14.0  11.5 - 14.5 % Final    Platelets 09/18/2020 287  150 - 350 K/uL Final    MPV 09/18/2020 10.1  9.2 - 12.9 fL Final    Immature Granulocytes 09/18/2020 0.2  0.0 - 0.5 % Final    Gran # (ANC) 09/18/2020 5.0  1.8 - 7.7 K/uL Final    Immature Grans (Abs) 09/18/2020 0.02  0.00 - 0.04 K/uL Final    Lymph # 09/18/2020 2.1  1.0 - 4.8 K/uL Final    Mono # 09/18/2020 1.0  0.3 - 1.0 K/uL Final    Eos # 09/18/2020 0.4  0.0 - 0.5 K/uL Final    Baso # 09/18/2020 0.05  0.00 - 0.20 K/uL Final    nRBC 09/18/2020 0  0 /100 WBC Final    Gran% 09/18/2020 58.4  38.0 - 73.0 % Final    Lymph% 09/18/2020 24.1  18.0 - 48.0 % Final    Mono% 09/18/2020 12.0   4.0 - 15.0 % Final    Eosinophil% 09/18/2020 4.7  0.0 - 8.0 % Final    Basophil% 09/18/2020 0.6  0.0 - 1.9 % Final    Differential Method 09/18/2020 Automated   Final    Sodium 09/18/2020 138  136 - 145 mmol/L Final    Potassium 09/18/2020 5.0  3.5 - 5.1 mmol/L Final    Chloride 09/18/2020 104  95 - 110 mmol/L Final    CO2 09/18/2020 26  23 - 29 mmol/L Final    Glucose 09/18/2020 91  70 - 110 mg/dL Final    BUN, Bld 09/18/2020 14  8 - 23 mg/dL Final    Creatinine 09/18/2020 0.8  0.5 - 1.4 mg/dL Final    Calcium 09/18/2020 8.7  8.7 - 10.5 mg/dL Final    Total Protein 09/18/2020 7.2  6.0 - 8.4 g/dL Final    Albumin 09/18/2020 3.6  3.5 - 5.2 g/dL Final    Total Bilirubin 09/18/2020 0.3  0.1 - 1.0 mg/dL Final    Alkaline Phosphatase 09/18/2020 62  55 - 135 U/L Final    AST 09/18/2020 25  10 - 40 U/L Final    ALT 09/18/2020 21  10 - 44 U/L Final    Anion Gap 09/18/2020 8  8 - 16 mmol/L Final    eGFR if African American 09/18/2020 >60  >60 mL/min/1.73 m^2 Final    eGFR if non African American 09/18/2020 >60  >60 mL/min/1.73 m^2 Final    TSH 09/18/2020 0.896  0.400 - 4.000 uIU/mL Final      ?   Tumor markers   ?   Lab Results   Component Value Date    AFP 1.8 02/21/2020     Iron   Date Value Ref Range Status   05/24/2016 46 30 - 160 ug/dL Final     TIBC   Date Value Ref Range Status   05/24/2016 383 250 - 450 ug/dL Final     Transferrin   Date Value Ref Range Status   05/24/2016 259 200 - 375 mg/dL Final     Ferritin   Date Value Ref Range Status   05/24/2016 142 20.0 - 300.0 ng/mL Final         ?   Imaging:  ?    6/16/20  CT CHEST ABDOMEN PELVIS W W/O CONTRAST (XPD)     CLINICAL HISTORY:  restaging;Liver cell carcinoma     TECHNIQUE:  Low dose axial, sagittal and coronal reformations were performed from the thoracic inlet to the pubic symphysis following the IV administration of 100 mL of Omnipaque 350.  The chest images are with  contrast and the abdomen images are with and without contrast.  30 mL  of oral Omnipaque was given.     COMPARISON:  Chest abdomen pelvis CT from February 4, 2020. PET-CT from February 19, 2020.     FINDINGS:  There is been interval decrease in size of a right supraclavicular lymph node which was hypermetabolic on the recent PET-CT.  It measured 9 mm short axis on the CT from February 4, 2020.  On today's exam, it measures 5 mm in short axis.  An AP window lymph node measuring 2.2 cm on the CT has also decreased in size and only small scattered nodes are now seen in this area.  Interval decrease in subcarinal lymph node which now measures 1.2 cm in short axis compared with 2.3 cm in short axis on CT.  A 2.6 cm right perihilar lymph node has also improved and currently, no enlarged lymph node is seen in this region.  Right infrahilar soft tissue mass measuring up to 3.3 cm on image 220 series 2 on the CT from 2/4/20 has also markedly improved with no measurable soft tissue mass seen in this region on today's study.  There is also interval improvement in previously present postobstructive atelectasis in the right lower lobe.  Respiratory motion artifact is present with bilateral areas of scarring and/or atelectasis.     Heart size is stable.  No pericardial effusion.  Thoracic aortic calcifications are seen.  Trachea is patent.  Thyroid gland appears normal.     Motion artifact in the abdomen pelvis limits detail.     Liver remains enlarged with innumerable hypoattenuating masses.  Many multifocal lesions are seen in the liver on image 115 series 3 for example.  These were not well visualized on the CT from 02/04/2020 although component of this finding likely relates to technique.  Appearance is not thought substantially changed when compared to the PET-CT.     No gallstones.  The spleen and pancreas are normal.  Adrenal glands are within normal limits.  Probable left renal cysts.  There is no hydronephrosis.  Calcified plaque in the aorta.  IVC appears normal.     Stomach and small  bowel are normal without evidence of bowel obstruction.  Colonic diverticulosis without convincing evidence to suggest diverticulitis.     Urinary bladder is within normal limits.  The uterus is present.  No ascites or drainable fluid collections.     Abdominal lymphadenopathy is again seen.  A retrocrural lymph node image 95 of series 3 which was hot on the PET scan is smaller on today's exam and measures 6 x 4 mm compared with 14 x 7 mm.  A pericaval lymph node which previously measured 14 mm in short axis image 46 series 3 on the CT from 02/04/2020 currently measures 5 mm in short axis.  A portacaval lymph node is essentially stable and measures on the order of 12 mm.  Gastrohepatic lymph node measures 12 mm in short axis, not significantly changed.  A left inguinal lymph node also appears smaller contains a fatty hilum.  There are lymph nodes seen in the right hemiabdomen inferior to the liver image 132 of series 3 for example measuring up to 9 mm in short axis which do appear increased in size.     Focal area of increased uptake in the ascending colon was best appreciated on the PET-CT.     Some sclerotic areas in the ribs are seen demonstrating uptake on the PET.  Other osseous metastatic foci per best appreciated on the PET-CT.  Discogenic degenerative changes of the spine are noted.  There is a small hypoattenuating collection in the posterior subcutaneous soft tissues of the upper back adjacent to staples, likely correlating to a seroma or hematoma for example.  This is not significantly changed.     Impression:     Marked interval improvement in intrathoracic disease as above.  Mixed response of abdominal lymph nodes as detailed above.  Persistent diffuse multifocal hepatic lesions.  Osseous metastatic disease was best appreciated on the recent PET-CT.  Multiple additional findings as aboveCT CHEST ABDOMEN PELVIS W W/O CONTRAST (XPD)     CLINICAL HISTORY:  restaging;Liver cell carcinoma     TECHNIQUE:  Low  dose axial, sagittal and coronal reformations were performed from the thoracic inlet to the pubic symphysis following the IV administration of 100 mL of Omnipaque 350.  The chest images are with  contrast and the abdomen images are with and without contrast.  30 mL of oral Omnipaque was given.     COMPARISON:  Chest abdomen pelvis CT from February 4, 2020. PET-CT from February 19, 2020.     FINDINGS:  There is been interval decrease in size of a right supraclavicular lymph node which was hypermetabolic on the recent PET-CT.  It measured 9 mm short axis on the CT from February 4, 2020.  On today's exam, it measures 5 mm in short axis.  An AP window lymph node measuring 2.2 cm on the CT has also decreased in size and only small scattered nodes are now seen in this area.  Interval decrease in subcarinal lymph node which now measures 1.2 cm in short axis compared with 2.3 cm in short axis on CT.  A 2.6 cm right perihilar lymph node has also improved and currently, no enlarged lymph node is seen in this region.  Right infrahilar soft tissue mass measuring up to 3.3 cm on image 220 series 2 on the CT from 2/4/20 has also markedly improved with no measurable soft tissue mass seen in this region on today's study.  There is also interval improvement in previously present postobstructive atelectasis in the right lower lobe.  Respiratory motion artifact is present with bilateral areas of scarring and/or atelectasis.     Heart size is stable.  No pericardial effusion.  Thoracic aortic calcifications are seen.  Trachea is patent.  Thyroid gland appears normal.     Motion artifact in the abdomen pelvis limits detail.     Liver remains enlarged with innumerable hypoattenuating masses.  Many multifocal lesions are seen in the liver on image 115 series 3 for example.  These were not well visualized on the CT from 02/04/2020 although component of this finding likely relates to technique.  Appearance is not thought substantially  changed when compared to the PET-CT.     No gallstones.  The spleen and pancreas are normal.  Adrenal glands are within normal limits.  Probable left renal cysts.  There is no hydronephrosis.  Calcified plaque in the aorta.  IVC appears normal.     Stomach and small bowel are normal without evidence of bowel obstruction.  Colonic diverticulosis without convincing evidence to suggest diverticulitis.     Urinary bladder is within normal limits.  The uterus is present.  No ascites or drainable fluid collections.     Abdominal lymphadenopathy is again seen.  A retrocrural lymph node image 95 of series 3 which was hot on the PET scan is smaller on today's exam and measures 6 x 4 mm compared with 14 x 7 mm.  A pericaval lymph node which previously measured 14 mm in short axis image 46 series 3 on the CT from 02/04/2020 currently measures 5 mm in short axis.  A portacaval lymph node is essentially stable and measures on the order of 12 mm.  Gastrohepatic lymph node measures 12 mm in short axis, not significantly changed.  A left inguinal lymph node also appears smaller contains a fatty hilum.  There are lymph nodes seen in the right hemiabdomen inferior to the liver image 132 of series 3 for example measuring up to 9 mm in short axis which do appear increased in size.     Focal area of increased uptake in the ascending colon was best appreciated on the PET-CT.     Some sclerotic areas in the ribs are seen demonstrating uptake on the PET.  Other osseous metastatic foci per best appreciated on the PET-CT.  Discogenic degenerative changes of the spine are noted.  There is a small hypoattenuating collection in the posterior subcutaneous soft tissues of the upper back adjacent to staples, likely correlating to a seroma or hematoma for example.  This is not significantly changed.     Impression:     Marked interval improvement in intrathoracic disease as above.  Mixed response of abdominal lymph nodes as detailed above.   Persistent diffuse multifocal hepatic lesions.  Osseous metastatic disease was best appreciated on the recent PET-CT.  Multiple additional findings as above       Pathology:        02/06/2020 liver biopsy  LIVER, BIOPSY:   Consistent with hepatocellular carcinoma, moderate to poorly differentiated     The biopsy shows nests of tumor cells with deeply eosinophilic granular   cytoplasm. Immunostains performed shows results as:   HepPar - Focal granular positive   CK7 and TTF-1 - Negative    ?   Assessment/Plan:       1. HCC (hepatocellular carcinoma)    2. Diarrhea of presumed infectious origin    3. Hepatocellular carcinoma    4. Secondary malignant neoplasm of bone          Plan:     # metastatic hepatocellular carcinoma:  initiated first-line palliative immunotherapy with nivolumab 03/02/2020.   Given COVID-19 concern I did discuss with her that her immunotherapy agent nivolumab has been used at higher dose 480mg every 4 weeks and feel reasonable.  I have personally reviewed and shared with her and daughter in clinic today results of restaging CT showing improvement in lymphadenopathy and relatively stable disease in liver and bones.  As she is doing well and enjoys convenience of every 4 week treatments with COVID-19 agreed continuation on this regimen.  She is tolerating well.  Labs reviewed okay to proceed today.  - nivolumab q.4 weeks, labs within normal limits today okay to proceed with treatment.  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  Have listed as allergy and removed from her treatment plan.  Discussed risks and benefits bone protective agents unclear if she would tolerate zoledronic acid better but hesitant to proceed with other medications at this time.  Will proceed with bone protection calcium vitamin-D alone.    # malignancy associated pain:   Follow with palliative care with changed from hydrocodone to Percocet higher dose better pain control.  No long-acting pain medication and may revisit this possibility pending pain control.  Encouraged continued following with palliative care.    # Diarrhea:  Responded well to Imodium and Lomotil and completely resolved she thinks secondary to food poisoning episode.  I did discuss potential side effect colitis from immunotherapy and will be important to monitor going forward as we proceed with therapy.    # oxygen dependence, functional status:  Improved oxygenation with improved shortness of breath as reflected by restaging scans.  No longer requires at rest but does use supplemental oxygen with activity.  Follow-up with pulmonology p.r.n..    # CHF, hypertension, type 2 diabetes, aortic atherosclerosis, morbid obesity, former smoker:  Elevated blood pressure, recommend follow-up with primary care.  Comorbidities recommend continued follow-up with primary care.      Follow-Up:     Patient Instructions   - nivolumab Q 4 weeks   - Revisit in 1 month with planned nivolumab.  - restaging CT prior to that visit with labs same day

## 2020-09-30 ENCOUNTER — EXTERNAL CHRONIC CARE MANAGEMENT (OUTPATIENT)
Dept: PRIMARY CARE CLINIC | Facility: CLINIC | Age: 80
End: 2020-09-30
Payer: MEDICARE

## 2020-09-30 ENCOUNTER — PATIENT MESSAGE (OUTPATIENT)
Dept: PALLIATIVE MEDICINE | Facility: CLINIC | Age: 80
End: 2020-09-30

## 2020-09-30 PROCEDURE — 99490 PR CHRONIC CARE MGMT, 1ST 20 MIN: ICD-10-PCS | Mod: S$PBB,,, | Performed by: FAMILY MEDICINE

## 2020-09-30 PROCEDURE — 99490 CHRNC CARE MGMT STAFF 1ST 20: CPT | Mod: PBBFAC,PN | Performed by: FAMILY MEDICINE

## 2020-09-30 PROCEDURE — 99490 CHRNC CARE MGMT STAFF 1ST 20: CPT | Mod: S$PBB,,, | Performed by: FAMILY MEDICINE

## 2020-10-01 DIAGNOSIS — G89.3 CANCER ASSOCIATED PAIN: ICD-10-CM

## 2020-10-01 RX ORDER — OXYCODONE AND ACETAMINOPHEN 10; 325 MG/1; MG/1
1 TABLET ORAL EVERY 4 HOURS PRN
Qty: 60 TABLET | Refills: 0 | Status: SHIPPED | OUTPATIENT
Start: 2020-10-01 | End: 2020-10-26 | Stop reason: SDUPTHER

## 2020-10-14 ENCOUNTER — HOSPITAL ENCOUNTER (OUTPATIENT)
Dept: RADIOLOGY | Facility: HOSPITAL | Age: 80
Discharge: HOME | End: 2020-10-14
Attending: INTERNAL MEDICINE
Payer: MEDICARE

## 2020-10-14 DIAGNOSIS — C22.0 HCC (HEPATOCELLULAR CARCINOMA): ICD-10-CM

## 2020-10-14 PROCEDURE — 25500020 PHARM REV CODE 255: Performed by: INTERNAL MEDICINE

## 2020-10-14 PROCEDURE — 74178 CT ABD&PLV WO CNTR FLWD CNTR: CPT | Mod: TC

## 2020-10-14 RX ADMIN — IOHEXOL 100 ML: 350 INJECTION, SOLUTION INTRAVENOUS at 02:10

## 2020-10-16 ENCOUNTER — OFFICE VISIT (OUTPATIENT)
Dept: HEMATOLOGY/ONCOLOGY | Facility: CLINIC | Age: 80
End: 2020-10-16
Payer: MEDICARE

## 2020-10-16 ENCOUNTER — INFUSION (OUTPATIENT)
Dept: INFUSION THERAPY | Facility: HOSPITAL | Age: 80
End: 2020-10-16
Attending: INTERNAL MEDICINE
Payer: MEDICARE

## 2020-10-16 VITALS
TEMPERATURE: 98 F | RESPIRATION RATE: 18 BRPM | BODY MASS INDEX: 35.67 KG/M2 | SYSTOLIC BLOOD PRESSURE: 146 MMHG | HEART RATE: 68 BPM | DIASTOLIC BLOOD PRESSURE: 66 MMHG | WEIGHT: 181.69 LBS | OXYGEN SATURATION: 93 % | HEIGHT: 60 IN

## 2020-10-16 VITALS
DIASTOLIC BLOOD PRESSURE: 62 MMHG | TEMPERATURE: 98 F | OXYGEN SATURATION: 98 % | BODY MASS INDEX: 35.67 KG/M2 | SYSTOLIC BLOOD PRESSURE: 148 MMHG | HEART RATE: 69 BPM | WEIGHT: 181.69 LBS | RESPIRATION RATE: 17 BRPM | HEIGHT: 60 IN

## 2020-10-16 DIAGNOSIS — C79.51 SECONDARY MALIGNANT NEOPLASM OF BONE: ICD-10-CM

## 2020-10-16 DIAGNOSIS — C22.0 HEPATOCELLULAR CARCINOMA: Primary | ICD-10-CM

## 2020-10-16 DIAGNOSIS — C22.0 HCC (HEPATOCELLULAR CARCINOMA): Primary | ICD-10-CM

## 2020-10-16 PROCEDURE — A4216 STERILE WATER/SALINE, 10 ML: HCPCS | Performed by: INTERNAL MEDICINE

## 2020-10-16 PROCEDURE — 99999 PR PBB SHADOW E&M-EST. PATIENT-LVL III: CPT | Mod: PBBFAC,,, | Performed by: INTERNAL MEDICINE

## 2020-10-16 PROCEDURE — 63600175 PHARM REV CODE 636 W HCPCS: Mod: JG | Performed by: INTERNAL MEDICINE

## 2020-10-16 PROCEDURE — 99999 PR PBB SHADOW E&M-EST. PATIENT-LVL III: ICD-10-PCS | Mod: PBBFAC,,, | Performed by: INTERNAL MEDICINE

## 2020-10-16 PROCEDURE — 99215 OFFICE O/P EST HI 40 MIN: CPT | Mod: S$PBB,,, | Performed by: INTERNAL MEDICINE

## 2020-10-16 PROCEDURE — 99215 PR OFFICE/OUTPT VISIT, EST, LEVL V, 40-54 MIN: ICD-10-PCS | Mod: S$PBB,,, | Performed by: INTERNAL MEDICINE

## 2020-10-16 PROCEDURE — 96413 CHEMO IV INFUSION 1 HR: CPT

## 2020-10-16 PROCEDURE — 25000003 PHARM REV CODE 250: Performed by: INTERNAL MEDICINE

## 2020-10-16 PROCEDURE — 99213 OFFICE O/P EST LOW 20 MIN: CPT | Mod: PBBFAC,25 | Performed by: INTERNAL MEDICINE

## 2020-10-16 RX ORDER — HEPARIN 100 UNIT/ML
500 SYRINGE INTRAVENOUS
Status: DISCONTINUED | OUTPATIENT
Start: 2020-10-16 | End: 2020-10-16 | Stop reason: HOSPADM

## 2020-10-16 RX ORDER — SODIUM CHLORIDE 0.9 % (FLUSH) 0.9 %
10 SYRINGE (ML) INJECTION
Status: DISCONTINUED | OUTPATIENT
Start: 2020-10-16 | End: 2020-10-16 | Stop reason: HOSPADM

## 2020-10-16 RX ORDER — SODIUM CHLORIDE 0.9 % (FLUSH) 0.9 %
10 SYRINGE (ML) INJECTION
Status: CANCELLED | OUTPATIENT
Start: 2020-10-16

## 2020-10-16 RX ORDER — HEPARIN 100 UNIT/ML
500 SYRINGE INTRAVENOUS
Status: CANCELLED | OUTPATIENT
Start: 2020-10-16

## 2020-10-16 RX ADMIN — SODIUM CHLORIDE 480 MG: 9 INJECTION, SOLUTION INTRAVENOUS at 12:10

## 2020-10-16 RX ADMIN — Medication 10 ML: at 12:10

## 2020-10-16 NOTE — PROGRESS NOTES
Subjective:      DATE OF VISIT: 10/16/20     ?  Patient ID:?Maura Singh is a 80 y.o. female.?? MR#: [id number]   ?   PRIMARY ONCOLOGIST: Dr. Burgess    ? Primary Care Providers:  Yessy Andrade MD, MD (General)     CHIEF COMPLAINT: ???  Follow-up, plan treatment   ?   ONCOLOGIC DIAGNOSIS:  Metastatic hepatocellular carcinoma  ?   CURRENT TREATMENT:  Nivolumab 480mg q4 weeks   ?   ONCOLOGIC HISTORY:    Ms. Singh was admitted to [facility] on 02/04/2020 with gradually progressive shortness of breath, cough with hemoptysis along with intermittent abdominal pain, anorexia and fatigue.  In the emergency room she had workup including abdominal ultrasound showing multiple liver lesions concerning for metastatic disease.  CTA was negative for pulmonary embolism but revealed mediastinal adenopathy with right hilar soft tissue mass cannot exclude lymphadenopathy along with postobstructive collapse of right anterior segment lower lobe.  Hepatomegaly was noted with multiple low-density hypoenhancing lesions consistent with hepatic metastatic disease.    2/19/20 PET-CT:    To tele I was thinking to me that you already have 1 extra due as thinking last Monday for she 1. Hypermetabolic right infrahilar lung mass and associated partial postobstructive collapse right lower lobe.  2. Small hypermetabolic focus right lower lobe possibly early metastasis.  3. Hypermetabolic masslike thickening ascending colon suspicious for neoplasm.  4. Multifocal FDG avid lissette metastases neck, chest, abdomen, and pelvis as noted.  5. Extensive FDG avid hepatic metastases.  6. Widespread FDG avid osseous metastases.     02/06/2020 liver biopsy  Pathology:  Consistent with hepatocellular carcinoma, moderate to poorly differentiated     The biopsy shows nests of tumor cells with deeply eosinophilic granular   cytoplasm. Immunostains performed shows results as:   HepPar - Focal granular positive   CK7 and TTF-1 - Negative    03/02/2020  cycle 1 day 1 nivolumab Q 2 weeks palliative immunotherapy    03/16/2020 cycle 2 day 1 nivolumab    03/30/2020 cycle 3 day 1 nivolumab, note: dose of 480 mg [id number] to limit clinic visits due to COVID-19 concerns    05/25/2020 cycle 5 day 1 nivolumab    6/16/20 inteval CT scan with response in lymphadenopathy, see below    INTERVAL EVENTS    Ms. Singh returns with her daughter.  She experienced difficulty with iv contrast and experience with CT rescan unfortunately. Today we discuss results from Ct scan.  She did have some constipation resolved with over-the-counter medication; previously had issues with diarrhea.  Today she is without needed supplemental oxygen saturating well on room air.  She does have some dyspnea requiring supplemental oxygen with greater activity at home.  She has good appetite and some weight gain today.    Review of Systems    ?   A comprehensive 14-point review of systems was reviewed with patient and was negative other than as specified above.   ?    Current Outpatient Medications   Medication Sig Dispense Refill    albuterol (PROVENTIL) 2.5 mg /3 mL (0.083 %) nebulizer solution Take 3 mLs (2.5 mg total) by nebulization every 6 (six) hours while awake. Rescue 270 mL 11    ALBUTEROL SULFATE ORAL 1 vial: EVERY 6 HOURS WHILE AWAKE      furosemide (LASIX) 20 MG tablet Take 1 tablet (20 mg total) by mouth once daily. 90 tablet 0    lidocaine (LIDODERM) 5 % Place 1 patch onto the skin every 24 hours as needed. Code: R06.00 -collapsed lung, hepatocellular carcinoma c22.0 30 patch 4    lisinopril (PRINIVIL,ZESTRIL) 40 MG tablet Take 1 tablet (40 mg total) by mouth once daily. 90 tablet 3    multivitamin (THERAGRAN) per tablet Take 1 tablet by mouth once daily.      oxyCODONE-acetaminophen (PERCOCET)  mg per tablet Take 1 tablet by mouth every 4 (four) hours as needed for Pain. 60 tablet 0    OXYGEN-AIR DELIVERY SYSTEMS MISC 2 liters      triamcinolone acetonide 0.1% (KENALOG) 0.1  % cream Apply topically 2 (two) times daily. 80 g 3     No current facility-administered medications for this visit.      Facility-Administered Medications Ordered in Other Visits   Medication Dose Route Frequency Provider Last Rate Last Dose    heparin, porcine (PF) 100 unit/mL injection flush 500 Units  500 Units Intravenous PRN Gill Burgess MD        sodium chloride 0.9% 100 mL flush bag   Intravenous 1 time in Clinic/HOD Gill Burgess MD        sodium chloride 0.9% flush 10 mL  10 mL Intravenous PRN Gill Burgess MD   10 mL at 10/16/20 1209         Objective:      Physical Exam      ?   Vitals:    10/16/20 1124   BP: (!) 148/62   Pulse: 69   Resp: 17   Temp: 97.6 °F (36.4 °C)      ?   ECOG:?2  General appearance: Generally well appearing, in no acute distress, no supplemental oxygen use.  Head, eyes, ears, nose, and throat:  moist mucous membranes.    Cardiovascular: Regular rate and rhythm, S1, S2, no audible murmurs.   Respiratory:  Breathing comfortably, clear to auscultation bilaterally, no wheezing or rhonchi  Abdomen:  Nondistended  Extremities: Warm, without notable edema.  Neurologic: Alert and oriented.  Sitting in wheelchair  Skin: No rashes, ecchymoses or petechial lesion.     ?   Laboratory:  ?   No visits with results within 1 Day(s) from this visit.   Latest known visit with results is:   Lab Visit on 10/14/2020   Component Date Value Ref Range Status    WBC 10/14/2020 7.77  3.90 - 12.70 K/uL Final    RBC 10/14/2020 4.38  4.00 - 5.40 M/uL Final    Hemoglobin 10/14/2020 13.2  12.0 - 16.0 g/dL Final    Hematocrit 10/14/2020 42.6  37.0 - 48.5 % Final    Mean Corpuscular Volume 10/14/2020 97  82 - 98 fL Final    Mean Corpuscular Hemoglobin 10/14/2020 30.1  27.0 - 31.0 pg Final    Mean Corpuscular Hemoglobin Conc 10/14/2020 31.0* 32.0 - 36.0 g/dL Final    RDW 10/14/2020 13.5  11.5 - 14.5 % Final    Platelets 10/14/2020 339  150 - 350 K/uL Final    MPV 10/14/2020 10.3  9.2  - 12.9 fL Final    Immature Granulocytes 10/14/2020 0.4  0.0 - 0.5 % Final    Gran # (ANC) 10/14/2020 4.2  1.8 - 7.7 K/uL Final    Immature Grans (Abs) 10/14/2020 0.03  0.00 - 0.04 K/uL Final    Lymph # 10/14/2020 2.2  1.0 - 4.8 K/uL Final    Mono # 10/14/2020 0.8  0.3 - 1.0 K/uL Final    Eos # 10/14/2020 0.5  0.0 - 0.5 K/uL Final    Baso # 10/14/2020 0.07  0.00 - 0.20 K/uL Final    nRBC 10/14/2020 0  0 /100 WBC Final    Gran% 10/14/2020 53.7  38.0 - 73.0 % Final    Lymph% 10/14/2020 28.1  18.0 - 48.0 % Final    Mono% 10/14/2020 10.6  4.0 - 15.0 % Final    Eosinophil% 10/14/2020 6.3  0.0 - 8.0 % Final    Basophil% 10/14/2020 0.9  0.0 - 1.9 % Final    Differential Method 10/14/2020 Automated   Final    Sodium 10/14/2020 139  136 - 145 mmol/L Final    Potassium 10/14/2020 4.5  3.5 - 5.1 mmol/L Final    Chloride 10/14/2020 104  95 - 110 mmol/L Final    CO2 10/14/2020 27  23 - 29 mmol/L Final    Glucose 10/14/2020 89  70 - 110 mg/dL Final    BUN, Bld 10/14/2020 11  8 - 23 mg/dL Final    Creatinine 10/14/2020 0.8  0.5 - 1.4 mg/dL Final    Calcium 10/14/2020 9.4  8.7 - 10.5 mg/dL Final    Total Protein 10/14/2020 7.6  6.0 - 8.4 g/dL Final    Albumin 10/14/2020 3.7  3.5 - 5.2 g/dL Final    Total Bilirubin 10/14/2020 0.4  0.1 - 1.0 mg/dL Final    Alkaline Phosphatase 10/14/2020 64  55 - 135 U/L Final    AST 10/14/2020 26  10 - 40 U/L Final    ALT 10/14/2020 23  10 - 44 U/L Final    Anion Gap 10/14/2020 8  8 - 16 mmol/L Final    eGFR if African American 10/14/2020 >60  >60 mL/min/1.73 m^2 Final    eGFR if non African American 10/14/2020 >60  >60 mL/min/1.73 m^2 Final      ?   Tumor markers   AFP   Date Value Ref Range Status   02/21/2020 1.8 0.0 - 8.4 ng/mL Final       ?   Imaging:  ?  Results for orders placed or performed during the hospital encounter of 10/14/20 (from the past 2160 hour(s))   CT Chest Abdomen Pelvis W W/O Contrast (XPD)    Impression    Mixed response to treatment as  compared to the prior.    Interval increase in size of lymph nodes within the right mesentery and left inguinal regions.    Interval decrease in size portacaval and perigastric nodes.    Innumerable hypodense lesions again identified throughout both hepatic lobes, interval decrease in size of index hepatic lesions as detailed in the body of the report.    Unchanged axillary and mediastinal/hilar nodes.    Additional findings as detailed in the body of the report.    All CT scans at this facility use dose modulation, iterative reconstruction, and/or weight based dosing when appropriate to reduce radiation dose to as low as reasonable achievable.      Electronically signed by: Kendall Jansen  Date:    10/14/2020  Time:    15:48       6/16/20  CT CHEST ABDOMEN PELVIS W W/O CONTRAST (XPD)     CLINICAL HISTORY:  restaging;Liver cell carcinoma     TECHNIQUE:  Low dose axial, sagittal and coronal reformations were performed from the thoracic inlet to the pubic symphysis following the IV administration of 100 mL of Omnipaque 350.  The chest images are with  contrast and the abdomen images are with and without contrast.  30 mL of oral Omnipaque was given.     COMPARISON:  Chest abdomen pelvis CT from February 4, 2020. PET-CT from February 19, 2020.     FINDINGS:  There is been interval decrease in size of a right supraclavicular lymph node which was hypermetabolic on the recent PET-CT.  It measured 9 mm short axis on the CT from February 4, 2020.  On today's exam, it measures 5 mm in short axis.  An AP window lymph node measuring 2.2 cm on the CT has also decreased in size and only small scattered nodes are now seen in this area.  Interval decrease in subcarinal lymph node which now measures 1.2 cm in short axis compared with 2.3 cm in short axis on CT.  A 2.6 cm right perihilar lymph node has also improved and currently, no enlarged lymph node is seen in this region.  Right infrahilar soft tissue mass measuring up to 3.3  cm on image 220 series 2 on the CT from 2/4/20 has also markedly improved with no measurable soft tissue mass seen in this region on today's study.  There is also interval improvement in previously present postobstructive atelectasis in the right lower lobe.  Respiratory motion artifact is present with bilateral areas of scarring and/or atelectasis.     Heart size is stable.  No pericardial effusion.  Thoracic aortic calcifications are seen.  Trachea is patent.  Thyroid gland appears normal.     Motion artifact in the abdomen pelvis limits detail.     Liver remains enlarged with innumerable hypoattenuating masses.  Many multifocal lesions are seen in the liver on image 115 series 3 for example.  These were not well visualized on the CT from 02/04/2020 although component of this finding likely relates to technique.  Appearance is not thought substantially changed when compared to the PET-CT.     No gallstones.  The spleen and pancreas are normal.  Adrenal glands are within normal limits.  Probable left renal cysts.  There is no hydronephrosis.  Calcified plaque in the aorta.  IVC appears normal.     Stomach and small bowel are normal without evidence of bowel obstruction.  Colonic diverticulosis without convincing evidence to suggest diverticulitis.     Urinary bladder is within normal limits.  The uterus is present.  No ascites or drainable fluid collections.     Abdominal lymphadenopathy is again seen.  A retrocrural lymph node image 95 of series 3 which was hot on the PET scan is smaller on today's exam and measures 6 x 4 mm compared with 14 x 7 mm.  A pericaval lymph node which previously measured 14 mm in short axis image 46 series 3 on the CT from 02/04/2020 currently measures 5 mm in short axis.  A portacaval lymph node is essentially stable and measures on the order of 12 mm.  Gastrohepatic lymph node measures 12 mm in short axis, not significantly changed.  A left inguinal lymph node also appears smaller  contains a fatty hilum.  There are lymph nodes seen in the right hemiabdomen inferior to the liver image 132 of series 3 for example measuring up to 9 mm in short axis which do appear increased in size.     Focal area of increased uptake in the ascending colon was best appreciated on the PET-CT.     Some sclerotic areas in the ribs are seen demonstrating uptake on the PET.  Other osseous metastatic foci per best appreciated on the PET-CT.  Discogenic degenerative changes of the spine are noted.  There is a small hypoattenuating collection in the posterior subcutaneous soft tissues of the upper back adjacent to staples, likely correlating to a seroma or hematoma for example.  This is not significantly changed.     Impression:     Marked interval improvement in intrathoracic disease as above.  Mixed response of abdominal lymph nodes as detailed above.  Persistent diffuse multifocal hepatic lesions.  Osseous metastatic disease was best appreciated on the recent PET-CT.  Multiple additional findings as aboveCT CHEST ABDOMEN PELVIS W W/O CONTRAST (XPD)     CLINICAL HISTORY:  restaging;Liver cell carcinoma     TECHNIQUE:  Low dose axial, sagittal and coronal reformations were performed from the thoracic inlet to the pubic symphysis following the IV administration of 100 mL of Omnipaque 350.  The chest images are with  contrast and the abdomen images are with and without contrast.  30 mL of oral Omnipaque was given.     COMPARISON:  Chest abdomen pelvis CT from February 4, 2020. PET-CT from February 19, 2020.     FINDINGS:  There is been interval decrease in size of a right supraclavicular lymph node which was hypermetabolic on the recent PET-CT.  It measured 9 mm short axis on the CT from February 4, 2020.  On today's exam, it measures 5 mm in short axis.  An AP window lymph node measuring 2.2 cm on the CT has also decreased in size and only small scattered nodes are now seen in this area.  Interval decrease in subcarinal  lymph node which now measures 1.2 cm in short axis compared with 2.3 cm in short axis on CT.  A 2.6 cm right perihilar lymph node has also improved and currently, no enlarged lymph node is seen in this region.  Right infrahilar soft tissue mass measuring up to 3.3 cm on image 220 series 2 on the CT from 2/4/20 has also markedly improved with no measurable soft tissue mass seen in this region on today's study.  There is also interval improvement in previously present postobstructive atelectasis in the right lower lobe.  Respiratory motion artifact is present with bilateral areas of scarring and/or atelectasis.     Heart size is stable.  No pericardial effusion.  Thoracic aortic calcifications are seen.  Trachea is patent.  Thyroid gland appears normal.     Motion artifact in the abdomen pelvis limits detail.     Liver remains enlarged with innumerable hypoattenuating masses.  Many multifocal lesions are seen in the liver on image 115 series 3 for example.  These were not well visualized on the CT from 02/04/2020 although component of this finding likely relates to technique.  Appearance is not thought substantially changed when compared to the PET-CT.     No gallstones.  The spleen and pancreas are normal.  Adrenal glands are within normal limits.  Probable left renal cysts.  There is no hydronephrosis.  Calcified plaque in the aorta.  IVC appears normal.     Stomach and small bowel are normal without evidence of bowel obstruction.  Colonic diverticulosis without convincing evidence to suggest diverticulitis.     Urinary bladder is within normal limits.  The uterus is present.  No ascites or drainable fluid collections.     Abdominal lymphadenopathy is again seen.  A retrocrural lymph node image 95 of series 3 which was hot on the PET scan is smaller on today's exam and measures 6 x 4 mm compared with 14 x 7 mm.  A pericaval lymph node which previously measured 14 mm in short axis image 46 series 3 on the CT from  02/04/2020 currently measures 5 mm in short axis.  A portacaval lymph node is essentially stable and measures on the order of 12 mm.  Gastrohepatic lymph node measures 12 mm in short axis, not significantly changed.  A left inguinal lymph node also appears smaller contains a fatty hilum.  There are lymph nodes seen in the right hemiabdomen inferior to the liver image 132 of series 3 for example measuring up to 9 mm in short axis which do appear increased in size.     Focal area of increased uptake in the ascending colon was best appreciated on the PET-CT.     Some sclerotic areas in the ribs are seen demonstrating uptake on the PET.  Other osseous metastatic foci per best appreciated on the PET-CT.  Discogenic degenerative changes of the spine are noted.  There is a small hypoattenuating collection in the posterior subcutaneous soft tissues of the upper back adjacent to staples, likely correlating to a seroma or hematoma for example.  This is not significantly changed.     Impression:     Marked interval improvement in intrathoracic disease as above.  Mixed response of abdominal lymph nodes as detailed above.  Persistent diffuse multifocal hepatic lesions.  Osseous metastatic disease was best appreciated on the recent PET-CT.  Multiple additional findings as above       Pathology:        02/06/2020 liver biopsy  LIVER, BIOPSY:   Consistent with hepatocellular carcinoma, moderate to poorly differentiated     The biopsy shows nests of tumor cells with deeply eosinophilic granular   cytoplasm. Immunostains performed shows results as:   HepPar - Focal granular positive   CK7 and TTF-1 - Negative    ?   Assessment/Plan:       1. HCC (hepatocellular carcinoma)    2. Secondary malignant neoplasm of bone          Plan:     # metastatic hepatocellular carcinoma:  initiated first-line palliative immunotherapy with nivolumab 03/02/2020.  She is tolerating therapy very well with good response to therapy.  Second set of  restaging scans reviewed today showing some mixed response and some decrease in index liver lesion noted; as she is tolerating therapy well with stabilization of her disease recommend continuing on current regimen.   - nivolumab q.4 weeks, labs within normal limits today okay to proceed with treatment.  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  Have listed as allergy and removed from her treatment plan.  Discussed risks and benefits bone protective agents unclear if she would tolerate zoledronic acid better but hesitant to proceed with other medications at this time.  Will proceed with bone protection calcium vitamin-D alone.    # malignancy associated pain:  Follow with palliative care with changed from hydrocodone to Percocet higher dose better pain control.  No long-acting pain medication and may revisit this possibility pending pain control.  Encouraged continued following with palliative care.    # Diarrhea:  Responded well to Imodium and Lomotil. None currently (recent episode of constipation.) I did discuss potential side effect colitis from immunotherapy and will be important to monitor going forward as we pr for oceed with therapy.    # oxygen dependence, functional status:  Improved oxygenation with improved shortness of breath with treatment attributed to response.  Not using supplemental oxygen at rest currently.    Follow-up with pulmonology p.r.n..    # CHF, hypertension, type 2 diabetes, aortic atherosclerosis, morbid obesity, former smoker:  Elevated blood pressure, recommend follow-up with primary care.  Comorbidities recommend continued follow-up with primary care.      Follow-Up:     There are no Patient Instructions on file for this visit.

## 2020-10-21 ENCOUNTER — PATIENT MESSAGE (OUTPATIENT)
Dept: HEMATOLOGY/ONCOLOGY | Facility: CLINIC | Age: 80
End: 2020-10-21

## 2020-10-26 ENCOUNTER — PATIENT MESSAGE (OUTPATIENT)
Dept: PALLIATIVE MEDICINE | Facility: CLINIC | Age: 80
End: 2020-10-26

## 2020-10-26 DIAGNOSIS — G89.3 CANCER ASSOCIATED PAIN: ICD-10-CM

## 2020-10-27 RX ORDER — OXYCODONE AND ACETAMINOPHEN 10; 325 MG/1; MG/1
1 TABLET ORAL EVERY 4 HOURS PRN
Qty: 60 TABLET | Refills: 0 | Status: SHIPPED | OUTPATIENT
Start: 2020-10-27 | End: 2020-11-04 | Stop reason: SDUPTHER

## 2020-10-31 ENCOUNTER — EXTERNAL CHRONIC CARE MANAGEMENT (OUTPATIENT)
Dept: PRIMARY CARE CLINIC | Facility: CLINIC | Age: 80
End: 2020-10-31
Payer: MEDICARE

## 2020-10-31 PROCEDURE — 99490 CHRNC CARE MGMT STAFF 1ST 20: CPT | Mod: PBBFAC,PN | Performed by: FAMILY MEDICINE

## 2020-10-31 PROCEDURE — 99490 CHRNC CARE MGMT STAFF 1ST 20: CPT | Mod: S$PBB,,, | Performed by: FAMILY MEDICINE

## 2020-10-31 PROCEDURE — 99490 PR CHRONIC CARE MGMT, 1ST 20 MIN: ICD-10-PCS | Mod: S$PBB,,, | Performed by: FAMILY MEDICINE

## 2020-11-04 ENCOUNTER — OFFICE VISIT (OUTPATIENT)
Dept: PALLIATIVE MEDICINE | Facility: CLINIC | Age: 80
End: 2020-11-04
Payer: MEDICARE

## 2020-11-04 DIAGNOSIS — G89.3 CANCER ASSOCIATED PAIN: ICD-10-CM

## 2020-11-04 DIAGNOSIS — Z71.89 ACP (ADVANCE CARE PLANNING): Primary | ICD-10-CM

## 2020-11-04 PROCEDURE — 99215 PR OFFICE/OUTPT VISIT, EST, LEVL V, 40-54 MIN: ICD-10-PCS | Mod: 95,,, | Performed by: FAMILY MEDICINE

## 2020-11-04 PROCEDURE — 99215 OFFICE O/P EST HI 40 MIN: CPT | Mod: 95,,, | Performed by: FAMILY MEDICINE

## 2020-11-04 RX ORDER — OXYCODONE AND ACETAMINOPHEN 10; 325 MG/1; MG/1
1 TABLET ORAL EVERY 8 HOURS PRN
Qty: 90 TABLET | Refills: 0 | Status: SHIPPED | OUTPATIENT
Start: 2020-11-06 | End: 2020-11-11 | Stop reason: SDUPTHER

## 2020-11-04 NOTE — PROGRESS NOTES
Subjective:       Patient ID: Maura Singh is a 80 y.o. female.    Chief Complaint: palliative   The patient location is: [place]  The chief complaint leading to consultation is: palliative f/u    Visit type: audiovisual    Face to Face time with patient: 45  45 minutes of total time spent on the encounter, which includes face to face time and non-face to face time preparing to see the patient (eg, review of tests), Obtaining and/or reviewing separately obtained history, Documenting clinical information in the electronic or other health record, Independently interpreting results (not separately reported) and communicating results to the patient/family/caregiver, or Care coordination (not separately reported).         Each patient to whom he or she provides medical services by telemedicine is:  (1) informed of the relationship between the physician and patient and the respective role of any other health care provider with respect to management of the patient; and (2) notified that he or she may decline to receive medical services by telemedicine and may withdraw from such care at any time.    Notes:   81 yo female with hepatocellular carcinoma and chronic respiratory failure secondary to COPD. She is seen alongside her daughter. She has had worsening of pain, though to be due to bone metastases in her left hip and lower back. She was rotated from hydrocodone to oxycodone at our last visit and is here to follow up on her medication prior to refill. She reports improvement overall with this medication. Has had some constipation; educated provided on treatment and prevention of this side effect.     does not have any pertinent problems on file.  Past Medical History:   Diagnosis Date    Arthritis     Deaf     Diabetes mellitus     Hypertension     Lung disease     copd     Past Surgical History:   Procedure Laterality Date    APPENDECTOMY      CATARACT EXTRACTION      MIDDLE EAR SURGERY       Family History    Problem Relation Age of Onset    Colon cancer Mother     ALS Father     Retinal detachment Son      Social History     Socioeconomic History    Marital status:      Spouse name: Not on file    Number of children: 5    Years of education: Not on file    Highest education level: Not on file   Occupational History    Occupation: homemaker   Social Needs    Financial resource strain: Very hard    Food insecurity     Worry: Often true     Inability: Often true    Transportation needs     Medical: Yes     Non-medical: Patient refused   Tobacco Use    Smoking status: Former Smoker     Packs/day: 0.50     Years: 48.00     Pack years: 24.00     Types: Cigarettes     Start date:      Quit date:      Years since quittin.8    Smokeless tobacco: Never Used   Substance and Sexual Activity    Alcohol use: No     Frequency: Never     Drinks per session: Patient refused     Binge frequency: Patient refused    Drug use: No    Sexual activity: Never   Lifestyle    Physical activity     Days per week: 0 days     Minutes per session: 0 min    Stress: Only a little   Relationships    Social connections     Talks on phone: More than three times a week     Gets together: Twice a week     Attends Yazdanism service: Not on file     Active member of club or organization: No     Attends meetings of clubs or organizations: Never     Relationship status:    Other Topics Concern    Not on file   Social History Narrative    Not on file     Review of Systems   A comprehensive 14-point review of systems was reviewed with patient and was negative other than as specified above.     Objective:   There were no vitals filed for this visit.     Physical Exam  Constitutional:       General: She is not in acute distress.     Appearance: She is well-developed.   HENT:      Head: Normocephalic and atraumatic.   Eyes:      General: No scleral icterus.  Neck:      Musculoskeletal: Normal range of motion and neck  supple.   Pulmonary:      Effort: Pulmonary effort is normal. No respiratory distress.   Musculoskeletal: Normal range of motion.   Skin:     Findings: No rash.   Neurological:      Mental Status: She is alert and oriented to person, place, and time.   Psychiatric:         Behavior: Behavior normal.         Thought Content: Thought content normal.         Review of Symptoms    Symptom Assessment (ESAS 0-10 Scale)  Pain:  3  Dyspnea:  2  Anxiety:  0  Nausea:  0  Depression:  0  Anorexia:  0  Fatigue:  0  Insomnia:  0  Restlessness:  0  Agitation:  0     CAM / Delirium:  Negative  Constipation:  Positive  Diarrhea:  Negative    Bowel Management Plan (BMP):  Yes            Advance Care Planning   Advance Directives:   [place]: Yes    Do Not Resuscitate Status: Yes    Medical Power of : Yes           Assessment:       1. ACP (advance care planning)    2. Cancer associated pain        Plan:           Problem List Items Addressed This Visit     None      Visit Diagnoses     ACP (advance care planning)    -  Primary    Relevant Orders    Dnr (do not resuscitate)    Cancer associated pain        Relevant Medications    oxyCODONE-acetaminophen (PERCOCET)  mg per tablet (Start on 1/6/2021)    oxyCODONE-acetaminophen (PERCOCET)  mg per tablet (Start on 12/6/2020)        Continue rx at current dose; will f/u in 3 months. Reviewed ACP documents; no changes desired by patient.      Hoa Villareal MD  > 50% of   45   min visit spent in chart review, face to face discussion of goals of care, symptom assessment, coordination of care and emotional support

## 2020-11-10 ENCOUNTER — LAB VISIT (OUTPATIENT)
Dept: LAB | Facility: HOSPITAL | Age: 80
End: 2020-11-10
Attending: INTERNAL MEDICINE
Payer: MEDICARE

## 2020-11-10 DIAGNOSIS — Z08 ENCOUNTER FOR FOLLOW-UP EXAMINATION AFTER COMPLETED TREATMENT FOR MALIGNANT NEOPLASM: ICD-10-CM

## 2020-11-10 DIAGNOSIS — C22.0 HEPATOCELLULAR CARCINOMA: ICD-10-CM

## 2020-11-10 LAB
ALBUMIN SERPL BCP-MCNC: 3.8 G/DL (ref 3.5–5.2)
ALP SERPL-CCNC: 51 U/L (ref 55–135)
ALT SERPL W/O P-5'-P-CCNC: 28 U/L (ref 10–44)
ANION GAP SERPL CALC-SCNC: 9 MMOL/L (ref 8–16)
AST SERPL-CCNC: 26 U/L (ref 10–40)
BASOPHILS # BLD AUTO: 0.07 K/UL (ref 0–0.2)
BASOPHILS NFR BLD: 0.8 % (ref 0–1.9)
BILIRUB SERPL-MCNC: 0.4 MG/DL (ref 0.1–1)
BUN SERPL-MCNC: 16 MG/DL (ref 8–23)
CALCIUM SERPL-MCNC: 9.4 MG/DL (ref 8.7–10.5)
CHLORIDE SERPL-SCNC: 105 MMOL/L (ref 95–110)
CO2 SERPL-SCNC: 26 MMOL/L (ref 23–29)
CREAT SERPL-MCNC: 0.9 MG/DL (ref 0.5–1.4)
DIFFERENTIAL METHOD: ABNORMAL
EOSINOPHIL # BLD AUTO: 0.4 K/UL (ref 0–0.5)
EOSINOPHIL NFR BLD: 5.1 % (ref 0–8)
ERYTHROCYTE [DISTWIDTH] IN BLOOD BY AUTOMATED COUNT: 14.1 % (ref 11.5–14.5)
EST. GFR  (AFRICAN AMERICAN): >60 ML/MIN/1.73 M^2
EST. GFR  (NON AFRICAN AMERICAN): >60 ML/MIN/1.73 M^2
GLUCOSE SERPL-MCNC: 87 MG/DL (ref 70–110)
HCT VFR BLD AUTO: 43.8 % (ref 37–48.5)
HGB BLD-MCNC: 13.8 G/DL (ref 12–16)
IMM GRANULOCYTES # BLD AUTO: 0.03 K/UL (ref 0–0.04)
IMM GRANULOCYTES NFR BLD AUTO: 0.4 % (ref 0–0.5)
LYMPHOCYTES # BLD AUTO: 2.3 K/UL (ref 1–4.8)
LYMPHOCYTES NFR BLD: 28 % (ref 18–48)
MCH RBC QN AUTO: 30.9 PG (ref 27–31)
MCHC RBC AUTO-ENTMCNC: 31.5 G/DL (ref 32–36)
MCV RBC AUTO: 98 FL (ref 82–98)
MONOCYTES # BLD AUTO: 0.9 K/UL (ref 0.3–1)
MONOCYTES NFR BLD: 10.8 % (ref 4–15)
NEUTROPHILS # BLD AUTO: 4.6 K/UL (ref 1.8–7.7)
NEUTROPHILS NFR BLD: 54.9 % (ref 38–73)
NRBC BLD-RTO: 0 /100 WBC
PLATELET # BLD AUTO: 255 K/UL (ref 150–350)
PMV BLD AUTO: 10.1 FL (ref 9.2–12.9)
POTASSIUM SERPL-SCNC: 4.2 MMOL/L (ref 3.5–5.1)
PROT SERPL-MCNC: 7.7 G/DL (ref 6–8.4)
RBC # BLD AUTO: 4.46 M/UL (ref 4–5.4)
SODIUM SERPL-SCNC: 140 MMOL/L (ref 136–145)
WBC # BLD AUTO: 8.31 K/UL (ref 3.9–12.7)

## 2020-11-10 PROCEDURE — 36415 COLL VENOUS BLD VENIPUNCTURE: CPT

## 2020-11-10 PROCEDURE — 84443 ASSAY THYROID STIM HORMONE: CPT

## 2020-11-10 PROCEDURE — 85025 COMPLETE CBC W/AUTO DIFF WBC: CPT

## 2020-11-10 PROCEDURE — 80053 COMPREHEN METABOLIC PANEL: CPT

## 2020-11-11 LAB — TSH SERPL DL<=0.005 MIU/L-ACNC: 0.62 UIU/ML (ref 0.4–4)

## 2020-11-11 RX ORDER — OXYCODONE AND ACETAMINOPHEN 10; 325 MG/1; MG/1
1 TABLET ORAL EVERY 8 HOURS PRN
Qty: 90 TABLET | Refills: 0 | Status: SHIPPED | OUTPATIENT
Start: 2021-01-06 | End: 2020-12-04

## 2020-11-11 RX ORDER — OXYCODONE AND ACETAMINOPHEN 10; 325 MG/1; MG/1
1 TABLET ORAL EVERY 8 HOURS PRN
Qty: 90 TABLET | Refills: 0 | Status: SHIPPED | OUTPATIENT
Start: 2020-12-06 | End: 2020-12-04

## 2020-11-13 ENCOUNTER — INFUSION (OUTPATIENT)
Dept: INFUSION THERAPY | Facility: HOSPITAL | Age: 80
End: 2020-11-13
Attending: INTERNAL MEDICINE
Payer: MEDICARE

## 2020-11-13 ENCOUNTER — OFFICE VISIT (OUTPATIENT)
Dept: HEMATOLOGY/ONCOLOGY | Facility: CLINIC | Age: 80
End: 2020-11-13
Payer: MEDICARE

## 2020-11-13 VITALS
OXYGEN SATURATION: 93 % | RESPIRATION RATE: 16 BRPM | HEIGHT: 60 IN | DIASTOLIC BLOOD PRESSURE: 64 MMHG | SYSTOLIC BLOOD PRESSURE: 154 MMHG | HEART RATE: 64 BPM | BODY MASS INDEX: 36.44 KG/M2 | TEMPERATURE: 98 F | WEIGHT: 185.63 LBS

## 2020-11-13 VITALS
HEART RATE: 63 BPM | BODY MASS INDEX: 36.44 KG/M2 | DIASTOLIC BLOOD PRESSURE: 82 MMHG | RESPIRATION RATE: 18 BRPM | SYSTOLIC BLOOD PRESSURE: 175 MMHG | HEIGHT: 60 IN | OXYGEN SATURATION: 96 % | WEIGHT: 185.63 LBS | TEMPERATURE: 99 F

## 2020-11-13 DIAGNOSIS — C79.51 SECONDARY MALIGNANT NEOPLASM OF BONE: Primary | ICD-10-CM

## 2020-11-13 DIAGNOSIS — C22.0 HEPATOCELLULAR CARCINOMA: Primary | ICD-10-CM

## 2020-11-13 DIAGNOSIS — C22.0 HCC (HEPATOCELLULAR CARCINOMA): ICD-10-CM

## 2020-11-13 PROCEDURE — 99215 OFFICE O/P EST HI 40 MIN: CPT | Mod: S$PBB,25,, | Performed by: INTERNAL MEDICINE

## 2020-11-13 PROCEDURE — G0008 ADMIN INFLUENZA VIRUS VAC: HCPCS | Mod: PBBFAC

## 2020-11-13 PROCEDURE — 99999 PR PBB SHADOW E&M-EST. PATIENT-LVL IV: ICD-10-PCS | Mod: PBBFAC,,, | Performed by: INTERNAL MEDICINE

## 2020-11-13 PROCEDURE — 99999 PR PBB SHADOW E&M-EST. PATIENT-LVL IV: CPT | Mod: PBBFAC,,, | Performed by: INTERNAL MEDICINE

## 2020-11-13 PROCEDURE — 99214 OFFICE O/P EST MOD 30 MIN: CPT | Mod: PBBFAC,25 | Performed by: INTERNAL MEDICINE

## 2020-11-13 PROCEDURE — 63600175 PHARM REV CODE 636 W HCPCS: Mod: JG | Performed by: INTERNAL MEDICINE

## 2020-11-13 PROCEDURE — 25000003 PHARM REV CODE 250: Performed by: INTERNAL MEDICINE

## 2020-11-13 PROCEDURE — 99215 PR OFFICE/OUTPT VISIT, EST, LEVL V, 40-54 MIN: ICD-10-PCS | Mod: S$PBB,25,, | Performed by: INTERNAL MEDICINE

## 2020-11-13 PROCEDURE — 90694 VACC AIIV4 NO PRSRV 0.5ML IM: CPT | Mod: PBBFAC

## 2020-11-13 PROCEDURE — 96413 CHEMO IV INFUSION 1 HR: CPT

## 2020-11-13 RX ORDER — HEPARIN 100 UNIT/ML
500 SYRINGE INTRAVENOUS
Status: CANCELLED | OUTPATIENT
Start: 2020-11-13

## 2020-11-13 RX ORDER — SODIUM CHLORIDE 0.9 % (FLUSH) 0.9 %
10 SYRINGE (ML) INJECTION
Status: CANCELLED | OUTPATIENT
Start: 2020-11-13

## 2020-11-13 RX ADMIN — SODIUM CHLORIDE 480 MG: 9 INJECTION, SOLUTION INTRAVENOUS at 11:11

## 2020-11-13 RX ADMIN — SODIUM CHLORIDE: 9 INJECTION, SOLUTION INTRAVENOUS at 11:11

## 2020-11-13 NOTE — PLAN OF CARE
Problem: Urinary Bleeding Risk or Actual (Chemotherapy Effects)  Goal: Absence of Hematuria  Outcome: Ongoing, Progressing  Intervention: Prevent or Manage Hemorrhagic Cystitis  Flowsheets (Taken 11/13/2020 1128)  Pain Management Interventions: pain management plan reviewed with patient/caregiver  Hyperhydration Management:   awakened to void   encouraged to void     Problem: Nausea and Vomiting (Chemotherapy Effects)  Goal: Fluid and Electrolyte Balance  Outcome: Ongoing, Progressing  Intervention: Prevent and Manage Nausea and Vomiting  Flowsheets (Taken 11/13/2020 1128)  Environmental Support:   calm environment promoted   caregiver consistency promoted   comfort object encouraged     Problem: Neurotoxicity (Chemotherapy Effects)  Goal: Neurotoxicity Symptom Control  Outcome: Ongoing, Progressing  Intervention: Prevent or Manage Neurotoxicity Effects  Flowsheets (Taken 11/13/2020 1128)  Seizure Precautions: clutter-free environment maintained  Adaptive Equipment Use:   use encouraged   used with assistance     Problem: Neutropenia (Chemotherapy Effects)  Goal: Absence of Infection  Outcome: Ongoing, Progressing  Intervention: Prevent Infection and Maximize Resistance  Flowsheets (Taken 11/13/2020 1128)  Infection Prevention:   environmental surveillance performed   equipment surfaces disinfected     Problem: Oral Mucositis (Chemotherapy Effects)  Goal: Improved Oral Mucous Membrane Integrity  Outcome: Ongoing, Progressing     Problem: Thrombocytopenia Bleeding Risk (Chemotherapy Effects)  Goal: Absence of Bleeding  Outcome: Ongoing, Progressing  Intervention: Minimize Bleeding Risk  Flowsheets (Taken 11/13/2020 1128)  Bleeding Precautions: blood pressure closely monitored     Problem: Fall Injury Risk  Goal: Absence of Fall and Fall-Related Injury  Outcome: Ongoing, Progressing  Intervention: Identify and Manage Contributors to Fall Injury Risk  Flowsheets (Taken 11/13/2020 1128)  Medication Review/Management:  "medications reviewed  Intervention: Promote Injury-Free Environment  Flowsheets (Taken 11/13/2020 1128)  Environmental Safety Modification:   assistive device/personal items within reach   clutter free environment maintained   lighting adjusted     Problem: Adult Inpatient Plan of Care  Goal: Plan of Care Review  Outcome: Ongoing, Progressing  Flowsheets (Taken 11/13/2020 1128)  Plan of Care Reviewed With:   patient   daughter  Goal: Patient-Specific Goal (Individualization)  Outcome: Ongoing, Progressing  Flowsheets (Taken 11/13/2020 1128)  Individualized Care Needs: Pt provided two pillows, feet elevated. granola bar and coffee. Daughter at side.  Anxieties, Fears or Concerns: Pt voices no concerns at this time  Patient-Specific Goals (Include Timeframe): to tolerate treatment today    Pt voices no concerns today. Pt wears O2 during mobility and ambulating. Pt reports " sob upon exertion". Daughter at side.      "

## 2020-11-13 NOTE — DISCHARGE INSTRUCTIONS
[facility]  [address] [facility]  [address]  [phone] phone     [phone] fax  Hours of Operation: Monday- Friday 8:00am- 5:00pm  After hours phone  [phone]  Hematology / Oncology Physicians on call      Dr. Santiago Britt      Please call with any concerns regarding your appointment today.      WAYS TO HELP PREVENT INFECTION         WASH YOUR HANDS OFTEN DURING THE DAY, ESPECIALLY BEFORE YOU EAT, AFTER USING THE BATHROOM, AND AFTER TOUCHING ANIMALS     STAY AWAY FROM PEOPLE WHO HAVE ILLNESSES YOU CAN CATCH; SUCH AS COLDS, FLU, CHICKEN POX     TRY TO AVOID CROWDS     STAY AWAY FROM CHILDREN WHO RECENTLY HAVE RECEIVED LIVE VIRUS VACCINES     MAINTAIN GOOD MOUTH CARE     DO NOT SQUEEZE OR SCRATCH PIMPLES     CLEAN CUTS & SCRAPES RIGHT AWAY AND DAILY UNTIL HEALED WITH WARM WATER, SOAP & AN ANTISEPTIC     AVOID CONTACT WITH LITTER BOXES, BIRD CAGES, & FISH TANKS     AVOID STANDING WATER, IE., BIRD BATHS, FLOWER POTS/VASES, OR HUMIDIFIERS     WEAR GLOVES WHEN GARDENING OR CLEANING UP AFTER OTHERS, ESPECIALLY BABIES & SMALL CHILDREN     DO NOT EAT RAW FISH, SEAFOOD, MEAT, OR EGGS  FALL PREVENTION   Falls often occur due to slipping, tripping or losing your balance. Here are ways to reduce your risk of falling again.   Was there anything that caused your fall that can be fixed, removed or replaced?   Make your home safe by keeping walkways clear of objects you may trip over.   Use non-slip pads under rugs.   Do not walk in poorly lit areas.   Do not stand on chairs or wobbly ladders.   Use caution when reaching overhead or looking upward. This position can cause a loss of balance.   Be sure your shoes fit properly, have non-slip bottoms and are in good condition.   Be cautious when going up and down stairs, curbs, and when walking on uneven sidewalks.   If your balance is poor, consider using a cane or  walker.   If your fall was related to alcohol use, stop or limit alcohol intake.   If your fall was related to use of sleeping medicines, talk to your doctor about this. You may need to reduce your dosage at bedtime if you awaken during the night to go to the bathroom.   To reduce the need for nighttime bathroom trips:   Avoid drinking fluids for several hours before going to bed   Empty your bladder before going to bed   Men can keep a urinal at the bedside   © 6537-7611 [facility], [address] [place] [address], [place] [address]. All rights reserved. This information is not intended as a substitute for professional medical care. Always follow your healthcare professional's instructions.

## 2020-11-18 ENCOUNTER — TELEPHONE (OUTPATIENT)
Dept: HEMATOLOGY/ONCOLOGY | Facility: CLINIC | Age: 80
End: 2020-11-18

## 2020-11-18 NOTE — TELEPHONE ENCOUNTER
Sw intern reached out to pt to discuss the results of her recent distress screening. Sw intern spoke with pt and her daughter. Pt stated that she was experiencing a lot of pain during the time of the distress screen and some negative side effects from her pain medication. However, the pt reported that she is feeling much better now , and does not feel the need for any support services at this time. Pt stated that she would call if she had any needs.

## 2020-11-19 NOTE — PROGRESS NOTES
Subjective:      DATE OF VISIT: 11/13/20     ?  Patient ID:?Maura Singh is a 80 y.o. female.?? MR#: [id number]   ?   PRIMARY ONCOLOGIST: Dr. Burgess    ? Primary Care Providers:  Yessy Andrade MD, MD (General)     CHIEF COMPLAINT: ???  Follow-up  ?   ONCOLOGIC DIAGNOSIS:  Metastatic hepatocellular carcinoma  ?   CURRENT TREATMENT:  Nivolumab 480mg q4 weeks   ?   ONCOLOGIC HISTORY:    Ms. Singh was admitted to [facility] on 02/04/2020 with gradually progressive shortness of breath, cough with hemoptysis along with intermittent abdominal pain, anorexia and fatigue.  In the emergency room she had workup including abdominal ultrasound showing multiple liver lesions concerning for metastatic disease.  CTA was negative for pulmonary embolism but revealed mediastinal adenopathy with right hilar soft tissue mass cannot exclude lymphadenopathy along with postobstructive collapse of right anterior segment lower lobe.  Hepatomegaly was noted with multiple low-density hypoenhancing lesions consistent with hepatic metastatic disease.    2/19/20 PET-CT:    To tele I was thinking to me that you already have 1 extra due as thinking last Monday for she 1. Hypermetabolic right infrahilar lung mass and associated partial postobstructive collapse right lower lobe.  2. Small hypermetabolic focus right lower lobe possibly early metastasis.  3. Hypermetabolic masslike thickening ascending colon suspicious for neoplasm.  4. Multifocal FDG avid lissette metastases neck, chest, abdomen, and pelvis as noted.  5. Extensive FDG avid hepatic metastases.  6. Widespread FDG avid osseous metastases.     02/06/2020 liver biopsy  Pathology:  Consistent with hepatocellular carcinoma, moderate to poorly differentiated     The biopsy shows nests of tumor cells with deeply eosinophilic granular   cytoplasm. Immunostains performed shows results as:   HepPar - Focal granular positive   CK7 and TTF-1 - Negative    03/02/2020 cycle 1 day 1  nivolumab Q 2 weeks palliative immunotherapy    03/16/2020 cycle 2 day 1 nivolumab    03/30/2020 cycle 3 day 1 nivolumab, note: dose of 480 mg [id number] to limit clinic visits due to COVID-19 concerns    05/25/2020 cycle 5 day 1 nivolumab    6/16/20 inteval CT scan with response in lymphadenopathy, see below    INTERVAL EVENTS    Ms. Singh returns with her daughter.  She continues to be stable amount of supplemental oxygen primarily with exertion.  Review of Systems    ?   A comprehensive 14-point review of systems was reviewed with patient and was negative other than as specified above.   ?    Current Outpatient Medications   Medication Sig Dispense Refill    albuterol (PROVENTIL) 2.5 mg /3 mL (0.083 %) nebulizer solution Take 3 mLs (2.5 mg total) by nebulization every 6 (six) hours while awake. Rescue 270 mL 11    ALBUTEROL SULFATE ORAL 1 vial: EVERY 6 HOURS WHILE AWAKE      furosemide (LASIX) 20 MG tablet Take 1 tablet (20 mg total) by mouth once daily. 90 tablet 0    lidocaine (LIDODERM) 5 % Place 1 patch onto the skin every 24 hours as needed. Code: R06.00 -collapsed lung, hepatocellular carcinoma c22.0 30 patch 4    lisinopril (PRINIVIL,ZESTRIL) 40 MG tablet Take 1 tablet (40 mg total) by mouth once daily. 90 tablet 3    multivitamin (THERAGRAN) per tablet Take 1 tablet by mouth once daily.      [START ON 1/6/2021] oxyCODONE-acetaminophen (PERCOCET)  mg per tablet Take 1 tablet by mouth every 8 (eight) hours as needed for Pain. 90 tablet 0    OXYGEN-AIR DELIVERY SYSTEMS MISC 2 liters      triamcinolone acetonide 0.1% (KENALOG) 0.1 % cream Apply topically 2 (two) times daily. 80 g 3    [START ON 12/6/2020] oxyCODONE-acetaminophen (PERCOCET)  mg per tablet Take 1 tablet by mouth every 8 (eight) hours as needed for Pain. (Patient not taking: Reported on 11/13/2020) 90 tablet 0     No current facility-administered medications for this visit.          Objective:      Physical Exam      ?    Vitals:    11/13/20 0940   BP: (!) 175/82   Pulse: 63   Resp: 18   Temp: 98.5 °F (36.9 °C)      ?   ECOG:?2  General appearance: Generally well appearing, in no acute distress, no supplemental oxygen use.  Head, eyes, ears, nose, and throat:  moist mucous membranes.    Cardiovascular: Regular rate and rhythm, S1, S2, no audible murmurs.   Respiratory:  Breathing comfortably, clear to auscultation bilaterally, no wheezing or rhonchi  Abdomen:  Nondistended  Extremities: Warm, without notable edema.  Neurologic: Alert and oriented.  Sitting in wheelchair  Skin: No rashes, ecchymoses or petechial lesion.     ?   Laboratory:  ?   No visits with results within 1 Day(s) from this visit.   Latest known visit with results is:   Lab Visit on 11/10/2020   Component Date Value Ref Range Status    WBC 11/10/2020 8.31  3.90 - 12.70 K/uL Final    RBC 11/10/2020 4.46  4.00 - 5.40 M/uL Final    Hemoglobin 11/10/2020 13.8  12.0 - 16.0 g/dL Final    Hematocrit 11/10/2020 43.8  37.0 - 48.5 % Final    MCV 11/10/2020 98  82 - 98 fL Final    MCH 11/10/2020 30.9  27.0 - 31.0 pg Final    MCHC 11/10/2020 31.5* 32.0 - 36.0 g/dL Final    RDW 11/10/2020 14.1  11.5 - 14.5 % Final    Platelets 11/10/2020 255  150 - 350 K/uL Final    MPV 11/10/2020 10.1  9.2 - 12.9 fL Final    Immature Granulocytes 11/10/2020 0.4  0.0 - 0.5 % Final    Gran # (ANC) 11/10/2020 4.6  1.8 - 7.7 K/uL Final    Immature Grans (Abs) 11/10/2020 0.03  0.00 - 0.04 K/uL Final    Lymph # 11/10/2020 2.3  1.0 - 4.8 K/uL Final    Mono # 11/10/2020 0.9  0.3 - 1.0 K/uL Final    Eos # 11/10/2020 0.4  0.0 - 0.5 K/uL Final    Baso # 11/10/2020 0.07  0.00 - 0.20 K/uL Final    nRBC 11/10/2020 0  0 /100 WBC Final    Gran % 11/10/2020 54.9  38.0 - 73.0 % Final    Lymph % 11/10/2020 28.0  18.0 - 48.0 % Final    Mono % 11/10/2020 10.8  4.0 - 15.0 % Final    Eosinophil % 11/10/2020 5.1  0.0 - 8.0 % Final    Basophil % 11/10/2020 0.8  0.0 - 1.9 % Final     Differential Method 11/10/2020 Automated   Final    Sodium 11/10/2020 140  136 - 145 mmol/L Final    Potassium 11/10/2020 4.2  3.5 - 5.1 mmol/L Final    Chloride 11/10/2020 105  95 - 110 mmol/L Final    CO2 11/10/2020 26  23 - 29 mmol/L Final    Glucose 11/10/2020 87  70 - 110 mg/dL Final    BUN 11/10/2020 16  8 - 23 mg/dL Final    Creatinine 11/10/2020 0.9  0.5 - 1.4 mg/dL Final    Calcium 11/10/2020 9.4  8.7 - 10.5 mg/dL Final    Total Protein 11/10/2020 7.7  6.0 - 8.4 g/dL Final    Albumin 11/10/2020 3.8  3.5 - 5.2 g/dL Final    Total Bilirubin 11/10/2020 0.4  0.1 - 1.0 mg/dL Final    Alkaline Phosphatase 11/10/2020 51* 55 - 135 U/L Final    AST 11/10/2020 26  10 - 40 U/L Final    ALT 11/10/2020 28  10 - 44 U/L Final    Anion Gap 11/10/2020 9  8 - 16 mmol/L Final    eGFR if African American 11/10/2020 >60  >60 mL/min/1.73 m^2 Final    eGFR if non African American 11/10/2020 >60  >60 mL/min/1.73 m^2 Final    TSH 11/10/2020 0.620  0.400 - 4.000 uIU/mL Final      ?   Tumor markers   AFP   Date Value Ref Range Status   02/21/2020 1.8 0.0 - 8.4 ng/mL Final       ?   Imaging:  ?  Results for orders placed or performed during the hospital encounter of 10/14/20 (from the past 2160 hour(s))   CT Chest Abdomen Pelvis W W/O Contrast (XPD)    Impression    Mixed response to treatment as compared to the prior.    Interval increase in size of lymph nodes within the right mesentery and left inguinal regions.    Interval decrease in size portacaval and perigastric nodes.    Innumerable hypodense lesions again identified throughout both hepatic lobes, interval decrease in size of index hepatic lesions as detailed in the body of the report.    Unchanged axillary and mediastinal/hilar nodes.    Additional findings as detailed in the body of the report.    All CT scans at this facility use dose modulation, iterative reconstruction, and/or weight based dosing when appropriate to reduce radiation dose to as low as  reasonable achievable.      Electronically signed by: Kendall Jansen  Date:    10/14/2020  Time:    15:48           Pathology:        02/06/2020 liver biopsy  LIVER, BIOPSY:   Consistent with hepatocellular carcinoma, moderate to poorly differentiated     The biopsy shows nests of tumor cells with deeply eosinophilic granular   cytoplasm. Immunostains performed shows results as:   HepPar - Focal granular positive   CK7 and TTF-1 - Negative    ?   Assessment/Plan:       1. Secondary malignant neoplasm of bone    2. HCC (hepatocellular carcinoma)          Plan:     # metastatic hepatocellular carcinoma:  initiated first-line palliative immunotherapy with nivolumab 03/02/2020.  She is tolerating therapy very well with good response to therapy.  Second set of restaging scans October 2020 showing some mixed response and some decrease in index liver lesion noted; as she is tolerating therapy well with stabilization of her disease recommend continuing on current regimen.   - nivolumab q.4 weeks, labs within normal limits today okay to proceed with treatment.    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  Have listed as allergy and removed from her treatment plan.  Discussed risks and benefits bone protective agents unclear if she would tolerate zoledronic acid better but hesitant to proceed with other medications at this time.  Will proceed with bone protection calcium vitamin-D alone.    # malignancy associated pain:  Follow with palliative care with changed from hydrocodone to Percocet higher dose better pain control.  No long-acting pain medication and may revisit this possibility pending pain control.  Encouraged continued following with palliative care.    # Diarrhea:  Responded well to Imodium and Lomotil. None currently (recent episode of constipation.) I did discuss potential side effect colitis from  immunotherapy and will be important to monitor going forward as we pr for oceed with therapy.    # oxygen dependence, functional status:  Improved oxygenation with improved shortness of breath with treatment attributed to response.  Not using supplemental oxygen at rest currently.  Follow-up with pulmonology p.r.n..    # CHF, hypertension, type 2 diabetes, aortic atherosclerosis, morbid obesity, former smoker:  Elevated blood pressure, recommend follow-up with primary care.  Comorbidities recommend continued follow-up with primary care.      Follow-Up:     Patient Instructions   Immunotherapy today  Revisit in 1 month with labs and immunotherapy planned

## 2020-11-30 ENCOUNTER — EXTERNAL CHRONIC CARE MANAGEMENT (OUTPATIENT)
Dept: PRIMARY CARE CLINIC | Facility: CLINIC | Age: 80
End: 2020-11-30
Payer: MEDICARE

## 2020-11-30 PROCEDURE — 99490 CHRNC CARE MGMT STAFF 1ST 20: CPT | Mod: PBBFAC,PN | Performed by: FAMILY MEDICINE

## 2020-11-30 PROCEDURE — 99490 PR CHRONIC CARE MGMT, 1ST 20 MIN: ICD-10-PCS | Mod: S$PBB,,, | Performed by: FAMILY MEDICINE

## 2020-11-30 PROCEDURE — 99490 CHRNC CARE MGMT STAFF 1ST 20: CPT | Mod: S$PBB,,, | Performed by: FAMILY MEDICINE

## 2020-12-04 ENCOUNTER — PATIENT MESSAGE (OUTPATIENT)
Dept: PALLIATIVE MEDICINE | Facility: CLINIC | Age: 80
End: 2020-12-04

## 2020-12-04 ENCOUNTER — DOCUMENTATION ONLY (OUTPATIENT)
Dept: PALLIATIVE MEDICINE | Facility: CLINIC | Age: 80
End: 2020-12-04

## 2020-12-04 DIAGNOSIS — G89.3 CANCER ASSOCIATED PAIN: ICD-10-CM

## 2020-12-04 RX ORDER — OXYCODONE AND ACETAMINOPHEN 10; 325 MG/1; MG/1
1 TABLET ORAL EVERY 8 HOURS PRN
Qty: 90 TABLET | Refills: 0 | Status: CANCELLED | OUTPATIENT
Start: 2020-12-06

## 2020-12-04 RX ORDER — OXYCODONE AND ACETAMINOPHEN 10; 325 MG/1; MG/1
1 TABLET ORAL EVERY 8 HOURS PRN
Qty: 90 TABLET | Refills: 0 | Status: SHIPPED | OUTPATIENT
Start: 2020-12-06 | End: 2020-12-09 | Stop reason: SDUPTHER

## 2020-12-04 NOTE — TELEPHONE ENCOUNTER
palliative care note;  Nurse called pt's daughter regarding a portal messege sent regarding her pain meds not getting refill. Nurse explained to pt's daughter dr villareal has refill her mother's pain medication. Pt's daughter had not confirmed the refill with the [facility]. Nurse informed Dr Villareal, pharmacy stated pt will have to wait until Tuesday for approval of refill. pts daughter stated she will pay for the meds needed until insurance approves. Dr Villareal requested a visit video. To discuss medication therapy. Pt's daughter was made aware of apt, it was scheduled according to time works best for her.

## 2020-12-09 ENCOUNTER — OFFICE VISIT (OUTPATIENT)
Dept: PALLIATIVE MEDICINE | Facility: CLINIC | Age: 80
End: 2020-12-09
Payer: MEDICARE

## 2020-12-09 DIAGNOSIS — G89.3 CANCER ASSOCIATED PAIN: ICD-10-CM

## 2020-12-09 PROCEDURE — 99215 OFFICE O/P EST HI 40 MIN: CPT | Mod: 95,,, | Performed by: FAMILY MEDICINE

## 2020-12-09 PROCEDURE — 99215 PR OFFICE/OUTPT VISIT, EST, LEVL V, 40-54 MIN: ICD-10-PCS | Mod: 95,,, | Performed by: FAMILY MEDICINE

## 2020-12-09 RX ORDER — OXYCODONE AND ACETAMINOPHEN 10; 325 MG/1; MG/1
1 TABLET ORAL 3 TIMES DAILY PRN
Qty: 90 TABLET | Refills: 0 | Status: SHIPPED | OUTPATIENT
Start: 2020-12-09 | End: 2021-01-24 | Stop reason: SDUPTHER

## 2020-12-09 RX ORDER — OXYCODONE AND ACETAMINOPHEN 10; 325 MG/1; MG/1
1 TABLET ORAL 3 TIMES DAILY PRN
Qty: 90 TABLET | Refills: 0 | Status: SHIPPED | OUTPATIENT
Start: 2021-02-08 | End: 2021-02-05 | Stop reason: SDUPTHER

## 2020-12-09 RX ORDER — OXYCODONE AND ACETAMINOPHEN 10; 325 MG/1; MG/1
1 TABLET ORAL 3 TIMES DAILY PRN
Qty: 90 TABLET | Refills: 0 | Status: SHIPPED | OUTPATIENT
Start: 2021-01-09 | End: 2021-01-24 | Stop reason: SDUPTHER

## 2020-12-09 NOTE — PROGRESS NOTES
"Subjective:       Patient ID: Maura Singh is a 80 y.o. female.    Chief Complaint: palliative f/u    The patient location is: [place]  The chief complaint leading to consultation is: pain f/u    Visit type: audiovisual    Face to Face time with patient: 45  50 minutes of total time spent on the encounter, which includes face to face time and non-face to face time preparing to see the patient (eg, review of tests), Obtaining and/or reviewing separately obtained history, Documenting clinical information in the electronic or other health record, Independently interpreting results (not separately reported) and communicating results to the patient/family/caregiver, or Care coordination (not separately reported).         Each patient to whom he or she provides medical services by telemedicine is:  (1) informed of the relationship between the physician and patient and the respective role of any other health care provider with respect to management of the patient; and (2) notified that he or she may decline to receive medical services by telemedicine and may withdraw from such care at any time.    Notes:   79 yo female with HCC on immunotherapy seen for palliative f/u to discuss her pain regimen. She is joined by her daughter who contributes to the history. She has been doing well on oxycodone 10/325 TID but recently requested a refill too early; I was concerned that she did not have adequate pain control on this regimen and requested the visit. Her daughter reports that her mother did run out a little early but she is not sure why; concerned that she took an extra pill here and there due to not remembering if she'd already taken it. I discussed the possibility of going to a long-acting medication to avoid frequent dosing. Ms. Singh reports that she has not had constipation on this regimen and is worried about a long-acting formulation making her feel "dopey" for too long. In general, the consensus is that she does not " want to change her regimen. We did discuss starting a pill box with a dose set out for TID every day of the week and all agree that this is a good plan.     does not have any pertinent problems on file.  Past Medical History:   Diagnosis Date    Arthritis     Deaf     Diabetes mellitus     Hypertension     Lung disease     copd     Past Surgical History:   Procedure Laterality Date    APPENDECTOMY      CATARACT EXTRACTION      MIDDLE EAR SURGERY       Family History   Problem Relation Age of Onset    Colon cancer Mother     ALS Father     Retinal detachment Son      Social History     Socioeconomic History    Marital status:      Spouse name: Not on file    Number of children: 5    Years of education: Not on file    Highest education level: Not on file   Occupational History    Occupation: homemaker   Social Needs    Financial resource strain: Very hard    Food insecurity     Worry: Often true     Inability: Often true    Transportation needs     Medical: Yes     Non-medical: Patient refused   Tobacco Use    Smoking status: Former Smoker     Packs/day: 0.50     Years: 48.00     Pack years: 24.00     Types: Cigarettes     Start date:      Quit date:      Years since quittin.9    Smokeless tobacco: Never Used   Substance and Sexual Activity    Alcohol use: No     Frequency: Never     Drinks per session: Patient refused     Binge frequency: Patient refused    Drug use: No    Sexual activity: Never   Lifestyle    Physical activity     Days per week: 0 days     Minutes per session: 0 min    Stress: Only a little   Relationships    Social connections     Talks on phone: More than three times a week     Gets together: Twice a week     Attends Jewish service: Not on file     Active member of club or organization: No     Attends meetings of clubs or organizations: Never     Relationship status:    Other Topics Concern    Not on file   Social History Narrative    Not  on file     Review of Systems    Objective:   There were no vitals filed for this visit.     Physical Exam  Constitutional:       General: She is not in acute distress.     Appearance: Normal appearance. She is well-developed. She is not ill-appearing.   HENT:      Head: Normocephalic and atraumatic.      Nose: No congestion or rhinorrhea.   Eyes:      General: No scleral icterus.     Extraocular Movements: Extraocular movements intact.   Neck:      Musculoskeletal: Normal range of motion and neck supple.   Pulmonary:      Effort: Pulmonary effort is normal. No respiratory distress.   Musculoskeletal: Normal range of motion.         General: No signs of injury.   Skin:     General: Skin is dry.      Findings: No rash.   Neurological:      Mental Status: She is alert and oriented to person, place, and time. Mental status is at baseline.   Psychiatric:         Behavior: Behavior normal.         Thought Content: Thought content normal.         Review of Symptoms    Symptom Assessment (ESAS 0-10 Scale)  Pain:  2  Dyspnea:  2  Anxiety:  0  Nausea:  0  Depression:  0  Anorexia:  0  Fatigue:  2  Insomnia:  0  Restlessness:  0  Agitation:  0     CAM / Delirium:  Negative  Constipation:  Negative  Diarrhea:  Negative          ECOG Performance Status Grade:  1 - Ambulates, capable of light work    Living Arrangements:  Lives alone         Assessment:       1. Cancer associated pain        Plan:           Problem List Items Addressed This Visit     None      Visit Diagnoses     Cancer associated pain        Relevant Medications    oxyCODONE-acetaminophen (PERCOCET)  mg per tablet    oxyCODONE-acetaminophen (PERCOCET)  mg per tablet (Start on 1/9/2021)    oxyCODONE-acetaminophen (PERCOCET)  mg per tablet (Start on 2/8/2021)        Will continue on current regimen per patient/family preference and see back in 3 months. Starting home pill box to avoid overuse.       Complexity of decision making HIGH based on  severity of patients illnesss, advanced age, chronic medical conditions. High risk medication use requires careful dosing and monitoring due to risk of serious side effects.   Hoa Villareal MD  > 50% of   45   min visit spent in chart review, face to face discussion of goals of care, symptom assessment, coordination of care and emotional support

## 2020-12-11 ENCOUNTER — PATIENT MESSAGE (OUTPATIENT)
Dept: HEMATOLOGY/ONCOLOGY | Facility: CLINIC | Age: 80
End: 2020-12-11

## 2020-12-14 ENCOUNTER — INFUSION (OUTPATIENT)
Dept: INFUSION THERAPY | Facility: HOSPITAL | Age: 80
End: 2020-12-14
Payer: MEDICARE

## 2020-12-14 ENCOUNTER — OFFICE VISIT (OUTPATIENT)
Dept: HEMATOLOGY/ONCOLOGY | Facility: CLINIC | Age: 80
End: 2020-12-14
Payer: MEDICARE

## 2020-12-14 VITALS
BODY MASS INDEX: 37.6 KG/M2 | WEIGHT: 186.5 LBS | OXYGEN SATURATION: 96 % | TEMPERATURE: 98 F | RESPIRATION RATE: 14 BRPM | SYSTOLIC BLOOD PRESSURE: 167 MMHG | WEIGHT: 186.5 LBS | BODY MASS INDEX: 37.6 KG/M2 | OXYGEN SATURATION: 99 % | HEIGHT: 59 IN | TEMPERATURE: 98 F | RESPIRATION RATE: 18 BRPM | HEART RATE: 56 BPM | DIASTOLIC BLOOD PRESSURE: 79 MMHG | HEIGHT: 59 IN | HEART RATE: 51 BPM | DIASTOLIC BLOOD PRESSURE: 81 MMHG | SYSTOLIC BLOOD PRESSURE: 148 MMHG

## 2020-12-14 DIAGNOSIS — C79.89 SECONDARY MALIGNANT NEOPLASM OF OTHER SPECIFIED SITES: ICD-10-CM

## 2020-12-14 DIAGNOSIS — C79.51 SECONDARY MALIGNANT NEOPLASM OF BONE: ICD-10-CM

## 2020-12-14 DIAGNOSIS — C22.0 HEPATOCELLULAR CARCINOMA: Primary | ICD-10-CM

## 2020-12-14 DIAGNOSIS — Z29.89 ENCOUNTER FOR IMMUNOTHERAPY: ICD-10-CM

## 2020-12-14 PROCEDURE — 99999 PR PBB SHADOW E&M-EST. PATIENT-LVL IV: CPT | Mod: PBBFAC,,, | Performed by: NURSE PRACTITIONER

## 2020-12-14 PROCEDURE — 99999 PR PBB SHADOW E&M-EST. PATIENT-LVL IV: ICD-10-PCS | Mod: PBBFAC,,, | Performed by: NURSE PRACTITIONER

## 2020-12-14 PROCEDURE — 99215 PR OFFICE/OUTPT VISIT, EST, LEVL V, 40-54 MIN: ICD-10-PCS | Mod: S$PBB,,, | Performed by: NURSE PRACTITIONER

## 2020-12-14 PROCEDURE — 99215 OFFICE O/P EST HI 40 MIN: CPT | Mod: S$PBB,,, | Performed by: NURSE PRACTITIONER

## 2020-12-14 PROCEDURE — 25000003 PHARM REV CODE 250: Performed by: INTERNAL MEDICINE

## 2020-12-14 PROCEDURE — 99214 OFFICE O/P EST MOD 30 MIN: CPT | Mod: PBBFAC | Performed by: NURSE PRACTITIONER

## 2020-12-14 PROCEDURE — 63600175 PHARM REV CODE 636 W HCPCS: Mod: JG | Performed by: INTERNAL MEDICINE

## 2020-12-14 PROCEDURE — 96413 CHEMO IV INFUSION 1 HR: CPT

## 2020-12-14 RX ORDER — SODIUM CHLORIDE 0.9 % (FLUSH) 0.9 %
10 SYRINGE (ML) INJECTION
Status: CANCELLED | OUTPATIENT
Start: 2020-12-14

## 2020-12-14 RX ORDER — HEPARIN 100 UNIT/ML
500 SYRINGE INTRAVENOUS
Status: CANCELLED | OUTPATIENT
Start: 2020-12-14

## 2020-12-14 RX ADMIN — SODIUM CHLORIDE 480 MG: 9 INJECTION, SOLUTION INTRAVENOUS at 11:12

## 2020-12-14 NOTE — PROGRESS NOTES
Subjective:      Patient ID: Maura Singh is a 80 y.o. female.    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  Was started on Opdivo 480 mg in 2020.  Was previously given Xgeva but had a reaction to the medications so this was stopped.  She continues taking calcium and vitamin D as prescribed.  Last received Opdivo on 2020.  She is accompanied by her daughter.  She is on chronic O2 that she states she uses with exertional activities and at night during sleep.  She reports oxygen sats of 92-95% on room air while at rest.  She denies diarrhea, rashes or increased difficulty of breathing.  She is hard of hearing.     Social History     Socioeconomic History    Marital status:      Spouse name: Not on file    Number of children: 5    Years of education: Not on file    Highest education level: Not on file   Occupational History    Occupation: homemaker   Social Needs    Financial resource strain: Very hard    Food insecurity     Worry: Often true     Inability: Often true    Transportation needs     Medical: Yes     Non-medical: Patient refused   Tobacco Use    Smoking status: Former Smoker     Packs/day: 0.50     Years: 48.00     Pack years: 24.00     Types: Cigarettes     Start date:      Quit date:      Years since quittin.9    Smokeless tobacco: Never Used   Substance and Sexual Activity    Alcohol use: No     Frequency: Never     Drinks per session: Patient refused     Binge frequency: Patient refused    Drug use: No    Sexual activity: Never   Lifestyle    Physical activity     Days per week: 0 days     Minutes per session: 0 min    Stress: Only a little   Relationships    Social connections     Talks on phone: More than three times a week      Gets together: Twice a week     Attends Cheondoism service: Not on file     Active member of club or organization: No     Attends meetings of clubs or organizations: Never     Relationship status:    Other Topics Concern    Not on file   Social History Narrative    Not on file       Family History   Problem Relation Age of Onset    Colon cancer Mother     ALS Father     Retinal detachment Son        Past Surgical History:   Procedure Laterality Date    APPENDECTOMY      CATARACT EXTRACTION      MIDDLE EAR SURGERY         Past Medical History:   Diagnosis Date    Arthritis     Deaf     Diabetes mellitus     Hypertension     Lung disease     copd       Review of Systems   Constitutional: Negative.    HENT: Positive for hearing loss.    Eyes: Negative.    Respiratory: Positive for shortness of breath (with exertion).    Cardiovascular: Negative.    Gastrointestinal: Negative.    Endocrine: Negative.    Genitourinary: Negative.    Musculoskeletal: Positive for back pain, gait problem and myalgias.   Skin: Negative.    Allergic/Immunologic: Negative.    Hematological: Negative.    Psychiatric/Behavioral: Negative.           Medication List with Changes/Refills   Current Medications    ALBUTEROL (PROVENTIL) 2.5 MG /3 ML (0.083 %) NEBULIZER SOLUTION    Take 3 mLs (2.5 mg total) by nebulization every 6 (six) hours while awake. Rescue    ALBUTEROL SULFATE ORAL    1 vial: EVERY 6 HOURS WHILE AWAKE    FUROSEMIDE (LASIX) 20 MG TABLET    Take 1 tablet (20 mg total) by mouth once daily.    LIDOCAINE (LIDODERM) 5 %    Place 1 patch onto the skin every 24 hours as needed. Code: R06.00 -collapsed lung, hepatocellular carcinoma c22.0    LISINOPRIL (PRINIVIL,ZESTRIL) 40 MG TABLET    Take 1 tablet (40 mg total) by mouth once daily.    MULTIVITAMIN (THERAGRAN) PER TABLET    Take 1 tablet by mouth once daily.    OXYCODONE-ACETAMINOPHEN (PERCOCET)  MG PER TABLET    Take 1 tablet by mouth 3 (three) times daily as  needed for Pain.    OXYCODONE-ACETAMINOPHEN (PERCOCET)  MG PER TABLET    Take 1 tablet by mouth 3 (three) times daily as needed for Pain.    OXYCODONE-ACETAMINOPHEN (PERCOCET)  MG PER TABLET    Take 1 tablet by mouth 3 (three) times daily as needed for Pain.    OXYGEN-AIR DELIVERY SYSTEMS MISC    2 liters    TRIAMCINOLONE ACETONIDE 0.1% (KENALOG) 0.1 % CREAM    Apply topically 2 (two) times daily.        Objective:     Vitals:    12/14/20 1112   BP: (!) 148/81   Pulse: (!) 51   Resp:    Temp:        Physical Exam  Vitals signs reviewed.   Constitutional:       Appearance: Normal appearance.   HENT:      Head: Normocephalic and atraumatic.   Eyes:      Extraocular Movements: Extraocular movements intact.   Cardiovascular:      Rate and Rhythm: Normal rate and regular rhythm.      Heart sounds: Normal heart sounds, S1 normal and S2 normal.   Pulmonary:      Effort: Pulmonary effort is normal.      Breath sounds: Examination of the right-upper field reveals decreased breath sounds. Examination of the left-upper field reveals decreased breath sounds. Decreased breath sounds present.   Abdominal:      General: There is no distension.   Musculoskeletal: Normal range of motion.   Skin:     General: Skin is warm and dry.      Coloration: Skin is pale.   Neurological:      General: No focal deficit present.      Mental Status: She is alert and oriented to person, place, and time.   Psychiatric:         Attention and Perception: Attention and perception normal.         Mood and Affect: Mood normal.         Speech: Speech normal.         Behavior: Behavior normal.         Thought Content: Thought content normal.         Cognition and Memory: Cognition normal.         Judgment: Judgment normal.         Assessment:     Problem List Items Addressed This Visit        Oncology    Hepatocellular carcinoma - Primary    Secondary malignant neoplasm of bone      Other Visit Diagnoses     Encounter for immunotherapy         Secondary malignant neoplasm of other specified sites             Lab Results   Component Value Date    WBC 7.93 12/14/2020    HGB 14.0 12/14/2020    HCT 43.9 12/14/2020    MCV 96 12/14/2020     12/14/2020       BMP  Lab Results   Component Value Date     12/14/2020    K 4.3 12/14/2020     12/14/2020    CO2 29 12/14/2020    BUN 17 12/14/2020    CREATININE 1.0 12/14/2020    CALCIUM 9.4 12/14/2020    ANIONGAP 8 12/14/2020    ESTGFRAFRICA >60 12/14/2020    EGFRNONAA 53 (A) 12/14/2020     Lab Results   Component Value Date    ALT 37 12/14/2020    AST 32 12/14/2020    ALKPHOS 62 12/14/2020    BILITOT 0.5 12/14/2020     Lab Results   Component Value Date    TSH 0.620 11/10/2020       Plan:   Hepatocellular carcinoma    Secondary malignant neoplasm of bone    Encounter for immunotherapy    Secondary malignant neoplasm of other specified sites      Labs look good and patient is tolerating immunotherapy well.  Proceed with day 1 cycle 12 Opdivo.  Follow-up in 4 weeks with CBC, CMP, TSH see Dr. Burgess - D1C13 Opdivo.     Collaborating Provider: Dr. Ankur Henderson    Thank You,  Nate Vaughn, KE-C

## 2020-12-14 NOTE — PLAN OF CARE
Cancer Center Progress Note  Patient Name: Jessika Crispin   YOB: 1952   Medical Record Number: [id number]   Attending Physician: [facility]  Co-Medical Director, Supriya [facility] Patient states she feels ok today.    and Solucortef was added to the premedications.       History of Present Illness   Artist Som is a 76year old male with metastatic colorectal cancer, as above, who presents for follow up and Cycle 2 of Opdivo being used on compassionate use basis.  He DAY, Disp: 60 tablet, Rfl: 2  •  Rivaroxaban (XARELTO) 20 MG Oral Tab, Take 1 tablet (20 mg total) by mouth daily with food. , Disp: 26 tablet, Rfl: 11  •  Magnesium 100 MG Oral Tab, Take 400 mg by mouth daily. , Disp: , Rfl:     Allergies:    Oxaliplatin deficits, normal cerebellar function, normal gait, cranial nerves intact. Psychiatric Normal - A&Ox3, Coherent speech. Verbalizes understanding of our discussions today.          Labs:     Recent Labs   01/03/18  0755   RBC 3.37 L   HGB 10.3 L   HCT 31.8 Metastatic colon cancer on Chemotherapy and pulmonary emboli. Waldemar Alvarado M.D.   [facility]  Co-Medical Director, [facility]

## 2020-12-15 ENCOUNTER — TELEPHONE (OUTPATIENT)
Dept: HEMATOLOGY/ONCOLOGY | Facility: CLINIC | Age: 80
End: 2020-12-15

## 2020-12-15 DIAGNOSIS — E03.9 HYPOTHYROIDISM (ACQUIRED): Primary | ICD-10-CM

## 2020-12-15 RX ORDER — LEVOTHYROXINE SODIUM 25 UG/1
25 TABLET ORAL
Qty: 30 TABLET | Refills: 11 | Status: SHIPPED | OUTPATIENT
Start: 2020-12-15 | End: 2021-01-11 | Stop reason: SDUPTHER

## 2020-12-15 NOTE — TELEPHONE ENCOUNTER
----- Message from Gill Burgess MD sent at 12/15/2020  8:05 AM CST -----  Please let her know that I am reviewing her labs from yesterday when I was out and see that her thyroid function is a bit low.  This can be a side effect of the immunotherapy she is on.  I would like her to take a small thyroid supplement starting a low-dose, we will recheck with labs in 4 weeks and consider uptitration if needed.  I have sent to her local pharmacy.  Let me know if they have any questions on this.

## 2020-12-31 ENCOUNTER — EXTERNAL CHRONIC CARE MANAGEMENT (OUTPATIENT)
Dept: PRIMARY CARE CLINIC | Facility: CLINIC | Age: 80
End: 2020-12-31
Payer: MEDICARE

## 2020-12-31 PROCEDURE — 99490 CHRNC CARE MGMT STAFF 1ST 20: CPT | Mod: PBBFAC,PN | Performed by: FAMILY MEDICINE

## 2020-12-31 PROCEDURE — 99490 CHRNC CARE MGMT STAFF 1ST 20: CPT | Mod: S$PBB,,, | Performed by: FAMILY MEDICINE

## 2020-12-31 PROCEDURE — 99490 PR CHRONIC CARE MGMT, 1ST 20 MIN: ICD-10-PCS | Mod: S$PBB,,, | Performed by: FAMILY MEDICINE

## 2021-01-06 ENCOUNTER — PATIENT MESSAGE (OUTPATIENT)
Dept: PALLIATIVE MEDICINE | Facility: CLINIC | Age: 81
End: 2021-01-06

## 2021-01-06 ENCOUNTER — PATIENT MESSAGE (OUTPATIENT)
Dept: HEMATOLOGY/ONCOLOGY | Facility: CLINIC | Age: 81
End: 2021-01-06

## 2021-01-11 ENCOUNTER — INFUSION (OUTPATIENT)
Dept: INFUSION THERAPY | Facility: HOSPITAL | Age: 81
End: 2021-01-11
Attending: INTERNAL MEDICINE
Payer: MEDICARE

## 2021-01-11 ENCOUNTER — IMMUNIZATION (OUTPATIENT)
Dept: INTERNAL MEDICINE | Facility: CLINIC | Age: 81
End: 2021-01-11
Payer: MEDICARE

## 2021-01-11 ENCOUNTER — OFFICE VISIT (OUTPATIENT)
Dept: HEMATOLOGY/ONCOLOGY | Facility: CLINIC | Age: 81
End: 2021-01-11
Payer: MEDICARE

## 2021-01-11 VITALS
OXYGEN SATURATION: 93 % | DIASTOLIC BLOOD PRESSURE: 72 MMHG | HEIGHT: 59 IN | HEART RATE: 54 BPM | WEIGHT: 182.31 LBS | BODY MASS INDEX: 36.75 KG/M2 | SYSTOLIC BLOOD PRESSURE: 150 MMHG | TEMPERATURE: 98 F

## 2021-01-11 VITALS
OXYGEN SATURATION: 94 % | TEMPERATURE: 98 F | SYSTOLIC BLOOD PRESSURE: 135 MMHG | WEIGHT: 182.31 LBS | HEIGHT: 59 IN | HEART RATE: 57 BPM | BODY MASS INDEX: 36.75 KG/M2 | DIASTOLIC BLOOD PRESSURE: 71 MMHG | RESPIRATION RATE: 16 BRPM

## 2021-01-11 DIAGNOSIS — R59.0 LYMPHADENOPATHY OF RIGHT CERVICAL REGION: ICD-10-CM

## 2021-01-11 DIAGNOSIS — C22.0 HCC (HEPATOCELLULAR CARCINOMA): Primary | ICD-10-CM

## 2021-01-11 DIAGNOSIS — C22.0 HEPATOCELLULAR CARCINOMA: Primary | ICD-10-CM

## 2021-01-11 DIAGNOSIS — H92.01 RIGHT EAR PAIN: ICD-10-CM

## 2021-01-11 DIAGNOSIS — Z23 NEED FOR VACCINATION: ICD-10-CM

## 2021-01-11 DIAGNOSIS — E03.9 HYPOTHYROIDISM (ACQUIRED): ICD-10-CM

## 2021-01-11 DIAGNOSIS — C79.51 SECONDARY MALIGNANT NEOPLASM OF BONE: ICD-10-CM

## 2021-01-11 PROCEDURE — 99999 PR PBB SHADOW E&M-EST. PATIENT-LVL IV: ICD-10-PCS | Mod: PBBFAC,,, | Performed by: INTERNAL MEDICINE

## 2021-01-11 PROCEDURE — 63600175 PHARM REV CODE 636 W HCPCS: Mod: JG | Performed by: INTERNAL MEDICINE

## 2021-01-11 PROCEDURE — 25000003 PHARM REV CODE 250: Performed by: INTERNAL MEDICINE

## 2021-01-11 PROCEDURE — 99214 OFFICE O/P EST MOD 30 MIN: CPT | Mod: PBBFAC,25 | Performed by: INTERNAL MEDICINE

## 2021-01-11 PROCEDURE — 99215 OFFICE O/P EST HI 40 MIN: CPT | Mod: S$PBB,,, | Performed by: INTERNAL MEDICINE

## 2021-01-11 PROCEDURE — 91300 COVID-19, MRNA, LNP-S, PF, 30 MCG/0.3 ML DOSE VACCINE: CPT | Mod: PBBFAC | Performed by: FAMILY MEDICINE

## 2021-01-11 PROCEDURE — 99999 PR PBB SHADOW E&M-EST. PATIENT-LVL IV: CPT | Mod: PBBFAC,,, | Performed by: INTERNAL MEDICINE

## 2021-01-11 PROCEDURE — 96413 CHEMO IV INFUSION 1 HR: CPT

## 2021-01-11 PROCEDURE — 99215 PR OFFICE/OUTPT VISIT, EST, LEVL V, 40-54 MIN: ICD-10-PCS | Mod: S$PBB,,, | Performed by: INTERNAL MEDICINE

## 2021-01-11 RX ORDER — LEVOTHYROXINE SODIUM 50 UG/1
50 CAPSULE ORAL
Qty: 30 TABLET | Refills: 1 | Status: SHIPPED | OUTPATIENT
Start: 2021-01-11 | End: 2021-02-25 | Stop reason: SDUPTHER

## 2021-01-11 RX ORDER — HEPARIN 100 UNIT/ML
500 SYRINGE INTRAVENOUS
Status: CANCELLED | OUTPATIENT
Start: 2021-01-14

## 2021-01-11 RX ORDER — LEVOTHYROXINE SODIUM 25 UG/1
25 TABLET ORAL
Qty: 30 TABLET | Refills: 11 | Status: SHIPPED | OUTPATIENT
Start: 2021-01-11 | End: 2021-01-11

## 2021-01-11 RX ORDER — SODIUM CHLORIDE 0.9 % (FLUSH) 0.9 %
10 SYRINGE (ML) INJECTION
Status: CANCELLED | OUTPATIENT
Start: 2021-01-14

## 2021-01-11 RX ORDER — SODIUM CHLORIDE 0.9 % (FLUSH) 0.9 %
10 SYRINGE (ML) INJECTION
Status: DISCONTINUED | OUTPATIENT
Start: 2021-01-11 | End: 2021-01-11 | Stop reason: HOSPADM

## 2021-01-11 RX ADMIN — SODIUM CHLORIDE 480 MG: 9 INJECTION, SOLUTION INTRAVENOUS at 11:01

## 2021-01-13 ENCOUNTER — TELEPHONE (OUTPATIENT)
Dept: HEMATOLOGY/ONCOLOGY | Facility: CLINIC | Age: 81
End: 2021-01-13

## 2021-01-14 ENCOUNTER — TELEPHONE (OUTPATIENT)
Dept: PHARMACY | Facility: CLINIC | Age: 81
End: 2021-01-14

## 2021-01-19 ENCOUNTER — TELEPHONE (OUTPATIENT)
Dept: RADIOLOGY | Facility: HOSPITAL | Age: 81
End: 2021-01-19

## 2021-01-20 ENCOUNTER — HOSPITAL ENCOUNTER (OUTPATIENT)
Dept: RADIOLOGY | Facility: HOSPITAL | Age: 81
Discharge: HOME | End: 2021-01-20
Attending: INTERNAL MEDICINE
Payer: MEDICARE

## 2021-01-20 DIAGNOSIS — C22.0 HCC (HEPATOCELLULAR CARCINOMA): ICD-10-CM

## 2021-01-20 PROCEDURE — 78815 PET IMAGE W/CT SKULL-THIGH: CPT | Mod: 26,PS,, | Performed by: RADIOLOGY

## 2021-01-20 PROCEDURE — A9552 F18 FDG: HCPCS

## 2021-01-20 PROCEDURE — 78815 PET IMAGE W/CT SKULL-THIGH: CPT | Mod: TC,PS

## 2021-01-20 PROCEDURE — 78815 NM PET CT ROUTINE: ICD-10-PCS | Mod: 26,PS,, | Performed by: RADIOLOGY

## 2021-01-22 ENCOUNTER — OFFICE VISIT (OUTPATIENT)
Dept: HEMATOLOGY/ONCOLOGY | Facility: CLINIC | Age: 81
End: 2021-01-22
Payer: MEDICARE

## 2021-01-22 VITALS
OXYGEN SATURATION: 96 % | HEART RATE: 63 BPM | DIASTOLIC BLOOD PRESSURE: 76 MMHG | BODY MASS INDEX: 36.87 KG/M2 | WEIGHT: 182.56 LBS | SYSTOLIC BLOOD PRESSURE: 165 MMHG

## 2021-01-22 DIAGNOSIS — C79.51 SECONDARY MALIGNANT NEOPLASM OF BONE: ICD-10-CM

## 2021-01-22 DIAGNOSIS — R59.0 LYMPHADENOPATHY OF RIGHT CERVICAL REGION: ICD-10-CM

## 2021-01-22 DIAGNOSIS — C22.0 HCC (HEPATOCELLULAR CARCINOMA): Primary | ICD-10-CM

## 2021-01-22 PROCEDURE — 99999 PR PBB SHADOW E&M-EST. PATIENT-LVL IV: ICD-10-PCS | Mod: PBBFAC,,, | Performed by: INTERNAL MEDICINE

## 2021-01-22 PROCEDURE — 99214 OFFICE O/P EST MOD 30 MIN: CPT | Mod: PBBFAC,PO | Performed by: INTERNAL MEDICINE

## 2021-01-22 PROCEDURE — 99215 PR OFFICE/OUTPT VISIT, EST, LEVL V, 40-54 MIN: ICD-10-PCS | Mod: S$PBB,,, | Performed by: INTERNAL MEDICINE

## 2021-01-22 PROCEDURE — 99999 PR PBB SHADOW E&M-EST. PATIENT-LVL IV: CPT | Mod: PBBFAC,,, | Performed by: INTERNAL MEDICINE

## 2021-01-22 PROCEDURE — 99215 OFFICE O/P EST HI 40 MIN: CPT | Mod: S$PBB,,, | Performed by: INTERNAL MEDICINE

## 2021-01-25 DIAGNOSIS — R59.0 MEDIASTINAL LYMPHADENOPATHY: ICD-10-CM

## 2021-01-25 DIAGNOSIS — R16.0 LIVER MASSES: ICD-10-CM

## 2021-01-25 DIAGNOSIS — C22.0 HCC (HEPATOCELLULAR CARCINOMA): Primary | ICD-10-CM

## 2021-01-25 DIAGNOSIS — R91.8 LUNG NODULES: ICD-10-CM

## 2021-01-25 DIAGNOSIS — E78.2 MIXED HYPERLIPIDEMIA: ICD-10-CM

## 2021-01-25 DIAGNOSIS — I10 ESSENTIAL HYPERTENSION: ICD-10-CM

## 2021-01-25 DIAGNOSIS — J98.11 ATELECTASIS OF RIGHT LUNG: ICD-10-CM

## 2021-01-31 ENCOUNTER — EXTERNAL CHRONIC CARE MANAGEMENT (OUTPATIENT)
Dept: PRIMARY CARE CLINIC | Facility: CLINIC | Age: 81
End: 2021-01-31
Payer: MEDICARE

## 2021-01-31 PROCEDURE — 99490 CHRNC CARE MGMT STAFF 1ST 20: CPT | Mod: S$PBB,,, | Performed by: FAMILY MEDICINE

## 2021-01-31 PROCEDURE — 99490 CHRNC CARE MGMT STAFF 1ST 20: CPT | Mod: PBBFAC,PN | Performed by: FAMILY MEDICINE

## 2021-01-31 PROCEDURE — 99490 PR CHRONIC CARE MGMT, 1ST 20 MIN: ICD-10-PCS | Mod: S$PBB,,, | Performed by: FAMILY MEDICINE

## 2021-02-01 ENCOUNTER — IMMUNIZATION (OUTPATIENT)
Dept: INTERNAL MEDICINE | Facility: CLINIC | Age: 81
End: 2021-02-01
Payer: MEDICARE

## 2021-02-01 DIAGNOSIS — Z23 NEED FOR VACCINATION: Primary | ICD-10-CM

## 2021-02-01 PROCEDURE — 91300 COVID-19, MRNA, LNP-S, PF, 30 MCG/0.3 ML DOSE VACCINE: CPT | Mod: PBBFAC | Performed by: FAMILY MEDICINE

## 2021-02-01 PROCEDURE — 0002A COVID-19, MRNA, LNP-S, PF, 30 MCG/0.3 ML DOSE VACCINE: CPT | Mod: PBBFAC | Performed by: FAMILY MEDICINE

## 2021-02-03 ENCOUNTER — DOCUMENTATION ONLY (OUTPATIENT)
Dept: HEMATOLOGY/ONCOLOGY | Facility: CLINIC | Age: 81
End: 2021-02-03

## 2021-02-04 ENCOUNTER — HOSPITAL ENCOUNTER (OUTPATIENT)
Dept: RADIOLOGY | Facility: HOSPITAL | Age: 81
Discharge: HOME | End: 2021-02-04
Attending: INTERNAL MEDICINE
Payer: MEDICARE

## 2021-02-04 DIAGNOSIS — R91.8 LUNG NODULES: ICD-10-CM

## 2021-02-04 DIAGNOSIS — R16.0 LIVER MASSES: ICD-10-CM

## 2021-02-04 DIAGNOSIS — J98.11 ATELECTASIS OF RIGHT LUNG: ICD-10-CM

## 2021-02-04 DIAGNOSIS — R59.0 MEDIASTINAL LYMPHADENOPATHY: ICD-10-CM

## 2021-02-04 DIAGNOSIS — I10 ESSENTIAL HYPERTENSION: ICD-10-CM

## 2021-02-04 DIAGNOSIS — E78.2 MIXED HYPERLIPIDEMIA: ICD-10-CM

## 2021-02-04 DIAGNOSIS — C22.0 HCC (HEPATOCELLULAR CARCINOMA): ICD-10-CM

## 2021-02-04 PROCEDURE — 88342 IMHCHEM/IMCYTCHM 1ST ANTB: CPT | Mod: 26,,, | Performed by: PATHOLOGY

## 2021-02-04 PROCEDURE — 88305 TISSUE EXAM BY PATHOLOGIST: CPT | Performed by: PATHOLOGY

## 2021-02-04 PROCEDURE — 88342 CHG IMMUNOCYTOCHEMISTRY: ICD-10-PCS | Mod: 26,,, | Performed by: PATHOLOGY

## 2021-02-04 PROCEDURE — 88189 FLOWCYTOMETRY/READ 16 & >: CPT | Mod: ,,, | Performed by: PATHOLOGY

## 2021-02-04 PROCEDURE — 88305 TISSUE EXAM BY PATHOLOGIST: ICD-10-PCS | Mod: 26,,, | Performed by: PATHOLOGY

## 2021-02-04 PROCEDURE — 88342 IMHCHEM/IMCYTCHM 1ST ANTB: CPT | Performed by: PATHOLOGY

## 2021-02-04 PROCEDURE — 88341 IMHCHEM/IMCYTCHM EA ADD ANTB: CPT | Mod: 26,,, | Performed by: PATHOLOGY

## 2021-02-04 PROCEDURE — 88184 FLOWCYTOMETRY/ TC 1 MARKER: CPT | Performed by: PATHOLOGY

## 2021-02-04 PROCEDURE — 88189 PR  FLOWCYTOMETRY/READ, 16 & > MARKERS: ICD-10-PCS | Mod: ,,, | Performed by: PATHOLOGY

## 2021-02-04 PROCEDURE — 88185 FLOWCYTOMETRY/TC ADD-ON: CPT | Mod: 59 | Performed by: PATHOLOGY

## 2021-02-04 PROCEDURE — 88341 PR IHC OR ICC EACH ADD'L SINGLE ANTIBODY  STAINPR: ICD-10-PCS | Mod: 26,,, | Performed by: PATHOLOGY

## 2021-02-04 PROCEDURE — 88341 IMHCHEM/IMCYTCHM EA ADD ANTB: CPT | Performed by: PATHOLOGY

## 2021-02-04 PROCEDURE — 38505 NEEDLE BIOPSY LYMPH NODES: CPT

## 2021-02-04 PROCEDURE — 88305 TISSUE EXAM BY PATHOLOGIST: CPT | Mod: 26,,, | Performed by: PATHOLOGY

## 2021-02-04 RX ORDER — LISINOPRIL 40 MG/1
40 TABLET ORAL DAILY
Qty: 90 TABLET | Refills: 0 | Status: SHIPPED | OUTPATIENT
Start: 2021-02-04 | End: 2021-05-26

## 2021-02-04 RX ORDER — TRIAMCINOLONE ACETONIDE 1 MG/G
CREAM TOPICAL 2 TIMES DAILY
Qty: 80 G | Refills: 0 | Status: SHIPPED | OUTPATIENT
Start: 2021-02-04 | End: 2021-05-21 | Stop reason: SDUPTHER

## 2021-02-05 ENCOUNTER — PATIENT MESSAGE (OUTPATIENT)
Dept: PALLIATIVE MEDICINE | Facility: CLINIC | Age: 81
End: 2021-02-05

## 2021-02-05 DIAGNOSIS — G89.3 CANCER ASSOCIATED PAIN: ICD-10-CM

## 2021-02-05 RX ORDER — OXYCODONE AND ACETAMINOPHEN 10; 325 MG/1; MG/1
1 TABLET ORAL 3 TIMES DAILY PRN
Qty: 90 TABLET | Refills: 0 | Status: SHIPPED | OUTPATIENT
Start: 2021-02-08 | End: 2021-03-02 | Stop reason: SDUPTHER

## 2021-02-08 LAB
FLOW CYTOMETRY ANTIBODIES ANALYZED - LYMPH NODE: NORMAL
FLOW CYTOMETRY COMMENT - LYMPH NODE: NORMAL
FLOW CYTOMETRY INTERPRETATION - LYMPH NODE: NORMAL

## 2021-02-11 LAB
COMMENT: ABNORMAL
FINAL PATHOLOGIC DIAGNOSIS: ABNORMAL
GROSS: ABNORMAL

## 2021-02-12 ENCOUNTER — SPECIALTY PHARMACY (OUTPATIENT)
Dept: PHARMACY | Facility: CLINIC | Age: 81
End: 2021-02-12

## 2021-02-12 ENCOUNTER — DOCUMENTATION ONLY (OUTPATIENT)
Dept: HEMATOLOGY/ONCOLOGY | Facility: CLINIC | Age: 81
End: 2021-02-12

## 2021-02-12 ENCOUNTER — OFFICE VISIT (OUTPATIENT)
Dept: HEMATOLOGY/ONCOLOGY | Facility: CLINIC | Age: 81
End: 2021-02-12
Payer: MEDICARE

## 2021-02-12 VITALS
WEIGHT: 182.31 LBS | SYSTOLIC BLOOD PRESSURE: 180 MMHG | DIASTOLIC BLOOD PRESSURE: 74 MMHG | BODY MASS INDEX: 36.82 KG/M2 | OXYGEN SATURATION: 95 % | HEART RATE: 63 BPM

## 2021-02-12 DIAGNOSIS — I10 ESSENTIAL HYPERTENSION: ICD-10-CM

## 2021-02-12 DIAGNOSIS — C22.0 HCC (HEPATOCELLULAR CARCINOMA): Primary | ICD-10-CM

## 2021-02-12 DIAGNOSIS — E03.9 HYPOTHYROIDISM (ACQUIRED): ICD-10-CM

## 2021-02-12 DIAGNOSIS — C79.51 SECONDARY MALIGNANT NEOPLASM OF BONE: ICD-10-CM

## 2021-02-12 PROCEDURE — 99215 PR OFFICE/OUTPT VISIT, EST, LEVL V, 40-54 MIN: ICD-10-PCS | Mod: S$PBB,,, | Performed by: INTERNAL MEDICINE

## 2021-02-12 PROCEDURE — 99999 PR PBB SHADOW E&M-EST. PATIENT-LVL III: CPT | Mod: PBBFAC,,, | Performed by: INTERNAL MEDICINE

## 2021-02-12 PROCEDURE — 99999 PR PBB SHADOW E&M-EST. PATIENT-LVL III: ICD-10-PCS | Mod: PBBFAC,,, | Performed by: INTERNAL MEDICINE

## 2021-02-12 PROCEDURE — 99215 OFFICE O/P EST HI 40 MIN: CPT | Mod: S$PBB,,, | Performed by: INTERNAL MEDICINE

## 2021-02-12 PROCEDURE — 99213 OFFICE O/P EST LOW 20 MIN: CPT | Mod: PBBFAC,PO | Performed by: INTERNAL MEDICINE

## 2021-02-16 ENCOUNTER — TELEPHONE (OUTPATIENT)
Dept: HEMATOLOGY/ONCOLOGY | Facility: CLINIC | Age: 81
End: 2021-02-16

## 2021-02-23 ENCOUNTER — DOCUMENTATION ONLY (OUTPATIENT)
Dept: HEMATOLOGY/ONCOLOGY | Facility: CLINIC | Age: 81
End: 2021-02-23

## 2021-02-23 DIAGNOSIS — E03.9 HYPOTHYROIDISM (ACQUIRED): Primary | ICD-10-CM

## 2021-02-24 ENCOUNTER — PATIENT MESSAGE (OUTPATIENT)
Dept: HEMATOLOGY/ONCOLOGY | Facility: CLINIC | Age: 81
End: 2021-02-24

## 2021-02-24 ENCOUNTER — LAB VISIT (OUTPATIENT)
Dept: LAB | Facility: HOSPITAL | Age: 81
End: 2021-02-24
Payer: MEDICARE

## 2021-02-24 ENCOUNTER — DOCUMENTATION ONLY (OUTPATIENT)
Dept: HEMATOLOGY/ONCOLOGY | Facility: CLINIC | Age: 81
End: 2021-02-24

## 2021-02-24 DIAGNOSIS — E03.9 HYPOTHYROIDISM (ACQUIRED): ICD-10-CM

## 2021-02-24 DIAGNOSIS — C22.0 HEPATOCELLULAR CARCINOMA: ICD-10-CM

## 2021-02-24 DIAGNOSIS — C79.51 SECONDARY MALIGNANT NEOPLASM OF BONE: ICD-10-CM

## 2021-02-24 DIAGNOSIS — Z29.89 ENCOUNTER FOR IMMUNOTHERAPY: ICD-10-CM

## 2021-02-24 DIAGNOSIS — C79.89 SECONDARY MALIGNANT NEOPLASM OF OTHER SPECIFIED SITES: ICD-10-CM

## 2021-02-24 LAB
ALBUMIN SERPL BCP-MCNC: 3.7 G/DL (ref 3.5–5.2)
ALP SERPL-CCNC: 81 U/L (ref 55–135)
ALT SERPL W/O P-5'-P-CCNC: 24 U/L (ref 10–44)
ANION GAP SERPL CALC-SCNC: 9 MMOL/L (ref 8–16)
AST SERPL-CCNC: 24 U/L (ref 10–40)
BASOPHILS # BLD AUTO: 0.07 K/UL (ref 0–0.2)
BASOPHILS NFR BLD: 0.9 % (ref 0–1.9)
BILIRUB SERPL-MCNC: 0.5 MG/DL (ref 0.1–1)
BUN SERPL-MCNC: 20 MG/DL (ref 8–23)
CALCIUM SERPL-MCNC: 9.9 MG/DL (ref 8.7–10.5)
CHLORIDE SERPL-SCNC: 101 MMOL/L (ref 95–110)
CO2 SERPL-SCNC: 28 MMOL/L (ref 23–29)
CREAT SERPL-MCNC: 1.1 MG/DL (ref 0.5–1.4)
DIFFERENTIAL METHOD: ABNORMAL
EOSINOPHIL # BLD AUTO: 0.4 K/UL (ref 0–0.5)
EOSINOPHIL NFR BLD: 5.1 % (ref 0–8)
ERYTHROCYTE [DISTWIDTH] IN BLOOD BY AUTOMATED COUNT: 13.8 % (ref 11.5–14.5)
EST. GFR  (AFRICAN AMERICAN): 54 ML/MIN/1.73 M^2
EST. GFR  (NON AFRICAN AMERICAN): 47 ML/MIN/1.73 M^2
GLUCOSE SERPL-MCNC: 108 MG/DL (ref 70–110)
HCT VFR BLD AUTO: 42.6 % (ref 37–48.5)
HGB BLD-MCNC: 13.5 G/DL (ref 12–16)
IMM GRANULOCYTES # BLD AUTO: 0.03 K/UL (ref 0–0.04)
IMM GRANULOCYTES NFR BLD AUTO: 0.4 % (ref 0–0.5)
LYMPHOCYTES # BLD AUTO: 2 K/UL (ref 1–4.8)
LYMPHOCYTES NFR BLD: 24.2 % (ref 18–48)
MCH RBC QN AUTO: 31 PG (ref 27–31)
MCHC RBC AUTO-ENTMCNC: 31.7 G/DL (ref 32–36)
MCV RBC AUTO: 98 FL (ref 82–98)
MONOCYTES # BLD AUTO: 0.9 K/UL (ref 0.3–1)
MONOCYTES NFR BLD: 10.8 % (ref 4–15)
NEUTROPHILS # BLD AUTO: 4.7 K/UL (ref 1.8–7.7)
NEUTROPHILS NFR BLD: 58.6 % (ref 38–73)
NRBC BLD-RTO: 0 /100 WBC
PLATELET # BLD AUTO: 305 K/UL (ref 150–350)
PMV BLD AUTO: 9.5 FL (ref 9.2–12.9)
POTASSIUM SERPL-SCNC: 4.4 MMOL/L (ref 3.5–5.1)
PROT SERPL-MCNC: 8 G/DL (ref 6–8.4)
RBC # BLD AUTO: 4.36 M/UL (ref 4–5.4)
SODIUM SERPL-SCNC: 138 MMOL/L (ref 136–145)
WBC # BLD AUTO: 8.05 K/UL (ref 3.9–12.7)

## 2021-02-24 PROCEDURE — 80053 COMPREHEN METABOLIC PANEL: CPT

## 2021-02-24 PROCEDURE — 84443 ASSAY THYROID STIM HORMONE: CPT

## 2021-02-24 PROCEDURE — 84439 ASSAY OF FREE THYROXINE: CPT

## 2021-02-24 PROCEDURE — 85025 COMPLETE CBC W/AUTO DIFF WBC: CPT

## 2021-02-24 PROCEDURE — 36415 COLL VENOUS BLD VENIPUNCTURE: CPT

## 2021-02-25 LAB
T4 FREE SERPL-MCNC: 0.84 NG/DL (ref 0.71–1.51)
TSH SERPL DL<=0.005 MIU/L-ACNC: 0.47 UIU/ML (ref 0.4–4)

## 2021-02-25 RX ORDER — LEVOTHYROXINE SODIUM 50 UG/1
50 CAPSULE ORAL
Qty: 90 CAPSULE | Refills: 3 | Status: SHIPPED | OUTPATIENT
Start: 2021-02-25 | End: 2021-03-22 | Stop reason: SDUPTHER

## 2021-02-26 ENCOUNTER — OFFICE VISIT (OUTPATIENT)
Dept: HEMATOLOGY/ONCOLOGY | Facility: CLINIC | Age: 81
End: 2021-02-26
Payer: MEDICARE

## 2021-02-26 ENCOUNTER — TELEPHONE (OUTPATIENT)
Dept: HEMATOLOGY/ONCOLOGY | Facility: CLINIC | Age: 81
End: 2021-02-26

## 2021-02-26 DIAGNOSIS — Z71.9 ENCOUNTER FOR EDUCATION: ICD-10-CM

## 2021-02-26 DIAGNOSIS — R11.0 CHEMOTHERAPY-INDUCED NAUSEA: Primary | ICD-10-CM

## 2021-02-26 DIAGNOSIS — N30.01 ACUTE CYSTITIS WITH HEMATURIA: Primary | ICD-10-CM

## 2021-02-26 DIAGNOSIS — C22.0 HCC (HEPATOCELLULAR CARCINOMA): ICD-10-CM

## 2021-02-26 DIAGNOSIS — T45.1X5A CHEMOTHERAPY-INDUCED NAUSEA: Primary | ICD-10-CM

## 2021-02-26 DIAGNOSIS — C22.0 HCC (HEPATOCELLULAR CARCINOMA): Primary | ICD-10-CM

## 2021-02-26 DIAGNOSIS — C22.0 HEPATOCELLULAR CARCINOMA: Primary | ICD-10-CM

## 2021-02-26 PROCEDURE — 99999 PR PBB SHADOW E&M-EST. PATIENT-LVL I: CPT | Mod: PBBFAC,,, | Performed by: NURSE PRACTITIONER

## 2021-02-26 PROCEDURE — 99211 OFF/OP EST MAY X REQ PHY/QHP: CPT | Mod: PBBFAC | Performed by: NURSE PRACTITIONER

## 2021-02-26 PROCEDURE — 99215 OFFICE O/P EST HI 40 MIN: CPT | Mod: S$PBB,,, | Performed by: NURSE PRACTITIONER

## 2021-02-26 PROCEDURE — 99999 PR PBB SHADOW E&M-EST. PATIENT-LVL I: ICD-10-PCS | Mod: PBBFAC,,, | Performed by: NURSE PRACTITIONER

## 2021-02-26 PROCEDURE — 99215 PR OFFICE/OUTPT VISIT, EST, LEVL V, 40-54 MIN: ICD-10-PCS | Mod: S$PBB,,, | Performed by: NURSE PRACTITIONER

## 2021-02-26 RX ORDER — CIPROFLOXACIN 500 MG/1
500 TABLET ORAL 2 TIMES DAILY
Qty: 10 TABLET | Refills: 0 | Status: SHIPPED | OUTPATIENT
Start: 2021-02-26 | End: 2021-03-03

## 2021-02-26 RX ORDER — PROCHLORPERAZINE MALEATE 5 MG
5 TABLET ORAL EVERY 6 HOURS PRN
Qty: 30 TABLET | Refills: 1 | Status: SHIPPED | OUTPATIENT
Start: 2021-02-26 | End: 2022-02-26

## 2021-02-26 RX ORDER — ONDANSETRON HYDROCHLORIDE 8 MG/1
8 TABLET, FILM COATED ORAL EVERY 12 HOURS PRN
Qty: 30 TABLET | Refills: 2 | Status: SHIPPED | OUTPATIENT
Start: 2021-02-26 | End: 2022-02-26

## 2021-02-28 ENCOUNTER — EXTERNAL CHRONIC CARE MANAGEMENT (OUTPATIENT)
Dept: PRIMARY CARE CLINIC | Facility: CLINIC | Age: 81
End: 2021-02-28
Payer: MEDICARE

## 2021-02-28 PROCEDURE — 99439 CHRNC CARE MGMT STAF EA ADDL: CPT | Mod: PBBFAC,PN | Performed by: FAMILY MEDICINE

## 2021-02-28 PROCEDURE — 99490 CHRNC CARE MGMT STAFF 1ST 20: CPT | Mod: PBBFAC,PN | Performed by: FAMILY MEDICINE

## 2021-02-28 PROCEDURE — 99439 PR CHRONIC CARE MGMT, EA ADDTL 20 MIN: ICD-10-PCS | Mod: S$PBB,,, | Performed by: FAMILY MEDICINE

## 2021-02-28 PROCEDURE — 99439 CHRNC CARE MGMT STAF EA ADDL: CPT | Mod: S$PBB,,, | Performed by: FAMILY MEDICINE

## 2021-02-28 PROCEDURE — 99490 CHRNC CARE MGMT STAFF 1ST 20: CPT | Mod: S$PBB,,, | Performed by: FAMILY MEDICINE

## 2021-02-28 PROCEDURE — 99490 PR CHRONIC CARE MGMT, 1ST 20 MIN: ICD-10-PCS | Mod: S$PBB,,, | Performed by: FAMILY MEDICINE

## 2021-03-02 ENCOUNTER — OFFICE VISIT (OUTPATIENT)
Dept: PALLIATIVE MEDICINE | Facility: CLINIC | Age: 81
End: 2021-03-02
Payer: MEDICARE

## 2021-03-02 VITALS
HEIGHT: 60 IN | RESPIRATION RATE: 16 BRPM | OXYGEN SATURATION: 96 % | WEIGHT: 179.69 LBS | HEART RATE: 84 BPM | BODY MASS INDEX: 35.28 KG/M2 | DIASTOLIC BLOOD PRESSURE: 83 MMHG | TEMPERATURE: 97 F | SYSTOLIC BLOOD PRESSURE: 198 MMHG

## 2021-03-02 DIAGNOSIS — R03.0 ELEVATED BP WITHOUT DIAGNOSIS OF HYPERTENSION: ICD-10-CM

## 2021-03-02 DIAGNOSIS — G89.3 CANCER ASSOCIATED PAIN: Primary | ICD-10-CM

## 2021-03-02 DIAGNOSIS — K59.03 DRUG-INDUCED CONSTIPATION: ICD-10-CM

## 2021-03-02 PROCEDURE — 99215 OFFICE O/P EST HI 40 MIN: CPT | Mod: PBBFAC | Performed by: FAMILY MEDICINE

## 2021-03-02 PROCEDURE — 99999 PR PBB SHADOW E&M-EST. PATIENT-LVL V: ICD-10-PCS | Mod: PBBFAC,,, | Performed by: FAMILY MEDICINE

## 2021-03-02 PROCEDURE — 99215 PR OFFICE/OUTPT VISIT, EST, LEVL V, 40-54 MIN: ICD-10-PCS | Mod: S$PBB,,, | Performed by: FAMILY MEDICINE

## 2021-03-02 PROCEDURE — 99215 OFFICE O/P EST HI 40 MIN: CPT | Mod: S$PBB,,, | Performed by: FAMILY MEDICINE

## 2021-03-02 PROCEDURE — 99999 PR PBB SHADOW E&M-EST. PATIENT-LVL V: CPT | Mod: PBBFAC,,, | Performed by: FAMILY MEDICINE

## 2021-03-02 RX ORDER — OXYCODONE AND ACETAMINOPHEN 10; 325 MG/1; MG/1
1 TABLET ORAL EVERY 6 HOURS PRN
Qty: 120 TABLET | Refills: 0 | Status: SHIPPED | OUTPATIENT
Start: 2021-03-02 | End: 2021-03-31 | Stop reason: SDUPTHER

## 2021-03-02 RX ORDER — POLYETHYLENE GLYCOL 3350 17 G/17G
17 POWDER, FOR SOLUTION ORAL DAILY PRN
Qty: 30 EACH | Refills: 11 | Status: SHIPPED | OUTPATIENT
Start: 2021-03-02 | End: 2022-03-02

## 2021-03-02 RX ORDER — OXYCODONE 13.5 MG/1
13.5 CAPSULE, EXTENDED RELEASE ORAL EVERY 12 HOURS
Qty: 60 EACH | Refills: 0 | Status: SHIPPED | OUTPATIENT
Start: 2021-03-02 | End: 2021-03-31 | Stop reason: SDUPTHER

## 2021-03-05 ENCOUNTER — SPECIALTY PHARMACY (OUTPATIENT)
Dept: PHARMACY | Facility: CLINIC | Age: 81
End: 2021-03-05

## 2021-03-09 ENCOUNTER — PATIENT MESSAGE (OUTPATIENT)
Dept: HEMATOLOGY/ONCOLOGY | Facility: CLINIC | Age: 81
End: 2021-03-09

## 2021-03-09 ENCOUNTER — OFFICE VISIT (OUTPATIENT)
Dept: PRIMARY CARE CLINIC | Facility: CLINIC | Age: 81
End: 2021-03-09
Payer: MEDICARE

## 2021-03-09 VITALS
HEIGHT: 60 IN | WEIGHT: 180.56 LBS | BODY MASS INDEX: 35.45 KG/M2 | SYSTOLIC BLOOD PRESSURE: 170 MMHG | TEMPERATURE: 98 F | HEART RATE: 55 BPM | DIASTOLIC BLOOD PRESSURE: 82 MMHG

## 2021-03-09 DIAGNOSIS — M79.676 PAIN OF FOOT AND TOES: Primary | ICD-10-CM

## 2021-03-09 DIAGNOSIS — R26.9 GAIT ABNORMALITY: ICD-10-CM

## 2021-03-09 DIAGNOSIS — M79.673 PAIN OF FOOT AND TOES: Primary | ICD-10-CM

## 2021-03-09 DIAGNOSIS — I10 ESSENTIAL HYPERTENSION: ICD-10-CM

## 2021-03-09 PROCEDURE — 99999 PR PBB SHADOW E&M-EST. PATIENT-LVL III: ICD-10-PCS | Mod: PBBFAC,,, | Performed by: NURSE PRACTITIONER

## 2021-03-09 PROCEDURE — 99213 OFFICE O/P EST LOW 20 MIN: CPT | Mod: PBBFAC,PN | Performed by: NURSE PRACTITIONER

## 2021-03-09 PROCEDURE — 99213 OFFICE O/P EST LOW 20 MIN: CPT | Mod: S$PBB,,, | Performed by: NURSE PRACTITIONER

## 2021-03-09 PROCEDURE — 99213 PR OFFICE/OUTPT VISIT, EST, LEVL III, 20-29 MIN: ICD-10-PCS | Mod: S$PBB,,, | Performed by: NURSE PRACTITIONER

## 2021-03-09 PROCEDURE — 99999 PR PBB SHADOW E&M-EST. PATIENT-LVL III: CPT | Mod: PBBFAC,,, | Performed by: NURSE PRACTITIONER

## 2021-03-09 RX ORDER — AMLODIPINE BESYLATE 2.5 MG/1
2.5 TABLET ORAL DAILY
Qty: 90 TABLET | Refills: 3 | Status: SHIPPED | OUTPATIENT
Start: 2021-03-09 | End: 2021-04-29

## 2021-03-17 ENCOUNTER — TELEPHONE (OUTPATIENT)
Dept: PRIMARY CARE CLINIC | Facility: CLINIC | Age: 81
End: 2021-03-17

## 2021-03-17 DIAGNOSIS — R06.02 SHORTNESS OF BREATH: ICD-10-CM

## 2021-03-17 RX ORDER — FUROSEMIDE 20 MG/1
20 TABLET ORAL DAILY
Qty: 90 TABLET | Refills: 0
Start: 2021-03-17 | End: 2021-03-22

## 2021-03-18 ENCOUNTER — LAB VISIT (OUTPATIENT)
Dept: LAB | Facility: HOSPITAL | Age: 81
End: 2021-03-18
Attending: NURSE PRACTITIONER
Payer: MEDICARE

## 2021-03-18 ENCOUNTER — OFFICE VISIT (OUTPATIENT)
Dept: PRIMARY CARE CLINIC | Facility: CLINIC | Age: 81
End: 2021-03-18
Payer: MEDICARE

## 2021-03-18 VITALS
SYSTOLIC BLOOD PRESSURE: 178 MMHG | HEART RATE: 57 BPM | OXYGEN SATURATION: 96 % | DIASTOLIC BLOOD PRESSURE: 82 MMHG | TEMPERATURE: 98 F

## 2021-03-18 DIAGNOSIS — I10 ESSENTIAL HYPERTENSION: ICD-10-CM

## 2021-03-18 DIAGNOSIS — M79.671 RIGHT FOOT PAIN: ICD-10-CM

## 2021-03-18 DIAGNOSIS — M79.676 PAIN OF FOOT AND TOES: ICD-10-CM

## 2021-03-18 DIAGNOSIS — C22.0 HCC (HEPATOCELLULAR CARCINOMA): ICD-10-CM

## 2021-03-18 DIAGNOSIS — R26.9 GAIT ABNORMALITY: ICD-10-CM

## 2021-03-18 DIAGNOSIS — M79.673 PAIN OF FOOT AND TOES: ICD-10-CM

## 2021-03-18 DIAGNOSIS — M10.9 ACUTE GOUT INVOLVING TOE OF RIGHT FOOT, UNSPECIFIED CAUSE: Primary | ICD-10-CM

## 2021-03-18 DIAGNOSIS — E11.9 TYPE 2 DIABETES MELLITUS WITHOUT COMPLICATION, WITHOUT LONG-TERM CURRENT USE OF INSULIN: ICD-10-CM

## 2021-03-18 LAB
ALBUMIN SERPL BCP-MCNC: 3.3 G/DL (ref 3.5–5.2)
ALP SERPL-CCNC: 100 U/L (ref 55–135)
ALT SERPL W/O P-5'-P-CCNC: 16 U/L (ref 10–44)
ANION GAP SERPL CALC-SCNC: 10 MMOL/L (ref 8–16)
AST SERPL-CCNC: 24 U/L (ref 10–40)
BASOPHILS # BLD AUTO: 0.05 K/UL (ref 0–0.2)
BASOPHILS NFR BLD: 0.6 % (ref 0–1.9)
BILIRUB SERPL-MCNC: 0.5 MG/DL (ref 0.1–1)
BUN SERPL-MCNC: 14 MG/DL (ref 8–23)
CALCIUM SERPL-MCNC: 9.3 MG/DL (ref 8.7–10.5)
CHLORIDE SERPL-SCNC: 100 MMOL/L (ref 95–110)
CO2 SERPL-SCNC: 28 MMOL/L (ref 23–29)
CREAT SERPL-MCNC: 0.8 MG/DL (ref 0.5–1.4)
DIFFERENTIAL METHOD: ABNORMAL
EOSINOPHIL # BLD AUTO: 0.1 K/UL (ref 0–0.5)
EOSINOPHIL NFR BLD: 1.5 % (ref 0–8)
ERYTHROCYTE [DISTWIDTH] IN BLOOD BY AUTOMATED COUNT: 13.3 % (ref 11.5–14.5)
EST. GFR  (AFRICAN AMERICAN): >60 ML/MIN/1.73 M^2
EST. GFR  (NON AFRICAN AMERICAN): >60 ML/MIN/1.73 M^2
GLUCOSE SERPL-MCNC: 82 MG/DL (ref 70–110)
HCT VFR BLD AUTO: 46.6 % (ref 37–48.5)
HGB BLD-MCNC: 14.5 G/DL (ref 12–16)
IMM GRANULOCYTES # BLD AUTO: 0.04 K/UL (ref 0–0.04)
IMM GRANULOCYTES NFR BLD AUTO: 0.5 % (ref 0–0.5)
LYMPHOCYTES # BLD AUTO: 2 K/UL (ref 1–4.8)
LYMPHOCYTES NFR BLD: 23.4 % (ref 18–48)
MCH RBC QN AUTO: 30.2 PG (ref 27–31)
MCHC RBC AUTO-ENTMCNC: 31.1 G/DL (ref 32–36)
MCV RBC AUTO: 97 FL (ref 82–98)
MONOCYTES # BLD AUTO: 0.8 K/UL (ref 0.3–1)
MONOCYTES NFR BLD: 9.7 % (ref 4–15)
NEUTROPHILS # BLD AUTO: 5.6 K/UL (ref 1.8–7.7)
NEUTROPHILS NFR BLD: 64.3 % (ref 38–73)
NRBC BLD-RTO: 0 /100 WBC
PLATELET # BLD AUTO: 277 K/UL (ref 150–350)
PMV BLD AUTO: 9.8 FL (ref 9.2–12.9)
POTASSIUM SERPL-SCNC: 4.1 MMOL/L (ref 3.5–5.1)
PROT SERPL-MCNC: 8.1 G/DL (ref 6–8.4)
RBC # BLD AUTO: 4.8 M/UL (ref 4–5.4)
SODIUM SERPL-SCNC: 138 MMOL/L (ref 136–145)
URATE SERPL-MCNC: 6.1 MG/DL (ref 2.4–5.7)
WBC # BLD AUTO: 8.67 K/UL (ref 3.9–12.7)

## 2021-03-18 PROCEDURE — 84550 ASSAY OF BLOOD/URIC ACID: CPT | Performed by: FAMILY MEDICINE

## 2021-03-18 PROCEDURE — 99999 PR PBB SHADOW E&M-EST. PATIENT-LVL III: ICD-10-PCS | Mod: PBBFAC,,, | Performed by: PHYSICIAN ASSISTANT

## 2021-03-18 PROCEDURE — 99213 OFFICE O/P EST LOW 20 MIN: CPT | Mod: PBBFAC,PN | Performed by: PHYSICIAN ASSISTANT

## 2021-03-18 PROCEDURE — 85025 COMPLETE CBC W/AUTO DIFF WBC: CPT | Performed by: NURSE PRACTITIONER

## 2021-03-18 PROCEDURE — 99214 OFFICE O/P EST MOD 30 MIN: CPT | Mod: S$PBB,,, | Performed by: PHYSICIAN ASSISTANT

## 2021-03-18 PROCEDURE — 36415 COLL VENOUS BLD VENIPUNCTURE: CPT | Mod: PO | Performed by: NURSE PRACTITIONER

## 2021-03-18 PROCEDURE — 80053 COMPREHEN METABOLIC PANEL: CPT | Performed by: NURSE PRACTITIONER

## 2021-03-18 PROCEDURE — 99214 PR OFFICE/OUTPT VISIT, EST, LEVL IV, 30-39 MIN: ICD-10-PCS | Mod: S$PBB,,, | Performed by: PHYSICIAN ASSISTANT

## 2021-03-18 PROCEDURE — 99999 PR PBB SHADOW E&M-EST. PATIENT-LVL III: CPT | Mod: PBBFAC,,, | Performed by: PHYSICIAN ASSISTANT

## 2021-03-18 RX ORDER — PREDNISONE 20 MG/1
40 TABLET ORAL DAILY
Qty: 20 TABLET | Refills: 0 | Status: SHIPPED | OUTPATIENT
Start: 2021-03-18 | End: 2021-03-28

## 2021-03-19 DIAGNOSIS — C22.0 HCC (HEPATOCELLULAR CARCINOMA): ICD-10-CM

## 2021-03-20 ENCOUNTER — SPECIALTY PHARMACY (OUTPATIENT)
Dept: PHARMACY | Facility: CLINIC | Age: 81
End: 2021-03-20

## 2021-03-22 ENCOUNTER — OFFICE VISIT (OUTPATIENT)
Dept: HEMATOLOGY/ONCOLOGY | Facility: CLINIC | Age: 81
End: 2021-03-22
Payer: MEDICARE

## 2021-03-22 ENCOUNTER — DOCUMENTATION ONLY (OUTPATIENT)
Dept: HEMATOLOGY/ONCOLOGY | Facility: CLINIC | Age: 81
End: 2021-03-22

## 2021-03-22 VITALS
HEIGHT: 61 IN | OXYGEN SATURATION: 97 % | RESPIRATION RATE: 17 BRPM | WEIGHT: 176.38 LBS | DIASTOLIC BLOOD PRESSURE: 85 MMHG | HEART RATE: 59 BPM | BODY MASS INDEX: 33.3 KG/M2 | TEMPERATURE: 97 F | SYSTOLIC BLOOD PRESSURE: 190 MMHG

## 2021-03-22 DIAGNOSIS — E03.9 HYPOTHYROIDISM (ACQUIRED): ICD-10-CM

## 2021-03-22 DIAGNOSIS — C79.51 SECONDARY MALIGNANT NEOPLASM OF BONE: ICD-10-CM

## 2021-03-22 DIAGNOSIS — C22.0 HEPATOCELLULAR CARCINOMA: Primary | ICD-10-CM

## 2021-03-22 PROCEDURE — 99215 PR OFFICE/OUTPT VISIT, EST, LEVL V, 40-54 MIN: ICD-10-PCS | Mod: S$PBB,,, | Performed by: INTERNAL MEDICINE

## 2021-03-22 PROCEDURE — 99999 PR PBB SHADOW E&M-EST. PATIENT-LVL V: CPT | Mod: PBBFAC,,, | Performed by: INTERNAL MEDICINE

## 2021-03-22 PROCEDURE — 99215 OFFICE O/P EST HI 40 MIN: CPT | Mod: PBBFAC | Performed by: INTERNAL MEDICINE

## 2021-03-22 PROCEDURE — 99999 PR PBB SHADOW E&M-EST. PATIENT-LVL V: ICD-10-PCS | Mod: PBBFAC,,, | Performed by: INTERNAL MEDICINE

## 2021-03-22 PROCEDURE — 99215 OFFICE O/P EST HI 40 MIN: CPT | Mod: S$PBB,,, | Performed by: INTERNAL MEDICINE

## 2021-03-22 RX ORDER — LEVOTHYROXINE SODIUM 50 UG/1
50 CAPSULE ORAL
Qty: 90 CAPSULE | Refills: 3 | Status: SHIPPED | OUTPATIENT
Start: 2021-03-22 | End: 2021-09-16 | Stop reason: SDUPTHER

## 2021-03-30 ENCOUNTER — PES CALL (OUTPATIENT)
Dept: ADMINISTRATIVE | Facility: CLINIC | Age: 81
End: 2021-03-30

## 2021-03-31 ENCOUNTER — EXTERNAL CHRONIC CARE MANAGEMENT (OUTPATIENT)
Dept: PRIMARY CARE CLINIC | Facility: CLINIC | Age: 81
End: 2021-03-31
Payer: MEDICARE

## 2021-03-31 DIAGNOSIS — G89.3 CANCER ASSOCIATED PAIN: ICD-10-CM

## 2021-03-31 PROCEDURE — 99490 CHRNC CARE MGMT STAFF 1ST 20: CPT | Mod: PBBFAC,PN | Performed by: FAMILY MEDICINE

## 2021-03-31 PROCEDURE — 99490 PR CHRONIC CARE MGMT, 1ST 20 MIN: ICD-10-PCS | Mod: S$PBB,,, | Performed by: FAMILY MEDICINE

## 2021-03-31 PROCEDURE — 99490 CHRNC CARE MGMT STAFF 1ST 20: CPT | Mod: S$PBB,,, | Performed by: FAMILY MEDICINE

## 2021-03-31 RX ORDER — OXYCODONE 13.5 MG/1
13.5 CAPSULE, EXTENDED RELEASE ORAL EVERY 12 HOURS
Qty: 60 EACH | Refills: 0 | Status: SHIPPED | OUTPATIENT
Start: 2021-03-31 | End: 2021-04-01 | Stop reason: SDUPTHER

## 2021-04-01 ENCOUNTER — PATIENT MESSAGE (OUTPATIENT)
Dept: PALLIATIVE MEDICINE | Facility: CLINIC | Age: 81
End: 2021-04-01

## 2021-04-01 DIAGNOSIS — G89.3 CANCER ASSOCIATED PAIN: ICD-10-CM

## 2021-04-01 RX ORDER — OXYCODONE AND ACETAMINOPHEN 10; 325 MG/1; MG/1
1 TABLET ORAL EVERY 6 HOURS PRN
Qty: 120 TABLET | Refills: 0 | Status: SHIPPED | OUTPATIENT
Start: 2021-04-01 | End: 2021-04-29 | Stop reason: SDUPTHER

## 2021-04-03 ENCOUNTER — PATIENT MESSAGE (OUTPATIENT)
Dept: PALLIATIVE MEDICINE | Facility: CLINIC | Age: 81
End: 2021-04-03

## 2021-04-03 RX ORDER — OXYCODONE 13.5 MG/1
13.5 CAPSULE, EXTENDED RELEASE ORAL EVERY 12 HOURS
Qty: 60 EACH | Refills: 0 | Status: SHIPPED | OUTPATIENT
Start: 2021-04-03 | End: 2021-04-22 | Stop reason: SDUPTHER

## 2021-04-05 ENCOUNTER — PATIENT MESSAGE (OUTPATIENT)
Dept: PALLIATIVE MEDICINE | Facility: CLINIC | Age: 81
End: 2021-04-05

## 2021-04-05 ENCOUNTER — TELEPHONE (OUTPATIENT)
Dept: HEMATOLOGY/ONCOLOGY | Facility: CLINIC | Age: 81
End: 2021-04-05

## 2021-04-12 ENCOUNTER — LAB VISIT (OUTPATIENT)
Dept: LAB | Facility: HOSPITAL | Age: 81
End: 2021-04-12
Attending: INTERNAL MEDICINE
Payer: MEDICARE

## 2021-04-12 DIAGNOSIS — C22.0 HEPATOCELLULAR CARCINOMA: ICD-10-CM

## 2021-04-12 DIAGNOSIS — R19.7 DIARRHEA, UNSPECIFIED TYPE: ICD-10-CM

## 2021-04-12 DIAGNOSIS — E83.51 HYPOCALCEMIA: ICD-10-CM

## 2021-04-12 DIAGNOSIS — Z08 ENCOUNTER FOR FOLLOW-UP EXAMINATION AFTER COMPLETED TREATMENT FOR MALIGNANT NEOPLASM: ICD-10-CM

## 2021-04-12 DIAGNOSIS — E03.9 HYPOTHYROIDISM (ACQUIRED): ICD-10-CM

## 2021-04-12 LAB
ALBUMIN SERPL BCP-MCNC: 3.2 G/DL (ref 3.5–5.2)
ALP SERPL-CCNC: 77 U/L (ref 55–135)
ALT SERPL W/O P-5'-P-CCNC: 27 U/L (ref 10–44)
ANION GAP SERPL CALC-SCNC: 10 MMOL/L (ref 8–16)
AST SERPL-CCNC: 26 U/L (ref 10–40)
BASOPHILS # BLD AUTO: 0.07 K/UL (ref 0–0.2)
BASOPHILS NFR BLD: 1 % (ref 0–1.9)
BILIRUB SERPL-MCNC: 0.6 MG/DL (ref 0.1–1)
BUN SERPL-MCNC: 14 MG/DL (ref 8–23)
CALCIUM SERPL-MCNC: 9.2 MG/DL (ref 8.7–10.5)
CHLORIDE SERPL-SCNC: 104 MMOL/L (ref 95–110)
CO2 SERPL-SCNC: 27 MMOL/L (ref 23–29)
CREAT SERPL-MCNC: 0.8 MG/DL (ref 0.5–1.4)
DIFFERENTIAL METHOD: ABNORMAL
EOSINOPHIL # BLD AUTO: 0.6 K/UL (ref 0–0.5)
EOSINOPHIL NFR BLD: 7.6 % (ref 0–8)
ERYTHROCYTE [DISTWIDTH] IN BLOOD BY AUTOMATED COUNT: 14.7 % (ref 11.5–14.5)
EST. GFR  (AFRICAN AMERICAN): >60 ML/MIN/1.73 M^2
EST. GFR  (NON AFRICAN AMERICAN): >60 ML/MIN/1.73 M^2
GLUCOSE SERPL-MCNC: 92 MG/DL (ref 70–110)
HCT VFR BLD AUTO: 47.3 % (ref 37–48.5)
HGB BLD-MCNC: 15.2 G/DL (ref 12–16)
IMM GRANULOCYTES # BLD AUTO: 0.01 K/UL (ref 0–0.04)
IMM GRANULOCYTES NFR BLD AUTO: 0.1 % (ref 0–0.5)
LYMPHOCYTES # BLD AUTO: 1.8 K/UL (ref 1–4.8)
LYMPHOCYTES NFR BLD: 24.7 % (ref 18–48)
MCH RBC QN AUTO: 30.8 PG (ref 27–31)
MCHC RBC AUTO-ENTMCNC: 32.1 G/DL (ref 32–36)
MCV RBC AUTO: 96 FL (ref 82–98)
MONOCYTES # BLD AUTO: 0.7 K/UL (ref 0.3–1)
MONOCYTES NFR BLD: 10.3 % (ref 4–15)
NEUTROPHILS # BLD AUTO: 4.1 K/UL (ref 1.8–7.7)
NEUTROPHILS NFR BLD: 56.3 % (ref 38–73)
NRBC BLD-RTO: 0 /100 WBC
PLATELET # BLD AUTO: 192 K/UL (ref 150–450)
PMV BLD AUTO: 10.2 FL (ref 9.2–12.9)
POTASSIUM SERPL-SCNC: 3.9 MMOL/L (ref 3.5–5.1)
PROT SERPL-MCNC: 6.8 G/DL (ref 6–8.4)
RBC # BLD AUTO: 4.94 M/UL (ref 4–5.4)
SODIUM SERPL-SCNC: 141 MMOL/L (ref 136–145)
WBC # BLD AUTO: 7.21 K/UL (ref 3.9–12.7)

## 2021-04-12 PROCEDURE — 80053 COMPREHEN METABOLIC PANEL: CPT | Performed by: NURSE PRACTITIONER

## 2021-04-12 PROCEDURE — 84439 ASSAY OF FREE THYROXINE: CPT | Performed by: INTERNAL MEDICINE

## 2021-04-12 PROCEDURE — 36415 COLL VENOUS BLD VENIPUNCTURE: CPT | Mod: PO | Performed by: NURSE PRACTITIONER

## 2021-04-12 PROCEDURE — 84443 ASSAY THYROID STIM HORMONE: CPT | Performed by: INTERNAL MEDICINE

## 2021-04-12 PROCEDURE — 85025 COMPLETE CBC W/AUTO DIFF WBC: CPT | Performed by: NURSE PRACTITIONER

## 2021-04-13 LAB
T4 FREE SERPL-MCNC: 1.05 NG/DL (ref 0.71–1.51)
TSH SERPL DL<=0.005 MIU/L-ACNC: 0.63 UIU/ML (ref 0.4–4)

## 2021-04-14 ENCOUNTER — TELEPHONE (OUTPATIENT)
Dept: PHARMACY | Facility: CLINIC | Age: 81
End: 2021-04-14

## 2021-04-16 ENCOUNTER — OFFICE VISIT (OUTPATIENT)
Dept: HEMATOLOGY/ONCOLOGY | Facility: CLINIC | Age: 81
End: 2021-04-16
Payer: MEDICARE

## 2021-04-16 VITALS
HEART RATE: 65 BPM | OXYGEN SATURATION: 91 % | DIASTOLIC BLOOD PRESSURE: 82 MMHG | HEIGHT: 60 IN | SYSTOLIC BLOOD PRESSURE: 158 MMHG | BODY MASS INDEX: 34.37 KG/M2 | WEIGHT: 175.06 LBS | TEMPERATURE: 98 F | RESPIRATION RATE: 20 BRPM

## 2021-04-16 DIAGNOSIS — R21 RASH: ICD-10-CM

## 2021-04-16 DIAGNOSIS — E03.9 HYPOTHYROIDISM (ACQUIRED): ICD-10-CM

## 2021-04-16 DIAGNOSIS — C79.51 SECONDARY MALIGNANT NEOPLASM OF BONE: ICD-10-CM

## 2021-04-16 DIAGNOSIS — C22.0 HEPATOCELLULAR CARCINOMA: Primary | ICD-10-CM

## 2021-04-16 DIAGNOSIS — R30.0 DYSURIA: ICD-10-CM

## 2021-04-16 PROCEDURE — 99215 OFFICE O/P EST HI 40 MIN: CPT | Mod: PBBFAC | Performed by: INTERNAL MEDICINE

## 2021-04-16 PROCEDURE — 99215 PR OFFICE/OUTPT VISIT, EST, LEVL V, 40-54 MIN: ICD-10-PCS | Mod: S$PBB,,, | Performed by: INTERNAL MEDICINE

## 2021-04-16 PROCEDURE — 99215 OFFICE O/P EST HI 40 MIN: CPT | Mod: S$PBB,,, | Performed by: INTERNAL MEDICINE

## 2021-04-16 PROCEDURE — 99999 PR PBB SHADOW E&M-EST. PATIENT-LVL V: CPT | Mod: PBBFAC,,, | Performed by: INTERNAL MEDICINE

## 2021-04-16 PROCEDURE — 99999 PR PBB SHADOW E&M-EST. PATIENT-LVL V: ICD-10-PCS | Mod: PBBFAC,,, | Performed by: INTERNAL MEDICINE

## 2021-04-19 ENCOUNTER — LAB VISIT (OUTPATIENT)
Dept: LAB | Facility: HOSPITAL | Age: 81
End: 2021-04-19
Attending: INTERNAL MEDICINE
Payer: MEDICARE

## 2021-04-19 DIAGNOSIS — R21 RASH: ICD-10-CM

## 2021-04-19 DIAGNOSIS — C22.0 HCC (HEPATOCELLULAR CARCINOMA): ICD-10-CM

## 2021-04-19 DIAGNOSIS — R30.0 DYSURIA: ICD-10-CM

## 2021-04-19 LAB
BILIRUB UR QL STRIP: NEGATIVE
CAOX CRY URNS QL MICRO: ABNORMAL
CLARITY UR: ABNORMAL
COLOR UR: YELLOW
GLUCOSE UR QL STRIP: NEGATIVE
HGB UR QL STRIP: NEGATIVE
KETONES UR QL STRIP: NEGATIVE
LEUKOCYTE ESTERASE UR QL STRIP: ABNORMAL
MICROSCOPIC COMMENT: ABNORMAL
NITRITE UR QL STRIP: NEGATIVE
PH UR STRIP: 6 [PH] (ref 5–8)
PROT UR QL STRIP: NEGATIVE
SP GR UR STRIP: 1.02 (ref 1–1.03)
URN SPEC COLLECT METH UR: ABNORMAL
WBC #/AREA URNS HPF: 4 /HPF (ref 0–5)
WBC CLUMPS URNS QL MICRO: ABNORMAL

## 2021-04-19 PROCEDURE — 81000 URINALYSIS NONAUTO W/SCOPE: CPT | Performed by: INTERNAL MEDICINE

## 2021-04-20 ENCOUNTER — SPECIALTY PHARMACY (OUTPATIENT)
Dept: PHARMACY | Facility: CLINIC | Age: 81
End: 2021-04-20

## 2021-04-21 ENCOUNTER — PATIENT MESSAGE (OUTPATIENT)
Dept: PALLIATIVE MEDICINE | Facility: CLINIC | Age: 81
End: 2021-04-21

## 2021-04-22 DIAGNOSIS — G89.3 CANCER ASSOCIATED PAIN: ICD-10-CM

## 2021-04-22 RX ORDER — OXYCODONE 13.5 MG/1
13.5 CAPSULE, EXTENDED RELEASE ORAL EVERY 12 HOURS
Qty: 60 EACH | Refills: 0 | Status: SHIPPED | OUTPATIENT
Start: 2021-04-22 | End: 2021-05-06 | Stop reason: SDUPTHER

## 2021-04-29 ENCOUNTER — OFFICE VISIT (OUTPATIENT)
Dept: PALLIATIVE MEDICINE | Facility: CLINIC | Age: 81
End: 2021-04-29
Payer: MEDICARE

## 2021-04-29 ENCOUNTER — PATIENT MESSAGE (OUTPATIENT)
Dept: PRIMARY CARE CLINIC | Facility: CLINIC | Age: 81
End: 2021-04-29

## 2021-04-29 DIAGNOSIS — G89.3 CANCER ASSOCIATED PAIN: ICD-10-CM

## 2021-04-29 PROCEDURE — 99215 PR OFFICE/OUTPT VISIT, EST, LEVL V, 40-54 MIN: ICD-10-PCS | Mod: 95,,, | Performed by: FAMILY MEDICINE

## 2021-04-29 PROCEDURE — 99215 OFFICE O/P EST HI 40 MIN: CPT | Mod: 95,,, | Performed by: FAMILY MEDICINE

## 2021-04-29 RX ORDER — MORPHINE SULFATE 30 MG/1
30 TABLET, FILM COATED, EXTENDED RELEASE ORAL 2 TIMES DAILY
Qty: 60 TABLET | Refills: 0 | Status: SHIPPED | OUTPATIENT
Start: 2021-04-29 | End: 2021-05-06

## 2021-04-29 RX ORDER — OXYCODONE AND ACETAMINOPHEN 10; 325 MG/1; MG/1
1 TABLET ORAL EVERY 6 HOURS PRN
Qty: 120 TABLET | Refills: 0 | Status: SHIPPED | OUTPATIENT
Start: 2021-04-29 | End: 2021-05-06 | Stop reason: SDUPTHER

## 2021-04-29 RX ORDER — AMLODIPINE BESYLATE 2.5 MG/1
2.5 TABLET ORAL 2 TIMES DAILY
Qty: 180 TABLET | Refills: 3 | Status: SHIPPED | OUTPATIENT
Start: 2021-04-29 | End: 2022-07-12

## 2021-04-30 ENCOUNTER — PATIENT MESSAGE (OUTPATIENT)
Dept: HEMATOLOGY/ONCOLOGY | Facility: CLINIC | Age: 81
End: 2021-04-30

## 2021-04-30 ENCOUNTER — TELEPHONE (OUTPATIENT)
Dept: PALLIATIVE MEDICINE | Facility: CLINIC | Age: 81
End: 2021-04-30

## 2021-04-30 ENCOUNTER — EXTERNAL CHRONIC CARE MANAGEMENT (OUTPATIENT)
Dept: PRIMARY CARE CLINIC | Facility: CLINIC | Age: 81
End: 2021-04-30
Payer: MEDICARE

## 2021-04-30 PROCEDURE — 99489 CPLX CHRNC CARE EA ADDL 30: CPT | Mod: S$PBB,,, | Performed by: FAMILY MEDICINE

## 2021-04-30 PROCEDURE — 99487 CPLX CHRNC CARE 1ST 60 MIN: CPT | Mod: PBBFAC,PN | Performed by: FAMILY MEDICINE

## 2021-04-30 PROCEDURE — 99489 CPLX CHRNC CARE EA ADDL 30: CPT | Mod: PBBFAC,PN,25,27 | Performed by: FAMILY MEDICINE

## 2021-04-30 PROCEDURE — 99489 PR COMPLX CHRON CARE MGMT, EA ADDTL 30 MIN, PER MONTH: ICD-10-PCS | Mod: S$PBB,,, | Performed by: FAMILY MEDICINE

## 2021-04-30 PROCEDURE — 99487 PR COMPLX CHRON CARE MGMT, 1ST HR, PER MONTH: ICD-10-PCS | Mod: S$PBB,,, | Performed by: FAMILY MEDICINE

## 2021-04-30 PROCEDURE — 99487 CPLX CHRNC CARE 1ST 60 MIN: CPT | Mod: S$PBB,,, | Performed by: FAMILY MEDICINE

## 2021-05-05 ENCOUNTER — PATIENT MESSAGE (OUTPATIENT)
Dept: PALLIATIVE MEDICINE | Facility: CLINIC | Age: 81
End: 2021-05-05

## 2021-05-06 ENCOUNTER — OFFICE VISIT (OUTPATIENT)
Dept: PALLIATIVE MEDICINE | Facility: CLINIC | Age: 81
End: 2021-05-06
Payer: MEDICARE

## 2021-05-06 DIAGNOSIS — G89.3 CANCER ASSOCIATED PAIN: ICD-10-CM

## 2021-05-06 PROCEDURE — 99215 PR OFFICE/OUTPT VISIT, EST, LEVL V, 40-54 MIN: ICD-10-PCS | Mod: 95,,, | Performed by: FAMILY MEDICINE

## 2021-05-06 PROCEDURE — 99215 OFFICE O/P EST HI 40 MIN: CPT | Mod: 95,,, | Performed by: FAMILY MEDICINE

## 2021-05-06 RX ORDER — OXYCODONE AND ACETAMINOPHEN 10; 325 MG/1; MG/1
1 TABLET ORAL EVERY 6 HOURS PRN
Qty: 120 TABLET | Refills: 0 | Status: SHIPPED | OUTPATIENT
Start: 2021-05-06 | End: 2021-06-02 | Stop reason: SDUPTHER

## 2021-05-06 RX ORDER — OXYCODONE 13.5 MG/1
13.5 CAPSULE, EXTENDED RELEASE ORAL EVERY 12 HOURS
Qty: 60 EACH | Refills: 0 | Status: SHIPPED | OUTPATIENT
Start: 2021-05-06 | End: 2021-06-08

## 2021-05-13 ENCOUNTER — SPECIALTY PHARMACY (OUTPATIENT)
Dept: PHARMACY | Facility: CLINIC | Age: 81
End: 2021-05-13

## 2021-05-17 ENCOUNTER — LAB VISIT (OUTPATIENT)
Dept: LAB | Facility: HOSPITAL | Age: 81
End: 2021-05-17
Attending: INTERNAL MEDICINE
Payer: MEDICARE

## 2021-05-17 DIAGNOSIS — C22.0 HEPATOCELLULAR CARCINOMA: ICD-10-CM

## 2021-05-17 LAB
ALBUMIN SERPL BCP-MCNC: 3.5 G/DL (ref 3.5–5.2)
ALP SERPL-CCNC: 81 U/L (ref 55–135)
ALT SERPL W/O P-5'-P-CCNC: 82 U/L (ref 10–44)
ANION GAP SERPL CALC-SCNC: 11 MMOL/L (ref 8–16)
AST SERPL-CCNC: 78 U/L (ref 10–40)
BASOPHILS # BLD AUTO: 0.07 K/UL (ref 0–0.2)
BASOPHILS NFR BLD: 1 % (ref 0–1.9)
BILIRUB SERPL-MCNC: 0.9 MG/DL (ref 0.1–1)
BUN SERPL-MCNC: 17 MG/DL (ref 8–23)
CALCIUM SERPL-MCNC: 10 MG/DL (ref 8.7–10.5)
CHLORIDE SERPL-SCNC: 103 MMOL/L (ref 95–110)
CO2 SERPL-SCNC: 25 MMOL/L (ref 23–29)
CREAT SERPL-MCNC: 0.9 MG/DL (ref 0.5–1.4)
DIFFERENTIAL METHOD: ABNORMAL
EOSINOPHIL # BLD AUTO: 0.2 K/UL (ref 0–0.5)
EOSINOPHIL NFR BLD: 2.2 % (ref 0–8)
ERYTHROCYTE [DISTWIDTH] IN BLOOD BY AUTOMATED COUNT: 15.9 % (ref 11.5–14.5)
EST. GFR  (AFRICAN AMERICAN): >60 ML/MIN/1.73 M^2
EST. GFR  (NON AFRICAN AMERICAN): 59.7 ML/MIN/1.73 M^2
GLUCOSE SERPL-MCNC: 96 MG/DL (ref 70–110)
HCT VFR BLD AUTO: 50.6 % (ref 37–48.5)
HGB BLD-MCNC: 16.3 G/DL (ref 12–16)
IMM GRANULOCYTES # BLD AUTO: 0.01 K/UL (ref 0–0.04)
IMM GRANULOCYTES NFR BLD AUTO: 0.1 % (ref 0–0.5)
LYMPHOCYTES # BLD AUTO: 1.8 K/UL (ref 1–4.8)
LYMPHOCYTES NFR BLD: 24.3 % (ref 18–48)
MCH RBC QN AUTO: 30.4 PG (ref 27–31)
MCHC RBC AUTO-ENTMCNC: 32.2 G/DL (ref 32–36)
MCV RBC AUTO: 94 FL (ref 82–98)
MONOCYTES # BLD AUTO: 0.8 K/UL (ref 0.3–1)
MONOCYTES NFR BLD: 11.5 % (ref 4–15)
NEUTROPHILS # BLD AUTO: 4.4 K/UL (ref 1.8–7.7)
NEUTROPHILS NFR BLD: 60.9 % (ref 38–73)
NRBC BLD-RTO: 0 /100 WBC
PLATELET # BLD AUTO: 186 K/UL (ref 150–450)
PMV BLD AUTO: 10.7 FL (ref 9.2–12.9)
POTASSIUM SERPL-SCNC: 4.7 MMOL/L (ref 3.5–5.1)
PROT SERPL-MCNC: 7 G/DL (ref 6–8.4)
RBC # BLD AUTO: 5.37 M/UL (ref 4–5.4)
SODIUM SERPL-SCNC: 139 MMOL/L (ref 136–145)
WBC # BLD AUTO: 7.23 K/UL (ref 3.9–12.7)

## 2021-05-17 PROCEDURE — 80053 COMPREHEN METABOLIC PANEL: CPT | Performed by: INTERNAL MEDICINE

## 2021-05-17 PROCEDURE — 36415 COLL VENOUS BLD VENIPUNCTURE: CPT | Mod: PO | Performed by: INTERNAL MEDICINE

## 2021-05-17 PROCEDURE — 85025 COMPLETE CBC W/AUTO DIFF WBC: CPT | Performed by: INTERNAL MEDICINE

## 2021-05-18 ENCOUNTER — TELEPHONE (OUTPATIENT)
Dept: HEMATOLOGY/ONCOLOGY | Facility: CLINIC | Age: 81
End: 2021-05-18

## 2021-05-18 DIAGNOSIS — E03.9 HYPOTHYROIDISM (ACQUIRED): Primary | ICD-10-CM

## 2021-05-21 ENCOUNTER — OFFICE VISIT (OUTPATIENT)
Dept: PRIMARY CARE CLINIC | Facility: CLINIC | Age: 81
End: 2021-05-21
Payer: MEDICARE

## 2021-05-21 VITALS
WEIGHT: 167.44 LBS | TEMPERATURE: 98 F | HEIGHT: 60 IN | OXYGEN SATURATION: 95 % | BODY MASS INDEX: 32.87 KG/M2 | SYSTOLIC BLOOD PRESSURE: 148 MMHG | DIASTOLIC BLOOD PRESSURE: 80 MMHG | HEART RATE: 59 BPM

## 2021-05-21 DIAGNOSIS — K21.9 GASTROESOPHAGEAL REFLUX DISEASE, UNSPECIFIED WHETHER ESOPHAGITIS PRESENT: ICD-10-CM

## 2021-05-21 DIAGNOSIS — I10 ESSENTIAL HYPERTENSION: ICD-10-CM

## 2021-05-21 DIAGNOSIS — C22.0 HCC (HEPATOCELLULAR CARCINOMA): ICD-10-CM

## 2021-05-21 DIAGNOSIS — E11.9 TYPE 2 DIABETES MELLITUS WITHOUT COMPLICATION, WITHOUT LONG-TERM CURRENT USE OF INSULIN: ICD-10-CM

## 2021-05-21 DIAGNOSIS — J44.9 CHRONIC OBSTRUCTIVE PULMONARY DISEASE, UNSPECIFIED COPD TYPE: ICD-10-CM

## 2021-05-21 DIAGNOSIS — G89.3 CANCER ASSOCIATED PAIN: ICD-10-CM

## 2021-05-21 DIAGNOSIS — K20.90 ESOPHAGITIS: ICD-10-CM

## 2021-05-21 DIAGNOSIS — R21 RASH AND NONSPECIFIC SKIN ERUPTION: Primary | ICD-10-CM

## 2021-05-21 PROCEDURE — 96372 THER/PROPH/DIAG INJ SC/IM: CPT | Mod: PBBFAC,PN

## 2021-05-21 PROCEDURE — 99999 PR PBB SHADOW E&M-EST. PATIENT-LVL IV: CPT | Mod: PBBFAC,,, | Performed by: NURSE PRACTITIONER

## 2021-05-21 PROCEDURE — 99214 PR OFFICE/OUTPT VISIT, EST, LEVL IV, 30-39 MIN: ICD-10-PCS | Mod: S$PBB,,, | Performed by: NURSE PRACTITIONER

## 2021-05-21 PROCEDURE — 99214 OFFICE O/P EST MOD 30 MIN: CPT | Mod: S$PBB,,, | Performed by: NURSE PRACTITIONER

## 2021-05-21 PROCEDURE — 99999 PR PBB SHADOW E&M-EST. PATIENT-LVL IV: ICD-10-PCS | Mod: PBBFAC,,, | Performed by: NURSE PRACTITIONER

## 2021-05-21 PROCEDURE — 99214 OFFICE O/P EST MOD 30 MIN: CPT | Mod: PBBFAC,PN,25 | Performed by: NURSE PRACTITIONER

## 2021-05-21 RX ORDER — TRIAMCINOLONE ACETONIDE 1 MG/G
CREAM TOPICAL 2 TIMES DAILY
Qty: 80 G | Refills: 1 | Status: SHIPPED | OUTPATIENT
Start: 2021-05-21 | End: 2023-01-01

## 2021-05-21 RX ORDER — BETAMETHASONE SODIUM PHOSPHATE AND BETAMETHASONE ACETATE 3; 3 MG/ML; MG/ML
6 INJECTION, SUSPENSION INTRA-ARTICULAR; INTRALESIONAL; INTRAMUSCULAR; SOFT TISSUE
Status: COMPLETED | OUTPATIENT
Start: 2021-05-21 | End: 2021-05-21

## 2021-05-21 RX ORDER — FAMOTIDINE 40 MG/1
40 TABLET, FILM COATED ORAL NIGHTLY
Qty: 14 TABLET | Refills: 0 | Status: SHIPPED | OUTPATIENT
Start: 2021-05-21 | End: 2021-06-17

## 2021-05-21 RX ORDER — NYSTATIN 100000 [USP'U]/ML
6 SUSPENSION ORAL 4 TIMES DAILY
Qty: 240 ML | Refills: 0 | Status: SHIPPED | OUTPATIENT
Start: 2021-05-21 | End: 2021-05-31

## 2021-05-21 RX ADMIN — BETAMETHASONE ACETATE AND BETAMETHASONE SODIUM PHOSPHATE 6 MG: 3; 3 INJECTION, SUSPENSION INTRA-ARTICULAR; INTRALESIONAL; INTRAMUSCULAR; SOFT TISSUE at 11:05

## 2021-05-24 DIAGNOSIS — C22.0 HCC (HEPATOCELLULAR CARCINOMA): ICD-10-CM

## 2021-05-24 RX ORDER — LENVATINIB 4 MG/1
12 CAPSULE ORAL DAILY
Qty: 90 CAPSULE | Refills: 0 | Status: CANCELLED | OUTPATIENT
Start: 2021-05-24

## 2021-05-26 ENCOUNTER — PATIENT MESSAGE (OUTPATIENT)
Dept: HEMATOLOGY/ONCOLOGY | Facility: CLINIC | Age: 81
End: 2021-05-26

## 2021-05-26 ENCOUNTER — SPECIALTY PHARMACY (OUTPATIENT)
Dept: PHARMACY | Facility: CLINIC | Age: 81
End: 2021-05-26

## 2021-05-26 DIAGNOSIS — C22.0 HCC (HEPATOCELLULAR CARCINOMA): ICD-10-CM

## 2021-05-26 DIAGNOSIS — I10 ESSENTIAL HYPERTENSION: ICD-10-CM

## 2021-05-26 RX ORDER — LENVATINIB 4 MG/1
12 CAPSULE ORAL DAILY
Qty: 90 CAPSULE | Refills: 0 | Status: CANCELLED | OUTPATIENT
Start: 2021-05-24

## 2021-05-26 RX ORDER — LISINOPRIL 40 MG/1
40 TABLET ORAL DAILY
Qty: 90 TABLET | Refills: 0 | OUTPATIENT
Start: 2021-05-26

## 2021-05-27 ENCOUNTER — SPECIALTY PHARMACY (OUTPATIENT)
Dept: PHARMACY | Facility: CLINIC | Age: 81
End: 2021-05-27

## 2021-05-27 ENCOUNTER — TELEPHONE (OUTPATIENT)
Dept: HEMATOLOGY/ONCOLOGY | Facility: CLINIC | Age: 81
End: 2021-05-27

## 2021-05-31 ENCOUNTER — EXTERNAL CHRONIC CARE MANAGEMENT (OUTPATIENT)
Dept: PRIMARY CARE CLINIC | Facility: CLINIC | Age: 81
End: 2021-05-31
Payer: MEDICARE

## 2021-05-31 PROCEDURE — 99490 CHRNC CARE MGMT STAFF 1ST 20: CPT | Mod: S$PBB,,, | Performed by: FAMILY MEDICINE

## 2021-05-31 PROCEDURE — 99490 PR CHRONIC CARE MGMT, 1ST 20 MIN: ICD-10-PCS | Mod: S$PBB,,, | Performed by: FAMILY MEDICINE

## 2021-05-31 PROCEDURE — 99439 CHRNC CARE MGMT STAF EA ADDL: CPT | Mod: S$PBB,,, | Performed by: FAMILY MEDICINE

## 2021-05-31 PROCEDURE — 99439 CHRNC CARE MGMT STAF EA ADDL: CPT | Mod: PBBFAC,PN | Performed by: FAMILY MEDICINE

## 2021-05-31 PROCEDURE — 99490 CHRNC CARE MGMT STAFF 1ST 20: CPT | Mod: PBBFAC,PN | Performed by: FAMILY MEDICINE

## 2021-05-31 PROCEDURE — 99439 PR CHRONIC CARE MGMT, EA ADDTL 20 MIN: ICD-10-PCS | Mod: S$PBB,,, | Performed by: FAMILY MEDICINE

## 2021-06-01 ENCOUNTER — PATIENT MESSAGE (OUTPATIENT)
Dept: PALLIATIVE MEDICINE | Facility: CLINIC | Age: 81
End: 2021-06-01

## 2021-06-02 DIAGNOSIS — G89.3 CANCER ASSOCIATED PAIN: ICD-10-CM

## 2021-06-02 RX ORDER — OXYCODONE AND ACETAMINOPHEN 10; 325 MG/1; MG/1
1 TABLET ORAL EVERY 6 HOURS PRN
Qty: 120 TABLET | Refills: 0 | Status: SHIPPED | OUTPATIENT
Start: 2021-06-02 | End: 2021-07-01

## 2021-06-06 ENCOUNTER — PATIENT MESSAGE (OUTPATIENT)
Dept: HEMATOLOGY/ONCOLOGY | Facility: CLINIC | Age: 81
End: 2021-06-06

## 2021-06-06 DIAGNOSIS — R21 RASH: Primary | ICD-10-CM

## 2021-06-07 ENCOUNTER — TELEPHONE (OUTPATIENT)
Dept: DERMATOLOGY | Facility: CLINIC | Age: 81
End: 2021-06-07

## 2021-06-07 ENCOUNTER — PATIENT MESSAGE (OUTPATIENT)
Dept: HEMATOLOGY/ONCOLOGY | Facility: CLINIC | Age: 81
End: 2021-06-07

## 2021-06-07 DIAGNOSIS — R13.10 ODYNOPHAGIA: Primary | ICD-10-CM

## 2021-06-07 RX ORDER — NYSTATIN 100000 [USP'U]/ML
4 SUSPENSION ORAL 4 TIMES DAILY
Qty: 160 ML | Refills: 0 | Status: SHIPPED | OUTPATIENT
Start: 2021-06-07 | End: 2021-06-08

## 2021-06-08 ENCOUNTER — OFFICE VISIT (OUTPATIENT)
Dept: HEMATOLOGY/ONCOLOGY | Facility: CLINIC | Age: 81
End: 2021-06-08
Payer: MEDICARE

## 2021-06-08 ENCOUNTER — LAB VISIT (OUTPATIENT)
Dept: LAB | Facility: HOSPITAL | Age: 81
End: 2021-06-08
Attending: INTERNAL MEDICINE
Payer: MEDICARE

## 2021-06-08 VITALS
DIASTOLIC BLOOD PRESSURE: 80 MMHG | SYSTOLIC BLOOD PRESSURE: 155 MMHG | OXYGEN SATURATION: 93 % | WEIGHT: 160.25 LBS | TEMPERATURE: 98 F | BODY MASS INDEX: 31.3 KG/M2 | HEART RATE: 49 BPM

## 2021-06-08 DIAGNOSIS — R13.10 ODYNOPHAGIA: ICD-10-CM

## 2021-06-08 DIAGNOSIS — R21 DIFFUSE PAPULAR RASH: Primary | ICD-10-CM

## 2021-06-08 DIAGNOSIS — C22.0 HCC (HEPATOCELLULAR CARCINOMA): ICD-10-CM

## 2021-06-08 DIAGNOSIS — C22.0 HEPATOCELLULAR CARCINOMA: ICD-10-CM

## 2021-06-08 DIAGNOSIS — C79.51 SECONDARY MALIGNANT NEOPLASM OF BONE: ICD-10-CM

## 2021-06-08 LAB
ALBUMIN SERPL BCP-MCNC: 3.4 G/DL (ref 3.5–5.2)
ALP SERPL-CCNC: 67 U/L (ref 55–135)
ALT SERPL W/O P-5'-P-CCNC: 34 U/L (ref 10–44)
ANION GAP SERPL CALC-SCNC: 9 MMOL/L (ref 8–16)
AST SERPL-CCNC: 33 U/L (ref 10–40)
BASOPHILS # BLD AUTO: 0.08 K/UL (ref 0–0.2)
BASOPHILS NFR BLD: 1.3 % (ref 0–1.9)
BILIRUB SERPL-MCNC: 0.9 MG/DL (ref 0.1–1)
BUN SERPL-MCNC: 14 MG/DL (ref 8–23)
CALCIUM SERPL-MCNC: 9.5 MG/DL (ref 8.7–10.5)
CHLORIDE SERPL-SCNC: 103 MMOL/L (ref 95–110)
CO2 SERPL-SCNC: 28 MMOL/L (ref 23–29)
CREAT SERPL-MCNC: 0.8 MG/DL (ref 0.5–1.4)
DIFFERENTIAL METHOD: ABNORMAL
EOSINOPHIL # BLD AUTO: 0.2 K/UL (ref 0–0.5)
EOSINOPHIL NFR BLD: 2.8 % (ref 0–8)
ERYTHROCYTE [DISTWIDTH] IN BLOOD BY AUTOMATED COUNT: 16.1 % (ref 11.5–14.5)
EST. GFR  (AFRICAN AMERICAN): >60 ML/MIN/1.73 M^2
EST. GFR  (NON AFRICAN AMERICAN): >60 ML/MIN/1.73 M^2
GLUCOSE SERPL-MCNC: 97 MG/DL (ref 70–110)
HCT VFR BLD AUTO: 51.6 % (ref 37–48.5)
HGB BLD-MCNC: 16.3 G/DL (ref 12–16)
IMM GRANULOCYTES # BLD AUTO: 0.01 K/UL (ref 0–0.04)
IMM GRANULOCYTES NFR BLD AUTO: 0.2 % (ref 0–0.5)
LYMPHOCYTES # BLD AUTO: 1.8 K/UL (ref 1–4.8)
LYMPHOCYTES NFR BLD: 28.5 % (ref 18–48)
MCH RBC QN AUTO: 30 PG (ref 27–31)
MCHC RBC AUTO-ENTMCNC: 31.6 G/DL (ref 32–36)
MCV RBC AUTO: 95 FL (ref 82–98)
MONOCYTES # BLD AUTO: 0.7 K/UL (ref 0.3–1)
MONOCYTES NFR BLD: 10.7 % (ref 4–15)
NEUTROPHILS # BLD AUTO: 3.6 K/UL (ref 1.8–7.7)
NEUTROPHILS NFR BLD: 56.5 % (ref 38–73)
NRBC BLD-RTO: 0 /100 WBC
PLATELET # BLD AUTO: 157 K/UL (ref 150–450)
PMV BLD AUTO: 10.6 FL (ref 9.2–12.9)
POTASSIUM SERPL-SCNC: 4.2 MMOL/L (ref 3.5–5.1)
PROT SERPL-MCNC: 6.7 G/DL (ref 6–8.4)
RBC # BLD AUTO: 5.44 M/UL (ref 4–5.4)
SODIUM SERPL-SCNC: 140 MMOL/L (ref 136–145)
WBC # BLD AUTO: 6.38 K/UL (ref 3.9–12.7)

## 2021-06-08 PROCEDURE — 99213 OFFICE O/P EST LOW 20 MIN: CPT | Mod: PBBFAC | Performed by: INTERNAL MEDICINE

## 2021-06-08 PROCEDURE — 85025 COMPLETE CBC W/AUTO DIFF WBC: CPT | Performed by: INTERNAL MEDICINE

## 2021-06-08 PROCEDURE — 99215 OFFICE O/P EST HI 40 MIN: CPT | Mod: S$PBB,,, | Performed by: INTERNAL MEDICINE

## 2021-06-08 PROCEDURE — 99215 PR OFFICE/OUTPT VISIT, EST, LEVL V, 40-54 MIN: ICD-10-PCS | Mod: S$PBB,,, | Performed by: INTERNAL MEDICINE

## 2021-06-08 PROCEDURE — 80053 COMPREHEN METABOLIC PANEL: CPT | Performed by: INTERNAL MEDICINE

## 2021-06-08 PROCEDURE — 99999 PR PBB SHADOW E&M-EST. PATIENT-LVL III: ICD-10-PCS | Mod: PBBFAC,,, | Performed by: INTERNAL MEDICINE

## 2021-06-08 PROCEDURE — 99999 PR PBB SHADOW E&M-EST. PATIENT-LVL III: CPT | Mod: PBBFAC,,, | Performed by: INTERNAL MEDICINE

## 2021-06-08 PROCEDURE — 36415 COLL VENOUS BLD VENIPUNCTURE: CPT | Performed by: INTERNAL MEDICINE

## 2021-06-08 RX ORDER — FLUCONAZOLE 100 MG/1
100 TABLET ORAL DAILY
Qty: 8 TABLET | Refills: 0 | Status: SHIPPED | OUTPATIENT
Start: 2021-06-08 | End: 2021-07-08

## 2021-06-08 RX ORDER — LIDOCAINE HYDROCHLORIDE 20 MG/ML
SOLUTION OROPHARYNGEAL EVERY 8 HOURS PRN
Qty: 100 ML | Refills: 3 | Status: SHIPPED | OUTPATIENT
Start: 2021-06-08 | End: 2021-10-28 | Stop reason: SDUPTHER

## 2021-06-10 ENCOUNTER — PATIENT MESSAGE (OUTPATIENT)
Dept: HEMATOLOGY/ONCOLOGY | Facility: CLINIC | Age: 81
End: 2021-06-10

## 2021-06-11 ENCOUNTER — TELEPHONE (OUTPATIENT)
Dept: HEMATOLOGY/ONCOLOGY | Facility: CLINIC | Age: 81
End: 2021-06-11

## 2021-06-16 ENCOUNTER — OFFICE VISIT (OUTPATIENT)
Dept: DERMATOLOGY | Facility: CLINIC | Age: 81
End: 2021-06-16
Payer: MEDICARE

## 2021-06-16 DIAGNOSIS — L30.9 DERMATITIS: ICD-10-CM

## 2021-06-16 PROCEDURE — 99999 PR PBB SHADOW E&M-EST. PATIENT-LVL III: ICD-10-PCS | Mod: PBBFAC,,, | Performed by: STUDENT IN AN ORGANIZED HEALTH CARE EDUCATION/TRAINING PROGRAM

## 2021-06-16 PROCEDURE — 99203 OFFICE O/P NEW LOW 30 MIN: CPT | Mod: S$PBB,,, | Performed by: STUDENT IN AN ORGANIZED HEALTH CARE EDUCATION/TRAINING PROGRAM

## 2021-06-16 PROCEDURE — 99203 PR OFFICE/OUTPT VISIT, NEW, LEVL III, 30-44 MIN: ICD-10-PCS | Mod: S$PBB,,, | Performed by: STUDENT IN AN ORGANIZED HEALTH CARE EDUCATION/TRAINING PROGRAM

## 2021-06-16 PROCEDURE — 99999 PR PBB SHADOW E&M-EST. PATIENT-LVL III: CPT | Mod: PBBFAC,,, | Performed by: STUDENT IN AN ORGANIZED HEALTH CARE EDUCATION/TRAINING PROGRAM

## 2021-06-16 PROCEDURE — 99213 OFFICE O/P EST LOW 20 MIN: CPT | Mod: PBBFAC | Performed by: STUDENT IN AN ORGANIZED HEALTH CARE EDUCATION/TRAINING PROGRAM

## 2021-06-16 RX ORDER — OXYCODONE 13.5 MG/1
1 CAPSULE, EXTENDED RELEASE ORAL 2 TIMES DAILY
COMMUNITY
Start: 2021-06-09 | End: 2021-07-06 | Stop reason: SDUPTHER

## 2021-06-16 RX ORDER — DICYCLOMINE HYDROCHLORIDE 10 MG/5ML
SOLUTION ORAL
COMMUNITY
Start: 2021-06-10 | End: 2021-10-27

## 2021-06-16 RX ORDER — CLOBETASOL PROPIONATE 0.5 MG/G
OINTMENT TOPICAL 2 TIMES DAILY
Qty: 60 G | Refills: 3 | Status: SHIPPED | OUTPATIENT
Start: 2021-06-16 | End: 2023-01-01

## 2021-06-16 RX ORDER — PIMECROLIMUS 10 MG/G
CREAM TOPICAL
Qty: 100 G | Refills: 3 | Status: SHIPPED | OUTPATIENT
Start: 2021-06-16 | End: 2022-01-01 | Stop reason: ALTCHOICE

## 2021-06-16 RX ORDER — BETAMETHASONE VALERATE 1.2 MG/G
CREAM TOPICAL 2 TIMES DAILY
Qty: 45 G | Refills: 5 | Status: SHIPPED | OUTPATIENT
Start: 2021-06-16 | End: 2022-01-01 | Stop reason: ALTCHOICE

## 2021-06-16 RX ORDER — TACROLIMUS 1 MG/G
OINTMENT TOPICAL 2 TIMES DAILY
Qty: 60 G | Refills: 5 | Status: SHIPPED | OUTPATIENT
Start: 2021-06-16 | End: 2023-01-01

## 2021-06-17 ENCOUNTER — PATIENT MESSAGE (OUTPATIENT)
Dept: PHARMACY | Facility: CLINIC | Age: 81
End: 2021-06-17

## 2021-06-17 ENCOUNTER — TELEPHONE (OUTPATIENT)
Dept: PHARMACY | Facility: CLINIC | Age: 81
End: 2021-06-17

## 2021-06-17 ENCOUNTER — OFFICE VISIT (OUTPATIENT)
Dept: PRIMARY CARE CLINIC | Facility: CLINIC | Age: 81
End: 2021-06-17
Payer: MEDICARE

## 2021-06-17 ENCOUNTER — LAB VISIT (OUTPATIENT)
Dept: LAB | Facility: HOSPITAL | Age: 81
End: 2021-06-17
Attending: FAMILY MEDICINE
Payer: MEDICARE

## 2021-06-17 VITALS
DIASTOLIC BLOOD PRESSURE: 62 MMHG | SYSTOLIC BLOOD PRESSURE: 118 MMHG | HEART RATE: 68 BPM | BODY MASS INDEX: 31.68 KG/M2 | HEIGHT: 60 IN | TEMPERATURE: 98 F | WEIGHT: 161.38 LBS | OXYGEN SATURATION: 93 %

## 2021-06-17 DIAGNOSIS — E66.01 MORBID OBESITY: ICD-10-CM

## 2021-06-17 DIAGNOSIS — I50.32 CHRONIC DIASTOLIC CONGESTIVE HEART FAILURE: ICD-10-CM

## 2021-06-17 DIAGNOSIS — I10 ESSENTIAL HYPERTENSION: ICD-10-CM

## 2021-06-17 DIAGNOSIS — C79.51 SECONDARY MALIGNANT NEOPLASM OF BONE: Primary | ICD-10-CM

## 2021-06-17 DIAGNOSIS — Z87.09 HISTORY OF RESPIRATORY FAILURE: ICD-10-CM

## 2021-06-17 DIAGNOSIS — E03.2 DRUG-INDUCED HYPOTHYROIDISM: ICD-10-CM

## 2021-06-17 DIAGNOSIS — E78.2 MIXED HYPERLIPIDEMIA: ICD-10-CM

## 2021-06-17 DIAGNOSIS — R06.89 DYSPNEA AND RESPIRATORY ABNORMALITIES: ICD-10-CM

## 2021-06-17 DIAGNOSIS — K59.03 DRUG-INDUCED CONSTIPATION: ICD-10-CM

## 2021-06-17 DIAGNOSIS — R26.9 GAIT ABNORMALITY: ICD-10-CM

## 2021-06-17 DIAGNOSIS — E79.0 ELEVATED URIC ACID IN BLOOD: ICD-10-CM

## 2021-06-17 DIAGNOSIS — G89.3 CANCER ASSOCIATED PAIN: ICD-10-CM

## 2021-06-17 DIAGNOSIS — C22.0 HCC (HEPATOCELLULAR CARCINOMA): ICD-10-CM

## 2021-06-17 DIAGNOSIS — E11.9 DIET-CONTROLLED DIABETES MELLITUS: ICD-10-CM

## 2021-06-17 DIAGNOSIS — L27.0 DERMATITIS, DRUG-INDUCED: ICD-10-CM

## 2021-06-17 DIAGNOSIS — C79.51 SECONDARY MALIGNANT NEOPLASM OF BONE: ICD-10-CM

## 2021-06-17 DIAGNOSIS — R06.00 DYSPNEA AND RESPIRATORY ABNORMALITIES: ICD-10-CM

## 2021-06-17 PROCEDURE — 80061 LIPID PANEL: CPT | Performed by: FAMILY MEDICINE

## 2021-06-17 PROCEDURE — 83036 HEMOGLOBIN GLYCOSYLATED A1C: CPT | Performed by: FAMILY MEDICINE

## 2021-06-17 PROCEDURE — 99999 PR PBB SHADOW E&M-EST. PATIENT-LVL IV: CPT | Mod: PBBFAC,,, | Performed by: FAMILY MEDICINE

## 2021-06-17 PROCEDURE — 84439 ASSAY OF FREE THYROXINE: CPT | Performed by: FAMILY MEDICINE

## 2021-06-17 PROCEDURE — 84550 ASSAY OF BLOOD/URIC ACID: CPT | Performed by: FAMILY MEDICINE

## 2021-06-17 PROCEDURE — 36415 COLL VENOUS BLD VENIPUNCTURE: CPT | Mod: PN | Performed by: FAMILY MEDICINE

## 2021-06-17 PROCEDURE — 84443 ASSAY THYROID STIM HORMONE: CPT | Performed by: FAMILY MEDICINE

## 2021-06-17 PROCEDURE — 99214 OFFICE O/P EST MOD 30 MIN: CPT | Mod: PBBFAC,PN | Performed by: FAMILY MEDICINE

## 2021-06-17 PROCEDURE — 99999 PR PBB SHADOW E&M-EST. PATIENT-LVL IV: ICD-10-PCS | Mod: PBBFAC,,, | Performed by: FAMILY MEDICINE

## 2021-06-17 PROCEDURE — 99215 OFFICE O/P EST HI 40 MIN: CPT | Mod: S$PBB,,, | Performed by: FAMILY MEDICINE

## 2021-06-17 PROCEDURE — 99215 PR OFFICE/OUTPT VISIT, EST, LEVL V, 40-54 MIN: ICD-10-PCS | Mod: S$PBB,,, | Performed by: FAMILY MEDICINE

## 2021-06-17 RX ORDER — LISINOPRIL 40 MG/1
40 TABLET ORAL DAILY
Qty: 90 TABLET | Refills: 3 | Status: SHIPPED | OUTPATIENT
Start: 2021-06-17 | End: 2022-01-01 | Stop reason: SDUPTHER

## 2021-06-18 ENCOUNTER — OFFICE VISIT (OUTPATIENT)
Dept: HEMATOLOGY/ONCOLOGY | Facility: CLINIC | Age: 81
End: 2021-06-18
Payer: MEDICARE

## 2021-06-18 VITALS
SYSTOLIC BLOOD PRESSURE: 119 MMHG | HEIGHT: 60 IN | DIASTOLIC BLOOD PRESSURE: 63 MMHG | OXYGEN SATURATION: 97 % | TEMPERATURE: 98 F | WEIGHT: 158.75 LBS | BODY MASS INDEX: 31.17 KG/M2 | HEART RATE: 55 BPM

## 2021-06-18 DIAGNOSIS — C22.0 HEPATOCELLULAR CARCINOMA: Primary | ICD-10-CM

## 2021-06-18 DIAGNOSIS — R21 DIFFUSE PAPULAR RASH: ICD-10-CM

## 2021-06-18 DIAGNOSIS — C79.51 SECONDARY MALIGNANT NEOPLASM OF BONE: ICD-10-CM

## 2021-06-18 DIAGNOSIS — R13.10 ODYNOPHAGIA: ICD-10-CM

## 2021-06-18 DIAGNOSIS — E03.9 HYPOTHYROIDISM (ACQUIRED): ICD-10-CM

## 2021-06-18 LAB
CHOLEST SERPL-MCNC: 220 MG/DL (ref 120–199)
CHOLEST/HDLC SERPL: 6.9 {RATIO} (ref 2–5)
ESTIMATED AVG GLUCOSE: 114 MG/DL (ref 68–131)
HBA1C MFR BLD: 5.6 % (ref 4–5.6)
HDLC SERPL-MCNC: 32 MG/DL (ref 40–75)
HDLC SERPL: 14.5 % (ref 20–50)
LDLC SERPL CALC-MCNC: 149.8 MG/DL (ref 63–159)
NONHDLC SERPL-MCNC: 188 MG/DL
T4 FREE SERPL-MCNC: 1.01 NG/DL (ref 0.71–1.51)
TRIGL SERPL-MCNC: 191 MG/DL (ref 30–150)
TSH SERPL DL<=0.005 MIU/L-ACNC: 1.44 UIU/ML (ref 0.4–4)
URATE SERPL-MCNC: 7.4 MG/DL (ref 2.4–5.7)

## 2021-06-18 PROCEDURE — 99999 PR PBB SHADOW E&M-EST. PATIENT-LVL V: ICD-10-PCS | Mod: PBBFAC,,, | Performed by: INTERNAL MEDICINE

## 2021-06-18 PROCEDURE — 99999 PR PBB SHADOW E&M-EST. PATIENT-LVL V: CPT | Mod: PBBFAC,,, | Performed by: INTERNAL MEDICINE

## 2021-06-18 PROCEDURE — 99215 OFFICE O/P EST HI 40 MIN: CPT | Mod: PBBFAC | Performed by: INTERNAL MEDICINE

## 2021-06-18 PROCEDURE — 99215 PR OFFICE/OUTPT VISIT, EST, LEVL V, 40-54 MIN: ICD-10-PCS | Mod: S$PBB,,, | Performed by: INTERNAL MEDICINE

## 2021-06-18 PROCEDURE — 99215 OFFICE O/P EST HI 40 MIN: CPT | Mod: S$PBB,,, | Performed by: INTERNAL MEDICINE

## 2021-06-21 ENCOUNTER — PATIENT MESSAGE (OUTPATIENT)
Dept: HEMATOLOGY/ONCOLOGY | Facility: CLINIC | Age: 81
End: 2021-06-21

## 2021-06-23 ENCOUNTER — TELEPHONE (OUTPATIENT)
Dept: PRIMARY CARE CLINIC | Facility: CLINIC | Age: 81
End: 2021-06-23

## 2021-06-23 ENCOUNTER — PATIENT MESSAGE (OUTPATIENT)
Dept: PRIMARY CARE CLINIC | Facility: CLINIC | Age: 81
End: 2021-06-23

## 2021-06-25 ENCOUNTER — SPECIALTY PHARMACY (OUTPATIENT)
Dept: PHARMACY | Facility: CLINIC | Age: 81
End: 2021-06-25

## 2021-06-28 ENCOUNTER — TELEPHONE (OUTPATIENT)
Dept: HEMATOLOGY/ONCOLOGY | Facility: CLINIC | Age: 81
End: 2021-06-28

## 2021-06-28 ENCOUNTER — LAB VISIT (OUTPATIENT)
Dept: LAB | Facility: HOSPITAL | Age: 81
End: 2021-06-28
Attending: INTERNAL MEDICINE
Payer: MEDICARE

## 2021-06-28 DIAGNOSIS — C22.0 HEPATOCELLULAR CARCINOMA: ICD-10-CM

## 2021-06-28 DIAGNOSIS — C22.0 HCC (HEPATOCELLULAR CARCINOMA): ICD-10-CM

## 2021-06-28 LAB
BASOPHILS # BLD AUTO: 0.07 K/UL (ref 0–0.2)
BASOPHILS NFR BLD: 0.9 % (ref 0–1.9)
DIFFERENTIAL METHOD: ABNORMAL
EOSINOPHIL # BLD AUTO: 0.2 K/UL (ref 0–0.5)
EOSINOPHIL NFR BLD: 3 % (ref 0–8)
ERYTHROCYTE [DISTWIDTH] IN BLOOD BY AUTOMATED COUNT: 16.7 % (ref 11.5–14.5)
HCT VFR BLD AUTO: 48.3 % (ref 37–48.5)
HGB BLD-MCNC: 15 G/DL (ref 12–16)
IMM GRANULOCYTES # BLD AUTO: 0.03 K/UL (ref 0–0.04)
IMM GRANULOCYTES NFR BLD AUTO: 0.4 % (ref 0–0.5)
LYMPHOCYTES # BLD AUTO: 2 K/UL (ref 1–4.8)
LYMPHOCYTES NFR BLD: 26.1 % (ref 18–48)
MCH RBC QN AUTO: 30.1 PG (ref 27–31)
MCHC RBC AUTO-ENTMCNC: 31.1 G/DL (ref 32–36)
MCV RBC AUTO: 97 FL (ref 82–98)
MONOCYTES # BLD AUTO: 1 K/UL (ref 0.3–1)
MONOCYTES NFR BLD: 12.7 % (ref 4–15)
NEUTROPHILS # BLD AUTO: 4.3 K/UL (ref 1.8–7.7)
NEUTROPHILS NFR BLD: 56.9 % (ref 38–73)
NRBC BLD-RTO: 0 /100 WBC
PLATELET # BLD AUTO: 330 K/UL (ref 150–450)
PMV BLD AUTO: 11 FL (ref 9.2–12.9)
RBC # BLD AUTO: 4.99 M/UL (ref 4–5.4)
WBC # BLD AUTO: 7.62 K/UL (ref 3.9–12.7)

## 2021-06-28 PROCEDURE — 80053 COMPREHEN METABOLIC PANEL: CPT | Performed by: INTERNAL MEDICINE

## 2021-06-28 PROCEDURE — 36415 COLL VENOUS BLD VENIPUNCTURE: CPT | Mod: PO | Performed by: INTERNAL MEDICINE

## 2021-06-28 PROCEDURE — 85025 COMPLETE CBC W/AUTO DIFF WBC: CPT | Performed by: INTERNAL MEDICINE

## 2021-06-29 LAB
ALBUMIN SERPL BCP-MCNC: 3.3 G/DL (ref 3.5–5.2)
ALP SERPL-CCNC: 84 U/L (ref 55–135)
ALT SERPL W/O P-5'-P-CCNC: 18 U/L (ref 10–44)
ANION GAP SERPL CALC-SCNC: 11 MMOL/L (ref 8–16)
AST SERPL-CCNC: 23 U/L (ref 10–40)
BILIRUB SERPL-MCNC: 0.4 MG/DL (ref 0.1–1)
BUN SERPL-MCNC: 26 MG/DL (ref 8–23)
CALCIUM SERPL-MCNC: 10 MG/DL (ref 8.7–10.5)
CHLORIDE SERPL-SCNC: 102 MMOL/L (ref 95–110)
CO2 SERPL-SCNC: 24 MMOL/L (ref 23–29)
CREAT SERPL-MCNC: 1.3 MG/DL (ref 0.5–1.4)
EST. GFR  (AFRICAN AMERICAN): 44.1 ML/MIN/1.73 M^2
EST. GFR  (NON AFRICAN AMERICAN): 38.3 ML/MIN/1.73 M^2
GLUCOSE SERPL-MCNC: 119 MG/DL (ref 70–110)
POTASSIUM SERPL-SCNC: 5 MMOL/L (ref 3.5–5.1)
PROT SERPL-MCNC: 7.1 G/DL (ref 6–8.4)
SODIUM SERPL-SCNC: 137 MMOL/L (ref 136–145)

## 2021-06-30 ENCOUNTER — EXTERNAL CHRONIC CARE MANAGEMENT (OUTPATIENT)
Dept: PRIMARY CARE CLINIC | Facility: CLINIC | Age: 81
End: 2021-06-30
Payer: MEDICARE

## 2021-06-30 PROCEDURE — 99487 PR COMPLX CHRON CARE MGMT, 1ST HR, PER MONTH: ICD-10-PCS | Mod: S$PBB,,, | Performed by: FAMILY MEDICINE

## 2021-06-30 PROCEDURE — 99487 CPLX CHRNC CARE 1ST 60 MIN: CPT | Mod: PBBFAC,PN | Performed by: FAMILY MEDICINE

## 2021-06-30 PROCEDURE — 99489 PR COMPLX CHRON CARE MGMT, EA ADDTL 30 MIN, PER MONTH: ICD-10-PCS | Mod: S$PBB,,, | Performed by: FAMILY MEDICINE

## 2021-06-30 PROCEDURE — 99489 CPLX CHRNC CARE EA ADDL 30: CPT | Mod: S$PBB,,, | Performed by: FAMILY MEDICINE

## 2021-06-30 PROCEDURE — 99489 CPLX CHRNC CARE EA ADDL 30: CPT | Mod: PBBFAC,PN | Performed by: FAMILY MEDICINE

## 2021-06-30 PROCEDURE — 99487 CPLX CHRNC CARE 1ST 60 MIN: CPT | Mod: S$PBB,,, | Performed by: FAMILY MEDICINE

## 2021-07-04 ENCOUNTER — PATIENT MESSAGE (OUTPATIENT)
Dept: PALLIATIVE MEDICINE | Facility: CLINIC | Age: 81
End: 2021-07-04

## 2021-07-06 RX ORDER — OXYCODONE 13.5 MG/1
1 CAPSULE, EXTENDED RELEASE ORAL 2 TIMES DAILY
Qty: 180 EACH | Refills: 0 | Status: SHIPPED | OUTPATIENT
Start: 2021-07-06 | End: 2021-08-02

## 2021-07-06 RX ORDER — OXYCODONE AND ACETAMINOPHEN 10; 325 MG/1; MG/1
1 TABLET ORAL 4 TIMES DAILY PRN
COMMUNITY
End: 2021-07-06 | Stop reason: SDUPTHER

## 2021-07-06 RX ORDER — OXYCODONE AND ACETAMINOPHEN 10; 325 MG/1; MG/1
1 TABLET ORAL 4 TIMES DAILY PRN
Qty: 120 TABLET | Refills: 0 | Status: SHIPPED | OUTPATIENT
Start: 2021-07-06 | End: 2021-08-09 | Stop reason: SDUPTHER

## 2021-07-06 RX ORDER — OXYCODONE 13.5 MG/1
1 CAPSULE, EXTENDED RELEASE ORAL 2 TIMES DAILY
Status: CANCELLED | OUTPATIENT
Start: 2021-07-06

## 2021-07-09 ENCOUNTER — PES CALL (OUTPATIENT)
Dept: ADMINISTRATIVE | Facility: CLINIC | Age: 81
End: 2021-07-09

## 2021-07-12 ENCOUNTER — LAB VISIT (OUTPATIENT)
Dept: LAB | Facility: HOSPITAL | Age: 81
End: 2021-07-12
Attending: INTERNAL MEDICINE
Payer: MEDICARE

## 2021-07-12 DIAGNOSIS — C22.0 HEPATOCELLULAR CARCINOMA: ICD-10-CM

## 2021-07-12 LAB
ALBUMIN SERPL BCP-MCNC: 3.3 G/DL (ref 3.5–5.2)
ALP SERPL-CCNC: 89 U/L (ref 55–135)
ALT SERPL W/O P-5'-P-CCNC: 16 U/L (ref 10–44)
ANION GAP SERPL CALC-SCNC: 9 MMOL/L (ref 8–16)
AST SERPL-CCNC: 22 U/L (ref 10–40)
BASOPHILS # BLD AUTO: 0.09 K/UL (ref 0–0.2)
BASOPHILS NFR BLD: 1.1 % (ref 0–1.9)
BILIRUB SERPL-MCNC: 0.5 MG/DL (ref 0.1–1)
BUN SERPL-MCNC: 15 MG/DL (ref 8–23)
CALCIUM SERPL-MCNC: 9.6 MG/DL (ref 8.7–10.5)
CHLORIDE SERPL-SCNC: 103 MMOL/L (ref 95–110)
CO2 SERPL-SCNC: 28 MMOL/L (ref 23–29)
CREAT SERPL-MCNC: 0.8 MG/DL (ref 0.5–1.4)
DIFFERENTIAL METHOD: ABNORMAL
EOSINOPHIL # BLD AUTO: 0.3 K/UL (ref 0–0.5)
EOSINOPHIL NFR BLD: 3.8 % (ref 0–8)
ERYTHROCYTE [DISTWIDTH] IN BLOOD BY AUTOMATED COUNT: 15.8 % (ref 11.5–14.5)
EST. GFR  (AFRICAN AMERICAN): >60 ML/MIN/1.73 M^2
EST. GFR  (NON AFRICAN AMERICAN): >60 ML/MIN/1.73 M^2
GLUCOSE SERPL-MCNC: 86 MG/DL (ref 70–110)
HCT VFR BLD AUTO: 51.1 % (ref 37–48.5)
HGB BLD-MCNC: 15.7 G/DL (ref 12–16)
IMM GRANULOCYTES # BLD AUTO: 0.02 K/UL (ref 0–0.04)
IMM GRANULOCYTES NFR BLD AUTO: 0.3 % (ref 0–0.5)
LYMPHOCYTES # BLD AUTO: 1.9 K/UL (ref 1–4.8)
LYMPHOCYTES NFR BLD: 23.6 % (ref 18–48)
MCH RBC QN AUTO: 29.4 PG (ref 27–31)
MCHC RBC AUTO-ENTMCNC: 30.7 G/DL (ref 32–36)
MCV RBC AUTO: 96 FL (ref 82–98)
MONOCYTES # BLD AUTO: 0.8 K/UL (ref 0.3–1)
MONOCYTES NFR BLD: 10.2 % (ref 4–15)
NEUTROPHILS # BLD AUTO: 4.8 K/UL (ref 1.8–7.7)
NEUTROPHILS NFR BLD: 61 % (ref 38–73)
NRBC BLD-RTO: 0 /100 WBC
PLATELET # BLD AUTO: 226 K/UL (ref 150–450)
PMV BLD AUTO: 10.3 FL (ref 9.2–12.9)
POTASSIUM SERPL-SCNC: 4.3 MMOL/L (ref 3.5–5.1)
PROT SERPL-MCNC: 7.4 G/DL (ref 6–8.4)
RBC # BLD AUTO: 5.34 M/UL (ref 4–5.4)
WBC # BLD AUTO: 7.93 K/UL (ref 3.9–12.7)

## 2021-07-12 PROCEDURE — 85025 COMPLETE CBC W/AUTO DIFF WBC: CPT | Performed by: INTERNAL MEDICINE

## 2021-07-12 PROCEDURE — 80053 COMPREHEN METABOLIC PANEL: CPT | Performed by: INTERNAL MEDICINE

## 2021-07-12 PROCEDURE — 36415 COLL VENOUS BLD VENIPUNCTURE: CPT | Mod: PO | Performed by: INTERNAL MEDICINE

## 2021-07-13 ENCOUNTER — OFFICE VISIT (OUTPATIENT)
Dept: HEMATOLOGY/ONCOLOGY | Facility: CLINIC | Age: 81
End: 2021-07-13
Payer: MEDICARE

## 2021-07-13 VITALS
TEMPERATURE: 98 F | WEIGHT: 157.44 LBS | SYSTOLIC BLOOD PRESSURE: 177 MMHG | HEART RATE: 55 BPM | BODY MASS INDEX: 30.74 KG/M2 | DIASTOLIC BLOOD PRESSURE: 76 MMHG | OXYGEN SATURATION: 93 %

## 2021-07-13 DIAGNOSIS — E03.9 HYPOTHYROIDISM (ACQUIRED): ICD-10-CM

## 2021-07-13 DIAGNOSIS — K59.00 CONSTIPATION, UNSPECIFIED CONSTIPATION TYPE: ICD-10-CM

## 2021-07-13 DIAGNOSIS — C22.0 HCC (HEPATOCELLULAR CARCINOMA): Primary | ICD-10-CM

## 2021-07-13 DIAGNOSIS — R21 RASH: ICD-10-CM

## 2021-07-13 PROCEDURE — 99215 OFFICE O/P EST HI 40 MIN: CPT | Mod: PBBFAC | Performed by: INTERNAL MEDICINE

## 2021-07-13 PROCEDURE — 99215 OFFICE O/P EST HI 40 MIN: CPT | Mod: S$PBB,,, | Performed by: INTERNAL MEDICINE

## 2021-07-13 PROCEDURE — 99999 PR PBB SHADOW E&M-EST. PATIENT-LVL V: CPT | Mod: PBBFAC,,, | Performed by: INTERNAL MEDICINE

## 2021-07-13 PROCEDURE — 99999 PR PBB SHADOW E&M-EST. PATIENT-LVL V: ICD-10-PCS | Mod: PBBFAC,,, | Performed by: INTERNAL MEDICINE

## 2021-07-13 PROCEDURE — 99215 PR OFFICE/OUTPT VISIT, EST, LEVL V, 40-54 MIN: ICD-10-PCS | Mod: S$PBB,,, | Performed by: INTERNAL MEDICINE

## 2021-07-14 ENCOUNTER — PATIENT MESSAGE (OUTPATIENT)
Dept: HEMATOLOGY/ONCOLOGY | Facility: CLINIC | Age: 81
End: 2021-07-14

## 2021-07-15 ENCOUNTER — PATIENT MESSAGE (OUTPATIENT)
Dept: HEMATOLOGY/ONCOLOGY | Facility: CLINIC | Age: 81
End: 2021-07-15

## 2021-07-16 ENCOUNTER — SPECIALTY PHARMACY (OUTPATIENT)
Dept: PHARMACY | Facility: CLINIC | Age: 81
End: 2021-07-16

## 2021-07-22 ENCOUNTER — TELEPHONE (OUTPATIENT)
Dept: RADIOLOGY | Facility: HOSPITAL | Age: 81
End: 2021-07-22

## 2021-07-23 ENCOUNTER — TELEPHONE (OUTPATIENT)
Dept: RADIOLOGY | Facility: HOSPITAL | Age: 81
End: 2021-07-23

## 2021-07-31 ENCOUNTER — EXTERNAL CHRONIC CARE MANAGEMENT (OUTPATIENT)
Dept: PRIMARY CARE CLINIC | Facility: CLINIC | Age: 81
End: 2021-07-31
Payer: MEDICARE

## 2021-07-31 PROCEDURE — 99490 CHRNC CARE MGMT STAFF 1ST 20: CPT | Mod: S$PBB,,, | Performed by: FAMILY MEDICINE

## 2021-07-31 PROCEDURE — 99439 CHRNC CARE MGMT STAF EA ADDL: CPT | Mod: PBBFAC,PN | Performed by: FAMILY MEDICINE

## 2021-07-31 PROCEDURE — 99490 CHRNC CARE MGMT STAFF 1ST 20: CPT | Mod: PBBFAC,PN | Performed by: FAMILY MEDICINE

## 2021-07-31 PROCEDURE — 99439 PR CHRONIC CARE MGMT, EA ADDTL 20 MIN: ICD-10-PCS | Mod: S$PBB,,, | Performed by: FAMILY MEDICINE

## 2021-07-31 PROCEDURE — 99439 CHRNC CARE MGMT STAF EA ADDL: CPT | Mod: S$PBB,,, | Performed by: FAMILY MEDICINE

## 2021-07-31 PROCEDURE — 99490 PR CHRONIC CARE MGMT, 1ST 20 MIN: ICD-10-PCS | Mod: S$PBB,,, | Performed by: FAMILY MEDICINE

## 2021-08-02 ENCOUNTER — HOSPITAL ENCOUNTER (OUTPATIENT)
Dept: RADIOLOGY | Facility: HOSPITAL | Age: 81
Discharge: HOME | End: 2021-08-02
Attending: INTERNAL MEDICINE
Payer: MEDICARE

## 2021-08-02 DIAGNOSIS — C22.0 HCC (HEPATOCELLULAR CARCINOMA): ICD-10-CM

## 2021-08-02 PROCEDURE — A9552 F18 FDG: HCPCS

## 2021-08-02 PROCEDURE — 78815 PET IMAGE W/CT SKULL-THIGH: CPT | Mod: 26,PS,, | Performed by: RADIOLOGY

## 2021-08-02 PROCEDURE — 78815 NM PET CT ROUTINE: ICD-10-PCS | Mod: 26,PS,, | Performed by: RADIOLOGY

## 2021-08-02 PROCEDURE — 78815 PET IMAGE W/CT SKULL-THIGH: CPT | Mod: TC,PS

## 2021-08-04 ENCOUNTER — TELEPHONE (OUTPATIENT)
Dept: HEMATOLOGY/ONCOLOGY | Facility: CLINIC | Age: 81
End: 2021-08-04

## 2021-08-05 ENCOUNTER — SPECIALTY PHARMACY (OUTPATIENT)
Dept: PHARMACY | Facility: CLINIC | Age: 81
End: 2021-08-05

## 2021-08-05 RX ORDER — SENNOSIDES 8.6 MG/1
1 TABLET ORAL DAILY
COMMUNITY

## 2021-08-06 ENCOUNTER — PATIENT MESSAGE (OUTPATIENT)
Dept: PRIMARY CARE CLINIC | Facility: CLINIC | Age: 81
End: 2021-08-06

## 2021-08-09 ENCOUNTER — PATIENT MESSAGE (OUTPATIENT)
Dept: HEMATOLOGY/ONCOLOGY | Facility: CLINIC | Age: 81
End: 2021-08-09

## 2021-08-09 ENCOUNTER — PATIENT MESSAGE (OUTPATIENT)
Dept: PALLIATIVE MEDICINE | Facility: CLINIC | Age: 81
End: 2021-08-09

## 2021-08-09 ENCOUNTER — TELEPHONE (OUTPATIENT)
Dept: PALLIATIVE MEDICINE | Facility: CLINIC | Age: 81
End: 2021-08-09

## 2021-08-09 DIAGNOSIS — U07.1 COVID-19 VIRUS DETECTED: Primary | ICD-10-CM

## 2021-08-09 DIAGNOSIS — C22.0 HCC (HEPATOCELLULAR CARCINOMA): Primary | ICD-10-CM

## 2021-08-09 RX ORDER — OXYCODONE AND ACETAMINOPHEN 10; 325 MG/1; MG/1
1 TABLET ORAL 4 TIMES DAILY PRN
Qty: 120 TABLET | Refills: 0 | Status: SHIPPED | OUTPATIENT
Start: 2021-08-09 | End: 2021-09-12 | Stop reason: SDUPTHER

## 2021-08-10 ENCOUNTER — PATIENT MESSAGE (OUTPATIENT)
Dept: PRIMARY CARE CLINIC | Facility: CLINIC | Age: 81
End: 2021-08-10

## 2021-08-10 ENCOUNTER — PATIENT MESSAGE (OUTPATIENT)
Dept: ADMINISTRATIVE | Facility: OTHER | Age: 81
End: 2021-08-10

## 2021-08-10 ENCOUNTER — PATIENT MESSAGE (OUTPATIENT)
Dept: HEMATOLOGY/ONCOLOGY | Facility: CLINIC | Age: 81
End: 2021-08-10

## 2021-08-10 ENCOUNTER — PATIENT MESSAGE (OUTPATIENT)
Dept: ADMINISTRATIVE | Facility: CLINIC | Age: 81
End: 2021-08-10

## 2021-08-10 ENCOUNTER — NURSE TRIAGE (OUTPATIENT)
Dept: ADMINISTRATIVE | Facility: CLINIC | Age: 81
End: 2021-08-10

## 2021-08-10 ENCOUNTER — INFUSION (OUTPATIENT)
Dept: INFECTIOUS DISEASES | Facility: HOSPITAL | Age: 81
End: 2021-08-10
Attending: INTERNAL MEDICINE
Payer: MEDICARE

## 2021-08-10 VITALS
RESPIRATION RATE: 18 BRPM | OXYGEN SATURATION: 91 % | DIASTOLIC BLOOD PRESSURE: 63 MMHG | HEART RATE: 54 BPM | SYSTOLIC BLOOD PRESSURE: 137 MMHG | TEMPERATURE: 98 F

## 2021-08-10 DIAGNOSIS — U07.1 COVID-19 VIRUS DETECTED: ICD-10-CM

## 2021-08-10 DIAGNOSIS — U07.1 COVID-19: Primary | ICD-10-CM

## 2021-08-10 PROCEDURE — M0243 CASIRIVI AND IMDEVI INFUSION: HCPCS | Performed by: INTERNAL MEDICINE

## 2021-08-10 PROCEDURE — 63600175 PHARM REV CODE 636 W HCPCS: Performed by: INTERNAL MEDICINE

## 2021-08-10 PROCEDURE — 25000003 PHARM REV CODE 250: Performed by: INTERNAL MEDICINE

## 2021-08-10 RX ORDER — SODIUM CHLORIDE 0.9 % (FLUSH) 0.9 %
10 SYRINGE (ML) INJECTION
Status: DISCONTINUED | OUTPATIENT
Start: 2021-08-10 | End: 2021-10-27

## 2021-08-10 RX ORDER — DIPHENHYDRAMINE HYDROCHLORIDE 50 MG/ML
25 INJECTION INTRAMUSCULAR; INTRAVENOUS ONCE AS NEEDED
Status: DISCONTINUED | OUTPATIENT
Start: 2021-08-10 | End: 2021-10-27

## 2021-08-10 RX ORDER — ONDANSETRON 4 MG/1
4 TABLET, ORALLY DISINTEGRATING ORAL ONCE AS NEEDED
Status: DISCONTINUED | OUTPATIENT
Start: 2021-08-10 | End: 2021-10-27

## 2021-08-10 RX ORDER — ACETAMINOPHEN 325 MG/1
650 TABLET ORAL ONCE AS NEEDED
Status: DISCONTINUED | OUTPATIENT
Start: 2021-08-10 | End: 2021-10-27

## 2021-08-10 RX ORDER — EPINEPHRINE 0.3 MG/.3ML
0.3 INJECTION SUBCUTANEOUS
Status: DISCONTINUED | OUTPATIENT
Start: 2021-08-10 | End: 2021-10-27

## 2021-08-10 RX ORDER — ALBUTEROL SULFATE 90 UG/1
2 AEROSOL, METERED RESPIRATORY (INHALATION)
Status: DISCONTINUED | OUTPATIENT
Start: 2021-08-10 | End: 2021-10-27

## 2021-08-10 RX ADMIN — CASIRIVIMAB AND IMDEVIMAB 600 MG: 600; 600 INJECTION, SOLUTION, CONCENTRATE INTRAVENOUS at 01:08

## 2021-08-11 ENCOUNTER — PATIENT MESSAGE (OUTPATIENT)
Dept: ADMINISTRATIVE | Facility: OTHER | Age: 81
End: 2021-08-11

## 2021-08-11 ENCOUNTER — NURSE TRIAGE (OUTPATIENT)
Dept: ADMINISTRATIVE | Facility: CLINIC | Age: 81
End: 2021-08-11

## 2021-08-13 ENCOUNTER — SPECIALTY PHARMACY (OUTPATIENT)
Dept: PHARMACY | Facility: CLINIC | Age: 81
End: 2021-08-13

## 2021-08-18 ENCOUNTER — OFFICE VISIT (OUTPATIENT)
Dept: HEMATOLOGY/ONCOLOGY | Facility: CLINIC | Age: 81
End: 2021-08-18
Payer: MEDICARE

## 2021-08-18 DIAGNOSIS — U07.1 COVID-19 VIRUS DETECTED: ICD-10-CM

## 2021-08-18 DIAGNOSIS — C22.0 HCC (HEPATOCELLULAR CARCINOMA): Primary | ICD-10-CM

## 2021-08-18 DIAGNOSIS — E03.9 HYPOTHYROIDISM (ACQUIRED): ICD-10-CM

## 2021-08-18 DIAGNOSIS — C77.8 SECONDARY MALIGNANT NEOPLASM OF LYMPH NODES OF MULTIPLE SITES: ICD-10-CM

## 2021-08-18 PROCEDURE — 99215 OFFICE O/P EST HI 40 MIN: CPT | Mod: CR,95,, | Performed by: INTERNAL MEDICINE

## 2021-08-18 PROCEDURE — 99215 PR OFFICE/OUTPT VISIT, EST, LEVL V, 40-54 MIN: ICD-10-PCS | Mod: CR,95,, | Performed by: INTERNAL MEDICINE

## 2021-08-19 ENCOUNTER — DOCUMENTATION ONLY (OUTPATIENT)
Dept: HEMATOLOGY/ONCOLOGY | Facility: CLINIC | Age: 81
End: 2021-08-19

## 2021-08-24 ENCOUNTER — LAB VISIT (OUTPATIENT)
Dept: LAB | Facility: HOSPITAL | Age: 81
End: 2021-08-24
Attending: INTERNAL MEDICINE
Payer: MEDICARE

## 2021-08-24 DIAGNOSIS — C22.0 HCC (HEPATOCELLULAR CARCINOMA): ICD-10-CM

## 2021-08-24 LAB
AFP SERPL-MCNC: <2 NG/ML (ref 0–8.4)
ALBUMIN SERPL BCP-MCNC: 3.2 G/DL (ref 3.5–5.2)
ALP SERPL-CCNC: 78 U/L (ref 55–135)
ALT SERPL W/O P-5'-P-CCNC: 24 U/L (ref 10–44)
ANION GAP SERPL CALC-SCNC: 11 MMOL/L (ref 8–16)
AST SERPL-CCNC: 36 U/L (ref 10–40)
BASOPHILS # BLD AUTO: 0.07 K/UL (ref 0–0.2)
BASOPHILS NFR BLD: 0.8 % (ref 0–1.9)
BILIRUB SERPL-MCNC: 0.7 MG/DL (ref 0.1–1)
BUN SERPL-MCNC: 12 MG/DL (ref 8–23)
CALCIUM SERPL-MCNC: 10 MG/DL (ref 8.7–10.5)
CHLORIDE SERPL-SCNC: 102 MMOL/L (ref 95–110)
CO2 SERPL-SCNC: 27 MMOL/L (ref 23–29)
CREAT SERPL-MCNC: 0.7 MG/DL (ref 0.5–1.4)
DIFFERENTIAL METHOD: ABNORMAL
EOSINOPHIL # BLD AUTO: 0.2 K/UL (ref 0–0.5)
EOSINOPHIL NFR BLD: 2 % (ref 0–8)
ERYTHROCYTE [DISTWIDTH] IN BLOOD BY AUTOMATED COUNT: 16.3 % (ref 11.5–14.5)
EST. GFR  (AFRICAN AMERICAN): >60 ML/MIN/1.73 M^2
EST. GFR  (NON AFRICAN AMERICAN): >60 ML/MIN/1.73 M^2
GLUCOSE SERPL-MCNC: 96 MG/DL (ref 70–110)
HCT VFR BLD AUTO: 51.3 % (ref 37–48.5)
HGB BLD-MCNC: 16 G/DL (ref 12–16)
IMM GRANULOCYTES # BLD AUTO: 0.04 K/UL (ref 0–0.04)
IMM GRANULOCYTES NFR BLD AUTO: 0.4 % (ref 0–0.5)
INR PPP: 1 (ref 0.8–1.2)
LYMPHOCYTES # BLD AUTO: 1.8 K/UL (ref 1–4.8)
LYMPHOCYTES NFR BLD: 19.3 % (ref 18–48)
MCH RBC QN AUTO: 29.5 PG (ref 27–31)
MCHC RBC AUTO-ENTMCNC: 31.2 G/DL (ref 32–36)
MCV RBC AUTO: 95 FL (ref 82–98)
MONOCYTES # BLD AUTO: 0.8 K/UL (ref 0.3–1)
MONOCYTES NFR BLD: 8.2 % (ref 4–15)
NEUTROPHILS # BLD AUTO: 6.4 K/UL (ref 1.8–7.7)
NEUTROPHILS NFR BLD: 69.3 % (ref 38–73)
NRBC BLD-RTO: 0 /100 WBC
PLATELET # BLD AUTO: 162 K/UL (ref 150–450)
PMV BLD AUTO: 11.2 FL (ref 9.2–12.9)
POTASSIUM SERPL-SCNC: 3.9 MMOL/L (ref 3.5–5.1)
PROT SERPL-MCNC: 6.9 G/DL (ref 6–8.4)
PROTHROMBIN TIME: 11.4 SEC (ref 9–12.5)
RBC # BLD AUTO: 5.43 M/UL (ref 4–5.4)
SODIUM SERPL-SCNC: 140 MMOL/L (ref 136–145)
T4 FREE SERPL-MCNC: 1.05 NG/DL (ref 0.71–1.51)
TSH SERPL DL<=0.005 MIU/L-ACNC: 1.63 UIU/ML (ref 0.4–4)
WBC # BLD AUTO: 9.16 K/UL (ref 3.9–12.7)

## 2021-08-24 PROCEDURE — 84443 ASSAY THYROID STIM HORMONE: CPT | Performed by: INTERNAL MEDICINE

## 2021-08-24 PROCEDURE — 84439 ASSAY OF FREE THYROXINE: CPT | Performed by: INTERNAL MEDICINE

## 2021-08-24 PROCEDURE — 36415 COLL VENOUS BLD VENIPUNCTURE: CPT | Mod: PO | Performed by: INTERNAL MEDICINE

## 2021-08-24 PROCEDURE — 82105 ALPHA-FETOPROTEIN SERUM: CPT | Performed by: INTERNAL MEDICINE

## 2021-08-24 PROCEDURE — 85025 COMPLETE CBC W/AUTO DIFF WBC: CPT | Performed by: INTERNAL MEDICINE

## 2021-08-24 PROCEDURE — 80053 COMPREHEN METABOLIC PANEL: CPT | Performed by: INTERNAL MEDICINE

## 2021-08-24 PROCEDURE — 85610 PROTHROMBIN TIME: CPT | Performed by: INTERNAL MEDICINE

## 2021-08-25 ENCOUNTER — PATIENT MESSAGE (OUTPATIENT)
Dept: HEMATOLOGY/ONCOLOGY | Facility: CLINIC | Age: 81
End: 2021-08-25

## 2021-08-25 ENCOUNTER — CLINICAL SUPPORT (OUTPATIENT)
Dept: HEMATOLOGY/ONCOLOGY | Facility: CLINIC | Age: 81
End: 2021-08-25
Payer: MEDICARE

## 2021-08-25 DIAGNOSIS — C22.0 HCC (HEPATOCELLULAR CARCINOMA): ICD-10-CM

## 2021-08-25 DIAGNOSIS — C77.8 SECONDARY MALIGNANT NEOPLASM OF LYMPH NODES OF MULTIPLE SITES: ICD-10-CM

## 2021-08-25 DIAGNOSIS — Z71.9 ENCOUNTER FOR EDUCATION: Primary | ICD-10-CM

## 2021-08-25 DIAGNOSIS — C79.51 SECONDARY MALIGNANT NEOPLASM OF BONE: ICD-10-CM

## 2021-08-25 DIAGNOSIS — C22.0 HEPATOCELLULAR CARCINOMA: ICD-10-CM

## 2021-08-25 PROCEDURE — 99499 UNLISTED E&M SERVICE: CPT | Mod: 95,,,

## 2021-08-25 PROCEDURE — 99499 NO LOS: ICD-10-PCS | Mod: 95,,,

## 2021-08-26 ENCOUNTER — SOCIAL WORK (OUTPATIENT)
Dept: HEMATOLOGY/ONCOLOGY | Facility: CLINIC | Age: 81
End: 2021-08-26

## 2021-08-26 ENCOUNTER — INFUSION (OUTPATIENT)
Dept: INFUSION THERAPY | Facility: HOSPITAL | Age: 81
End: 2021-08-26
Attending: INTERNAL MEDICINE
Payer: MEDICARE

## 2021-08-26 ENCOUNTER — OFFICE VISIT (OUTPATIENT)
Dept: HEMATOLOGY/ONCOLOGY | Facility: CLINIC | Age: 81
End: 2021-08-26
Payer: MEDICARE

## 2021-08-26 VITALS
BODY MASS INDEX: 30.01 KG/M2 | SYSTOLIC BLOOD PRESSURE: 112 MMHG | DIASTOLIC BLOOD PRESSURE: 57 MMHG | TEMPERATURE: 98 F | OXYGEN SATURATION: 93 % | WEIGHT: 153.69 LBS | HEART RATE: 66 BPM | RESPIRATION RATE: 18 BRPM

## 2021-08-26 DIAGNOSIS — C77.8 SECONDARY MALIGNANT NEOPLASM OF LYMPH NODES OF MULTIPLE SITES: ICD-10-CM

## 2021-08-26 DIAGNOSIS — C22.0 HCC (HEPATOCELLULAR CARCINOMA): Primary | ICD-10-CM

## 2021-08-26 DIAGNOSIS — E03.9 HYPOTHYROIDISM (ACQUIRED): ICD-10-CM

## 2021-08-26 DIAGNOSIS — C22.0 HEPATOCELLULAR CARCINOMA: ICD-10-CM

## 2021-08-26 PROCEDURE — 63600175 PHARM REV CODE 636 W HCPCS: Mod: JG | Performed by: INTERNAL MEDICINE

## 2021-08-26 PROCEDURE — 96417 CHEMO IV INFUS EACH ADDL SEQ: CPT

## 2021-08-26 PROCEDURE — 96415 CHEMO IV INFUSION ADDL HR: CPT

## 2021-08-26 PROCEDURE — 96413 CHEMO IV INFUSION 1 HR: CPT

## 2021-08-26 PROCEDURE — 99214 OFFICE O/P EST MOD 30 MIN: CPT | Mod: 95,,, | Performed by: INTERNAL MEDICINE

## 2021-08-26 PROCEDURE — 25000003 PHARM REV CODE 250: Performed by: INTERNAL MEDICINE

## 2021-08-26 PROCEDURE — 99214 PR OFFICE/OUTPT VISIT, EST, LEVL IV, 30-39 MIN: ICD-10-PCS | Mod: 95,,, | Performed by: INTERNAL MEDICINE

## 2021-08-26 RX ORDER — DIPHENHYDRAMINE HYDROCHLORIDE 50 MG/ML
25 INJECTION INTRAMUSCULAR; INTRAVENOUS EVERY 10 MIN PRN
Status: CANCELLED | OUTPATIENT
Start: 2021-08-26 | End: 2021-08-27

## 2021-08-26 RX ORDER — METHYLPREDNISOLONE SOD SUCC 125 MG
125 VIAL (EA) INJECTION ONCE AS NEEDED
Status: CANCELLED | OUTPATIENT
Start: 2021-08-26

## 2021-08-26 RX ORDER — METHYLPREDNISOLONE SOD SUCC 125 MG
125 VIAL (EA) INJECTION ONCE AS NEEDED
Status: DISCONTINUED | OUTPATIENT
Start: 2021-08-26 | End: 2021-08-26 | Stop reason: HOSPADM

## 2021-08-26 RX ORDER — EPINEPHRINE 0.3 MG/.3ML
0.3 INJECTION SUBCUTANEOUS ONCE AS NEEDED
Status: DISCONTINUED | OUTPATIENT
Start: 2021-08-26 | End: 2021-08-26 | Stop reason: HOSPADM

## 2021-08-26 RX ORDER — DIPHENHYDRAMINE HYDROCHLORIDE 50 MG/ML
25 INJECTION INTRAMUSCULAR; INTRAVENOUS EVERY 10 MIN PRN
Status: DISCONTINUED | OUTPATIENT
Start: 2021-08-26 | End: 2021-08-26 | Stop reason: HOSPADM

## 2021-08-26 RX ORDER — SODIUM CHLORIDE 0.9 % (FLUSH) 0.9 %
10 SYRINGE (ML) INJECTION
Status: CANCELLED | OUTPATIENT
Start: 2021-08-26

## 2021-08-26 RX ORDER — EPINEPHRINE 0.3 MG/.3ML
0.3 INJECTION SUBCUTANEOUS ONCE AS NEEDED
Status: CANCELLED | OUTPATIENT
Start: 2021-08-26

## 2021-08-26 RX ORDER — HEPARIN 100 UNIT/ML
500 SYRINGE INTRAVENOUS
Status: CANCELLED | OUTPATIENT
Start: 2021-08-26

## 2021-08-26 RX ADMIN — BEVACIZUMAB 1000 MG: 400 INJECTION, SOLUTION INTRAVENOUS at 02:08

## 2021-08-26 RX ADMIN — ATEZOLIZUMAB 1200 MG: 1200 INJECTION, SOLUTION INTRAVENOUS at 01:08

## 2021-08-31 ENCOUNTER — PATIENT MESSAGE (OUTPATIENT)
Dept: HEMATOLOGY/ONCOLOGY | Facility: CLINIC | Age: 81
End: 2021-08-31

## 2021-08-31 ENCOUNTER — EXTERNAL CHRONIC CARE MANAGEMENT (OUTPATIENT)
Dept: PRIMARY CARE CLINIC | Facility: CLINIC | Age: 81
End: 2021-08-31
Payer: MEDICARE

## 2021-08-31 DIAGNOSIS — C22.0 HCC (HEPATOCELLULAR CARCINOMA): Primary | ICD-10-CM

## 2021-08-31 PROCEDURE — 99489 PR COMPLX CHRON CARE MGMT, EA ADDTL 30 MIN, PER MONTH: ICD-10-PCS | Mod: S$PBB,,, | Performed by: FAMILY MEDICINE

## 2021-08-31 PROCEDURE — 99487 CPLX CHRNC CARE 1ST 60 MIN: CPT | Mod: S$PBB,,, | Performed by: FAMILY MEDICINE

## 2021-08-31 PROCEDURE — 99487 PR COMPLX CHRON CARE MGMT, 1ST HR, PER MONTH: ICD-10-PCS | Mod: S$PBB,,, | Performed by: FAMILY MEDICINE

## 2021-08-31 PROCEDURE — 99489 CPLX CHRNC CARE EA ADDL 30: CPT | Mod: PBBFAC,PN | Performed by: FAMILY MEDICINE

## 2021-08-31 PROCEDURE — 99489 CPLX CHRNC CARE EA ADDL 30: CPT | Mod: S$PBB,,, | Performed by: FAMILY MEDICINE

## 2021-08-31 PROCEDURE — 99487 CPLX CHRNC CARE 1ST 60 MIN: CPT | Mod: PBBFAC,PN | Performed by: FAMILY MEDICINE

## 2021-08-31 RX ORDER — OXYCODONE 13.5 MG/1
1 CAPSULE, EXTENDED RELEASE ORAL 2 TIMES DAILY
Qty: 60 EACH | Refills: 0 | Status: SHIPPED | OUTPATIENT
Start: 2021-08-31 | End: 2021-10-04 | Stop reason: SDUPTHER

## 2021-09-15 ENCOUNTER — LAB VISIT (OUTPATIENT)
Dept: LAB | Facility: HOSPITAL | Age: 81
End: 2021-09-15
Attending: INTERNAL MEDICINE
Payer: MEDICARE

## 2021-09-15 DIAGNOSIS — E03.9 HYPOTHYROIDISM (ACQUIRED): ICD-10-CM

## 2021-09-15 DIAGNOSIS — C22.0 HEPATOCELLULAR CARCINOMA: ICD-10-CM

## 2021-09-15 LAB
ALBUMIN SERPL BCP-MCNC: 3.3 G/DL (ref 3.5–5.2)
ALP SERPL-CCNC: 73 U/L (ref 55–135)
ALT SERPL W/O P-5'-P-CCNC: 12 U/L (ref 10–44)
ANION GAP SERPL CALC-SCNC: 11 MMOL/L (ref 8–16)
AST SERPL-CCNC: 19 U/L (ref 10–40)
BASOPHILS # BLD AUTO: 0.08 K/UL (ref 0–0.2)
BASOPHILS NFR BLD: 1.1 % (ref 0–1.9)
BILIRUB SERPL-MCNC: 0.7 MG/DL (ref 0.1–1)
BUN SERPL-MCNC: 15 MG/DL (ref 8–23)
CALCIUM SERPL-MCNC: 10 MG/DL (ref 8.7–10.5)
CHLORIDE SERPL-SCNC: 104 MMOL/L (ref 95–110)
CO2 SERPL-SCNC: 25 MMOL/L (ref 23–29)
CREAT SERPL-MCNC: 0.8 MG/DL (ref 0.5–1.4)
DIFFERENTIAL METHOD: ABNORMAL
EOSINOPHIL # BLD AUTO: 0.3 K/UL (ref 0–0.5)
EOSINOPHIL NFR BLD: 3.5 % (ref 0–8)
ERYTHROCYTE [DISTWIDTH] IN BLOOD BY AUTOMATED COUNT: 16.8 % (ref 11.5–14.5)
EST. GFR  (AFRICAN AMERICAN): >60 ML/MIN/1.73 M^2
EST. GFR  (NON AFRICAN AMERICAN): >60 ML/MIN/1.73 M^2
GLUCOSE SERPL-MCNC: 106 MG/DL (ref 70–110)
HCT VFR BLD AUTO: 47.5 % (ref 37–48.5)
HGB BLD-MCNC: 14.5 G/DL (ref 12–16)
IMM GRANULOCYTES # BLD AUTO: 0.01 K/UL (ref 0–0.04)
IMM GRANULOCYTES NFR BLD AUTO: 0.1 % (ref 0–0.5)
LYMPHOCYTES # BLD AUTO: 2 K/UL (ref 1–4.8)
LYMPHOCYTES NFR BLD: 27 % (ref 18–48)
MCH RBC QN AUTO: 29.4 PG (ref 27–31)
MCHC RBC AUTO-ENTMCNC: 30.5 G/DL (ref 32–36)
MCV RBC AUTO: 96 FL (ref 82–98)
MONOCYTES # BLD AUTO: 0.7 K/UL (ref 0.3–1)
MONOCYTES NFR BLD: 9.5 % (ref 4–15)
NEUTROPHILS # BLD AUTO: 4.3 K/UL (ref 1.8–7.7)
NEUTROPHILS NFR BLD: 58.8 % (ref 38–73)
NRBC BLD-RTO: 0 /100 WBC
PLATELET # BLD AUTO: 259 K/UL (ref 150–450)
PMV BLD AUTO: 10.4 FL (ref 9.2–12.9)
POTASSIUM SERPL-SCNC: 4.5 MMOL/L (ref 3.5–5.1)
PROT SERPL-MCNC: 7.4 G/DL (ref 6–8.4)
RBC # BLD AUTO: 4.94 M/UL (ref 4–5.4)
SODIUM SERPL-SCNC: 140 MMOL/L (ref 136–145)
T4 FREE SERPL-MCNC: 1.08 NG/DL (ref 0.71–1.51)
TSH SERPL DL<=0.005 MIU/L-ACNC: 1.06 UIU/ML (ref 0.4–4)
WBC # BLD AUTO: 7.36 K/UL (ref 3.9–12.7)

## 2021-09-15 PROCEDURE — 36415 COLL VENOUS BLD VENIPUNCTURE: CPT | Mod: PO | Performed by: INTERNAL MEDICINE

## 2021-09-15 PROCEDURE — 84439 ASSAY OF FREE THYROXINE: CPT | Performed by: INTERNAL MEDICINE

## 2021-09-15 PROCEDURE — 80053 COMPREHEN METABOLIC PANEL: CPT | Performed by: INTERNAL MEDICINE

## 2021-09-15 PROCEDURE — 84443 ASSAY THYROID STIM HORMONE: CPT | Performed by: INTERNAL MEDICINE

## 2021-09-15 PROCEDURE — 85025 COMPLETE CBC W/AUTO DIFF WBC: CPT | Performed by: INTERNAL MEDICINE

## 2021-09-16 ENCOUNTER — OFFICE VISIT (OUTPATIENT)
Dept: HEMATOLOGY/ONCOLOGY | Facility: CLINIC | Age: 81
End: 2021-09-16
Payer: MEDICARE

## 2021-09-16 ENCOUNTER — INFUSION (OUTPATIENT)
Dept: INFUSION THERAPY | Facility: HOSPITAL | Age: 81
End: 2021-09-16
Attending: INTERNAL MEDICINE
Payer: MEDICARE

## 2021-09-16 VITALS
HEIGHT: 61 IN | HEART RATE: 60 BPM | DIASTOLIC BLOOD PRESSURE: 75 MMHG | WEIGHT: 151.44 LBS | RESPIRATION RATE: 16 BRPM | OXYGEN SATURATION: 94 % | BODY MASS INDEX: 28.59 KG/M2 | SYSTOLIC BLOOD PRESSURE: 159 MMHG | TEMPERATURE: 98 F

## 2021-09-16 DIAGNOSIS — C77.8 SECONDARY MALIGNANT NEOPLASM OF LYMPH NODES OF MULTIPLE SITES: ICD-10-CM

## 2021-09-16 DIAGNOSIS — E03.9 HYPOTHYROIDISM (ACQUIRED): ICD-10-CM

## 2021-09-16 DIAGNOSIS — C22.0 HCC (HEPATOCELLULAR CARCINOMA): Primary | ICD-10-CM

## 2021-09-16 DIAGNOSIS — C22.0 HEPATOCELLULAR CARCINOMA: ICD-10-CM

## 2021-09-16 DIAGNOSIS — H91.91 HEARING LOSS OF RIGHT EAR, UNSPECIFIED HEARING LOSS TYPE: ICD-10-CM

## 2021-09-16 PROCEDURE — 96417 CHEMO IV INFUS EACH ADDL SEQ: CPT

## 2021-09-16 PROCEDURE — 99215 PR OFFICE/OUTPT VISIT, EST, LEVL V, 40-54 MIN: ICD-10-PCS | Mod: 95,,, | Performed by: INTERNAL MEDICINE

## 2021-09-16 PROCEDURE — 63600175 PHARM REV CODE 636 W HCPCS: Mod: JG | Performed by: INTERNAL MEDICINE

## 2021-09-16 PROCEDURE — 96413 CHEMO IV INFUSION 1 HR: CPT

## 2021-09-16 PROCEDURE — 99215 OFFICE O/P EST HI 40 MIN: CPT | Mod: 95,,, | Performed by: INTERNAL MEDICINE

## 2021-09-16 PROCEDURE — 25000003 PHARM REV CODE 250: Performed by: INTERNAL MEDICINE

## 2021-09-16 RX ORDER — HEPARIN 100 UNIT/ML
500 SYRINGE INTRAVENOUS
Status: CANCELLED | OUTPATIENT
Start: 2021-09-16

## 2021-09-16 RX ORDER — EPINEPHRINE 0.3 MG/.3ML
0.3 INJECTION SUBCUTANEOUS ONCE AS NEEDED
Status: CANCELLED | OUTPATIENT
Start: 2021-09-16

## 2021-09-16 RX ORDER — DIPHENHYDRAMINE HYDROCHLORIDE 50 MG/ML
25 INJECTION INTRAMUSCULAR; INTRAVENOUS EVERY 10 MIN PRN
Status: CANCELLED | OUTPATIENT
Start: 2021-09-16 | End: 2021-09-17

## 2021-09-16 RX ORDER — OXYCODONE AND ACETAMINOPHEN 10; 325 MG/1; MG/1
1 TABLET ORAL 4 TIMES DAILY PRN
Qty: 120 TABLET | Refills: 0 | Status: SHIPPED | OUTPATIENT
Start: 2021-09-16 | End: 2021-12-01 | Stop reason: SDUPTHER

## 2021-09-16 RX ORDER — SODIUM CHLORIDE 0.9 % (FLUSH) 0.9 %
10 SYRINGE (ML) INJECTION
Status: CANCELLED | OUTPATIENT
Start: 2021-09-16

## 2021-09-16 RX ORDER — METHYLPREDNISOLONE SOD SUCC 125 MG
125 VIAL (EA) INJECTION ONCE AS NEEDED
Status: CANCELLED | OUTPATIENT
Start: 2021-09-16

## 2021-09-16 RX ORDER — LEVOTHYROXINE SODIUM 50 UG/1
50 CAPSULE ORAL
Qty: 90 CAPSULE | Refills: 3 | Status: SHIPPED | OUTPATIENT
Start: 2021-09-16 | End: 2022-01-01 | Stop reason: SDUPTHER

## 2021-09-16 RX ADMIN — ATEZOLIZUMAB 1200 MG: 1200 INJECTION, SOLUTION INTRAVENOUS at 02:09

## 2021-09-16 RX ADMIN — BEVACIZUMAB 1000 MG: 400 INJECTION, SOLUTION INTRAVENOUS at 02:09

## 2021-09-17 DIAGNOSIS — C22.0 HCC (HEPATOCELLULAR CARCINOMA): ICD-10-CM

## 2021-09-21 ENCOUNTER — OFFICE VISIT (OUTPATIENT)
Dept: OTOLARYNGOLOGY | Facility: CLINIC | Age: 81
End: 2021-09-21
Payer: MEDICARE

## 2021-09-21 ENCOUNTER — CLINICAL SUPPORT (OUTPATIENT)
Dept: AUDIOLOGY | Facility: CLINIC | Age: 81
End: 2021-09-21
Payer: MEDICARE

## 2021-09-21 VITALS — SYSTOLIC BLOOD PRESSURE: 150 MMHG | TEMPERATURE: 98 F | HEART RATE: 56 BPM | DIASTOLIC BLOOD PRESSURE: 64 MMHG

## 2021-09-21 DIAGNOSIS — H93.8X1: Primary | ICD-10-CM

## 2021-09-21 DIAGNOSIS — H90.3 SENSORINEURAL HEARING LOSS (SNHL) OF BOTH EARS: ICD-10-CM

## 2021-09-21 DIAGNOSIS — H90.6 MIXED CONDUCTIVE AND SENSORINEURAL HEARING LOSS, BILATERAL: ICD-10-CM

## 2021-09-21 PROCEDURE — 99999 PR PBB SHADOW E&M-EST. PATIENT-LVL II: CPT | Mod: PBBFAC,,, | Performed by: AUDIOLOGIST-HEARING AID FITTER

## 2021-09-21 PROCEDURE — 99214 PR OFFICE/OUTPT VISIT, EST, LEVL IV, 30-39 MIN: ICD-10-PCS | Mod: S$PBB,ICN,, | Performed by: PHYSICIAN ASSISTANT

## 2021-09-21 PROCEDURE — 92557 COMPREHENSIVE HEARING TEST: CPT | Mod: PBBFAC | Performed by: AUDIOLOGIST-HEARING AID FITTER

## 2021-09-21 PROCEDURE — 92567 TYMPANOMETRY: CPT | Mod: PBBFAC | Performed by: AUDIOLOGIST-HEARING AID FITTER

## 2021-09-21 PROCEDURE — 99999 PR PBB SHADOW E&M-EST. PATIENT-LVL IV: CPT | Mod: PBBFAC,,, | Performed by: PHYSICIAN ASSISTANT

## 2021-09-21 PROCEDURE — 99999 PR PBB SHADOW E&M-EST. PATIENT-LVL II: ICD-10-PCS | Mod: PBBFAC,,, | Performed by: AUDIOLOGIST-HEARING AID FITTER

## 2021-09-21 PROCEDURE — 99999 PR PBB SHADOW E&M-EST. PATIENT-LVL IV: ICD-10-PCS | Mod: PBBFAC,,, | Performed by: PHYSICIAN ASSISTANT

## 2021-09-21 PROCEDURE — 99214 OFFICE O/P EST MOD 30 MIN: CPT | Mod: S$PBB,ICN,, | Performed by: PHYSICIAN ASSISTANT

## 2021-09-21 PROCEDURE — 99214 OFFICE O/P EST MOD 30 MIN: CPT | Mod: PBBFAC | Performed by: PHYSICIAN ASSISTANT

## 2021-09-21 PROCEDURE — 99212 OFFICE O/P EST SF 10 MIN: CPT | Mod: PBBFAC,27 | Performed by: AUDIOLOGIST-HEARING AID FITTER

## 2021-09-21 RX ORDER — NEOMYCIN SULFATE, POLYMYXIN B SULFATE AND HYDROCORTISONE 10; 3.5; 1 MG/ML; MG/ML; [USP'U]/ML
3 SUSPENSION/ DROPS AURICULAR (OTIC) 4 TIMES DAILY
Qty: 10 ML | Refills: 0 | Status: SHIPPED | OUTPATIENT
Start: 2021-09-21 | End: 2023-01-01

## 2021-09-30 ENCOUNTER — EXTERNAL CHRONIC CARE MANAGEMENT (OUTPATIENT)
Dept: PRIMARY CARE CLINIC | Facility: CLINIC | Age: 81
End: 2021-09-30
Payer: MEDICARE

## 2021-09-30 ENCOUNTER — SPECIALTY PHARMACY (OUTPATIENT)
Dept: PHARMACY | Facility: CLINIC | Age: 81
End: 2021-09-30

## 2021-09-30 PROCEDURE — 99490 PR CHRONIC CARE MGMT, 1ST 20 MIN: ICD-10-PCS | Mod: S$PBB,,, | Performed by: FAMILY MEDICINE

## 2021-09-30 PROCEDURE — 99490 CHRNC CARE MGMT STAFF 1ST 20: CPT | Mod: S$PBB,,, | Performed by: FAMILY MEDICINE

## 2021-09-30 PROCEDURE — 99490 CHRNC CARE MGMT STAFF 1ST 20: CPT | Mod: PBBFAC,PN | Performed by: FAMILY MEDICINE

## 2021-10-04 DIAGNOSIS — C22.0 HCC (HEPATOCELLULAR CARCINOMA): ICD-10-CM

## 2021-10-04 RX ORDER — OXYCODONE 13.5 MG/1
1 CAPSULE, EXTENDED RELEASE ORAL 2 TIMES DAILY
Qty: 60 EACH | Refills: 0 | Status: SHIPPED | OUTPATIENT
Start: 2021-10-04 | End: 2021-10-28 | Stop reason: SDUPTHER

## 2021-10-06 ENCOUNTER — LAB VISIT (OUTPATIENT)
Dept: LAB | Facility: HOSPITAL | Age: 81
End: 2021-10-06
Attending: INTERNAL MEDICINE
Payer: MEDICARE

## 2021-10-06 DIAGNOSIS — C22.0 HEPATOCELLULAR CARCINOMA: ICD-10-CM

## 2021-10-06 LAB
ALBUMIN SERPL BCP-MCNC: 3.3 G/DL (ref 3.5–5.2)
ALP SERPL-CCNC: 82 U/L (ref 55–135)
ALT SERPL W/O P-5'-P-CCNC: 12 U/L (ref 10–44)
ANION GAP SERPL CALC-SCNC: 10 MMOL/L (ref 8–16)
AST SERPL-CCNC: 19 U/L (ref 10–40)
BASOPHILS # BLD AUTO: 0.08 K/UL (ref 0–0.2)
BASOPHILS NFR BLD: 1 % (ref 0–1.9)
BILIRUB SERPL-MCNC: 0.6 MG/DL (ref 0.1–1)
BUN SERPL-MCNC: 13 MG/DL (ref 8–23)
CALCIUM SERPL-MCNC: 10 MG/DL (ref 8.7–10.5)
CHLORIDE SERPL-SCNC: 103 MMOL/L (ref 95–110)
CO2 SERPL-SCNC: 25 MMOL/L (ref 23–29)
CREAT SERPL-MCNC: 0.8 MG/DL (ref 0.5–1.4)
DIFFERENTIAL METHOD: ABNORMAL
EOSINOPHIL # BLD AUTO: 0.5 K/UL (ref 0–0.5)
EOSINOPHIL NFR BLD: 5.7 % (ref 0–8)
ERYTHROCYTE [DISTWIDTH] IN BLOOD BY AUTOMATED COUNT: 15.9 % (ref 11.5–14.5)
EST. GFR  (AFRICAN AMERICAN): >60 ML/MIN/1.73 M^2
EST. GFR  (NON AFRICAN AMERICAN): >60 ML/MIN/1.73 M^2
GLUCOSE SERPL-MCNC: 88 MG/DL (ref 70–110)
HCT VFR BLD AUTO: 46.7 % (ref 37–48.5)
HGB BLD-MCNC: 14.6 G/DL (ref 12–16)
IMM GRANULOCYTES # BLD AUTO: 0.03 K/UL (ref 0–0.04)
IMM GRANULOCYTES NFR BLD AUTO: 0.4 % (ref 0–0.5)
LYMPHOCYTES # BLD AUTO: 1.7 K/UL (ref 1–4.8)
LYMPHOCYTES NFR BLD: 22 % (ref 18–48)
MCH RBC QN AUTO: 30.1 PG (ref 27–31)
MCHC RBC AUTO-ENTMCNC: 31.3 G/DL (ref 32–36)
MCV RBC AUTO: 96 FL (ref 82–98)
MONOCYTES # BLD AUTO: 1 K/UL (ref 0.3–1)
MONOCYTES NFR BLD: 12.8 % (ref 4–15)
NEUTROPHILS # BLD AUTO: 4.6 K/UL (ref 1.8–7.7)
NEUTROPHILS NFR BLD: 58.1 % (ref 38–73)
NRBC BLD-RTO: 0 /100 WBC
PLATELET # BLD AUTO: 245 K/UL (ref 150–450)
PMV BLD AUTO: 10.5 FL (ref 9.2–12.9)
PROT SERPL-MCNC: 7.6 G/DL (ref 6–8.4)
RBC # BLD AUTO: 4.85 M/UL (ref 4–5.4)
SODIUM SERPL-SCNC: 138 MMOL/L (ref 136–145)
WBC # BLD AUTO: 7.91 K/UL (ref 3.9–12.7)

## 2021-10-06 PROCEDURE — 85025 COMPLETE CBC W/AUTO DIFF WBC: CPT | Performed by: INTERNAL MEDICINE

## 2021-10-06 PROCEDURE — 36415 COLL VENOUS BLD VENIPUNCTURE: CPT | Mod: PO | Performed by: INTERNAL MEDICINE

## 2021-10-06 PROCEDURE — 80053 COMPREHEN METABOLIC PANEL: CPT | Performed by: INTERNAL MEDICINE

## 2021-10-07 ENCOUNTER — OFFICE VISIT (OUTPATIENT)
Dept: HEMATOLOGY/ONCOLOGY | Facility: CLINIC | Age: 81
End: 2021-10-07
Payer: MEDICARE

## 2021-10-07 ENCOUNTER — INFUSION (OUTPATIENT)
Dept: INFUSION THERAPY | Facility: HOSPITAL | Age: 81
End: 2021-10-07
Attending: INTERNAL MEDICINE
Payer: MEDICARE

## 2021-10-07 VITALS
RESPIRATION RATE: 18 BRPM | OXYGEN SATURATION: 96 % | TEMPERATURE: 98 F | HEIGHT: 61 IN | WEIGHT: 155.44 LBS | SYSTOLIC BLOOD PRESSURE: 132 MMHG | BODY MASS INDEX: 29.35 KG/M2 | HEART RATE: 64 BPM | DIASTOLIC BLOOD PRESSURE: 74 MMHG

## 2021-10-07 DIAGNOSIS — E03.9 HYPOTHYROIDISM (ACQUIRED): ICD-10-CM

## 2021-10-07 DIAGNOSIS — C22.0 HCC (HEPATOCELLULAR CARCINOMA): Primary | ICD-10-CM

## 2021-10-07 DIAGNOSIS — C22.0 HEPATOCELLULAR CARCINOMA: ICD-10-CM

## 2021-10-07 DIAGNOSIS — R19.7 DIARRHEA, UNSPECIFIED TYPE: ICD-10-CM

## 2021-10-07 PROCEDURE — 63600175 PHARM REV CODE 636 W HCPCS: Mod: JG | Performed by: INTERNAL MEDICINE

## 2021-10-07 PROCEDURE — 99215 PR OFFICE/OUTPT VISIT, EST, LEVL V, 40-54 MIN: ICD-10-PCS | Mod: 95,,, | Performed by: INTERNAL MEDICINE

## 2021-10-07 PROCEDURE — 25000003 PHARM REV CODE 250: Performed by: INTERNAL MEDICINE

## 2021-10-07 PROCEDURE — 96417 CHEMO IV INFUS EACH ADDL SEQ: CPT

## 2021-10-07 PROCEDURE — 96413 CHEMO IV INFUSION 1 HR: CPT

## 2021-10-07 PROCEDURE — 99215 OFFICE O/P EST HI 40 MIN: CPT | Mod: 95,,, | Performed by: INTERNAL MEDICINE

## 2021-10-07 RX ORDER — EPINEPHRINE 0.3 MG/.3ML
0.3 INJECTION SUBCUTANEOUS ONCE AS NEEDED
Status: CANCELLED | OUTPATIENT
Start: 2021-10-07

## 2021-10-07 RX ORDER — METHYLPREDNISOLONE SOD SUCC 125 MG
125 VIAL (EA) INJECTION ONCE AS NEEDED
Status: CANCELLED | OUTPATIENT
Start: 2021-10-07

## 2021-10-07 RX ORDER — HEPARIN 100 UNIT/ML
500 SYRINGE INTRAVENOUS
Status: CANCELLED | OUTPATIENT
Start: 2021-10-07

## 2021-10-07 RX ORDER — DIPHENHYDRAMINE HYDROCHLORIDE 50 MG/ML
25 INJECTION INTRAMUSCULAR; INTRAVENOUS EVERY 10 MIN PRN
Status: CANCELLED | OUTPATIENT
Start: 2021-10-07 | End: 2021-10-08

## 2021-10-07 RX ORDER — SODIUM CHLORIDE 0.9 % (FLUSH) 0.9 %
10 SYRINGE (ML) INJECTION
Status: CANCELLED | OUTPATIENT
Start: 2021-10-07

## 2021-10-07 RX ORDER — DIPHENOXYLATE HYDROCHLORIDE AND ATROPINE SULFATE 2.5; .025 MG/1; MG/1
1 TABLET ORAL 4 TIMES DAILY PRN
Qty: 30 TABLET | Refills: 1 | Status: SHIPPED | OUTPATIENT
Start: 2021-10-07 | End: 2022-01-01 | Stop reason: SDUPTHER

## 2021-10-07 RX ADMIN — BEVACIZUMAB 1060 MG: 400 INJECTION, SOLUTION INTRAVENOUS at 02:10

## 2021-10-07 RX ADMIN — ATEZOLIZUMAB 1200 MG: 1200 INJECTION, SOLUTION INTRAVENOUS at 01:10

## 2021-10-27 ENCOUNTER — LAB VISIT (OUTPATIENT)
Dept: LAB | Facility: HOSPITAL | Age: 81
End: 2021-10-27
Attending: INTERNAL MEDICINE
Payer: MEDICARE

## 2021-10-27 ENCOUNTER — OFFICE VISIT (OUTPATIENT)
Dept: HOME HEALTH SERVICES | Facility: CLINIC | Age: 81
End: 2021-10-27
Payer: MEDICARE

## 2021-10-27 VITALS
OXYGEN SATURATION: 99 % | WEIGHT: 151.81 LBS | HEIGHT: 60 IN | TEMPERATURE: 98 F | SYSTOLIC BLOOD PRESSURE: 141 MMHG | BODY MASS INDEX: 29.8 KG/M2 | HEART RATE: 59 BPM | DIASTOLIC BLOOD PRESSURE: 79 MMHG

## 2021-10-27 DIAGNOSIS — C77.8 SECONDARY MALIGNANT NEOPLASM OF LYMPH NODES OF MULTIPLE SITES: ICD-10-CM

## 2021-10-27 DIAGNOSIS — J44.9 CHRONIC OBSTRUCTIVE PULMONARY DISEASE, UNSPECIFIED COPD TYPE: ICD-10-CM

## 2021-10-27 DIAGNOSIS — L27.0 DERMATITIS, DRUG-INDUCED: ICD-10-CM

## 2021-10-27 DIAGNOSIS — I70.0 ATHEROSCLEROSIS OF AORTA: ICD-10-CM

## 2021-10-27 DIAGNOSIS — Z00.00 ENCOUNTER FOR PREVENTIVE HEALTH EXAMINATION: Primary | ICD-10-CM

## 2021-10-27 DIAGNOSIS — K59.03 DRUG-INDUCED CONSTIPATION: ICD-10-CM

## 2021-10-27 DIAGNOSIS — R26.9 GAIT ABNORMALITY: ICD-10-CM

## 2021-10-27 DIAGNOSIS — E78.2 MIXED HYPERLIPIDEMIA: ICD-10-CM

## 2021-10-27 DIAGNOSIS — C22.0 HCC (HEPATOCELLULAR CARCINOMA): ICD-10-CM

## 2021-10-27 DIAGNOSIS — E11.9 TYPE 2 DIABETES MELLITUS WITHOUT COMPLICATION, WITHOUT LONG-TERM CURRENT USE OF INSULIN: ICD-10-CM

## 2021-10-27 DIAGNOSIS — I10 ESSENTIAL HYPERTENSION: ICD-10-CM

## 2021-10-27 DIAGNOSIS — I50.32 CHRONIC DIASTOLIC CONGESTIVE HEART FAILURE: ICD-10-CM

## 2021-10-27 DIAGNOSIS — C22.0 HEPATOCELLULAR CARCINOMA: ICD-10-CM

## 2021-10-27 DIAGNOSIS — C79.51 SECONDARY MALIGNANT NEOPLASM OF BONE: ICD-10-CM

## 2021-10-27 DIAGNOSIS — E03.9 HYPOTHYROIDISM (ACQUIRED): ICD-10-CM

## 2021-10-27 DIAGNOSIS — E03.2 DRUG-INDUCED HYPOTHYROIDISM: ICD-10-CM

## 2021-10-27 DIAGNOSIS — G89.3 CANCER ASSOCIATED PAIN: ICD-10-CM

## 2021-10-27 DIAGNOSIS — K21.9 GASTROESOPHAGEAL REFLUX DISEASE, UNSPECIFIED WHETHER ESOPHAGITIS PRESENT: ICD-10-CM

## 2021-10-27 DIAGNOSIS — R63.4 WEIGHT LOSS, ABNORMAL: ICD-10-CM

## 2021-10-27 PROBLEM — E66.01 MORBID OBESITY: Status: RESOLVED | Noted: 2018-03-20 | Resolved: 2021-10-27

## 2021-10-27 PROBLEM — R04.2 HEMOPTYSIS: Status: RESOLVED | Noted: 2020-02-05 | Resolved: 2021-10-27

## 2021-10-27 LAB
ALBUMIN SERPL BCP-MCNC: 3.4 G/DL (ref 3.5–5.2)
ALP SERPL-CCNC: 84 U/L (ref 55–135)
ALT SERPL W/O P-5'-P-CCNC: 9 U/L (ref 10–44)
ANION GAP SERPL CALC-SCNC: 14 MMOL/L (ref 8–16)
AST SERPL-CCNC: 20 U/L (ref 10–40)
BASOPHILS # BLD AUTO: 0.07 K/UL (ref 0–0.2)
BASOPHILS NFR BLD: 0.8 % (ref 0–1.9)
BILIRUB SERPL-MCNC: 0.5 MG/DL (ref 0.1–1)
BUN SERPL-MCNC: 13 MG/DL (ref 8–23)
CALCIUM SERPL-MCNC: 10 MG/DL (ref 8.7–10.5)
CHLORIDE SERPL-SCNC: 101 MMOL/L (ref 95–110)
CO2 SERPL-SCNC: 22 MMOL/L (ref 23–29)
CREAT SERPL-MCNC: 0.8 MG/DL (ref 0.5–1.4)
DIFFERENTIAL METHOD: ABNORMAL
EOSINOPHIL # BLD AUTO: 0.3 K/UL (ref 0–0.5)
EOSINOPHIL NFR BLD: 3.3 % (ref 0–8)
ERYTHROCYTE [DISTWIDTH] IN BLOOD BY AUTOMATED COUNT: 15.1 % (ref 11.5–14.5)
EST. GFR  (AFRICAN AMERICAN): >60 ML/MIN/1.73 M^2
EST. GFR  (NON AFRICAN AMERICAN): >60 ML/MIN/1.73 M^2
GLUCOSE SERPL-MCNC: 73 MG/DL (ref 70–110)
HCT VFR BLD AUTO: 46.7 % (ref 37–48.5)
HGB BLD-MCNC: 14.7 G/DL (ref 12–16)
IMM GRANULOCYTES # BLD AUTO: 0.02 K/UL (ref 0–0.04)
IMM GRANULOCYTES NFR BLD AUTO: 0.2 % (ref 0–0.5)
LYMPHOCYTES # BLD AUTO: 1.7 K/UL (ref 1–4.8)
LYMPHOCYTES NFR BLD: 19.5 % (ref 18–48)
MCH RBC QN AUTO: 30.1 PG (ref 27–31)
MCHC RBC AUTO-ENTMCNC: 31.5 G/DL (ref 32–36)
MCV RBC AUTO: 96 FL (ref 82–98)
MONOCYTES # BLD AUTO: 1 K/UL (ref 0.3–1)
MONOCYTES NFR BLD: 11.2 % (ref 4–15)
NEUTROPHILS # BLD AUTO: 5.7 K/UL (ref 1.8–7.7)
NEUTROPHILS NFR BLD: 65 % (ref 38–73)
NRBC BLD-RTO: 0 /100 WBC
PLATELET # BLD AUTO: 298 K/UL (ref 150–450)
PMV BLD AUTO: 10.2 FL (ref 9.2–12.9)
POTASSIUM SERPL-SCNC: 4.5 MMOL/L (ref 3.5–5.1)
PROT SERPL-MCNC: 7.9 G/DL (ref 6–8.4)
RBC # BLD AUTO: 4.89 M/UL (ref 4–5.4)
SODIUM SERPL-SCNC: 137 MMOL/L (ref 136–145)
T4 FREE SERPL-MCNC: 1.14 NG/DL (ref 0.71–1.51)
WBC # BLD AUTO: 8.78 K/UL (ref 3.9–12.7)

## 2021-10-27 PROCEDURE — 36415 COLL VENOUS BLD VENIPUNCTURE: CPT | Mod: PO | Performed by: INTERNAL MEDICINE

## 2021-10-27 PROCEDURE — 80053 COMPREHEN METABOLIC PANEL: CPT | Performed by: INTERNAL MEDICINE

## 2021-10-27 PROCEDURE — G0439 PPPS, SUBSEQ VISIT: HCPCS | Mod: S$GLB,,, | Performed by: NURSE PRACTITIONER

## 2021-10-27 PROCEDURE — G0439 PR MEDICARE ANNUAL WELLNESS SUBSEQUENT VISIT: ICD-10-PCS | Mod: S$GLB,,, | Performed by: NURSE PRACTITIONER

## 2021-10-27 PROCEDURE — 84439 ASSAY OF FREE THYROXINE: CPT | Performed by: INTERNAL MEDICINE

## 2021-10-27 PROCEDURE — 85025 COMPLETE CBC W/AUTO DIFF WBC: CPT | Performed by: INTERNAL MEDICINE

## 2021-10-28 ENCOUNTER — INFUSION (OUTPATIENT)
Dept: INFUSION THERAPY | Facility: HOSPITAL | Age: 81
End: 2021-10-28
Attending: INTERNAL MEDICINE
Payer: MEDICARE

## 2021-10-28 ENCOUNTER — OFFICE VISIT (OUTPATIENT)
Dept: HEMATOLOGY/ONCOLOGY | Facility: CLINIC | Age: 81
End: 2021-10-28
Payer: MEDICARE

## 2021-10-28 VITALS
SYSTOLIC BLOOD PRESSURE: 133 MMHG | RESPIRATION RATE: 16 BRPM | TEMPERATURE: 98 F | DIASTOLIC BLOOD PRESSURE: 68 MMHG | HEART RATE: 68 BPM | OXYGEN SATURATION: 94 %

## 2021-10-28 VITALS
HEIGHT: 60 IN | OXYGEN SATURATION: 94 % | WEIGHT: 153.25 LBS | BODY MASS INDEX: 30.09 KG/M2 | SYSTOLIC BLOOD PRESSURE: 138 MMHG | HEART RATE: 55 BPM | TEMPERATURE: 98 F | DIASTOLIC BLOOD PRESSURE: 67 MMHG

## 2021-10-28 DIAGNOSIS — R13.10 ODYNOPHAGIA: ICD-10-CM

## 2021-10-28 DIAGNOSIS — C22.0 HCC (HEPATOCELLULAR CARCINOMA): Primary | ICD-10-CM

## 2021-10-28 DIAGNOSIS — C22.0 HCC (HEPATOCELLULAR CARCINOMA): ICD-10-CM

## 2021-10-28 DIAGNOSIS — K59.00 CONSTIPATION, UNSPECIFIED CONSTIPATION TYPE: ICD-10-CM

## 2021-10-28 DIAGNOSIS — C22.0 HEPATOCELLULAR CARCINOMA: ICD-10-CM

## 2021-10-28 DIAGNOSIS — E03.9 HYPOTHYROIDISM (ACQUIRED): ICD-10-CM

## 2021-10-28 PROCEDURE — 99215 PR OFFICE/OUTPT VISIT, EST, LEVL V, 40-54 MIN: ICD-10-PCS | Mod: S$PBB,,, | Performed by: INTERNAL MEDICINE

## 2021-10-28 PROCEDURE — 99999 PR PBB SHADOW E&M-EST. PATIENT-LVL III: CPT | Mod: PBBFAC,,, | Performed by: INTERNAL MEDICINE

## 2021-10-28 PROCEDURE — 99215 OFFICE O/P EST HI 40 MIN: CPT | Mod: S$PBB,,, | Performed by: INTERNAL MEDICINE

## 2021-10-28 PROCEDURE — 63600175 PHARM REV CODE 636 W HCPCS: Mod: JG | Performed by: INTERNAL MEDICINE

## 2021-10-28 PROCEDURE — 99213 OFFICE O/P EST LOW 20 MIN: CPT | Mod: PBBFAC,25 | Performed by: INTERNAL MEDICINE

## 2021-10-28 PROCEDURE — 25000003 PHARM REV CODE 250: Performed by: INTERNAL MEDICINE

## 2021-10-28 PROCEDURE — A4216 STERILE WATER/SALINE, 10 ML: HCPCS | Performed by: INTERNAL MEDICINE

## 2021-10-28 PROCEDURE — 96415 CHEMO IV INFUSION ADDL HR: CPT

## 2021-10-28 PROCEDURE — 99999 PR PBB SHADOW E&M-EST. PATIENT-LVL III: ICD-10-PCS | Mod: PBBFAC,,, | Performed by: INTERNAL MEDICINE

## 2021-10-28 PROCEDURE — 96417 CHEMO IV INFUS EACH ADDL SEQ: CPT

## 2021-10-28 PROCEDURE — 96413 CHEMO IV INFUSION 1 HR: CPT

## 2021-10-28 RX ORDER — EPINEPHRINE 1 MG/ML
0.3 INJECTION, SOLUTION INTRACARDIAC; INTRAMUSCULAR; INTRAVENOUS; SUBCUTANEOUS ONCE AS NEEDED
Status: DISCONTINUED | OUTPATIENT
Start: 2021-10-28 | End: 2021-10-28 | Stop reason: HOSPADM

## 2021-10-28 RX ORDER — DIPHENHYDRAMINE HYDROCHLORIDE 50 MG/ML
25 INJECTION INTRAMUSCULAR; INTRAVENOUS EVERY 10 MIN PRN
Status: CANCELLED | OUTPATIENT
Start: 2021-10-28 | End: 2021-10-29

## 2021-10-28 RX ORDER — DIPHENHYDRAMINE HYDROCHLORIDE 50 MG/ML
25 INJECTION INTRAMUSCULAR; INTRAVENOUS EVERY 10 MIN PRN
Status: DISCONTINUED | OUTPATIENT
Start: 2021-10-28 | End: 2021-10-28 | Stop reason: HOSPADM

## 2021-10-28 RX ORDER — METHYLPREDNISOLONE SOD SUCC 125 MG
125 VIAL (EA) INJECTION ONCE AS NEEDED
Status: DISCONTINUED | OUTPATIENT
Start: 2021-10-28 | End: 2021-10-28 | Stop reason: HOSPADM

## 2021-10-28 RX ORDER — OXYCODONE 13.5 MG/1
1 CAPSULE, EXTENDED RELEASE ORAL 2 TIMES DAILY
Qty: 60 EACH | Refills: 0 | Status: SHIPPED | OUTPATIENT
Start: 2021-10-28 | End: 2021-12-01 | Stop reason: SDUPTHER

## 2021-10-28 RX ORDER — LIDOCAINE HYDROCHLORIDE 20 MG/ML
SOLUTION OROPHARYNGEAL EVERY 8 HOURS PRN
Qty: 100 ML | Refills: 3 | Status: SHIPPED | OUTPATIENT
Start: 2021-10-28 | End: 2022-01-01 | Stop reason: ALTCHOICE

## 2021-10-28 RX ORDER — SODIUM CHLORIDE 0.9 % (FLUSH) 0.9 %
10 SYRINGE (ML) INJECTION
Status: CANCELLED | OUTPATIENT
Start: 2021-10-28

## 2021-10-28 RX ORDER — METHYLPREDNISOLONE SOD SUCC 125 MG
125 VIAL (EA) INJECTION ONCE AS NEEDED
Status: CANCELLED | OUTPATIENT
Start: 2021-10-28

## 2021-10-28 RX ORDER — HEPARIN 100 UNIT/ML
500 SYRINGE INTRAVENOUS
Status: CANCELLED | OUTPATIENT
Start: 2021-10-28

## 2021-10-28 RX ORDER — SODIUM CHLORIDE 0.9 % (FLUSH) 0.9 %
10 SYRINGE (ML) INJECTION
Status: DISCONTINUED | OUTPATIENT
Start: 2021-10-28 | End: 2021-10-28 | Stop reason: HOSPADM

## 2021-10-28 RX ORDER — EPINEPHRINE 0.3 MG/.3ML
0.3 INJECTION SUBCUTANEOUS ONCE AS NEEDED
Status: CANCELLED | OUTPATIENT
Start: 2021-10-28

## 2021-10-28 RX ADMIN — BEVACIZUMAB 1045 MG: 400 INJECTION, SOLUTION INTRAVENOUS at 02:10

## 2021-10-28 RX ADMIN — SODIUM CHLORIDE 10 ML: 9 INJECTION, SOLUTION INTRAMUSCULAR; INTRAVENOUS; SUBCUTANEOUS at 02:10

## 2021-10-28 RX ADMIN — ATEZOLIZUMAB 1200 MG: 1200 INJECTION, SOLUTION INTRAVENOUS at 02:10

## 2021-10-31 ENCOUNTER — EXTERNAL CHRONIC CARE MANAGEMENT (OUTPATIENT)
Dept: PRIMARY CARE CLINIC | Facility: CLINIC | Age: 81
End: 2021-10-31
Payer: MEDICARE

## 2021-10-31 PROCEDURE — 99490 PR CHRONIC CARE MGMT, 1ST 20 MIN: ICD-10-PCS | Mod: S$PBB,,, | Performed by: FAMILY MEDICINE

## 2021-10-31 PROCEDURE — 99490 CHRNC CARE MGMT STAFF 1ST 20: CPT | Mod: PBBFAC,PN | Performed by: FAMILY MEDICINE

## 2021-10-31 PROCEDURE — 99490 CHRNC CARE MGMT STAFF 1ST 20: CPT | Mod: S$PBB,,, | Performed by: FAMILY MEDICINE

## 2021-11-17 ENCOUNTER — LAB VISIT (OUTPATIENT)
Dept: LAB | Facility: HOSPITAL | Age: 81
End: 2021-11-17
Attending: INTERNAL MEDICINE
Payer: MEDICARE

## 2021-11-17 DIAGNOSIS — C22.0 HCC (HEPATOCELLULAR CARCINOMA): ICD-10-CM

## 2021-11-17 DIAGNOSIS — C22.0 HEPATOCELLULAR CARCINOMA: ICD-10-CM

## 2021-11-17 LAB
ALBUMIN SERPL BCP-MCNC: 3.4 G/DL (ref 3.5–5.2)
ALP SERPL-CCNC: 86 U/L (ref 55–135)
ALT SERPL W/O P-5'-P-CCNC: 7 U/L (ref 10–44)
ANION GAP SERPL CALC-SCNC: 9 MMOL/L (ref 8–16)
AST SERPL-CCNC: 17 U/L (ref 10–40)
BACTERIA #/AREA URNS HPF: NORMAL /HPF
BASOPHILS # BLD AUTO: 0.06 K/UL (ref 0–0.2)
BASOPHILS NFR BLD: 0.7 % (ref 0–1.9)
BILIRUB SERPL-MCNC: 0.5 MG/DL (ref 0.1–1)
BILIRUB UR QL STRIP: NEGATIVE
BUN SERPL-MCNC: 15 MG/DL (ref 8–23)
CALCIUM SERPL-MCNC: 9.2 MG/DL (ref 8.7–10.5)
CHLORIDE SERPL-SCNC: 102 MMOL/L (ref 95–110)
CLARITY UR: CLEAR
CO2 SERPL-SCNC: 28 MMOL/L (ref 23–29)
COLOR UR: YELLOW
CREAT SERPL-MCNC: 1 MG/DL (ref 0.5–1.4)
DIFFERENTIAL METHOD: ABNORMAL
EOSINOPHIL # BLD AUTO: 0.3 K/UL (ref 0–0.5)
EOSINOPHIL NFR BLD: 3.4 % (ref 0–8)
ERYTHROCYTE [DISTWIDTH] IN BLOOD BY AUTOMATED COUNT: 14.8 % (ref 11.5–14.5)
EST. GFR  (AFRICAN AMERICAN): >60 ML/MIN/1.73 M^2
EST. GFR  (NON AFRICAN AMERICAN): 53 ML/MIN/1.73 M^2
GLUCOSE SERPL-MCNC: 101 MG/DL (ref 70–110)
GLUCOSE UR QL STRIP: NEGATIVE
HCT VFR BLD AUTO: 46.9 % (ref 37–48.5)
HGB BLD-MCNC: 14.7 G/DL (ref 12–16)
HGB UR QL STRIP: NEGATIVE
IMM GRANULOCYTES # BLD AUTO: 0.02 K/UL (ref 0–0.04)
IMM GRANULOCYTES NFR BLD AUTO: 0.2 % (ref 0–0.5)
KETONES UR QL STRIP: NEGATIVE
LEUKOCYTE ESTERASE UR QL STRIP: ABNORMAL
LYMPHOCYTES # BLD AUTO: 1.7 K/UL (ref 1–4.8)
LYMPHOCYTES NFR BLD: 20.6 % (ref 18–48)
MCH RBC QN AUTO: 29.7 PG (ref 27–31)
MCHC RBC AUTO-ENTMCNC: 31.3 G/DL (ref 32–36)
MCV RBC AUTO: 95 FL (ref 82–98)
MICROSCOPIC COMMENT: NORMAL
MONOCYTES # BLD AUTO: 0.9 K/UL (ref 0.3–1)
MONOCYTES NFR BLD: 10.6 % (ref 4–15)
NEUTROPHILS # BLD AUTO: 5.3 K/UL (ref 1.8–7.7)
NEUTROPHILS NFR BLD: 64.5 % (ref 38–73)
NITRITE UR QL STRIP: NEGATIVE
NRBC BLD-RTO: 0 /100 WBC
PH UR STRIP: 6 [PH] (ref 5–8)
PLATELET # BLD AUTO: 287 K/UL (ref 150–450)
PMV BLD AUTO: 10.4 FL (ref 9.2–12.9)
POTASSIUM SERPL-SCNC: 4.4 MMOL/L (ref 3.5–5.1)
PROT SERPL-MCNC: 7.5 G/DL (ref 6–8.4)
PROT UR QL STRIP: NEGATIVE
RBC # BLD AUTO: 4.95 M/UL (ref 4–5.4)
SODIUM SERPL-SCNC: 139 MMOL/L (ref 136–145)
SP GR UR STRIP: 1.02 (ref 1–1.03)
URN SPEC COLLECT METH UR: ABNORMAL
WBC # BLD AUTO: 8.19 K/UL (ref 3.9–12.7)
WBC #/AREA URNS HPF: 5 /HPF (ref 0–5)

## 2021-11-17 PROCEDURE — 80053 COMPREHEN METABOLIC PANEL: CPT | Performed by: INTERNAL MEDICINE

## 2021-11-17 PROCEDURE — 81000 URINALYSIS NONAUTO W/SCOPE: CPT | Performed by: INTERNAL MEDICINE

## 2021-11-17 PROCEDURE — 36415 COLL VENOUS BLD VENIPUNCTURE: CPT | Mod: PO | Performed by: INTERNAL MEDICINE

## 2021-11-17 PROCEDURE — 85025 COMPLETE CBC W/AUTO DIFF WBC: CPT | Performed by: INTERNAL MEDICINE

## 2021-11-18 ENCOUNTER — OFFICE VISIT (OUTPATIENT)
Dept: HEMATOLOGY/ONCOLOGY | Facility: CLINIC | Age: 81
End: 2021-11-18
Payer: MEDICARE

## 2021-11-18 ENCOUNTER — INFUSION (OUTPATIENT)
Dept: INFUSION THERAPY | Facility: HOSPITAL | Age: 81
End: 2021-11-18
Attending: INTERNAL MEDICINE
Payer: MEDICARE

## 2021-11-18 VITALS
SYSTOLIC BLOOD PRESSURE: 140 MMHG | OXYGEN SATURATION: 93 % | TEMPERATURE: 98 F | BODY MASS INDEX: 30.12 KG/M2 | HEART RATE: 58 BPM | WEIGHT: 153.44 LBS | HEIGHT: 60 IN | DIASTOLIC BLOOD PRESSURE: 69 MMHG

## 2021-11-18 VITALS
OXYGEN SATURATION: 94 % | HEART RATE: 69 BPM | SYSTOLIC BLOOD PRESSURE: 146 MMHG | RESPIRATION RATE: 16 BRPM | TEMPERATURE: 98 F | DIASTOLIC BLOOD PRESSURE: 85 MMHG

## 2021-11-18 DIAGNOSIS — C77.8 SECONDARY MALIGNANT NEOPLASM OF LYMPH NODES OF MULTIPLE SITES: ICD-10-CM

## 2021-11-18 DIAGNOSIS — C22.0 HCC (HEPATOCELLULAR CARCINOMA): Primary | ICD-10-CM

## 2021-11-18 DIAGNOSIS — C22.0 HEPATOCELLULAR CARCINOMA: ICD-10-CM

## 2021-11-18 DIAGNOSIS — E03.9 HYPOTHYROIDISM (ACQUIRED): ICD-10-CM

## 2021-11-18 PROCEDURE — 63600175 PHARM REV CODE 636 W HCPCS: Mod: JG | Performed by: INTERNAL MEDICINE

## 2021-11-18 PROCEDURE — 99999 PR PBB SHADOW E&M-EST. PATIENT-LVL IV: ICD-10-PCS | Mod: PBBFAC,,, | Performed by: INTERNAL MEDICINE

## 2021-11-18 PROCEDURE — 99214 OFFICE O/P EST MOD 30 MIN: CPT | Mod: PBBFAC | Performed by: INTERNAL MEDICINE

## 2021-11-18 PROCEDURE — 99215 OFFICE O/P EST HI 40 MIN: CPT | Mod: S$PBB,,, | Performed by: INTERNAL MEDICINE

## 2021-11-18 PROCEDURE — 25000003 PHARM REV CODE 250: Performed by: INTERNAL MEDICINE

## 2021-11-18 PROCEDURE — 99999 PR PBB SHADOW E&M-EST. PATIENT-LVL IV: CPT | Mod: PBBFAC,,, | Performed by: INTERNAL MEDICINE

## 2021-11-18 PROCEDURE — 96417 CHEMO IV INFUS EACH ADDL SEQ: CPT

## 2021-11-18 PROCEDURE — 99215 PR OFFICE/OUTPT VISIT, EST, LEVL V, 40-54 MIN: ICD-10-PCS | Mod: S$PBB,,, | Performed by: INTERNAL MEDICINE

## 2021-11-18 PROCEDURE — 96413 CHEMO IV INFUSION 1 HR: CPT

## 2021-11-18 RX ORDER — EPINEPHRINE 0.3 MG/.3ML
0.3 INJECTION SUBCUTANEOUS ONCE AS NEEDED
Status: CANCELLED | OUTPATIENT
Start: 2021-11-18

## 2021-11-18 RX ORDER — DIPHENHYDRAMINE HYDROCHLORIDE 50 MG/ML
25 INJECTION INTRAMUSCULAR; INTRAVENOUS EVERY 10 MIN PRN
Status: CANCELLED | OUTPATIENT
Start: 2021-11-18 | End: 2021-11-19

## 2021-11-18 RX ORDER — SODIUM CHLORIDE 0.9 % (FLUSH) 0.9 %
10 SYRINGE (ML) INJECTION
Status: CANCELLED | OUTPATIENT
Start: 2021-11-18

## 2021-11-18 RX ORDER — METHYLPREDNISOLONE SOD SUCC 125 MG
125 VIAL (EA) INJECTION ONCE AS NEEDED
Status: CANCELLED | OUTPATIENT
Start: 2021-11-18

## 2021-11-18 RX ORDER — SODIUM CHLORIDE 0.9 % (FLUSH) 0.9 %
10 SYRINGE (ML) INJECTION
Status: DISCONTINUED | OUTPATIENT
Start: 2021-11-18 | End: 2021-11-18 | Stop reason: HOSPADM

## 2021-11-18 RX ORDER — HEPARIN 100 UNIT/ML
500 SYRINGE INTRAVENOUS
Status: CANCELLED | OUTPATIENT
Start: 2021-11-18

## 2021-11-18 RX ADMIN — BEVACIZUMAB 1045 MG: 400 INJECTION, SOLUTION INTRAVENOUS at 02:11

## 2021-11-18 RX ADMIN — ATEZOLIZUMAB 1200 MG: 1200 INJECTION, SOLUTION INTRAVENOUS at 02:11

## 2021-11-30 ENCOUNTER — EXTERNAL CHRONIC CARE MANAGEMENT (OUTPATIENT)
Dept: PRIMARY CARE CLINIC | Facility: CLINIC | Age: 81
End: 2021-11-30
Payer: MEDICARE

## 2021-11-30 PROCEDURE — 99490 CHRNC CARE MGMT STAFF 1ST 20: CPT | Mod: S$PBB,,, | Performed by: FAMILY MEDICINE

## 2021-11-30 PROCEDURE — 99490 PR CHRONIC CARE MGMT, 1ST 20 MIN: ICD-10-PCS | Mod: S$PBB,,, | Performed by: FAMILY MEDICINE

## 2021-11-30 PROCEDURE — 99490 CHRNC CARE MGMT STAFF 1ST 20: CPT | Mod: PBBFAC,PN | Performed by: FAMILY MEDICINE

## 2021-12-01 DIAGNOSIS — C22.0 HCC (HEPATOCELLULAR CARCINOMA): ICD-10-CM

## 2021-12-02 RX ORDER — OXYCODONE 13.5 MG/1
1 CAPSULE, EXTENDED RELEASE ORAL 2 TIMES DAILY
Qty: 60 EACH | Refills: 0 | Status: SHIPPED | OUTPATIENT
Start: 2021-12-02 | End: 2021-12-30 | Stop reason: SDUPTHER

## 2021-12-02 RX ORDER — OXYCODONE AND ACETAMINOPHEN 10; 325 MG/1; MG/1
1 TABLET ORAL 4 TIMES DAILY PRN
Qty: 120 TABLET | Refills: 0 | Status: SHIPPED | OUTPATIENT
Start: 2021-12-02 | End: 2022-01-14 | Stop reason: SDUPTHER

## 2021-12-08 ENCOUNTER — LAB VISIT (OUTPATIENT)
Dept: LAB | Facility: HOSPITAL | Age: 81
End: 2021-12-08
Attending: INTERNAL MEDICINE
Payer: MEDICARE

## 2021-12-08 DIAGNOSIS — C22.0 HCC (HEPATOCELLULAR CARCINOMA): ICD-10-CM

## 2021-12-08 PROCEDURE — 81003 URINALYSIS AUTO W/O SCOPE: CPT | Performed by: INTERNAL MEDICINE

## 2021-12-09 ENCOUNTER — OFFICE VISIT (OUTPATIENT)
Dept: HEMATOLOGY/ONCOLOGY | Facility: CLINIC | Age: 81
End: 2021-12-09
Payer: MEDICARE

## 2021-12-09 ENCOUNTER — INFUSION (OUTPATIENT)
Dept: INFUSION THERAPY | Facility: HOSPITAL | Age: 81
End: 2021-12-09
Attending: INTERNAL MEDICINE
Payer: MEDICARE

## 2021-12-09 VITALS
HEART RATE: 63 BPM | OXYGEN SATURATION: 92 % | HEIGHT: 60 IN | SYSTOLIC BLOOD PRESSURE: 145 MMHG | BODY MASS INDEX: 30.12 KG/M2 | DIASTOLIC BLOOD PRESSURE: 74 MMHG | WEIGHT: 153.44 LBS | TEMPERATURE: 98 F

## 2021-12-09 VITALS
DIASTOLIC BLOOD PRESSURE: 66 MMHG | HEART RATE: 65 BPM | RESPIRATION RATE: 16 BRPM | OXYGEN SATURATION: 93 % | BODY MASS INDEX: 29.97 KG/M2 | WEIGHT: 153.44 LBS | SYSTOLIC BLOOD PRESSURE: 131 MMHG | TEMPERATURE: 98 F

## 2021-12-09 DIAGNOSIS — C22.0 HCC (HEPATOCELLULAR CARCINOMA): Primary | ICD-10-CM

## 2021-12-09 DIAGNOSIS — I70.0 ATHEROSCLEROSIS OF AORTA: ICD-10-CM

## 2021-12-09 DIAGNOSIS — C22.0 HEPATOCELLULAR CARCINOMA: ICD-10-CM

## 2021-12-09 DIAGNOSIS — C77.8 SECONDARY MALIGNANT NEOPLASM OF LYMPH NODES OF MULTIPLE SITES: ICD-10-CM

## 2021-12-09 DIAGNOSIS — T45.1X5A IMMUNODEFICIENCY DUE TO CHEMOTHERAPY: ICD-10-CM

## 2021-12-09 DIAGNOSIS — C79.51 SECONDARY MALIGNANT NEOPLASM OF BONE: ICD-10-CM

## 2021-12-09 DIAGNOSIS — D84.821 IMMUNODEFICIENCY DUE TO CHEMOTHERAPY: ICD-10-CM

## 2021-12-09 DIAGNOSIS — E03.9 HYPOTHYROIDISM (ACQUIRED): ICD-10-CM

## 2021-12-09 DIAGNOSIS — Z79.899 IMMUNODEFICIENCY DUE TO CHEMOTHERAPY: ICD-10-CM

## 2021-12-09 DIAGNOSIS — I10 ESSENTIAL HYPERTENSION: ICD-10-CM

## 2021-12-09 DIAGNOSIS — I50.32 CHRONIC DIASTOLIC CONGESTIVE HEART FAILURE: ICD-10-CM

## 2021-12-09 DIAGNOSIS — E78.2 MIXED HYPERLIPIDEMIA: ICD-10-CM

## 2021-12-09 DIAGNOSIS — J44.9 CHRONIC OBSTRUCTIVE PULMONARY DISEASE, UNSPECIFIED COPD TYPE: ICD-10-CM

## 2021-12-09 LAB
BILIRUB UR QL STRIP: NEGATIVE
CLARITY UR REFRACT.AUTO: CLEAR
COLOR UR AUTO: YELLOW
GLUCOSE UR QL STRIP: NEGATIVE
HGB UR QL STRIP: NEGATIVE
KETONES UR QL STRIP: NEGATIVE
LEUKOCYTE ESTERASE UR QL STRIP: NEGATIVE
NITRITE UR QL STRIP: NEGATIVE
PH UR STRIP: 6 [PH] (ref 5–8)
PROT UR QL STRIP: NEGATIVE
SP GR UR STRIP: 1.01 (ref 1–1.03)
URN SPEC COLLECT METH UR: NORMAL

## 2021-12-09 PROCEDURE — 99215 PR OFFICE/OUTPT VISIT, EST, LEVL V, 40-54 MIN: ICD-10-PCS | Mod: 25,S$PBB,, | Performed by: INTERNAL MEDICINE

## 2021-12-09 PROCEDURE — 63600175 PHARM REV CODE 636 W HCPCS: Mod: JG | Performed by: INTERNAL MEDICINE

## 2021-12-09 PROCEDURE — 99999 PR PBB SHADOW E&M-EST. PATIENT-LVL IV: ICD-10-PCS | Mod: PBBFAC,,, | Performed by: INTERNAL MEDICINE

## 2021-12-09 PROCEDURE — 99999 PR PBB SHADOW E&M-EST. PATIENT-LVL IV: CPT | Mod: PBBFAC,,, | Performed by: INTERNAL MEDICINE

## 2021-12-09 PROCEDURE — 25000003 PHARM REV CODE 250: Performed by: INTERNAL MEDICINE

## 2021-12-09 PROCEDURE — 99214 OFFICE O/P EST MOD 30 MIN: CPT | Mod: PBBFAC,25 | Performed by: INTERNAL MEDICINE

## 2021-12-09 PROCEDURE — 96417 CHEMO IV INFUS EACH ADDL SEQ: CPT

## 2021-12-09 PROCEDURE — 99215 OFFICE O/P EST HI 40 MIN: CPT | Mod: 25,S$PBB,, | Performed by: INTERNAL MEDICINE

## 2021-12-09 PROCEDURE — 96413 CHEMO IV INFUSION 1 HR: CPT

## 2021-12-09 RX ORDER — SODIUM CHLORIDE 0.9 % (FLUSH) 0.9 %
10 SYRINGE (ML) INJECTION
Status: CANCELLED | OUTPATIENT
Start: 2021-12-09

## 2021-12-09 RX ORDER — HEPARIN 100 UNIT/ML
500 SYRINGE INTRAVENOUS
Status: CANCELLED | OUTPATIENT
Start: 2021-12-09

## 2021-12-09 RX ORDER — DIPHENHYDRAMINE HYDROCHLORIDE 50 MG/ML
25 INJECTION INTRAMUSCULAR; INTRAVENOUS EVERY 10 MIN PRN
Status: CANCELLED | OUTPATIENT
Start: 2021-12-09 | End: 2021-12-10

## 2021-12-09 RX ORDER — EPINEPHRINE 0.3 MG/.3ML
0.3 INJECTION SUBCUTANEOUS ONCE AS NEEDED
Status: CANCELLED | OUTPATIENT
Start: 2021-12-09

## 2021-12-09 RX ORDER — METHYLPREDNISOLONE SOD SUCC 125 MG
125 VIAL (EA) INJECTION ONCE AS NEEDED
Status: CANCELLED | OUTPATIENT
Start: 2021-12-09

## 2021-12-09 RX ADMIN — ATEZOLIZUMAB 1200 MG: 1200 INJECTION, SOLUTION INTRAVENOUS at 02:12

## 2021-12-09 RX ADMIN — BEVACIZUMAB 1000 MG: 400 INJECTION, SOLUTION INTRAVENOUS at 02:12

## 2021-12-30 ENCOUNTER — OFFICE VISIT (OUTPATIENT)
Dept: HEMATOLOGY/ONCOLOGY | Facility: CLINIC | Age: 81
End: 2021-12-30
Payer: MEDICARE

## 2021-12-30 ENCOUNTER — DOCUMENTATION ONLY (OUTPATIENT)
Dept: HEMATOLOGY/ONCOLOGY | Facility: CLINIC | Age: 81
End: 2021-12-30
Payer: MEDICARE

## 2021-12-30 ENCOUNTER — CLINICAL SUPPORT (OUTPATIENT)
Dept: HEMATOLOGY/ONCOLOGY | Facility: CLINIC | Age: 81
End: 2021-12-30
Payer: MEDICARE

## 2021-12-30 ENCOUNTER — INFUSION (OUTPATIENT)
Dept: INFUSION THERAPY | Facility: HOSPITAL | Age: 81
End: 2021-12-30
Attending: INTERNAL MEDICINE
Payer: MEDICARE

## 2021-12-30 VITALS
WEIGHT: 156.75 LBS | DIASTOLIC BLOOD PRESSURE: 70 MMHG | SYSTOLIC BLOOD PRESSURE: 155 MMHG | BODY MASS INDEX: 30.77 KG/M2 | OXYGEN SATURATION: 95 % | HEIGHT: 60 IN | TEMPERATURE: 98 F | RESPIRATION RATE: 20 BRPM | HEART RATE: 60 BPM

## 2021-12-30 VITALS
HEART RATE: 63 BPM | BODY MASS INDEX: 30.77 KG/M2 | TEMPERATURE: 98 F | OXYGEN SATURATION: 93 % | HEIGHT: 60 IN | WEIGHT: 156.75 LBS | DIASTOLIC BLOOD PRESSURE: 73 MMHG | SYSTOLIC BLOOD PRESSURE: 143 MMHG

## 2021-12-30 DIAGNOSIS — C22.0 HEPATOCELLULAR CARCINOMA: ICD-10-CM

## 2021-12-30 DIAGNOSIS — C22.0 HCC (HEPATOCELLULAR CARCINOMA): Primary | ICD-10-CM

## 2021-12-30 DIAGNOSIS — R30.0 DYSURIA: ICD-10-CM

## 2021-12-30 DIAGNOSIS — C77.8 SECONDARY MALIGNANT NEOPLASM OF LYMPH NODES OF MULTIPLE SITES: ICD-10-CM

## 2021-12-30 DIAGNOSIS — K59.03 DRUG-INDUCED CONSTIPATION: Primary | ICD-10-CM

## 2021-12-30 DIAGNOSIS — E03.9 HYPOTHYROIDISM (ACQUIRED): ICD-10-CM

## 2021-12-30 DIAGNOSIS — C22.0 HCC (HEPATOCELLULAR CARCINOMA): ICD-10-CM

## 2021-12-30 PROCEDURE — 99214 OFFICE O/P EST MOD 30 MIN: CPT | Mod: PBBFAC,25 | Performed by: INTERNAL MEDICINE

## 2021-12-30 PROCEDURE — 99215 OFFICE O/P EST HI 40 MIN: CPT | Mod: S$PBB,,, | Performed by: INTERNAL MEDICINE

## 2021-12-30 PROCEDURE — 90662 IIV NO PRSV INCREASED AG IM: CPT | Mod: PBBFAC

## 2021-12-30 PROCEDURE — 99999 PR PBB SHADOW E&M-EST. PATIENT-LVL IV: ICD-10-PCS | Mod: PBBFAC,,, | Performed by: INTERNAL MEDICINE

## 2021-12-30 PROCEDURE — 96413 CHEMO IV INFUSION 1 HR: CPT

## 2021-12-30 PROCEDURE — 63600175 PHARM REV CODE 636 W HCPCS: Mod: JG | Performed by: INTERNAL MEDICINE

## 2021-12-30 PROCEDURE — 99215 PR OFFICE/OUTPT VISIT, EST, LEVL V, 40-54 MIN: ICD-10-PCS | Mod: S$PBB,,, | Performed by: INTERNAL MEDICINE

## 2021-12-30 PROCEDURE — 25000003 PHARM REV CODE 250: Performed by: INTERNAL MEDICINE

## 2021-12-30 PROCEDURE — G0008 ADMIN INFLUENZA VIRUS VAC: HCPCS | Mod: PBBFAC

## 2021-12-30 PROCEDURE — 99999 PR PBB SHADOW E&M-EST. PATIENT-LVL IV: CPT | Mod: PBBFAC,,, | Performed by: INTERNAL MEDICINE

## 2021-12-30 PROCEDURE — 96417 CHEMO IV INFUS EACH ADDL SEQ: CPT

## 2021-12-30 RX ORDER — EPINEPHRINE 0.3 MG/.3ML
0.3 INJECTION SUBCUTANEOUS ONCE AS NEEDED
Status: CANCELLED | OUTPATIENT
Start: 2021-12-30

## 2021-12-30 RX ORDER — HEPARIN 100 UNIT/ML
500 SYRINGE INTRAVENOUS
Status: CANCELLED | OUTPATIENT
Start: 2021-12-30

## 2021-12-30 RX ORDER — DIPHENHYDRAMINE HYDROCHLORIDE 50 MG/ML
25 INJECTION INTRAMUSCULAR; INTRAVENOUS EVERY 10 MIN PRN
Status: CANCELLED | OUTPATIENT
Start: 2021-12-30 | End: 2021-12-31

## 2021-12-30 RX ORDER — METHYLPREDNISOLONE SOD SUCC 125 MG
125 VIAL (EA) INJECTION ONCE AS NEEDED
Status: CANCELLED | OUTPATIENT
Start: 2021-12-30

## 2021-12-30 RX ORDER — SODIUM CHLORIDE 0.9 % (FLUSH) 0.9 %
10 SYRINGE (ML) INJECTION
Status: CANCELLED | OUTPATIENT
Start: 2021-12-30

## 2021-12-30 RX ORDER — OXYCODONE 13.5 MG/1
1 CAPSULE, EXTENDED RELEASE ORAL 2 TIMES DAILY
Qty: 60 EACH | Refills: 0 | Status: SHIPPED | OUTPATIENT
Start: 2021-12-30 | End: 2022-01-29 | Stop reason: SDUPTHER

## 2021-12-30 RX ADMIN — BEVACIZUMAB 1065 MG: 400 INJECTION, SOLUTION INTRAVENOUS at 12:12

## 2021-12-30 RX ADMIN — ATEZOLIZUMAB 1200 MG: 1200 INJECTION, SOLUTION INTRAVENOUS at 12:12

## 2021-12-31 ENCOUNTER — EXTERNAL CHRONIC CARE MANAGEMENT (OUTPATIENT)
Dept: PRIMARY CARE CLINIC | Facility: CLINIC | Age: 81
End: 2021-12-31
Payer: MEDICARE

## 2021-12-31 PROCEDURE — 99490 CHRNC CARE MGMT STAFF 1ST 20: CPT | Mod: S$PBB,,, | Performed by: FAMILY MEDICINE

## 2021-12-31 PROCEDURE — 99490 PR CHRONIC CARE MGMT, 1ST 20 MIN: ICD-10-PCS | Mod: S$PBB,,, | Performed by: FAMILY MEDICINE

## 2021-12-31 PROCEDURE — 99490 CHRNC CARE MGMT STAFF 1ST 20: CPT | Mod: PBBFAC,PN | Performed by: FAMILY MEDICINE

## 2022-01-01 ENCOUNTER — TELEPHONE (OUTPATIENT)
Dept: HEMATOLOGY/ONCOLOGY | Facility: CLINIC | Age: 82
End: 2022-01-01
Payer: MEDICARE

## 2022-01-01 ENCOUNTER — EXTERNAL CHRONIC CARE MANAGEMENT (OUTPATIENT)
Dept: PRIMARY CARE CLINIC | Facility: CLINIC | Age: 82
End: 2022-01-01
Payer: MEDICARE

## 2022-01-01 ENCOUNTER — SPECIALTY PHARMACY (OUTPATIENT)
Dept: PHARMACY | Facility: CLINIC | Age: 82
End: 2022-01-01
Payer: MEDICARE

## 2022-01-01 ENCOUNTER — OFFICE VISIT (OUTPATIENT)
Dept: HEMATOLOGY/ONCOLOGY | Facility: CLINIC | Age: 82
End: 2022-01-01
Payer: MEDICARE

## 2022-01-01 ENCOUNTER — TELEPHONE (OUTPATIENT)
Dept: ADMINISTRATIVE | Facility: HOSPITAL | Age: 82
End: 2022-01-01
Payer: MEDICARE

## 2022-01-01 ENCOUNTER — LAB VISIT (OUTPATIENT)
Dept: LAB | Facility: HOSPITAL | Age: 82
End: 2022-01-01
Attending: INTERNAL MEDICINE
Payer: MEDICARE

## 2022-01-01 ENCOUNTER — OFFICE VISIT (OUTPATIENT)
Dept: PRIMARY CARE CLINIC | Facility: CLINIC | Age: 82
End: 2022-01-01
Payer: MEDICARE

## 2022-01-01 ENCOUNTER — PATIENT MESSAGE (OUTPATIENT)
Dept: HEMATOLOGY/ONCOLOGY | Facility: CLINIC | Age: 82
End: 2022-01-01
Payer: MEDICARE

## 2022-01-01 ENCOUNTER — INFUSION (OUTPATIENT)
Dept: INFUSION THERAPY | Facility: HOSPITAL | Age: 82
End: 2022-01-01
Payer: MEDICARE

## 2022-01-01 ENCOUNTER — HOSPITAL ENCOUNTER (OUTPATIENT)
Dept: RADIOLOGY | Facility: HOSPITAL | Age: 82
Discharge: HOME | End: 2022-09-28
Payer: MEDICARE

## 2022-01-01 ENCOUNTER — PATIENT MESSAGE (OUTPATIENT)
Dept: PRIMARY CARE CLINIC | Facility: CLINIC | Age: 82
End: 2022-01-01
Payer: MEDICARE

## 2022-01-01 ENCOUNTER — LAB VISIT (OUTPATIENT)
Dept: LAB | Facility: HOSPITAL | Age: 82
End: 2022-01-01
Payer: MEDICARE

## 2022-01-01 ENCOUNTER — TELEPHONE (OUTPATIENT)
Dept: HEMATOLOGY/ONCOLOGY | Facility: CLINIC | Age: 82
End: 2022-01-01

## 2022-01-01 VITALS
HEIGHT: 60 IN | TEMPERATURE: 98 F | DIASTOLIC BLOOD PRESSURE: 78 MMHG | BODY MASS INDEX: 27.01 KG/M2 | OXYGEN SATURATION: 94 % | HEART RATE: 60 BPM | WEIGHT: 137.56 LBS | SYSTOLIC BLOOD PRESSURE: 191 MMHG

## 2022-01-01 VITALS
HEIGHT: 61 IN | TEMPERATURE: 98 F | DIASTOLIC BLOOD PRESSURE: 62 MMHG | OXYGEN SATURATION: 94 % | WEIGHT: 132.94 LBS | HEART RATE: 65 BPM | SYSTOLIC BLOOD PRESSURE: 160 MMHG | BODY MASS INDEX: 25.1 KG/M2

## 2022-01-01 VITALS
OXYGEN SATURATION: 97 % | DIASTOLIC BLOOD PRESSURE: 71 MMHG | TEMPERATURE: 97 F | SYSTOLIC BLOOD PRESSURE: 124 MMHG | HEART RATE: 62 BPM | RESPIRATION RATE: 18 BRPM

## 2022-01-01 VITALS
DIASTOLIC BLOOD PRESSURE: 70 MMHG | TEMPERATURE: 98 F | HEART RATE: 64 BPM | HEIGHT: 60 IN | SYSTOLIC BLOOD PRESSURE: 152 MMHG | OXYGEN SATURATION: 96 % | BODY MASS INDEX: 25.26 KG/M2 | WEIGHT: 128.63 LBS

## 2022-01-01 VITALS
WEIGHT: 130.94 LBS | SYSTOLIC BLOOD PRESSURE: 158 MMHG | TEMPERATURE: 97 F | BODY MASS INDEX: 25.71 KG/M2 | DIASTOLIC BLOOD PRESSURE: 78 MMHG | HEIGHT: 60 IN | OXYGEN SATURATION: 93 % | HEART RATE: 67 BPM

## 2022-01-01 VITALS
HEART RATE: 60 BPM | OXYGEN SATURATION: 95 % | BODY MASS INDEX: 24.29 KG/M2 | DIASTOLIC BLOOD PRESSURE: 74 MMHG | TEMPERATURE: 98 F | SYSTOLIC BLOOD PRESSURE: 126 MMHG | WEIGHT: 128.5 LBS

## 2022-01-01 VITALS — DIASTOLIC BLOOD PRESSURE: 76 MMHG | SYSTOLIC BLOOD PRESSURE: 159 MMHG

## 2022-01-01 DIAGNOSIS — E87.6 HYPOKALEMIA: ICD-10-CM

## 2022-01-01 DIAGNOSIS — R19.7 DIARRHEA, UNSPECIFIED TYPE: ICD-10-CM

## 2022-01-01 DIAGNOSIS — E06.4 DRUG-INDUCED THYROIDITIS: ICD-10-CM

## 2022-01-01 DIAGNOSIS — C22.0 HEPATOCELLULAR CARCINOMA: ICD-10-CM

## 2022-01-01 DIAGNOSIS — D84.821 IMMUNODEFICIENCY DUE TO CHEMOTHERAPY: ICD-10-CM

## 2022-01-01 DIAGNOSIS — Z09 ENCOUNTER FOR FOLLOW-UP EXAMINATION AFTER COMPLETED TREATMENT FOR CONDITIONS OTHER THAN MALIGNANT NEOPLASM: ICD-10-CM

## 2022-01-01 DIAGNOSIS — G89.3 CANCER ASSOCIATED PAIN: ICD-10-CM

## 2022-01-01 DIAGNOSIS — C22.0 HCC (HEPATOCELLULAR CARCINOMA): ICD-10-CM

## 2022-01-01 DIAGNOSIS — T45.1X5A IMMUNODEFICIENCY DUE TO CHEMOTHERAPY: ICD-10-CM

## 2022-01-01 DIAGNOSIS — C77.8 SECONDARY MALIGNANT NEOPLASM OF LYMPH NODES OF MULTIPLE SITES: ICD-10-CM

## 2022-01-01 DIAGNOSIS — Z79.899 IMMUNODEFICIENCY DUE TO CHEMOTHERAPY: ICD-10-CM

## 2022-01-01 DIAGNOSIS — G89.3 PAIN, NEOPLASM-RELATED: ICD-10-CM

## 2022-01-01 DIAGNOSIS — C79.51 SECONDARY MALIGNANT NEOPLASM OF BONE: ICD-10-CM

## 2022-01-01 DIAGNOSIS — E78.2 MIXED HYPERLIPIDEMIA: ICD-10-CM

## 2022-01-01 DIAGNOSIS — R68.81 EARLY SATIETY: ICD-10-CM

## 2022-01-01 DIAGNOSIS — M79.89 SWELLING OF LOWER EXTREMITY: ICD-10-CM

## 2022-01-01 DIAGNOSIS — E87.6 HYPOKALEMIA: Primary | ICD-10-CM

## 2022-01-01 DIAGNOSIS — I10 ESSENTIAL HYPERTENSION: Primary | ICD-10-CM

## 2022-01-01 DIAGNOSIS — J44.9 CHRONIC OBSTRUCTIVE PULMONARY DISEASE, UNSPECIFIED COPD TYPE: ICD-10-CM

## 2022-01-01 DIAGNOSIS — C22.0 HCC (HEPATOCELLULAR CARCINOMA): Primary | ICD-10-CM

## 2022-01-01 DIAGNOSIS — Z86.39 HISTORY OF STEROID-INDUCED DIABETES MELLITUS: ICD-10-CM

## 2022-01-01 DIAGNOSIS — I10 HYPERTENSION, UNSPECIFIED TYPE: ICD-10-CM

## 2022-01-01 DIAGNOSIS — E86.0 DEHYDRATION: Primary | ICD-10-CM

## 2022-01-01 DIAGNOSIS — R19.7 DIARRHEA, UNSPECIFIED TYPE: Primary | ICD-10-CM

## 2022-01-01 DIAGNOSIS — I70.0 ATHEROSCLEROSIS OF AORTA: ICD-10-CM

## 2022-01-01 DIAGNOSIS — I50.32 CHRONIC DIASTOLIC CONGESTIVE HEART FAILURE: ICD-10-CM

## 2022-01-01 DIAGNOSIS — N30.00 ACUTE CYSTITIS WITHOUT HEMATURIA: ICD-10-CM

## 2022-01-01 DIAGNOSIS — E03.9 HYPOTHYROIDISM (ACQUIRED): ICD-10-CM

## 2022-01-01 DIAGNOSIS — E86.0 DEHYDRATION: ICD-10-CM

## 2022-01-01 LAB
ALBUMIN SERPL BCP-MCNC: 2.8 G/DL (ref 3.5–5.2)
ALBUMIN SERPL BCP-MCNC: 3 G/DL (ref 3.5–5.2)
ALBUMIN SERPL BCP-MCNC: 3.4 G/DL (ref 3.5–5.2)
ALP SERPL-CCNC: 109 U/L (ref 55–135)
ALP SERPL-CCNC: 57 U/L (ref 55–135)
ALP SERPL-CCNC: 67 U/L (ref 55–135)
ALP SERPL-CCNC: 73 U/L (ref 55–135)
ALP SERPL-CCNC: 75 U/L (ref 55–135)
ALT SERPL W/O P-5'-P-CCNC: 19 U/L (ref 10–44)
ALT SERPL W/O P-5'-P-CCNC: 20 U/L (ref 10–44)
ALT SERPL W/O P-5'-P-CCNC: 25 U/L (ref 10–44)
ALT SERPL W/O P-5'-P-CCNC: 26 U/L (ref 10–44)
ALT SERPL W/O P-5'-P-CCNC: 46 U/L (ref 10–44)
ANION GAP SERPL CALC-SCNC: 12 MMOL/L (ref 8–16)
ANION GAP SERPL CALC-SCNC: 13 MMOL/L (ref 8–16)
ANION GAP SERPL CALC-SCNC: 7 MMOL/L (ref 8–16)
ANION GAP SERPL CALC-SCNC: 9 MMOL/L (ref 8–16)
ANION GAP SERPL CALC-SCNC: 9 MMOL/L (ref 8–16)
AST SERPL-CCNC: 27 U/L (ref 10–40)
AST SERPL-CCNC: 28 U/L (ref 10–40)
AST SERPL-CCNC: 30 U/L (ref 10–40)
AST SERPL-CCNC: 31 U/L (ref 10–40)
AST SERPL-CCNC: 50 U/L (ref 10–40)
BASOPHILS # BLD AUTO: 0.02 K/UL (ref 0–0.2)
BASOPHILS # BLD AUTO: 0.03 K/UL (ref 0–0.2)
BASOPHILS # BLD AUTO: 0.04 K/UL (ref 0–0.2)
BASOPHILS # BLD AUTO: 0.05 K/UL (ref 0–0.2)
BASOPHILS # BLD AUTO: 0.07 K/UL (ref 0–0.2)
BASOPHILS NFR BLD: 0.4 % (ref 0–1.9)
BASOPHILS NFR BLD: 0.5 % (ref 0–1.9)
BASOPHILS NFR BLD: 0.6 % (ref 0–1.9)
BASOPHILS NFR BLD: 0.8 % (ref 0–1.9)
BASOPHILS NFR BLD: 0.9 % (ref 0–1.9)
BILIRUB SERPL-MCNC: 0.4 MG/DL (ref 0.1–1)
BILIRUB SERPL-MCNC: 0.4 MG/DL (ref 0.1–1)
BILIRUB SERPL-MCNC: 0.5 MG/DL (ref 0.1–1)
BUN SERPL-MCNC: 12 MG/DL (ref 8–23)
BUN SERPL-MCNC: 12 MG/DL (ref 8–23)
BUN SERPL-MCNC: 13 MG/DL (ref 8–23)
BUN SERPL-MCNC: 16 MG/DL (ref 8–23)
BUN SERPL-MCNC: 32 MG/DL (ref 8–23)
CALCIUM SERPL-MCNC: 10 MG/DL (ref 8.7–10.5)
CALCIUM SERPL-MCNC: 8.3 MG/DL (ref 8.7–10.5)
CALCIUM SERPL-MCNC: 8.3 MG/DL (ref 8.7–10.5)
CALCIUM SERPL-MCNC: 8.6 MG/DL (ref 8.7–10.5)
CALCIUM SERPL-MCNC: 9 MG/DL (ref 8.7–10.5)
CHLORIDE SERPL-SCNC: 102 MMOL/L (ref 95–110)
CHLORIDE SERPL-SCNC: 104 MMOL/L (ref 95–110)
CHLORIDE SERPL-SCNC: 104 MMOL/L (ref 95–110)
CHLORIDE SERPL-SCNC: 106 MMOL/L (ref 95–110)
CHLORIDE SERPL-SCNC: 106 MMOL/L (ref 95–110)
CO2 SERPL-SCNC: 22 MMOL/L (ref 23–29)
CO2 SERPL-SCNC: 26 MMOL/L (ref 23–29)
CO2 SERPL-SCNC: 27 MMOL/L (ref 23–29)
CO2 SERPL-SCNC: 27 MMOL/L (ref 23–29)
CO2 SERPL-SCNC: 28 MMOL/L (ref 23–29)
CREAT SERPL-MCNC: 0.7 MG/DL (ref 0.5–1.4)
CREAT SERPL-MCNC: 0.8 MG/DL (ref 0.5–1.4)
CREAT SERPL-MCNC: 0.8 MG/DL (ref 0.5–1.4)
CREAT SERPL-MCNC: 0.9 MG/DL (ref 0.5–1.4)
CREAT SERPL-MCNC: 1.3 MG/DL (ref 0.5–1.4)
DIFFERENTIAL METHOD: ABNORMAL
EOSINOPHIL # BLD AUTO: 0.1 K/UL (ref 0–0.5)
EOSINOPHIL # BLD AUTO: 0.1 K/UL (ref 0–0.5)
EOSINOPHIL # BLD AUTO: 0.2 K/UL (ref 0–0.5)
EOSINOPHIL NFR BLD: 1.7 % (ref 0–8)
EOSINOPHIL NFR BLD: 2.3 % (ref 0–8)
EOSINOPHIL NFR BLD: 2.6 % (ref 0–8)
EOSINOPHIL NFR BLD: 2.6 % (ref 0–8)
EOSINOPHIL NFR BLD: 2.8 % (ref 0–8)
ERYTHROCYTE [DISTWIDTH] IN BLOOD BY AUTOMATED COUNT: 13.7 % (ref 11.5–14.5)
ERYTHROCYTE [DISTWIDTH] IN BLOOD BY AUTOMATED COUNT: 14.9 % (ref 11.5–14.5)
ERYTHROCYTE [DISTWIDTH] IN BLOOD BY AUTOMATED COUNT: 15 % (ref 11.5–14.5)
ERYTHROCYTE [DISTWIDTH] IN BLOOD BY AUTOMATED COUNT: 15.7 % (ref 11.5–14.5)
ERYTHROCYTE [DISTWIDTH] IN BLOOD BY AUTOMATED COUNT: 15.9 % (ref 11.5–14.5)
EST. GFR  (NO RACE VARIABLE): 40.8 ML/MIN/1.73 M^2
EST. GFR  (NO RACE VARIABLE): >60 ML/MIN/1.73 M^2
GLUCOSE SERPL-MCNC: 106 MG/DL (ref 70–110)
GLUCOSE SERPL-MCNC: 82 MG/DL (ref 70–110)
GLUCOSE SERPL-MCNC: 89 MG/DL (ref 70–110)
GLUCOSE SERPL-MCNC: 90 MG/DL (ref 70–110)
GLUCOSE SERPL-MCNC: 97 MG/DL (ref 70–110)
HCT VFR BLD AUTO: 36.9 % (ref 37–48.5)
HCT VFR BLD AUTO: 37.4 % (ref 37–48.5)
HCT VFR BLD AUTO: 39.4 % (ref 37–48.5)
HCT VFR BLD AUTO: 39.5 % (ref 37–48.5)
HCT VFR BLD AUTO: 40.4 % (ref 37–48.5)
HGB BLD-MCNC: 11.9 G/DL (ref 12–16)
HGB BLD-MCNC: 12.1 G/DL (ref 12–16)
HGB BLD-MCNC: 12.6 G/DL (ref 12–16)
HGB BLD-MCNC: 12.8 G/DL (ref 12–16)
HGB BLD-MCNC: 13.2 G/DL (ref 12–16)
IMM GRANULOCYTES # BLD AUTO: 0.01 K/UL (ref 0–0.04)
IMM GRANULOCYTES # BLD AUTO: 0.04 K/UL (ref 0–0.04)
IMM GRANULOCYTES NFR BLD AUTO: 0.2 % (ref 0–0.5)
IMM GRANULOCYTES NFR BLD AUTO: 0.4 % (ref 0–0.5)
LYMPHOCYTES # BLD AUTO: 1.3 K/UL (ref 1–4.8)
LYMPHOCYTES # BLD AUTO: 1.3 K/UL (ref 1–4.8)
LYMPHOCYTES # BLD AUTO: 1.7 K/UL (ref 1–4.8)
LYMPHOCYTES # BLD AUTO: 1.8 K/UL (ref 1–4.8)
LYMPHOCYTES # BLD AUTO: 1.8 K/UL (ref 1–4.8)
LYMPHOCYTES NFR BLD: 20 % (ref 18–48)
LYMPHOCYTES NFR BLD: 23 % (ref 18–48)
LYMPHOCYTES NFR BLD: 23.5 % (ref 18–48)
LYMPHOCYTES NFR BLD: 25.9 % (ref 18–48)
LYMPHOCYTES NFR BLD: 31.9 % (ref 18–48)
MCH RBC QN AUTO: 32.8 PG (ref 27–31)
MCH RBC QN AUTO: 34.1 PG (ref 27–31)
MCH RBC QN AUTO: 34.3 PG (ref 27–31)
MCH RBC QN AUTO: 34.6 PG (ref 27–31)
MCH RBC QN AUTO: 35.4 PG (ref 27–31)
MCHC RBC AUTO-ENTMCNC: 31.8 G/DL (ref 32–36)
MCHC RBC AUTO-ENTMCNC: 31.9 G/DL (ref 32–36)
MCHC RBC AUTO-ENTMCNC: 32.5 G/DL (ref 32–36)
MCHC RBC AUTO-ENTMCNC: 32.7 G/DL (ref 32–36)
MCHC RBC AUTO-ENTMCNC: 32.8 G/DL (ref 32–36)
MCV RBC AUTO: 101 FL (ref 82–98)
MCV RBC AUTO: 106 FL (ref 82–98)
MCV RBC AUTO: 107 FL (ref 82–98)
MCV RBC AUTO: 108 FL (ref 82–98)
MCV RBC AUTO: 108 FL (ref 82–98)
MONOCYTES # BLD AUTO: 0.5 K/UL (ref 0.3–1)
MONOCYTES # BLD AUTO: 0.5 K/UL (ref 0.3–1)
MONOCYTES # BLD AUTO: 0.6 K/UL (ref 0.3–1)
MONOCYTES # BLD AUTO: 1.1 K/UL (ref 0.3–1)
MONOCYTES # BLD AUTO: 1.2 K/UL (ref 0.3–1)
MONOCYTES NFR BLD: 12.6 % (ref 4–15)
MONOCYTES NFR BLD: 19 % (ref 4–15)
MONOCYTES NFR BLD: 8.9 % (ref 4–15)
MONOCYTES NFR BLD: 9.2 % (ref 4–15)
MONOCYTES NFR BLD: 9.7 % (ref 4–15)
NEUTROPHILS # BLD AUTO: 3 K/UL (ref 1.8–7.7)
NEUTROPHILS # BLD AUTO: 3.1 K/UL (ref 1.8–7.7)
NEUTROPHILS # BLD AUTO: 3.6 K/UL (ref 1.8–7.7)
NEUTROPHILS # BLD AUTO: 4 K/UL (ref 1.8–7.7)
NEUTROPHILS # BLD AUTO: 5.9 K/UL (ref 1.8–7.7)
NEUTROPHILS NFR BLD: 54.8 % (ref 38–73)
NEUTROPHILS NFR BLD: 55.1 % (ref 38–73)
NEUTROPHILS NFR BLD: 62.7 % (ref 38–73)
NEUTROPHILS NFR BLD: 63.4 % (ref 38–73)
NEUTROPHILS NFR BLD: 63.9 % (ref 38–73)
NRBC BLD-RTO: 0 /100 WBC
PLATELET # BLD AUTO: 155 K/UL (ref 150–450)
PLATELET # BLD AUTO: 166 K/UL (ref 150–450)
PLATELET # BLD AUTO: 176 K/UL (ref 150–450)
PLATELET # BLD AUTO: 196 K/UL (ref 150–450)
PLATELET # BLD AUTO: 358 K/UL (ref 150–450)
PMV BLD AUTO: 10 FL (ref 9.2–12.9)
PMV BLD AUTO: 10.1 FL (ref 9.2–12.9)
PMV BLD AUTO: 10.5 FL (ref 9.2–12.9)
PMV BLD AUTO: 9.8 FL (ref 9.2–12.9)
PMV BLD AUTO: 9.9 FL (ref 9.2–12.9)
POTASSIUM SERPL-SCNC: 2.9 MMOL/L (ref 3.5–5.1)
POTASSIUM SERPL-SCNC: 3.4 MMOL/L (ref 3.5–5.1)
POTASSIUM SERPL-SCNC: 3.8 MMOL/L (ref 3.5–5.1)
POTASSIUM SERPL-SCNC: 4.7 MMOL/L (ref 3.5–5.1)
POTASSIUM SERPL-SCNC: 5.4 MMOL/L (ref 3.5–5.1)
PROT SERPL-MCNC: 6.6 G/DL (ref 6–8.4)
PROT SERPL-MCNC: 6.8 G/DL (ref 6–8.4)
PROT SERPL-MCNC: 7 G/DL (ref 6–8.4)
PROT SERPL-MCNC: 7.2 G/DL (ref 6–8.4)
PROT SERPL-MCNC: 8.6 G/DL (ref 6–8.4)
RBC # BLD AUTO: 3.42 M/UL (ref 4–5.4)
RBC # BLD AUTO: 3.49 M/UL (ref 4–5.4)
RBC # BLD AUTO: 3.67 M/UL (ref 4–5.4)
RBC # BLD AUTO: 3.82 M/UL (ref 4–5.4)
RBC # BLD AUTO: 3.9 M/UL (ref 4–5.4)
SODIUM SERPL-SCNC: 137 MMOL/L (ref 136–145)
SODIUM SERPL-SCNC: 140 MMOL/L (ref 136–145)
SODIUM SERPL-SCNC: 141 MMOL/L (ref 136–145)
SODIUM SERPL-SCNC: 141 MMOL/L (ref 136–145)
SODIUM SERPL-SCNC: 143 MMOL/L (ref 136–145)
TSH SERPL DL<=0.005 MIU/L-ACNC: 1.25 UIU/ML (ref 0.4–4)
TSH SERPL DL<=0.005 MIU/L-ACNC: 1.33 UIU/ML (ref 0.4–4)
TSH SERPL DL<=0.005 MIU/L-ACNC: 1.55 UIU/ML (ref 0.4–4)
TSH SERPL DL<=0.005 MIU/L-ACNC: 1.61 UIU/ML (ref 0.4–4)
WBC # BLD AUTO: 5.48 K/UL (ref 3.9–12.7)
WBC # BLD AUTO: 5.67 K/UL (ref 3.9–12.7)
WBC # BLD AUTO: 5.69 K/UL (ref 3.9–12.7)
WBC # BLD AUTO: 6.41 K/UL (ref 3.9–12.7)
WBC # BLD AUTO: 9.19 K/UL (ref 3.9–12.7)

## 2022-01-01 PROCEDURE — 99215 OFFICE O/P EST HI 40 MIN: CPT | Mod: S$PBB,,, | Performed by: INTERNAL MEDICINE

## 2022-01-01 PROCEDURE — 99214 OFFICE O/P EST MOD 30 MIN: CPT | Mod: PBBFAC,PN | Performed by: FAMILY MEDICINE

## 2022-01-01 PROCEDURE — 84443 ASSAY THYROID STIM HORMONE: CPT | Performed by: INTERNAL MEDICINE

## 2022-01-01 PROCEDURE — 99999 PR PBB SHADOW E&M-EST. PATIENT-LVL IV: CPT | Mod: PBBFAC,,,

## 2022-01-01 PROCEDURE — 99213 OFFICE O/P EST LOW 20 MIN: CPT | Mod: PBBFAC | Performed by: INTERNAL MEDICINE

## 2022-01-01 PROCEDURE — 80053 COMPREHEN METABOLIC PANEL: CPT | Performed by: INTERNAL MEDICINE

## 2022-01-01 PROCEDURE — 99490 CHRNC CARE MGMT STAFF 1ST 20: CPT | Mod: PBBFAC,25,PN | Performed by: FAMILY MEDICINE

## 2022-01-01 PROCEDURE — 99439 CHRNC CARE MGMT STAF EA ADDL: CPT | Mod: PBBFAC,25,27,PN | Performed by: FAMILY MEDICINE

## 2022-01-01 PROCEDURE — 99999 PR PBB SHADOW E&M-EST. PATIENT-LVL III: ICD-10-PCS | Mod: PBBFAC,,, | Performed by: INTERNAL MEDICINE

## 2022-01-01 PROCEDURE — 99439 PR CHRONIC CARE MGMT, EA ADDTL 20 MIN: ICD-10-PCS | Mod: S$PBB,,, | Performed by: FAMILY MEDICINE

## 2022-01-01 PROCEDURE — 99214 OFFICE O/P EST MOD 30 MIN: CPT | Mod: S$PBB,,, | Performed by: PHYSICIAN ASSISTANT

## 2022-01-01 PROCEDURE — 36415 COLL VENOUS BLD VENIPUNCTURE: CPT | Mod: PO | Performed by: INTERNAL MEDICINE

## 2022-01-01 PROCEDURE — 78815 PET IMAGE W/CT SKULL-THIGH: CPT | Mod: 26,PS,, | Performed by: RADIOLOGY

## 2022-01-01 PROCEDURE — 99214 PR OFFICE/OUTPT VISIT, EST, LEVL IV, 30-39 MIN: ICD-10-PCS | Mod: S$PBB,,,

## 2022-01-01 PROCEDURE — 99490 CHRNC CARE MGMT STAFF 1ST 20: CPT | Mod: PBBFAC,PN | Performed by: FAMILY MEDICINE

## 2022-01-01 PROCEDURE — 85025 COMPLETE CBC W/AUTO DIFF WBC: CPT | Performed by: INTERNAL MEDICINE

## 2022-01-01 PROCEDURE — 99490 PR CHRONIC CARE MGMT, 1ST 20 MIN: ICD-10-PCS | Mod: S$PBB,,, | Performed by: FAMILY MEDICINE

## 2022-01-01 PROCEDURE — G0008 ADMIN INFLUENZA VIRUS VAC: HCPCS | Mod: PBBFAC,PN

## 2022-01-01 PROCEDURE — 99490 CHRNC CARE MGMT STAFF 1ST 20: CPT | Mod: S$PBB,,, | Performed by: FAMILY MEDICINE

## 2022-01-01 PROCEDURE — 96360 HYDRATION IV INFUSION INIT: CPT

## 2022-01-01 PROCEDURE — 99439 CHRNC CARE MGMT STAF EA ADDL: CPT | Mod: S$PBB,,, | Performed by: FAMILY MEDICINE

## 2022-01-01 PROCEDURE — 90662 IIV NO PRSV INCREASED AG IM: CPT | Mod: PBBFAC,PN

## 2022-01-01 PROCEDURE — 99214 PR OFFICE/OUTPT VISIT, EST, LEVL IV, 30-39 MIN: ICD-10-PCS | Mod: S$PBB,,, | Performed by: PHYSICIAN ASSISTANT

## 2022-01-01 PROCEDURE — 99215 PR OFFICE/OUTPT VISIT, EST, LEVL V, 40-54 MIN: ICD-10-PCS | Mod: 95,,, | Performed by: INTERNAL MEDICINE

## 2022-01-01 PROCEDURE — 96361 HYDRATE IV INFUSION ADD-ON: CPT

## 2022-01-01 PROCEDURE — 99214 OFFICE O/P EST MOD 30 MIN: CPT | Mod: S$PBB,,,

## 2022-01-01 PROCEDURE — 25000003 PHARM REV CODE 250

## 2022-01-01 PROCEDURE — 78815 NM PET CT ROUTINE: ICD-10-PCS | Mod: 26,PS,, | Performed by: RADIOLOGY

## 2022-01-01 PROCEDURE — 85025 COMPLETE CBC W/AUTO DIFF WBC: CPT

## 2022-01-01 PROCEDURE — 78815 PET IMAGE W/CT SKULL-THIGH: CPT | Mod: TC

## 2022-01-01 PROCEDURE — 99999 PR PBB SHADOW E&M-EST. PATIENT-LVL V: ICD-10-PCS | Mod: PBBFAC,,, | Performed by: PHYSICIAN ASSISTANT

## 2022-01-01 PROCEDURE — 99999 PR PBB SHADOW E&M-EST. PATIENT-LVL IV: ICD-10-PCS | Mod: PBBFAC,,, | Performed by: FAMILY MEDICINE

## 2022-01-01 PROCEDURE — 99999 PR PBB SHADOW E&M-EST. PATIENT-LVL III: CPT | Mod: PBBFAC,,, | Performed by: INTERNAL MEDICINE

## 2022-01-01 PROCEDURE — 99439 CHRNC CARE MGMT STAF EA ADDL: CPT | Mod: PBBFAC,PN | Performed by: FAMILY MEDICINE

## 2022-01-01 PROCEDURE — 99999 PR PBB SHADOW E&M-EST. PATIENT-LVL IV: CPT | Mod: PBBFAC,,, | Performed by: FAMILY MEDICINE

## 2022-01-01 PROCEDURE — 36415 COLL VENOUS BLD VENIPUNCTURE: CPT | Mod: PO

## 2022-01-01 PROCEDURE — 99214 PR OFFICE/OUTPT VISIT, EST, LEVL IV, 30-39 MIN: ICD-10-PCS | Mod: S$PBB,,, | Performed by: FAMILY MEDICINE

## 2022-01-01 PROCEDURE — 84443 ASSAY THYROID STIM HORMONE: CPT

## 2022-01-01 PROCEDURE — 99215 OFFICE O/P EST HI 40 MIN: CPT | Mod: PBBFAC,PN | Performed by: PHYSICIAN ASSISTANT

## 2022-01-01 PROCEDURE — 99439 CHRNC CARE MGMT STAF EA ADDL: CPT | Mod: PBBFAC,PN,27 | Performed by: FAMILY MEDICINE

## 2022-01-01 PROCEDURE — 63600175 PHARM REV CODE 636 W HCPCS

## 2022-01-01 PROCEDURE — 99999 PR PBB SHADOW E&M-EST. PATIENT-LVL V: CPT | Mod: PBBFAC,,, | Performed by: PHYSICIAN ASSISTANT

## 2022-01-01 PROCEDURE — 99999 PR PBB SHADOW E&M-EST. PATIENT-LVL IV: ICD-10-PCS | Mod: PBBFAC,,,

## 2022-01-01 PROCEDURE — 99215 OFFICE O/P EST HI 40 MIN: CPT | Mod: 95,,, | Performed by: INTERNAL MEDICINE

## 2022-01-01 PROCEDURE — 99214 OFFICE O/P EST MOD 30 MIN: CPT | Mod: S$PBB,,, | Performed by: FAMILY MEDICINE

## 2022-01-01 PROCEDURE — 80053 COMPREHEN METABOLIC PANEL: CPT

## 2022-01-01 PROCEDURE — 99214 OFFICE O/P EST MOD 30 MIN: CPT | Mod: PBBFAC,25

## 2022-01-01 PROCEDURE — 99215 PR OFFICE/OUTPT VISIT, EST, LEVL V, 40-54 MIN: ICD-10-PCS | Mod: S$PBB,,, | Performed by: INTERNAL MEDICINE

## 2022-01-01 RX ORDER — POTASSIUM CHLORIDE 750 MG/1
10 TABLET, EXTENDED RELEASE ORAL 2 TIMES DAILY
Qty: 30 TABLET | Refills: 11 | Status: SHIPPED | OUTPATIENT
Start: 2022-01-01 | End: 2022-01-01 | Stop reason: SDUPTHER

## 2022-01-01 RX ORDER — DIPHENOXYLATE HYDROCHLORIDE AND ATROPINE SULFATE 2.5; .025 MG/1; MG/1
1 TABLET ORAL 4 TIMES DAILY PRN
Qty: 30 TABLET | Refills: 1 | Status: SHIPPED | OUTPATIENT
Start: 2022-01-01 | End: 2023-11-17

## 2022-01-01 RX ORDER — OXYCODONE AND ACETAMINOPHEN 10; 325 MG/1; MG/1
1 TABLET ORAL 4 TIMES DAILY PRN
Qty: 120 TABLET | Refills: 0 | Status: SHIPPED | OUTPATIENT
Start: 2022-01-01 | End: 2022-01-01 | Stop reason: SDUPTHER

## 2022-01-01 RX ORDER — OXYCODONE AND ACETAMINOPHEN 10; 325 MG/1; MG/1
1 TABLET ORAL 4 TIMES DAILY PRN
Qty: 120 TABLET | Refills: 0 | Status: CANCELLED | OUTPATIENT
Start: 2022-01-01

## 2022-01-01 RX ORDER — OXYCODONE HYDROCHLORIDE 15 MG/1
15 TABLET, FILM COATED, EXTENDED RELEASE ORAL EVERY 12 HOURS
Qty: 60 EACH | Refills: 0 | OUTPATIENT
Start: 2022-01-01

## 2022-01-01 RX ORDER — LISINOPRIL 40 MG/1
40 TABLET ORAL DAILY
Qty: 90 TABLET | Refills: 3 | Status: SHIPPED | OUTPATIENT
Start: 2022-01-01

## 2022-01-01 RX ORDER — OXYCODONE AND ACETAMINOPHEN 10; 325 MG/1; MG/1
1 TABLET ORAL 4 TIMES DAILY PRN
Qty: 120 TABLET | Refills: 0 | Status: SHIPPED | OUTPATIENT
Start: 2022-01-01 | End: 2023-01-01 | Stop reason: SDUPTHER

## 2022-01-01 RX ORDER — POTASSIUM CHLORIDE 750 MG/1
10 TABLET, EXTENDED RELEASE ORAL 2 TIMES DAILY
Qty: 30 TABLET | Refills: 11 | Status: SHIPPED | OUTPATIENT
Start: 2022-01-01 | End: 2022-01-01

## 2022-01-01 RX ORDER — HEPARIN 100 UNIT/ML
500 SYRINGE INTRAVENOUS
Status: CANCELLED | OUTPATIENT
Start: 2022-01-01

## 2022-01-01 RX ORDER — OXYCODONE HYDROCHLORIDE 15 MG/1
15 TABLET, FILM COATED, EXTENDED RELEASE ORAL EVERY 12 HOURS
Qty: 60 EACH | Refills: 0 | Status: SHIPPED | OUTPATIENT
Start: 2022-01-01 | End: 2023-01-01 | Stop reason: SDUPTHER

## 2022-01-01 RX ORDER — SODIUM CHLORIDE 0.9 % (FLUSH) 0.9 %
10 SYRINGE (ML) INJECTION
Status: CANCELLED | OUTPATIENT
Start: 2022-01-01

## 2022-01-01 RX ORDER — AMLODIPINE BESYLATE 2.5 MG/1
2.5 TABLET ORAL 3 TIMES DAILY
Qty: 90 TABLET | Refills: 11 | Status: SHIPPED | OUTPATIENT
Start: 2022-01-01 | End: 2023-08-02

## 2022-01-01 RX ORDER — OXYCODONE HYDROCHLORIDE 15 MG/1
15 TABLET, FILM COATED, EXTENDED RELEASE ORAL EVERY 12 HOURS
Qty: 60 EACH | Refills: 0 | Status: SHIPPED | OUTPATIENT
Start: 2022-01-01 | End: 2022-01-01 | Stop reason: SDUPTHER

## 2022-01-01 RX ORDER — POTASSIUM CHLORIDE 750 MG/1
10 CAPSULE, EXTENDED RELEASE ORAL ONCE
Qty: 30 CAPSULE | Refills: 11 | Status: SHIPPED | OUTPATIENT
Start: 2022-01-01 | End: 2022-01-01

## 2022-01-01 RX ORDER — DIPHENOXYLATE HYDROCHLORIDE AND ATROPINE SULFATE 2.5; .025 MG/1; MG/1
1 TABLET ORAL 4 TIMES DAILY PRN
Qty: 30 TABLET | Refills: 1 | Status: SHIPPED | OUTPATIENT
Start: 2022-01-01 | End: 2022-01-01 | Stop reason: ALTCHOICE

## 2022-01-01 RX ORDER — FUROSEMIDE 20 MG/1
20 TABLET ORAL DAILY
Qty: 30 TABLET | Refills: 11 | Status: SHIPPED | OUTPATIENT
Start: 2022-01-01 | End: 2022-01-01 | Stop reason: ALTCHOICE

## 2022-01-01 RX ORDER — LEVOTHYROXINE SODIUM 50 UG/1
50 CAPSULE ORAL
Qty: 90 CAPSULE | Refills: 3 | Status: SHIPPED | OUTPATIENT
Start: 2022-01-01 | End: 2023-09-12

## 2022-01-01 RX ORDER — NITROFURANTOIN 25; 75 MG/1; MG/1
100 CAPSULE ORAL 2 TIMES DAILY
Qty: 14 CAPSULE | Refills: 0 | Status: SHIPPED | OUTPATIENT
Start: 2022-01-01 | End: 2022-01-01

## 2022-01-01 RX ADMIN — SODIUM CHLORIDE 500 ML/HR: 900 INJECTION, SOLUTION INTRAVENOUS at 03:08

## 2022-01-12 ENCOUNTER — HOSPITAL ENCOUNTER (OUTPATIENT)
Dept: RADIOLOGY | Facility: HOSPITAL | Age: 82
Discharge: HOME | End: 2022-01-12
Attending: INTERNAL MEDICINE
Payer: MEDICARE

## 2022-01-12 DIAGNOSIS — C22.0 HCC (HEPATOCELLULAR CARCINOMA): ICD-10-CM

## 2022-01-12 PROCEDURE — 78815 PET IMAGE W/CT SKULL-THIGH: CPT | Mod: 26,PS,, | Performed by: RADIOLOGY

## 2022-01-12 PROCEDURE — 78815 NM PET CT ROUTINE: ICD-10-PCS | Mod: 26,PS,, | Performed by: RADIOLOGY

## 2022-01-12 PROCEDURE — 78815 PET IMAGE W/CT SKULL-THIGH: CPT | Mod: TC

## 2022-01-14 DIAGNOSIS — C22.0 HCC (HEPATOCELLULAR CARCINOMA): ICD-10-CM

## 2022-01-14 RX ORDER — OXYCODONE AND ACETAMINOPHEN 10; 325 MG/1; MG/1
1 TABLET ORAL 4 TIMES DAILY PRN
Qty: 120 TABLET | Refills: 0 | Status: SHIPPED | OUTPATIENT
Start: 2022-01-14 | End: 2022-02-28 | Stop reason: SDUPTHER

## 2022-01-19 ENCOUNTER — LAB VISIT (OUTPATIENT)
Dept: LAB | Facility: HOSPITAL | Age: 82
End: 2022-01-19
Attending: INTERNAL MEDICINE
Payer: MEDICARE

## 2022-01-19 DIAGNOSIS — C22.0 HCC (HEPATOCELLULAR CARCINOMA): ICD-10-CM

## 2022-01-19 LAB
BILIRUB UR QL STRIP: NEGATIVE
CLARITY UR: CLEAR
COLOR UR: YELLOW
GLUCOSE UR QL STRIP: NEGATIVE
HGB UR QL STRIP: NEGATIVE
KETONES UR QL STRIP: NEGATIVE
LEUKOCYTE ESTERASE UR QL STRIP: ABNORMAL
MICROSCOPIC COMMENT: NORMAL
NITRITE UR QL STRIP: NEGATIVE
PH UR STRIP: 6 [PH] (ref 5–8)
PROT UR QL STRIP: NEGATIVE
SP GR UR STRIP: 1.02 (ref 1–1.03)
URN SPEC COLLECT METH UR: ABNORMAL
WBC #/AREA URNS HPF: 2 /HPF (ref 0–5)

## 2022-01-19 PROCEDURE — 81000 URINALYSIS NONAUTO W/SCOPE: CPT | Performed by: INTERNAL MEDICINE

## 2022-01-20 ENCOUNTER — OFFICE VISIT (OUTPATIENT)
Dept: HEMATOLOGY/ONCOLOGY | Facility: CLINIC | Age: 82
End: 2022-01-20
Payer: MEDICARE

## 2022-01-20 ENCOUNTER — TELEPHONE (OUTPATIENT)
Dept: HEMATOLOGY/ONCOLOGY | Facility: CLINIC | Age: 82
End: 2022-01-20

## 2022-01-20 VITALS
OXYGEN SATURATION: 94 % | HEIGHT: 60 IN | DIASTOLIC BLOOD PRESSURE: 71 MMHG | HEART RATE: 63 BPM | BODY MASS INDEX: 29.9 KG/M2 | WEIGHT: 152.31 LBS | SYSTOLIC BLOOD PRESSURE: 176 MMHG | TEMPERATURE: 98 F

## 2022-01-20 DIAGNOSIS — K59.03 DRUG-INDUCED CONSTIPATION: ICD-10-CM

## 2022-01-20 DIAGNOSIS — C22.0 HCC (HEPATOCELLULAR CARCINOMA): Primary | ICD-10-CM

## 2022-01-20 DIAGNOSIS — C22.0 HEPATOCELLULAR CARCINOMA: ICD-10-CM

## 2022-01-20 DIAGNOSIS — C77.8 SECONDARY MALIGNANT NEOPLASM OF LYMPH NODES OF MULTIPLE SITES: ICD-10-CM

## 2022-01-20 PROCEDURE — 99999 PR PBB SHADOW E&M-EST. PATIENT-LVL IV: CPT | Mod: PBBFAC,,, | Performed by: INTERNAL MEDICINE

## 2022-01-20 PROCEDURE — 99214 OFFICE O/P EST MOD 30 MIN: CPT | Mod: PBBFAC | Performed by: INTERNAL MEDICINE

## 2022-01-20 PROCEDURE — 99215 PR OFFICE/OUTPT VISIT, EST, LEVL V, 40-54 MIN: ICD-10-PCS | Mod: S$PBB,,, | Performed by: INTERNAL MEDICINE

## 2022-01-20 PROCEDURE — 99999 PR PBB SHADOW E&M-EST. PATIENT-LVL IV: ICD-10-PCS | Mod: PBBFAC,,, | Performed by: INTERNAL MEDICINE

## 2022-01-20 PROCEDURE — 99215 OFFICE O/P EST HI 40 MIN: CPT | Mod: S$PBB,,, | Performed by: INTERNAL MEDICINE

## 2022-01-20 NOTE — Clinical Note
FYI Unfortunately she has had progression of disease.  Please see note and arrange per AVS.  Sent to specialty pharmacy and completed teaching and consent myself today with her and daughter.

## 2022-01-20 NOTE — TELEPHONE ENCOUNTER
Per instructions from Dr Burgess I have spoken with Ama at the [facility] lab at [phone] to add MG and phos to yesterdays labs.  ICD 10 code given to Ama and she will add labs as requested.

## 2022-01-20 NOTE — PROGRESS NOTES
Subjective:      DATE OF VISIT: 1/20/22     ?  Patient ID:?Maura Singh is a 81 y.o. female.?? MR#: [id number]   ?   PRIMARY ONCOLOGIST: Dr. Burgess    ? Primary Care Providers:  Yessy Andrade MD, MD (General)     CHIEF COMPLAINT: ???  Follow-up  ?   ONCOLOGIC DIAGNOSIS:  Metastatic hepatocellular carcinoma  ?   CURRENT TREATMENT:  Bevacizumab and atezolizumab, C1D1 8/26/21    PAST TREATMENT:    Nivolumab 480mg q4 weeks   lenvatinib  ?   ONCOLOGIC HISTORY:    Ms. Singh was admitted to [facility] on 02/04/2020 with gradually progressive shortness of breath, cough with hemoptysis along with intermittent abdominal pain, anorexia and fatigue.  In the emergency room she had workup including abdominal ultrasound showing multiple liver lesions concerning for metastatic disease.  CTA was negative for pulmonary embolism but revealed mediastinal adenopathy with right hilar soft tissue mass cannot exclude lymphadenopathy along with postobstructive collapse of right anterior segment lower lobe.  Hepatomegaly was noted with multiple low-density hypoenhancing lesions consistent with hepatic metastatic disease.    2/19/20 PET-CT:    To tele I was thinking to me that you already have 1 extra due as thinking last Monday for she 1. Hypermetabolic right infrahilar lung mass and associated partial postobstructive collapse right lower lobe.  2. Small hypermetabolic focus right lower lobe possibly early metastasis.  3. Hypermetabolic masslike thickening ascending colon suspicious for neoplasm.  4. Multifocal FDG avid lissette metastases neck, chest, abdomen, and pelvis as noted.  5. Extensive FDG avid hepatic metastases.  6. Widespread FDG avid osseous metastases.     02/06/2020 liver biopsy  Pathology:  Consistent with hepatocellular carcinoma, moderate to poorly differentiated     The biopsy shows nests of tumor cells with deeply eosinophilic granular   cytoplasm. Immunostains performed shows results as:   HepPar - Focal  granular positive   CK7 and TTF-1 - Negative    03/02/2020 cycle 1 day 1 nivolumab Q 2 weeks palliative immunotherapy    03/16/2020 cycle 2 day 1 nivolumab    03/30/2020 cycle 3 day 1 nivolumab, note: dose of 480 mg [id number] to limit clinic visits due to COVID-19 concerns    05/25/2020 cycle 5 day 1 nivolumab    6/16/20 inteval CT scan with response in lymphadenopathy, see below    3/2020 initiated lenvatinib; held, decreased dose due to rash side effect currently on 8 mg daily    08/26/2021 cycle 1 day 1 atezolizumab and bevacizumab    INTERVAL EVENTS  Lost function of hearing aid, communicating by writing and assistance of daughter with us today.  No new pain generally well controlled on current regimen.  Has not seen palliative Care in some time.  Today review results of restaging scans.    Review of Systems    ?   A comprehensive 14-point review of systems was reviewed with patient and was negative other than as specified above.   ?      Objective:      Physical Exam      ?   Vitals:    01/20/22 0901   BP: (!) 176/71   Pulse: 63   Temp: 97.7 °F (36.5 °C)      ?   ECOG:?2   General appearance: Generally well appearing, in no acute distress, no hearing today  Head, eyes, ears, nose, and throat: moist mucous membranes.   Respiratory:  Normal work of breathing  Abdomen: nontender, nondistended.   Extremities: Warm, without edema.   Neurologic: Alert and oriented.   Skin: No rashes, ecchymoses or petechial lesion.   Psychiatric:  Normal mood and affect.    Laboratory:  ?   No visits with results within 1 Day(s) from this visit.   Latest known visit with results is:   Lab Visit on 01/19/2022   Component Date Value Ref Range Status    Specimen UA 01/19/2022 Urine, Clean CatchUrine, UnspecifiedUrin   Final    Color, UA 01/19/2022 Yellow  Yellow, Straw, Tiffanie Final    Appearance, UA 01/19/2022 Clear  Clear Final    pH, UA 01/19/2022 6.0  5.0 - 8.0 Final    Specific Gravity, UA 01/19/2022 1.025  1.005 - 1.030 Final     Protein, UA 01/19/2022 Negative  Negative Final    Glucose, UA 01/19/2022 Negative  Negative Final    Ketones, UA 01/19/2022 Negative  Negative Final    Bilirubin (UA) 01/19/2022 Negative  Negative Final    Occult Blood UA 01/19/2022 Negative  Negative Final    Nitrite, UA 01/19/2022 Negative  Negative Final    Leukocytes, UA 01/19/2022 Trace* Negative Final    WBC, UA 01/19/2022 2  0 - 5 /hpf Final    Microscopic Comment 01/19/2022 SEE COMMENT   Final      ?   Tumor markers   AFP   Date Value Ref Range Status   08/24/2021 <2.0 0.0 - 8.4 ng/mL Final   02/21/2020 1.8 0.0 - 8.4 ng/mL Final       ?   Imaging:    Results for orders placed or performed during the hospital encounter of 01/12/22 (from the past 2160 hour(s))   NM PET CT Routine FDG    Impression    1. Variable change in widespread lymphadenopathy in the neck, chest, and pelvis with overall findings consistent with disease progression.  2. Newly developed bilateral patchy and nodular parenchymal lung opacities exhibiting low level FDG uptake and concerning for metastases.  3. Increased FDG uptake within thickened left adrenal gland concerning for metastasis.  4. Increased uptake within avid intraosseous lesion in the right proximal femur, likely metastasis.      Electronically signed by: SMILEY Do MD  Date:    01/12/2022  Time:    11:45         Pathology:        02/06/2020 liver biopsy  LIVER, BIOPSY:   Consistent with hepatocellular carcinoma, moderate to poorly differentiated     The biopsy shows nests of tumor cells with deeply eosinophilic granular   cytoplasm. Immunostains performed shows results as:   HepPar - Focal granular positive   CK7 and TTF-1 - Negative    ?   Assessment/Plan:       1. HCC (hepatocellular carcinoma)    2. Hepatocellular carcinoma    3. 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  The last to restaging scans showing improvement particularly in liver lesions.  Reviewed today together restaging PET-CT.  Of note radiology read is in comparison to prior PET-CT February 2020 interval scans have been with CT which had shown improvement in liver lesions.  This most recent PET-CT does show new right cervical, mandibular and multiple bilateral lung lesions.  Mixed response in lymph nodes in abdomen/pelvis with continued improvement in liver lesions.  Repeat biopsy left groin lymph node consistent with same primary, hepatocellular carcinoma.  On lenvatinib initiated early March 2021, due to rash/pruritus/odynophagia complications lenvatinib on hold with resolution and restarted up titrated to 8 mg tolerating well.  Restaging PET 08/20/2021 reviewed with mixed response resolution of pulmonary area of avidity however other areas with findings consistent with progression with enlargement and new lymphadenopathy in neck chest abdomen and pelvis.  I discussed options including best supportive care or can consider bevacizumab and atezolizumab.  She did have prior immunotherapy however she had initial excellent response and may be consideration given combination with bevacizumab which she has not yet tried.  - 08/26/2021 cycle 1 day 1 atezolizumab and bevacizumab, tolerated exceptionally well.  Presents for cycle 2 today labs reviewed no concerning findings and will proceed with treatment today.  - pain well controlled on Xtampza 13.5mER bid (refilled 12/30/21) and 3-4 percocet/day  Per patient preference to wait until after holidays for repeat scan recently performed January 2022 and we review in detail results today unfortunately showing evidence of progressive disease.  We discussed she has had already several lines of therapy and limited further options.  Previously on lenvatinib unable to achieve typical doses due to side effects with rash primarily; I  did discuss consideration of cabozantinib also used hepatocellular carcinoma but may have similar side effects and potential for limited impact on her disease.  She is interested in trialing this.  She has already had recent progression on VEGF inhibitor bevacizumab and immunotherapy.  Would not be optimal candidate for ramcirumab also AFP not notably elevated.  Performed teaching and consent for cabozantinib in sent to [facility].  Will off nurse navigation.  Will get additional lab work including phosphorus and magnesium to follow-up with new treatment.  Will start at 60 mg given as 20 mg tablets to allow for flexibility in dose reduction as needed in close follow-up for toxicity.  I did stress importance of quality of life and recommend follow-up with palliative care in the next couple weeks given progressive disease.    # Constipation:  Recommend increased fiber in diet as possible on Metamucil consideration if difficulty with diet given dentures.    # Hypothyroidism:  Normal thyroid function labs on repeat 09/2021, continue 50 mcg.     Follow-Up:   Patient Instructions   Holding infusion today  cabozantinib consent file  To [facility] RV 1 wk after initiation with phos, Mg, cbc, cmp, TSH, Dr. Burgess  Add on Mg and phos to yesterday labs  RV with palliative care               You can access the FollowMyHealth Patient Portal offered by [facility] by registering at the following website: [email or web address]. By joining [facility]’s FollowMyHealth portal, you will also be able to view your health information using other applications (apps) compatible with our system.

## 2022-01-20 NOTE — PATIENT INSTRUCTIONS
Holding infusion today  cabozantinib consent file  To [facility] RV 1 wk after initiation with phos, Mg, cbc, cmp, TSH, Dr. Burgess  Add on Mg and phos to yesterday labs  RV with palliative care

## 2022-01-21 ENCOUNTER — DOCUMENTATION ONLY (OUTPATIENT)
Dept: HEMATOLOGY/ONCOLOGY | Facility: CLINIC | Age: 82
End: 2022-01-21
Payer: MEDICARE

## 2022-01-21 NOTE — NURSING
Received communication from Dr. Hussein yesterday to have pt stared on Cabozantinib  Rx for Cabozantinib sent to [facility] yesterday .  Education performed  and consent obtained per Dr. hussein on yesterday  I have spoken with pt and her daughter over the phone this am and we reviewed my role as nurse navigator again and daughter has my direct contact information and she will notify me once pt receives medication.   We reviewed the [facility] process and they stated understanding.   I will follow receipt of medication and then set up follow up with DR. Hussein for one week after start with lab per her instructions.   They know to call me with any further concerns meanwhile,.   Oncology Navigation   Intake  Date of Diagnosis: 2022  Cancer Type: GI (HCC)  Initial Nurse Navigator Contact: 2022  Date Worked: 2022  Multiple appointments: Yes  Start of Treatment: 2021  Reason for Treatment Delay: Other (awaiting shipment of Cabazantinib from [facility])     Treatment  Current Status: Active       Medical Oncologist: jovita  Chemotherapy: Planned  Immunotherapy: Planned  Immunotherapy Name: Atezolizumab and Avastin   Start Date: 2021  Oral Therapy: Planned  Oral Therapy Name: Cabozantinib          General Referrals: Social work  Social Work Referral Date: 2022          Support Systems: Children  Barriers of Care: -- (pt is hard of hearing )  Concerns: wigs     Acuity  Systemic Treatment - predicted or initiated: Oral chemotherapy only (+1)  Treatment Tolerability: Has not started treatment yet/treatment fully completed and side effects resolved  ECO-3 (+1)  Comorbidities in Medical History: 6+ (+2)   Needed: No  Support: Patient reports adequate support system  Transportation: Adequate transportation for treatment  Verbalizes the need for more education: Yes  Navigation Acuity: 6     Follow Up  No follow-ups on file.

## 2022-01-27 ENCOUNTER — SPECIALTY PHARMACY (OUTPATIENT)
Dept: PHARMACY | Facility: CLINIC | Age: 82
End: 2022-01-27
Payer: MEDICARE

## 2022-01-27 ENCOUNTER — PATIENT MESSAGE (OUTPATIENT)
Dept: HEMATOLOGY/ONCOLOGY | Facility: CLINIC | Age: 82
End: 2022-01-27
Payer: MEDICARE

## 2022-01-27 ENCOUNTER — TELEPHONE (OUTPATIENT)
Dept: HEMATOLOGY/ONCOLOGY | Facility: CLINIC | Age: 82
End: 2022-01-27
Payer: MEDICARE

## 2022-01-27 NOTE — TELEPHONE ENCOUNTER
Called [facility] for status check on Cabozantinib Rx . Spoke with Lindsey who states pt is next on list to be processed.  She will escalate to oncology team.

## 2022-01-27 NOTE — TELEPHONE ENCOUNTER
Incoming call from Madhavi at Dr. Henderson' office to check on the status of Cabometyx prescription. Rx not assigned at this time. Will escalate to onco team.

## 2022-01-28 DIAGNOSIS — C22.0 HCC (HEPATOCELLULAR CARCINOMA): Primary | ICD-10-CM

## 2022-01-28 NOTE — TELEPHONE ENCOUNTER
PA for Cabometyx is approved on patient's insurance. Approval dates 01/01/2022 through 01/27/2023. Qty #30/30 day supply. Will complete benefit investigation and proceed with FA if needed.     Test claim still rejects. PA only covers 1 tablet daily - #30/30. MD would like patient to use 20mg and have patient take 3 tablets daily for ease of dose adjustment. However, insurance will not pay for more than one tablet at a time. Will send message to MD office to see if they will update prescription to the 60mg tablet. If toxicity is a concern, they may want to start at 40mg/day and increase as tolerated.

## 2022-01-29 DIAGNOSIS — C22.0 HCC (HEPATOCELLULAR CARCINOMA): ICD-10-CM

## 2022-01-30 NOTE — TELEPHONE ENCOUNTER
Cabometyx 40mg covered under current PA through 01/27/2023.    Benefit investigation - Cabometyx 40mg:    PA on file. [facility] in network. Patient is covered under Medicare Part D with Level 1 LIS. Copay $9.85 until patient reaches max OOP and then copay will reduce to $0.00. Will proceed with initial consult/shipment.

## 2022-01-30 NOTE — TELEPHONE ENCOUNTER
MD opted to switch patient to Cabometyx 40mg/day and increase as tolerated. Will confirm medication covered under current PA and will complete BI and FA as needed.

## 2022-01-31 ENCOUNTER — EXTERNAL CHRONIC CARE MANAGEMENT (OUTPATIENT)
Dept: PRIMARY CARE CLINIC | Facility: CLINIC | Age: 82
End: 2022-01-31
Payer: MEDICARE

## 2022-01-31 PROCEDURE — 99490 PR CHRONIC CARE MGMT, 1ST 20 MIN: ICD-10-PCS | Mod: S$PBB,,, | Performed by: FAMILY MEDICINE

## 2022-01-31 PROCEDURE — 99490 CHRNC CARE MGMT STAFF 1ST 20: CPT | Mod: S$PBB,,, | Performed by: FAMILY MEDICINE

## 2022-01-31 PROCEDURE — 99490 CHRNC CARE MGMT STAFF 1ST 20: CPT | Mod: PBBFAC,PN | Performed by: FAMILY MEDICINE

## 2022-01-31 RX ORDER — OXYCODONE 13.5 MG/1
1 CAPSULE, EXTENDED RELEASE ORAL 2 TIMES DAILY
Qty: 60 EACH | Refills: 0 | Status: SHIPPED | OUTPATIENT
Start: 2022-01-31 | End: 2022-02-28 | Stop reason: SDUPTHER

## 2022-02-02 ENCOUNTER — SPECIALTY PHARMACY (OUTPATIENT)
Dept: PHARMACY | Facility: CLINIC | Age: 82
End: 2022-02-02
Payer: MEDICARE

## 2022-02-02 NOTE — TELEPHONE ENCOUNTER
Specialty Pharmacy - Initial Clinical Assessment    Specialty Medication Orders Linked to Encounter    Flowsheet Row Most Recent Value   Medication #1 cabozantinib (CABOMETYX) 40 mg Tab (Order#[id number], Rx#[id number])        Subjective    Maura Singh is a 82 y.o. female, who is followed by the specialty pharmacy service for management and education.    Recent Encounters     Date Type Provider Description    02/02/2022 Specialty Pharmacy Soledad Wilburn PharmD Initial Clinical Assessment    01/27/2022 Specialty Pharmacy Alyssia Lucero PharmD Referral Authorization    01/27/2022 Specialty Pharmacy Lindsey Muller PharmD Referral Authorization    09/30/2021 Specialty Pharmacy Alyssia Lucero PharmD Clinical Intervention; Refill Coordination    08/13/2021 Specialty Pharmacy Luzma Kolb Refill Coordination        Clinical call attempts since last clinical assessment   9/30/2021  5:03 PM - Specialty Pharmacy - Clinical Intervention by Alyssia Lucero PharmD  2/2/2022  6:09 PM - Specialty Pharmacy - Clinical Assessment by Soledad Wilburn PharmD     Today she received education before her first fill with [facility].    Current Outpatient Medications   Medication Sig Note    amLODIPine (NORVASC) 2.5 MG tablet Take 1 tablet (2.5 mg total) by mouth 2 (two) times daily. (Patient taking differently: Take 2.5 mg by mouth 2 (two) times daily.)     betamethasone valerate 0.1% (VALISONE) 0.1 % Crea Apply topically 2 (two) times daily. 2/2/2022: Takes PRN    levothyroxine (TIROSINT) 50 mcg Cap Take 1 tablet (50 mcg) by mouth before breakfast.     lisinopriL (PRINIVIL,ZESTRIL) 40 MG tablet Take 1 tablet (40 mg total) by mouth once daily.     magnesium hydroxide (MILK OF MAGNESIA ORAL) Take by mouth once daily.     oxyCODONE-acetaminophen (PERCOCET)  mg per tablet Take 1 tablet by mouth 4 (four) times daily as needed (severe pain).     polyethylene glycol (GLYCOLAX) 17 gram PwPk Take 17 grams by  mouth daily as needed (constipation).     XTAMPZA ER 13.5 mg CSpT Take 1 capsule by mouth 2 (two) times daily.     (Magic mouthwash) 1:1:1 Benadryl 12.5mg/5ml liq, aluminum & magnesium hydroxide-simehticone (Maalox), LIDOcaine viscous 2% Take 10 mL PO every 4-6 hours as needed for throat or esophageal pain.     cabozantinib (CABOMETYX) 40 mg Tab Take 40 mg by mouth once daily.     clobetasol 0.05% (TEMOVATE) 0.05 % Oint Apply topically 2 (two) times daily. 2/2/2022: Takes PRN    diphenoxylate-atropine 2.5-0.025 mg (LOMOTIL) 2.5-0.025 mg per tablet Take 1 tablet by mouth 4 (four) times daily as needed for Diarrhea.     GI cocktail antac/dicyc/lidoc Take 10 ml by mouth three times daily as needed for throat pain     LIDOcaine HCl 2% (XYLOCAINE) 2 % Soln Swish, gargle, and spit 10 mLs every 6-8 hrs as needed for throat pain.     neomycin-polymyxin-hydrocortisone (CORTISPORIN) 3.5-10,000-1 mg/mL-unit/mL-% otic suspension Place 3 drops into the right ear 4 (four) times daily.     ondansetron (ZOFRAN) 8 MG tablet Take 1 tablet (8 mg total) by mouth every 12 (twelve) hours as needed for Nausea.     pimecrolimus (ELIDEL) 1 % cream Apply to the affected area twice daily (Patient taking differently: Apply to the affected area twice daily)     prochlorperazine (COMPAZINE) 5 MG tablet Take 1 tablet (5 mg total) by mouth every 6 (six) hours as needed for Nausea.     pulse oximeter (PULSE OXIMETER) device by Apply Externally route 2 (two) times a day. Use twice daily at 8 AM and 3 PM and record the value in MyChart as directed.     senna (SENOKOT) 8.6 mg tablet Take 1 tablet by mouth once daily.     tacrolimus (PROTOPIC) 0.1 % ointment Apply topically 2 (two) times daily.     triamcinolone acetonide 0.1% (KENALOG) 0.1 % cream Apply topically 2 (two) times daily. for 10 days    Last reviewed on 2/2/2022  3:11 PM by Soledad Wilburn, PharmD    Review of patient's allergies indicates:   Allergen Reactions     "Pravastatin Other (See Comments)     Burning/flushing    Xgeva [denosumab] Other (See Comments)     Mouth and diffuse pain    Allopurinol analogues Other (See Comments)     "makes me sick and feel crazy"   Last reviewed on  2/2/2022 2:58 PM by Soledad Wilburn    Drug Interactions    Drug interactions evaluated: yes  Clinically relevant drug interactions identified: no  Provided the patient with educational material regarding drug interactions: not applicable       Medication Adherence    Adherence tools used: directed education  Support network for adherence: family member       Adverse Effects    *All other systems reviewed and are negative       Assessment Questions - Documented Responses    Flowsheet Row Most Recent Value   Assessment    Medication Reconciliation completed for patient Yes   During the past 4 weeks, has patient missed any activities due to condition or medication? No   During the past 4 weeks, did patient have any of the following urgent care visits? None   Goals of Therapy Status Discussed (new start)   Welcome packet contents reviewed and discussed with patient? Yes   Assesment completed? Yes   Plan Therapy being initiated   Do you need to open a clinical intervention (i-vent)? No   Do you want to schedule first shipment? Yes   Medication #1 Assessment Info    Patient status New medication, Exisiting to [facility]   Is this medication appropriate for the patient? Yes   Is this medication effective? Not yet started        Refill Questions - Documented Responses    Flowsheet Row Most Recent Value   Patient Availability and HIPAA Verification    Does patient want to proceed with activity? Yes   HIPAA/medical authority confirmed? Yes   Relationship to patient of person spoken to? Child   Refill Screening Questions    When does the patient need to receive the medication? 02/03/22   Refill Delivery Questions    How will the patient receive the medication? Delivery Yissel   When does the patient need to " receive the medication? 02/03/22   Shipping Address Home   Address in [facility] confirmed and updated if neccessary? Yes   Expected Copay ($) 9.85   Is the patient able to afford the medication copay? Yes   Payment Method CC on file   Days supply of Refill 30   Supplies needed? No supplies needed   Refill activity completed? Yes   Refill activity plan Refill scheduled   Shipment/Pickup Date: 02/03/22          Objective    She has a past medical history of Arthritis, Deaf, Hypertension, and Lung disease.    Tried/failed medications:   -Bevacizumab and atezolizumab  -Nivolumab 480mg q4 weeks and lenvatinib    BP Readings from Last 4 Encounters:   01/20/22 (!) 176/71   12/30/21 (!) 155/70   12/30/21 (!) 143/73   12/09/21 131/66     Ht Readings from Last 4 Encounters:   01/20/22 5' (1.524 m)   12/30/21 5' (1.524 m)   12/30/21 5' (1.524 m)   12/09/21 5' (1.524 m)     Wt Readings from Last 4 Encounters:   01/20/22 69.1 kg (152 lb 5.4 oz)   12/30/21 71.1 kg (156 lb 12 oz)   12/30/21 71.1 kg (156 lb 12 oz)   12/09/21 69.6 kg (153 lb 7 oz)     Recent Labs   Lab Result Units 01/19/22  1505 12/29/21  1512 12/08/21  1405 12/08/21  1405 11/17/21  1149 11/17/21  1149   RBC M/uL 5.09 4.80  --  5.12  --  4.95   Hemoglobin g/dL 14.7 13.9  --  15.0  --  14.7   Hematocrit % 47.2 45.1  --  48.3  --  46.9   WBC K/uL 8.56 8.43  --  8.29  --  8.19   Gran # (ANC) K/uL 5.3 5.1   < > 5.2   < > 5.3   Gran % % 62.4 60.7  --  63.2   < > 64.5   Platelets K/uL 220 175  --  234  --  287   Sodium mmol/L 138 137  --  137  --  139   Potassium mmol/L 5.2 H 4.2  --  5.0  --  4.4   Chloride mmol/L 103 104  --  100  --  102   Glucose mg/dL 105 81  --  92  --  101   BUN mg/dL 13 12  --  15  --  15   Creatinine mg/dL 1.0 0.8  --  1.1  --  1.0   Calcium mg/dL 10.1 9.3  --  9.6  --  9.2   Total Protein g/dL 8.2 7.7  --  8.0  --  7.5   Albumin g/dL 3.4 L 3.2 L  --  3.7  --  3.4 L   Total Bilirubin mg/dL 0.5 0.4  --  0.5  --  0.5   Alkaline Phosphatase  U/L 102 93  --  96  --  86   AST U/L 22 22  --  18  --  17   ALT U/L 16 15  --  10  --  7 L    < > = values in this interval not displayed.     The goals of cancer treatment include:  · Achieving remission of cancer, if possible  · Reducing tumor size and spread of cancer, if remission is not possible  · Minimizing pain and symptoms of the cancer  · Preventing infection and other complications of treatment  · Promoting adequate nutrition  · Encouraging proper hydration  · Improving or maintaining quality of life  · Maintaining optimal therapy adherence  · Minimizing and managing side effects    Goals of Therapy Status: Discussed (new start)    Assessment/Plan  Patient plans to start therapy on 02/03/22      Indication, dosage, appropriateness, effectiveness, safety and convenience of her specialty medication(s) were reviewed today.     Patient Counseling    Counseled the patient on the following: doses and administration discussed, safe handling, storage, and disposal discussed, possible adverse effects and management discussed, possible drug and prescription drug interactions discussed, possible drug and OTC drug and food interactions discussed, lab monitoring and follow-up discussed, therapeutic rationale discussed, cost of medications and cost implications discussed, adherence and missed doses discussed, pharmacy contact information discussed       Tasks added this encounter   2/26/2022 - Refill Call (Auto Added)  4/26/2022 - Clinical - Follow Up Assesement (90 day)  2/10/2022 - 7 Day Post Start Touchbase   Tasks due within next 3 months   No tasks due.     Soledad Wilburn, PharmD, [facility]  [facility]  [address] [place] [id number]  Phone: [phone]  Fax: [phone]

## 2022-02-02 NOTE — TELEPHONE ENCOUNTER
Called pt to review Cabometyx and complete initial consult. Spoke with pt's daughter, Lilian    Scheduled initial consult for today (2/2) at 2:30p. Will call Lilian at that time to complete initial

## 2022-02-08 ENCOUNTER — TELEPHONE (OUTPATIENT)
Dept: HEMATOLOGY/ONCOLOGY | Facility: CLINIC | Age: 82
End: 2022-02-08
Payer: MEDICARE

## 2022-02-08 NOTE — TELEPHONE ENCOUNTER
Researching chart to see if patient had received her Cabozantinib yet , I found that pt had it and started it and the daughter was actually trying to find out how to contact me back because she threw away my contact information    Together we set a follow up appt with Dr. Burgess and lab for 2/16/22 at the [place] location ( due to pt daughter schedule and ability to bring pt in )   They now have my number again to call with any further concerns meanwhile.

## 2022-02-16 ENCOUNTER — OFFICE VISIT (OUTPATIENT)
Dept: HEMATOLOGY/ONCOLOGY | Facility: CLINIC | Age: 82
End: 2022-02-16
Payer: MEDICARE

## 2022-02-16 ENCOUNTER — LAB VISIT (OUTPATIENT)
Dept: LAB | Facility: HOSPITAL | Age: 82
End: 2022-02-16
Attending: INTERNAL MEDICINE
Payer: MEDICARE

## 2022-02-16 VITALS
SYSTOLIC BLOOD PRESSURE: 176 MMHG | OXYGEN SATURATION: 95 % | WEIGHT: 150.56 LBS | RESPIRATION RATE: 16 BRPM | DIASTOLIC BLOOD PRESSURE: 80 MMHG | HEIGHT: 62 IN | TEMPERATURE: 97 F | HEART RATE: 51 BPM | BODY MASS INDEX: 27.7 KG/M2

## 2022-02-16 DIAGNOSIS — C22.0 HEPATOCELLULAR CARCINOMA: ICD-10-CM

## 2022-02-16 DIAGNOSIS — E03.9 HYPOTHYROIDISM (ACQUIRED): ICD-10-CM

## 2022-02-16 DIAGNOSIS — C22.0 HCC (HEPATOCELLULAR CARCINOMA): ICD-10-CM

## 2022-02-16 DIAGNOSIS — C22.0 HCC (HEPATOCELLULAR CARCINOMA): Primary | ICD-10-CM

## 2022-02-16 DIAGNOSIS — K59.03 DRUG-INDUCED CONSTIPATION: ICD-10-CM

## 2022-02-16 DIAGNOSIS — C77.8 SECONDARY MALIGNANT NEOPLASM OF LYMPH NODES OF MULTIPLE SITES: ICD-10-CM

## 2022-02-16 LAB
ALBUMIN SERPL BCP-MCNC: 3.5 G/DL (ref 3.5–5.2)
ALP SERPL-CCNC: 138 U/L (ref 55–135)
ALT SERPL W/O P-5'-P-CCNC: 34 U/L (ref 10–44)
ANION GAP SERPL CALC-SCNC: 9 MMOL/L (ref 8–16)
AST SERPL-CCNC: 41 U/L (ref 10–40)
BASOPHILS # BLD AUTO: 0.07 K/UL (ref 0–0.2)
BASOPHILS NFR BLD: 1 % (ref 0–1.9)
BILIRUB SERPL-MCNC: 0.5 MG/DL (ref 0.1–1)
BUN SERPL-MCNC: 13 MG/DL (ref 8–23)
CALCIUM SERPL-MCNC: 9.7 MG/DL (ref 8.7–10.5)
CHLORIDE SERPL-SCNC: 99 MMOL/L (ref 95–110)
CO2 SERPL-SCNC: 28 MMOL/L (ref 23–29)
CREAT SERPL-MCNC: 1 MG/DL (ref 0.5–1.4)
DIFFERENTIAL METHOD: ABNORMAL
EOSINOPHIL # BLD AUTO: 0.2 K/UL (ref 0–0.5)
EOSINOPHIL NFR BLD: 3 % (ref 0–8)
ERYTHROCYTE [DISTWIDTH] IN BLOOD BY AUTOMATED COUNT: 15.7 % (ref 11.5–14.5)
EST. GFR  (AFRICAN AMERICAN): >60 ML/MIN/1.73 M^2
EST. GFR  (NON AFRICAN AMERICAN): 52 ML/MIN/1.73 M^2
GLUCOSE SERPL-MCNC: 99 MG/DL (ref 70–110)
HCT VFR BLD AUTO: 51.6 % (ref 37–48.5)
HGB BLD-MCNC: 16.5 G/DL (ref 12–16)
IMM GRANULOCYTES # BLD AUTO: 0.02 K/UL (ref 0–0.04)
IMM GRANULOCYTES NFR BLD AUTO: 0.3 % (ref 0–0.5)
LYMPHOCYTES # BLD AUTO: 1.8 K/UL (ref 1–4.8)
LYMPHOCYTES NFR BLD: 24.8 % (ref 18–48)
MAGNESIUM SERPL-MCNC: 2 MG/DL (ref 1.6–2.6)
MCH RBC QN AUTO: 29.1 PG (ref 27–31)
MCHC RBC AUTO-ENTMCNC: 32 G/DL (ref 32–36)
MCV RBC AUTO: 91 FL (ref 82–98)
MONOCYTES # BLD AUTO: 0.5 K/UL (ref 0.3–1)
MONOCYTES NFR BLD: 6.6 % (ref 4–15)
NEUTROPHILS # BLD AUTO: 4.6 K/UL (ref 1.8–7.7)
NEUTROPHILS NFR BLD: 64.3 % (ref 38–73)
NRBC BLD-RTO: 0 /100 WBC
PLATELET # BLD AUTO: 202 K/UL (ref 150–450)
PMV BLD AUTO: 9.7 FL (ref 9.2–12.9)
POTASSIUM SERPL-SCNC: 4.8 MMOL/L (ref 3.5–5.1)
PROT SERPL-MCNC: 8.7 G/DL (ref 6–8.4)
RBC # BLD AUTO: 5.67 M/UL (ref 4–5.4)
SODIUM SERPL-SCNC: 136 MMOL/L (ref 136–145)
TSH SERPL DL<=0.005 MIU/L-ACNC: 1.39 UIU/ML (ref 0.4–4)
WBC # BLD AUTO: 7.07 K/UL (ref 3.9–12.7)

## 2022-02-16 PROCEDURE — 80053 COMPREHEN METABOLIC PANEL: CPT | Performed by: INTERNAL MEDICINE

## 2022-02-16 PROCEDURE — 99215 PR OFFICE/OUTPT VISIT, EST, LEVL V, 40-54 MIN: ICD-10-PCS | Mod: S$PBB,,, | Performed by: INTERNAL MEDICINE

## 2022-02-16 PROCEDURE — 36415 COLL VENOUS BLD VENIPUNCTURE: CPT | Performed by: INTERNAL MEDICINE

## 2022-02-16 PROCEDURE — 83735 ASSAY OF MAGNESIUM: CPT | Performed by: INTERNAL MEDICINE

## 2022-02-16 PROCEDURE — 99999 PR PBB SHADOW E&M-EST. PATIENT-LVL V: ICD-10-PCS | Mod: PBBFAC,,, | Performed by: INTERNAL MEDICINE

## 2022-02-16 PROCEDURE — 85025 COMPLETE CBC W/AUTO DIFF WBC: CPT | Performed by: INTERNAL MEDICINE

## 2022-02-16 PROCEDURE — 99215 OFFICE O/P EST HI 40 MIN: CPT | Mod: PBBFAC | Performed by: INTERNAL MEDICINE

## 2022-02-16 PROCEDURE — 99999 PR PBB SHADOW E&M-EST. PATIENT-LVL V: CPT | Mod: PBBFAC,,, | Performed by: INTERNAL MEDICINE

## 2022-02-16 PROCEDURE — 84443 ASSAY THYROID STIM HORMONE: CPT | Performed by: INTERNAL MEDICINE

## 2022-02-16 PROCEDURE — 99215 OFFICE O/P EST HI 40 MIN: CPT | Mod: S$PBB,,, | Performed by: INTERNAL MEDICINE

## 2022-02-16 NOTE — PROGRESS NOTES
Subjective:      DATE OF VISIT: 2/16/22     ?  Patient ID:?Maura Singh is a 82 y.o. female.?? MR#: [id number]   ?   PRIMARY ONCOLOGIST: Dr. Burgess    ? Primary Care Providers:  Yessy Andrade MD, MD (General)     CHIEF COMPLAINT: ???  Follow-up  ?   ONCOLOGIC DIAGNOSIS:  Metastatic hepatocellular carcinoma  ?   CURRENT TREATMENT:  Cabozantinib 40 mg daily    PAST TREATMENT:    Nivolumab 480mg q4 weeks   lenvatinib  Bevacizumab and atezolizumab, C1D1 8/26/21     ONCOLOGIC HISTORY:    Ms. Singh was admitted to [facility] on 02/04/2020 with gradually progressive shortness of breath, cough with hemoptysis along with intermittent abdominal pain, anorexia and fatigue.  In the emergency room she had workup including abdominal ultrasound showing multiple liver lesions concerning for metastatic disease.  CTA was negative for pulmonary embolism but revealed mediastinal adenopathy with right hilar soft tissue mass cannot exclude lymphadenopathy along with postobstructive collapse of right anterior segment lower lobe.  Hepatomegaly was noted with multiple low-density hypoenhancing lesions consistent with hepatic metastatic disease.    2/19/20 PET-CT:    To tele I was thinking to me that you already have 1 extra due as thinking last Monday for she 1. Hypermetabolic right infrahilar lung mass and associated partial postobstructive collapse right lower lobe.  2. Small hypermetabolic focus right lower lobe possibly early metastasis.  3. Hypermetabolic masslike thickening ascending colon suspicious for neoplasm.  4. Multifocal FDG avid lissette metastases neck, chest, abdomen, and pelvis as noted.  5. Extensive FDG avid hepatic metastases.  6. Widespread FDG avid osseous metastases.     02/06/2020 liver biopsy  Pathology:  Consistent with hepatocellular carcinoma, moderate to poorly differentiated     The biopsy shows nests of tumor cells with deeply eosinophilic granular   cytoplasm. Immunostains performed shows  results as:   HepPar - Focal granular positive   CK7 and TTF-1 - Negative    03/02/2020 cycle 1 day 1 nivolumab Q 2 weeks palliative immunotherapy    03/16/2020 cycle 2 day 1 nivolumab    03/30/2020 cycle 3 day 1 nivolumab, note: dose of 480 mg [id number] to limit clinic visits due to COVID-19 concerns    05/25/2020 cycle 5 day 1 nivolumab    6/16/20 inteval CT scan with response in lymphadenopathy, see below    3/2020 initiated lenvatinib; held, decreased dose due to rash side effect currently on 8 mg daily    08/26/2021 cycle 1 day 1 atezolizumab and bevacizumab    02/04/2022 cycle 1 day 1 cabozantinib 40 mg daily    INTERVAL EVENTS  Initiated cabozantinib 40 mg daily 2 weeks ago has been tolerating well at this dose.  She does have some sores on fingertips however no calluses or recurrent folliculitis, or oral ulceration.  She had get revision of hearing aids.    Review of Systems    ?   A comprehensive 14-point review of systems was reviewed with patient and was negative other than as specified above.   ?      Objective:      Physical Exam      ?   Vitals:    02/16/22 1104   BP: (!) 176/80   Pulse: (!) 51   Resp: 16   Temp: 97.3 °F (36.3 °C)      ?   ECOG:?2   General appearance: Generally well appearing, in no acute distress, no hearing today  Head, eyes, ears, nose, and throat: moist mucous membranes.   Respiratory:  Normal work of breathing  Abdomen: nontender, nondistended.   Extremities: Warm, without edema.   Neurologic: Alert and oriented.   Skin: No rashes, ecchymoses or petechial lesion.  Sores on fingertips  Psychiatric:  Normal mood and affect.    Laboratory:  ?   Lab Visit on 02/16/2022   Component Date Value Ref Range Status    WBC 02/16/2022 7.07  3.90 - 12.70 K/uL Final    RBC 02/16/2022 5.67* 4.00 - 5.40 M/uL Final    Hemoglobin 02/16/2022 16.5* 12.0 - 16.0 g/dL Final    Hematocrit 02/16/2022 51.6* 37.0 - 48.5 % Final    MCV 02/16/2022 91  82 - 98 fL Final    MCH 02/16/2022 29.1  27.0 - 31.0  pg Final    MCHC 02/16/2022 32.0  32.0 - 36.0 g/dL Final    RDW 02/16/2022 15.7* 11.5 - 14.5 % Final    Platelets 02/16/2022 202  150 - 450 K/uL Final    MPV 02/16/2022 9.7  9.2 - 12.9 fL Final    Immature Granulocytes 02/16/2022 0.3  0.0 - 0.5 % Final    Gran # (ANC) 02/16/2022 4.6  1.8 - 7.7 K/uL Final    Immature Grans (Abs) 02/16/2022 0.02  0.00 - 0.04 K/uL Final    Lymph # 02/16/2022 1.8  1.0 - 4.8 K/uL Final    Mono # 02/16/2022 0.5  0.3 - 1.0 K/uL Final    Eos # 02/16/2022 0.2  0.0 - 0.5 K/uL Final    Baso # 02/16/2022 0.07  0.00 - 0.20 K/uL Final    nRBC 02/16/2022 0  0 /100 WBC Final    Gran % 02/16/2022 64.3  38.0 - 73.0 % Final    Lymph % 02/16/2022 24.8  18.0 - 48.0 % Final    Mono % 02/16/2022 6.6  4.0 - 15.0 % Final    Eosinophil % 02/16/2022 3.0  0.0 - 8.0 % Final    Basophil % 02/16/2022 1.0  0.0 - 1.9 % Final    Differential Method 02/16/2022 Automated   Final    Sodium 02/16/2022 136  136 - 145 mmol/L Final    Potassium 02/16/2022 4.8  3.5 - 5.1 mmol/L Final    Chloride 02/16/2022 99  95 - 110 mmol/L Final    CO2 02/16/2022 28  23 - 29 mmol/L Final    Glucose 02/16/2022 99  70 - 110 mg/dL Final    BUN 02/16/2022 13  8 - 23 mg/dL Final    Creatinine 02/16/2022 1.0  0.5 - 1.4 mg/dL Final    Calcium 02/16/2022 9.7  8.7 - 10.5 mg/dL Final    Total Protein 02/16/2022 8.7* 6.0 - 8.4 g/dL Final    Albumin 02/16/2022 3.5  3.5 - 5.2 g/dL Final    Total Bilirubin 02/16/2022 0.5  0.1 - 1.0 mg/dL Final    Alkaline Phosphatase 02/16/2022 138* 55 - 135 U/L Final    AST 02/16/2022 41* 10 - 40 U/L Final    ALT 02/16/2022 34  10 - 44 U/L Final    Anion Gap 02/16/2022 9  8 - 16 mmol/L Final    eGFR if African American 02/16/2022 >60  >60 mL/min/1.73 m^2 Final    eGFR if non  02/16/2022 52* >60 mL/min/1.73 m^2 Final    Magnesium 02/16/2022 2.0  1.6 - 2.6 mg/dL Final      ?   Tumor markers   AFP   Date Value Ref Range Status   08/24/2021 <2.0 0.0 - 8.4 ng/mL Final    02/21/2020 1.8 0.0 - 8.4 ng/mL Final       ?   Imaging:    Results for orders placed or performed during the hospital encounter of 01/12/22 (from the past 2160 hour(s))   NM PET CT Routine FDG    Impression    1. Variable change in widespread lymphadenopathy in the neck, chest, and pelvis with overall findings consistent with disease progression.  2. Newly developed bilateral patchy and nodular parenchymal lung opacities exhibiting low level FDG uptake and concerning for metastases.  3. Increased FDG uptake within thickened left adrenal gland concerning for metastasis.  4. Increased uptake within avid intraosseous lesion in the right proximal femur, likely metastasis.      Electronically signed by: SMILEY Do MD  Date:    01/12/2022  Time:    11:45         Pathology:        02/06/2020 liver biopsy  LIVER, BIOPSY:   Consistent with hepatocellular carcinoma, moderate to poorly differentiated     The biopsy shows nests of tumor cells with deeply eosinophilic granular   cytoplasm. Immunostains performed shows results as:   HepPar - Focal granular positive   CK7 and TTF-1 - Negative    ?   Assessment/Plan:       1. HCC (hepatocellular carcinoma)    2. 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 Drug-induced constipation          Plan:     # metastatic hepatocellular carcinoma:  initiated first-line palliative immunotherapy with nivolumab 03/02/2020.  The last to restaging scans showing improvement particularly in liver lesions.  Reviewed today together restaging PET-CT.  Of note radiology read is in comparison to prior PET-CT February 2020 interval scans have been with CT which had shown improvement in liver lesions.  This most recent PET-CT does show new right cervical, mandibular and multiple bilateral lung lesions.  Mixed response in lymph nodes in abdomen/pelvis with continued improvement in liver lesions.  Repeat biopsy left groin lymph node consistent  with same primary, hepatocellular carcinoma.  On lenvatinib initiated early March 2021, due to rash/pruritus/odynophagia complications lenvatinib on hold with resolution and restarted up titrated to 8 mg tolerating well.  Restaging PET 08/20/2021 reviewed with mixed response resolution of pulmonary area of avidity however other areas with findings consistent with progression with enlargement and new lymphadenopathy in neck chest abdomen and pelvis.  I discussed options including best supportive care or can consider bevacizumab and atezolizumab.  She did have prior immunotherapy however she had initial excellent response and may be consideration given combination with bevacizumab which she has not yet tried.  - 08/26/2021 cycle 1 day 1 atezolizumab and bevacizumab, tolerated exceptionally well.  Presents for cycle 2 today labs reviewed no concerning findings and will proceed with treatment today.  - pain well controlled on Xtampza 13.5mER bid (refilled 12/30/21) and 3-4 percocet/day  Per patient preference to wait until after holidays for repeat scan recently performed January 2022 and we review in detail results today unfortunately showing evidence of progressive disease.  We discussed she has had already several lines of therapy and limited further options.  Previously on lenvatinib unable to achieve typical doses due to side effects with rash primarily; I did discuss consideration of cabozantinib also used hepatocellular carcinoma but may have similar side effects and potential for limited impact on her disease.  She is interested in trialing this.  We decided to start dose reduced given prior intolerance is cabozantinib 40 mg which she is tolerating thus far quite well after 2 weeks.  Will consider uptitration with repeat labs and evaluation in 2 more weeks considering 60 mg daily.  Labs with mild LFT changes and increase in hemoglobin will need to follow-up on future lab work.    # Constipation:  Recommend  increased fiber in diet as possible on Metamucil consideration if difficulty with diet given dentures.    # Hypothyroidism:  Normal thyroid function labs on repeat 09/2021, continue 50 mcg.     Follow-Up:   Patient Instructions   3/1/22 cbc, cmp and RV around 11am

## 2022-02-22 DIAGNOSIS — D84.9 IMMUNOSUPPRESSED STATUS: ICD-10-CM

## 2022-02-28 ENCOUNTER — EXTERNAL CHRONIC CARE MANAGEMENT (OUTPATIENT)
Dept: PRIMARY CARE CLINIC | Facility: CLINIC | Age: 82
End: 2022-02-28
Payer: MEDICARE

## 2022-02-28 ENCOUNTER — SPECIALTY PHARMACY (OUTPATIENT)
Dept: PHARMACY | Facility: CLINIC | Age: 82
End: 2022-02-28
Payer: MEDICARE

## 2022-02-28 DIAGNOSIS — C22.0 HCC (HEPATOCELLULAR CARCINOMA): ICD-10-CM

## 2022-02-28 PROCEDURE — 99490 CHRNC CARE MGMT STAFF 1ST 20: CPT | Mod: S$PBB,,, | Performed by: FAMILY MEDICINE

## 2022-02-28 PROCEDURE — 99490 CHRNC CARE MGMT STAFF 1ST 20: CPT | Mod: PBBFAC,PN | Performed by: FAMILY MEDICINE

## 2022-02-28 PROCEDURE — 99490 PR CHRONIC CARE MGMT, 1ST 20 MIN: ICD-10-PCS | Mod: S$PBB,,, | Performed by: FAMILY MEDICINE

## 2022-02-28 NOTE — TELEPHONE ENCOUNTER
Specialty Pharmacy - Refill Coordination    Specialty Medication Orders Linked to Encounter    Flowsheet Row Most Recent Value   Medication #1 cabozantinib (CABOMETYX) 40 mg Tab (Order#[id number], Rx#[id number])          Refill Questions - Documented Responses    Flowsheet Row Most Recent Value   Patient Availability and HIPAA Verification    Does patient want to proceed with activity? Yes   HIPAA/medical authority confirmed? Yes   Relationship to patient of person spoken to? Self   Refill Screening Questions    Changes to allergies? No   Changes to medications? No   New conditions since last clinic visit? No   Unplanned office visit, urgent care, ED, or hospital admission in the last 4 weeks? No   How does patient/caregiver feel medication is working? Good   Financial problems or insurance changes? No   How many doses of your specialty medications were missed in the last 4 weeks? 0   Would patient like to speak to a pharmacist? No   When does the patient need to receive the medication? 03/05/22   Refill Delivery Questions    How will the patient receive the medication? Delivery Yissel   When does the patient need to receive the medication? 03/05/22   Shipping Address Home   Address in [facility] confirmed and updated if neccessary? Yes   Expected Copay ($) 0   Is the patient able to afford the medication copay? Yes   Payment Method zero copay   Days supply of Refill 30   Supplies needed? No supplies needed   Refill activity completed? Yes   Refill activity plan Refill scheduled   Shipment/Pickup Date: 03/03/22          Current Outpatient Medications   Medication Sig    (Magic mouthwash) 1:1:1 Benadryl 12.5mg/5ml liq, aluminum & magnesium hydroxide-simehticone (Maalox), LIDOcaine viscous 2% Take 10 mL PO every 4-6 hours as needed for throat or esophageal pain.    amLODIPine (NORVASC) 2.5 MG tablet Take 1 tablet (2.5 mg total) by mouth 2 (two) times daily. (Patient taking differently: Take 2.5 mg by mouth 2  (two) times daily.)    betamethasone valerate 0.1% (VALISONE) 0.1 % Crea Apply topically 2 (two) times daily.    cabozantinib (CABOMETYX) 40 mg Tab Take 40 mg by mouth once daily.    clobetasol 0.05% (TEMOVATE) 0.05 % Oint Apply topically 2 (two) times daily.    diphenoxylate-atropine 2.5-0.025 mg (LOMOTIL) 2.5-0.025 mg per tablet Take 1 tablet by mouth 4 (four) times daily as needed for Diarrhea.    GI cocktail antac/dicyc/lidoc Take 10 ml by mouth three times daily as needed for throat pain    levothyroxine (TIROSINT) 50 mcg Cap Take 1 tablet (50 mcg) by mouth before breakfast.    LIDOcaine HCl 2% (XYLOCAINE) 2 % Soln Swish, gargle, and spit 10 mLs every 6-8 hrs as needed for throat pain.    lisinopriL (PRINIVIL,ZESTRIL) 40 MG tablet Take 1 tablet (40 mg total) by mouth once daily.    magnesium hydroxide (MILK OF MAGNESIA ORAL) Take by mouth once daily.    neomycin-polymyxin-hydrocortisone (CORTISPORIN) 3.5-10,000-1 mg/mL-unit/mL-% otic suspension Place 3 drops into the right ear 4 (four) times daily.    oxyCODONE-acetaminophen (PERCOCET)  mg per tablet Take 1 tablet by mouth 4 (four) times daily as needed (severe pain).    pimecrolimus (ELIDEL) 1 % cream Apply to the affected area twice daily (Patient taking differently: Apply to the affected area twice daily)    polyethylene glycol (GLYCOLAX) 17 gram PwPk Take 17 grams by mouth daily as needed (constipation).    pulse oximeter (PULSE OXIMETER) device by Apply Externally route 2 (two) times a day. Use twice daily at 8 AM and 3 PM and record the value in MyChart as directed.    senna (SENOKOT) 8.6 mg tablet Take 1 tablet by mouth once daily.    tacrolimus (PROTOPIC) 0.1 % ointment Apply topically 2 (two) times daily.    triamcinolone acetonide 0.1% (KENALOG) 0.1 % cream Apply topically 2 (two) times daily. for 10 days    XTAMPZA ER 13.5 mg CSpT Take 1 capsule by mouth 2 (two) times daily.   Last reviewed on 2/16/2022 11:07 AM by Lonnie  "Leday, LPN    Review of patient's allergies indicates:   Allergen Reactions    Pravastatin Other (See Comments)     Burning/flushing    Xgeva [denosumab] Other (See Comments)     Mouth and diffuse pain    Allopurinol analogues Other (See Comments)     "makes me sick and feel crazy"    Last reviewed on  2/16/2022 11:07 AM by Lonnie Gay      Tasks added this encounter   3/28/2022 - Refill Call (Auto Added)   Tasks due within next 3 months   4/26/2022 - Clinical - Follow Up Assesement (90 day)  2/10/2022 - 7 Day Post Start Touchbase     [facility]  [address] [facility] [address]  Phone: [phone]  Fax: [phone]      "

## 2022-02-28 NOTE — TELEPHONE ENCOUNTER
Cabometyx refill received. PA not required. $0 copay. Notes indicate MD may consider dose increase if pt tolerates. Reviewed labs: Hgb mildy, elevated. ALK phosphatase and AST slightly elevated. Other labs WNL. No adjustments needed at this time.

## 2022-03-01 ENCOUNTER — LAB VISIT (OUTPATIENT)
Dept: LAB | Facility: HOSPITAL | Age: 82
End: 2022-03-01
Attending: INTERNAL MEDICINE
Payer: MEDICARE

## 2022-03-01 ENCOUNTER — OFFICE VISIT (OUTPATIENT)
Dept: HEMATOLOGY/ONCOLOGY | Facility: CLINIC | Age: 82
End: 2022-03-01
Payer: MEDICARE

## 2022-03-01 VITALS
HEART RATE: 57 BPM | HEIGHT: 62 IN | SYSTOLIC BLOOD PRESSURE: 156 MMHG | TEMPERATURE: 98 F | DIASTOLIC BLOOD PRESSURE: 82 MMHG | RESPIRATION RATE: 18 BRPM | OXYGEN SATURATION: 97 % | BODY MASS INDEX: 27.63 KG/M2 | WEIGHT: 150.13 LBS

## 2022-03-01 DIAGNOSIS — C22.0 HEPATOCELLULAR CARCINOMA: Primary | ICD-10-CM

## 2022-03-01 DIAGNOSIS — C22.0 HEPATOCELLULAR CARCINOMA: ICD-10-CM

## 2022-03-01 DIAGNOSIS — H53.9 VISION CHANGES: Primary | ICD-10-CM

## 2022-03-01 DIAGNOSIS — C77.8 SECONDARY MALIGNANT NEOPLASM OF LYMPH NODES OF MULTIPLE SITES: ICD-10-CM

## 2022-03-01 DIAGNOSIS — E03.9 HYPOTHYROIDISM (ACQUIRED): ICD-10-CM

## 2022-03-01 DIAGNOSIS — D69.6 THROMBOCYTOPENIA: ICD-10-CM

## 2022-03-01 LAB
ALBUMIN SERPL BCP-MCNC: 3 G/DL (ref 3.5–5.2)
ALP SERPL-CCNC: 124 U/L (ref 55–135)
ALT SERPL W/O P-5'-P-CCNC: 27 U/L (ref 10–44)
ANION GAP SERPL CALC-SCNC: 8 MMOL/L (ref 8–16)
AST SERPL-CCNC: 33 U/L (ref 10–40)
BASOPHILS # BLD AUTO: 0.07 K/UL (ref 0–0.2)
BASOPHILS NFR BLD: 1.3 % (ref 0–1.9)
BILIRUB SERPL-MCNC: 0.6 MG/DL (ref 0.1–1)
BUN SERPL-MCNC: 11 MG/DL (ref 8–23)
CALCIUM SERPL-MCNC: 8.7 MG/DL (ref 8.7–10.5)
CHLORIDE SERPL-SCNC: 103 MMOL/L (ref 95–110)
CO2 SERPL-SCNC: 29 MMOL/L (ref 23–29)
CREAT SERPL-MCNC: 0.8 MG/DL (ref 0.5–1.4)
DIFFERENTIAL METHOD: ABNORMAL
EOSINOPHIL # BLD AUTO: 0.2 K/UL (ref 0–0.5)
EOSINOPHIL NFR BLD: 4.5 % (ref 0–8)
ERYTHROCYTE [DISTWIDTH] IN BLOOD BY AUTOMATED COUNT: 16.5 % (ref 11.5–14.5)
EST. GFR  (AFRICAN AMERICAN): >60 ML/MIN/1.73 M^2
EST. GFR  (NON AFRICAN AMERICAN): >60 ML/MIN/1.73 M^2
GLUCOSE SERPL-MCNC: 95 MG/DL (ref 70–110)
HCT VFR BLD AUTO: 50.2 % (ref 37–48.5)
HGB BLD-MCNC: 15.9 G/DL (ref 12–16)
IMM GRANULOCYTES # BLD AUTO: 0.01 K/UL (ref 0–0.04)
IMM GRANULOCYTES NFR BLD AUTO: 0.2 % (ref 0–0.5)
LYMPHOCYTES # BLD AUTO: 1.2 K/UL (ref 1–4.8)
LYMPHOCYTES NFR BLD: 21.9 % (ref 18–48)
MCH RBC QN AUTO: 28.7 PG (ref 27–31)
MCHC RBC AUTO-ENTMCNC: 31.7 G/DL (ref 32–36)
MCV RBC AUTO: 91 FL (ref 82–98)
MONOCYTES # BLD AUTO: 0.4 K/UL (ref 0.3–1)
MONOCYTES NFR BLD: 7.2 % (ref 4–15)
NEUTROPHILS # BLD AUTO: 3.5 K/UL (ref 1.8–7.7)
NEUTROPHILS NFR BLD: 64.9 % (ref 38–73)
NRBC BLD-RTO: 0 /100 WBC
PLATELET # BLD AUTO: 121 K/UL (ref 150–450)
PMV BLD AUTO: 10 FL (ref 9.2–12.9)
POTASSIUM SERPL-SCNC: 3.9 MMOL/L (ref 3.5–5.1)
PROT SERPL-MCNC: 7.5 G/DL (ref 6–8.4)
RBC # BLD AUTO: 5.54 M/UL (ref 4–5.4)
SODIUM SERPL-SCNC: 140 MMOL/L (ref 136–145)
WBC # BLD AUTO: 5.38 K/UL (ref 3.9–12.7)

## 2022-03-01 PROCEDURE — 85025 COMPLETE CBC W/AUTO DIFF WBC: CPT | Performed by: INTERNAL MEDICINE

## 2022-03-01 PROCEDURE — 99215 OFFICE O/P EST HI 40 MIN: CPT | Mod: S$PBB,,, | Performed by: INTERNAL MEDICINE

## 2022-03-01 PROCEDURE — 80053 COMPREHEN METABOLIC PANEL: CPT | Performed by: INTERNAL MEDICINE

## 2022-03-01 PROCEDURE — 99215 PR OFFICE/OUTPT VISIT, EST, LEVL V, 40-54 MIN: ICD-10-PCS | Mod: S$PBB,,, | Performed by: INTERNAL MEDICINE

## 2022-03-01 PROCEDURE — 99999 PR PBB SHADOW E&M-EST. PATIENT-LVL V: CPT | Mod: PBBFAC,,, | Performed by: INTERNAL MEDICINE

## 2022-03-01 PROCEDURE — 36415 COLL VENOUS BLD VENIPUNCTURE: CPT | Performed by: INTERNAL MEDICINE

## 2022-03-01 PROCEDURE — 99215 OFFICE O/P EST HI 40 MIN: CPT | Mod: PBBFAC | Performed by: INTERNAL MEDICINE

## 2022-03-01 PROCEDURE — 99999 PR PBB SHADOW E&M-EST. PATIENT-LVL V: ICD-10-PCS | Mod: PBBFAC,,, | Performed by: INTERNAL MEDICINE

## 2022-03-01 RX ORDER — OXYCODONE 13.5 MG/1
1 CAPSULE, EXTENDED RELEASE ORAL 2 TIMES DAILY
Qty: 60 EACH | Refills: 0 | Status: SHIPPED | OUTPATIENT
Start: 2022-03-01 | End: 2022-04-01 | Stop reason: SDUPTHER

## 2022-03-01 RX ORDER — OXYCODONE AND ACETAMINOPHEN 10; 325 MG/1; MG/1
1 TABLET ORAL 4 TIMES DAILY PRN
Qty: 120 TABLET | Refills: 0 | Status: SHIPPED | OUTPATIENT
Start: 2022-03-01 | End: 2022-04-27 | Stop reason: SDUPTHER

## 2022-03-01 NOTE — PATIENT INSTRUCTIONS
RV in 4 wks with cbc, cmp, tsh, f4t prior, Dr. Giraldo in [place] per patient request   Ophthalmology referral

## 2022-03-01 NOTE — PROGRESS NOTES
Subjective:      DATE OF VISIT: 3/1/22     ?  Patient ID:?Maura Singh is a 82 y.o. female.?? MR#: [id number]   ?   PRIMARY ONCOLOGIST: Dr. Burgess    ? Primary Care Providers:  Yessy Andrade MD, MD (General)     CHIEF COMPLAINT: ???  Follow-up  ?   ONCOLOGIC DIAGNOSIS:  Metastatic hepatocellular carcinoma  ?   CURRENT TREATMENT:  Cabozantinib 40 mg daily    PAST TREATMENT:    Nivolumab 480mg q4 weeks   lenvatinib  Bevacizumab and atezolizumab, C1D1 8/26/21     ONCOLOGIC HISTORY:    Ms. Singh was admitted to [facility] on 02/04/2020 with gradually progressive shortness of breath, cough with hemoptysis along with intermittent abdominal pain, anorexia and fatigue.  In the emergency room she had workup including abdominal ultrasound showing multiple liver lesions concerning for metastatic disease.  CTA was negative for pulmonary embolism but revealed mediastinal adenopathy with right hilar soft tissue mass cannot exclude lymphadenopathy along with postobstructive collapse of right anterior segment lower lobe.  Hepatomegaly was noted with multiple low-density hypoenhancing lesions consistent with hepatic metastatic disease.    2/19/20 PET-CT:    To tele I was thinking to me that you already have 1 extra due as thinking last Monday for she 1. Hypermetabolic right infrahilar lung mass and associated partial postobstructive collapse right lower lobe.  2. Small hypermetabolic focus right lower lobe possibly early metastasis.  3. Hypermetabolic masslike thickening ascending colon suspicious for neoplasm.  4. Multifocal FDG avid lissette metastases neck, chest, abdomen, and pelvis as noted.  5. Extensive FDG avid hepatic metastases.  6. Widespread FDG avid osseous metastases.     02/06/2020 liver biopsy  Pathology:  Consistent with hepatocellular carcinoma, moderate to poorly differentiated     The biopsy shows nests of tumor cells with deeply eosinophilic granular   cytoplasm. Immunostains performed shows  results as:   HepPar - Focal granular positive   CK7 and TTF-1 - Negative    03/02/2020 cycle 1 day 1 nivolumab Q 2 weeks palliative immunotherapy    03/16/2020 cycle 2 day 1 nivolumab    03/30/2020 cycle 3 day 1 nivolumab, note: dose of 480 mg [id number] to limit clinic visits due to COVID-19 concerns    05/25/2020 cycle 5 day 1 nivolumab    6/16/20 inteval CT scan with response in lymphadenopathy, see below    3/2020 initiated lenvatinib; held, decreased dose due to rash side effect currently on 8 mg daily    08/26/2021 cycle 1 day 1 atezolizumab and bevacizumab    02/04/2022 cycle 1 day 1 cabozantinib 40 mg daily    INTERVAL EVENTS  Currently on cabozantinib 40 mg for the last 4 weeks tolerating well.  She denies any rash.  She has had some pain in the great toes not gout may be related to long nails needing clipping.  Some joint pain particularly in knees.  She has had progressive decline in light sensitivity/vision.  Prior history of cataracts with surgeries bilaterally but no recent optimal logic follow-up.    Review of Systems    ?   A comprehensive 14-point review of systems was reviewed with patient and was negative other than as specified above.   ?      Objective:      Physical Exam      ?   Vitals:    03/01/22 1024   BP: (!) 156/82   Pulse: (!) 57   Resp: 18   Temp: 98.1 °F (36.7 °C)      ?   ECOG:?2   General appearance: Generally well appearing, in no acute distress, no hearing today  Head, eyes, ears, nose, and throat: moist mucous membranes.   Respiratory:  Normal work of breathing  Abdomen: nontender, nondistended.   Extremities: Warm, without edema.   Neurologic: Alert and oriented.   Skin: No rashes, ecchymoses or petechial lesion.    Psychiatric:  Normal mood and affect.    Laboratory:  ?   Lab Visit on 03/01/2022   Component Date Value Ref Range Status    WBC 03/01/2022 5.38  3.90 - 12.70 K/uL Final    RBC 03/01/2022 5.54 (A) 4.00 - 5.40 M/uL Final    Hemoglobin 03/01/2022 15.9  12.0 - 16.0 g/dL  Final    Hematocrit 03/01/2022 50.2 (A) 37.0 - 48.5 % Final    MCV 03/01/2022 91  82 - 98 fL Final    MCH 03/01/2022 28.7  27.0 - 31.0 pg Final    MCHC 03/01/2022 31.7 (A) 32.0 - 36.0 g/dL Final    RDW 03/01/2022 16.5 (A) 11.5 - 14.5 % Final    Platelets 03/01/2022 121 (A) 150 - 450 K/uL Final    MPV 03/01/2022 10.0  9.2 - 12.9 fL Final    Immature Granulocytes 03/01/2022 0.2  0.0 - 0.5 % Final    Gran # (ANC) 03/01/2022 3.5  1.8 - 7.7 K/uL Final    Immature Grans (Abs) 03/01/2022 0.01  0.00 - 0.04 K/uL Final    Lymph # 03/01/2022 1.2  1.0 - 4.8 K/uL Final    Mono # 03/01/2022 0.4  0.3 - 1.0 K/uL Final    Eos # 03/01/2022 0.2  0.0 - 0.5 K/uL Final    Baso # 03/01/2022 0.07  0.00 - 0.20 K/uL Final    nRBC 03/01/2022 0  0 /100 WBC Final    Gran % 03/01/2022 64.9  38.0 - 73.0 % Final    Lymph % 03/01/2022 21.9  18.0 - 48.0 % Final    Mono % 03/01/2022 7.2  4.0 - 15.0 % Final    Eosinophil % 03/01/2022 4.5  0.0 - 8.0 % Final    Basophil % 03/01/2022 1.3  0.0 - 1.9 % Final    Differential Method 03/01/2022 Automated   Final    Sodium 03/01/2022 140  136 - 145 mmol/L Final    Potassium 03/01/2022 3.9  3.5 - 5.1 mmol/L Final    Chloride 03/01/2022 103  95 - 110 mmol/L Final    CO2 03/01/2022 29  23 - 29 mmol/L Final    Glucose 03/01/2022 95  70 - 110 mg/dL Final    BUN 03/01/2022 11  8 - 23 mg/dL Final    Creatinine 03/01/2022 0.8  0.5 - 1.4 mg/dL Final    Calcium 03/01/2022 8.7  8.7 - 10.5 mg/dL Final    Total Protein 03/01/2022 7.5  6.0 - 8.4 g/dL Final    Albumin 03/01/2022 3.0 (A) 3.5 - 5.2 g/dL Final    Total Bilirubin 03/01/2022 0.6  0.1 - 1.0 mg/dL Final    Alkaline Phosphatase 03/01/2022 124  55 - 135 U/L Final    AST 03/01/2022 33  10 - 40 U/L Final    ALT 03/01/2022 27  10 - 44 U/L Final    Anion Gap 03/01/2022 8  8 - 16 mmol/L Final    eGFR if African American 03/01/2022 >60  >60 mL/min/1.73 m^2 Final    eGFR if non African American 03/01/2022 >60  >60 mL/min/1.73 m^2 Final       ?   Tumor markers   AFP   Date Value Ref Range Status   08/24/2021 <2.0 0.0 - 8.4 ng/mL Final   02/21/2020 1.8 0.0 - 8.4 ng/mL Final       ?   Imaging:    Results for orders placed or performed during the hospital encounter of 01/12/22 (from the past 2160 hour(s))   NM PET CT Routine FDG    Impression    1. Variable change in widespread lymphadenopathy in the neck, chest, and pelvis with overall findings consistent with disease progression.  2. Newly developed bilateral patchy and nodular parenchymal lung opacities exhibiting low level FDG uptake and concerning for metastases.  3. Increased FDG uptake within thickened left adrenal gland concerning for metastasis.  4. Increased uptake within avid intraosseous lesion in the right proximal femur, likely metastasis.      Electronically signed by: SMILEY Do MD  Date:    01/12/2022  Time:    11:45         Pathology:        02/06/2020 liver biopsy  LIVER, BIOPSY:   Consistent with hepatocellular carcinoma, moderate to poorly differentiated     The biopsy shows nests of tumor cells with deeply eosinophilic granular   cytoplasm. Immunostains performed shows results as:   HepPar - Focal granular positive   CK7 and TTF-1 - Negative    ?   Assessment/Plan:       1. 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 Hypothyroidism (acquired)    4. Thrombocytopenia          Plan:     # metastatic hepatocellular carcinoma:  initiated first-line palliative immunotherapy with nivolumab 03/02/2020.  The last to restaging scans showing improvement particularly in liver lesions.  Reviewed today together restaging PET-CT.  Of note radiology read is in comparison to prior PET-CT February 2020 interval scans have been with CT which had shown improvement in liver lesions.  This most recent PET-CT does show new right cervical, mandibular and multiple bilateral lung lesions.  Mixed response in lymph nodes in abdomen/pelvis with  continued improvement in liver lesions.  Repeat biopsy left groin lymph node consistent with same primary, hepatocellular carcinoma.  On lenvatinib initiated early March 2021, due to rash/pruritus/odynophagia complications lenvatinib on hold with resolution and restarted up titrated to 8 mg tolerating well.  Restaging PET 08/20/2021 reviewed with mixed response resolution of pulmonary area of avidity however other areas with findings consistent with progression with enlargement and new lymphadenopathy in neck chest abdomen and pelvis.  I discussed options including best supportive care or can consider bevacizumab and atezolizumab.  She did have prior immunotherapy however she had initial excellent response and may be consideration given combination with bevacizumab which she has not yet tried.  - 08/26/2021 cycle 1 day 1 atezolizumab and bevacizumab, tolerated exceptionally well.  Presents for cycle 2 today labs reviewed no concerning findings and will proceed with treatment today.  - pain well controlled on Xtampza 13.5mER bid (refilled 12/30/21) and 3-4 percocet/day  Per patient preference to wait until after holidays for repeat scan recently performed January 2022 and we review in detail results today unfortunately showing evidence of progressive disease.  We discussed she has had already several lines of therapy and limited further options.  Previously on lenvatinib unable to achieve typical doses due to side effects with rash primarily; I did discuss consideration of cabozantinib also used hepatocellular carcinoma but may have similar side effects and potential for limited impact on her disease.  She is interested in trialing this.  We decided to start dose reduced given prior intolerance is cabozantinib 40 mg  She has now been on cabozantinib 40 mg for the last 4 weeks continues to tolerate exceptionally well.  Patient preference to continue at this dose which is very reasonable balancing quality of life.  She  does have labs remaining stable aside from mild thrombocytopenia will continue to monitor repeat evaluation and labs in 4 weeks.    Light vision decline:  Ophthalmologic referral    # Constipation:  Recommend increased fiber in diet as possible on Metamucil consideration if difficulty with diet given dentures.    # Hypothyroidism:  Normal thyroid function labs on repeat 09/2021, continue 50 mcg.     Follow-Up:   Patient Instructions   RV in 4 wks with cbc, cmp, tsh, f4t prior, Dr. Giraldo in [place] per patient request   Ophthalmology referral

## 2022-03-02 ENCOUNTER — DOCUMENTATION ONLY (OUTPATIENT)
Dept: HEMATOLOGY/ONCOLOGY | Facility: CLINIC | Age: 82
End: 2022-03-02
Payer: MEDICARE

## 2022-03-02 ENCOUNTER — OFFICE VISIT (OUTPATIENT)
Dept: OPHTHALMOLOGY | Facility: CLINIC | Age: 82
End: 2022-03-02
Payer: MEDICARE

## 2022-03-02 DIAGNOSIS — E11.9 DIABETES MELLITUS WITHOUT COMPLICATION: ICD-10-CM

## 2022-03-02 DIAGNOSIS — H53.9 VISION CHANGES: ICD-10-CM

## 2022-03-02 DIAGNOSIS — Z96.1 PSEUDOPHAKIA OF BOTH EYES: ICD-10-CM

## 2022-03-02 DIAGNOSIS — H35.373 EPIRETINAL MEMBRANE (ERM) OF BOTH EYES: Primary | ICD-10-CM

## 2022-03-02 DIAGNOSIS — H43.823 VITREOMACULAR TRACTION SYNDROME OF BOTH EYES: ICD-10-CM

## 2022-03-02 PROCEDURE — 92014 COMPRE OPH EXAM EST PT 1/>: CPT | Mod: S$PBB,,, | Performed by: OPTOMETRIST

## 2022-03-02 PROCEDURE — 99999 PR PBB SHADOW E&M-EST. PATIENT-LVL III: ICD-10-PCS | Mod: PBBFAC,,, | Performed by: OPTOMETRIST

## 2022-03-02 PROCEDURE — 92014 PR EYE EXAM, EST PATIENT,COMPREHESV: ICD-10-PCS | Mod: S$PBB,,, | Performed by: OPTOMETRIST

## 2022-03-02 PROCEDURE — 99999 PR PBB SHADOW E&M-EST. PATIENT-LVL III: CPT | Mod: PBBFAC,,, | Performed by: OPTOMETRIST

## 2022-03-02 PROCEDURE — 99213 OFFICE O/P EST LOW 20 MIN: CPT | Mod: PBBFAC,PO | Performed by: OPTOMETRIST

## 2022-03-03 NOTE — PROGRESS NOTES
"HPI     New patient to [facility]  Last seen by Dr. Godfrey     1.PCIOL OD +22.0 7/2/2018 CDE 32.56  PCIOL OS +22.5 [id number] / CDE: 24.70 / 8/2/2018    Vision changes since last eye exam?: 2019     Any eye pain today: No    Other ocular symptoms: Pt c/o of "black ball" over her vision and of   floaters     Interested in contact lens fitting today? No         Last edited by Vanessa Queen MA on 3/2/2022  3:08 PM. (History)            Assessment /Plan     For exam results, see Encounter Report.    Epiretinal membrane (ERM) of both eyes  Consult Dr Mcqueen for retinal evaluation     Vision changes  -     Ambulatory referral/consult to Ophthalmology    Pseudophakia of both eyes  S/p CE/IOL doing well mild PCO OS>OS.     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     Diabetes mellitus without complication  last A1c 5.6 Stressed importance of DM control to preserve vision. No diabetic retinopathy was seen in either eye today. Continue strict blood glucose control.  Reviewed importance of yearly dilated eye exams. Continue close care with PCP regarding diabetes.    Consult Dr Mcqueen next available for retinal evaluation.                    "

## 2022-03-07 NOTE — PROGRESS NOTES
Nasimr was consulted by MD on today to assist with Cancer Services referral. SWer completed form and provided to MD for signature. Nurse faxed signed form. SWer will remain available.

## 2022-03-11 ENCOUNTER — PATIENT MESSAGE (OUTPATIENT)
Dept: HEMATOLOGY/ONCOLOGY | Facility: CLINIC | Age: 82
End: 2022-03-11
Payer: MEDICARE

## 2022-03-21 ENCOUNTER — OFFICE VISIT (OUTPATIENT)
Dept: URGENT CARE | Facility: CLINIC | Age: 82
End: 2022-03-21
Payer: MEDICARE

## 2022-03-21 ENCOUNTER — HOSPITAL ENCOUNTER (OUTPATIENT)
Dept: RADIOLOGY | Facility: HOSPITAL | Age: 82
Discharge: HOME | End: 2022-03-21
Attending: PHYSICIAN ASSISTANT
Payer: MEDICARE

## 2022-03-21 ENCOUNTER — TELEPHONE (OUTPATIENT)
Dept: URGENT CARE | Facility: CLINIC | Age: 82
End: 2022-03-21

## 2022-03-21 VITALS
HEART RATE: 60 BPM | RESPIRATION RATE: 16 BRPM | WEIGHT: 147 LBS | TEMPERATURE: 96 F | OXYGEN SATURATION: 94 % | DIASTOLIC BLOOD PRESSURE: 100 MMHG | HEIGHT: 62 IN | BODY MASS INDEX: 27.05 KG/M2 | SYSTOLIC BLOOD PRESSURE: 213 MMHG

## 2022-03-21 DIAGNOSIS — R03.0 ELEVATED BLOOD PRESSURE READING: ICD-10-CM

## 2022-03-21 DIAGNOSIS — L03.313 CELLULITIS OF CHEST WALL: ICD-10-CM

## 2022-03-21 DIAGNOSIS — R60.0 LOWER EXTREMITY EDEMA: ICD-10-CM

## 2022-03-21 DIAGNOSIS — N30.90 CYSTITIS WITHOUT HEMATURIA: Primary | ICD-10-CM

## 2022-03-21 LAB
BILIRUB UR QL STRIP: NEGATIVE
GLUCOSE UR QL STRIP: NEGATIVE
KETONES UR QL STRIP: NEGATIVE
LEUKOCYTE ESTERASE UR QL STRIP: POSITIVE
PH, POC UA: 6
POC BLOOD, URINE: NEGATIVE
POC NITRATES, URINE: NEGATIVE
PROT UR QL STRIP: NEGATIVE
SP GR UR STRIP: 1.01 (ref 1–1.03)
UROBILINOGEN UR STRIP-ACNC: ABNORMAL (ref 0.1–1.1)

## 2022-03-21 PROCEDURE — 99214 OFFICE O/P EST MOD 30 MIN: CPT | Mod: S$GLB,,, | Performed by: PHYSICIAN ASSISTANT

## 2022-03-21 PROCEDURE — 93970 EXTREMITY STUDY: CPT | Mod: 26,,, | Performed by: RADIOLOGY

## 2022-03-21 PROCEDURE — 87086 URINE CULTURE/COLONY COUNT: CPT | Performed by: PHYSICIAN ASSISTANT

## 2022-03-21 PROCEDURE — 81003 URINALYSIS AUTO W/O SCOPE: CPT | Mod: QW,S$GLB,, | Performed by: PHYSICIAN ASSISTANT

## 2022-03-21 PROCEDURE — 87088 URINE BACTERIA CULTURE: CPT | Performed by: PHYSICIAN ASSISTANT

## 2022-03-21 PROCEDURE — 93970 EXTREMITY STUDY: CPT | Mod: TC

## 2022-03-21 PROCEDURE — 81003 POCT URINALYSIS, DIPSTICK, AUTOMATED, W/O SCOPE: ICD-10-PCS | Mod: QW,S$GLB,, | Performed by: PHYSICIAN ASSISTANT

## 2022-03-21 PROCEDURE — 87147 CULTURE TYPE IMMUNOLOGIC: CPT | Performed by: PHYSICIAN ASSISTANT

## 2022-03-21 PROCEDURE — 93970 US LOWER EXTREMITY VEINS BILATERAL: ICD-10-PCS | Mod: 26,,, | Performed by: RADIOLOGY

## 2022-03-21 PROCEDURE — 99214 PR OFFICE/OUTPT VISIT, EST, LEVL IV, 30-39 MIN: ICD-10-PCS | Mod: S$GLB,,, | Performed by: PHYSICIAN ASSISTANT

## 2022-03-21 RX ORDER — SULFAMETHOXAZOLE AND TRIMETHOPRIM 800; 160 MG/1; MG/1
1 TABLET ORAL 2 TIMES DAILY
Qty: 20 TABLET | Refills: 0 | Status: SHIPPED | OUTPATIENT
Start: 2022-03-21 | End: 2022-03-22

## 2022-03-21 RX ORDER — FUROSEMIDE 20 MG/1
10 TABLET ORAL DAILY
Qty: 2 TABLET | Refills: 0 | Status: SHIPPED | OUTPATIENT
Start: 2022-03-21 | End: 2022-03-25

## 2022-03-21 RX ORDER — MUPIROCIN 20 MG/G
OINTMENT TOPICAL 3 TIMES DAILY
Qty: 22 G | Refills: 1 | Status: SHIPPED | OUTPATIENT
Start: 2022-03-21

## 2022-03-21 NOTE — PROGRESS NOTES
"Subjective:       Patient ID: Maura Singh is a 82 y.o. female.    Vitals:  height is 5' 2" (1.575 m) and weight is 66.7 kg (147 lb). Her axillary temperature is 96.4 °F (35.8 °C). Her blood pressure is 213/100 (abnormal) and her pulse is 60. Her respiration is 16 and oxygen saturation is 94% (abnormal).     Chief Complaint: Dysuria    83 yo female with Metastatic HCC (on palliative chemo, is DNR), DM, HTN multiple comorbodities with multiple complaints.    1. Dysuria, urinary frequency for 3-4 days,. Worsening. No fever, N/V or flank pain    2. Patient also has swelling in her bilateral legs and feet (chronic), but worsening recently x 1-2 weeks. Hx of CHF. Was on lasix prn but has not seen PCP in months (only heme/onc) so is out of her rx. On chemo for metastatic Ca. Denies CP, SOB, hx of PE/DVT.    4. BP noted to be very elevated. Did not take her lisinopril this am. Denies HA, blurred vision, CP, SOB.     3 . Also c/o a boil on her chest noticed 2-3 days ago. Daughter using neosporin without relief. Not draining. No fever.     Dysuria   This is a new problem. The current episode started more than 1 month ago. The problem occurs every urination. The problem has been gradually worsening. The quality of the pain is described as burning. The pain is at a severity of 10/10. The pain is severe. There has been no fever. She is not sexually active. There is no history of pyelonephritis. Associated symptoms include behavior changes (anxiety), chills, frequency, hesitancy, urgency, bubble bath use and constipation. Pertinent negatives include no discharge, flank pain, hematuria, nausea, possible pregnancy, sweats, vomiting, weight loss, rash or withholding. Treatments tried: Azo. The treatment provided mild relief. Her past medical history is significant for hypertension. There is no history of catheterization, diabetes insipidus, diabetes mellitus, genitourinary reflux, kidney stones, recurrent UTIs, a single " kidney, STD, urinary stasis or a urological procedure.       Constitution: Positive for chills. Negative for fever.   HENT: Negative for congestion, sinus pain and sinus pressure.    Neck: Negative for neck pain and neck stiffness.   Cardiovascular: Positive for leg swelling. Negative for chest pain, palpitations, sob on exertion and passing out.   Eyes: Negative for eye discharge, eye itching and eye pain.   Respiratory: Negative for cough, sputum production and shortness of breath.    Gastrointestinal: Positive for constipation. Negative for abdominal pain, nausea, vomiting and diarrhea.   Genitourinary: Positive for dysuria, frequency and urgency. Negative for flank pain and hematuria.   Musculoskeletal: Negative for muscle ache.   Skin: Positive for skin thickening/induration and erythema. Negative for rash.   Neurological: Negative for altered mental status.   Psychiatric/Behavioral: Negative for altered mental status.       Objective:      Physical Exam   Constitutional: She is oriented to person, place, and time. She appears well-developed. She is cooperative.  Non-toxic appearance. She does not appear ill. No distress. obesity  HENT:   Head: Normocephalic and atraumatic.   Ears:   Right Ear: Hearing, tympanic membrane, external ear and ear canal normal.   Left Ear: Hearing, tympanic membrane, external ear and ear canal normal.   Nose: Nose normal. No mucosal edema, rhinorrhea or nasal deformity. No epistaxis. Right sinus exhibits no maxillary sinus tenderness and no frontal sinus tenderness. Left sinus exhibits no maxillary sinus tenderness and no frontal sinus tenderness.   Mouth/Throat: Uvula is midline, oropharynx is clear and moist and mucous membranes are normal. No trismus in the jaw. Normal dentition. No uvula swelling. No posterior oropharyngeal erythema.   Eyes: Conjunctivae and lids are normal. Right eye exhibits no discharge. Left eye exhibits no discharge. No scleral icterus.   Neck: Trachea  normal and phonation normal. Neck supple.   Cardiovascular: Normal rate, regular rhythm, normal heart sounds and normal pulses.   No murmur heard.  Pulses:       Radial pulses are 2+ on the right side and 2+ on the left side.        Dorsalis pedis pulses are 2+ on the right side and 2+ on the left side.      Comments: RLE > LLE asymmetic edema + mild bilateral CALF TTP    Pulmonary/Chest: Effort normal and breath sounds normal. No respiratory distress. She has no wheezes. She has no rhonchi. She has no rales.   Abdominal: Normal appearance and bowel sounds are normal. She exhibits no distension, no pulsatile midline mass and no mass. Soft. There is no abdominal tenderness. There is no rebound, no guarding, no left CVA tenderness and no right CVA tenderness.   Musculoskeletal: Normal range of motion.         General: No deformity. Normal range of motion.      Right lower le+ Edema present.      Left lower le+ Edema present.   Neurological: She is alert and oriented to person, place, and time. She exhibits normal muscle tone. Coordination normal.   Skin: Skin is warm, dry, intact, not diaphoretic and not pale. erythema        Psychiatric: Her speech is normal and behavior is normal. Judgment and thought content normal.   Nursing note and vitals reviewed.        Assessment:       1. Cystitis without hematuria    2. Lower extremity edema    3. Elevated blood pressure reading          Plan:       tx cystitis and chest wall cellulitis. Reviewed recent labs, creatinine and LFTS. rx bactrim to tx cystitis and cellulitis. rx bactroban given also.    Arranged stat US mukund LE. Pt going straight to [facility] to have done to r/o DVT. High suspicion as asymmetric edema in metastatic ca patient. Will call pt with report.if negative, may benefit from compression hose, elevation, prn low dose lasix.    Elevated BP - explained to take meds, BP diary. Extra dose of norvasc if needed this am if still elevated after taking  lisinopril.    Cystitis without hematuria  -     POCT Urinalysis, Dipstick, Automated, W/O Scope  -     Culture, Urine    Lower extremity edema  -     Cancel: CV Ultrasound doppler venous legs bilat; Future  -     US Lower Extremity Veins Bilateral; Future; Expected date: 03/21/2022    Elevated blood pressure reading    Other orders  -     sulfamethoxazole-trimethoprim 800-160mg (BACTRIM DS) 800-160 mg Tab; Take 1 tablet by mouth 2 (two) times daily. for 10 days  Dispense: 20 tablet; Refill: 0  -     mupirocin (BACTROBAN) 2 % ointment; Apply topically 3 (three) times daily.  Dispense: 22 g; Refill: 1    Tari Trevino PA-C  [facility]         Patient Instructions   Go straight to [facility] [address] [place] [address] FOR 11:00 APPT TO RULE OUT BLOOD CLOT. We will call with results.     Blood pressure very HIGH. Go home and TAKE YOUR LISINOPRIL. If still elevated > 160/90 one hour after taking take extra dose of norvasc (amlodipine) this morning. Take BP diary 2x daily and report findings to PCP.     Take BACTRIM ANTIBIOTIC 2x daily and topical antibiotic 2-3x daily. Follow up with PCP if spreading redness, fever, or swelling. Warm compresses will help.

## 2022-03-21 NOTE — PATIENT INSTRUCTIONS
Go straight to [facility] [address] [place] [address] FOR 11:00 APPT TO RULE OUT BLOOD CLOT. We will call with results.     Blood pressure very HIGH. Go home and TAKE YOUR LISINOPRIL. If still elevated > 160/90 one hour after taking take extra dose of norvasc (amlodipine) this morning. Take BP diary 2x daily and report findings to PCP.     Take BACTRIM ANTIBIOTIC 2x daily and topical antibiotic 2-3x daily. Follow up with PCP if spreading redness, fever, or swelling. Warm compresses will help.

## 2022-03-21 NOTE — TELEPHONE ENCOUNTER
Called and informed of negative US for DVT. ID confirmed with name and . Discussed elevating legs, compression hose at least 25-40 mmHg pressure. May take 10mg lasix short term, no longer than 3 days to help. Called into pharmacy. Last creatinine noted to be 0.8 with GFR > 60. F/u with PCP for any persistent edema.

## 2022-03-22 ENCOUNTER — TELEPHONE (OUTPATIENT)
Dept: URGENT CARE | Facility: CLINIC | Age: 82
End: 2022-03-22
Payer: MEDICARE

## 2022-03-22 DIAGNOSIS — N30.90 CYSTITIS WITHOUT HEMATURIA: Primary | ICD-10-CM

## 2022-03-22 LAB — BACTERIA UR CULT: ABNORMAL

## 2022-03-22 RX ORDER — AMOXICILLIN 500 MG/1
500 TABLET, FILM COATED ORAL EVERY 12 HOURS
Qty: 14 TABLET | Refills: 0 | Status: SHIPPED | OUTPATIENT
Start: 2022-03-22 | End: 2022-03-29

## 2022-03-22 NOTE — TELEPHONE ENCOUNTER
Called and spoke to patient's daughter regarding urine culture. Will stop bactrim, start amoxicllin for 7 days for treatment of complicated cystitis.

## 2022-03-23 ENCOUNTER — TELEPHONE (OUTPATIENT)
Dept: URGENT CARE | Facility: CLINIC | Age: 82
End: 2022-03-23
Payer: MEDICARE

## 2022-03-24 ENCOUNTER — TELEPHONE (OUTPATIENT)
Dept: PHARMACY | Facility: CLINIC | Age: 82
End: 2022-03-24
Payer: MEDICARE

## 2022-03-28 ENCOUNTER — PATIENT MESSAGE (OUTPATIENT)
Dept: HEMATOLOGY/ONCOLOGY | Facility: CLINIC | Age: 82
End: 2022-03-28
Payer: MEDICAID

## 2022-03-28 DIAGNOSIS — C22.0 HCC (HEPATOCELLULAR CARCINOMA): ICD-10-CM

## 2022-03-28 RX ORDER — CABOZANTINIB 40 MG/1
40 TABLET ORAL DAILY
Qty: 30 TABLET | Refills: 0 | Status: CANCELLED | OUTPATIENT
Start: 2022-03-28

## 2022-03-29 ENCOUNTER — LAB VISIT (OUTPATIENT)
Dept: LAB | Facility: HOSPITAL | Age: 82
End: 2022-03-29
Attending: INTERNAL MEDICINE
Payer: MEDICARE

## 2022-03-29 DIAGNOSIS — C22.0 HCC (HEPATOCELLULAR CARCINOMA): ICD-10-CM

## 2022-03-29 DIAGNOSIS — E03.9 HYPOTHYROIDISM (ACQUIRED): ICD-10-CM

## 2022-03-29 DIAGNOSIS — C22.0 HEPATOCELLULAR CARCINOMA: ICD-10-CM

## 2022-03-29 LAB
ALBUMIN SERPL BCP-MCNC: 3.1 G/DL (ref 3.5–5.2)
ALP SERPL-CCNC: 105 U/L (ref 55–135)
ALT SERPL W/O P-5'-P-CCNC: 44 U/L (ref 10–44)
ANION GAP SERPL CALC-SCNC: 12 MMOL/L (ref 8–16)
ANISOCYTOSIS BLD QL SMEAR: SLIGHT
AST SERPL-CCNC: 59 U/L (ref 10–40)
BASOPHILS # BLD AUTO: 0.04 K/UL (ref 0–0.2)
BASOPHILS NFR BLD: 0.7 % (ref 0–1.9)
BILIRUB SERPL-MCNC: 0.6 MG/DL (ref 0.1–1)
BUN SERPL-MCNC: 11 MG/DL (ref 8–23)
CALCIUM SERPL-MCNC: 8.7 MG/DL (ref 8.7–10.5)
CHLORIDE SERPL-SCNC: 103 MMOL/L (ref 95–110)
CO2 SERPL-SCNC: 24 MMOL/L (ref 23–29)
CREAT SERPL-MCNC: 0.8 MG/DL (ref 0.5–1.4)
DIFFERENTIAL METHOD: ABNORMAL
EOSINOPHIL # BLD AUTO: 0.1 K/UL (ref 0–0.5)
EOSINOPHIL NFR BLD: 2.3 % (ref 0–8)
ERYTHROCYTE [DISTWIDTH] IN BLOOD BY AUTOMATED COUNT: 20.2 % (ref 11.5–14.5)
EST. GFR  (AFRICAN AMERICAN): >60 ML/MIN/1.73 M^2
EST. GFR  (NON AFRICAN AMERICAN): >60 ML/MIN/1.73 M^2
GLUCOSE SERPL-MCNC: 111 MG/DL (ref 70–110)
HCT VFR BLD AUTO: 48.3 % (ref 37–48.5)
HGB BLD-MCNC: 15.2 G/DL (ref 12–16)
IMM GRANULOCYTES # BLD AUTO: 0.02 K/UL (ref 0–0.04)
IMM GRANULOCYTES NFR BLD AUTO: 0.4 % (ref 0–0.5)
LYMPHOCYTES # BLD AUTO: 1.3 K/UL (ref 1–4.8)
LYMPHOCYTES NFR BLD: 24 % (ref 18–48)
MCH RBC QN AUTO: 29.7 PG (ref 27–31)
MCHC RBC AUTO-ENTMCNC: 31.5 G/DL (ref 32–36)
MCV RBC AUTO: 95 FL (ref 82–98)
MONOCYTES # BLD AUTO: 0.5 K/UL (ref 0.3–1)
MONOCYTES NFR BLD: 9.2 % (ref 4–15)
NEUTROPHILS # BLD AUTO: 3.5 K/UL (ref 1.8–7.7)
NEUTROPHILS NFR BLD: 63.4 % (ref 38–73)
NRBC BLD-RTO: 0 /100 WBC
OVALOCYTES BLD QL SMEAR: ABNORMAL
PLATELET # BLD AUTO: 134 K/UL (ref 150–450)
PMV BLD AUTO: 10.2 FL (ref 9.2–12.9)
POTASSIUM SERPL-SCNC: 3.7 MMOL/L (ref 3.5–5.1)
PROT SERPL-MCNC: 7.3 G/DL (ref 6–8.4)
RBC # BLD AUTO: 5.11 M/UL (ref 4–5.4)
SODIUM SERPL-SCNC: 139 MMOL/L (ref 136–145)
T4 FREE SERPL-MCNC: 1.09 NG/DL (ref 0.71–1.51)
TSH SERPL DL<=0.005 MIU/L-ACNC: 1.16 UIU/ML (ref 0.4–4)
WBC # BLD AUTO: 5.55 K/UL (ref 3.9–12.7)

## 2022-03-29 PROCEDURE — 84443 ASSAY THYROID STIM HORMONE: CPT | Performed by: INTERNAL MEDICINE

## 2022-03-29 PROCEDURE — 84439 ASSAY OF FREE THYROXINE: CPT | Performed by: INTERNAL MEDICINE

## 2022-03-29 PROCEDURE — 36415 COLL VENOUS BLD VENIPUNCTURE: CPT | Mod: PO | Performed by: INTERNAL MEDICINE

## 2022-03-29 PROCEDURE — 80053 COMPREHEN METABOLIC PANEL: CPT | Performed by: INTERNAL MEDICINE

## 2022-03-29 PROCEDURE — 85025 COMPLETE CBC W/AUTO DIFF WBC: CPT | Performed by: INTERNAL MEDICINE

## 2022-03-30 ENCOUNTER — SPECIALTY PHARMACY (OUTPATIENT)
Dept: PHARMACY | Facility: CLINIC | Age: 82
End: 2022-03-30
Payer: MEDICAID

## 2022-03-31 ENCOUNTER — EXTERNAL CHRONIC CARE MANAGEMENT (OUTPATIENT)
Dept: PRIMARY CARE CLINIC | Facility: CLINIC | Age: 82
End: 2022-03-31
Payer: MEDICARE

## 2022-03-31 PROCEDURE — 99490 CHRNC CARE MGMT STAFF 1ST 20: CPT | Mod: PBBFAC,PN | Performed by: FAMILY MEDICINE

## 2022-03-31 PROCEDURE — 99490 PR CHRONIC CARE MGMT, 1ST 20 MIN: ICD-10-PCS | Mod: S$PBB,,, | Performed by: FAMILY MEDICINE

## 2022-03-31 PROCEDURE — 99490 CHRNC CARE MGMT STAFF 1ST 20: CPT | Mod: S$PBB,,, | Performed by: FAMILY MEDICINE

## 2022-04-01 ENCOUNTER — OFFICE VISIT (OUTPATIENT)
Dept: HEMATOLOGY/ONCOLOGY | Facility: CLINIC | Age: 82
End: 2022-04-01
Payer: MEDICARE

## 2022-04-01 ENCOUNTER — TELEPHONE (OUTPATIENT)
Dept: PHARMACY | Facility: CLINIC | Age: 82
End: 2022-04-01
Payer: MEDICAID

## 2022-04-01 VITALS
DIASTOLIC BLOOD PRESSURE: 100 MMHG | WEIGHT: 145.75 LBS | HEART RATE: 67 BPM | BODY MASS INDEX: 27.52 KG/M2 | OXYGEN SATURATION: 94 % | TEMPERATURE: 97 F | HEIGHT: 61 IN | SYSTOLIC BLOOD PRESSURE: 190 MMHG

## 2022-04-01 DIAGNOSIS — D84.821 IMMUNODEFICIENCY DUE TO CHEMOTHERAPY: Primary | ICD-10-CM

## 2022-04-01 DIAGNOSIS — T45.1X5A IMMUNODEFICIENCY DUE TO CHEMOTHERAPY: Primary | ICD-10-CM

## 2022-04-01 DIAGNOSIS — Z79.899 IMMUNODEFICIENCY DUE TO CHEMOTHERAPY: Primary | ICD-10-CM

## 2022-04-01 DIAGNOSIS — E03.2 DRUG-INDUCED HYPOTHYROIDISM: ICD-10-CM

## 2022-04-01 DIAGNOSIS — C22.0 HCC (HEPATOCELLULAR CARCINOMA): ICD-10-CM

## 2022-04-01 PROCEDURE — 99215 PR OFFICE/OUTPT VISIT, EST, LEVL V, 40-54 MIN: ICD-10-PCS | Mod: S$PBB,,, | Performed by: INTERNAL MEDICINE

## 2022-04-01 PROCEDURE — 99214 OFFICE O/P EST MOD 30 MIN: CPT | Mod: PBBFAC,PO | Performed by: INTERNAL MEDICINE

## 2022-04-01 PROCEDURE — 99999 PR PBB SHADOW E&M-EST. PATIENT-LVL IV: ICD-10-PCS | Mod: PBBFAC,,, | Performed by: INTERNAL MEDICINE

## 2022-04-01 PROCEDURE — 99999 PR PBB SHADOW E&M-EST. PATIENT-LVL IV: CPT | Mod: PBBFAC,,, | Performed by: INTERNAL MEDICINE

## 2022-04-01 PROCEDURE — 99215 OFFICE O/P EST HI 40 MIN: CPT | Mod: S$PBB,,, | Performed by: INTERNAL MEDICINE

## 2022-04-01 RX ORDER — OXYCODONE 13.5 MG/1
1 CAPSULE, EXTENDED RELEASE ORAL 2 TIMES DAILY
Qty: 60 EACH | Refills: 0 | Status: SHIPPED | OUTPATIENT
Start: 2022-04-01 | End: 2022-05-03 | Stop reason: SDUPTHER

## 2022-04-01 NOTE — ASSESSMENT & PLAN NOTE
Metastatic hepatocellular carcinoma, status post multiple lines of palliative systemic therapy.  Most recently started low-dose cabozantinib at 40 mg daily.  Seems to be tolerating fairly well.  Reviewed PET scan from 01/12/2022 that showed disease progression.  I have reviewed labs, no concerning cytopenia, continue treatment as prescribed.  Return in 4 weeks with repeat labs or sooner if needed.  Will consider restaging scan in April/May of 2022.

## 2022-04-01 NOTE — PROGRESS NOTES
Subjective:      DATE OF VISIT: 4/1/22     ?  Patient ID:?Maura Singh is a 82 y.o. female.?? MR#: [id number]   ?   PRIMARY ONCOLOGIST: Dr. Burgess    ? Primary Care Providers:  Yessy Andrade MD, MD (General)     CHIEF COMPLAINT: ???  Follow-up  ?   ONCOLOGIC DIAGNOSIS:  Metastatic hepatocellular carcinoma  ?   CURRENT TREATMENT:  Cabozantinib 40 mg daily    PAST TREATMENT:    Nivolumab 480mg q4 weeks   lenvatinib  Bevacizumab and atezolizumab, C1D1 8/26/21     ONCOLOGIC HISTORY:    Ms. Singh was admitted to [facility] on 02/04/2020 with gradually progressive shortness of breath, cough with hemoptysis along with intermittent abdominal pain, anorexia and fatigue.  In the emergency room she had workup including abdominal ultrasound showing multiple liver lesions concerning for metastatic disease.  CTA was negative for pulmonary embolism but revealed mediastinal adenopathy with right hilar soft tissue mass cannot exclude lymphadenopathy along with postobstructive collapse of right anterior segment lower lobe.  Hepatomegaly was noted with multiple low-density hypoenhancing lesions consistent with hepatic metastatic disease.    2/19/20 PET-CT:    To tele I was thinking to me that you already have 1 extra due as thinking last Monday for she 1. Hypermetabolic right infrahilar lung mass and associated partial postobstructive collapse right lower lobe.  2. Small hypermetabolic focus right lower lobe possibly early metastasis.  3. Hypermetabolic masslike thickening ascending colon suspicious for neoplasm.  4. Multifocal FDG avid lissette metastases neck, chest, abdomen, and pelvis as noted.  5. Extensive FDG avid hepatic metastases.  6. Widespread FDG avid osseous metastases.     02/06/2020 liver biopsy  Pathology:  Consistent with hepatocellular carcinoma, moderate to poorly differentiated     The biopsy shows nests of tumor cells with deeply eosinophilic granular   cytoplasm. Immunostains performed shows  results as:   HepPar - Focal granular positive   CK7 and TTF-1 - Negative    03/02/2020 cycle 1 day 1 nivolumab Q 2 weeks palliative immunotherapy    03/16/2020 cycle 2 day 1 nivolumab    03/30/2020 cycle 3 day 1 nivolumab, note: dose of 480 mg [id number] to limit clinic visits due to COVID-19 concerns    05/25/2020 cycle 5 day 1 nivolumab    6/16/20 inteval CT scan with response in lymphadenopathy, see below    3/2020 initiated lenvatinib; held, decreased dose due to rash side effect currently on 8 mg daily    08/26/2021 cycle 1 day 1 atezolizumab and bevacizumab    02/04/2022 cycle 1 day 1 cabozantinib 40 mg daily    INTERVAL EVENTS  Currently on cabozantinib 40 mg and tolerating well.  She denies any rash.  She has had progressive decline in light sensitivity/vision. Has upcoming appointment with ophthalmology.     Review of Systems    ?   A comprehensive 14-point review of systems was reviewed with patient and was negative other than as specified above.   ?      Objective:      Physical Exam      ?   Vitals:    04/01/22 0944   BP: (!) 190/100   Pulse: 67   Temp: 97.3 °F (36.3 °C)      ?   ECOG:?2   General appearance: Generally well appearing, in no acute distress, no hearing today  Head, eyes, ears, nose, and throat: moist mucous membranes.   Respiratory:  Normal work of breathing  Abdomen: nontender, nondistended.   Extremities: Warm, without edema.   Neurologic: Alert and oriented.   Skin: No rashes, ecchymoses or petechial lesion.    Psychiatric:  Normal mood and affect.    Laboratory:  ?   No visits with results within 1 Day(s) from this visit.   Latest known visit with results is:   Lab Visit on 03/29/2022   Component Date Value Ref Range Status    Free T4 03/29/2022 1.09  0.71 - 1.51 ng/dL Final    WBC 03/29/2022 5.55  3.90 - 12.70 K/uL Final    RBC 03/29/2022 5.11  4.00 - 5.40 M/uL Final    Hemoglobin 03/29/2022 15.2  12.0 - 16.0 g/dL Final    Hematocrit 03/29/2022 48.3  37.0 - 48.5 % Final    MCV  03/29/2022 95  82 - 98 fL Final    MCH 03/29/2022 29.7  27.0 - 31.0 pg Final    MCHC 03/29/2022 31.5 (A) 32.0 - 36.0 g/dL Final    RDW 03/29/2022 20.2 (A) 11.5 - 14.5 % Final    Platelets 03/29/2022 134 (A) 150 - 450 K/uL Final    MPV 03/29/2022 10.2  9.2 - 12.9 fL Final    Immature Granulocytes 03/29/2022 0.4  0.0 - 0.5 % Final    Gran # (ANC) 03/29/2022 3.5  1.8 - 7.7 K/uL Final    Immature Grans (Abs) 03/29/2022 0.02  0.00 - 0.04 K/uL Final    Lymph # 03/29/2022 1.3  1.0 - 4.8 K/uL Final    Mono # 03/29/2022 0.5  0.3 - 1.0 K/uL Final    Eos # 03/29/2022 0.1  0.0 - 0.5 K/uL Final    Baso # 03/29/2022 0.04  0.00 - 0.20 K/uL Final    nRBC 03/29/2022 0  0 /100 WBC Final    Gran % 03/29/2022 63.4  38.0 - 73.0 % Final    Lymph % 03/29/2022 24.0  18.0 - 48.0 % Final    Mono % 03/29/2022 9.2  4.0 - 15.0 % Final    Eosinophil % 03/29/2022 2.3  0.0 - 8.0 % Final    Basophil % 03/29/2022 0.7  0.0 - 1.9 % Final    Aniso 03/29/2022 Slight   Final    Ovalocytes 03/29/2022 Occasional   Final    Differential Method 03/29/2022 Automated   Final    Sodium 03/29/2022 139  136 - 145 mmol/L Final    Potassium 03/29/2022 3.7  3.5 - 5.1 mmol/L Final    Chloride 03/29/2022 103  95 - 110 mmol/L Final    CO2 03/29/2022 24  23 - 29 mmol/L Final    Glucose 03/29/2022 111 (A) 70 - 110 mg/dL Final    BUN 03/29/2022 11  8 - 23 mg/dL Final    Creatinine 03/29/2022 0.8  0.5 - 1.4 mg/dL Final    Calcium 03/29/2022 8.7  8.7 - 10.5 mg/dL Final    Total Protein 03/29/2022 7.3  6.0 - 8.4 g/dL Final    Albumin 03/29/2022 3.1 (A) 3.5 - 5.2 g/dL Final    Total Bilirubin 03/29/2022 0.6  0.1 - 1.0 mg/dL Final    Alkaline Phosphatase 03/29/2022 105  55 - 135 U/L Final    AST 03/29/2022 59 (A) 10 - 40 U/L Final    ALT 03/29/2022 44  10 - 44 U/L Final    Anion Gap 03/29/2022 12  8 - 16 mmol/L Final    eGFR if African American 03/29/2022 >60  >60 mL/min/1.73 m^2 Final    eGFR if non African American 03/29/2022 >60  >60  mL/min/1.73 m^2 Final    TSH 03/29/2022 1.163  0.400 - 4.000 uIU/mL Final      ?   Tumor markers   AFP   Date Value Ref Range Status   08/24/2021 <2.0 0.0 - 8.4 ng/mL Final   02/21/2020 1.8 0.0 - 8.4 ng/mL Final       ?   Imaging:    Results for orders placed or performed during the hospital encounter of 01/12/22 (from the past 2160 hour(s))   NM PET CT Routine FDG    Impression    1. Variable change in widespread lymphadenopathy in the neck, chest, and pelvis with overall findings consistent with disease progression.  2. Newly developed bilateral patchy and nodular parenchymal lung opacities exhibiting low level FDG uptake and concerning for metastases.  3. Increased FDG uptake within thickened left adrenal gland concerning for metastasis.  4. Increased uptake within avid intraosseous lesion in the right proximal femur, likely metastasis.      Electronically signed by: SMILEY Do MD  Date:    01/12/2022  Time:    11:45         Pathology:        02/06/2020 liver biopsy  LIVER, BIOPSY:   Consistent with hepatocellular carcinoma, moderate to poorly differentiated     The biopsy shows nests of tumor cells with deeply eosinophilic granular   cytoplasm. Immunostains performed shows results as:   HepPar - Focal granular positive   CK7 and TTF-1 - Negative    ?   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 HCC (hepatocellular carcinoma)    3. Drug-induced hypothyroidism          Plan:   HCC (hepatocellular carcinoma)  Metastatic hepatocellular carcinoma, status post multiple lines of palliative systemic therapy.  Most recently started low-dose cabozantinib at 40 mg daily.  Seems to be tolerating fairly well.  Reviewed PET scan from 01/12/2022 that showed disease progression.  I have reviewed labs, no concerning cytopenia, continue treatment as prescribed.  Return in 4 weeks with repeat labs or sooner if needed.  Will consider restaging scan in April/May of 2022.     Drug-induced  hypothyroidism  On levothyroxine at 50 mcg daily.    Immunodeficiency due to chemotherapy  -     CBC Auto Differential; Future; Expected date: 04/01/2022  -     Comprehensive Metabolic Panel; Future; Expected date: 04/01/2022  -     TSH; Future; Expected date: 04/01/2022    HCC (hepatocellular carcinoma)  -     CBC Auto Differential; Future; Expected date: 04/01/2022  -     Comprehensive Metabolic Panel; Future; Expected date: 04/01/2022  -     TSH; Future; Expected date: 04/01/2022    Drug-induced hypothyroidism  -     CBC Auto Differential; Future; Expected date: 04/01/2022  -     Comprehensive Metabolic Panel; Future; Expected date: 04/01/2022  -     TSH; Future; Expected date: 04/01/2022      Alonzo Giraldo MD

## 2022-04-01 NOTE — TELEPHONE ENCOUNTER
Specialty Pharmacy - Medication/Referral Authorization  Specialty Pharmacy - Refill Coordination    Daughter confirmed pt has 3 tablets on hand, enough to last through 4/4      Specialty Medication Orders Linked to Encounter    Flowsheet Row Most Recent Value   Medication #1 cabozantinib (CABOMETYX) 40 mg Tab (Order#[id number], Rx#)          Refill Questions - Documented Responses    Flowsheet Row Most Recent Value   Refill Screening Questions    Changes to allergies? No   Changes to medications? Yes  [Multivitamin- NoDDIs]   New conditions since last clinic visit? No   Unplanned office visit, urgent care, ED, or hospital admission in the last 4 weeks? Yes  [[facility]- for bladder infection]   How does patient/caregiver feel medication is working? Good   Financial problems or insurance changes? No   How many doses of your specialty medications were missed in the last 4 weeks? 0   Would patient like to speak to a pharmacist? No   When does the patient need to receive the medication? 04/05/22  [Daugther confirmed pt has 3 tablets on hand, enough to last through 4/4]   Refill Delivery Questions    How will the patient receive the medication? Delivery Yissel   When does the patient need to receive the medication? 04/05/22  [Daugther confirmed pt has 3 tablets on hand, enough to last through 4/4]   Shipping Address Home   Address in [facility] confirmed and updated if neccessary? Yes   Expected Copay ($) 0   Is the patient able to afford the medication copay? Yes   Payment Method zero copay   Days supply of Refill 30   Supplies needed? No supplies needed   Refill activity completed? Yes   Refill activity plan Refill scheduled   Shipment/Pickup Date: 04/04/22          Current Outpatient Medications   Medication Sig    (Magic mouthwash) 1:1:1 Benadryl 12.5mg/5ml liq, aluminum & magnesium hydroxide-simehticone (Maalox), LIDOcaine viscous 2% Take 10 mL PO every 4-6 hours as needed for throat or esophageal pain.    amLODIPine  (NORVASC) 2.5 MG tablet Take 1 tablet (2.5 mg total) by mouth 2 (two) times daily. (Patient taking differently: Take 2.5 mg by mouth 2 (two) times daily.)    betamethasone valerate 0.1% (VALISONE) 0.1 % Crea Apply topically 2 (two) times daily.    cabozantinib (CABOMETYX) 40 mg Tab Take 40 mg by mouth once daily.    clobetasol 0.05% (TEMOVATE) 0.05 % Oint Apply topically 2 (two) times daily.    diphenoxylate-atropine 2.5-0.025 mg (LOMOTIL) 2.5-0.025 mg per tablet Take 1 tablet by mouth 4 (four) times daily as needed for Diarrhea.    GI cocktail antac/dicyc/lidoc Take 10 ml by mouth three times daily as needed for throat pain    levothyroxine (TIROSINT) 50 mcg Cap Take 1 tablet (50 mcg) by mouth before breakfast.    LIDOcaine HCl 2% (XYLOCAINE) 2 % Soln Swish, gargle, and spit 10 mLs every 6-8 hrs as needed for throat pain.    lisinopriL (PRINIVIL,ZESTRIL) 40 MG tablet Take 1 tablet (40 mg total) by mouth once daily.    magnesium hydroxide (MILK OF MAGNESIA ORAL) Take by mouth once daily.    mupirocin (BACTROBAN) 2 % ointment Apply topically 3 (three) times daily.    neomycin-polymyxin-hydrocortisone (CORTISPORIN) 3.5-10,000-1 mg/mL-unit/mL-% otic suspension Place 3 drops into the right ear 4 (four) times daily.    oxyCODONE-acetaminophen (PERCOCET)  mg per tablet Take 1 tablet by mouth 4 (four) times daily as needed (severe pain).    pimecrolimus (ELIDEL) 1 % cream Apply to the affected area twice daily (Patient taking differently: Apply to the affected area twice daily)    pulse oximeter (PULSE OXIMETER) device by Apply Externally route 2 (two) times a day. Use twice daily at 8 AM and 3 PM and [facility].    senna (SENOKOT) 8.6 mg tablet Take 1 tablet by mouth once daily.    tacrolimus (PROTOPIC) 0.1 % ointment Apply topically 2 (two) times daily.    triamcinolone acetonide 0.1% (KENALOG) 0.1 % cream Apply topically 2 (two) times daily. for 10 days    XTAMPZA ER  "13.5 mg CSpT Take 1 capsule by mouth 2 (two) times daily.   Last reviewed on 4/1/2022  9:57 AM by Arabella Torres MA    Review of patient's allergies indicates:   Allergen Reactions    Pravastatin Other (See Comments)     Burning/flushing    Xgeva [denosumab] Other (See Comments)     Mouth and diffuse pain    Allopurinol analogues Other (See Comments)     "makes me sick and feel crazy"    Last reviewed on  4/1/2022 9:58 AM by Arabella Torres      Tasks added this encounter   4/1/2022 - Welcome Call  4/1/2022 - Referral Authorization   Tasks due within next 3 months   4/26/2022 - Clinical - Follow Up Assesement (90 day)  3/28/2022 - Refill Call (Auto Added)     JOSUÉ BUENROSTRO, PharmD  [facility]  [address]  [facility] [address]  Phone: [phone]  Fax: [phone]      "

## 2022-04-04 NOTE — TELEPHONE ENCOUNTER
Incoming patient daughter jonathan booker calling to check on shipment of Cabometyx. Per note Cabometyx is being deliver today 4/4 through [facility]. Confirm of address expected time to delivery.

## 2022-04-05 ENCOUNTER — OFFICE VISIT (OUTPATIENT)
Dept: OPHTHALMOLOGY | Facility: CLINIC | Age: 82
End: 2022-04-05
Payer: MEDICARE

## 2022-04-05 DIAGNOSIS — H35.373 EPIRETINAL MEMBRANE (ERM) OF BOTH EYES: ICD-10-CM

## 2022-04-05 DIAGNOSIS — Z96.1 PSEUDOPHAKIA OF BOTH EYES: ICD-10-CM

## 2022-04-05 DIAGNOSIS — E11.9 DIABETES MELLITUS WITHOUT COMPLICATION: ICD-10-CM

## 2022-04-05 DIAGNOSIS — H53.9 VISION CHANGES: ICD-10-CM

## 2022-04-05 DIAGNOSIS — H43.823 VITREOMACULAR TRACTION SYNDROME OF BOTH EYES: Primary | ICD-10-CM

## 2022-04-05 PROCEDURE — 99999 PR PBB SHADOW E&M-EST. PATIENT-LVL III: ICD-10-PCS | Mod: PBBFAC,,, | Performed by: OPHTHALMOLOGY

## 2022-04-05 PROCEDURE — 92134 CPTRZ OPH DX IMG PST SGM RTA: CPT | Mod: PBBFAC | Performed by: OPHTHALMOLOGY

## 2022-04-05 PROCEDURE — 92134 POSTERIOR SEGMENT OCT RETINA (OCULAR COHERENCE TOMOGRAPHY)-BOTH EYES: ICD-10-PCS | Mod: 26,S$PBB,, | Performed by: OPHTHALMOLOGY

## 2022-04-05 PROCEDURE — 99203 OFFICE O/P NEW LOW 30 MIN: CPT | Mod: S$PBB,,, | Performed by: OPHTHALMOLOGY

## 2022-04-05 PROCEDURE — 99213 OFFICE O/P EST LOW 20 MIN: CPT | Mod: PBBFAC | Performed by: OPHTHALMOLOGY

## 2022-04-05 PROCEDURE — 99999 PR PBB SHADOW E&M-EST. PATIENT-LVL III: CPT | Mod: PBBFAC,,, | Performed by: OPHTHALMOLOGY

## 2022-04-05 PROCEDURE — 99203 PR OFFICE/OUTPT VISIT, NEW, LEVL III, 30-44 MIN: ICD-10-PCS | Mod: S$PBB,,, | Performed by: OPHTHALMOLOGY

## 2022-04-05 NOTE — PROGRESS NOTES
===============================  Maura Singh,  4/5/2022 today   82 y.o. female   Last visit [facility]: :Visit date not found   Last visit eye dept. Visit date not found  [facility]:  Uncorrected distance visual acuity was 20/80 in the right eye and 20/60 in the left eye.  Tonometry     Tonometry (Applanation, 1:20 PM)       Right Left    Pressure 16 16              Not recorded       Not recorded       Not recorded       Chief Complaint   Patient presents with    ERM     RETINAL CONSULT PER DR. PASCUAL       ________________  4/5/2022 today  HPI     ERM      Additional comments: RETINAL CONSULT PER DR. PASCUAL              Comments     1.PCIOL OD +22.0 7/2/2018 CDE 32.56  PCIOL OS +22.5 SN60WF / CDE: 24.70 / 8/2/2018  ERM OU                         Last edited by Nevaeh Joaquin on 4/5/2022  1:09 PM. (History)      Problem List Items Addressed This Visit     Vision changes      Other Visit Diagnoses     Vitreomacular traction syndrome of both eyes    -  Primary    Relevant Orders    Posterior Segment OCT Retina-Both eyes (Completed)    Epiretinal membrane (ERM) of both eyes        Relevant Orders    Posterior Segment OCT Retina-Both eyes (Completed)    Diabetes mellitus without complication        Relevant Orders    Posterior Segment OCT Retina-Both eyes (Completed)    Pseudophakia of both eyes            VRT OU with more noise on OD OCT   ERM OU    PCIOL OU with clear capsule OU  1+ SPK OU  Well perfused disc OU 0.2  Age appropriate RPE mottling OU  Vessels are fine OU  Discussed with patient and daughter using artificial tears to help clear vision when reading    RTC 6 months repeat MOCT  Instructed to call 24/7 for any worsening of vision or symptoms. Check OU daily.   Gave my office and cell phone number.          ===========================

## 2022-04-18 ENCOUNTER — PATIENT MESSAGE (OUTPATIENT)
Dept: ADMINISTRATIVE | Facility: OTHER | Age: 82
End: 2022-04-18
Payer: MEDICARE

## 2022-04-23 DIAGNOSIS — C22.0 HCC (HEPATOCELLULAR CARCINOMA): ICD-10-CM

## 2022-04-23 RX ORDER — OXYCODONE AND ACETAMINOPHEN 10; 325 MG/1; MG/1
1 TABLET ORAL 4 TIMES DAILY PRN
Qty: 120 TABLET | Refills: 0 | Status: CANCELLED | OUTPATIENT
Start: 2022-04-23

## 2022-04-27 ENCOUNTER — SPECIALTY PHARMACY (OUTPATIENT)
Dept: PHARMACY | Facility: CLINIC | Age: 82
End: 2022-04-27
Payer: MEDICARE

## 2022-04-27 DIAGNOSIS — C22.0 HCC (HEPATOCELLULAR CARCINOMA): ICD-10-CM

## 2022-04-27 RX ORDER — OXYCODONE AND ACETAMINOPHEN 10; 325 MG/1; MG/1
1 TABLET ORAL 4 TIMES DAILY PRN
Qty: 120 TABLET | Refills: 0 | Status: CANCELLED | OUTPATIENT
Start: 2022-04-23

## 2022-04-27 RX ORDER — OXYCODONE AND ACETAMINOPHEN 10; 325 MG/1; MG/1
1 TABLET ORAL 4 TIMES DAILY PRN
Qty: 120 TABLET | Refills: 0 | Status: SHIPPED | OUTPATIENT
Start: 2022-04-27 | End: 2022-06-20 | Stop reason: SDUPTHER

## 2022-04-27 NOTE — TELEPHONE ENCOUNTER
Maura Singh is a 82 y.o. female, who is followed by the specialty pharmacy service for management and education of her Cabometyx.  She has been on therapy with Cabometyx  Since Feb 2022 .  She was previously on Lenvima but switched to Cabometyx earlier this year. I have reviewed her electronic medical record and current medication list and determined that specialty medication adjustment Is not needed at this time.    Patient has not experienced adverse events.  She Is adherent reporting 0 missed doses since last review.  Adherence has been encouraged with the following mechanism(s): directed education and family support. Her daughter is her primary caregiver.  She is on target to meet goals of therapy and will continue treatment.    Follow Up Review 4/1/2022 2/28/2022 2/2/2022   # of missed doses 0 0 -   New Medications? Yes No -   New Conditions? No No -   New Allergies? No No -   Medication Effective? Good Good -   Missed activities? - - No   Urgent Care? - - None   Some recent data might be hidden       Therapy is appropriate to continue.    Therapy is effective: Yes  Recommendations: none at this time.  Review Method: Chart Review    According to chart notes, MD will consider restaging scan in April/May of 2022. Reviewed PET scan from 01/12/2022 that showed disease progression. Reviewed 3/29 labs. CMP stable: AST 59 U/L, mildly elevated. CBC stable: plts; 134. eGFR above 60.       JOSUÉ BUENROSTRO, PharmD  [facility]  [address] [facility] [address]  Phone: [phone]  Fax: [phone]

## 2022-04-28 ENCOUNTER — SPECIALTY PHARMACY (OUTPATIENT)
Dept: PHARMACY | Facility: CLINIC | Age: 82
End: 2022-04-28
Payer: MEDICARE

## 2022-04-28 NOTE — TELEPHONE ENCOUNTER
Specialty Pharmacy - Refill Coordination    Specialty Medication Orders Linked to Encounter    Flowsheet Row Most Recent Value   Medication #1 cabozantinib (CABOMETYX) 40 mg Tab (Order#[id number], Rx#[id number])          Refill Questions - Documented Responses    Flowsheet Row Most Recent Value   Patient Availability and HIPAA Verification    Does patient want to proceed with activity? Yes   HIPAA/medical authority confirmed? Yes   Relationship to patient of person spoken to? Self   Refill Screening Questions    Changes to allergies? No   Changes to medications? No   New conditions since last clinic visit? No   Unplanned office visit, urgent care, ED, or hospital admission in the last 4 weeks? No   How does patient/caregiver feel medication is working? Excellent   Financial problems or insurance changes? No   How many doses of your specialty medications were missed in the last 4 weeks? 0   Would patient like to speak to a pharmacist? No   When does the patient need to receive the medication? 05/02/22   Refill Delivery Questions    How will the patient receive the medication? Delivery [facility]el   When does the patient need to receive the medication? 05/02/22   Shipping Address Home   Address in [facility] confirmed and updated if neccessary? Yes   Expected Copay ($) 0   Is the patient able to afford the medication copay? Yes   Payment Method zero copay   Days supply of Refill 30   Supplies needed? No supplies needed   Refill activity completed? Yes   Refill activity plan Refill scheduled   Shipment/Pickup Date: 05/02/22          Current Outpatient Medications   Medication Sig    (Magic mouthwash) 1:1:1 Benadryl 12.5mg/5ml liq, aluminum & magnesium hydroxide-simehticone (Maalox), LIDOcaine viscous 2% Take 10 mL PO every 4-6 hours as needed for throat or esophageal pain.    amLODIPine (NORVASC) 2.5 MG tablet Take 1 tablet (2.5 mg total) by mouth 2 (two) times daily. 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  Completed now.\par Check  labs today.  Monitor CBC and CMP closely.  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  PCR was not detected. Unable to trend pcr for evidence of clearance of infection.  \par Likely will need repeat EGD to document clearance.  \par \par Will d/w GI on timing. Sems reasonable to pursue few weeks after completion of therapy.\par \par Advised pt and daughter that she can f/up after EGD. 2 (two) times daily.)    betamethasone valerate 0.1% (VALISONE) 0.1 % Crea Apply topically 2 (two) times daily.    cabozantinib (CABOMETYX) 40 mg Tab Take 40 mg by mouth once daily.    clobetasol 0.05% (TEMOVATE) 0.05 % Oint Apply topically 2 (two) times daily.    diphenoxylate-atropine 2.5-0.025 mg (LOMOTIL) 2.5-0.025 mg per tablet Take 1 tablet by mouth 4 (four) times daily as needed for Diarrhea.    GI cocktail antac/dicyc/lidoc Take 10 ml by mouth three times daily as needed for throat pain    levothyroxine (TIROSINT) 50 mcg Cap Take 1 tablet (50 mcg) by mouth before breakfast.    LIDOcaine HCl 2% (XYLOCAINE) 2 % Soln Swish, gargle, and spit 10 mLs every 6-8 hrs as needed for throat pain.    lisinopriL (PRINIVIL,ZESTRIL) 40 MG tablet Take 1 tablet (40 mg total) by mouth once daily.    magnesium hydroxide (MILK OF MAGNESIA ORAL) Take by mouth once daily.    mupirocin (BACTROBAN) 2 % ointment Apply topically 3 (three) times daily.    neomycin-polymyxin-hydrocortisone (CORTISPORIN) 3.5-10,000-1 mg/mL-unit/mL-% otic suspension Place 3 drops into the right ear 4 (four) times daily.    oxyCODONE-acetaminophen (PERCOCET)  mg per tablet Take 1 tablet by mouth 4 (four) times daily as needed (severe pain).    pimecrolimus (ELIDEL) 1 % cream Apply to the affected area twice daily (Patient taking differently: Apply to the affected area twice daily)    pulse oximeter (PULSE OXIMETER) device by Apply Externally route 2 (two) times a day. Use twice daily at 8 AM and 3 PM and record the value in MyChart as directed.    senna (SENOKOT) 8.6 mg tablet Take 1 tablet by mouth once daily.    tacrolimus (PROTOPIC) 0.1 % ointment Apply topically 2 (two) times daily.    triamcinolone acetonide 0.1% (KENALOG) 0.1 % cream Apply topically 2 (two) times daily. for 10 days    XTAMPZA ER 13.5 mg CSpT Take 1 capsule by mouth 2 (two) times daily.   Last reviewed on 4/5/2022  3:07 PM by TANJA Mcqueen MD    Review of  "patient's allergies indicates:   Allergen Reactions    Pravastatin Other (See Comments)     Burning/flushing    Xgeva [denosumab] Other (See Comments)     Mouth and diffuse pain    Allopurinol analogues Other (See Comments)     "makes me sick and feel crazy"    Last reviewed on  4/5/2022 3:07 PM by VERNA Mcqueen      Tasks added this encounter   5/25/2022 - Refill Call (Auto Added)   Tasks due within next 3 months   No tasks due.     Chavez Flores, PharmD  [facility]  [address] [place] [address]  Phone: [phone]  Fax: [phone]      "

## 2022-04-30 ENCOUNTER — EXTERNAL CHRONIC CARE MANAGEMENT (OUTPATIENT)
Dept: PRIMARY CARE CLINIC | Facility: CLINIC | Age: 82
End: 2022-04-30
Payer: MEDICARE

## 2022-04-30 PROCEDURE — 99490 CHRNC CARE MGMT STAFF 1ST 20: CPT | Mod: PBBFAC,PN | Performed by: FAMILY MEDICINE

## 2022-04-30 PROCEDURE — 99490 PR CHRONIC CARE MGMT, 1ST 20 MIN: ICD-10-PCS | Mod: S$PBB,,, | Performed by: FAMILY MEDICINE

## 2022-04-30 PROCEDURE — 99490 CHRNC CARE MGMT STAFF 1ST 20: CPT | Mod: S$PBB,,, | Performed by: FAMILY MEDICINE

## 2022-05-03 ENCOUNTER — LAB VISIT (OUTPATIENT)
Dept: LAB | Facility: HOSPITAL | Age: 82
End: 2022-05-03
Attending: INTERNAL MEDICINE
Payer: MEDICARE

## 2022-05-03 DIAGNOSIS — Z79.899 IMMUNODEFICIENCY DUE TO CHEMOTHERAPY: ICD-10-CM

## 2022-05-03 DIAGNOSIS — C22.0 HCC (HEPATOCELLULAR CARCINOMA): ICD-10-CM

## 2022-05-03 DIAGNOSIS — E03.2 DRUG-INDUCED HYPOTHYROIDISM: ICD-10-CM

## 2022-05-03 DIAGNOSIS — T45.1X5A IMMUNODEFICIENCY DUE TO CHEMOTHERAPY: ICD-10-CM

## 2022-05-03 DIAGNOSIS — D84.821 IMMUNODEFICIENCY DUE TO CHEMOTHERAPY: ICD-10-CM

## 2022-05-03 LAB
ALBUMIN SERPL BCP-MCNC: 3 G/DL (ref 3.5–5.2)
ALP SERPL-CCNC: 98 U/L (ref 55–135)
ALT SERPL W/O P-5'-P-CCNC: 53 U/L (ref 10–44)
ANION GAP SERPL CALC-SCNC: 15 MMOL/L (ref 8–16)
ANISOCYTOSIS BLD QL SMEAR: SLIGHT
AST SERPL-CCNC: 55 U/L (ref 10–40)
BASOPHILS # BLD AUTO: 0.04 K/UL (ref 0–0.2)
BASOPHILS NFR BLD: 0.6 % (ref 0–1.9)
BILIRUB SERPL-MCNC: 0.6 MG/DL (ref 0.1–1)
BUN SERPL-MCNC: 12 MG/DL (ref 8–23)
CALCIUM SERPL-MCNC: 8.6 MG/DL (ref 8.7–10.5)
CHLORIDE SERPL-SCNC: 104 MMOL/L (ref 95–110)
CO2 SERPL-SCNC: 24 MMOL/L (ref 23–29)
CREAT SERPL-MCNC: 0.8 MG/DL (ref 0.5–1.4)
DIFFERENTIAL METHOD: ABNORMAL
EOSINOPHIL # BLD AUTO: 0.1 K/UL (ref 0–0.5)
EOSINOPHIL NFR BLD: 2.2 % (ref 0–8)
ERYTHROCYTE [DISTWIDTH] IN BLOOD BY AUTOMATED COUNT: 22.3 % (ref 11.5–14.5)
EST. GFR  (AFRICAN AMERICAN): >60 ML/MIN/1.73 M^2
EST. GFR  (NON AFRICAN AMERICAN): >60 ML/MIN/1.73 M^2
GLUCOSE SERPL-MCNC: 90 MG/DL (ref 70–110)
HCT VFR BLD AUTO: 46.5 % (ref 37–48.5)
HGB BLD-MCNC: 14.7 G/DL (ref 12–16)
IMM GRANULOCYTES # BLD AUTO: 0.01 K/UL (ref 0–0.04)
IMM GRANULOCYTES NFR BLD AUTO: 0.2 % (ref 0–0.5)
LYMPHOCYTES # BLD AUTO: 1.5 K/UL (ref 1–4.8)
LYMPHOCYTES NFR BLD: 24.1 % (ref 18–48)
MCH RBC QN AUTO: 30.7 PG (ref 27–31)
MCHC RBC AUTO-ENTMCNC: 31.6 G/DL (ref 32–36)
MCV RBC AUTO: 97 FL (ref 82–98)
MONOCYTES # BLD AUTO: 0.7 K/UL (ref 0.3–1)
MONOCYTES NFR BLD: 10.8 % (ref 4–15)
NEUTROPHILS # BLD AUTO: 4 K/UL (ref 1.8–7.7)
NEUTROPHILS NFR BLD: 62.1 % (ref 38–73)
NRBC BLD-RTO: 0 /100 WBC
PLATELET # BLD AUTO: 161 K/UL (ref 150–450)
PLATELET BLD QL SMEAR: ABNORMAL
PMV BLD AUTO: 10.6 FL (ref 9.2–12.9)
POTASSIUM SERPL-SCNC: 3.8 MMOL/L (ref 3.5–5.1)
PROT SERPL-MCNC: 6.8 G/DL (ref 6–8.4)
RBC # BLD AUTO: 4.79 M/UL (ref 4–5.4)
SODIUM SERPL-SCNC: 143 MMOL/L (ref 136–145)
TSH SERPL DL<=0.005 MIU/L-ACNC: 1.42 UIU/ML (ref 0.4–4)
WBC # BLD AUTO: 6.4 K/UL (ref 3.9–12.7)

## 2022-05-03 PROCEDURE — 85025 COMPLETE CBC W/AUTO DIFF WBC: CPT | Performed by: INTERNAL MEDICINE

## 2022-05-03 PROCEDURE — 80053 COMPREHEN METABOLIC PANEL: CPT | Performed by: INTERNAL MEDICINE

## 2022-05-03 PROCEDURE — 84443 ASSAY THYROID STIM HORMONE: CPT | Performed by: INTERNAL MEDICINE

## 2022-05-03 PROCEDURE — 36415 COLL VENOUS BLD VENIPUNCTURE: CPT | Mod: PO | Performed by: INTERNAL MEDICINE

## 2022-05-03 RX ORDER — OXYCODONE 13.5 MG/1
1 CAPSULE, EXTENDED RELEASE ORAL 2 TIMES DAILY
Qty: 60 EACH | Refills: 0 | Status: SHIPPED | OUTPATIENT
Start: 2022-05-03 | End: 2022-06-06 | Stop reason: SDUPTHER

## 2022-05-04 ENCOUNTER — OFFICE VISIT (OUTPATIENT)
Dept: HEMATOLOGY/ONCOLOGY | Facility: CLINIC | Age: 82
End: 2022-05-04
Payer: MEDICARE

## 2022-05-04 VITALS
TEMPERATURE: 98 F | OXYGEN SATURATION: 93 % | HEIGHT: 60 IN | DIASTOLIC BLOOD PRESSURE: 78 MMHG | HEART RATE: 61 BPM | SYSTOLIC BLOOD PRESSURE: 162 MMHG | BODY MASS INDEX: 28.35 KG/M2 | WEIGHT: 144.38 LBS

## 2022-05-04 DIAGNOSIS — C22.0 HEPATOCELLULAR CARCINOMA: Primary | ICD-10-CM

## 2022-05-04 DIAGNOSIS — C22.0 HCC (HEPATOCELLULAR CARCINOMA): ICD-10-CM

## 2022-05-04 DIAGNOSIS — E03.2 DRUG-INDUCED HYPOTHYROIDISM: ICD-10-CM

## 2022-05-04 PROCEDURE — 99214 OFFICE O/P EST MOD 30 MIN: CPT | Mod: PBBFAC

## 2022-05-04 PROCEDURE — 99999 PR PBB SHADOW E&M-EST. PATIENT-LVL IV: CPT | Mod: PBBFAC,,,

## 2022-05-04 PROCEDURE — 99214 PR OFFICE/OUTPT VISIT, EST, LEVL IV, 30-39 MIN: ICD-10-PCS | Mod: S$PBB,,,

## 2022-05-04 PROCEDURE — 99214 OFFICE O/P EST MOD 30 MIN: CPT | Mod: S$PBB,,,

## 2022-05-04 PROCEDURE — 99999 PR PBB SHADOW E&M-EST. PATIENT-LVL IV: ICD-10-PCS | Mod: PBBFAC,,,

## 2022-05-22 NOTE — PROGRESS NOTES
Subjective:      DATE OF VISIT: 5/4/2022   ?   ?   Patient ID:?Maura Singh is a 82 y.o. female.?? MR#: [id number]   ?   PRIMARY PROVIDER: Dr. Burgess  ?   CHIEF COMPLAINT: follow-up?????   ?   ONCOLOGIC DIAGNOSIS: Metastatic hepatocellular carcinoma  ?   CURRENT TREATMENT: OP CABOZANTINIB DAILY 40 mg    PAST TREATMENT:  Nivolumab 480mg q4 weeks   lenvatinib  Bevacizumab and atezolizumab, C1D1 8/26/21    HPI  Ms. Singh is a pleasant 82-year-old female who presents today with her daughter for follow-up.She was admitted to [facility] on 02/04/2020 with gradually progressive shortness of breath, cough with hemoptysis along with intermittent abdominal pain, anorexia and fatigue.  In the emergency room she had workup including abdominal ultrasound showing multiple liver lesions concerning for metastatic disease.  CTA was negative for pulmonary embolism but revealed mediastinal adenopathy with right hilar soft tissue mass cannot exclude lymphadenopathy along with postobstructive collapse of right anterior segment lower lobe.  Hepatomegaly was noted with multiple low-density hypoenhancing lesions consistent with hepatic metastatic disease    Interval History: Pt states overall she is feeling well. She continues on cabozantinib 40 mg without difficulty. Pt and daughter are both surprised and pleased at how well she has been feeling. Denies rash, n/v/d/c/, abnormal bleeding, mouth sores. She states her appetite is good, but ill fitting dentures are causing trouble with eating--will reach out to  for possible assist with this. Plan to follow-up with opthalmology soon.     Oncology History   Hepatocellular carcinoma   2/17/2020 Initial Diagnosis    Hepatocellular carcinoma     3/2/2020 - 2/10/2021 Chemotherapy    Treatment Summary   Plan Name: OP NIVOLUMAB Q2W (Q4W cycle 3 on)  Treatment Goal: Palliative  Status: Inactive  Start Date: 3/2/2020  End Date: 1/11/2021  Provider: Gill Burgess,  MD  Chemotherapy: nivolumab 240 mg in sodium chloride 0.9% 124 mL infusion, 240 mg, Intravenous, Clinic/HOD 1 time, 13 of 15 cycles  Dose modification: 480 mg (original dose 240 mg, Cycle 3), 480 mg (original dose 240 mg, Cycle 4)  Administration: 240 mg (3/2/2020), 240 mg (3/16/2020), 480 mg (3/30/2020), 480 mg (4/27/2020), 480 mg (5/25/2020), 480 mg (6/26/2020), 480 mg (7/24/2020), 480 mg (8/21/2020), 480 mg (9/18/2020), 480 mg (10/16/2020), 480 mg (11/13/2020), 480 mg (12/14/2020), 480 mg (1/11/2021)     2/12/2021 - 2/12/2021 Chemotherapy    Treatment Summary   Plan Name: OP THYROID LENVATINIB DAILY  Treatment Goal: Palliative  Status: Inactive  Start Date:   End Date:   Provider: Gill Burgess MD  Chemotherapy: lenvatinib (LENVIMA) 24 mg/day(10 mg x 2-4 mg x 1) Cap, 24 mg, Oral, Daily, 0 of 1 cycle, Start date: --, End date: --     8/26/2021 - 12/30/2021 Chemotherapy    Treatment Summary   Plan Name: OP ATEZOLIZUMAB BEVACIZUMAB Q3W  Treatment Goal: Palliative  Status: Inactive  Start Date: 8/26/2021  End Date: 12/30/2021  Provider: Gill Burgess MD  Chemotherapy: bevacizumab (AVASTIN) 1,000 mg in sodium chloride 0.9% 100 mL chemo infusion, 1,045 mg, Intravenous, Clinic/HOD 1 time, 7 of 17 cycles  Administration: 1,000 mg (8/26/2021), 1,000 mg (9/16/2021), 1,060 mg (10/7/2021), 1,045 mg (10/28/2021), 1,045 mg (11/18/2021), 1,000 mg (12/9/2021), 1,065 mg (12/30/2021)  atezolizumab (TECENTRIQ) 1,200 mg in sodium chloride 0.9% 270 mL infusion, 1,200 mg, Intravenous, Clinic/[facility] 1 time, 7 of 17 cycles  Administration: 1,200 mg (8/26/2021), 1,200 mg (9/16/2021), 1,200 mg (10/7/2021), 1,200 mg (10/28/2021), 1,200 mg (11/18/2021), 1,200 mg (12/9/2021), 1,200 mg (12/30/2021)     1/20/2022 -  Chemotherapy    Treatment Summary   Plan Name: OP CABOZANTINIB DAILY  Treatment Goal: Palliative  Status: Active  Start Date: 1/20/2022  End Date: 1/20/2022  Provider: Gill Burgess MD  Chemotherapy: cabozantinib  (CABOMETYX) 20 mg Tab, 60 mg, Oral, Daily, 1 of 1 cycle, Start date: 2022, End date: --     HCC (hepatocellular carcinoma)   2020 Initial Diagnosis    HCC (hepatocellular carcinoma)     2020 Cancer Staged    Staging form: Liver, AJCC 8th Edition  - Clinical stage from 2020: Stage IVB (cT3, cN1, cM1)     2021 - 2021 Chemotherapy    Treatment Summary   Plan Name: OP ATEZOLIZUMAB BEVACIZUMAB Q3W  Treatment Goal: Palliative  Status: Inactive  Start Date: 2021  End Date: 2021  Provider: Gill Burgess MD  Chemotherapy: bevacizumab (AVASTIN) 1,000 mg in sodium chloride 0.9% 100 mL chemo infusion, 1,045 mg, Intravenous, Clinic/[facility] 1 time, 7 of 17 cycles  Administration: 1,000 mg (2021), 1,000 mg (2021), 1,060 mg (10/7/2021), 1,045 mg (10/28/2021), 1,045 mg (2021), 1,000 mg (2021), 1,065 mg (2021)  atezolizumab (TECENTRIQ) 1,200 mg in sodium chloride 0.9% 270 mL infusion, 1,200 mg, Intravenous, Clinic/HOD 1 time, 7 of 17 cycles  Administration: 1,200 mg (2021), 1,200 mg (2021), 1,200 mg (10/7/2021), 1,200 mg (10/28/2021), 1,200 mg (2021), 1,200 mg (2021), 1,200 mg (2021)     2022 -  Chemotherapy    Treatment Summary   Plan Name: OP CABOZANTINIB DAILY  Treatment Goal: Palliative  Status: Active  Start Date: 2022  End Date: 2022  Provider: Gill Burgess MD  Chemotherapy: cabozantinib (CABOMETYX) 20 mg Tab, 60 mg, Oral, Daily, 1 of 1 cycle, Start date: 2022, End date: --        Social History     Socioeconomic History    Marital status:     Number of children: 5   Occupational History    Occupation: homemaker   Tobacco Use    Smoking status: Former Smoker     Packs/day: 0.50     Years: 48.00     Pack years: 24.00     Types: Cigarettes     Start date:      Quit date:      Years since quittin.4    Smokeless tobacco: Never Used   Substance and Sexual Activity    Alcohol use: No     Drug use: No    Sexual activity: Never     Social Determinants of Health     Financial Resource Strain: High Risk    Difficulty of Paying Living Expenses: Very hard   Food Insecurity: Food Insecurity Present    Worried About Running Out of Food in the Last Year: Often true    Ran Out of Food in the Last Year: Often true   Transportation Needs: Unmet Transportation Needs    Lack of Transportation (Medical): No    Lack of Transportation (Non-Medical): Yes   Physical Activity: Inactive    Days of Exercise per Week: 0 days    Minutes of Exercise per Session: 0 min   Stress: Stress Concern Present    Feeling of Stress : To some extent   Social Connections: Unknown    Frequency of Communication with Friends and Family: More than three times a week    Frequency of Social Gatherings with Friends and Family: Three times a week    Active Member of Clubs or Organizations: No    Attends Club or Organization Meetings: Never    Marital Status:    Housing Stability: Low Risk     Unable to Pay for Housing in the Last Year: No    Number of Places Lived in the Last Year: 1    Unstable Housing in the Last Year: No      Family History   Problem Relation Age of Onset    Colon cancer Mother     ALS Father     Retinal detachment Son       Past Surgical History:   Procedure Laterality Date    APPENDECTOMY      CATARACT EXTRACTION      MIDDLE EAR SURGERY          Review of Systems   Constitutional: Negative for activity change, appetite change, chills, diaphoresis, fatigue, fever and unexpected weight change.   HENT: Positive for hearing loss. Negative for congestion, dental problem, drooling, ear discharge, ear pain, facial swelling, mouth sores, nosebleeds, postnasal drip, rhinorrhea, sinus pressure, sneezing, sore throat, tinnitus, trouble swallowing and voice change.    Eyes: Negative for photophobia, pain, discharge, redness, itching and visual disturbance.   Respiratory: Negative for cough, choking,  chest tightness, shortness of breath, wheezing and stridor.    Cardiovascular: Negative for chest pain, palpitations and leg swelling.   Gastrointestinal: Negative for abdominal distention, abdominal pain, anal bleeding, blood in stool, constipation, diarrhea, nausea, rectal pain and vomiting.   Endocrine: Negative for cold intolerance, heat intolerance, polydipsia, polyphagia and polyuria.   Genitourinary: Negative for decreased urine volume, difficulty urinating, dyspareunia, dysuria, enuresis, flank pain, frequency, genital sores, hematuria, menstrual problem, pelvic pain, urgency, vaginal bleeding, vaginal discharge and vaginal pain.   Musculoskeletal: Positive for arthralgias, gait problem and myalgias. Negative for back pain, joint swelling, neck pain and neck stiffness.   Skin: Negative for color change, pallor and rash.   Allergic/Immunologic: Negative for environmental allergies, food allergies and immunocompromised state.   Neurological: Positive for weakness. Negative for dizziness, tremors, seizures, syncope, facial asymmetry, speech difficulty, light-headedness, numbness and headaches.   Hematological: Negative for adenopathy. Does not bruise/bleed easily.   Psychiatric/Behavioral: Negative for agitation, behavioral problems, confusion, decreased concentration, dysphoric mood, hallucinations, self-injury, sleep disturbance and suicidal ideas. The patient is not nervous/anxious and is not hyperactive.        ?   A comprehensive 14-point review of systems was reviewed with patient and was negative other than as specified above.   ?     Objective:      Physical Exam  Vitals reviewed.   Constitutional:       General: She is not in acute distress.     Appearance: She is well-developed. She is not ill-appearing or diaphoretic.   HENT:      Head: Normocephalic and atraumatic.      Right Ear: External ear normal.      Left Ear: External ear normal.      Nose: Nose normal.      Right Sinus: No maxillary sinus  tenderness or frontal sinus tenderness.      Left Sinus: No maxillary sinus tenderness or frontal sinus tenderness.      Mouth/Throat:      Pharynx: No oropharyngeal exudate.   Eyes:      General: Lids are normal. No scleral icterus.        Right eye: No discharge.         Left eye: No discharge.      Conjunctiva/sclera: Conjunctivae normal.      Right eye: Right conjunctiva is not injected. No hemorrhage.     Left eye: Left conjunctiva is not injected. No hemorrhage.     Pupils: Pupils are equal, round, and reactive to light.   Neck:      Thyroid: No thyromegaly.      Vascular: No JVD.      Trachea: No tracheal deviation.   Cardiovascular:      Rate and Rhythm: Normal rate.   Pulmonary:      Effort: Pulmonary effort is normal. No respiratory distress.      Breath sounds: No stridor.   Chest:      Chest wall: No tenderness.   Breasts:      Right: No supraclavicular adenopathy.      Left: No supraclavicular adenopathy.       Abdominal:      General: Bowel sounds are normal. There is no distension.      Palpations: Abdomen is soft. There is no hepatomegaly, splenomegaly or mass.      Tenderness: There is no abdominal tenderness. There is no rebound.   Musculoskeletal:         General: No tenderness. Normal range of motion.      Cervical back: Normal range of motion and neck supple.      Right lower leg: Edema present.      Left lower leg: Edema present.   Lymphadenopathy:      Cervical: No cervical adenopathy.      Upper Body:      Right upper body: No supraclavicular adenopathy.      Left upper body: No supraclavicular adenopathy.   Skin:     General: Skin is dry.      Findings: No erythema or rash.   Neurological:      Mental Status: She is alert and oriented to person, place, and time.      Cranial Nerves: No cranial nerve deficit.      Motor: Weakness present.      Coordination: Coordination abnormal.      Gait: Gait abnormal.   Psychiatric:         Behavior: Behavior normal.         Thought Content: Thought  content normal.         Judgment: Judgment normal.           ?   Vitals:    05/04/22 1112   BP: (!) 162/78   Pulse: 61   Temp: 98.3 °F (36.8 °C)      ?   ECOG:?2     ?   Laboratory:  ?   No visits with results within 1 Day(s) from this visit.   Latest known visit with results is:   Lab Visit on 05/03/2022   Component Date Value Ref Range Status    WBC 05/03/2022 6.40  3.90 - 12.70 K/uL Final    RBC 05/03/2022 4.79  4.00 - 5.40 M/uL Final    Hemoglobin 05/03/2022 14.7  12.0 - 16.0 g/dL Final    Hematocrit 05/03/2022 46.5  37.0 - 48.5 % Final    MCV 05/03/2022 97  82 - 98 fL Final    MCH 05/03/2022 30.7  27.0 - 31.0 pg Final    MCHC 05/03/2022 31.6 (A) 32.0 - 36.0 g/dL Final    RDW 05/03/2022 22.3 (A) 11.5 - 14.5 % Final    Platelets 05/03/2022 161  150 - 450 K/uL Final    MPV 05/03/2022 10.6  9.2 - 12.9 fL Final    Immature Granulocytes 05/03/2022 0.2  0.0 - 0.5 % Final    Gran # (ANC) 05/03/2022 4.0  1.8 - 7.7 K/uL Final    Immature Grans (Abs) 05/03/2022 0.01  0.00 - 0.04 K/uL Final    Lymph # 05/03/2022 1.5  1.0 - 4.8 K/uL Final    Mono # 05/03/2022 0.7  0.3 - 1.0 K/uL Final    Eos # 05/03/2022 0.1  0.0 - 0.5 K/uL Final    Baso # 05/03/2022 0.04  0.00 - 0.20 K/uL Final    nRBC 05/03/2022 0  0 /100 WBC Final    Gran % 05/03/2022 62.1  38.0 - 73.0 % Final    Lymph % 05/03/2022 24.1  18.0 - 48.0 % Final    Mono % 05/03/2022 10.8  4.0 - 15.0 % Final    Eosinophil % 05/03/2022 2.2  0.0 - 8.0 % Final    Basophil % 05/03/2022 0.6  0.0 - 1.9 % Final    Platelet Estimate 05/03/2022 Appears normal   Final    Aniso 05/03/2022 Slight   Final    Differential Method 05/03/2022 Automated   Final    Sodium 05/03/2022 143  136 - 145 mmol/L Final    Potassium 05/03/2022 3.8  3.5 - 5.1 mmol/L Final    Chloride 05/03/2022 104  95 - 110 mmol/L Final    CO2 05/03/2022 24  23 - 29 mmol/L Final    Glucose 05/03/2022 90  70 - 110 mg/dL Final    BUN 05/03/2022 12  8 - 23 mg/dL Final    Creatinine 05/03/2022  0.8  0.5 - 1.4 mg/dL Final    Calcium 05/03/2022 8.6 (A) 8.7 - 10.5 mg/dL Final    Total Protein 05/03/2022 6.8  6.0 - 8.4 g/dL Final    Albumin 05/03/2022 3.0 (A) 3.5 - 5.2 g/dL Final    Total Bilirubin 05/03/2022 0.6  0.1 - 1.0 mg/dL Final    Alkaline Phosphatase 05/03/2022 98  55 - 135 U/L Final    AST 05/03/2022 55 (A) 10 - 40 U/L Final    ALT 05/03/2022 53 (A) 10 - 44 U/L Final    Anion Gap 05/03/2022 15  8 - 16 mmol/L Final    eGFR if African American 05/03/2022 >60  >60 mL/min/1.73 m^2 Final    eGFR if non African American 05/03/2022 >60  >60 mL/min/1.73 m^2 Final    TSH 05/03/2022 1.415  0.400 - 4.000 uIU/mL Final      ?   Imaging:    No results found for this or any previous visit (from the past 2160 hour(s)).     No results found for this or any previous visit (from the past 2160 hour(s)).     ?   Assessment/Plan:     Problem List Items Addressed This Visit        Oncology    Hepatocellular carcinoma - Primary    Relevant Orders    CBC Auto Differential    Comprehensive Metabolic Panel    HCC (hepatocellular carcinoma)     Metastatic hepatocellular carcinoma, status post multiple lines of palliative systemic therapy.     01/12/2022 PET scan showed disease progression.    02/2022 started on low-dose cabozantinib at 40 mg daily    Labs reviewed, no concerning cytopenia, continue treatment as prescribed.      Pt would like to hold off on repeat imaging at this time at least  until Dr. Burgess's return.  --Plan for repeat imaging June/July 2022    Plan to f/u in 4 weeks with repeat labs or sooner if needed.                Endocrine    Drug-induced hypothyroidism     Continues on Levothyroxine 50mcg daily                   KE Mary-DUANE  Hematology/Oncology

## 2022-05-23 NOTE — ASSESSMENT & PLAN NOTE
Metastatic hepatocellular carcinoma, status post multiple lines of palliative systemic therapy.     01/12/2022 PET scan showed disease progression.    02/2022 started on low-dose cabozantinib at 40 mg daily    Labs reviewed, no concerning cytopenia, continue treatment as prescribed.      Pt would like to hold off on repeat imaging at this time at least  until Dr. Burgess's return.  --Plan for repeat imaging June/July 2022    Plan to f/u in 4 weeks with repeat labs or sooner if needed.

## 2022-05-25 DIAGNOSIS — C22.0 HCC (HEPATOCELLULAR CARCINOMA): ICD-10-CM

## 2022-05-25 RX ORDER — CABOZANTINIB 40 MG/1
40 TABLET ORAL DAILY
Qty: 30 TABLET | Refills: 0 | Status: CANCELLED | OUTPATIENT
Start: 2022-05-25

## 2022-05-26 ENCOUNTER — PATIENT MESSAGE (OUTPATIENT)
Dept: HEMATOLOGY/ONCOLOGY | Facility: CLINIC | Age: 82
End: 2022-05-26
Payer: MEDICARE

## 2022-05-26 DIAGNOSIS — C22.0 HCC (HEPATOCELLULAR CARCINOMA): ICD-10-CM

## 2022-05-27 ENCOUNTER — SPECIALTY PHARMACY (OUTPATIENT)
Dept: PHARMACY | Facility: CLINIC | Age: 82
End: 2022-05-27
Payer: MEDICARE

## 2022-05-27 NOTE — TELEPHONE ENCOUNTER
Specialty Pharmacy - Refill Coordination    Specialty Medication Orders Linked to Encounter    Flowsheet Row Most Recent Value   Medication #1 cabozantinib (CABOMETYX) 40 mg Tab (Order#[id number], Rx#[id number])          Refill Questions - Documented Responses    Flowsheet Row Most Recent Value   Patient Availability and HIPAA Verification    Does patient want to proceed with activity? Yes   HIPAA/medical authority confirmed? Yes   Relationship to patient of person spoken to? Self   Refill Screening Questions    Changes to allergies? No   Changes to medications? No   New conditions since last clinic visit? No   Unplanned office visit, urgent care, ED, or hospital admission in the last 4 weeks? No   How does patient/caregiver feel medication is working? Good   Financial problems or insurance changes? No   How many doses of your specialty medications were missed in the last 4 weeks? 0   Would patient like to speak to a pharmacist? No   When does the patient need to receive the medication? 05/31/22   Refill Delivery Questions    How will the patient receive the medication? Delivery Yissel   When does the patient need to receive the medication? 05/31/22   Shipping Address Home   Address in [facility] confirmed and updated if neccessary? Yes   Expected Copay ($) 0   Is the patient able to afford the medication copay? Yes   Payment Method zero copay   Days supply of Refill 30   Supplies needed? No supplies needed   Refill activity completed? Yes   Refill activity plan Refill scheduled   Shipment/Pickup Date: 05/31/22          Current Outpatient Medications   Medication Sig    (Magic mouthwash) 1:1:1 Benadryl 12.5mg/5ml liq, aluminum & magnesium hydroxide-simehticone (Maalox), LIDOcaine viscous 2% Take 10 mL PO every 4-6 hours as needed for throat or esophageal pain.    amLODIPine (NORVASC) 2.5 MG tablet Take 1 tablet (2.5 mg total) by mouth 2 (two) times daily. (Patient taking differently: Take 2.5 mg by mouth 2  (two) times daily.)    betamethasone valerate 0.1% (VALISONE) 0.1 % Crea Apply topically 2 (two) times daily.    cabozantinib (CABOMETYX) 40 mg Tab Take 40 mg by mouth once daily.    cabozantinib (CABOMETYX) 40 mg Tab Take 40 mg by mouth once daily.    clobetasol 0.05% (TEMOVATE) 0.05 % Oint Apply topically 2 (two) times daily.    diphenoxylate-atropine 2.5-0.025 mg (LOMOTIL) 2.5-0.025 mg per tablet Take 1 tablet by mouth 4 (four) times daily as needed for Diarrhea.    GI cocktail antac/dicyc/lidoc Take 10 ml by mouth three times daily as needed for throat pain    levothyroxine (TIROSINT) 50 mcg Cap Take 1 tablet (50 mcg) by mouth before breakfast.    LIDOcaine HCl 2% (XYLOCAINE) 2 % Soln Swish, gargle, and spit 10 mLs every 6-8 hrs as needed for throat pain.    lisinopriL (PRINIVIL,ZESTRIL) 40 MG tablet Take 1 tablet (40 mg total) by mouth once daily.    magnesium hydroxide (MILK OF MAGNESIA ORAL) Take by mouth once daily.    mupirocin (BACTROBAN) 2 % ointment Apply topically 3 (three) times daily.    neomycin-polymyxin-hydrocortisone (CORTISPORIN) 3.5-10,000-1 mg/mL-unit/mL-% otic suspension Place 3 drops into the right ear 4 (four) times daily.    oxyCODONE-acetaminophen (PERCOCET)  mg per tablet Take 1 tablet by mouth 4 (four) times daily as needed (severe pain).    pimecrolimus (ELIDEL) 1 % cream Apply to the affected area twice daily (Patient taking differently: Apply to the affected area twice daily)    pulse oximeter (PULSE OXIMETER) device by Apply Externally route 2 (two) times a day. Use twice daily at 8 AM and 3 PM and record the value in MyChart as directed.    senna (SENOKOT) 8.6 mg tablet Take 1 tablet by mouth once daily.    tacrolimus (PROTOPIC) 0.1 % ointment Apply topically 2 (two) times daily.    triamcinolone acetonide 0.1% (KENALOG) 0.1 % cream Apply topically 2 (two) times daily. for 10 days    XTAMPZA ER 13.5 mg CSpT Take 1 capsule by mouth 2 (two) times daily.   Last  "reviewed on 5/22/2022  7:10 PM by Ana Maria Lopez, MISAEL    Review of patient's allergies indicates:   Allergen Reactions    Pravastatin Other (See Comments)     Burning/flushing    Xgeva [denosumab] Other (See Comments)     Mouth and diffuse pain    Allopurinol analogues Other (See Comments)     "makes me sick and feel crazy"    Last reviewed on  5/22/2022 7:10 PM by Ana Maria Lopez      Tasks added this encounter   6/23/2022 - Refill Call (Auto Added)   Tasks due within next 3 months   No tasks due.     Montserrat Decker, Patient Care Assistant  [facility]  [address] [facility] [address]  Phone: [phone]  Fax: [phone]      "

## 2022-05-31 ENCOUNTER — EXTERNAL CHRONIC CARE MANAGEMENT (OUTPATIENT)
Dept: PRIMARY CARE CLINIC | Facility: CLINIC | Age: 82
End: 2022-05-31
Payer: MEDICARE

## 2022-05-31 DIAGNOSIS — C22.0 HCC (HEPATOCELLULAR CARCINOMA): ICD-10-CM

## 2022-05-31 PROCEDURE — 99490 PR CHRONIC CARE MGMT, 1ST 20 MIN: ICD-10-PCS | Mod: S$PBB,,, | Performed by: FAMILY MEDICINE

## 2022-05-31 PROCEDURE — 99490 CHRNC CARE MGMT STAFF 1ST 20: CPT | Mod: S$PBB,,, | Performed by: FAMILY MEDICINE

## 2022-05-31 PROCEDURE — 99490 CHRNC CARE MGMT STAFF 1ST 20: CPT | Mod: PBBFAC,PN | Performed by: FAMILY MEDICINE

## 2022-05-31 NOTE — TELEPHONE ENCOUNTER
Incoming call from pts daughter checking status of delivery. Informed her it will be delivered today. pts daughter voiced understanding.

## 2022-06-06 ENCOUNTER — PATIENT MESSAGE (OUTPATIENT)
Dept: HEMATOLOGY/ONCOLOGY | Facility: CLINIC | Age: 82
End: 2022-06-06
Payer: MEDICARE

## 2022-06-06 ENCOUNTER — TELEPHONE (OUTPATIENT)
Dept: HEMATOLOGY/ONCOLOGY | Facility: CLINIC | Age: 82
End: 2022-06-06
Payer: MEDICARE

## 2022-06-06 DIAGNOSIS — C22.0 HCC (HEPATOCELLULAR CARCINOMA): ICD-10-CM

## 2022-06-06 RX ORDER — OXYCODONE 13.5 MG/1
1 CAPSULE, EXTENDED RELEASE ORAL 2 TIMES DAILY
Qty: 60 EACH | Refills: 0 | Status: SHIPPED | OUTPATIENT
Start: 2022-06-06 | End: 2022-07-01 | Stop reason: SDUPTHER

## 2022-06-06 RX ORDER — OXYCODONE 13.5 MG/1
1 CAPSULE, EXTENDED RELEASE ORAL 2 TIMES DAILY
Qty: 60 EACH | Refills: 0 | OUTPATIENT
Start: 2022-06-06

## 2022-06-06 NOTE — TELEPHONE ENCOUNTER
Pt daughter Lilian called in this am requesting rx for Xtampza ER be sent to [facility] at the [place].   Message to Cortney Burgess sent and Dr. Burgess [facility] [place] [facility]  Daughter notified.

## 2022-06-17 DIAGNOSIS — C22.0 HCC (HEPATOCELLULAR CARCINOMA): ICD-10-CM

## 2022-06-17 RX ORDER — OXYCODONE AND ACETAMINOPHEN 10; 325 MG/1; MG/1
1 TABLET ORAL 4 TIMES DAILY PRN
Qty: 120 TABLET | Refills: 0 | OUTPATIENT
Start: 2022-06-17

## 2022-06-20 ENCOUNTER — LAB VISIT (OUTPATIENT)
Dept: LAB | Facility: HOSPITAL | Age: 82
End: 2022-06-20
Attending: INTERNAL MEDICINE
Payer: MEDICARE

## 2022-06-20 ENCOUNTER — OFFICE VISIT (OUTPATIENT)
Dept: HEMATOLOGY/ONCOLOGY | Facility: CLINIC | Age: 82
End: 2022-06-20
Payer: MEDICARE

## 2022-06-20 VITALS
BODY MASS INDEX: 26.66 KG/M2 | TEMPERATURE: 98 F | HEART RATE: 59 BPM | OXYGEN SATURATION: 97 % | DIASTOLIC BLOOD PRESSURE: 78 MMHG | WEIGHT: 135.81 LBS | SYSTOLIC BLOOD PRESSURE: 171 MMHG | HEIGHT: 60 IN

## 2022-06-20 DIAGNOSIS — I15.8 OTHER SECONDARY HYPERTENSION: ICD-10-CM

## 2022-06-20 DIAGNOSIS — C22.0 HCC (HEPATOCELLULAR CARCINOMA): ICD-10-CM

## 2022-06-20 DIAGNOSIS — C22.0 HEPATOCELLULAR CARCINOMA: ICD-10-CM

## 2022-06-20 DIAGNOSIS — Z09 ENCOUNTER FOR FOLLOW-UP EXAMINATION AFTER COMPLETED TREATMENT FOR CONDITIONS OTHER THAN MALIGNANT NEOPLASM: ICD-10-CM

## 2022-06-20 DIAGNOSIS — D84.821 IMMUNODEFICIENCY DUE TO CHEMOTHERAPY: ICD-10-CM

## 2022-06-20 DIAGNOSIS — C22.0 HEPATOCELLULAR CARCINOMA: Primary | ICD-10-CM

## 2022-06-20 DIAGNOSIS — T45.1X5A IMMUNODEFICIENCY DUE TO CHEMOTHERAPY: ICD-10-CM

## 2022-06-20 DIAGNOSIS — R74.01 TRANSAMINITIS: ICD-10-CM

## 2022-06-20 DIAGNOSIS — K59.03 DRUG-INDUCED CONSTIPATION: ICD-10-CM

## 2022-06-20 DIAGNOSIS — Z79.899 IMMUNODEFICIENCY DUE TO CHEMOTHERAPY: ICD-10-CM

## 2022-06-20 DIAGNOSIS — E03.2 DRUG-INDUCED HYPOTHYROIDISM: ICD-10-CM

## 2022-06-20 LAB
ALBUMIN SERPL BCP-MCNC: 3 G/DL (ref 3.5–5.2)
ALP SERPL-CCNC: 82 U/L (ref 55–135)
ALT SERPL W/O P-5'-P-CCNC: 30 U/L (ref 10–44)
ANION GAP SERPL CALC-SCNC: 11 MMOL/L (ref 8–16)
AST SERPL-CCNC: 32 U/L (ref 10–40)
BASOPHILS # BLD AUTO: 0.03 K/UL (ref 0–0.2)
BASOPHILS NFR BLD: 0.5 % (ref 0–1.9)
BILIRUB SERPL-MCNC: 0.6 MG/DL (ref 0.1–1)
BUN SERPL-MCNC: 12 MG/DL (ref 8–23)
CALCIUM SERPL-MCNC: 9.1 MG/DL (ref 8.7–10.5)
CHLORIDE SERPL-SCNC: 103 MMOL/L (ref 95–110)
CO2 SERPL-SCNC: 26 MMOL/L (ref 23–29)
CREAT SERPL-MCNC: 0.8 MG/DL (ref 0.5–1.4)
DIFFERENTIAL METHOD: ABNORMAL
EOSINOPHIL # BLD AUTO: 0.1 K/UL (ref 0–0.5)
EOSINOPHIL NFR BLD: 2.2 % (ref 0–8)
ERYTHROCYTE [DISTWIDTH] IN BLOOD BY AUTOMATED COUNT: 19.1 % (ref 11.5–14.5)
EST. GFR  (AFRICAN AMERICAN): >60 ML/MIN/1.73 M^2
EST. GFR  (NON AFRICAN AMERICAN): >60 ML/MIN/1.73 M^2
GLUCOSE SERPL-MCNC: 90 MG/DL (ref 70–110)
HCT VFR BLD AUTO: 45 % (ref 37–48.5)
HGB BLD-MCNC: 14.7 G/DL (ref 12–16)
IMM GRANULOCYTES # BLD AUTO: 0.02 K/UL (ref 0–0.04)
IMM GRANULOCYTES NFR BLD AUTO: 0.4 % (ref 0–0.5)
LYMPHOCYTES # BLD AUTO: 1.4 K/UL (ref 1–4.8)
LYMPHOCYTES NFR BLD: 24.8 % (ref 18–48)
MCH RBC QN AUTO: 32.7 PG (ref 27–31)
MCHC RBC AUTO-ENTMCNC: 32.7 G/DL (ref 32–36)
MCV RBC AUTO: 100 FL (ref 82–98)
MONOCYTES # BLD AUTO: 0.4 K/UL (ref 0.3–1)
MONOCYTES NFR BLD: 7.7 % (ref 4–15)
NEUTROPHILS # BLD AUTO: 3.6 K/UL (ref 1.8–7.7)
NEUTROPHILS NFR BLD: 64.4 % (ref 38–73)
NRBC BLD-RTO: 0 /100 WBC
PLATELET # BLD AUTO: 162 K/UL (ref 150–450)
PMV BLD AUTO: 9.4 FL (ref 9.2–12.9)
POTASSIUM SERPL-SCNC: 4 MMOL/L (ref 3.5–5.1)
PROT SERPL-MCNC: 7.3 G/DL (ref 6–8.4)
RBC # BLD AUTO: 4.49 M/UL (ref 4–5.4)
SODIUM SERPL-SCNC: 140 MMOL/L (ref 136–145)
TSH SERPL DL<=0.005 MIU/L-ACNC: 1.91 UIU/ML (ref 0.4–4)
WBC # BLD AUTO: 5.57 K/UL (ref 3.9–12.7)

## 2022-06-20 PROCEDURE — 99999 PR PBB SHADOW E&M-EST. PATIENT-LVL V: ICD-10-PCS | Mod: PBBFAC,,, | Performed by: INTERNAL MEDICINE

## 2022-06-20 PROCEDURE — 99215 PR OFFICE/OUTPT VISIT, EST, LEVL V, 40-54 MIN: ICD-10-PCS | Mod: S$PBB,,, | Performed by: INTERNAL MEDICINE

## 2022-06-20 PROCEDURE — 99215 OFFICE O/P EST HI 40 MIN: CPT | Mod: PBBFAC | Performed by: INTERNAL MEDICINE

## 2022-06-20 PROCEDURE — 84443 ASSAY THYROID STIM HORMONE: CPT | Performed by: INTERNAL MEDICINE

## 2022-06-20 PROCEDURE — 99999 PR PBB SHADOW E&M-EST. PATIENT-LVL V: CPT | Mod: PBBFAC,,, | Performed by: INTERNAL MEDICINE

## 2022-06-20 PROCEDURE — 99215 OFFICE O/P EST HI 40 MIN: CPT | Mod: S$PBB,,, | Performed by: INTERNAL MEDICINE

## 2022-06-20 PROCEDURE — 36415 COLL VENOUS BLD VENIPUNCTURE: CPT

## 2022-06-20 PROCEDURE — 36415 COLL VENOUS BLD VENIPUNCTURE: CPT | Performed by: INTERNAL MEDICINE

## 2022-06-20 PROCEDURE — 85025 COMPLETE CBC W/AUTO DIFF WBC: CPT

## 2022-06-20 PROCEDURE — 80053 COMPREHEN METABOLIC PANEL: CPT

## 2022-06-20 RX ORDER — ONDANSETRON 8 MG/1
8 TABLET, ORALLY DISINTEGRATING ORAL EVERY 12 HOURS PRN
Qty: 30 TABLET | Refills: 1 | OUTPATIENT
Start: 2022-06-20 | End: 2023-01-01

## 2022-06-20 RX ORDER — OXYCODONE AND ACETAMINOPHEN 10; 325 MG/1; MG/1
1 TABLET ORAL 4 TIMES DAILY PRN
Qty: 120 TABLET | Refills: 0 | Status: SHIPPED | OUTPATIENT
Start: 2022-06-20 | End: 2022-01-01 | Stop reason: SDUPTHER

## 2022-06-20 NOTE — PATIENT INSTRUCTIONS
Add on TSH and urinalysis today's labs  PET scan  Revisit after above  Will refill pain med percocet; may need earlier refill of ER fyi due to pain exacerbation

## 2022-06-20 NOTE — PROGRESS NOTES
Subjective:      DATE OF VISIT: 6/20/22     ?  Patient ID:?Maura Singh is a 82 y.o. female.?? MR#: [id number]   ?   PRIMARY ONCOLOGIST: Dr. Burgess    ? Primary Care Providers:  Yessy Andrade MD, MD (General)     CHIEF COMPLAINT: ???  Follow-up  ?   ONCOLOGIC DIAGNOSIS:  Metastatic hepatocellular carcinoma  ?   CURRENT TREATMENT:  Cabozantinib 40 mg daily    PAST TREATMENT:    Nivolumab 480mg q4 weeks   lenvatinib  Bevacizumab and atezolizumab, C1D1 8/26/21     ONCOLOGIC HISTORY:    Ms. Singh was admitted to [facility] on 02/04/2020 with gradually progressive shortness of breath, cough with hemoptysis along with intermittent abdominal pain, anorexia and fatigue.  In the emergency room she had workup including abdominal ultrasound showing multiple liver lesions concerning for metastatic disease.  CTA was negative for pulmonary embolism but revealed mediastinal adenopathy with right hilar soft tissue mass cannot exclude lymphadenopathy along with postobstructive collapse of right anterior segment lower lobe.  Hepatomegaly was noted with multiple low-density hypoenhancing lesions consistent with hepatic metastatic disease.    2/19/20 PET-CT:    To tele I was thinking to me that you already have 1 extra due as thinking last Monday for she 1. Hypermetabolic right infrahilar lung mass and associated partial postobstructive collapse right lower lobe.  2. Small hypermetabolic focus right lower lobe possibly early metastasis.  3. Hypermetabolic masslike thickening ascending colon suspicious for neoplasm.  4. Multifocal FDG avid lissette metastases neck, chest, abdomen, and pelvis as noted.  5. Extensive FDG avid hepatic metastases.  6. Widespread FDG avid osseous metastases.     02/06/2020 liver biopsy  Pathology:  Consistent with hepatocellular carcinoma, moderate to poorly differentiated     The biopsy shows nests of tumor cells with deeply eosinophilic granular   cytoplasm. Immunostains performed shows  results as:   HepPar - Focal granular positive   CK7 and TTF-1 - Negative    03/02/2020 cycle 1 day 1 nivolumab Q 2 weeks palliative immunotherapy    03/16/2020 cycle 2 day 1 nivolumab    03/30/2020 cycle 3 day 1 nivolumab, note: dose of 480 mg [id number] to limit clinic visits due to COVID-19 concerns    05/25/2020 cycle 5 day 1 nivolumab    6/16/20 inteval CT scan with response in lymphadenopathy, see below    3/2020 initiated lenvatinib; held, decreased dose due to rash side effect currently on 8 mg daily    08/26/2021 cycle 1 day 1 atezolizumab and bevacizumab    02/04/2022 cycle 1 day 1 cabozantinib 40 mg daily    INTERVAL EVENTS  Currently on cabozantinib 40 mg since 3/2022.  She notes difficulty with pain management which she notes primarily due to concern of difficulty getting refills from our clinic/palliative care.  She notes early satiety but good appetite and weight loss.  No rash.      Review of Systems    ?   A comprehensive 14-point review of systems was reviewed with patient and was negative other than as specified above.   ?      Objective:      Physical Exam      ?   Vitals:    06/20/22 1304   BP: (!) 171/78   Pulse: (!) 59   Temp: 97.6 °F (36.4 °C)      ?   ECOG:?2   General appearance: Generally well appearing, in no acute distress  Head, eyes, ears, nose, and throat: moist mucous membranes.   Respiratory:  Normal work of breathing  Abdomen: nontender, nondistended.   Extremities: Warm, without edema.   Neurologic: Alert and oriented.   Skin: No rashes, ecchymoses or petechial lesion.    Psychiatric:  Normal mood and affect.    Laboratory:  ?   Lab Visit on 06/20/2022   Component Date Value Ref Range Status    WBC 06/20/2022 5.57  3.90 - 12.70 K/uL Final    RBC 06/20/2022 4.49  4.00 - 5.40 M/uL Final    Hemoglobin 06/20/2022 14.7  12.0 - 16.0 g/dL Final    Hematocrit 06/20/2022 45.0  37.0 - 48.5 % Final    MCV 06/20/2022 100 (A) 82 - 98 fL Final    MCH 06/20/2022 32.7 (A) 27.0 - 31.0 pg Final     MCHC 06/20/2022 32.7  32.0 - 36.0 g/dL Final    RDW 06/20/2022 19.1 (A) 11.5 - 14.5 % Final    Platelets 06/20/2022 162  150 - 450 K/uL Final    MPV 06/20/2022 9.4  9.2 - 12.9 fL Final    Immature Granulocytes 06/20/2022 0.4  0.0 - 0.5 % Final    Gran # (ANC) 06/20/2022 3.6  1.8 - 7.7 K/uL Final    Immature Grans (Abs) 06/20/2022 0.02  0.00 - 0.04 K/uL Final    Lymph # 06/20/2022 1.4  1.0 - 4.8 K/uL Final    Mono # 06/20/2022 0.4  0.3 - 1.0 K/uL Final    Eos # 06/20/2022 0.1  0.0 - 0.5 K/uL Final    Baso # 06/20/2022 0.03  0.00 - 0.20 K/uL Final    nRBC 06/20/2022 0  0 /100 WBC Final    Gran % 06/20/2022 64.4  38.0 - 73.0 % Final    Lymph % 06/20/2022 24.8  18.0 - 48.0 % Final    Mono % 06/20/2022 7.7  4.0 - 15.0 % Final    Eosinophil % 06/20/2022 2.2  0.0 - 8.0 % Final    Basophil % 06/20/2022 0.5  0.0 - 1.9 % Final    Differential Method 06/20/2022 Automated   Final    Sodium 06/20/2022 140  136 - 145 mmol/L Final    Potassium 06/20/2022 4.0  3.5 - 5.1 mmol/L Final    Chloride 06/20/2022 103  95 - 110 mmol/L Final    CO2 06/20/2022 26  23 - 29 mmol/L Final    Glucose 06/20/2022 90  70 - 110 mg/dL Final    BUN 06/20/2022 12  8 - 23 mg/dL Final    Creatinine 06/20/2022 0.8  0.5 - 1.4 mg/dL Final    Calcium 06/20/2022 9.1  8.7 - 10.5 mg/dL Final    Total Protein 06/20/2022 7.3  6.0 - 8.4 g/dL Final    Albumin 06/20/2022 3.0 (A) 3.5 - 5.2 g/dL Final    Total Bilirubin 06/20/2022 0.6  0.1 - 1.0 mg/dL Final    Alkaline Phosphatase 06/20/2022 82  55 - 135 U/L Final    AST 06/20/2022 32  10 - 40 U/L Final    ALT 06/20/2022 30  10 - 44 U/L Final    Anion Gap 06/20/2022 11  8 - 16 mmol/L Final    eGFR if African American 06/20/2022 >60  >60 mL/min/1.73 m^2 Final    eGFR if non African American 06/20/2022 >60  >60 mL/min/1.73 m^2 Final    TSH 06/20/2022 1.910  0.400 - 4.000 uIU/mL Final      ?   Tumor markers   AFP   Date Value Ref Range Status   08/24/2021 <2.0 0.0 - 8.4 ng/mL Final    02/21/2020 1.8 0.0 - 8.4 ng/mL Final       ?   Imaging:    No results found for this or any previous visit (from the past 2160 hour(s)).      Pathology:        02/06/2020 liver biopsy  LIVER, BIOPSY:   Consistent with hepatocellular carcinoma, moderate to poorly differentiated     The biopsy shows nests of tumor cells with deeply eosinophilic granular   cytoplasm. Immunostains performed shows results as:   HepPar - Focal granular positive   CK7 and TTF-1 - Negative    ?   Assessment/Plan:       1. Hepatocellular carcinoma    2. Encounter for follow-up examination after completed treatment for conditions other than malignant neoplasm     3. Transaminitis    4. Drug-induced hypothyroidism    5. 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 HCC (hepatocellular carcinoma)    8. Other secondary hypertension          Plan:     # metastatic hepatocellular carcinoma:  initiated first-line palliative immunotherapy with nivolumab 03/02/2020.  The last to restaging scans showing improvement particularly in liver lesions.  Reviewed today together restaging PET-CT.  Of note radiology read is in comparison to prior PET-CT February 2020 interval scans have been with CT which had shown improvement in liver lesions.  This most recent PET-CT does show new right cervical, mandibular and multiple bilateral lung lesions.  Mixed response in lymph nodes in abdomen/pelvis with continued improvement in liver lesions.  Repeat biopsy left groin lymph node consistent with same primary, hepatocellular carcinoma.  On lenvatinib initiated early March 2021, due to rash/pruritus/odynophagia complications lenvatinib on hold with resolution and restarted up titrated to 8 mg tolerating well.  Restaging PET 08/20/2021 reviewed with mixed response resolution of pulmonary area of avidity however other areas with findings consistent with progression with enlargement and new lymphadenopathy in neck chest abdomen and pelvis.   I discussed options including best supportive care or can consider bevacizumab and atezolizumab.  She did have prior immunotherapy however she had initial excellent response and may be consideration given combination with bevacizumab which she has not yet tried.  - 08/26/2021 cycle 1 day 1 atezolizumab and bevacizumab, tolerated exceptionally well.  Presents for cycle 2 today labs reviewed no concerning findings and will proceed with treatment today.  - pain well controlled on Xtampza 13.5mER bid (refilled 12/30/21) and 3-4 percocet/day  Per patient preference to wait until after holidays for repeat scan recently performed January 2022 and we review in detail results today unfortunately showing evidence of progressive disease.  We discussed she has had already several lines of therapy and limited further options.  Previously on lenvatinib unable to achieve typical doses due to side effects with rash primarily; I did discuss consideration of cabozantinib also used hepatocellular carcinoma but may have similar side effects and potential for limited impact on her disease.  She is interested in trialing this.  We decided to start dose reduced given prior intolerance is cabozantinib 40 mg  She has now been on cabozantinib 40 mg initiated 2/4/22    Prominent symptoms of fatigue and early satiety with weight loss may be multifactorial including side effect from therapy and or disease progression.  Recommend restaging imaging which I have ordered today to assess for status of her disease on cabozantinib which she has been on now for 4 months.    Early satiety/weight loss:  Endorses excellent appetite.  Concern for disease progression/potential ascites with early satiety.  Will assess for this on imaging per above.  Unclear if appetite stimulation medication is indicated since she does endorse excellent appetite but can consider such as Megace or dronabinol.    She does have hypertension which may be related to her oral  therapy and recommend primary care follow-up for control of this.  Will check urinalysis for proteinuria.    # Constipation:  Well-controlled.    # Hypothyroidism:  TSH normalized; continue supplementation    Follow-Up:   Patient Instructions   Add on TSH and urinalysis today's labs  PET scan  Revisit after above  Will refill pain med percocet; may need earlier refill of ER fyi due to pain exacerbation

## 2022-06-22 ENCOUNTER — HOSPITAL ENCOUNTER (OUTPATIENT)
Dept: RADIOLOGY | Facility: HOSPITAL | Age: 82
Discharge: HOME | End: 2022-06-22
Attending: INTERNAL MEDICINE
Payer: MEDICARE

## 2022-06-22 DIAGNOSIS — C22.0 HEPATOCELLULAR CARCINOMA: ICD-10-CM

## 2022-06-22 PROCEDURE — 78815 PET IMAGE W/CT SKULL-THIGH: CPT | Mod: PS,TC

## 2022-06-22 PROCEDURE — 78815 PET IMAGE W/CT SKULL-THIGH: CPT | Mod: 26,PS,, | Performed by: RADIOLOGY

## 2022-06-22 PROCEDURE — 78815 NM PET CT ROUTINE: ICD-10-PCS | Mod: 26,PS,, | Performed by: RADIOLOGY

## 2022-06-23 ENCOUNTER — SPECIALTY PHARMACY (OUTPATIENT)
Dept: PHARMACY | Facility: CLINIC | Age: 82
End: 2022-06-23
Payer: MEDICARE

## 2022-06-23 NOTE — TELEPHONE ENCOUNTER
Outgoing call regarding Cabometyx. Pts daughter Stated pt has appointment 6/24/22 and wants to speak with [facility] to see how the pet scan came out before refilling medication. Asked for follow up call on 6/24/22 after 2 or she will call us back after appointment. Pts daughter stated she has 7 days on hand.

## 2022-06-24 ENCOUNTER — OFFICE VISIT (OUTPATIENT)
Dept: HEMATOLOGY/ONCOLOGY | Facility: CLINIC | Age: 82
End: 2022-06-24
Payer: MEDICARE

## 2022-06-24 VITALS
SYSTOLIC BLOOD PRESSURE: 145 MMHG | HEART RATE: 58 BPM | TEMPERATURE: 98 F | DIASTOLIC BLOOD PRESSURE: 72 MMHG | OXYGEN SATURATION: 95 % | HEIGHT: 61 IN | BODY MASS INDEX: 25.59 KG/M2 | WEIGHT: 135.56 LBS | RESPIRATION RATE: 16 BRPM

## 2022-06-24 DIAGNOSIS — C22.0 HCC (HEPATOCELLULAR CARCINOMA): ICD-10-CM

## 2022-06-24 DIAGNOSIS — C22.0 HEPATOCELLULAR CARCINOMA: Primary | ICD-10-CM

## 2022-06-24 DIAGNOSIS — G89.3 PAIN, NEOPLASM-RELATED: ICD-10-CM

## 2022-06-24 DIAGNOSIS — R68.81 EARLY SATIETY: ICD-10-CM

## 2022-06-24 PROCEDURE — 99215 OFFICE O/P EST HI 40 MIN: CPT | Mod: PBBFAC | Performed by: INTERNAL MEDICINE

## 2022-06-24 PROCEDURE — 99215 OFFICE O/P EST HI 40 MIN: CPT | Mod: S$PBB,,, | Performed by: INTERNAL MEDICINE

## 2022-06-24 PROCEDURE — 99999 PR PBB SHADOW E&M-EST. PATIENT-LVL V: ICD-10-PCS | Mod: PBBFAC,,, | Performed by: INTERNAL MEDICINE

## 2022-06-24 PROCEDURE — 99215 PR OFFICE/OUTPT VISIT, EST, LEVL V, 40-54 MIN: ICD-10-PCS | Mod: S$PBB,,, | Performed by: INTERNAL MEDICINE

## 2022-06-24 PROCEDURE — 99999 PR PBB SHADOW E&M-EST. PATIENT-LVL V: CPT | Mod: PBBFAC,,, | Performed by: INTERNAL MEDICINE

## 2022-06-24 NOTE — TELEPHONE ENCOUNTER
Incoming call from daughter of patient to refill her mother's medication, she stated the doctor wants mother to continue medication. It appears prescription was printed vs electronically sent to [facility]. Sent a refill request to [facility] , daughter asked to send the request specifically to Dr. Burgess.    Routing assigned team to alert.    Daughter reports 5 days on hand.

## 2022-06-24 NOTE — TELEPHONE ENCOUNTER
Specialty Pharmacy - Refill Coordination    Specialty Medication Orders Linked to Encounter    Flowsheet Row Most Recent Value   Medication #1 cabozantinib (CABOMETYX) 40 mg Tab (Order#[id number], Rx#[id number])          Refill Questions - Documented Responses    Flowsheet Row Most Recent Value   Patient Availability and HIPAA Verification    Does patient want to proceed with activity? Yes   HIPAA/medical authority confirmed? Yes   Relationship to patient of person spoken to? Child   Refill Screening Questions    Changes to allergies? No   Changes to medications? No   New conditions since last clinic visit? No   Unplanned office visit, urgent care, ED, or hospital admission in the last 4 weeks? No   How does patient/caregiver feel medication is working? Good   Financial problems or insurance changes? No   How many doses of your specialty medications were missed in the last 4 weeks? 0   Would patient like to speak to a pharmacist? No   When does the patient need to receive the medication? 06/29/22   Refill Delivery Questions    How will the patient receive the medication? Delivery Y[facility]el   When does the patient need to receive the medication? 06/29/22   Shipping Address Home   Address in [facility] confirmed and updated if neccessary? Yes   Expected Copay ($) 0   Is the patient able to afford the medication copay? Yes   Payment Method zero copay   Days supply of Refill 30   Supplies needed? No supplies needed   Refill activity completed? Yes   Refill activity plan Refill scheduled   Shipment/Pickup Date: 06/28/22          Current Outpatient Medications   Medication Sig    (Magic mouthwash) 1:1:1 Benadryl 12.5mg/5ml liq, aluminum & magnesium hydroxide-simehticone (Maalox), LIDOcaine viscous 2% Take 10 mL PO every 4-6 hours as needed for throat or esophageal pain.    amLODIPine (NORVASC) 2.5 MG tablet Take 1 tablet (2.5 mg total) by mouth 2 (two) times daily. (Patient taking differently: Take 2.5 mg by mouth 2  (two) times daily.)    betamethasone valerate 0.1% (VALISONE) 0.1 % Crea Apply topically 2 (two) times daily.    cabozantinib (CABOMETYX) 40 mg Tab Take 40 mg by mouth once daily.    cabozantinib (CABOMETYX) 40 mg Tab Take 1 tablet (40 mg) by mouth once daily.    clobetasol 0.05% (TEMOVATE) 0.05 % Oint Apply topically 2 (two) times daily.    diphenoxylate-atropine 2.5-0.025 mg (LOMOTIL) 2.5-0.025 mg per tablet Take 1 tablet by mouth 4 (four) times daily as needed for Diarrhea.    GI cocktail antac/dicyc/lidoc Take 10 ml by mouth three times daily as needed for throat pain    levothyroxine (TIROSINT) 50 mcg Cap Take 1 tablet (50 mcg) by mouth before breakfast.    LIDOcaine HCl 2% (XYLOCAINE) 2 % Soln Swish, gargle, and spit 10 mLs every 6-8 hrs as needed for throat pain.    lisinopriL (PRINIVIL,ZESTRIL) 40 MG tablet Take 1 tablet (40 mg total) by mouth once daily.    magnesium hydroxide (MILK OF MAGNESIA ORAL) Take by mouth once daily.    mupirocin (BACTROBAN) 2 % ointment Apply topically 3 (three) times daily.    neomycin-polymyxin-hydrocortisone (CORTISPORIN) 3.5-10,000-1 mg/mL-unit/mL-% otic suspension Place 3 drops into the right ear 4 (four) times daily.    ondansetron (ZOFRAN-ODT) 8 MG TbDL Take 1 tablet (8 mg total) by mouth every 12 (twelve) hours as needed.    oxyCODONE-acetaminophen (PERCOCET)  mg per tablet Take 1 tablet by mouth 4 (four) times daily as needed (severe pain).    pimecrolimus (ELIDEL) 1 % cream Apply to the affected area twice daily (Patient taking differently: Apply to the affected area twice daily)    pulse oximeter (PULSE OXIMETER) device by Apply Externally route 2 (two) times a day. Use twice daily at 8 AM and 3 PM and record the value in [facility] as directed.    senna (SENOKOT) 8.6 mg tablet Take 1 tablet by mouth once daily.    tacrolimus (PROTOPIC) 0.1 % ointment Apply topically 2 (two) times daily.    triamcinolone acetonide 0.1% (KENALOG) 0.1 % cream Apply  "topically 2 (two) times daily. for 10 days    XTAMPZA ER 13.5 mg CSpT Take 1 capsule by mouth 2 (two) times daily.   Last reviewed on 6/24/2022  2:43 PM by Michelle Hemphill LPN    Review of patient's allergies indicates:   Allergen Reactions    Pravastatin Other (See Comments)     Burning/flushing    Xgeva [denosumab] Other (See Comments)     Mouth and diffuse pain    Allopurinol analogues Other (See Comments)     "makes me sick and feel crazy"    Last reviewed on  6/24/2022 2:43 PM by Michelle Hmephill      Tasks added this encounter   7/22/2022 - Refill Call (Auto Added)   Tasks due within next 3 months   No tasks due.     Sailaja Machado, PharmD  [facility]  [address] [place] [id number]  Phone: [phone]  Fax: [phone]      "

## 2022-06-24 NOTE — PROGRESS NOTES
Subjective:      DATE OF VISIT: 6/24/22     ?  Patient ID:?Maura Singh is a 82 y.o. female.?? MR#: [id number]   ?   PRIMARY ONCOLOGIST: Dr. Burgess    ? Primary Care Providers:  Yessy Andrade MD, MD (General)     CHIEF COMPLAINT: ???  Follow-up  ?   ONCOLOGIC DIAGNOSIS:  Metastatic hepatocellular carcinoma  ?   CURRENT TREATMENT:  Cabozantinib 40 mg daily    PAST TREATMENT:    Nivolumab 480mg q4 weeks   lenvatinib  Bevacizumab and atezolizumab, C1D1 8/26/21     ONCOLOGIC HISTORY:    Ms. Singh was admitted to [facility] on 02/04/2020 with gradually progressive shortness of breath, cough with hemoptysis along with intermittent abdominal pain, anorexia and fatigue.  In the emergency room she had workup including abdominal ultrasound showing multiple liver lesions concerning for metastatic disease.  CTA was negative for pulmonary embolism but revealed mediastinal adenopathy with right hilar soft tissue mass cannot exclude lymphadenopathy along with postobstructive collapse of right anterior segment lower lobe.  Hepatomegaly was noted with multiple low-density hypoenhancing lesions consistent with hepatic metastatic disease.    2/19/20 PET-CT:    To tele I was thinking to me that you already have 1 extra due as thinking last Monday for she 1. Hypermetabolic right infrahilar lung mass and associated partial postobstructive collapse right lower lobe.  2. Small hypermetabolic focus right lower lobe possibly early metastasis.  3. Hypermetabolic masslike thickening ascending colon suspicious for neoplasm.  4. Multifocal FDG avid lissette metastases neck, chest, abdomen, and pelvis as noted.  5. Extensive FDG avid hepatic metastases.  6. Widespread FDG avid osseous metastases.     02/06/2020 liver biopsy  Pathology:  Consistent with hepatocellular carcinoma, moderate to poorly differentiated     The biopsy shows nests of tumor cells with deeply eosinophilic granular   cytoplasm. Immunostains performed shows  results as:   HepPar - Focal granular positive   CK7 and TTF-1 - Negative    03/02/2020 cycle 1 day 1 nivolumab Q 2 weeks palliative immunotherapy    03/16/2020 cycle 2 day 1 nivolumab    03/30/2020 cycle 3 day 1 nivolumab, note: dose of 480 mg [id number] to limit clinic visits due to COVID-19 concerns    05/25/2020 cycle 5 day 1 nivolumab    6/16/20 inteval CT scan with response in lymphadenopathy, see below    3/2020 initiated lenvatinib; held, decreased dose due to rash side effect currently on 8 mg daily    08/26/2021 cycle 1 day 1 atezolizumab and bevacizumab    02/04/2022 cycle 1 day 1 cabozantinib 40 mg daily    INTERVAL EVENTS  Currently on cabozantinib 40 mg since 3/2022.  She has been using Percocet 4 times per day and extended release b.i.d. q.12 hours.  Continues to have poorly controlled pain.  She does note early satiety.  Denies any nausea/vomiting.  Bowel movements well regulated.    Review of Systems    ?   A comprehensive 14-point review of systems was reviewed with patient and was negative other than as specified above.   ?      Objective:      Physical Exam      ?   Vitals:    06/24/22 1442   BP: (!) 145/72   Pulse: (!) 58   Resp: 16   Temp: 97.7 °F (36.5 °C)      ?   ECOG:?2   General appearance: Generally well appearing, in no acute distress  Head, eyes, ears, nose, and throat: moist mucous membranes.   Respiratory:  Normal work of breathing  Abdomen: nontender, nondistended.   Extremities: Warm, without edema.   Neurologic: Alert and oriented.   Skin: No rashes, ecchymoses or petechial lesion.    Psychiatric:  Normal mood and affect.    Laboratory:  ?   No visits with results within 1 Day(s) from this visit.   Latest known visit with results is:   Lab Visit on 06/20/2022   Component Date Value Ref Range Status    WBC 06/20/2022 5.57  3.90 - 12.70 K/uL Final    RBC 06/20/2022 4.49  4.00 - 5.40 M/uL Final    Hemoglobin 06/20/2022 14.7  12.0 - 16.0 g/dL Final    Hematocrit 06/20/2022 45.0  37.0  - 48.5 % Final    MCV 06/20/2022 100 (A) 82 - 98 fL Final    MCH 06/20/2022 32.7 (A) 27.0 - 31.0 pg Final    MCHC 06/20/2022 32.7  32.0 - 36.0 g/dL Final    RDW 06/20/2022 19.1 (A) 11.5 - 14.5 % Final    Platelets 06/20/2022 162  150 - 450 K/uL Final    MPV 06/20/2022 9.4  9.2 - 12.9 fL Final    Immature Granulocytes 06/20/2022 0.4  0.0 - 0.5 % Final    Gran # (ANC) 06/20/2022 3.6  1.8 - 7.7 K/uL Final    Immature Grans (Abs) 06/20/2022 0.02  0.00 - 0.04 K/uL Final    Lymph # 06/20/2022 1.4  1.0 - 4.8 K/uL Final    Mono # 06/20/2022 0.4  0.3 - 1.0 K/uL Final    Eos # 06/20/2022 0.1  0.0 - 0.5 K/uL Final    Baso # 06/20/2022 0.03  0.00 - 0.20 K/uL Final    nRBC 06/20/2022 0  0 /100 WBC Final    Gran % 06/20/2022 64.4  38.0 - 73.0 % Final    Lymph % 06/20/2022 24.8  18.0 - 48.0 % Final    Mono % 06/20/2022 7.7  4.0 - 15.0 % Final    Eosinophil % 06/20/2022 2.2  0.0 - 8.0 % Final    Basophil % 06/20/2022 0.5  0.0 - 1.9 % Final    Differential Method 06/20/2022 Automated   Final    Sodium 06/20/2022 140  136 - 145 mmol/L Final    Potassium 06/20/2022 4.0  3.5 - 5.1 mmol/L Final    Chloride 06/20/2022 103  95 - 110 mmol/L Final    CO2 06/20/2022 26  23 - 29 mmol/L Final    Glucose 06/20/2022 90  70 - 110 mg/dL Final    BUN 06/20/2022 12  8 - 23 mg/dL Final    Creatinine 06/20/2022 0.8  0.5 - 1.4 mg/dL Final    Calcium 06/20/2022 9.1  8.7 - 10.5 mg/dL Final    Total Protein 06/20/2022 7.3  6.0 - 8.4 g/dL Final    Albumin 06/20/2022 3.0 (A) 3.5 - 5.2 g/dL Final    Total Bilirubin 06/20/2022 0.6  0.1 - 1.0 mg/dL Final    Alkaline Phosphatase 06/20/2022 82  55 - 135 U/L Final    AST 06/20/2022 32  10 - 40 U/L Final    ALT 06/20/2022 30  10 - 44 U/L Final    Anion Gap 06/20/2022 11  8 - 16 mmol/L Final    eGFR if African American 06/20/2022 >60  >60 mL/min/1.73 m^2 Final    eGFR if non African American 06/20/2022 >60  >60 mL/min/1.73 m^2 Final    TSH 06/20/2022 1.910  0.400 - 4.000 uIU/mL  Final      ?   Tumor markers   AFP   Date Value Ref Range Status   08/24/2021 <2.0 0.0 - 8.4 ng/mL Final   02/21/2020 1.8 0.0 - 8.4 ng/mL Final       ?   Imaging:    Results for orders placed or performed during the hospital encounter of 06/22/22 (from the past 2160 hour(s))   NM PET CT Routine FDG    Impression    Most of the lymph nodes in the neck, chest, and pelvis demonstrate decreased uptake compared to PET-CT from earlier this year.  Uptake in a right axillary lymph node has increased.  There is also interval increase in size of a right inguinal lymph node.  Please see above for details.    Persistent bilateral patchy parenchymal opacities without significant change from prior.  No sufficient FDG avidity with these opacities.    No significant change in mild uptake in the thickened left adrenal gland.    Slight interval decrease in FDG uptake in proximal right femoral lesion.      Electronically signed by: Cesar Hart MD  Date:    06/22/2022  Time:    21:40         Pathology:        02/06/2020 liver biopsy  LIVER, BIOPSY:   Consistent with hepatocellular carcinoma, moderate to poorly differentiated     The biopsy shows nests of tumor cells with deeply eosinophilic granular   cytoplasm. Immunostains performed shows results as:   HepPar - Focal granular positive   CK7 and TTF-1 - Negative    ?   Assessment/Plan:       1. Early satiety    2. HCC (hepatocellular carcinoma)    3. Hepatocellular carcinoma    4. Pain, neoplasm-related          Plan:     # metastatic hepatocellular carcinoma:  initiated first-line palliative immunotherapy with nivolumab 03/02/2020.  The last to restaging scans showing improvement particularly in liver lesions.  Reviewed today together restaging PET-CT.  Of note radiology read is in comparison to prior PET-CT February 2020 interval scans have been with CT which had shown improvement in liver lesions.  This most recent PET-CT does show new right cervical, mandibular and multiple  bilateral lung lesions.  Mixed response in lymph nodes in abdomen/pelvis with continued improvement in liver lesions.  Repeat biopsy left groin lymph node consistent with same primary, hepatocellular carcinoma.  On lenvatinib initiated early March 2021, due to rash/pruritus/odynophagia complications lenvatinib on hold with resolution and restarted up titrated to 8 mg tolerating well.  Restaging PET 08/20/2021 reviewed with mixed response resolution of pulmonary area of avidity however other areas with findings consistent with progression with enlargement and new lymphadenopathy in neck chest abdomen and pelvis.  I discussed options including best supportive care or can consider bevacizumab and atezolizumab.  She did have prior immunotherapy however she had initial excellent response and may be consideration given combination with bevacizumab which she has not yet tried.  - 08/26/2021 cycle 1 day 1 atezolizumab and bevacizumab, tolerated exceptionally well.  Presents for cycle 2 today labs reviewed no concerning findings and will proceed with treatment today.  - pain well controlled on Xtampza 13.5mER bid (refilled 12/30/21) and 3-4 percocet/day  Per patient preference to wait until after holidays for repeat scan recently performed January 2022 and we review in detail results today unfortunately showing evidence of progressive disease.  We discussed she has had already several lines of therapy and limited further options.  Previously on lenvatinib unable to achieve typical doses due to side effects with rash primarily; I did discuss consideration of cabozantinib also used hepatocellular carcinoma but may have similar side effects and potential for limited impact on her disease.  She is interested in trialing this.  We decided to start dose reduced given prior intolerance is cabozantinib 40 mg  She has now been on cabozantinib 40 mg initiated 2/4/22  Four months of this treatment with restaging scans reviewed with  patient and daughter show overall exceptional response to treatment in neck, thoracic and inguinal regions there has been notable diminished SUV as compared to prior in January 2022 with exception of 1 right inguinal lymph node and 1 right axillary lymph node.  Overall she notes feeling improved however she does have early satiety and pain.  Given improvement in scan suspect this may be related to side effects from her treatment as well as disease as pain has never been adequately controlled on regimen.  Recommend increasing extended release to t.i.d. and continue as needed Percocet with uptitration as needed.    Recommend small frequent meals for early satiety.  It does not appear nausea/vomiting is playing a role.  Bowel movements now well regulated.    # Hypothyroidism:  TSH normalized; continue supplementation    Follow-Up:   Patient Instructions   RV in 6 wks with cbc, cmp, UA and TSH prior ([place])

## 2022-06-30 ENCOUNTER — EXTERNAL CHRONIC CARE MANAGEMENT (OUTPATIENT)
Dept: PRIMARY CARE CLINIC | Facility: CLINIC | Age: 82
End: 2022-06-30
Payer: MEDICARE

## 2022-06-30 PROCEDURE — 99490 PR CHRONIC CARE MGMT, 1ST 20 MIN: ICD-10-PCS | Mod: S$PBB,,, | Performed by: FAMILY MEDICINE

## 2022-06-30 PROCEDURE — 99490 CHRNC CARE MGMT STAFF 1ST 20: CPT | Mod: S$PBB,,, | Performed by: FAMILY MEDICINE

## 2022-06-30 PROCEDURE — 99490 CHRNC CARE MGMT STAFF 1ST 20: CPT | Mod: PBBFAC,PN | Performed by: FAMILY MEDICINE

## 2022-07-01 ENCOUNTER — TELEPHONE (OUTPATIENT)
Dept: HEMATOLOGY/ONCOLOGY | Facility: CLINIC | Age: 82
End: 2022-07-01
Payer: MEDICARE

## 2022-07-01 DIAGNOSIS — C22.0 HCC (HEPATOCELLULAR CARCINOMA): ICD-10-CM

## 2022-07-01 RX ORDER — OXYCODONE 13.5 MG/1
1 CAPSULE, EXTENDED RELEASE ORAL 3 TIMES DAILY
Qty: 90 EACH | Refills: 0
Start: 2022-07-01 | End: 2022-07-01 | Stop reason: SDUPTHER

## 2022-07-01 RX ORDER — OXYCODONE 13.5 MG/1
1 CAPSULE, EXTENDED RELEASE ORAL 3 TIMES DAILY
Qty: 90 EACH | Refills: 0 | Status: CANCELLED
Start: 2022-07-01

## 2022-07-01 RX ORDER — OXYCODONE 13.5 MG/1
1 CAPSULE, EXTENDED RELEASE ORAL 3 TIMES DAILY
Qty: 90 EACH | Refills: 0 | Status: SHIPPED | OUTPATIENT
Start: 2022-07-01 | End: 2022-01-01

## 2022-07-01 NOTE — TELEPHONE ENCOUNTER
chuy spoke with pt dtr Lilian    States that Dr. Burgess says to take Xtampa 3x/day q7h.     rx states bid.     Pt trying to request medication through my chart and the right rx is not listed on med list.     Pharmacy: [facility] [place] is waiting on corrected rx

## 2022-07-12 RX ORDER — AMLODIPINE BESYLATE 2.5 MG/1
TABLET ORAL
Qty: 180 TABLET | Refills: 0 | Status: SHIPPED | OUTPATIENT
Start: 2022-07-12 | End: 2022-01-01

## 2022-07-12 NOTE — TELEPHONE ENCOUNTER
Care Due:                  Date            Visit Type   Department     Provider  --------------------------------------------------------------------------------                                EP -                              PRIMARY      [facility] PRIMARY  Last Visit: 06-      CARE ([facility])   CARE           Yessy Andrade  Next Visit: None Scheduled  None         None Found                                                            Last  Test          Frequency    Reason                     Performed    Due Date  --------------------------------------------------------------------------------    Office Visit  12 months..  amLODIPine, lisinopriL...  06- 06-    [facility] Embedded Care Gaps. Reference number: [id number]. 7/12/2022   5:16:41 AM CDT

## 2022-07-12 NOTE — TELEPHONE ENCOUNTER
Refill Routing Note   Medication(s) are not appropriate for processing by [facility] for the following reason(s):      - Required vitals are abnormal    [facility] action(s):  Defer          Medication reconciliation completed: No     Appointments  past 12m or future 3m with PCP    Date Provider   Last Visit   6/17/2021 Yessy Andrade MD   Next Visit   Visit date not found Yessy Andrade MD   ED visits in past 90 days: 0        Note composed:11:27 AM 07/12/2022

## 2022-07-12 NOTE — TELEPHONE ENCOUNTER
A 90 day prescription has been sent. Please make sure to have patient make a followup appointment or keep the scheduled appointment to avoid future delays in medication refills. Thanks. Yessy Andrade MD

## 2022-07-22 DIAGNOSIS — C22.0 HCC (HEPATOCELLULAR CARCINOMA): ICD-10-CM

## 2022-07-26 ENCOUNTER — SPECIALTY PHARMACY (OUTPATIENT)
Dept: PHARMACY | Facility: CLINIC | Age: 82
End: 2022-07-26
Payer: MEDICARE

## 2022-07-26 NOTE — TELEPHONE ENCOUNTER
Patient states she does not need any medication at this time. Would like a call back at another time when someone can answer the phone one her behalf and assist her.

## 2022-07-27 ENCOUNTER — PATIENT MESSAGE (OUTPATIENT)
Dept: ADMINISTRATIVE | Facility: HOSPITAL | Age: 82
End: 2022-07-27
Payer: MEDICARE

## 2022-07-28 NOTE — TELEPHONE ENCOUNTER
Specialty Pharmacy - Refill Coordination    Specialty Medication Orders Linked to Encounter    Flowsheet Row Most Recent Value   Medication #1 cabozantinib (CABOMETYX) 40 mg Tab (Order#[id number], Rx#[id number])          Refill Questions - Documented Responses    Flowsheet Row Most Recent Value   Patient Availability and HIPAA Verification    Does patient want to proceed with activity? Yes   HIPAA/medical authority confirmed? Yes   Relationship to patient of person spoken to? Child   Refill Screening Questions    Changes to allergies? No   Changes to medications? No   New conditions since last clinic visit? No   Unplanned office visit, urgent care, ED, or hospital admission in the last 4 weeks? No   How does patient/caregiver feel medication is working? Good   Financial problems or insurance changes? No   How many doses of your specialty medications were missed in the last 4 weeks? 0   Would patient like to speak to a pharmacist? No   When does the patient need to receive the medication? 08/01/22   Refill Delivery Questions    How will the patient receive the medication? Delivery Y[facility]el   When does the patient need to receive the medication? 08/01/22   Shipping Address Home   Address in [facility] confirmed and updated if neccessary? Yes   Expected Copay ($) 0   Is the patient able to afford the medication copay? Yes   Payment Method zero copay   Days supply of Refill 30   Supplies needed? No supplies needed   Refill activity completed? Yes   Refill activity plan Refill scheduled   Shipment/Pickup Date: 07/28/22          Current Outpatient Medications   Medication Sig    (Magic mouthwash) 1:1:1 Benadryl 12.5mg/5ml liq, aluminum & magnesium hydroxide-simehticone (Maalox), LIDOcaine viscous 2% Take 10 mL PO every 4-6 hours as needed for throat or esophageal pain.    amLODIPine (NORVASC) 2.5 MG tablet TAKE 1 TABLET(2.5 MG) BY MOUTH TWICE DAILY    betamethasone valerate 0.1% (VALISONE) 0.1 % Crea Apply topically  2 (two) times daily.    cabozantinib (CABOMETYX) 40 mg Tab Take 1 tablet (40 mg) by mouth once daily.    clobetasol 0.05% (TEMOVATE) 0.05 % Oint Apply topically 2 (two) times daily.    diphenoxylate-atropine 2.5-0.025 mg (LOMOTIL) 2.5-0.025 mg per tablet Take 1 tablet by mouth 4 (four) times daily as needed for Diarrhea.    GI cocktail antac/dicyc/lidoc Take 10 ml by mouth three times daily as needed for throat pain    levothyroxine (TIROSINT) 50 mcg Cap Take 1 tablet (50 mcg) by mouth before breakfast.    LIDOcaine HCl 2% (XYLOCAINE) 2 % Soln Swish, gargle, and spit 10 mLs every 6-8 hrs as needed for throat pain.    lisinopriL (PRINIVIL,ZESTRIL) 40 MG tablet Take 1 tablet (40 mg total) by mouth once daily.    magnesium hydroxide (MILK OF MAGNESIA ORAL) Take by mouth once daily.    mupirocin (BACTROBAN) 2 % ointment Apply topically 3 (three) times daily.    neomycin-polymyxin-hydrocortisone (CORTISPORIN) 3.5-10,000-1 mg/mL-unit/mL-% otic suspension Place 3 drops into the right ear 4 (four) times daily.    ondansetron (ZOFRAN-ODT) 8 MG TbDL Take 1 tablet (8 mg total) by mouth every 12 (twelve) hours as needed.    oxyCODONE-acetaminophen (PERCOCET)  mg per tablet Take 1 tablet by mouth 4 (four) times daily as needed (severe pain).    pimecrolimus (ELIDEL) 1 % cream Apply to the affected area twice daily (Patient taking differently: Apply to the affected area twice daily)    pulse oximeter (PULSE OXIMETER) device by Apply Externally route 2 (two) times a day. Use twice daily at 8 AM and 3 PM and [facility].    senna (SENOKOT) 8.6 mg tablet Take 1 tablet by mouth once daily.    tacrolimus (PROTOPIC) 0.1 % ointment Apply topically 2 (two) times daily.    triamcinolone acetonide 0.1% (KENALOG) 0.1 % cream Apply topically 2 (two) times daily. for 10 days    XTAMPZA ER 13.5 mg CSpT Take 1 capsule by mouth 3 (three) times daily.   Last reviewed on 6/24/2022  2:43 PM by  "Michelle Hemphill LPN    Review of patient's allergies indicates:   Allergen Reactions    Pravastatin Other (See Comments)     Burning/flushing    Xgeva [denosumab] Other (See Comments)     Mouth and diffuse pain    Allopurinol analogues Other (See Comments)     "makes me sick and feel crazy"    Last reviewed on  7/1/2022 9:58 AM by Gill Burgess      Tasks added this encounter   8/24/2022 - Refill Call (Auto Added)   Tasks due within next 3 months   10/17/2022 - Clinical - Follow Up Assesement (180 day)     Sailaja Machado, PharmD  [facility]  [address]  [facility] [address]  Phone: [phone]  Fax: [phone]      "

## 2022-08-02 NOTE — PATIENT INSTRUCTIONS
Ok to take Lasix 20 mg as needed for blood pressure and fluid retention  Please take potassium supplement on days when take Lasix   Ok to increase amlodipine to 3 times per day (ok to take 2 tablets in am and one tablet in pm)  Please send in blood pressure readings on new medications

## 2022-08-02 NOTE — PROGRESS NOTES
Subjective:      Patient ID: Maura Singh is a 82 y.o. female.    Chief Complaint: Hypertension    Maura Singh is a 82 y.o. female who presents to clinic for follow-up for blood pressure     HTN - concerned about LE swelling at times - did well on lasix in the past.  Was on lisinopril-hctz in past but had SE when HCTZ was added. Wanting to restart lasix.      Last note from oncology team   # metastatic hepatocellular carcinoma:  initiated first-line palliative immunotherapy with nivolumab 03/02/2020.  The last to restaging scans showing improvement particularly in liver lesions.  Reviewed today together restaging PET-CT.  Of note radiology read is in comparison to prior PET-CT February 2020 interval scans have been with CT which had shown improvement in liver lesions.  This most recent PET-CT does show new right cervical, mandibular and multiple bilateral lung lesions.  Mixed response in lymph nodes in abdomen/pelvis with continued improvement in liver lesions.  Repeat biopsy left groin lymph node consistent with same primary, hepatocellular carcinoma.  On lenvatinib initiated early March 2021, due to rash/pruritus/odynophagia complications lenvatinib on hold with resolution and restarted up titrated to 8 mg tolerating well.  Restaging PET 08/20/2021 reviewed with mixed response resolution of pulmonary area of avidity however other areas with findings consistent with progression with enlargement and new lymphadenopathy in neck chest abdomen and pelvis.  I discussed options including best supportive care or can consider bevacizumab and atezolizumab.  She did have prior immunotherapy however she had initial excellent response and may be consideration given combination with bevacizumab which she has not yet tried.  - 08/26/2021 cycle 1 day 1 atezolizumab and bevacizumab, tolerated exceptionally well.  Presents for cycle 2 today labs reviewed no concerning findings and will proceed with treatment today.  -  pain well controlled on Xtampza 13.5mER bid (refilled 12/30/21) and 3-4 percocet/day  Per patient preference to wait until after holidays for repeat scan recently performed January 2022 and we review in detail results today unfortunately showing evidence of progressive disease.  We discussed she has had already several lines of therapy and limited further options.  Previously on lenvatinib unable to achieve typical doses due to side effects with rash primarily; I did discuss consideration of cabozantinib also used hepatocellular carcinoma but may have similar side effects and potential for limited impact on her disease.  She is interested in trialing this.  We decided to start dose reduced given prior intolerance is cabozantinib 40 mg  She has now been on cabozantinib 40 mg initiated 2/4/22  Four months of this treatment with restaging scans reviewed with patient and daughter show overall exceptional response to treatment in neck, thoracic and inguinal regions there has been notable diminished SUV as compared to prior in January 2022 with exception of 1 right inguinal lymph node and 1 right axillary lymph node.  Overall she notes feeling improved however she does have early satiety and pain.  Given improvement in scan suspect this may be related to side effects from her treatment as well as disease as pain has never been adequately controlled on regimen.  Recommend increasing extended release to t.i.d. and continue as needed Percocet with uptitration as needed.     Recommend small frequent meals for early satiety.  It does not appear nausea/vomiting is playing a role.  Bowel movements now well regulated.    Hypertension  This is a recurrent problem. The current episode started more than 1 year ago. The problem has been gradually worsening since onset. The problem is uncontrolled. Associated symptoms include anxiety, malaise/fatigue and peripheral edema. Pertinent negatives include no chest pain, palpitations or  "shortness of breath. Risk factors for coronary artery disease include stress. The current treatment provides no improvement. Compliance problems include diet and medication side effects.      Review of Systems   Constitutional: Positive for fatigue and malaise/fatigue. Negative for chills, diaphoresis and fever.   Respiratory: Negative for cough, shortness of breath and wheezing.    Cardiovascular: Positive for leg swelling. Negative for chest pain and palpitations.   Gastrointestinal: Negative for abdominal pain, diarrhea, nausea and vomiting.   Skin: Negative for rash.       Objective:   BP (!) 160/62   Pulse 65   Temp 97.6 °F (36.4 °C) (Temporal)   Ht 5' 1" (1.549 m)   Wt 60.3 kg (132 lb 15 oz)   SpO2 (!) 94%   BMI 25.12 kg/m²   Physical Exam  Vitals reviewed.   Constitutional:       General: She is not in acute distress.     Appearance: She is well-developed. She is not ill-appearing, toxic-appearing or diaphoretic.   HENT:      Head: Normocephalic and atraumatic.      Right Ear: External ear normal.      Left Ear: External ear normal.      Nose: Nose normal.   Cardiovascular:      Rate and Rhythm: Normal rate and regular rhythm.      Pulses:           Radial pulses are 2+ on the right side and 2+ on the left side.      Heart sounds: Normal heart sounds, S1 normal and S2 normal.   Pulmonary:      Effort: Pulmonary effort is normal. No tachypnea, bradypnea, accessory muscle usage or respiratory distress.      Breath sounds: Normal breath sounds. No decreased breath sounds, wheezing, rhonchi or rales.   Chest:      Chest wall: No tenderness.   Musculoskeletal:      Right lower le+ Edema present.      Left lower le+ Edema present.   Skin:     General: Skin is warm and dry.      Capillary Refill: Capillary refill takes less than 2 seconds.   Neurological:      Mental Status: She is alert and oriented to person, place, and time. She is not disoriented.      Cranial Nerves: No cranial nerve deficit.      " Sensory: No sensory deficit.      Motor: No abnormal muscle tone.   Psychiatric:         Behavior: Behavior normal.       Assessment:      1. Essential hypertension    2. Swelling of lower extremity       Plan:   Essential hypertension  Comments:  chronic, cont. lisinopril and amlodipine, start lasix daily prn for LE swelling   Orders:  -     furosemide (LASIX) 20 MG tablet; Take 1 tablet (20 mg total) by mouth once daily.  Dispense: 30 tablet; Refill: 11  -     potassium chloride (MICRO-K) 10 MEQ CpSR; Take 1 capsule (10 mEq total) by mouth once daily  Dispense: 30 capsule; Refill: 11  -     amLODIPine (NORVASC) 2.5 MG tablet; Take 1 tablet (2.5 mg total) by mouth 3 (three) times daily.  Dispense: 90 tablet; Refill: 11  -     lisinopriL (PRINIVIL,ZESTRIL) 40 MG tablet; Take 1 tablet (40 mg total) by mouth once daily.  Dispense: 90 tablet; Refill: 3    Swelling of lower extremity  Comments:  lasix prn for lower extremity swelling       Ok to take Lasix 20 mg as needed for blood pressure and fluid retention  Please take potassium supplement on days when take Lasix   Ok to increase amlodipine to 3 times per day (ok to take 2 tablets in am and one tablet in pm)  Please send in blood pressure readings on new medications     Yessy Mckeon PA-C   Physician Assistant   [facility]

## 2022-08-05 NOTE — TELEPHONE ENCOUNTER
Nurse called [facility] lab to see about urine per dr. hussein    Urinalysis Not reflex to culture bc wbc wasn't high enough     No more urine, will have to recollect

## 2022-08-05 NOTE — Clinical Note
Can you please call lab to be sure that urine cultures being performed on urinalysis that was done and abnormal on 08/04/2022?

## 2022-08-05 NOTE — PROGRESS NOTES
Subjective:      DATE OF VISIT: 8/5/22     ?  Patient ID:?Maura Singh is a 82 y.o. female.?? MR#: [id number]   ?   PRIMARY ONCOLOGIST: Dr. Burgess    ? Primary Care Providers:  Yessy Andrade MD, MD (General)     CHIEF COMPLAINT: ???  Follow-up  ?   ONCOLOGIC DIAGNOSIS:  Metastatic hepatocellular carcinoma  ?   CURRENT TREATMENT:  Cabozantinib 40 mg daily    PAST TREATMENT:    Nivolumab 480mg q4 weeks   lenvatinib  Bevacizumab and atezolizumab, C1D1 8/26/21     ONCOLOGIC HISTORY:    Ms. Singh was admitted to [facility] on 02/04/2020 with gradually progressive shortness of breath, cough with hemoptysis along with intermittent abdominal pain, anorexia and fatigue.  In the emergency room she had workup including abdominal ultrasound showing multiple liver lesions concerning for metastatic disease.  CTA was negative for pulmonary embolism but revealed mediastinal adenopathy with right hilar soft tissue mass cannot exclude lymphadenopathy along with postobstructive collapse of right anterior segment lower lobe.  Hepatomegaly was noted with multiple low-density hypoenhancing lesions consistent with hepatic metastatic disease.    2/19/20 PET-CT:    To tele I was thinking to me that you already have 1 extra due as thinking last Monday for she 1. Hypermetabolic right infrahilar lung mass and associated partial postobstructive collapse right lower lobe.  2. Small hypermetabolic focus right lower lobe possibly early metastasis.  3. Hypermetabolic masslike thickening ascending colon suspicious for neoplasm.  4. Multifocal FDG avid lissette metastases neck, chest, abdomen, and pelvis as noted.  5. Extensive FDG avid hepatic metastases.  6. Widespread FDG avid osseous metastases.     02/06/2020 liver biopsy  Pathology:  Consistent with hepatocellular carcinoma, moderate to poorly differentiated     The biopsy shows nests of tumor cells with deeply eosinophilic granular   cytoplasm. Immunostains performed shows  results as:   HepPar - Focal granular positive   CK7 and TTF-1 - Negative    03/02/2020 cycle 1 day 1 nivolumab Q 2 weeks palliative immunotherapy    03/16/2020 cycle 2 day 1 nivolumab    03/30/2020 cycle 3 day 1 nivolumab, note: dose of 480 mg [id number] to limit clinic visits due to COVID-19 concerns    05/25/2020 cycle 5 day 1 nivolumab    6/16/20 inteval CT scan with response in lymphadenopathy, see below    3/2020 initiated lenvatinib; held, decreased dose due to rash side effect currently on 8 mg daily    08/26/2021 cycle 1 day 1 atezolizumab and bevacizumab    02/04/2022 cycle 1 day 1 cabozantinib 40 mg daily    INTERVAL EVENTS  Currently on cabozantinib 40 mg since 3/2022 tolerating well aside from diarrhea.  Trial of long-acting oxycodone 3 times daily she notes not tolerating well requesting twice daily with mild increased dose.  Significant hypertension with primary care follow-up initiated diuretic and potassium supplementation will follow-up with them on this.  Some burning on urination with positive UA and will start on antibiotics.      Review of Systems    ?   A comprehensive 14-point review of systems was reviewed with patient and was negative other than as specified above.   ?      Objective:      Physical Exam      ?   Vitals:    08/05/22 1042   BP: 126/74   Pulse: 60   Temp: 98.2 °F (36.8 °C)      ?   ECOG:?2   General appearance: Generally well appearing, in no acute distress  Head, eyes, ears, nose, and throat: moist mucous membranes.   Respiratory:  Normal work of breathing  Abdomen: nontender, nondistended.   Extremities: Warm, without edema.   Neurologic: Alert and oriented.   Skin: No rashes, ecchymoses or petechial lesion.    Psychiatric:  Normal mood and affect.    Laboratory:  ?   No visits with results within 1 Day(s) from this visit.   Latest known visit with results is:   Lab Visit on 08/03/2022   Component Date Value Ref Range Status    Specimen UA 08/03/2022 Urine, Clean Catch   Final     Color, UA 08/03/2022 Yellow  Yellow, Straw, Tiffanie Final    Appearance, UA 08/03/2022 Cloudy (A) Clear Final    pH, UA 08/03/2022 5.0  5.0 - 8.0 Final    Specific Gravity, UA 08/03/2022 1.015  1.005 - 1.030 Final    Protein, UA 08/03/2022 Negative  Negative Final    Glucose, UA 08/03/2022 Negative  Negative Final    Ketones, UA 08/03/2022 Negative  Negative Final    Bilirubin (UA) 08/03/2022 Negative  Negative Final    Occult Blood UA 08/03/2022 Negative  Negative Final    Nitrite, UA 08/03/2022 Positive (A) Negative Final    Leukocytes, UA 08/03/2022 1+ (A) Negative Final    RBC, UA 08/03/2022 2  0 - 4 /hpf Final    WBC, UA 08/03/2022 9 (A) 0 - 5 /hpf Final    Bacteria 08/03/2022 Moderate (A) None-Occ /hpf Final    Squam Epithel, UA 08/03/2022 4  /hpf Final    Microscopic Comment 08/03/2022 SEE COMMENT   Final      ?   Tumor markers   AFP   Date Value Ref Range Status   08/24/2021 <2.0 0.0 - 8.4 ng/mL Final   02/21/2020 1.8 0.0 - 8.4 ng/mL Final       ?   Imaging:    Results for orders placed or performed during the hospital encounter of 06/22/22 (from the past 2160 hour(s))   NM PET CT Routine FDG    Impression    Most of the lymph nodes in the neck, chest, and pelvis demonstrate decreased uptake compared to PET-CT from earlier this year.  Uptake in a right axillary lymph node has increased.  There is also interval increase in size of a right inguinal lymph node.  Please see above for details.    Persistent bilateral patchy parenchymal opacities without significant change from prior.  No sufficient FDG avidity with these opacities.    No significant change in mild uptake in the thickened left adrenal gland.    Slight interval decrease in FDG uptake in proximal right femoral lesion.      Electronically signed by: Cesar Hart MD  Date:    06/22/2022  Time:    21:40         Pathology:        02/06/2020 liver biopsy  LIVER, BIOPSY:   Consistent with hepatocellular carcinoma, moderate to poorly  differentiated     The biopsy shows nests of tumor cells with deeply eosinophilic granular   cytoplasm. Immunostains performed shows results as:   HepPar - Focal granular positive   CK7 and TTF-1 - Negative    ?   Assessment/Plan:       1. HCC (hepatocellular carcinoma)    2. Acute cystitis without hematuria    3. Early satiety    4. Pain, neoplasm-related    5. Diarrhea, unspecified type          Plan:     # metastatic hepatocellular carcinoma:  initiated first-line palliative immunotherapy with nivolumab 03/02/2020.  The last to restaging scans showing improvement particularly in liver lesions.  Reviewed today together restaging PET-CT.  Of note radiology read is in comparison to prior PET-CT February 2020 interval scans have been with CT which had shown improvement in liver lesions.  This most recent PET-CT does show new right cervical, mandibular and multiple bilateral lung lesions.  Mixed response in lymph nodes in abdomen/pelvis with continued improvement in liver lesions.  Repeat biopsy left groin lymph node consistent with same primary, hepatocellular carcinoma.  On lenvatinib initiated early March 2021, due to rash/pruritus/odynophagia complications lenvatinib on hold with resolution and restarted up titrated to 8 mg tolerating well.  Restaging PET 08/20/2021 reviewed with mixed response resolution of pulmonary area of avidity however other areas with findings consistent with progression with enlargement and new lymphadenopathy in neck chest abdomen and pelvis.  I discussed options including best supportive care or can consider bevacizumab and atezolizumab.  She did have prior immunotherapy however she had initial excellent response and may be consideration given combination with bevacizumab which she has not yet tried.  - 08/26/2021 cycle 1 day 1 atezolizumab and bevacizumab, tolerated exceptionally well.  Presents for cycle 2 today labs reviewed no concerning findings and will proceed with treatment  today.  - pain well controlled on Xtampza 13.5mER bid (refilled 12/30/21) and 3-4 percocet/day  Per patient preference to wait until after holidays for repeat scan recently performed January 2022 and we review in detail results today unfortunately showing evidence of progressive disease.  We discussed she has had already several lines of therapy and limited further options.  Previously on lenvatinib unable to achieve typical doses due to side effects with rash primarily; I did discuss consideration of cabozantinib also used hepatocellular carcinoma but may have similar side effects and potential for limited impact on her disease.  She is interested in trialing this.  We decided to start dose reduced given prior intolerance is cabozantinib 40 mg  She has now been on cabozantinib 40 mg initiated 2/4/22  Four months of this treatment with restaging scans reviewed with patient and daughter show overall exceptional response to treatment in neck, thoracic and inguinal regions there has been notable diminished SUV as compared to prior in January 2022 with exception of 1 right inguinal lymph node and 1 right axillary lymph node.  Overall she notes feeling improved however she does have early satiety and pain.  Given improvement in scan suspect this may be related to side effects from her treatment as well as disease as pain has never been adequately controlled on regimen.  Recommend increasing extended release to t.i.d. and continue as needed Percocet with uptitration as needed.    Tolerating treatment well aside from diarrhea.  Will continue on current regimen and plan on restaging scans around September 2022.      Pain not ideally controlled trial of t.i.d. extended release oxycodone patient notes not tolerating well can trial slight increased dose b.i.d..  Recommend palliative care follow-up    UTI: some dysuria. lst UTI 3/2022.  Positive UA will start on Macrobid b.i.d. x7 days as we await theculture reflex.   The    Hypertension:  Recent initiation of amlodipine in addition to lisinopril, follow-up with primary care for blood pressure and electrolyte monitoring.    # Hypothyroidism:  TSH normalized; continue supplementation    Follow-Up:   Patient Instructions   Follow-up palliative care  Fup in 6 wks labs prior, cbc, cmp, tsh

## 2022-08-24 NOTE — TELEPHONE ENCOUNTER
Patient's daughter reported that the patient has had very bad diarrhea for the last 3 weeks and stated that the patient is losing weight. Transferred the call to [place].

## 2022-08-24 NOTE — TELEPHONE ENCOUNTER
Messaged Dr. Burgess to inform her of persistent diarrhea. Awaiting response.   Patient may need to hold cabometyx.     Patient's daughter reported persistent diarrhea during a refill call. She states her mother is taking both Imodium and lomotil as needed. She is afraid her mother has lost weight and may become dehydrated. Instructed daughter to bring her mother to urgentn care or the ER. Daughter states her mother may not be willing to go to the ER but may make an appt with her PCP.     Per [employer]:   Cabometyx(R) tablet) Diarrhea (Grade 2, 3, or 4) monotherapy in adults and pediatric patients with BSA 1.2 m(2) or greater: Withhold dose; after improvement or resolution to Grade 1 or less, resume treatment at a 20-mg reduction from the previous dosage (first reduction, 60 mg/day reduces to 40 mg orally once daily; second reduction, 40 mg/day reduces to 20 mg once daily; for patients previously taking 20 mg/day, resume 20 mg once daily if tolerated, otherwise, discontinue cabozantinib)

## 2022-08-26 PROBLEM — E87.6 HYPOKALEMIA: Status: ACTIVE | Noted: 2022-01-01

## 2022-08-26 PROBLEM — E86.0 DEHYDRATION: Status: ACTIVE | Noted: 2022-01-01

## 2022-08-26 PROBLEM — R19.7 DIARRHEA: Status: ACTIVE | Noted: 2022-01-01

## 2022-08-26 NOTE — PLAN OF CARE
Problem: Adult Inpatient Plan of Care  Goal: Plan of Care Review  Outcome: Ongoing, Progressing  Flowsheets (Taken 8/26/2022 1531)  Plan of Care Reviewed With:   patient   daughter  Goal: Patient-Specific Goal (Individualized)  Outcome: Ongoing, Progressing  Flowsheets (Taken 8/26/2022 1531)  Anxieties, Fears or Concerns: none  Individualized Care Needs: legs up, blanket  Patient-Specific Goals (Include Timeframe): get fluids  Goal: Optimal Comfort and Wellbeing  Outcome: Ongoing, Progressing  Intervention: Provide Person-Centered Care  Flowsheets (Taken 8/26/2022 1531)  Trust Relationship/Rapport:   care explained   questions encouraged   choices provided   reassurance provided   emotional support provided   thoughts/feelings acknowledged   empathic listening provided   questions answered     Problem: Fall Injury Risk  Goal: Absence of Fall and Fall-Related Injury  Outcome: Ongoing, Progressing  Intervention: Identify and Manage Contributors  Flowsheets (Taken 8/26/2022 1531)  Self-Care Promotion: BADL personal routines maintained  Medication Review/Management: medications reviewed  Intervention: Promote Injury-Free Environment  Flowsheets (Taken 8/26/2022 1531)  Safety Promotion/Fall Prevention:   in recliner, wheels locked   nonskid shoes/socks when out of bed   room near unit station   family to remain at bedside

## 2022-08-26 NOTE — DISCHARGE INSTRUCTIONS
[facility]  [address] [address]  [address]  [phone] phone     [phone] fax  Hours of Operation: Monday- Friday 8:00am- 5:00pm  After hours phone  [phone]  Hematology / Oncology Physicians on call      KLAUDIA Mendoza Dr., Dr., NP Sydney Prescott, MISAEL Ac FNP    Please call with any concerns regarding your appointment today.

## 2022-08-29 NOTE — TELEPHONE ENCOUNTER
Informed By Dr. Burgess that Ms. Villa will hold Cabometyx at this time. Daughter states patient has a PET scan and follow up appt on 9/28 and 10/06, respectively. Will pend refill call to 10/7 to reacess for refill.

## 2022-08-30 NOTE — ASSESSMENT & PLAN NOTE
Lab Results   Component Value Date    K 2.9 (L) 08/25/2022       Diarrhea x 3.5 weeks    Plan for 1L IVF + 20meq K today  Repeat CMP in one week   Reorder K 10 meq tablets usually smaller in size than capsules to see if these would be easier to swallow

## 2022-08-30 NOTE — ASSESSMENT & PLAN NOTE
Lab Results   Component Value Date    K 2.9 (L) 08/25/2022     Diarrhea x 3.5 weeks    Plan for 1L IVF + 20meq K today  Repeat CMP in one week   Reorder K tablets to be dissolved in water/pudding

## 2022-08-30 NOTE — TELEPHONE ENCOUNTER
"Contacted pt's daughter, Ms.Donna Obrien, to see how  is feeling today post IVFs. She notes her mother is feeling much better--diarrhea has resolved and she is taking potassium supplements as prescribed. "She is like a new person!" She would like to move repeat CMP to 09/06.   "

## 2022-08-30 NOTE — PROGRESS NOTES
Subjective:      DATE OF VISIT: 08/26/2022  ?   ?   Patient ID:?Maura Singh is a 82 y.o. female.?? MR#: [id number]   ?   PRIMARY PROVIDER: Dr. Burgess  ?   CHIEF COMPLAINT: follow-up?????   ?   ONCOLOGIC DIAGNOSIS: Metastatic hepatocellular carcinoma  ?   CURRENT TREATMENT: OP CABOZANTINIB DAILY 40 mg    PAST TREATMENT:  Nivolumab 480mg q4 weeks   lenvatinib  Bevacizumab and atezolizumab, C1D1 8/26/21    HPI  Ms. Singh is a pleasant 82-year-old female who presents today with her daughter for follow-up.She was admitted to [facility] on 02/04/2020 with gradually progressive shortness of breath, cough with hemoptysis along with intermittent abdominal pain, anorexia and fatigue.  In the emergency room she had workup including abdominal ultrasound showing multiple liver lesions concerning for metastatic disease.  CTA was negative for pulmonary embolism but revealed mediastinal adenopathy with right hilar soft tissue mass cannot exclude lymphadenopathy along with postobstructive collapse of right anterior segment lower lobe.  Hepatomegaly was noted with multiple low-density hypoenhancing lesions consistent with hepatic metastatic disease    Interval History: Pt presents today with daughter. She c/o continuous pain to back, sides, stomach, and feet. She notes diarrhea for the last 3.5 weeks not relieved with imodium or lomotil. held chemo pill today--no diarrhea today. Per daughter diuretic held for last 3 weeks r/t diarrhea. Pt notes she has not been taking potassium pills because they are too big. Denies rash, n/v/c, fever, chills, abnormal bleeding, mouth sores.   Oncology History   Hepatocellular carcinoma   2/17/2020 Initial Diagnosis    Hepatocellular carcinoma     3/2/2020 - 2/10/2021 Chemotherapy    Treatment Summary   Plan Name: OP NIVOLUMAB Q2W (Q4W cycle 3 on)  Treatment Goal: Palliative  Status: Inactive  Start Date: 3/2/2020  End Date: 1/11/2021  Provider: Gill Burgess MD  Chemotherapy:  nivolumab 240 mg in sodium chloride 0.9% 124 mL infusion, 240 mg, Intravenous, Clinic/HOD 1 time, 13 of 15 cycles  Dose modification: 480 mg (original dose 240 mg, Cycle 3), 480 mg (original dose 240 mg, Cycle 4)  Administration: 240 mg (3/2/2020), 240 mg (3/16/2020), 480 mg (3/30/2020), 480 mg (4/27/2020), 480 mg (5/25/2020), 480 mg (6/26/2020), 480 mg (7/24/2020), 480 mg (8/21/2020), 480 mg (9/18/2020), 480 mg (10/16/2020), 480 mg (11/13/2020), 480 mg (12/14/2020), 480 mg (1/11/2021)       2/12/2021 - 2/12/2021 Chemotherapy    Treatment Summary   Plan Name: OP THYROID LENVATINIB DAILY  Treatment Goal: Palliative  Status: Inactive  Start Date:   End Date:   Provider: Gill Burgess MD  Chemotherapy: lenvatinib (LENVIMA) 24 mg/day(10 mg x 2-4 mg x 1) Cap, 24 mg, Oral, Daily, 0 of 1 cycle, Start date: --, End date: --       8/26/2021 - 12/30/2021 Chemotherapy    Treatment Summary   Plan Name: OP ATEZOLIZUMAB BEVACIZUMAB Q3W  Treatment Goal: Palliative  Status: Inactive  Start Date: 8/26/2021  End Date: 12/30/2021  Provider: Gill Burgess MD  Chemotherapy: bevacizumab (AVASTIN) 1,000 mg in sodium chloride 0.9% 100 mL chemo infusion, 1,045 mg, Intravenous, Clinic/HOD 1 time, 7 of 17 cycles  Administration: 1,000 mg (8/26/2021), 1,000 mg (9/16/2021), 1,060 mg (10/7/2021), 1,045 mg (10/28/2021), 1,045 mg (11/18/2021), 1,000 mg (12/9/2021), 1,065 mg (12/30/2021)  atezolizumab (TECENTRIQ) 1,200 mg in sodium chloride 0.9% 270 mL infusion, 1,200 mg, Intravenous, Clinic/\[facility]\"" 1 time, 7 of 17 cycles  Administration: 1,200 mg (8/26/2021), 1,200 mg (9/16/2021), 1,200 mg (10/7/2021), 1,200 mg (10/28/2021), 1,200 mg (11/18/2021), 1,200 mg (12/9/2021), 1,200 mg (12/30/2021)       1/20/2022 -  Chemotherapy    Treatment Summary   Plan Name: OP CABOZANTINIB DAILY  Treatment Goal: Palliative  Status: Active  Start Date: 1/20/2022  End Date: 1/20/2022  Provider: Gill Burgess MD  Chemotherapy: cabozantinib (CABOMETYX) 20  mg Tab, 60 mg, Oral, Daily, 1 of 1 cycle, Start date: 2022, End date: --       HCC (hepatocellular carcinoma)   2020 Initial Diagnosis    HCC (hepatocellular carcinoma)     2020 Cancer Staged    Staging form: Liver, AJCC 8th Edition  - Clinical stage from 2020: Stage IVB (cT3, cN1, cM1)       2021 - 2021 Chemotherapy    Treatment Summary   Plan Name: OP ATEZOLIZUMAB BEVACIZUMAB Q3W  Treatment Goal: Palliative  Status: Inactive  Start Date: 2021  End Date: 2021  Provider: Gill Burgess MD  Chemotherapy: bevacizumab (AVASTIN) 1,000 mg in sodium chloride 0.9% 100 mL chemo infusion, 1,045 mg, Intravenous, Clinic/HOD 1 time, 7 of 17 cycles  Administration: 1,000 mg (2021), 1,000 mg (2021), 1,060 mg (10/7/2021), 1,045 mg (10/28/2021), 1,045 mg (2021), 1,000 mg (2021), 1,065 mg (2021)  atezolizumab (TECENTRIQ) 1,200 mg in sodium chloride 0.9% 270 mL infusion, 1,200 mg, Intravenous, Clinic/HOD 1 time, 7 of 17 cycles  Administration: 1,200 mg (2021), 1,200 mg (2021), 1,200 mg (10/7/2021), 1,200 mg (10/28/2021), 1,200 mg (2021), 1,200 mg (2021), 1,200 mg (2021)       2022 -  Chemotherapy    Treatment Summary   Plan Name: OP CABOZANTINIB DAILY  Treatment Goal: Palliative  Status: Active  Start Date: 2022  End Date: 2022  Provider: Gill Burgess MD  Chemotherapy: cabozantinib (CABOMETYX) 20 mg Tab, 60 mg, Oral, Daily, 1 of 1 cycle, Start date: 2022, End date: --          Social History     Socioeconomic History    Marital status:     Number of children: 5   Occupational History    Occupation: homemaker   Tobacco Use    Smoking status: Former     Packs/day: 0.50     Years: 48.00     Pack years: 24.00     Types: Cigarettes     Start date:      Quit date:      Years since quittin.6    Smokeless tobacco: Never   Substance and Sexual Activity    Alcohol use: No    Drug use: No    Sexual  activity: Never     Social Determinants of Health     Financial Resource Strain: High Risk    Difficulty of Paying Living Expenses: Very hard   Food Insecurity: Food Insecurity Present    Worried About Running Out of Food in the Last Year: Often true    Ran Out of Food in the Last Year: Often true   Transportation Needs: Unmet Transportation Needs    Lack of Transportation (Medical): No    Lack of Transportation (Non-Medical): Yes   Physical Activity: Inactive    Days of Exercise per Week: 0 days    Minutes of Exercise per Session: 0 min   Stress: Stress Concern Present    Feeling of Stress : Very much   Social Connections: Unknown    Frequency of Communication with Friends and Family: Three times a week    Frequency of Social Gatherings with Friends and Family: Twice a week    Active Member of Clubs or Organizations: No    Attends Club or Organization Meetings: Never    Marital Status:    Housing Stability: Low Risk     Unable to Pay for Housing in the Last Year: No    Number of Places Lived in the Last Year: 1    Unstable Housing in the Last Year: No      Family History   Problem Relation Age of Onset    Colon cancer Mother     ALS Father     Retinal detachment Son       Past Surgical History:   Procedure Laterality Date    APPENDECTOMY      CATARACT EXTRACTION      MIDDLE EAR SURGERY          Review of Systems   Constitutional:  Negative for activity change, appetite change, chills, diaphoresis, fatigue, fever and unexpected weight change.   HENT:  Positive for hearing loss. Negative for congestion, dental problem, drooling, ear discharge, ear pain, facial swelling, mouth sores, nosebleeds, postnasal drip, rhinorrhea, sinus pressure, sneezing, sore throat, tinnitus, trouble swallowing and voice change.    Eyes:  Negative for photophobia, pain, discharge, redness, itching and visual disturbance.   Respiratory:  Negative for cough, choking, chest tightness, shortness of breath, wheezing and stridor.     Cardiovascular:  Negative for chest pain, palpitations and leg swelling.   Gastrointestinal:  Negative for abdominal distention, abdominal pain, anal bleeding, blood in stool, constipation, diarrhea, nausea, rectal pain and vomiting.   Endocrine: Negative for cold intolerance, heat intolerance, polydipsia, polyphagia and polyuria.   Genitourinary:  Negative for decreased urine volume, difficulty urinating, dyspareunia, dysuria, enuresis, flank pain, frequency, genital sores, hematuria, menstrual problem, pelvic pain, urgency, vaginal bleeding, vaginal discharge and vaginal pain.   Musculoskeletal:  Positive for arthralgias, gait problem and myalgias. Negative for back pain, joint swelling, neck pain and neck stiffness.   Skin:  Negative for color change, pallor and rash.   Allergic/Immunologic: Negative for environmental allergies, food allergies and immunocompromised state.   Neurological:  Positive for weakness. Negative for dizziness, tremors, seizures, syncope, facial asymmetry, speech difficulty, light-headedness, numbness and headaches.   Hematological:  Negative for adenopathy. Does not bruise/bleed easily.   Psychiatric/Behavioral:  Negative for agitation, behavioral problems, confusion, decreased concentration, dysphoric mood, hallucinations, self-injury, sleep disturbance and suicidal ideas. The patient is not nervous/anxious and is not hyperactive.      ?   A comprehensive 14-point review of systems was reviewed with patient and was negative other than as specified above.   ?     Objective:      Physical Exam  Vitals reviewed.   Constitutional:       General: She is not in acute distress.     Appearance: She is well-developed. She is not ill-appearing or diaphoretic.   HENT:      Head: Normocephalic and atraumatic.      Right Ear: External ear normal.      Left Ear: External ear normal.      Nose: Nose normal.      Right Sinus: No maxillary sinus tenderness or frontal sinus tenderness.      Left Sinus:  No maxillary sinus tenderness or frontal sinus tenderness.      Mouth/Throat:      Pharynx: No oropharyngeal exudate.   Eyes:      General: Lids are normal. No scleral icterus.        Right eye: No discharge.         Left eye: No discharge.      Conjunctiva/sclera: Conjunctivae normal.      Right eye: Right conjunctiva is not injected. No hemorrhage.     Left eye: Left conjunctiva is not injected. No hemorrhage.     Pupils: Pupils are equal, round, and reactive to light.   Neck:      Thyroid: No thyromegaly.      Vascular: No JVD.      Trachea: No tracheal deviation.   Cardiovascular:      Rate and Rhythm: Normal rate.   Pulmonary:      Effort: Pulmonary effort is normal. No respiratory distress.      Breath sounds: No stridor.   Chest:      Chest wall: No tenderness.   Abdominal:      General: Bowel sounds are normal. There is no distension.      Palpations: Abdomen is soft. There is no hepatomegaly, splenomegaly or mass.      Tenderness: There is no abdominal tenderness. There is no rebound.   Musculoskeletal:         General: No tenderness. Normal range of motion.      Cervical back: Normal range of motion and neck supple.      Right lower leg: Edema present.      Left lower leg: Edema present.   Lymphadenopathy:      Cervical: No cervical adenopathy.      Upper Body:      Right upper body: No supraclavicular adenopathy.      Left upper body: No supraclavicular adenopathy.   Skin:     General: Skin is dry.      Findings: No erythema or rash.   Neurological:      Mental Status: She is alert and oriented to person, place, and time.      Cranial Nerves: No cranial nerve deficit.      Motor: Weakness present.      Coordination: Coordination abnormal.      Gait: Gait abnormal.   Psychiatric:         Behavior: Behavior normal.         Thought Content: Thought content normal.         Judgment: Judgment normal.         ?   Vitals:    08/26/22 1434   BP: (!) 158/78   Pulse: 67   Temp: 97.2 °F (36.2 °C)      ?   ECOG:?2      ?   Laboratory:  ?   No visits with results within 1 Day(s) from this visit.   Latest known visit with results is:   Lab Visit on 08/25/2022   Component Date Value Ref Range Status    WBC 08/25/2022 5.67  3.90 - 12.70 K/uL Final    RBC 08/25/2022 3.67 (L)  4.00 - 5.40 M/uL Final    Hemoglobin 08/25/2022 12.6  12.0 - 16.0 g/dL Final    Hematocrit 08/25/2022 39.5  37.0 - 48.5 % Final    MCV 08/25/2022 108 (H)  82 - 98 fL Final    MCH 08/25/2022 34.3 (H)  27.0 - 31.0 pg Final    MCHC 08/25/2022 31.9 (L)  32.0 - 36.0 g/dL Final    RDW 08/25/2022 14.9 (H)  11.5 - 14.5 % Final    Platelets 08/25/2022 155  150 - 450 K/uL Final    MPV 08/25/2022 10.5  9.2 - 12.9 fL Final    Immature Granulocytes 08/25/2022 0.2  0.0 - 0.5 % Final    Gran # (ANC) 08/25/2022 3.6  1.8 - 7.7 K/uL Final    Immature Grans (Abs) 08/25/2022 0.01  0.00 - 0.04 K/uL Final    Lymph # 08/25/2022 1.3  1.0 - 4.8 K/uL Final    Mono # 08/25/2022 0.5  0.3 - 1.0 K/uL Final    Eos # 08/25/2022 0.2  0.0 - 0.5 K/uL Final    Baso # 08/25/2022 0.05  0.00 - 0.20 K/uL Final    nRBC 08/25/2022 0  0 /100 WBC Final    Gran % 08/25/2022 63.4  38.0 - 73.0 % Final    Lymph % 08/25/2022 23.5  18.0 - 48.0 % Final    Mono % 08/25/2022 9.2  4.0 - 15.0 % Final    Eosinophil % 08/25/2022 2.8  0.0 - 8.0 % Final    Basophil % 08/25/2022 0.9  0.0 - 1.9 % Final    Differential Method 08/25/2022 Automated   Final    Sodium 08/25/2022 143  136 - 145 mmol/L Final    Potassium 08/25/2022 2.9 (L)  3.5 - 5.1 mmol/L Final    Chloride 08/25/2022 104  95 - 110 mmol/L Final    CO2 08/25/2022 27  23 - 29 mmol/L Final    Glucose 08/25/2022 106  70 - 110 mg/dL Final    BUN 08/25/2022 13  8 - 23 mg/dL Final    Creatinine 08/25/2022 0.8  0.5 - 1.4 mg/dL Final    Calcium 08/25/2022 8.3 (L)  8.7 - 10.5 mg/dL Final    Total Protein 08/25/2022 6.8  6.0 - 8.4 g/dL Final    Albumin 08/25/2022 3.0 (L)  3.5 - 5.2 g/dL Final    Total Bilirubin 08/25/2022 0.5  0.1 - 1.0 mg/dL Final    Alkaline  Phosphatase 08/25/2022 57  55 - 135 U/L Final    AST 08/25/2022 28  10 - 40 U/L Final    ALT 08/25/2022 19  10 - 44 U/L Final    Anion Gap 08/25/2022 12  8 - 16 mmol/L Final    eGFR 08/25/2022 >60  >60 mL/min/1.73 m^2 Final    TSH 08/25/2022 1.246  0.400 - 4.000 uIU/mL Final      ?   Imaging:    Results for orders placed or performed during the hospital encounter of 06/22/22 (from the past 2160 hour(s))   NM PET CT Routine FDG    Impression    Most of the lymph nodes in the neck, chest, and pelvis demonstrate decreased uptake compared to PET-CT from earlier this year.  Uptake in a right axillary lymph node has increased.  There is also interval increase in size of a right inguinal lymph node.  Please see above for details.    Persistent bilateral patchy parenchymal opacities without significant change from prior.  No sufficient FDG avidity with these opacities.    No significant change in mild uptake in the thickened left adrenal gland.    Slight interval decrease in FDG uptake in proximal right femoral lesion.      Electronically signed by: Cesar Hart MD  Date:    06/22/2022  Time:    21:40     *Note: Due to a large number of results and/or encounters for the requested time period, some results have not been displayed. A complete set of results can be found in Results Review.        No results found. However, due to the size of the patient record, not all encounters were searched. Please check Results Review for a complete set of results.     ?   Assessment/Plan:     Problem List Items Addressed This Visit          Renal/    Dehydration     Plan for 1L IVF over 2 hours today         Hypokalemia - Primary     Lab Results   Component Value Date    K 2.9 (L) 08/25/2022     Diarrhea x 3.5 weeks    Plan for 1L IVF + 20meq K today  Repeat CMP in one week   Reorder K 10 meq tablets usually smaller in size than capsules to see if these would be easier to swallow         Relevant Medications    potassium chloride SA  (K-DUR,KLOR-CON M) 10 MEQ tablet    Other Relevant Orders    Comprehensive Metabolic Panel    CBC Auto Differential    TSH    Comprehensive Metabolic Panel    NM PET CT Routine FDG       Oncology    HCC (hepatocellular carcinoma)     Metastatic hepatocellular carcinoma, status post multiple lines of palliative systemic therapy.     01/12/2022 PET scan showed disease progression.    02/2022 started on low-dose cabozantinib at 40 mg daily    06/2022 PET found lymph nodes in the neck, chest, and pelvis demonstrate decreased uptake compared to PET-CT from earlier this year.  Uptake in a right axillary lymph node has increased.  There is also interval increase in size of a right inguinal lymph node.       Persistent bilateral patchy parenchymal opacities without significant change from prior.  No sufficient FDG avidity with these opacities.     No significant change in mild uptake in the thickened left adrenal gland.     Slight interval decrease in FDG uptake in proximal right femoral lesion.    Plan for repeat imaging 09/2022    Plan to f/u in 6 weeks with repeat labs or sooner if needed.           Relevant Orders    Comprehensive Metabolic Panel    CBC Auto Differential    TSH    Comprehensive Metabolic Panel    NM PET CT Routine FDG       GI    Diarrhea     Other Visit Diagnoses       Drug-induced thyroiditis         Relevant Orders    TSH             JOSE Mary  Hematology/Oncology

## 2022-08-30 NOTE — ASSESSMENT & PLAN NOTE
Metastatic hepatocellular carcinoma, status post multiple lines of palliative systemic therapy.     01/12/2022 PET scan showed disease progression.    02/2022 started on low-dose cabozantinib at 40 mg daily    06/2022 PET found lymph nodes in the neck, chest, and pelvis demonstrate decreased uptake compared to PET-CT from earlier this year.  Uptake in a right axillary lymph node has increased.  There is also interval increase in size of a right inguinal lymph node.       Persistent bilateral patchy parenchymal opacities without significant change from prior.  No sufficient FDG avidity with these opacities.     No significant change in mild uptake in the thickened left adrenal gland.     Slight interval decrease in FDG uptake in proximal right femoral lesion.    Plan for repeat imaging 09/2022    Plan to f/u in 6 weeks with repeat labs or sooner if needed.

## 2022-09-19 NOTE — TELEPHONE ENCOUNTER
MA called pt and spoke w pt daughter about rescheduling upcoming apt due to MD being out. MA reviewed upcoming apts w daughter, she confirmed understanding.

## 2022-10-03 NOTE — TELEPHONE ENCOUNTER
Nurse called pt, spoke with dtr Lilian. Offered a sooner appt per pt request, caregiver could not make that appt. Lilian states will just keep appt as is. Nurse verbalized understanding

## 2022-10-04 NOTE — TELEPHONE ENCOUNTER
----- Message from Dali Kamara sent at 10/4/2022  3:07 PM CDT -----  Contact: Lilian/ Daughter  .Type:  RX Refill Request    Who Called: Lilian/ Daughter   Refill or New Rx:Refill  RX Name and Strength:oxyCODONE (OXYCONTIN) 15 mg TR12 12 hr tablet  How is the patient currently taking it? (ex. 1XDay):as prescribed   Is this a 30 day or 90 day RX:30  Preferred Pharmacy with phone number:.  [facility] - [place], [place] - [address]  [address]  [facility] [address]  Phone: [phone] Fax: [phone]  [facility] [address] [place] [address]  Phone: [phone] Fax: [phone]  Local or Mail Order:Local   Ordering Provider: Dr. Burgess  Would the patient rather a call back or a response via [facility]? Call   Best Call Back Number: .[phone]   Additional Information:

## 2022-10-04 NOTE — TELEPHONE ENCOUNTER
----- Message from Dali Kamara sent at 10/4/2022  3:07 PM CDT -----  Contact: Lilian/ Daughter  .Type:  RX Refill Request    Who Called: Lilian/ Daughter   Refill or New Rx:Refill  RX Name and Strength:oxyCODONE (OXYCONTIN) 15 mg TR12 12 hr tablet  How is the patient currently taking it? (ex. 1XDay):as prescribed   Is this a 30 day or 90 day RX:30  Preferred Pharmacy with phone number:.  [facility] - [place], [place] - [address]  [address]  [facility] [address]  Phone: [phone] Fax: [phone]  [facility] [address] [place] [address]  Phone: [phone] Fax: [phone]  Local or Mail Order:Local   Ordering Provider: Dr. Burgess  Would the patient rather a call back or a response via [facility]? Call   Best Call Back Number: .[phone]   Additional Information:

## 2022-10-05 NOTE — PROGRESS NOTES
The patient location is:  Home  The chief complaint leading to consultation is:  Follow-up    Visit type: audiovisual    Face to Face time with patient:  10 minutes  Thirty minutes of total time spent on the encounter, which includes face to face time and non-face to face time preparing to see the patient (eg, review of tests), Obtaining and/or reviewing separately obtained history, Documenting clinical information in the electronic or other health record, Independently interpreting results (not separately reported) and communicating results to the patient/family/caregiver, or Care coordination (not separately reported).         Each patient to whom he or she provides medical services by telemedicine is:  (1) informed of the relationship between the physician and patient and the respective role of any other health care provider with respect to management of the patient; and (2) notified that he or she may decline to receive medical services by telemedicine and may withdraw from such care at any time.    Notes:       Subjective:      DATE OF VISIT: 10/5/22     ?  Patient ID:?Maura Singh is a 82 y.o. female.?? MR#: [id number]   ?   PRIMARY ONCOLOGIST: Dr. Burgess    ? Primary Care Providers:  Yessy Andrade MD, MD (General)     CHIEF COMPLAINT: ???  Follow-up  ?   ONCOLOGIC DIAGNOSIS:  Metastatic hepatocellular carcinoma  ?   CURRENT TREATMENT:  Cabozantinib 40 mg daily, had been on hold since 08/05/2022 due to diarrhea toxicity    PAST TREATMENT:    Nivolumab 480mg q4 weeks   lenvatinib  Bevacizumab and atezolizumab, C1D1 8/26/21     ONCOLOGIC HISTORY:    Ms. Singh was admitted to [facility] on 02/04/2020 with gradually progressive shortness of breath, cough with hemoptysis along with intermittent abdominal pain, anorexia and fatigue.  In the emergency room she had workup including abdominal ultrasound showing multiple liver lesions concerning for metastatic disease.  CTA was negative for pulmonary  embolism but revealed mediastinal adenopathy with right hilar soft tissue mass cannot exclude lymphadenopathy along with postobstructive collapse of right anterior segment lower lobe.  Hepatomegaly was noted with multiple low-density hypoenhancing lesions consistent with hepatic metastatic disease.    2/19/20 PET-CT:    To tele I was thinking to me that you already have 1 extra due as thinking last Monday for she 1. Hypermetabolic right infrahilar lung mass and associated partial postobstructive collapse right lower lobe.  2. Small hypermetabolic focus right lower lobe possibly early metastasis.  3. Hypermetabolic masslike thickening ascending colon suspicious for neoplasm.  4. Multifocal FDG avid lissette metastases neck, chest, abdomen, and pelvis as noted.  5. Extensive FDG avid hepatic metastases.  6. Widespread FDG avid osseous metastases.     02/06/2020 liver biopsy  Pathology:  Consistent with hepatocellular carcinoma, moderate to poorly differentiated     The biopsy shows nests of tumor cells with deeply eosinophilic granular   cytoplasm. Immunostains performed shows results as:   HepPar - Focal granular positive   CK7 and TTF-1 - Negative    03/02/2020 cycle 1 day 1 nivolumab Q 2 weeks palliative immunotherapy    03/16/2020 cycle 2 day 1 nivolumab    03/30/2020 cycle 3 day 1 nivolumab, note: dose of 480 mg [id number] to limit clinic visits due to COVID-19 concerns    05/25/2020 cycle 5 day 1 nivolumab    6/16/20 inteval CT scan with response in lymphadenopathy, see below    3/2020 initiated lenvatinib; held, decreased dose due to rash side effect currently on 8 mg daily    08/26/2021 cycle 1 day 1 atezolizumab and bevacizumab    02/04/2022 cycle 1 day 1 cabozantinib 40 mg daily    INTERVAL EVENTS  She had been on cabozantinib 30 mg however seen by nurse practitioner noted diarrhea at the time and discontinued has been off the medication to current time about last 2 months in which time she has had full resolution  of diarrhea but does note enlarging lymphadenopathy, fatigue poor p.o. intake.  She does have some constipation at this time.    Review of Systems    ?   A comprehensive 14-point review of systems was reviewed with patient and was negative other than as specified above.   ?      Objective:      Physical Exam      ?   There were no vitals filed for this visit.     ?   Limited due to virtual visit  Laboratory:  ?   No visits with results within 1 Day(s) from this visit.   Latest known visit with results is:   Lab Visit on 10/03/2022   Component Date Value Ref Range Status    WBC 10/03/2022 9.19  3.90 - 12.70 K/uL Final    RBC 10/03/2022 3.49 (L)  4.00 - 5.40 M/uL Final    Hemoglobin 10/03/2022 11.9 (L)  12.0 - 16.0 g/dL Final    Hematocrit 10/03/2022 37.4  37.0 - 48.5 % Final    MCV 10/03/2022 107 (H)  82 - 98 fL Final    MCH 10/03/2022 34.1 (H)  27.0 - 31.0 pg Final    MCHC 10/03/2022 31.8 (L)  32.0 - 36.0 g/dL Final    RDW 10/03/2022 13.7  11.5 - 14.5 % Final    Platelets 10/03/2022 358  150 - 450 K/uL Final    MPV 10/03/2022 10.1  9.2 - 12.9 fL Final    Immature Granulocytes 10/03/2022 0.4  0.0 - 0.5 % Final    Gran # (ANC) 10/03/2022 5.9  1.8 - 7.7 K/uL Final    Immature Grans (Abs) 10/03/2022 0.04  0.00 - 0.04 K/uL Final    Lymph # 10/03/2022 1.8  1.0 - 4.8 K/uL Final    Mono # 10/03/2022 1.2 (H)  0.3 - 1.0 K/uL Final    Eos # 10/03/2022 0.2  0.0 - 0.5 K/uL Final    Baso # 10/03/2022 0.07  0.00 - 0.20 K/uL Final    nRBC 10/03/2022 0  0 /100 WBC Final    Gran % 10/03/2022 63.9  38.0 - 73.0 % Final    Lymph % 10/03/2022 20.0  18.0 - 48.0 % Final    Mono % 10/03/2022 12.6  4.0 - 15.0 % Final    Eosinophil % 10/03/2022 2.3  0.0 - 8.0 % Final    Basophil % 10/03/2022 0.8  0.0 - 1.9 % Final    Differential Method 10/03/2022 Automated   Final    TSH 10/03/2022 1.551  0.400 - 4.000 uIU/mL Final    Sodium 10/03/2022 137  136 - 145 mmol/L Final    Potassium 10/03/2022 5.4 (H)  3.5 - 5.1 mmol/L Final    Chloride 10/03/2022  102  95 - 110 mmol/L Final    CO2 10/03/2022 22 (L)  23 - 29 mmol/L Final    Glucose 10/03/2022 97  70 - 110 mg/dL Final    BUN 10/03/2022 32 (H)  8 - 23 mg/dL Final    Creatinine 10/03/2022 1.3  0.5 - 1.4 mg/dL Final    Calcium 10/03/2022 10.0  8.7 - 10.5 mg/dL Final    Total Protein 10/03/2022 8.6 (H)  6.0 - 8.4 g/dL Final    Albumin 10/03/2022 3.4 (L)  3.5 - 5.2 g/dL Final    Total Bilirubin 10/03/2022 0.4  0.1 - 1.0 mg/dL Final    Alkaline Phosphatase 10/03/2022 109  55 - 135 U/L Final    AST 10/03/2022 27  10 - 40 U/L Final    ALT 10/03/2022 20  10 - 44 U/L Final    Anion Gap 10/03/2022 13  8 - 16 mmol/L Final    eGFR 10/03/2022 40.8 (A)  >60 mL/min/1.73 m^2 Final      ?   Tumor markers   AFP   Date Value Ref Range Status   08/24/2021 <2.0 0.0 - 8.4 ng/mL Final   02/21/2020 1.8 0.0 - 8.4 ng/mL Final       ?   Imaging:    Results for orders placed or performed during the hospital encounter of 09/28/22 (from the past 2160 hour(s))   NM PET CT Routine FDG    Impression    1. Overall interval worsening of FDG avid cervical, thoracic, and pelvic adenopathy as above.  2. Unchanged appearance of small left adrenal nodule with associated low level FDG uptake.  3. Unchanged appearance of non FDG avid patchy ground-glass opacities in the bilateral lungs.  4. Other stable findings as above      Electronically signed by: Luis Burton MD  Date:    09/28/2022  Time:    14:24     *Note: Due to a large number of results and/or encounters for the requested time period, some results have not been displayed. A complete set of results can be found in Results Review.         Pathology:        02/06/2020 liver biopsy  LIVER, BIOPSY:   Consistent with hepatocellular carcinoma, moderate to poorly differentiated     The biopsy shows nests of tumor cells with deeply eosinophilic granular   cytoplasm. Immunostains performed shows results as:   HepPar - Focal granular positive   CK7 and TTF-1 - Negative    ?   Assessment/Plan:        1. Diarrhea, unspecified type    2. HCC (hepatocellular carcinoma)            Plan:     # metastatic hepatocellular carcinoma:  initiated first-line palliative immunotherapy with nivolumab 03/02/2020.  The last to restaging scans showing improvement particularly in liver lesions.  Reviewed today together restaging PET-CT.  Of note radiology read is in comparison to prior PET-CT February 2020 interval scans have been with CT which had shown improvement in liver lesions.  This most recent PET-CT does show new right cervical, mandibular and multiple bilateral lung lesions.  Mixed response in lymph nodes in abdomen/pelvis with continued improvement in liver lesions.  Repeat biopsy left groin lymph node consistent with same primary, hepatocellular carcinoma.  On lenvatinib initiated early March 2021, due to rash/pruritus/odynophagia complications lenvatinib on hold with resolution and restarted up titrated to 8 mg tolerating well.  Restaging PET 08/20/2021 reviewed with mixed response resolution of pulmonary area of avidity however other areas with findings consistent with progression with enlargement and new lymphadenopathy in neck chest abdomen and pelvis.  I discussed options including best supportive care or can consider bevacizumab and atezolizumab.  She did have prior immunotherapy however she had initial excellent response and may be consideration given combination with bevacizumab which she has not yet tried.  - 08/26/2021 cycle 1 day 1 atezolizumab and bevacizumab, tolerated exceptionally well.  Presents for cycle 2 today labs reviewed no concerning findings and will proceed with treatment today.  - pain well controlled on Xtampza 13.5mER bid (refilled 12/30/21) and 3-4 percocet/day  Per patient preference to wait until after holidays for repeat scan recently performed January 2022 and we review in detail results today unfortunately showing evidence of progressive disease.  We discussed she has had already  several lines of therapy and limited further options.  Previously on lenvatinib unable to achieve typical doses due to side effects with rash primarily; I did discuss consideration of cabozantinib also used hepatocellular carcinoma but may have similar side effects and potential for limited impact on her disease.  She is interested in trialing this.  We decided to start dose reduced given prior intolerance is cabozantinib 40 mg  She has now been on cabozantinib 40 mg initiated 2/4/22  Four months of this treatment with restaging scans reviewed with patient and daughter show overall exceptional response to treatment in neck, thoracic and inguinal regions there has been notable diminished SUV as compared to prior in January 2022 with exception of 1 right inguinal lymph node and 1 right axillary lymph node.      In early August 2022 saw nurse practitioner who discontinued cabozantinib due to diarrhea toxicity with full resolution but has been off therapy for 2 months in the interim has had progressive disease demonstrated by repeat PET scan.  Given initial good response to this medication recommend read trial cautiously at same low-dose cabozantinib 40 mg daily and close follow-up for recurrent diarrhea.  Discussed management and supportive care as needed.        Follow-Up:   Patient Instructions   Cabozantinib sent to [facility] refill  RV in 2 wks cbc, cmp prior, labs in [place], in person visit

## 2022-10-07 NOTE — TELEPHONE ENCOUNTER
Outgoing call to pt to confirm she has restarted Cabometyx 40 mg and determine whether she needs a refill.    Per 10/5 chart note, Pt was instructed to restart Cabometyx 40 mg daily. At this time, we do not have a script on file. Refill request forwarded to Dr. Burgess.

## 2022-10-07 NOTE — TELEPHONE ENCOUNTER
Incoming call from the patient's daughter, Lilian, stating the patient restarted Cabometyx and has enough medication till 10/10. She will need a refill for 10/11 dose. Advised [facility] sent request to the provider for refill and [facility] will call for refill once received.

## 2022-10-07 NOTE — TELEPHONE ENCOUNTER
Specialty Pharmacy - Clinical Intervention  Specialty Pharmacy - Refill Coordination    Patient held cabometyx. She restarted Cabometyx 40 mg daily on 10/05.    Specialty Medication Orders Linked to Encounter      Flowsheet Row Most Recent Value   Medication #1 cabozantinib (CABOMETYX) 40 mg Tab (Order#[id number], Rx#)            Refill Questions - Documented Responses      Flowsheet Row Most Recent Value   Patient Availability and HIPAA Verification    Does patient want to proceed with activity? Yes   HIPAA/medical authority confirmed? Yes   Relationship to patient of person spoken to? Child   Refill Screening Questions    Changes to allergies? No   Changes to medications? Yes  [Craig states pt held lisinopril while off cabometyx due to low blood pressure readings. She has an appt with her PCP to discuss.]   New conditions since last clinic visit? No   Unplanned office visit, urgent care, ED, or hospital admission in the last 4 weeks? No   How does patient/caregiver feel medication is working? Good   Financial problems or insurance changes? No   How many doses of your specialty medications were missed in the last 4 weeks? 0  [Patient held cabometyx. She restarted Cabometyx 40 mg daily on 10/05.]   Would patient like to speak to a pharmacist? No   When does the patient need to receive the medication? 10/11/22   Refill Delivery Questions    How will the patient receive the medication? [facility]   When does the patient need to receive the medication? 10/11/22   Shipping Address Home   Address in [facility] confirmed and updated if neccessary? Yes   Expected Copay ($) 0   Is the patient able to afford the medication copay? Yes   Payment Method zero copay   Days supply of Refill 30   Supplies needed? No supplies needed   Refill activity completed? Yes   Refill activity plan Refill scheduled   Shipment/Pickup Date: 10/07/22            Current Outpatient Medications   Medication Sig Note    (Magic mouthwash) 1:1:1  Benadryl 12.5mg/5ml liq, aluminum & magnesium hydroxide-simehticone (Maalox), LIDOcaine viscous 2% Take 10 mL PO every 4-6 hours as needed for throat or esophageal pain.     amLODIPine (NORVASC) 2.5 MG tablet Take 1 tablet (2.5 mg total) by mouth 3 (three) times daily. 10/7/2022: Pt not taking right now.     betamethasone valerate 0.1% (VALISONE) 0.1 % Crea Apply topically 2 (two) times daily.     cabozantinib (CABOMETYX) 40 mg Tab Take 1 tablet (40 mg) by mouth once daily.     clobetasol 0.05% (TEMOVATE) 0.05 % Oint Apply topically 2 (two) times daily.     diphenoxylate-atropine 2.5-0.025 mg (LOMOTIL) 2.5-0.025 mg per tablet Take 1 tablet by mouth 4 (four) times daily as needed for Diarrhea.     furosemide (LASIX) 20 MG tablet Take 1 tablet (20 mg total) by mouth once daily.     GI cocktail antac/dicyc/lidoc Take 10 ml by mouth three times daily as needed for throat pain     levothyroxine (TIROSINT) 50 mcg Cap Take 1 tablet (50 mcg) by mouth before breakfast.     LIDOcaine HCl 2% (XYLOCAINE) 2 % Soln Swish, gargle, and spit 10 mLs every 6-8 hrs as needed for throat pain.     lisinopriL (PRINIVIL,ZESTRIL) 40 MG tablet Take 1 tablet (40 mg total) by mouth once daily.     magnesium hydroxide (MILK OF MAGNESIA ORAL) Take by mouth once daily.     mupirocin (BACTROBAN) 2 % ointment Apply topically 3 (three) times daily.     neomycin-polymyxin-hydrocortisone (CORTISPORIN) 3.5-10,000-1 mg/mL-unit/mL-% otic suspension Place 3 drops into the right ear 4 (four) times daily.     ondansetron (ZOFRAN-ODT) 8 MG TbDL Take 1 tablet (8 mg total) by mouth every 12 (twelve) hours as needed.     oxyCODONE (OXYCONTIN) 15 mg TR12 12 hr tablet Take 1 tablet (15 mg total) by mouth every 12 (twelve) hours.     oxyCODONE-acetaminophen (PERCOCET)  mg per tablet Take 1 tablet by mouth 4 (four) times daily as needed (severe pain).     pimecrolimus (ELIDEL) 1 % cream Apply to the affected area twice daily (Patient taking differently: Apply  "to the affected area twice daily)     pulse oximeter (PULSE OXIMETER) device by Apply Externally route 2 (two) times a day. Use twice daily at 8 AM and 3 PM and record the value in MyChart as directed.     senna (SENOKOT) 8.6 mg tablet Take 1 tablet by mouth once daily.     tacrolimus (PROTOPIC) 0.1 % ointment Apply topically 2 (two) times daily.     triamcinolone acetonide 0.1% (KENALOG) 0.1 % cream Apply topically 2 (two) times daily. for 10 days    Last reviewed on 10/5/2022 12:37 PM by Gill Burgess MD    Review of patient's allergies indicates:   Allergen Reactions    Pravastatin Other (See Comments)     Burning/flushing    Xgeva [denosumab] Other (See Comments)     Mouth and diffuse pain    Allopurinol analogues Other (See Comments)     "makes me sick and feel crazy"    Last reviewed on  10/5/2022 12:37 PM by Gill Burgess      Tasks added this encounter   No tasks added.   Tasks due within next 3 months   10/17/2022 - Clinical - Follow Up Assesement (180 day)  10/7/2022 - Refill Call (Auto Added)     JOSUÉ BUENROSTRO, PharmD  [facility]  [address]  [facility] [id number]  Phone: [phone]  Fax: [phone]        "

## 2022-10-10 NOTE — TELEPHONE ENCOUNTER
Patient's daughter called stating she has not yet received delivery. She was supposed to receive it on today. Upon review, it appears she will receive shipment on tomorrow 10/11.    Daughter states patient will need her next dose by 7 am on 10/11. We reviewed missed dosing instructions. Per PI, Patient may take a missed dose as long as its more than 12 hours until her next dose. She has until approximated 6:59 pm on tomorrow to readminister a missed dose.     Daughter expressed understanding.

## 2022-10-18 PROBLEM — Z86.39: Status: ACTIVE | Noted: 2020-02-10

## 2022-10-18 NOTE — PROGRESS NOTES
Subjective:      Patient ID: Maura Singh is a 82 y.o. female.    Chief Complaint: Annual Exam    Disclaimer:  This note is prepared using voice recognition software and as such is likely to have errors and has not been proof read. Please contact me for questions.     [facility] Reason For Visit    10/17/2022  9:15 AM CDT - Filed by Patient   What is your primary reason for visit? Other/Annual   Have you experienced any of the following:   Change in activity? No   Unexpected weight change? No   Neck pain? No   Hearing loss? Yes   Runny nose? No   Trouble swallowing? No   Eye discharge? No   Changes in vision? Yes   Chest tightness? No   Wheezing? No   Chest pain? No   Heart beating fast or racing? No   Blood in stool? No   Constipation? No   Vomiting? No   Diarrhea? No   Drinking much more than usual? No   Urinating much more than usual? No   Difficulty urinating? No   Blood in the urine? No   Menstrual problem? No   Painful urination? No   Joint swelling? No   Joint pain? Yes   Headaches? No   Weakness? Yes   Confusion? No   Feeling depressed? Yes     [facility] Documents    10/17/2022  9:26 AM CDT - Filed by Patient   Would you like a copy of [facility]'s Financial Assistance Policy Summary? Yes, I would like a copy.   Visit the below link for the Financial Assistance Policy    Is this visit work-related or due to a work-related accident/injury? No     [facility]    10/17/2022  9:27 AM CDT - Filed by Patient   On average, how many days per week do you engage in moderate to strenuous exercise (like a brisk walk)? 0 days   On average, how many minutes do you engage in exercise at this level? 0 min   Do you feel stress - tense, restless, nervous, or anxious, or unable to sleep at night because your mind is troubled all the time - these days? Very much   Do you belong to any clubs or organizations such as Congregation groups, unions, fraternal or athletic groups, or school groups? No   How often do you attend meetings of the  clubs or organizations you belong to? Never   In a typical week, how many times do you talk on the phone with family, friends, or neighbors? Three times a week   How often do you get together with friends or relatives? Three times a week   Are you , , , , never , or living with a partner?    How hard is it for you to pay for the very basics like food, housing, medical care, and heating? Very hard   Within the past 12 months, you worried that your food would run out before you got the money to buy more. Often true   Within the past 12 months, the food you bought just didnt last and you didnt have money to get more. Often true   In the past 12 months, has lack of transportation kept you from medical appointments or from getting medications? No   In the past 12 months, has lack of transportation kept you from meetings, work, or from getting things needed for daily living? Yes   How often do you have a drink containing alcohol? Never   How many drinks containing alcohol do you have on a typical day when you are drinking? Patient does not drink   How often do you have six or more drinks on one occasion? Never   In the last 12 months, was there a time when you were not able to pay the mortgage or rent on time? No   In the last 12 months, how many places have you lived? (range: at least 0) 1   In the last 12 months, was there a time when you did not have a steady place to sleep or slept in a shelter (including now)? No       Maura Singh is a 82 y.o. female who presents to clinic for follow-up for blood pressure and annual exam.  Blood pressure slightly elevated today but had been very low.  They just recently started back adding in her amlodipine.  She has actually been without her medication for her liver cancer in for a month and a half.  She is just now back on it.  She is actually feeling very good though.  The size of liver tumors have decreased in size which has  helped a good bit.  Her weight is also down as well.  She did have steroid induced diabetes but currently does not have this at this time.  She does have labs to review.  Legs are doing better now breathing is doing well has a history of COPD.  Has a history of aortic atherosclerosis but is not currently on statin therapy due to the liver hepatocellular carcinoma.  Here with her daughter today.  Desires to have a flu shot.      Hypertension  This is a recurrent problem. The current episode started more than 1 year ago. The problem has been gradually worsening since onset. The problem is uncontrolled. Associated symptoms include anxiety, malaise/fatigue and peripheral edema. Pertinent negatives include no chest pain, headaches, neck pain, palpitations or shortness of breath. Risk factors for coronary artery disease include stress. The current treatment provides no improvement. Compliance problems include diet and medication side effects.      Past Medical History:   Diagnosis Date    Arthritis     Atherosclerosis of aorta 10/27/2021    Benign cyst of left kidney 3/20/2018    on imaging from ct scan at [facility].     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     Lung disease     copd    Lung nodules 2/5/2020    Mediastinal lymphadenopathy 2/5/2020    Mixed hyperlipidemia 5/2/2018       Lab Results   Component Value Date    HGBA1C 5.6 06/17/2021    HGBA1C 5.7 (H) 09/05/2019    HGBA1C 6.1 (H) 01/22/2018      Lab Results   Component Value Date    CHOL 220 (H)  06/17/2021    CHOL 250 (H) 09/05/2019    CHOL 272 (H) 05/02/2018     Lab Results   Component Value Date    LDLCALC 149.8 06/17/2021    LDLCALC 146.2 09/05/2019    LDLCALC 173.6 (H) 05/02/2018       Wt Readings from Last 10 Encounters:   10/18/22 58.3 kg (128 lb 10.2 oz)   08/26/22 59.4 kg (130 lb 15.3 oz)   08/05/22 58.3 kg (128 lb 8.5 oz)   08/02/22 60.3 kg (132 lb 15 oz)   06/24/22 61.5 kg (135 lb 9.3 oz)   06/20/22 61.6 kg (135 lb 12.9 oz)   05/04/22 65.5 kg (144 lb 6.4 oz)   04/01/22 66.1 kg (145 lb 11.6 oz)   03/21/22 66.7 kg (147 lb)   03/01/22 68.1 kg (150 lb 2.1 oz)       The ASCVD Risk score (Vannessa DK, et al., 2019) failed to calculate for the following reasons:    The 2019 ASCVD risk score is only valid for ages 40 to 79  Lab Results   Component Value Date    WBC 9.19 10/03/2022    HGB 11.9 (L) 10/03/2022    HCT 37.4 10/03/2022     10/03/2022    CHOL 220 (H) 06/17/2021    TRIG 191 (H) 06/17/2021    HDL 32 (L) 06/17/2021    ALT 20 10/03/2022    AST 27 10/03/2022     10/03/2022    K 5.4 (H) 10/03/2022     10/03/2022    CREATININE 1.3 10/03/2022    BUN 32 (H) 10/03/2022    CO2 22 (L) 10/03/2022    TSH 1.551 10/03/2022    INR 1.0 08/24/2021    HGBA1C 5.6 06/17/2021       NM PET CT Routine FDG  Narrative: EXAMINATION:  NM PET CT ROUTINE    CLINICAL HISTORY:  Hematologic malignancy, staging; Hypokalemia    TECHNIQUE:  13.0 mCi of F18-FDG was administered intravenously in the left antecubital fossa.  After an approximately 60 min distribution time, PET/CT images were acquired from the skull base to the mid thigh. Transmission images were acquired to correct for attenuation using a whole body low-dose CT scan without contrast with the arms positioned above the head.    COMPARISON:  06/22/2023    FINDINGS:  Quality of the study: Adequate.    Head neck: FDG avid right cervical adenopathy appears similar in size but demonstrates interval increase in FDG uptake with SUV max of 16.0, previously  11.0.  There is a prominent subcentimeter left submandibular lymph node which does appear slightly increased in size from prior and now demonstrates nonspecific low level FDG uptake with SUV max of 3.2.    Chest: There has been interval increase in size of several of the previously described abnormal left axillary lymph nodes which also demonstrate interval increase in FDG avidity.  SUV max measures 22.0, previously 13.0.  There has been some slight interval increase in FDG uptake associated with multiple small mediastinal and bilateral hilar lymph nodes with SUV max of 6.3 in a subcarinal lymph node (previously 3.6), 4.5 at the right (previously 2.7), and 5.2 at the left hilum previously 3.9.  There is also been interval increase in FDG avidity and slight increase in size of a precardial mediastinal lymph node with SUV max of 8.1, previously 2.4.  Patchy non FDG avid ground-glass opacity seen throughout the bilateral lungs remain unchanged.    Abdomen and pelvis: Small left adrenal nodule appears unchanged in size with no significant change in low level tracer uptake.  SUV max measures 3.2, previously 2.8. Bulky bilateral inguinal and iliac chain lymph nodes appear similar in size but demonstrate interval increased FDG uptake with SUV max of 25.0 in the right inguinal region (previously 15.0), 24.0 in the left inguinal region (previously 15.0), 25.0 in the right iliac chain (previously 20.0), and 22.0 in the left iliac chain (previously 6.5).    Skeletal: No abnormal foci of increased tracer uptake are present.    Physiologic uptake of the tracer is present within the brain, salivary glands, myocardium, GI and  tracts.    Incidental CT findings: Numerous sigmoid colonic diverticula present without evidence of acute diverticulitis.  Multiple nonobstructing renal stones are present on the left.  No significant change in appearance of left renal cyst.  Nodular liver contours are again noted, suggesting cirrhosis.  A  small sliding-type hiatal hernia is present.  Fairly extensive atherosclerotic calcifications are seen throughout the neck, chest, abdomen, and pelvis.  There are emphysematous changes present throughout the bilateral lungs.  Impression: 1. Overall interval worsening of FDG avid cervical, thoracic, and pelvic adenopathy as above.  2. Unchanged appearance of small left adrenal nodule with associated low level FDG uptake.  3. Unchanged appearance of non FDG avid patchy ground-glass opacities in the bilateral lungs.  4. Other stable findings as above    Electronically signed by: Luis Burton MD  Date:    09/28/2022  Time:    14:24        Review of Systems   Constitutional:  Positive for malaise/fatigue. Negative for activity change and unexpected weight change.   HENT:  Positive for hearing loss. Negative for rhinorrhea and trouble swallowing.    Eyes:  Positive for visual disturbance. Negative for discharge.   Respiratory:  Negative for chest tightness, shortness of breath and wheezing.    Cardiovascular:  Negative for chest pain and palpitations.   Gastrointestinal:  Negative for blood in stool, constipation, diarrhea and vomiting.   Endocrine: Negative for polydipsia and polyuria.   Genitourinary:  Negative for difficulty urinating, dysuria, hematuria and menstrual problem.   Musculoskeletal:  Positive for arthralgias. Negative for joint swelling and neck pain.   Neurological:  Positive for weakness. Negative for headaches.   Psychiatric/Behavioral:  Positive for dysphoric mood. Negative for confusion.    Objective:     Vitals:    10/18/22 1401   BP: (!) 152/70   Pulse: 64   Temp: 97.6 °F (36.4 °C)   TempSrc: Temporal   SpO2: 96%   Weight: 58.3 kg (128 lb 10.2 oz)   Height: 5' (1.524 m)     Physical Exam  Vitals and nursing note reviewed.   Constitutional:       General: She is awake.      Appearance: Normal appearance. She is well-developed, well-groomed and normal weight.   HENT:      Head: Normocephalic and  atraumatic.      Right Ear: Tympanic membrane and external ear normal.      Left Ear: Tympanic membrane and external ear normal.      Nose: Nose normal.   Eyes:      Conjunctiva/sclera: Conjunctivae normal.   Neck:      Thyroid: No thyromegaly or thyroid tenderness.   Cardiovascular:      Rate and Rhythm: Normal rate and regular rhythm.      Heart sounds: Normal heart sounds.   Pulmonary:      Effort: Pulmonary effort is normal. No accessory muscle usage.      Breath sounds: Normal breath sounds.   Musculoskeletal:      Cervical back: Normal range of motion and neck supple.   Neurological:      General: No focal deficit present.      Mental Status: She is alert. Mental status is at baseline.   Psychiatric:         Attention and Perception: Attention normal.         Mood and Affect: Mood normal.         Speech: Speech normal.         Behavior: Behavior normal. Behavior is cooperative.         Thought Content: Thought content normal.         Judgment: Judgment normal.     Assessment:     1. Essential hypertension    2. History of steroid-induced diabetes mellitus    3. 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 Secondary malignant neoplasm of bone    5. Cancer associated pain    6. HCC (hepatocellular carcinoma)    7. Immunodeficiency due to chemotherapy    8. Mixed hyperlipidemia    9. Chronic diastolic congestive heart failure    10. Atherosclerosis of aorta    11. Chronic obstructive pulmonary disease, unspecified COPD type      Plan:   Muara was seen today for annual exam.    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  Blood pressure is elevated today will continue to monitor at home.  Will adjust her amlodipine.  Continue with Oncology recommendations.  Continue with current medications for hepatocellular carcinoma.  Breathing is stable.  Weight is down.  Desires to have flu shot today.  , Continue with current medications and interventions. Labs reviewed.       Health Maintenance Due   Topic Date Due    Shingles Vaccine (1 of 2) Never done    COVID-19 Vaccine (3 - Booster for Pfizer series) 03/29/2021       Follow up in about 3 months (around 1/18/2023) for f/u VICKY Franz/ blood pressure .    There are no Patient Instructions on file for this visit.

## 2022-10-19 NOTE — TELEPHONE ENCOUNTER
SW intern called Pt regarding distress score 8. SW intern left voicemail. SW inter will remain available.

## 2022-10-31 NOTE — TELEPHONE ENCOUNTER
Specialty Pharmacy - Refill Coordination  Specialty Pharmacy - Clinical Reassessment    Specialty Medication Orders Linked to Encounter      Flowsheet Row Most Recent Value   Medication #1 cabozantinib (CABOMETYX) 40 mg Tab (Order#[id number], Rx#[id number])          Patient Diagnosis   C22.0 - HCC (hepatocellular carcinoma)    Subjective    Maura Singh is a 82 y.o. female, who is followed by the specialty pharmacy service for management and education.    Recent Encounters       Date Type Provider Description    10/31/2022 Specialty Pharmacy Sailaja Hester, Pradeep Refill Coordination; Follow-up Clinical Reassessment    10/07/2022 Specialty Pharmacy JOSUÉ KITCHEN PharmD Clinical Intervention; Refill Coordination    08/24/2022 Specialty Pharmacy Onelia Vu Refill Coordination; Clinical Intervention    07/26/2022 Specialty Pharmacy Acosta Ordaz PharmD Refill Coordination    06/23/2022 Specialty Pharmacy Monica Ramirez-Messi Refill Coordination          Clinical call attempts since last clinical assessment   10/7/2022  3:18 PM - Specialty Pharmacy - Clinical Intervention by Josué Kitchen PharmD  10/31/2022  7:39 PM - Specialty Pharmacy - Clinical Reassessment by Sailaja Hester PharmD     Current Outpatient Medications   Medication Sig    (Magic mouthwash) 1:1:1 Benadryl 12.5mg/5ml liq, aluminum & magnesium hydroxide-simehticone (Maalox), LIDOcaine viscous 2% Take 10 mL PO every 4-6 hours as needed for throat or esophageal pain.    amLODIPine (NORVASC) 2.5 MG tablet Take 1 tablet (2.5 mg total) by mouth 3 (three) times daily.    cabozantinib (CABOMETYX) 40 mg Tab Take 1 tablet (40 mg) by mouth once daily. (Patient not taking: Reported on 10/18/2022.)    clobetasol 0.05% (TEMOVATE) 0.05 % Oint Apply topically 2 (two) times daily.    levothyroxine (TIROSINT) 50 mcg Cap Take 1 tablet (50 mcg) by mouth before breakfast.    lisinopriL (PRINIVIL,ZESTRIL) 40 MG tablet Take 1 tablet (40 mg total) by mouth  "once daily.    magnesium hydroxide (MILK OF MAGNESIA ORAL) Take by mouth once daily.    mupirocin (BACTROBAN) 2 % ointment Apply topically 3 (three) times daily.    neomycin-polymyxin-hydrocortisone (CORTISPORIN) 3.5-10,000-1 mg/mL-unit/mL-% otic suspension Place 3 drops into the right ear 4 (four) times daily.    ondansetron (ZOFRAN-ODT) 8 MG TbDL Take 1 tablet (8 mg total) by mouth every 12 (twelve) hours as needed.    oxyCODONE (OXYCONTIN) 15 mg TR12 12 hr tablet Take 1 tablet (15 mg total) by mouth every 12 (twelve) hours.    oxyCODONE-acetaminophen (PERCOCET)  mg per tablet Take 1 tablet by mouth 4 (four) times daily as needed (severe pain).    pulse oximeter (PULSE OXIMETER) device by Apply Externally route 2 (two) times a day. Use twice daily at 8 AM and 3 PM and record the value in MyChart as directed.    senna (SENOKOT) 8.6 mg tablet Take 1 tablet by mouth once daily.    tacrolimus (PROTOPIC) 0.1 % ointment Apply topically 2 (two) times daily.    triamcinolone acetonide 0.1% (KENALOG) 0.1 % cream Apply topically 2 (two) times daily. for 10 days   Last reviewed on 10/18/2022  2:41 PM by Ruth Candelaria MA    Review of patient's allergies indicates:   Allergen Reactions    Pravastatin Other (See Comments)     Burning/flushing    Xgeva [denosumab] Other (See Comments)     Mouth and diffuse pain    Allopurinol analogues Other (See Comments)     "makes me sick and feel crazy"   Last reviewed on  10/18/2022 2:41 PM by Ruth Candelaria    Drug Interactions    Clinically relevant drug interactions identified: no       Medication Adherence    Adherence tools used: directed education  Support network for adherence: family member           Refill Questions - Documented Responses      Flowsheet Row Most Recent Value   Patient Availability and HIPAA Verification    Does patient want to proceed with activity? Yes   HIPAA/medical authority confirmed? Yes   Relationship to patient of person spoken to? Self   Refill " Screening Questions    Changes to allergies? No   Changes to medications? No   New conditions since last clinic visit? No   Unplanned office visit, urgent care, ED, or hospital admission in the last 4 weeks? No   How does patient/caregiver feel medication is working? Good   Financial problems or insurance changes? No   How many doses of your specialty medications were missed in the last 4 weeks? 0   Would patient like to speak to a pharmacist? No   When does the patient need to receive the medication? 11/08/22   Refill Delivery Questions    How will the patient receive the medication? [facility]   When does the patient need to receive the medication? 11/08/22   Shipping Address Home   Address in [facility] confirmed and updated if neccessary? Yes   Expected Copay ($) 0   Is the patient able to afford the medication copay? Yes   Payment Method zero copay   Days supply of Refill 30   Supplies needed? No supplies needed   Refill activity completed? Yes   Refill activity plan Refill scheduled   Shipment/Pickup Date: 11/03/22            Objective    She has a past medical history of Arthritis, Atherosclerosis of aorta (10/27/2021), Benign cyst of left kidney (3/20/2018), Chronic diastolic congestive heart failure (2/10/2020), Chronic obstructive pulmonary disease (2/10/2020), Deaf, Dermatitis, drug-induced (6/17/2021), Drug-induced hypothyroidism (6/17/2021), Essential hypertension (2/8/2018), Gastroesophageal reflux disease (5/2/2018), HCC (hepatocellular carcinoma) (2/20/2020), History of steroid-induced diabetes mellitus (2/10/2020), Hypertension, Immunodeficiency due to chemotherapy (12/9/2021), Kidney stone on left side (3/20/2018), Lung disease, Lung nodules (2/5/2020), Mediastinal lymphadenopathy (2/5/2020), and Mixed hyperlipidemia (5/2/2018).    Tried/failed medications: Bevacizumab and atezolizumab  -Nivolumab 480mg q4 weeks and lenvatinib       BP Readings from Last 4 Encounters:   10/18/22 (!) 152/70  "  08/26/22 124/71   08/26/22 (!) 158/78   08/05/22 126/74     Ht Readings from Last 4 Encounters:   10/18/22 5' (1.524 m)   08/26/22 5' (1.524 m)   08/02/22 5' 1" (1.549 m)   06/24/22 5' 1" (1.549 m)     Wt Readings from Last 4 Encounters:   10/18/22 58.3 kg (128 lb 10.2 oz)   08/26/22 59.4 kg (130 lb 15.3 oz)   08/05/22 58.3 kg (128 lb 8.5 oz)   08/02/22 60.3 kg (132 lb 15 oz)     Recent Labs   Lab Result Units 10/03/22  1636 09/06/22  1521 08/25/22  1457 08/03/22  1522   RBC M/uL 3.49 L 3.42 L 3.67 L 3.82 L   Hemoglobin g/dL 11.9 L 12.1 12.6 13.2   Hematocrit % 37.4 36.9 L 39.5 40.4   WBC K/uL 9.19 5.69 5.67 6.41   Gran # (ANC) K/uL 5.9 3.1 3.6 4.0   Gran % % 63.9 54.8 63.4 62.7   Platelets K/uL 358 196 155 166   Sodium mmol/L 137 141 143 140   Potassium mmol/L 5.4 H 4.7 2.9 L 3.4 L   Chloride mmol/L 102 106 104 104   Glucose mg/dL 97 90 106 82   BUN mg/dL 32 H 12 13 12   Creatinine mg/dL 1.3 0.7 0.8 0.8   Calcium mg/dL 10.0 9.0 8.3 L 8.3 L   Total Protein g/dL 8.6 H 7.0 6.8 6.6   Albumin g/dL 3.4 L 2.8 L 3.0 L 3.0 L   Total Bilirubin mg/dL 0.4 0.5 0.5 0.5   Alkaline Phosphatase U/L 109 75 57 67   AST U/L 27 50 H 28 31   ALT U/L 20 46 H 19 25     The goals of cancer treatment include:  Achieving remission of cancer, if possible  Reducing tumor size and spread of cancer, if remission is not possible  Minimizing pain and symptoms of the cancer  Preventing infection and other complications of treatment  Promoting adequate nutrition  Encouraging proper hydration  Improving or maintaining quality of life  Maintaining optimal therapy adherence  Minimizing and managing side effects         Assessment/Plan  Patient plans to continue therapy without changes      Indication, dosage, appropriateness, effectiveness, safety and convenience of her specialty medication(s) were reviewed today.     Patient Education       Side effects: none   Recent infections: none   Pain: none   Appetite: no changes   Energy, fatigue: no changes "   ED/UC visits: none   Medication list reviewed. No new allergies or health conditions.     Labs reviewed from: 10/3 labs     Therapy is appropriate for patient to continue.           Tasks added this encounter   12/1/2022 - Refill Call (Auto Added)  4/22/2023 - Clinical - Follow Up Assesement (180 day)   Tasks due within next 3 months   No tasks due.     Sailaja Hester, PharmD  [facility]  [address]  [facility] [address]  Phone: [phone]  Fax: [phone]

## 2022-10-31 NOTE — TELEPHONE ENCOUNTER
Outgoing call to pt for refill/follow up on Cabometyx. Patients daughter asked if she could call back because she was not available. Will continue to follow up.

## 2022-11-17 NOTE — PROGRESS NOTES
Subjective:      DATE OF VISIT: 11/17/22     ?  Patient ID:?Maura Singh is a 82 y.o. female.?? MR#: [id number]   ?   PRIMARY ONCOLOGIST: Dr. Burgess    ? Primary Care Providers:  Yessy Andrade MD, MD (General)     CHIEF COMPLAINT: ???  Follow-up  ?   ONCOLOGIC DIAGNOSIS:  Metastatic hepatocellular carcinoma  ?   CURRENT TREATMENT:  Cabozantinib 40 mg daily, had been on hold since 08/05/2022 due to diarrhea toxicity, restarted 10/2022    PAST TREATMENT:    Nivolumab 480mg q4 weeks   lenvatinib  Bevacizumab and atezolizumab, C1D1 8/26/21     ONCOLOGIC HISTORY:    Ms. Singh was admitted to [facility] on 02/04/2020 with gradually progressive shortness of breath, cough with hemoptysis along with intermittent abdominal pain, anorexia and fatigue.  In the emergency room she had workup including abdominal ultrasound showing multiple liver lesions concerning for metastatic disease.  CTA was negative for pulmonary embolism but revealed mediastinal adenopathy with right hilar soft tissue mass cannot exclude lymphadenopathy along with postobstructive collapse of right anterior segment lower lobe.  Hepatomegaly was noted with multiple low-density hypoenhancing lesions consistent with hepatic metastatic disease.    2/19/20 PET-CT:    To tele I was thinking to me that you already have 1 extra due as thinking last Monday for she 1. Hypermetabolic right infrahilar lung mass and associated partial postobstructive collapse right lower lobe.  2. Small hypermetabolic focus right lower lobe possibly early metastasis.  3. Hypermetabolic masslike thickening ascending colon suspicious for neoplasm.  4. Multifocal FDG avid lissette metastases neck, chest, abdomen, and pelvis as noted.  5. Extensive FDG avid hepatic metastases.  6. Widespread FDG avid osseous metastases.     02/06/2020 liver biopsy  Pathology:  Consistent with hepatocellular carcinoma, moderate to poorly differentiated     The biopsy shows nests of tumor cells  with deeply eosinophilic granular   cytoplasm. Immunostains performed shows results as:   HepPar - Focal granular positive   CK7 and TTF-1 - Negative    2020 cycle 1 day 1 nivolumab Q 2 weeks palliative immunotherapy    2020 cycle 2 day 1 nivolumab    2020 cycle 3 day 1 nivolumab, note: dose of 480 mg [id number] to limit clinic visits due to COVID-19 concerns    2020 cycle 5 day 1 nivolumab    20 inteval CT scan with response in lymphadenopathy, see below    3/2020 initiated lenvatinib; held, decreased dose due to rash side effect currently on 8 mg daily    2021 cycle 1 day 1 atezolizumab and bevacizumab    2022 cycle 1 day 1 cabozantinib 40 mg daily    INTERVAL EVENTS   restarted cabozantinib 40 mg daily 2022 has been on continuously for the last 5 weeks and patient notes improvement in lymph node no longer apparent right cervical region as well as groin.  She is feeling better with respect to energy appetite with weight gain.  Some hair growth back.  No notable diarrhea or constipation.  Pain well-controlled on current regimen refilled.    Review of Systems    ?   A comprehensive 14-point review of systems was reviewed with patient and was negative other than as specified above.   ?      Objective:      Physical Exam      ?   Vitals:    22 1018   BP: (!) 191/78   Pulse: 60   Temp: 97.6 °F (36.4 °C)        ?   ECO  General appearance: Generally well appearing, in no acute distress, hard of hearing.   Head, eyes, ears, nose, and throat: moist mucous membranes.   Respiratory:  Normal work of breathing  No appreciable cervical lymphadenopathy  Abdomen: nontender, nondistended.   Extremities: Warm, without edema.   Neurologic: Alert and oriented.   Skin: No rashes, ecchymoses or petechial lesion.   Psychiatric:  Normal mood and affect.    Laboratory:  ?   No visits with results within 1 Day(s) from this visit.   Latest known visit with results is:   Lab Visit on  11/15/2022   Component Date Value Ref Range Status    WBC 11/15/2022 5.48  3.90 - 12.70 K/uL Final    RBC 11/15/2022 3.90 (L)  4.00 - 5.40 M/uL Final    Hemoglobin 11/15/2022 12.8  12.0 - 16.0 g/dL Final    Hematocrit 11/15/2022 39.4  37.0 - 48.5 % Final    MCV 11/15/2022 101 (H)  82 - 98 fL Final    MCH 11/15/2022 32.8 (H)  27.0 - 31.0 pg Final    MCHC 11/15/2022 32.5  32.0 - 36.0 g/dL Final    RDW 11/15/2022 15.9 (H)  11.5 - 14.5 % Final    Platelets 11/15/2022 176  150 - 450 K/uL Final    MPV 11/15/2022 9.8  9.2 - 12.9 fL Final    Immature Granulocytes 11/15/2022 0.2  0.0 - 0.5 % Final    Gran # (ANC) 11/15/2022 3.0  1.8 - 7.7 K/uL Final    Immature Grans (Abs) 11/15/2022 0.01  0.00 - 0.04 K/uL Final    Lymph # 11/15/2022 1.8  1.0 - 4.8 K/uL Final    Mono # 11/15/2022 0.5  0.3 - 1.0 K/uL Final    Eos # 11/15/2022 0.1  0.0 - 0.5 K/uL Final    Baso # 11/15/2022 0.03  0.00 - 0.20 K/uL Final    nRBC 11/15/2022 0  0 /100 WBC Final    Gran % 11/15/2022 55.1  38.0 - 73.0 % Final    Lymph % 11/15/2022 31.9  18.0 - 48.0 % Final    Mono % 11/15/2022 9.7  4.0 - 15.0 % Final    Eosinophil % 11/15/2022 2.6  0.0 - 8.0 % Final    Basophil % 11/15/2022 0.5  0.0 - 1.9 % Final    Differential Method 11/15/2022 Automated   Final    Sodium 11/15/2022 141  136 - 145 mmol/L Final    Potassium 11/15/2022 3.8  3.5 - 5.1 mmol/L Final    Chloride 11/15/2022 106  95 - 110 mmol/L Final    CO2 11/15/2022 28  23 - 29 mmol/L Final    Glucose 11/15/2022 89  70 - 110 mg/dL Final    BUN 11/15/2022 16  8 - 23 mg/dL Final    Creatinine 11/15/2022 0.9  0.5 - 1.4 mg/dL Final    Calcium 11/15/2022 8.6 (L)  8.7 - 10.5 mg/dL Final    Total Protein 11/15/2022 7.2  6.0 - 8.4 g/dL Final    Albumin 11/15/2022 3.0 (L)  3.5 - 5.2 g/dL Final    Total Bilirubin 11/15/2022 0.4  0.1 - 1.0 mg/dL Final    Alkaline Phosphatase 11/15/2022 73  55 - 135 U/L Final    AST 11/15/2022 30  10 - 40 U/L Final    ALT 11/15/2022 26  10 - 44 U/L Final    Anion Gap 11/15/2022 7  (L)  8 - 16 mmol/L Final    eGFR 11/15/2022 >60  >60 mL/min/1.73 m^2 Final      ?   Tumor markers   AFP   Date Value Ref Range Status   08/24/2021 <2.0 0.0 - 8.4 ng/mL Final   02/21/2020 1.8 0.0 - 8.4 ng/mL Final       ?   Imaging:    Results for orders placed or performed during the hospital encounter of 09/28/22 (from the past 2160 hour(s))   NM PET CT Routine FDG    Impression    1. Overall interval worsening of FDG avid cervical, thoracic, and pelvic adenopathy as above.  2. Unchanged appearance of small left adrenal nodule with associated low level FDG uptake.  3. Unchanged appearance of non FDG avid patchy ground-glass opacities in the bilateral lungs.  4. Other stable findings as above      Electronically signed by: Luis Burton MD  Date:    09/28/2022  Time:    14:24     *Note: Due to a large number of results and/or encounters for the requested time period, some results have not been displayed. A complete set of results can be found in Results Review.         Pathology:        02/06/2020 liver biopsy  LIVER, BIOPSY:   Consistent with hepatocellular carcinoma, moderate to poorly differentiated     The biopsy shows nests of tumor cells with deeply eosinophilic granular   cytoplasm. Immunostains performed shows results as:   HepPar - Focal granular positive   CK7 and TTF-1 - Negative    ?   Assessment/Plan:       1. HCC (hepatocellular carcinoma)    2. Diarrhea, unspecified type    3. Hypertension, unspecified type              Plan:     # metastatic hepatocellular carcinoma:  initiated first-line palliative immunotherapy with nivolumab 03/02/2020.  The last to restaging scans showing improvement particularly in liver lesions.  Reviewed today together restaging PET-CT.  Of note radiology read is in comparison to prior PET-CT February 2020 interval scans have been with CT which had shown improvement in liver lesions.  This most recent PET-CT does show new right cervical, mandibular and multiple bilateral  lung lesions.  Mixed response in lymph nodes in abdomen/pelvis with continued improvement in liver lesions.  Repeat biopsy left groin lymph node consistent with same primary, hepatocellular carcinoma.  On lenvatinib initiated early March 2021, due to rash/pruritus/odynophagia complications lenvatinib on hold with resolution and restarted up titrated to 8 mg tolerating well.  Restaging PET 08/20/2021 reviewed with mixed response resolution of pulmonary area of avidity however other areas with findings consistent with progression with enlargement and new lymphadenopathy in neck chest abdomen and pelvis.  I discussed options including best supportive care or can consider bevacizumab and atezolizumab.  She did have prior immunotherapy however she had initial excellent response and may be consideration given combination with bevacizumab which she has not yet tried.  - 08/26/2021 cycle 1 day 1 atezolizumab and bevacizumab, tolerated exceptionally well.  Presents for cycle 2 today labs reviewed no concerning findings and will proceed with treatment today.  - pain well controlled on Xtampza 13.5mER bid (refilled 12/30/21) and 3-4 percocet/day  Per patient preference to wait until after holidays for repeat scan recently performed January 2022 and we review in detail results today unfortunately showing evidence of progressive disease.  We discussed she has had already several lines of therapy and limited further options.  Previously on lenvatinib unable to achieve typical doses due to side effects with rash primarily; I did discuss consideration of cabozantinib also used hepatocellular carcinoma but may have similar side effects and potential for limited impact on her disease.  She is interested in trialing this.  We decided to start dose reduced given prior intolerance is cabozantinib 40 mg  She has now been on cabozantinib 40 mg initiated 2/4/22  Four months of this treatment with restaging scans reviewed with patient and  daughter show overall exceptional response to treatment in neck, thoracic and inguinal regions there has been notable diminished SUV as compared to prior in January 2022 with exception of 1 right inguinal lymph node and 1 right axillary lymph node.      In early August 2022 saw nurse practitioner who discontinued cabozantinib due to diarrhea toxicity with full resolution but has been off therapy for 2 months in the interim has had progressive disease demonstrated by repeat PET scan.  October 2022 restarted due to resolution of bowel abnormality and has had excellent tolerance with improvement in functional status and clinical improvement in lymphadenopathy cervical region which is reassuring.  Recommend restaging scans after holidays early January per patient preference.  Lab work that abnormality.  Hypertension noted however may be attributed to financial anxiety earlier today notes generally well controlled at home and recommend follow-up with primary care as needed if persistent elevation.      Follow-Up:   Patient Instructions   RV in 2 mo after repeat PET with same day cbc, cmp, tsh, free t4 prior

## 2022-11-23 NOTE — TELEPHONE ENCOUNTER
NONA banuelos called Pt regarding distress score 10. NONA banuelos spoke w/ daughter, Lilian, who reports this is r/t financial concerns. NONA banuelos will mail local resources for utility assistance to Pt. Daughter requests a referral for behavioral health for Pt. Daughter reports she does not know if the Pt will want to speak to a therapists, but would like a referral for the Pt. Daughter also reports that Pt has not been treated by palliative care b/c there are no providers available. NONA banuelos informed daughter she will f/u w/ supervisor regarding this. NONA banuelos will remain available.

## 2022-11-30 NOTE — TELEPHONE ENCOUNTER
Outgoing call regarding Cabometyx refill, informed pt contacting provider for refill approval. Will follow up once provider respond

## 2022-12-01 NOTE — TELEPHONE ENCOUNTER
Specialty Pharmacy - Refill Coordination    Specialty Medication Orders Linked to Encounter      Flowsheet Row Most Recent Value   Medication #1 cabozantinib (CABOMETYX) 40 mg Tab (Order#[id number], Rx#[id number])            Refill Questions - Documented Responses      Flowsheet Row Most Recent Value   Patient Availability and HIPAA Verification    Does patient want to proceed with activity? Yes   HIPAA/medical authority confirmed? Yes   Relationship to patient of person spoken to? Self   Refill Screening Questions    Changes to allergies? No   Changes to medications? No   New conditions since last clinic visit? No   Unplanned office visit, urgent care, ED, or hospital admission in the last 4 weeks? No   How does patient/caregiver feel medication is working? Good   Financial problems or insurance changes? No   How many doses of your specialty medications were missed in the last 4 weeks? 0   Would patient like to speak to a pharmacist? No   When does the patient need to receive the medication? 12/09/22   Refill Delivery Questions    How will the patient receive the medication? MEDRx   When does the patient need to receive the medication? 12/09/22   Shipping Address Home   Address in [facility] confirmed and updated if neccessary? Yes   Expected Copay ($) 0   Is the patient able to afford the medication copay? Yes   Payment Method zero copay   Days supply of Refill 30   Supplies needed? No supplies needed   Refill activity completed? Yes   Refill activity plan Refill scheduled   Shipment/Pickup Date: 12/06/22            Current Outpatient Medications   Medication Sig    (Magic mouthwash) 1:1:1 Benadryl 12.5mg/5ml liq, aluminum & magnesium hydroxide-simehticone (Maalox), LIDOcaine viscous 2% Take 10 mL PO every 4-6 hours as needed for throat or esophageal pain.    amLODIPine (NORVASC) 2.5 MG tablet Take 1 tablet (2.5 mg total) by mouth 3 (three) times daily.    cabozantinib (CABOMETYX) 40 mg Tab Take 1 tablet  "(40 mg) by mouth once daily.    clobetasol 0.05% (TEMOVATE) 0.05 % Oint Apply topically 2 (two) times daily.    diphenoxylate-atropine 2.5-0.025 mg (LOMOTIL) 2.5-0.025 mg per tablet Take 1 tablet by mouth 4 (four) times daily as needed for Diarrhea.    levothyroxine (TIROSINT) 50 mcg Cap Take 1 tablet (50 mcg) by mouth before breakfast.    lisinopriL (PRINIVIL,ZESTRIL) 40 MG tablet Take 1 tablet (40 mg total) by mouth once daily.    magnesium hydroxide (MILK OF MAGNESIA ORAL) Take by mouth once daily.    mupirocin (BACTROBAN) 2 % ointment Apply topically 3 (three) times daily.    neomycin-polymyxin-hydrocortisone (CORTISPORIN) 3.5-10,000-1 mg/mL-unit/mL-% otic suspension Place 3 drops into the right ear 4 (four) times daily.    ondansetron (ZOFRAN-ODT) 8 MG TbDL Take 1 tablet (8 mg total) by mouth every 12 (twelve) hours as needed.    oxyCODONE (OXYCONTIN) 15 mg TR12 12 hr tablet Take 1 tablet (15 mg total) by mouth every 12 (twelve) hours.    oxyCODONE-acetaminophen (PERCOCET)  mg per tablet Take 1 tablet by mouth 4 (four) times daily as needed (severe pain).    pulse oximeter (PULSE OXIMETER) device by Apply Externally route 2 (two) times a day. Use twice daily at 8 AM and 3 PM and [facility].    senna (SENOKOT) 8.6 mg tablet Take 1 tablet by mouth once daily.    tacrolimus (PROTOPIC) 0.1 % ointment Apply topically 2 (two) times daily.    triamcinolone acetonide 0.1% (KENALOG) 0.1 % cream Apply topically 2 (two) times daily. for 10 days   Last reviewed on 11/17/2022  2:59 PM by Gill Burgess MD    Review of patient's allergies indicates:   Allergen Reactions    Pravastatin Other (See Comments)     Burning/flushing    Xgeva [denosumab] Other (See Comments)     Mouth and diffuse pain    Allopurinol analogues Other (See Comments)     "makes me sick and feel crazy"    Last reviewed on  11/23/2022 12:29 PM by Roxann Layne      Tasks added this encounter   1/1/2023 - Refill Call " (Auto Added)   Tasks due within next 3 months   No tasks due.     [facility]  [address]  [facility] [address]  Phone: [phone]  Fax: [phone]

## 2023-01-01 ENCOUNTER — PATIENT MESSAGE (OUTPATIENT)
Dept: HEMATOLOGY/ONCOLOGY | Facility: CLINIC | Age: 83
End: 2023-01-01
Payer: MEDICARE

## 2023-01-01 ENCOUNTER — LAB VISIT (OUTPATIENT)
Dept: LAB | Facility: HOSPITAL | Age: 83
End: 2023-01-01
Attending: INTERNAL MEDICINE
Payer: MEDICARE

## 2023-01-01 ENCOUNTER — EXTERNAL CHRONIC CARE MANAGEMENT (OUTPATIENT)
Dept: PRIMARY CARE CLINIC | Facility: CLINIC | Age: 83
End: 2023-01-01
Payer: MEDICARE

## 2023-01-01 ENCOUNTER — TELEPHONE (OUTPATIENT)
Dept: HEMATOLOGY/ONCOLOGY | Facility: CLINIC | Age: 83
End: 2023-01-01
Payer: MEDICARE

## 2023-01-01 ENCOUNTER — PES CALL (OUTPATIENT)
Dept: ADMINISTRATIVE | Facility: CLINIC | Age: 83
End: 2023-01-01
Payer: MEDICARE

## 2023-01-01 ENCOUNTER — PATIENT MESSAGE (OUTPATIENT)
Dept: HEMATOLOGY/ONCOLOGY | Facility: CLINIC | Age: 83
End: 2023-01-01

## 2023-01-01 ENCOUNTER — OUTPATIENT CASE MANAGEMENT (OUTPATIENT)
Dept: ADMINISTRATIVE | Facility: OTHER | Age: 83
End: 2023-01-01
Payer: MEDICARE

## 2023-01-01 ENCOUNTER — OFFICE VISIT (OUTPATIENT)
Dept: HEMATOLOGY/ONCOLOGY | Facility: CLINIC | Age: 83
End: 2023-01-01
Payer: MEDICARE

## 2023-01-01 ENCOUNTER — INFUSION (OUTPATIENT)
Dept: INFUSION THERAPY | Facility: HOSPITAL | Age: 83
End: 2023-01-01
Attending: NURSE PRACTITIONER
Payer: MEDICARE

## 2023-01-01 ENCOUNTER — PATIENT MESSAGE (OUTPATIENT)
Dept: PRIMARY CARE CLINIC | Facility: CLINIC | Age: 83
End: 2023-01-01
Payer: MEDICARE

## 2023-01-01 ENCOUNTER — EXTERNAL HOME HEALTH (OUTPATIENT)
Dept: HOME HEALTH SERVICES | Facility: HOSPITAL | Age: 83
End: 2023-01-01
Payer: MEDICARE

## 2023-01-01 ENCOUNTER — TELEPHONE (OUTPATIENT)
Dept: ADMINISTRATIVE | Facility: CLINIC | Age: 83
End: 2023-01-01
Payer: MEDICARE

## 2023-01-01 ENCOUNTER — PATIENT MESSAGE (OUTPATIENT)
Dept: PHARMACY | Facility: CLINIC | Age: 83
End: 2023-01-01
Payer: MEDICARE

## 2023-01-01 ENCOUNTER — OFFICE VISIT (OUTPATIENT)
Dept: PRIMARY CARE CLINIC | Facility: CLINIC | Age: 83
End: 2023-01-01
Payer: MEDICARE

## 2023-01-01 ENCOUNTER — EXTERNAL CHRONIC CARE MANAGEMENT (OUTPATIENT)
Dept: PRIMARY CARE CLINIC | Facility: CLINIC | Age: 83
End: 2023-01-01
Payer: OTHER MISCELLANEOUS

## 2023-01-01 ENCOUNTER — PATIENT MESSAGE (OUTPATIENT)
Dept: ADMINISTRATIVE | Facility: OTHER | Age: 83
End: 2023-01-01
Payer: MEDICARE

## 2023-01-01 ENCOUNTER — HOSPITAL ENCOUNTER (OUTPATIENT)
Facility: HOSPITAL | Age: 83
Discharge: HOME | End: 2023-05-01
Attending: EMERGENCY MEDICINE | Admitting: FAMILY MEDICINE
Payer: MEDICARE

## 2023-01-01 ENCOUNTER — OFFICE VISIT (OUTPATIENT)
Dept: INTERNAL MEDICINE | Facility: CLINIC | Age: 83
End: 2023-01-01
Payer: MEDICARE

## 2023-01-01 ENCOUNTER — OFFICE VISIT (OUTPATIENT)
Dept: HOME HEALTH SERVICES | Facility: CLINIC | Age: 83
End: 2023-01-01
Payer: MEDICARE

## 2023-01-01 ENCOUNTER — INFUSION (OUTPATIENT)
Dept: INFUSION THERAPY | Facility: HOSPITAL | Age: 83
End: 2023-01-01
Attending: INTERNAL MEDICINE
Payer: MEDICARE

## 2023-01-01 ENCOUNTER — OUTPATIENT CASE MANAGEMENT (OUTPATIENT)
Dept: ADMINISTRATIVE | Facility: OTHER | Age: 83
End: 2023-01-01

## 2023-01-01 ENCOUNTER — TELEPHONE (OUTPATIENT)
Dept: PRIMARY CARE CLINIC | Facility: CLINIC | Age: 83
End: 2023-01-01
Payer: MEDICARE

## 2023-01-01 ENCOUNTER — HOSPITAL ENCOUNTER (OUTPATIENT)
Dept: RADIOLOGY | Facility: HOSPITAL | Age: 83
Discharge: HOME | End: 2023-01-26
Attending: INTERNAL MEDICINE
Payer: MEDICARE

## 2023-01-01 ENCOUNTER — SPECIALTY PHARMACY (OUTPATIENT)
Dept: PHARMACY | Facility: CLINIC | Age: 83
End: 2023-01-01
Payer: MEDICARE

## 2023-01-01 ENCOUNTER — HOSPITAL ENCOUNTER (OUTPATIENT)
Dept: RADIOLOGY | Facility: HOSPITAL | Age: 83
Discharge: HOME | End: 2023-01-30
Attending: INTERNAL MEDICINE
Payer: MEDICARE

## 2023-01-01 ENCOUNTER — HOSPITAL ENCOUNTER (EMERGENCY)
Facility: HOSPITAL | Age: 83
Discharge: HOME | End: 2023-04-26
Attending: EMERGENCY MEDICINE
Payer: MEDICARE

## 2023-01-01 VITALS
WEIGHT: 136.81 LBS | HEIGHT: 62 IN | RESPIRATION RATE: 16 BRPM | DIASTOLIC BLOOD PRESSURE: 63 MMHG | WEIGHT: 171.31 LBS | HEART RATE: 83 BPM | TEMPERATURE: 98 F | SYSTOLIC BLOOD PRESSURE: 154 MMHG | HEIGHT: 58 IN | DIASTOLIC BLOOD PRESSURE: 86 MMHG | OXYGEN SATURATION: 96 % | OXYGEN SATURATION: 98 % | HEART RATE: 56 BPM | TEMPERATURE: 97 F | BODY MASS INDEX: 28.72 KG/M2 | BODY MASS INDEX: 31.52 KG/M2 | RESPIRATION RATE: 16 BRPM | SYSTOLIC BLOOD PRESSURE: 134 MMHG

## 2023-01-01 VITALS
SYSTOLIC BLOOD PRESSURE: 146 MMHG | HEIGHT: 58 IN | WEIGHT: 171 LBS | DIASTOLIC BLOOD PRESSURE: 71 MMHG | BODY MASS INDEX: 35.89 KG/M2 | TEMPERATURE: 99 F | HEART RATE: 53 BPM | OXYGEN SATURATION: 98 %

## 2023-01-01 VITALS
TEMPERATURE: 99 F | WEIGHT: 135.81 LBS | DIASTOLIC BLOOD PRESSURE: 65 MMHG | SYSTOLIC BLOOD PRESSURE: 139 MMHG | HEART RATE: 62 BPM | BODY MASS INDEX: 26.66 KG/M2 | HEIGHT: 60 IN

## 2023-01-01 VITALS
SYSTOLIC BLOOD PRESSURE: 158 MMHG | RESPIRATION RATE: 20 BRPM | HEIGHT: 60 IN | BODY MASS INDEX: 26.84 KG/M2 | HEART RATE: 61 BPM | WEIGHT: 136.69 LBS | DIASTOLIC BLOOD PRESSURE: 88 MMHG | TEMPERATURE: 97 F | OXYGEN SATURATION: 93 %

## 2023-01-01 VITALS
DIASTOLIC BLOOD PRESSURE: 65 MMHG | HEART RATE: 57 BPM | RESPIRATION RATE: 16 BRPM | OXYGEN SATURATION: 94 % | BODY MASS INDEX: 27.48 KG/M2 | HEIGHT: 60 IN | SYSTOLIC BLOOD PRESSURE: 115 MMHG | TEMPERATURE: 98 F | WEIGHT: 140 LBS

## 2023-01-01 VITALS
TEMPERATURE: 98 F | BODY MASS INDEX: 26.61 KG/M2 | HEART RATE: 52 BPM | RESPIRATION RATE: 18 BRPM | HEIGHT: 60 IN | DIASTOLIC BLOOD PRESSURE: 63 MMHG | WEIGHT: 135.56 LBS | OXYGEN SATURATION: 95 % | SYSTOLIC BLOOD PRESSURE: 128 MMHG

## 2023-01-01 VITALS
BODY MASS INDEX: 27.23 KG/M2 | HEIGHT: 60 IN | RESPIRATION RATE: 18 BRPM | TEMPERATURE: 98 F | TEMPERATURE: 98 F | OXYGEN SATURATION: 95 % | BODY MASS INDEX: 26.61 KG/M2 | OXYGEN SATURATION: 90 % | SYSTOLIC BLOOD PRESSURE: 137 MMHG | HEIGHT: 60 IN | DIASTOLIC BLOOD PRESSURE: 69 MMHG | HEART RATE: 59 BPM | WEIGHT: 135.56 LBS | SYSTOLIC BLOOD PRESSURE: 108 MMHG | DIASTOLIC BLOOD PRESSURE: 65 MMHG | HEART RATE: 62 BPM | WEIGHT: 138.69 LBS | RESPIRATION RATE: 16 BRPM

## 2023-01-01 VITALS
HEART RATE: 59 BPM | WEIGHT: 136.25 LBS | SYSTOLIC BLOOD PRESSURE: 156 MMHG | OXYGEN SATURATION: 93 % | DIASTOLIC BLOOD PRESSURE: 69 MMHG | BODY MASS INDEX: 26.75 KG/M2 | HEIGHT: 60 IN | TEMPERATURE: 97 F

## 2023-01-01 VITALS
HEART RATE: 67 BPM | SYSTOLIC BLOOD PRESSURE: 140 MMHG | RESPIRATION RATE: 18 BRPM | OXYGEN SATURATION: 98 % | DIASTOLIC BLOOD PRESSURE: 64 MMHG

## 2023-01-01 VITALS
RESPIRATION RATE: 23 BRPM | SYSTOLIC BLOOD PRESSURE: 168 MMHG | DIASTOLIC BLOOD PRESSURE: 71 MMHG | OXYGEN SATURATION: 98 % | HEART RATE: 58 BPM | TEMPERATURE: 98 F

## 2023-01-01 VITALS
WEIGHT: 140 LBS | OXYGEN SATURATION: 95 % | TEMPERATURE: 98 F | SYSTOLIC BLOOD PRESSURE: 133 MMHG | BODY MASS INDEX: 27.48 KG/M2 | DIASTOLIC BLOOD PRESSURE: 59 MMHG | HEIGHT: 60 IN | HEART RATE: 53 BPM

## 2023-01-01 DIAGNOSIS — R19.7 DIARRHEA, UNSPECIFIED TYPE: ICD-10-CM

## 2023-01-01 DIAGNOSIS — R55 SYNCOPE, UNSPECIFIED SYNCOPE TYPE: Primary | ICD-10-CM

## 2023-01-01 DIAGNOSIS — I70.0 ATHEROSCLEROSIS OF AORTA: ICD-10-CM

## 2023-01-01 DIAGNOSIS — E87.1 HYPONATREMIA: ICD-10-CM

## 2023-01-01 DIAGNOSIS — C22.0 HCC (HEPATOCELLULAR CARCINOMA): Primary | ICD-10-CM

## 2023-01-01 DIAGNOSIS — C22.0 HCC (HEPATOCELLULAR CARCINOMA): ICD-10-CM

## 2023-01-01 DIAGNOSIS — R16.0 LIVER MASSES: ICD-10-CM

## 2023-01-01 DIAGNOSIS — I77.1 TORTUOUS AORTA: ICD-10-CM

## 2023-01-01 DIAGNOSIS — R55 SYNCOPE, UNSPECIFIED SYNCOPE TYPE: ICD-10-CM

## 2023-01-01 DIAGNOSIS — L84 FOOT CALLUS: ICD-10-CM

## 2023-01-01 DIAGNOSIS — Z09 HOSPITAL DISCHARGE FOLLOW-UP: Primary | ICD-10-CM

## 2023-01-01 DIAGNOSIS — C79.51 SECONDARY MALIGNANT NEOPLASM OF BONE: ICD-10-CM

## 2023-01-01 DIAGNOSIS — E03.2 DRUG-INDUCED HYPOTHYROIDISM: ICD-10-CM

## 2023-01-01 DIAGNOSIS — T45.1X5A IMMUNODEFICIENCY DUE TO CHEMOTHERAPY: ICD-10-CM

## 2023-01-01 DIAGNOSIS — E06.4 DRUG-INDUCED THYROIDITIS: ICD-10-CM

## 2023-01-01 DIAGNOSIS — R11.2 NAUSEA AND VOMITING, UNSPECIFIED VOMITING TYPE: ICD-10-CM

## 2023-01-01 DIAGNOSIS — K76.9 LIVER DISEASE, UNSPECIFIED: Primary | ICD-10-CM

## 2023-01-01 DIAGNOSIS — Z86.39 HISTORY OF STEROID-INDUCED DIABETES MELLITUS: ICD-10-CM

## 2023-01-01 DIAGNOSIS — I50.32 CHRONIC DIASTOLIC CONGESTIVE HEART FAILURE: ICD-10-CM

## 2023-01-01 DIAGNOSIS — R19.7 DIARRHEA, UNSPECIFIED TYPE: Primary | ICD-10-CM

## 2023-01-01 DIAGNOSIS — R55 SYNCOPE: ICD-10-CM

## 2023-01-01 DIAGNOSIS — C22.0 HEPATOCELLULAR CARCINOMA: ICD-10-CM

## 2023-01-01 DIAGNOSIS — Z00.00 ENCOUNTER FOR PREVENTIVE HEALTH EXAMINATION: Primary | ICD-10-CM

## 2023-01-01 DIAGNOSIS — K21.9 GASTROESOPHAGEAL REFLUX DISEASE, UNSPECIFIED WHETHER ESOPHAGITIS PRESENT: ICD-10-CM

## 2023-01-01 DIAGNOSIS — R59.0 MEDIASTINAL LYMPHADENOPATHY: ICD-10-CM

## 2023-01-01 DIAGNOSIS — C78.01 MALIGNANT NEOPLASM METASTATIC TO BOTH LUNGS: ICD-10-CM

## 2023-01-01 DIAGNOSIS — R63.8 DEHYDRATION SYMPTOMS: ICD-10-CM

## 2023-01-01 DIAGNOSIS — J44.9 CHRONIC OBSTRUCTIVE PULMONARY DISEASE, UNSPECIFIED COPD TYPE: ICD-10-CM

## 2023-01-01 DIAGNOSIS — D89.89 OTHER SPECIFIED DISORDERS INVOLVING THE IMMUNE MECHANISM, NOT ELSEWHERE CLASSIFIED: ICD-10-CM

## 2023-01-01 DIAGNOSIS — C77.8 SECONDARY MALIGNANT NEOPLASM OF LYMPH NODES OF MULTIPLE SITES: ICD-10-CM

## 2023-01-01 DIAGNOSIS — Z09 ENCOUNTER FOR FOLLOW-UP EXAMINATION AFTER COMPLETED TREATMENT FOR CONDITIONS OTHER THAN MALIGNANT NEOPLASM: ICD-10-CM

## 2023-01-01 DIAGNOSIS — M79.604 RIGHT LEG PAIN: ICD-10-CM

## 2023-01-01 DIAGNOSIS — K59.03 DRUG-INDUCED CONSTIPATION: ICD-10-CM

## 2023-01-01 DIAGNOSIS — R00.1 BRADYCARDIA: ICD-10-CM

## 2023-01-01 DIAGNOSIS — R10.13 EPIGASTRIC ABDOMINAL PAIN: Primary | ICD-10-CM

## 2023-01-01 DIAGNOSIS — R55 SYNCOPE AND COLLAPSE: ICD-10-CM

## 2023-01-01 DIAGNOSIS — I10 ESSENTIAL HYPERTENSION: Primary | ICD-10-CM

## 2023-01-01 DIAGNOSIS — I10 ESSENTIAL HYPERTENSION: ICD-10-CM

## 2023-01-01 DIAGNOSIS — C78.02 MALIGNANT NEOPLASM METASTATIC TO BOTH LUNGS: ICD-10-CM

## 2023-01-01 DIAGNOSIS — R00.1 BRADYCARDIA: Primary | ICD-10-CM

## 2023-01-01 DIAGNOSIS — Z79.899 IMMUNODEFICIENCY DUE TO CHEMOTHERAPY: ICD-10-CM

## 2023-01-01 DIAGNOSIS — D84.821 IMMUNODEFICIENCY DUE TO CHEMOTHERAPY: ICD-10-CM

## 2023-01-01 DIAGNOSIS — K59.00 CONSTIPATION, UNSPECIFIED CONSTIPATION TYPE: ICD-10-CM

## 2023-01-01 DIAGNOSIS — N39.0 URINARY TRACT INFECTION WITHOUT HEMATURIA, SITE UNSPECIFIED: ICD-10-CM

## 2023-01-01 DIAGNOSIS — Z78.9 NEEDS ASSISTANCE WITH COMMUNITY RESOURCES: ICD-10-CM

## 2023-01-01 DIAGNOSIS — M79.604 RIGHT LEG PAIN: Primary | ICD-10-CM

## 2023-01-01 DIAGNOSIS — G57.93 NEUROPATHY OF BOTH FEET: ICD-10-CM

## 2023-01-01 DIAGNOSIS — J69.0 ASPIRATION PNEUMONIA OF BOTH LOWER LOBES DUE TO GASTRIC SECRETIONS: ICD-10-CM

## 2023-01-01 DIAGNOSIS — R40.20 LOSS OF CONSCIOUSNESS: ICD-10-CM

## 2023-01-01 DIAGNOSIS — G89.3 CANCER ASSOCIATED PAIN: ICD-10-CM

## 2023-01-01 DIAGNOSIS — N17.9 AKI (ACUTE KIDNEY INJURY): Primary | ICD-10-CM

## 2023-01-01 DIAGNOSIS — R26.9 GAIT ABNORMALITY: ICD-10-CM

## 2023-01-01 DIAGNOSIS — E78.2 MIXED HYPERLIPIDEMIA: ICD-10-CM

## 2023-01-01 DIAGNOSIS — N17.9 AKI (ACUTE KIDNEY INJURY): ICD-10-CM

## 2023-01-01 LAB
ALBUMIN SERPL BCP-MCNC: 3.4 G/DL (ref 3.5–5.2)
ALBUMIN SERPL BCP-MCNC: 3.8 G/DL (ref 3.5–5.2)
ALBUMIN SERPL BCP-MCNC: 3.9 G/DL (ref 3.5–5.2)
ALP SERPL-CCNC: 63 U/L (ref 55–135)
ALP SERPL-CCNC: 65 U/L (ref 55–135)
ALT SERPL W/O P-5'-P-CCNC: 10 U/L (ref 10–44)
ALT SERPL W/O P-5'-P-CCNC: 12 U/L (ref 10–44)
AMMONIA PLAS-SCNC: 19 UMOL/L (ref 10–50)
ANION GAP SERPL CALC-SCNC: 10 MMOL/L (ref 8–16)
ANION GAP SERPL CALC-SCNC: 13 MMOL/L (ref 8–16)
ANION GAP SERPL CALC-SCNC: 15 MMOL/L (ref 8–16)
ANION GAP SERPL CALC-SCNC: 9 MMOL/L (ref 8–16)
APTT PPP: 27.7 SEC (ref 21–32)
AST SERPL-CCNC: 17 U/L (ref 10–40)
AST SERPL-CCNC: 18 U/L (ref 10–40)
AV INDEX (PROSTH): 0.69
AV MEAN GRADIENT: 3 MMHG
AV PEAK GRADIENT: 7 MMHG
AV VALVE AREA: 2.11 CM2
AV VELOCITY RATIO: 0.68
BACTERIA #/AREA URNS HPF: ABNORMAL /HPF
BACTERIA BLD CULT: NORMAL
BACTERIA BLD CULT: NORMAL
BACTERIA UR CULT: NORMAL
BACTERIA UR CULT: NORMAL
BASOPHILS # BLD AUTO: 0.03 K/UL (ref 0–0.2)
BASOPHILS # BLD AUTO: 0.04 K/UL (ref 0–0.2)
BASOPHILS # BLD AUTO: 0.06 K/UL (ref 0–0.2)
BASOPHILS NFR BLD: 0.4 % (ref 0–1.9)
BASOPHILS NFR BLD: 0.5 % (ref 0–1.9)
BASOPHILS NFR BLD: 0.8 % (ref 0–1.9)
BILIRUB SERPL-MCNC: 0.5 MG/DL (ref 0.1–1)
BILIRUB SERPL-MCNC: 0.6 MG/DL (ref 0.1–1)
BILIRUB UR QL STRIP: NEGATIVE
BNP SERPL-MCNC: 196 PG/ML (ref 0–99)
BNP SERPL-MCNC: 80 PG/ML (ref 0–99)
BSA FOR ECHO PROCEDURE: 1.8 M2
BUN SERPL-MCNC: 23 MG/DL (ref 8–23)
BUN SERPL-MCNC: 24 MG/DL (ref 8–23)
BUN SERPL-MCNC: 24 MG/DL (ref 8–23)
BUN SERPL-MCNC: 27 MG/DL (ref 8–23)
CALCIUM SERPL-MCNC: 10 MG/DL (ref 8.7–10.5)
CALCIUM SERPL-MCNC: 9.3 MG/DL (ref 8.7–10.5)
CALCIUM SERPL-MCNC: 9.4 MG/DL (ref 8.7–10.5)
CALCIUM SERPL-MCNC: 9.9 MG/DL (ref 8.7–10.5)
CHLORIDE SERPL-SCNC: 102 MMOL/L (ref 95–110)
CHLORIDE SERPL-SCNC: 103 MMOL/L (ref 95–110)
CLARITY UR: CLEAR
CO2 SERPL-SCNC: 20 MMOL/L (ref 23–29)
CO2 SERPL-SCNC: 21 MMOL/L (ref 23–29)
CO2 SERPL-SCNC: 22 MMOL/L (ref 23–29)
CO2 SERPL-SCNC: 25 MMOL/L (ref 23–29)
COLOR UR: YELLOW
CREAT SERPL-MCNC: 1.2 MG/DL (ref 0.5–1.4)
CREAT SERPL-MCNC: 1.4 MG/DL (ref 0.5–1.4)
CV ECHO LV RWT: 0.37 CM
DIFFERENTIAL METHOD: ABNORMAL
DOP CALC AO PEAK VEL: 1.34 M/S
DOP CALC AO VTI: 33.6 CM
DOP CALC LVOT AREA: 3 CM2
DOP CALC LVOT DIAMETER: 1.97 CM
DOP CALC LVOT PEAK VEL: 0.91 M/S
DOP CALC LVOT STROKE VOLUME: 70.98 CM3
DOP CALCLVOT PEAK VEL VTI: 23.3 CM
E WAVE DECELERATION TIME: 202.46 MSEC
E/A RATIO: 1.09
E/E' RATIO: 9 M/S
ECHO LV POSTERIOR WALL: 0.88 CM (ref 0.6–1.1)
EJECTION FRACTION: 60 %
EOSINOPHIL # BLD AUTO: 0 K/UL (ref 0–0.5)
EOSINOPHIL # BLD AUTO: 0.1 K/UL (ref 0–0.5)
EOSINOPHIL # BLD AUTO: 0.1 K/UL (ref 0–0.5)
EOSINOPHIL NFR BLD: 0.1 % (ref 0–8)
EOSINOPHIL NFR BLD: 1.2 % (ref 0–8)
EOSINOPHIL NFR BLD: 1.3 % (ref 0–8)
ERYTHROCYTE [DISTWIDTH] IN BLOOD BY AUTOMATED COUNT: 12.2 % (ref 11.5–14.5)
ERYTHROCYTE [DISTWIDTH] IN BLOOD BY AUTOMATED COUNT: 12.5 % (ref 11.5–14.5)
ERYTHROCYTE [DISTWIDTH] IN BLOOD BY AUTOMATED COUNT: 12.7 % (ref 11.5–14.5)
EST. GFR  (NO RACE VARIABLE): 37 ML/MIN/1.73 M^2
EST. GFR  (NO RACE VARIABLE): 45 ML/MIN/1.73 M^2
FRACTIONAL SHORTENING: 24 % (ref 28–44)
GLUCOSE SERPL-MCNC: 100 MG/DL (ref 70–110)
GLUCOSE SERPL-MCNC: 82 MG/DL (ref 70–110)
GLUCOSE SERPL-MCNC: 87 MG/DL (ref 70–110)
GLUCOSE SERPL-MCNC: 96 MG/DL (ref 70–110)
GLUCOSE UR QL STRIP: NEGATIVE
HCT VFR BLD AUTO: 38.3 % (ref 37–48.5)
HCT VFR BLD AUTO: 39.6 % (ref 37–48.5)
HCT VFR BLD AUTO: 39.9 % (ref 37–48.5)
HGB BLD-MCNC: 12.4 G/DL (ref 12–16)
HGB BLD-MCNC: 12.8 G/DL (ref 12–16)
HGB BLD-MCNC: 12.9 G/DL (ref 12–16)
HGB UR QL STRIP: NEGATIVE
IMM GRANULOCYTES # BLD AUTO: 0.02 K/UL (ref 0–0.04)
IMM GRANULOCYTES # BLD AUTO: 0.03 K/UL (ref 0–0.04)
IMM GRANULOCYTES # BLD AUTO: 0.03 K/UL (ref 0–0.04)
IMM GRANULOCYTES NFR BLD AUTO: 0.3 % (ref 0–0.5)
IMM GRANULOCYTES NFR BLD AUTO: 0.4 % (ref 0–0.5)
IMM GRANULOCYTES NFR BLD AUTO: 0.4 % (ref 0–0.5)
INR PPP: 1.1 (ref 0.8–1.2)
INTERVENTRICULAR SEPTUM: 0.95 CM (ref 0.6–1.1)
IVC DIAMETER: 1.28 CM
IVRT: 121.79 MSEC
KETONES UR QL STRIP: ABNORMAL
LA MAJOR: 5.31 CM
LA MINOR: 5.26 CM
LACTATE SERPL-SCNC: 1 MMOL/L (ref 0.5–2.2)
LACTATE SERPL-SCNC: 1.1 MMOL/L (ref 0.5–2.2)
LEFT ATRIUM SIZE: 2.96 CM
LEFT INTERNAL DIMENSION IN SYSTOLE: 3.64 CM (ref 2.1–4)
LEFT VENTRICLE DIASTOLIC VOLUME INDEX: 59.27 ML/M2
LEFT VENTRICLE DIASTOLIC VOLUME: 106.1 ML
LEFT VENTRICLE MASS INDEX: 84 G/M2
LEFT VENTRICLE SYSTOLIC VOLUME INDEX: 31.3 ML/M2
LEFT VENTRICLE SYSTOLIC VOLUME: 56.06 ML
LEFT VENTRICULAR INTERNAL DIMENSION IN DIASTOLE: 4.77 CM (ref 3.5–6)
LEFT VENTRICULAR MASS: 149.5 G
LEUKOCYTE ESTERASE UR QL STRIP: ABNORMAL
LIPASE SERPL-CCNC: 9 U/L (ref 4–60)
LV LATERAL E/E' RATIO: 10.29 M/S
LV SEPTAL E/E' RATIO: 8 M/S
LVOT MG: 1.72 MMHG
LVOT MV: 0.61 CM/S
LYMPHOCYTES # BLD AUTO: 1.4 K/UL (ref 1–4.8)
LYMPHOCYTES # BLD AUTO: 1.4 K/UL (ref 1–4.8)
LYMPHOCYTES # BLD AUTO: 1.5 K/UL (ref 1–4.8)
LYMPHOCYTES NFR BLD: 16.3 % (ref 18–48)
LYMPHOCYTES NFR BLD: 18 % (ref 18–48)
LYMPHOCYTES NFR BLD: 18.8 % (ref 18–48)
MAGNESIUM SERPL-MCNC: 2.1 MG/DL (ref 1.6–2.6)
MCH RBC QN AUTO: 31.3 PG (ref 27–31)
MCH RBC QN AUTO: 31.7 PG (ref 27–31)
MCH RBC QN AUTO: 31.8 PG (ref 27–31)
MCHC RBC AUTO-ENTMCNC: 32.1 G/DL (ref 32–36)
MCHC RBC AUTO-ENTMCNC: 32.4 G/DL (ref 32–36)
MCHC RBC AUTO-ENTMCNC: 32.6 G/DL (ref 32–36)
MCV RBC AUTO: 96 FL (ref 82–98)
MCV RBC AUTO: 98 FL (ref 82–98)
MCV RBC AUTO: 99 FL (ref 82–98)
MICROSCOPIC COMMENT: ABNORMAL
MONOCYTES # BLD AUTO: 0.7 K/UL (ref 0.3–1)
MONOCYTES # BLD AUTO: 0.8 K/UL (ref 0.3–1)
MONOCYTES # BLD AUTO: 0.8 K/UL (ref 0.3–1)
MONOCYTES NFR BLD: 10.3 % (ref 4–15)
MONOCYTES NFR BLD: 9 % (ref 4–15)
MONOCYTES NFR BLD: 9.5 % (ref 4–15)
MV PEAK A VEL: 0.66 M/S
MV PEAK E VEL: 0.72 M/S
NEUTROPHILS # BLD AUTO: 5.5 K/UL (ref 1.8–7.7)
NEUTROPHILS # BLD AUTO: 5.5 K/UL (ref 1.8–7.7)
NEUTROPHILS # BLD AUTO: 6.1 K/UL (ref 1.8–7.7)
NEUTROPHILS NFR BLD: 68.8 % (ref 38–73)
NEUTROPHILS NFR BLD: 70.1 % (ref 38–73)
NEUTROPHILS NFR BLD: 73.8 % (ref 38–73)
NITRITE UR QL STRIP: NEGATIVE
NRBC BLD-RTO: 0 /100 WBC
PH UR STRIP: 8 [PH] (ref 5–8)
PHOSPHATE SERPL-MCNC: 3.5 MG/DL (ref 2.7–4.5)
PLATELET # BLD AUTO: 265 K/UL (ref 150–450)
PLATELET # BLD AUTO: 278 K/UL (ref 150–450)
PLATELET # BLD AUTO: 296 K/UL (ref 150–450)
PMV BLD AUTO: 9.2 FL (ref 9.2–12.9)
PMV BLD AUTO: 9.4 FL (ref 9.2–12.9)
PMV BLD AUTO: 9.9 FL (ref 9.2–12.9)
POTASSIUM SERPL-SCNC: 3.7 MMOL/L (ref 3.5–5.1)
POTASSIUM SERPL-SCNC: 3.8 MMOL/L (ref 3.5–5.1)
POTASSIUM SERPL-SCNC: 4.2 MMOL/L (ref 3.5–5.1)
POTASSIUM SERPL-SCNC: 4.9 MMOL/L (ref 3.5–5.1)
PROT SERPL-MCNC: 8.3 G/DL (ref 6–8.4)
PROT SERPL-MCNC: 8.3 G/DL (ref 6–8.4)
PROT UR QL STRIP: ABNORMAL
PROTHROMBIN TIME: 11.3 SEC (ref 9–12.5)
PV MV: 0.57 M/S
PV PEAK VELOCITY: 0.77 CM/S
RA MAJOR: 5.38 CM
RA PRESSURE: 3 MMHG
RBC # BLD AUTO: 3.91 M/UL (ref 4–5.4)
RBC # BLD AUTO: 4.03 M/UL (ref 4–5.4)
RBC # BLD AUTO: 4.12 M/UL (ref 4–5.4)
RBC #/AREA URNS HPF: 2 /HPF (ref 0–4)
SODIUM SERPL-SCNC: 135 MMOL/L (ref 136–145)
SODIUM SERPL-SCNC: 136 MMOL/L (ref 136–145)
SODIUM SERPL-SCNC: 136 MMOL/L (ref 136–145)
SODIUM SERPL-SCNC: 139 MMOL/L (ref 136–145)
SP GR UR STRIP: >1.03 (ref 1–1.03)
SQUAMOUS #/AREA URNS HPF: 4 /HPF
STJ: 2.15 CM
T4 FREE SERPL-MCNC: 1.34 NG/DL (ref 0.71–1.51)
TDI LATERAL: 0.07 M/S
TDI SEPTAL: 0.09 M/S
TDI: 0.08 M/S
TROPONIN I SERPL DL<=0.01 NG/ML-MCNC: 0.01 NG/ML (ref 0–0.03)
TROPONIN I SERPL DL<=0.01 NG/ML-MCNC: 0.01 NG/ML (ref 0–0.03)
TSH SERPL DL<=0.005 MIU/L-ACNC: 0.38 UIU/ML (ref 0.4–4)
URN SPEC COLLECT METH UR: ABNORMAL
UROBILINOGEN UR STRIP-ACNC: NEGATIVE EU/DL
WBC # BLD AUTO: 7.88 K/UL (ref 3.9–12.7)
WBC # BLD AUTO: 8.05 K/UL (ref 3.9–12.7)
WBC # BLD AUTO: 8.26 K/UL (ref 3.9–12.7)
WBC #/AREA URNS HPF: 12 /HPF (ref 0–5)

## 2023-01-01 PROCEDURE — 99490 PR CHRONIC CARE MGMT, 1ST 20 MIN: ICD-10-PCS | Mod: S$PBB,GW,, | Performed by: FAMILY MEDICINE

## 2023-01-01 PROCEDURE — 99490 CHRNC CARE MGMT STAFF 1ST 20: CPT | Mod: PBBFAC,25,PN | Performed by: FAMILY MEDICINE

## 2023-01-01 PROCEDURE — 99215 PR OFFICE/OUTPT VISIT, EST, LEVL V, 40-54 MIN: ICD-10-PCS | Mod: S$PBB,,, | Performed by: INTERNAL MEDICINE

## 2023-01-01 PROCEDURE — 25000003 PHARM REV CODE 250: Performed by: EMERGENCY MEDICINE

## 2023-01-01 PROCEDURE — 63600175 PHARM REV CODE 636 W HCPCS: Performed by: FAMILY MEDICINE

## 2023-01-01 PROCEDURE — 93971 US LOWER EXTREMITY VEINS RIGHT: ICD-10-PCS | Mod: 26,RT,, | Performed by: RADIOLOGY

## 2023-01-01 PROCEDURE — 99490 PR CHRONIC CARE MGMT, 1ST 20 MIN: ICD-10-PCS | Mod: S$PBB,,, | Performed by: FAMILY MEDICINE

## 2023-01-01 PROCEDURE — 99439 PR CHRONIC CARE MGMT, EA ADDTL 20 MIN: ICD-10-PCS | Mod: S$PBB,,, | Performed by: FAMILY MEDICINE

## 2023-01-01 PROCEDURE — 99999 PR PBB SHADOW E&M-EST. PATIENT-LVL III: ICD-10-PCS | Mod: PBBFAC,,, | Performed by: INTERNAL MEDICINE

## 2023-01-01 PROCEDURE — 99215 OFFICE O/P EST HI 40 MIN: CPT | Mod: S$PBB,,, | Performed by: INTERNAL MEDICINE

## 2023-01-01 PROCEDURE — 96413 CHEMO IV INFUSION 1 HR: CPT

## 2023-01-01 PROCEDURE — 99496 TRANSJ CARE MGMT HIGH F2F 7D: CPT | Mod: S$GLB,,,

## 2023-01-01 PROCEDURE — 96372 THER/PROPH/DIAG INJ SC/IM: CPT | Mod: 59 | Performed by: FAMILY MEDICINE

## 2023-01-01 PROCEDURE — 99490 CHRNC CARE MGMT STAFF 1ST 20: CPT | Mod: S$PBB,,, | Performed by: FAMILY MEDICINE

## 2023-01-01 PROCEDURE — 36415 COLL VENOUS BLD VENIPUNCTURE: CPT | Performed by: FAMILY MEDICINE

## 2023-01-01 PROCEDURE — 25000003 PHARM REV CODE 250: Performed by: INTERNAL MEDICINE

## 2023-01-01 PROCEDURE — 93010 EKG 12-LEAD: ICD-10-PCS | Mod: ,,, | Performed by: INTERNAL MEDICINE

## 2023-01-01 PROCEDURE — 83605 ASSAY OF LACTIC ACID: CPT | Performed by: EMERGENCY MEDICINE

## 2023-01-01 PROCEDURE — 99215 OFFICE O/P EST HI 40 MIN: CPT | Mod: 95,,, | Performed by: NURSE PRACTITIONER

## 2023-01-01 PROCEDURE — 99285 EMERGENCY DEPT VISIT HI MDM: CPT | Mod: 25

## 2023-01-01 PROCEDURE — 99999 PR PBB SHADOW E&M-EST. PATIENT-LVL IV: CPT | Mod: PBBFAC,,, | Performed by: INTERNAL MEDICINE

## 2023-01-01 PROCEDURE — 99496 TRANSITIONAL CARE MANAGE SERVICE 7 DAY DISCHARGE: ICD-10-PCS | Mod: S$GLB,,,

## 2023-01-01 PROCEDURE — 80053 COMPREHEN METABOLIC PANEL: CPT | Performed by: EMERGENCY MEDICINE

## 2023-01-01 PROCEDURE — G0378 HOSPITAL OBSERVATION PER HR: HCPCS

## 2023-01-01 PROCEDURE — 25500020 PHARM REV CODE 255: Performed by: EMERGENCY MEDICINE

## 2023-01-01 PROCEDURE — 99223 1ST HOSP IP/OBS HIGH 75: CPT | Mod: 25,,, | Performed by: INTERNAL MEDICINE

## 2023-01-01 PROCEDURE — 99999 PR PBB SHADOW E&M-EST. PATIENT-LVL IV: ICD-10-PCS | Mod: PBBFAC,,, | Performed by: INTERNAL MEDICINE

## 2023-01-01 PROCEDURE — 99999 PR PBB SHADOW E&M-EST. PATIENT-LVL IV: ICD-10-PCS | Mod: PBBFAC,,, | Performed by: FAMILY MEDICINE

## 2023-01-01 PROCEDURE — 96365 THER/PROPH/DIAG IV INF INIT: CPT

## 2023-01-01 PROCEDURE — 93010 ELECTROCARDIOGRAM REPORT: CPT | Mod: ,,, | Performed by: INTERNAL MEDICINE

## 2023-01-01 PROCEDURE — 99223 PR INITIAL HOSPITAL CARE,LEVL III: ICD-10-PCS | Mod: 25,,, | Performed by: INTERNAL MEDICINE

## 2023-01-01 PROCEDURE — 85730 THROMBOPLASTIN TIME PARTIAL: CPT | Performed by: EMERGENCY MEDICINE

## 2023-01-01 PROCEDURE — 63600175 PHARM REV CODE 636 W HCPCS: Mod: JG | Performed by: INTERNAL MEDICINE

## 2023-01-01 PROCEDURE — 99490 CHRNC CARE MGMT STAFF 1ST 20: CPT | Mod: PBBFAC,PN | Performed by: FAMILY MEDICINE

## 2023-01-01 PROCEDURE — 93971 EXTREMITY STUDY: CPT | Mod: TC,RT

## 2023-01-01 PROCEDURE — 99215 PR OFFICE/OUTPT VISIT, EST, LEVL V, 40-54 MIN: ICD-10-PCS | Mod: 25,S$PBB,, | Performed by: NURSE PRACTITIONER

## 2023-01-01 PROCEDURE — 99215 OFFICE O/P EST HI 40 MIN: CPT | Mod: 25,S$PBB,, | Performed by: NURSE PRACTITIONER

## 2023-01-01 PROCEDURE — 96372 THER/PROPH/DIAG INJ SC/IM: CPT | Performed by: FAMILY MEDICINE

## 2023-01-01 PROCEDURE — 36415 COLL VENOUS BLD VENIPUNCTURE: CPT | Mod: PO | Performed by: INTERNAL MEDICINE

## 2023-01-01 PROCEDURE — 99214 PR OFFICE/OUTPT VISIT, EST, LEVL IV, 30-39 MIN: ICD-10-PCS | Mod: S$PBB,,, | Performed by: FAMILY MEDICINE

## 2023-01-01 PROCEDURE — 85025 COMPLETE CBC W/AUTO DIFF WBC: CPT | Performed by: NURSE PRACTITIONER

## 2023-01-01 PROCEDURE — 80069 RENAL FUNCTION PANEL: CPT | Performed by: INTERNAL MEDICINE

## 2023-01-01 PROCEDURE — 99439 CHRNC CARE MGMT STAF EA ADDL: CPT | Mod: S$PBB,,, | Performed by: FAMILY MEDICINE

## 2023-01-01 PROCEDURE — 93971 EXTREMITY STUDY: CPT | Mod: 26,RT,, | Performed by: RADIOLOGY

## 2023-01-01 PROCEDURE — 99215 OFFICE O/P EST HI 40 MIN: CPT | Mod: PBBFAC,PO | Performed by: NURSE PRACTITIONER

## 2023-01-01 PROCEDURE — 84484 ASSAY OF TROPONIN QUANT: CPT | Performed by: EMERGENCY MEDICINE

## 2023-01-01 PROCEDURE — 99215 PR OFFICE/OUTPT VISIT, EST, LEVL V, 40-54 MIN: ICD-10-PCS | Mod: 95,,, | Performed by: INTERNAL MEDICINE

## 2023-01-01 PROCEDURE — 96365 THER/PROPH/DIAG IV INF INIT: CPT | Mod: 59

## 2023-01-01 PROCEDURE — 99490 CHRNC CARE MGMT STAFF 1ST 20: CPT | Mod: S$PBB,GW,, | Performed by: FAMILY MEDICINE

## 2023-01-01 PROCEDURE — 63600175 PHARM REV CODE 636 W HCPCS: Performed by: EMERGENCY MEDICINE

## 2023-01-01 PROCEDURE — 99214 OFFICE O/P EST MOD 30 MIN: CPT | Mod: PBBFAC,25 | Performed by: NURSE PRACTITIONER

## 2023-01-01 PROCEDURE — 36415 COLL VENOUS BLD VENIPUNCTURE: CPT | Performed by: INTERNAL MEDICINE

## 2023-01-01 PROCEDURE — G0439 PPPS, SUBSEQ VISIT: HCPCS | Mod: ,,, | Performed by: NURSE PRACTITIONER

## 2023-01-01 PROCEDURE — 99439 CHRNC CARE MGMT STAF EA ADDL: CPT | Mod: PBBFAC,27,PN | Performed by: FAMILY MEDICINE

## 2023-01-01 PROCEDURE — 25000003 PHARM REV CODE 250: Performed by: NURSE PRACTITIONER

## 2023-01-01 PROCEDURE — 96375 TX/PRO/DX INJ NEW DRUG ADDON: CPT | Mod: 59

## 2023-01-01 PROCEDURE — G0439 PR MEDICARE ANNUAL WELLNESS SUBSEQUENT VISIT: ICD-10-PCS | Mod: ,,, | Performed by: NURSE PRACTITIONER

## 2023-01-01 PROCEDURE — 99213 OFFICE O/P EST LOW 20 MIN: CPT | Mod: PBBFAC,25 | Performed by: INTERNAL MEDICINE

## 2023-01-01 PROCEDURE — 78815 PET IMAGE W/CT SKULL-THIGH: CPT | Mod: TC

## 2023-01-01 PROCEDURE — 99214 OFFICE O/P EST MOD 30 MIN: CPT | Mod: S$PBB,,, | Performed by: FAMILY MEDICINE

## 2023-01-01 PROCEDURE — 99999 PR PBB SHADOW E&M-EST. PATIENT-LVL V: CPT | Mod: PBBFAC,,, | Performed by: NURSE PRACTITIONER

## 2023-01-01 PROCEDURE — 99999 PR PBB SHADOW E&M-EST. PATIENT-LVL III: CPT | Mod: PBBFAC,,, | Performed by: INTERNAL MEDICINE

## 2023-01-01 PROCEDURE — 99214 OFFICE O/P EST MOD 30 MIN: CPT | Mod: PBBFAC,PN | Performed by: FAMILY MEDICINE

## 2023-01-01 PROCEDURE — 83735 ASSAY OF MAGNESIUM: CPT | Performed by: INTERNAL MEDICINE

## 2023-01-01 PROCEDURE — 99215 OFFICE O/P EST HI 40 MIN: CPT | Mod: 95,,, | Performed by: INTERNAL MEDICINE

## 2023-01-01 PROCEDURE — 94761 N-INVAS EAR/PLS OXIMETRY MLT: CPT

## 2023-01-01 PROCEDURE — 99215 PR OFFICE/OUTPT VISIT, EST, LEVL V, 40-54 MIN: ICD-10-PCS | Mod: 95,,, | Performed by: NURSE PRACTITIONER

## 2023-01-01 PROCEDURE — 93010 ELECTROCARDIOGRAM REPORT: CPT | Mod: 76,,, | Performed by: INTERNAL MEDICINE

## 2023-01-01 PROCEDURE — 87040 BLOOD CULTURE FOR BACTERIA: CPT | Performed by: EMERGENCY MEDICINE

## 2023-01-01 PROCEDURE — 99214 OFFICE O/P EST MOD 30 MIN: CPT | Mod: PBBFAC,25 | Performed by: INTERNAL MEDICINE

## 2023-01-01 PROCEDURE — 99999 PR PBB SHADOW E&M-EST. PATIENT-LVL V: ICD-10-PCS | Mod: PBBFAC,,, | Performed by: NURSE PRACTITIONER

## 2023-01-01 PROCEDURE — A9552 F18 FDG: HCPCS

## 2023-01-01 PROCEDURE — 99439 CHRNC CARE MGMT STAF EA ADDL: CPT | Mod: PBBFAC,25,27,PN | Performed by: FAMILY MEDICINE

## 2023-01-01 PROCEDURE — 63600175 PHARM REV CODE 636 W HCPCS: Mod: JZ,JG | Performed by: NURSE PRACTITIONER

## 2023-01-01 PROCEDURE — 87086 URINE CULTURE/COLONY COUNT: CPT | Performed by: NURSE PRACTITIONER

## 2023-01-01 PROCEDURE — 99214 OFFICE O/P EST MOD 30 MIN: CPT | Mod: PBBFAC | Performed by: INTERNAL MEDICINE

## 2023-01-01 PROCEDURE — 81000 URINALYSIS NONAUTO W/SCOPE: CPT | Performed by: NURSE PRACTITIONER

## 2023-01-01 PROCEDURE — 83880 ASSAY OF NATRIURETIC PEPTIDE: CPT | Performed by: NURSE PRACTITIONER

## 2023-01-01 PROCEDURE — 84439 ASSAY OF FREE THYROXINE: CPT | Performed by: EMERGENCY MEDICINE

## 2023-01-01 PROCEDURE — 99490 PR CHRONIC CARE MGMT, 1ST 20 MIN: ICD-10-PCS | Mod: S$PBB,GV,, | Performed by: FAMILY MEDICINE

## 2023-01-01 PROCEDURE — 85025 COMPLETE CBC W/AUTO DIFF WBC: CPT | Performed by: EMERGENCY MEDICINE

## 2023-01-01 PROCEDURE — 85025 COMPLETE CBC W/AUTO DIFF WBC: CPT | Performed by: FAMILY MEDICINE

## 2023-01-01 PROCEDURE — 99999 PR PBB SHADOW E&M-EST. PATIENT-LVL IV: CPT | Mod: PBBFAC,,, | Performed by: NURSE PRACTITIONER

## 2023-01-01 PROCEDURE — 82140 ASSAY OF AMMONIA: CPT | Performed by: EMERGENCY MEDICINE

## 2023-01-01 PROCEDURE — 96375 TX/PRO/DX INJ NEW DRUG ADDON: CPT

## 2023-01-01 PROCEDURE — 96374 THER/PROPH/DIAG INJ IV PUSH: CPT | Mod: 59

## 2023-01-01 PROCEDURE — 83880 ASSAY OF NATRIURETIC PEPTIDE: CPT | Performed by: EMERGENCY MEDICINE

## 2023-01-01 PROCEDURE — 99999 PR PBB SHADOW E&M-EST. PATIENT-LVL IV: CPT | Mod: PBBFAC,,, | Performed by: FAMILY MEDICINE

## 2023-01-01 PROCEDURE — 93005 ELECTROCARDIOGRAM TRACING: CPT

## 2023-01-01 PROCEDURE — 63600175 PHARM REV CODE 636 W HCPCS: Mod: JZ,JG | Performed by: INTERNAL MEDICINE

## 2023-01-01 PROCEDURE — 99214 PR OFFICE/OUTPT VISIT, EST, LEVL IV, 30-39 MIN: ICD-10-PCS | Mod: ,,,

## 2023-01-01 PROCEDURE — 96361 HYDRATE IV INFUSION ADD-ON: CPT

## 2023-01-01 PROCEDURE — 25000003 PHARM REV CODE 250: Performed by: FAMILY MEDICINE

## 2023-01-01 PROCEDURE — 84443 ASSAY THYROID STIM HORMONE: CPT | Performed by: EMERGENCY MEDICINE

## 2023-01-01 PROCEDURE — 78815 NM PET CT ROUTINE: ICD-10-PCS | Mod: 26,PS,, | Performed by: RADIOLOGY

## 2023-01-01 PROCEDURE — 78815 PET IMAGE W/CT SKULL-THIGH: CPT | Mod: 26,PS,, | Performed by: RADIOLOGY

## 2023-01-01 PROCEDURE — 83690 ASSAY OF LIPASE: CPT | Performed by: EMERGENCY MEDICINE

## 2023-01-01 PROCEDURE — 80048 BASIC METABOLIC PNL TOTAL CA: CPT | Performed by: FAMILY MEDICINE

## 2023-01-01 PROCEDURE — 99999 PR PBB SHADOW E&M-EST. PATIENT-LVL IV: ICD-10-PCS | Mod: PBBFAC,,, | Performed by: NURSE PRACTITIONER

## 2023-01-01 PROCEDURE — 99490 CHRNC CARE MGMT STAFF 1ST 20: CPT | Mod: S$PBB,GV,, | Performed by: FAMILY MEDICINE

## 2023-01-01 PROCEDURE — 85610 PROTHROMBIN TIME: CPT | Performed by: EMERGENCY MEDICINE

## 2023-01-01 PROCEDURE — 99214 OFFICE O/P EST MOD 30 MIN: CPT | Mod: ,,,

## 2023-01-01 RX ORDER — EPINEPHRINE 0.3 MG/.3ML
0.3 INJECTION SUBCUTANEOUS ONCE AS NEEDED
Status: CANCELLED | OUTPATIENT
Start: 2023-01-01

## 2023-01-01 RX ORDER — OXYCODONE AND ACETAMINOPHEN 10; 325 MG/1; MG/1
1 TABLET ORAL 4 TIMES DAILY PRN
Qty: 120 TABLET | Refills: 0 | Status: SHIPPED | OUTPATIENT
Start: 2023-01-01 | End: 2023-01-01 | Stop reason: SDUPTHER

## 2023-01-01 RX ORDER — OXYCODONE HYDROCHLORIDE 15 MG/1
15 TABLET, FILM COATED, EXTENDED RELEASE ORAL EVERY 12 HOURS
Qty: 60 EACH | Refills: 0 | Status: SHIPPED | OUTPATIENT
Start: 2023-01-01

## 2023-01-01 RX ORDER — HEPARIN 100 UNIT/ML
500 SYRINGE INTRAVENOUS
Status: CANCELLED | OUTPATIENT
Start: 2023-01-01

## 2023-01-01 RX ORDER — OXYCODONE AND ACETAMINOPHEN 10; 325 MG/1; MG/1
1 TABLET ORAL 4 TIMES DAILY PRN
Status: DISCONTINUED | OUTPATIENT
Start: 2023-01-01 | End: 2023-01-01 | Stop reason: HOSPADM

## 2023-01-01 RX ORDER — MAG HYDROX/ALUMINUM HYD/SIMETH 200-200-20
30 SUSPENSION, ORAL (FINAL DOSE FORM) ORAL ONCE
Status: COMPLETED | OUTPATIENT
Start: 2023-01-01 | End: 2023-01-01

## 2023-01-01 RX ORDER — ACETAMINOPHEN 500 MG
1000 TABLET ORAL EVERY 8 HOURS PRN
Status: DISCONTINUED | OUTPATIENT
Start: 2023-01-01 | End: 2023-01-01 | Stop reason: HOSPADM

## 2023-01-01 RX ORDER — SUCRALFATE 1 G/1
1 TABLET ORAL EVERY 6 HOURS
Qty: 28 TABLET | Refills: 0 | Status: SHIPPED | OUTPATIENT
Start: 2023-01-01 | End: 2023-01-01

## 2023-01-01 RX ORDER — OXYCODONE HYDROCHLORIDE 15 MG/1
15 TABLET, FILM COATED, EXTENDED RELEASE ORAL EVERY 12 HOURS
Qty: 60 EACH | Refills: 0 | Status: SHIPPED | OUTPATIENT
Start: 2023-01-01 | End: 2023-01-01 | Stop reason: SDUPTHER

## 2023-01-01 RX ORDER — LEVOTHYROXINE SODIUM 50 UG/1
50 TABLET ORAL
Status: DISCONTINUED | OUTPATIENT
Start: 2023-01-01 | End: 2023-01-01 | Stop reason: HOSPADM

## 2023-01-01 RX ORDER — LIDOCAINE HYDROCHLORIDE 20 MG/ML
15 SOLUTION OROPHARYNGEAL ONCE
Status: COMPLETED | OUTPATIENT
Start: 2023-01-01 | End: 2023-01-01

## 2023-01-01 RX ORDER — OXYCODONE HCL 10 MG/1
10 TABLET, FILM COATED, EXTENDED RELEASE ORAL EVERY 12 HOURS
Status: DISCONTINUED | OUTPATIENT
Start: 2023-01-01 | End: 2023-01-01 | Stop reason: HOSPADM

## 2023-01-01 RX ORDER — SODIUM CHLORIDE 0.9 % (FLUSH) 0.9 %
10 SYRINGE (ML) INJECTION
Status: DISCONTINUED | OUTPATIENT
Start: 2023-01-01 | End: 2023-01-01 | Stop reason: HOSPADM

## 2023-01-01 RX ORDER — SODIUM CHLORIDE 0.9 % (FLUSH) 0.9 %
10 SYRINGE (ML) INJECTION
Status: CANCELLED | OUTPATIENT
Start: 2023-01-01

## 2023-01-01 RX ORDER — PANTOPRAZOLE SODIUM 40 MG/1
40 TABLET, DELAYED RELEASE ORAL DAILY
Status: DISCONTINUED | OUTPATIENT
Start: 2023-01-01 | End: 2023-01-01 | Stop reason: HOSPADM

## 2023-01-01 RX ORDER — SENNOSIDES 8.6 MG/1
1 TABLET ORAL DAILY
Status: DISCONTINUED | OUTPATIENT
Start: 2023-01-01 | End: 2023-01-01 | Stop reason: HOSPADM

## 2023-01-01 RX ORDER — OXYCODONE HYDROCHLORIDE 15 MG/1
15 TABLET, FILM COATED, EXTENDED RELEASE ORAL EVERY 12 HOURS
Qty: 60 EACH | Refills: 0 | Status: ON HOLD | OUTPATIENT
Start: 2023-01-01 | End: 2023-01-01 | Stop reason: SDUPTHER

## 2023-01-01 RX ORDER — DIPHENHYDRAMINE HYDROCHLORIDE 50 MG/ML
50 INJECTION INTRAMUSCULAR; INTRAVENOUS ONCE AS NEEDED
Status: CANCELLED | OUTPATIENT
Start: 2023-01-01

## 2023-01-01 RX ORDER — ENOXAPARIN SODIUM 100 MG/ML
40 INJECTION SUBCUTANEOUS EVERY 24 HOURS
Status: DISCONTINUED | OUTPATIENT
Start: 2023-01-01 | End: 2023-01-01 | Stop reason: HOSPADM

## 2023-01-01 RX ORDER — OXYCODONE AND ACETAMINOPHEN 10; 325 MG/1; MG/1
1 TABLET ORAL 4 TIMES DAILY PRN
Qty: 120 TABLET | Refills: 0 | Status: CANCELLED | OUTPATIENT
Start: 2023-01-01

## 2023-01-01 RX ORDER — ENOXAPARIN SODIUM 100 MG/ML
40 INJECTION SUBCUTANEOUS EVERY 24 HOURS
Status: DISCONTINUED | OUTPATIENT
Start: 2023-01-01 | End: 2023-01-01

## 2023-01-01 RX ORDER — POLYETHYLENE GLYCOL 3350 17 G/17G
17 POWDER, FOR SOLUTION ORAL DAILY
Status: DISCONTINUED | OUTPATIENT
Start: 2023-01-01 | End: 2023-01-01 | Stop reason: HOSPADM

## 2023-01-01 RX ORDER — ONDANSETRON 8 MG/1
8 TABLET, ORALLY DISINTEGRATING ORAL EVERY 8 HOURS PRN
Status: DISCONTINUED | OUTPATIENT
Start: 2023-01-01 | End: 2023-01-01 | Stop reason: HOSPADM

## 2023-01-01 RX ORDER — PANTOPRAZOLE SODIUM 40 MG/1
40 TABLET, DELAYED RELEASE ORAL DAILY
Qty: 30 TABLET | Refills: 0 | Status: SHIPPED | OUTPATIENT
Start: 2023-01-01 | End: 2023-01-01

## 2023-01-01 RX ORDER — LISINOPRIL 20 MG/1
40 TABLET ORAL DAILY
Status: DISCONTINUED | OUTPATIENT
Start: 2023-01-01 | End: 2023-01-01 | Stop reason: HOSPADM

## 2023-01-01 RX ORDER — HYDRALAZINE HYDROCHLORIDE 20 MG/ML
10 INJECTION INTRAMUSCULAR; INTRAVENOUS
Status: COMPLETED | OUTPATIENT
Start: 2023-01-01 | End: 2023-01-01

## 2023-01-01 RX ORDER — OXYCODONE AND ACETAMINOPHEN 10; 325 MG/1; MG/1
1 TABLET ORAL 4 TIMES DAILY PRN
Qty: 120 TABLET | Refills: 0 | OUTPATIENT
Start: 2023-01-01

## 2023-01-01 RX ORDER — SUCRALFATE 1 G/10ML
1 SUSPENSION ORAL EVERY 6 HOURS
Status: DISCONTINUED | OUTPATIENT
Start: 2023-01-01 | End: 2023-01-01 | Stop reason: HOSPADM

## 2023-01-01 RX ORDER — HYDRALAZINE HYDROCHLORIDE 20 MG/ML
10 INJECTION INTRAMUSCULAR; INTRAVENOUS EVERY 6 HOURS PRN
Status: DISCONTINUED | OUTPATIENT
Start: 2023-01-01 | End: 2023-01-01 | Stop reason: HOSPADM

## 2023-01-01 RX ORDER — OXYCODONE HYDROCHLORIDE 15 MG/1
15 TABLET, FILM COATED, EXTENDED RELEASE ORAL EVERY 12 HOURS
Status: DISCONTINUED | OUTPATIENT
Start: 2023-01-01 | End: 2023-01-01

## 2023-01-01 RX ORDER — ONDANSETRON 2 MG/ML
4 INJECTION INTRAMUSCULAR; INTRAVENOUS
Status: COMPLETED | OUTPATIENT
Start: 2023-01-01 | End: 2023-01-01

## 2023-01-01 RX ORDER — ONDANSETRON 4 MG/1
4 TABLET, ORALLY DISINTEGRATING ORAL EVERY 8 HOURS PRN
Qty: 15 TABLET | Refills: 0 | Status: SHIPPED | OUTPATIENT
Start: 2023-01-01 | End: 2023-01-01

## 2023-01-01 RX ORDER — MORPHINE SULFATE 4 MG/ML
4 INJECTION, SOLUTION INTRAMUSCULAR; INTRAVENOUS
Status: COMPLETED | OUTPATIENT
Start: 2023-01-01 | End: 2023-01-01

## 2023-01-01 RX ORDER — OXYCODONE AND ACETAMINOPHEN 10; 325 MG/1; MG/1
1 TABLET ORAL 4 TIMES DAILY PRN
Qty: 120 TABLET | Refills: 0 | Status: SHIPPED | OUTPATIENT
Start: 2023-01-01

## 2023-01-01 RX ORDER — PROCHLORPERAZINE EDISYLATE 5 MG/ML
5 INJECTION INTRAMUSCULAR; INTRAVENOUS EVERY 6 HOURS PRN
Status: DISCONTINUED | OUTPATIENT
Start: 2023-01-01 | End: 2023-01-01 | Stop reason: HOSPADM

## 2023-01-01 RX ADMIN — SENNOSIDES 1 TABLET: 8.6 TABLET, FILM COATED ORAL at 09:04

## 2023-01-01 RX ADMIN — POLYETHYLENE GLYCOL 3350 17 G: 17 POWDER, FOR SOLUTION ORAL at 09:04

## 2023-01-01 RX ADMIN — SODIUM CHLORIDE 500 ML: 9 INJECTION, SOLUTION INTRAVENOUS at 03:05

## 2023-01-01 RX ADMIN — ENOXAPARIN SODIUM 40 MG: 40 INJECTION SUBCUTANEOUS at 06:04

## 2023-01-01 RX ADMIN — AZITHROMYCIN MONOHYDRATE 500 MG: 500 INJECTION, POWDER, LYOPHILIZED, FOR SOLUTION INTRAVENOUS at 07:04

## 2023-01-01 RX ADMIN — PROCHLORPERAZINE EDISYLATE 5 MG: 5 INJECTION INTRAMUSCULAR; INTRAVENOUS at 06:04

## 2023-01-01 RX ADMIN — ENOXAPARIN SODIUM 40 MG: 40 INJECTION SUBCUTANEOUS at 04:04

## 2023-01-01 RX ADMIN — ONDANSETRON 8 MG: 8 TABLET, ORALLY DISINTEGRATING ORAL at 03:05

## 2023-01-01 RX ADMIN — PANTOPRAZOLE SODIUM 40 MG: 40 TABLET, DELAYED RELEASE ORAL at 09:05

## 2023-01-01 RX ADMIN — PANTOPRAZOLE SODIUM 40 MG: 40 TABLET, DELAYED RELEASE ORAL at 09:04

## 2023-01-01 RX ADMIN — OXYCODONE HYDROCHLORIDE 10 MG: 10 TABLET, FILM COATED, EXTENDED RELEASE ORAL at 09:04

## 2023-01-01 RX ADMIN — HYPROMELLOSE 2910 1 DROP: 5 SOLUTION/ DROPS OPHTHALMIC at 03:05

## 2023-01-01 RX ADMIN — SUCRALFATE 1 G: 1 SUSPENSION ORAL at 11:04

## 2023-01-01 RX ADMIN — SUCRALFATE 1 G: 1 SUSPENSION ORAL at 05:05

## 2023-01-01 RX ADMIN — IOHEXOL 30 ML: 350 INJECTION, SOLUTION INTRAVENOUS at 01:04

## 2023-01-01 RX ADMIN — LIDOCAINE HYDROCHLORIDE 15 ML: 20 SOLUTION ORAL; TOPICAL at 03:04

## 2023-01-01 RX ADMIN — ALUMINUM HYDROXIDE, MAGNESIUM HYDROXIDE, AND DIMETHICONE 30 ML: 200; 20; 200 SUSPENSION ORAL at 03:04

## 2023-01-01 RX ADMIN — LEVOTHYROXINE SODIUM 50 MCG: 50 TABLET ORAL at 05:04

## 2023-01-01 RX ADMIN — LISINOPRIL 40 MG: 20 TABLET ORAL at 09:04

## 2023-01-01 RX ADMIN — LEVOTHYROXINE SODIUM 50 MCG: 50 TABLET ORAL at 05:05

## 2023-01-01 RX ADMIN — ENOXAPARIN SODIUM 40 MG: 40 INJECTION SUBCUTANEOUS at 05:05

## 2023-01-01 RX ADMIN — SODIUM CHLORIDE 400 MG: 9 INJECTION, SOLUTION INTRAVENOUS at 02:03

## 2023-01-01 RX ADMIN — ONDANSETRON 8 MG: 8 TABLET, ORALLY DISINTEGRATING ORAL at 07:04

## 2023-01-01 RX ADMIN — SODIUM CHLORIDE 500 ML: 0.9 INJECTION, SOLUTION INTRAVENOUS at 02:03

## 2023-01-01 RX ADMIN — IOHEXOL 100 ML: 350 INJECTION, SOLUTION INTRAVENOUS at 01:04

## 2023-01-01 RX ADMIN — SODIUM CHLORIDE 1000 ML: 9 INJECTION, SOLUTION INTRAVENOUS at 11:04

## 2023-01-01 RX ADMIN — CEFTRIAXONE 1 G: 1 INJECTION, POWDER, FOR SOLUTION INTRAMUSCULAR; INTRAVENOUS at 07:04

## 2023-01-01 RX ADMIN — POLYETHYLENE GLYCOL 3350 17 G: 17 POWDER, FOR SOLUTION ORAL at 09:05

## 2023-01-01 RX ADMIN — MORPHINE SULFATE 4 MG: 4 INJECTION INTRAVENOUS at 11:04

## 2023-01-01 RX ADMIN — SUCRALFATE 1 G: 1 SUSPENSION ORAL at 11:05

## 2023-01-01 RX ADMIN — OXYCODONE HYDROCHLORIDE 10 MG: 10 TABLET, FILM COATED, EXTENDED RELEASE ORAL at 09:05

## 2023-01-01 RX ADMIN — SODIUM CHLORIDE 400 MG: 9 INJECTION, SOLUTION INTRAVENOUS at 02:02

## 2023-01-01 RX ADMIN — IOHEXOL 100 ML: 350 INJECTION, SOLUTION INTRAVENOUS at 04:04

## 2023-01-01 RX ADMIN — LISINOPRIL 40 MG: 20 TABLET ORAL at 09:05

## 2023-01-01 RX ADMIN — SODIUM CHLORIDE 400 MG: 9 INJECTION, SOLUTION INTRAVENOUS at 03:05

## 2023-01-01 RX ADMIN — ONDANSETRON 4 MG: 2 INJECTION INTRAMUSCULAR; INTRAVENOUS at 11:04

## 2023-01-01 RX ADMIN — HYDRALAZINE HYDROCHLORIDE 10 MG: 20 INJECTION, SOLUTION INTRAMUSCULAR; INTRAVENOUS at 05:04

## 2023-01-01 RX ADMIN — ONDANSETRON 8 MG: 8 TABLET, ORALLY DISINTEGRATING ORAL at 06:04

## 2023-01-01 RX ADMIN — SENNOSIDES 1 TABLET: 8.6 TABLET, FILM COATED ORAL at 09:05

## 2023-01-03 NOTE — TELEPHONE ENCOUNTER
Outgoing call to daughter Lilian to confirm the $10.35 copay was ok to charge the card on file.      Specialty Pharmacy - Refill Coordination    Specialty Medication Orders Linked to Encounter      Flowsheet Row Most Recent Value   Medication #1 cabozantinib (CABOMETYX) 40 mg Tab (Order#[id number], Rx#[id number])            Refill Questions - Documented Responses      Flowsheet Row Most Recent Value   Patient Availability and HIPAA Verification    Does patient want to proceed with activity? Yes   HIPAA/medical authority confirmed? Yes   Relationship to patient of person spoken to? Child   Refill Screening Questions    Changes to allergies? No   Changes to medications? No   New conditions since last clinic visit? No   Unplanned office visit, urgent care, ED, or hospital admission in the last 4 weeks? No   How does patient/caregiver feel medication is working? Good   Financial problems or insurance changes? No   How many doses of your specialty medications were missed in the last 4 weeks? 0   Would patient like to speak to a pharmacist? No   When does the patient need to receive the medication? 01/09/23   Refill Delivery Questions    How will the patient receive the medication? MEDRx   When does the patient need to receive the medication? 01/09/23   Shipping Address Home   Address in [facility] confirmed and updated if neccessary? Yes   Expected Copay ($) 10.35   Is the patient able to afford the medication copay? Yes   Payment Method CC on file   Days supply of Refill 30   Supplies needed? No supplies needed   Refill activity completed? Yes   Refill activity plan Refill scheduled   Shipment/Pickup Date: 01/04/23            Current Outpatient Medications   Medication Sig    (Magic mouthwash) 1:1:1 Benadryl 12.5mg/5ml liq, aluminum & magnesium hydroxide-simehticone (Maalox), LIDOcaine viscous 2% Take 10 mL PO every 4-6 hours as needed for throat or esophageal pain.    amLODIPine (NORVASC) 2.5 MG tablet Take 1  "tablet (2.5 mg total) by mouth 3 (three) times daily.    cabozantinib (CABOMETYX) 40 mg Tab Take 1 tablet (40 mg) by mouth once daily.    clobetasol 0.05% (TEMOVATE) 0.05 % Oint Apply topically 2 (two) times daily.    diphenoxylate-atropine 2.5-0.025 mg (LOMOTIL) 2.5-0.025 mg per tablet Take 1 tablet by mouth 4 (four) times daily as needed for Diarrhea.    levothyroxine (TIROSINT) 50 mcg Cap Take 1 tablet (50 mcg) by mouth before breakfast.    lisinopriL (PRINIVIL,ZESTRIL) 40 MG tablet Take 1 tablet (40 mg total) by mouth once daily.    magnesium hydroxide (MILK OF MAGNESIA ORAL) Take by mouth once daily.    mupirocin (BACTROBAN) 2 % ointment Apply topically 3 (three) times daily.    neomycin-polymyxin-hydrocortisone (CORTISPORIN) 3.5-10,000-1 mg/mL-unit/mL-% otic suspension Place 3 drops into the right ear 4 (four) times daily.    ondansetron (ZOFRAN-ODT) 8 MG TbDL Take 1 tablet (8 mg total) by mouth every 12 (twelve) hours as needed.    oxyCODONE (OXYCONTIN) 15 mg TR12 12 hr tablet Take 1 tablet (15 mg total) by mouth every 12 (twelve) hours.    oxyCODONE-acetaminophen (PERCOCET)  mg per tablet Take 1 tablet by mouth 4 (four) times daily as needed (severe pain).    pulse oximeter (PULSE OXIMETER) device by Apply Externally route 2 (two) times a day. Use twice daily at 8 AM and 3 PM and [facility] as [facility].    senna (SENOKOT) 8.6 mg tablet Take 1 tablet by mouth once daily.    tacrolimus (PROTOPIC) 0.1 % ointment Apply topically 2 (two) times daily.    triamcinolone acetonide 0.1% (KENALOG) 0.1 % cream Apply topically 2 (two) times daily. for 10 days   Last reviewed on 11/17/2022  2:59 PM by Gill M. Kovtun, MD    Review of patient's allergies indicates:   Allergen Reactions    Pravastatin Other (See Comments)     Burning/flushing    Xgeva [denosumab] Other (See Comments)     Mouth and diffuse pain    Allopurinol analogues Other (See Comments)     "makes me sick and feel crazy"    Last " reviewed on  11/23/2022 12:29 PM by Roxann Layne      Tasks added this encounter   2/1/2023 - Refill Call (Auto Added)   Tasks due within next 3 months   No tasks due.     Eugenie Briceño, PharmD  [facility]  [address]  [facility] [address]  Phone: [phone]  Fax: [phone]

## 2023-01-24 PROBLEM — I77.1 TORTUOUS AORTA: Status: ACTIVE | Noted: 2023-01-01

## 2023-01-24 NOTE — PROGRESS NOTES
Maura Singh presented for a follow-up Medicare AWV today. The following components were reviewed and updated:    Medical history  Family History  Social history  Allergies and Current Medications  Health Risk Assessment  Health Maintenance  Care Team    **See Completed Assessments for Annual Wellness visit with in the encounter summary    The following assessments were completed:  Depression Screening  Cognitive function Screening  Timed Get Up Test  Whisper Test          Vitals:    01/24/23 1419   BP: (!) 158/88   BP Location: Left arm   Pulse: 61   Resp: 20   Temp: 97 °F (36.1 °C)   TempSrc: Oral   SpO2: (!) 93%   Weight: 62 kg (136 lb 11 oz)   Height: 5' (1.524 m)     Body mass index is 26.69 kg/m².   ]    Physical Exam  Constitutional:       Appearance: Normal appearance.   HENT:      Head: Normocephalic and atraumatic.   Cardiovascular:      Rate and Rhythm: Normal rate.      Pulses:           Dorsalis pedis pulses are 1+ on the right side and 1+ on the left side.        Posterior tibial pulses are 1+ on the right side and 1+ on the left side.   Pulmonary:      Effort: Pulmonary effort is normal.   Abdominal:      Tenderness: There is abdominal tenderness.   Musculoskeletal:      Right lower leg: Edema present.   Feet:      Right foot:      Skin integrity: Callus and dry skin present.      Toenail Condition: Right toenails are long.      Left foot:      Skin integrity: Callus and dry skin present.      Toenail Condition: Left toenails are long.   Skin:     General: Skin is cool and dry.      Capillary Refill: Capillary refill takes 2 to 3 seconds.      Coloration: Skin is mottled (bilateral feet).   Neurological:      General: No focal deficit present.      Mental Status: She is alert and oriented to person, place, and time.   Psychiatric:         Mood and Affect: Mood normal.         Behavior: Behavior normal.         Thought Content: Thought content normal.         Judgment: Judgment normal.        Diagnoses and health risks identified today and associated recommendations/orders:  1. Encounter for preventive health examination  Reviewed and discussed preventive health screenings and vaccinations with the patient.   Shingrix and COVID-19 vaccines available at the pharmacy   Upcoming appointment with PCP, Dr. Andrade, 2/2/2023.     2. HCC (hepatocellular carcinoma)  On cabozantinib, under care and management of Hematology/Oncology, Dr. Burgess. Restaging PET scan scheduled 1/26/2023.     3. Secondary malignant neoplasm of bone  On cabozantinib, under care and management of Hematology/Oncology, Dr. Burgess.     4. 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 Burgess.     5. Cancer associated pain  Chronic. On Percocet and Oxycontin per Dr. Burgess. Continue current treatment plan as previously prescribed with your Hem/Onc.     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 Continue current treatment plan as previously prescribed with your Hem/Onc.     Lab Results   Component Value Date    WBC 5.48 11/15/2022    HGB 12.8 11/15/2022    HCT 39.4 11/15/2022     (H) 11/15/2022     11/15/2022     7. Chronic obstructive pulmonary disease, unspecified COPD type  Stable, oxygen today 93% on RA; patient reports it is typically 93-95% at home. Continue current treatment plan as previously prescribed with your PCP    8. Chronic diastolic congestive heart failure  Patient denies dyspnea, orthopnea, or PND; there is RLE edema noted on exam without associated pain. Follow up with PCP for further evaluation and management.     9. Atherosclerosis of aorta  CT Chest, Abdomen, Pelvis 10/2020.   Stable. Continue current treatment plan as previously prescribed with your PCP    10. Tortuous aorta  CXR 12/2017  Stable. Continue current treatment plan as previously prescribed with your PCP    11. History of steroid-induced diabetes  mellitus  Stable, previous Alc normal. Continue current treatment plan as previously prescribed with your PCP    Lab Results   Component Value Date    HGBA1C 5.6 06/17/2021     12. Gastroesophageal reflux disease, unspecified whether esophagitis present  Stable. Continue current treatment plan as previously prescribed with your PCP    13. Essential hypertension  Blood pressure is elevated above goal in clinic today. Patient is taking lisinopril daily with amlodipine TID as needed for systolic elevations of 170 or greater. Patient's daughter notes blood pressure elevations occur when she takes her chemotherapy but blood pressure runs a bit low on days she does not take chemotherapy. Advised she continue to monitor closely and continue regimen as previously prescribed by PCP. She has an upcoming appt with PCP next week.     14. Mixed hyperlipidemia  Elevated total cholesterol but improved as compared to previous. Continue current treatment plan as previously prescribed with your PCP    Lab Results   Component Value Date    CHOL 220 (H) 06/17/2021    CHOL 250 (H) 09/05/2019    CHOL 272 (H) 05/02/2018     Lab Results   Component Value Date    HDL 32 (L) 06/17/2021    HDL 40 09/05/2019    HDL 36 (L) 05/02/2018     Lab Results   Component Value Date    LDLCALC 149.8 06/17/2021    LDLCALC 146.2 09/05/2019    LDLCALC 173.6 (H) 05/02/2018     Lab Results   Component Value Date    TRIG 191 (H) 06/17/2021    TRIG 319 (H) 09/05/2019    TRIG 312 (H) 05/02/2018     Lab Results   Component Value Date    CHOLHDL 14.5 (L) 06/17/2021    CHOLHDL 16.0 (L) 09/05/2019    CHOLHDL 13.2 (L) 05/02/2018       15. Drug-induced hypothyroidism  Stable on levothyroxine, repeat labs scheduled 1/26/2023.   Continue current treatment plan as previously prescribed with your PCP and Hem/Onc.   Lab Results   Component Value Date    TSH 1.551 10/03/2022    FREET4 1.09 03/29/2022     16. Gait abnormality  Ambulates with rolling walker; denies any falls  in the past 12 months. She has a ramp to get in and out of her home and is aware of fall precautions.     17. Diarrhea, unspecified type   Secondary to chemotherapy. Using Lomotil PRN per Hem/Onc. Will further discuss ongoing diarrhea with Hem/Onc at follow up appointment on Monday. Encouraged continued hydration.     18. Neuropathy of both feet   Patient endorses numbness and burning sensation of bilateral feet, worse at bedtime. She has mottled appearance of the feet with 1+ pulses bilaterally. On inspection, there are two calluses that are painful to touch and she reports her nails are painful to touch. Referral has been placed to podiatry for nail care and foot care. We discussed foot protection and frequent inspection of the feet for wounds or injuries. Advised patient also follow up with PCP for further evaluation and management; she has an upcoming appointment.     19. Foot callus   Referral placed to podiatry     20. Needs assistance with community resources   Patient endorses difficulty affording food, electricity, and heating. Her greatest concern at this time is difficulty affording meals. She also lost a considerable amount of weight 2/2 metastatic HCC and her dentures no longer fit, impairing her ability to chew and eat solids. She is interested in meeting with a  to further discuss resources available to her. A referral has been placed to the outpatient case management program.       Opioid Risk Assessment:   Maura Singh is prescribed Percocet and Oxycontin by her Oncologist, Dr. Burgess, due to chronic cancer-related pain. Her opioid risk score is 0.       I offered to discuss advanced care planning, including how to pick a person who would make decisions for you if you were unable to make them for yourself, called a health care power of , and what kind of decisions you might make such as use of life sustaining treatments such as ventilators and tube feeding when faced with a  life limiting illness recorded on a living will that they will need to know. (How you want to be cared for as you near the end of your natural life)     X  Patient has advanced directives on file, which we reviewed, and they do not wish to make changes.    Provided Maura with a 5-10 year written screening schedule and personal prevention plan. Recommendations were developed using the USPSTF age appropriate recommendations. Education, counseling, and referrals were provided as needed.  After Visit Summary printed and given to patient which includes a list of additional screenings\tests needed.    No follow-ups on file.      Montserrat Price NP

## 2023-01-24 NOTE — Clinical Note
Hi Dr. Andrade,   Mrs. Fitzgerald was seen today for her Medicare AWV. There were a couple of things identified during her appointment today, including worsening BLE neuropathy and elevated blood pressure. She will be seeing Dr. Burgess this Monday 1/30/2023 and has an appointment with you on 2/2/2023.   Thank you,   JOSE Chandra

## 2023-01-24 NOTE — PATIENT INSTRUCTIONS
Counseling and Referral of Other Preventative  (Italic type indicates deductible and co-insurance are waived)    Patient Name: Maura Singh  Today's Date: 1/24/2023    Health Maintenance       Date Due Completion Date    Shingles Vaccine (1 of 2) Never done ---    COVID-19 Vaccine (3 - Booster for Pfizer series) 03/29/2021 2/1/2021    Diabetes Urine Screening 10/18/2023 (Originally 6/17/2022) 6/17/2021        No orders of the defined types were placed in this encounter.    The following information is provided to all patients.  This information is to help you find resources for any of the problems found today that may be affecting your health:                Living healthy guide: www.[facility].[place].gov      Understanding Diabetes: www.diabetes.org      Eating healthy: www.cdc.gov/healthyweight      [facility] home safety checklist: www.cdc.gov/steadi/patient.html      Agency on Aging: www.goea.[place].[facility]      Alcoholics anonymous (AA): www.aa.org      Physical Activity: www.ezra.nih.gov/ki4rmji      Tobacco use: www.quitwithusla.org

## 2023-01-26 NOTE — TELEPHONE ENCOUNTER
Received call from Tiffanie at [facility] lab. Reported that pt's CMP hemolyzed. Called lab for clarification and was informed both the pt's CBC and CMP hemolyzed and pt needed to be re-drawn. Phlebotomist notified. New orders placed for redraw. Pt and MD aware.

## 2023-01-30 NOTE — PROGRESS NOTES
Subjective:      DATE OF VISIT: 1/30/23     ?  Patient ID:?Maura Singh is a 83 y.o. female.?? MR#: [id number]   ?   PRIMARY ONCOLOGIST: Dr. Burgess    ? Primary Care Providers:  Yessy Andrade MD, MD (General)     CHIEF COMPLAINT: ???  Follow-up  ?   ONCOLOGIC DIAGNOSIS:  Metastatic hepatocellular carcinoma  ?   CURRENT TREATMENT:  plan for pembrolizumab 400mg q6wks, see below    PAST TREATMENT:    Nivolumab 480mg q4 weeks   lenvatinib  Bevacizumab and atezolizumab, C1D1 8/26/21  Cabozantinib 40 mg daily, had been on hold since 08/05/2022 due to diarrhea toxicity, restarted 10/2022; d/c 1/30/23    ONCOLOGIC HISTORY:    Ms. Singh was admitted to [facility] on 02/04/2020 with gradually progressive shortness of breath, cough with hemoptysis along with intermittent abdominal pain, anorexia and fatigue.  In the emergency room she had workup including abdominal ultrasound showing multiple liver lesions concerning for metastatic disease.  CTA was negative for pulmonary embolism but revealed mediastinal adenopathy with right hilar soft tissue mass cannot exclude lymphadenopathy along with postobstructive collapse of right anterior segment lower lobe.  Hepatomegaly was noted with multiple low-density hypoenhancing lesions consistent with hepatic metastatic disease.    2/19/20 PET-CT:    To tele I was thinking to me that you already have 1 extra due as thinking last Monday for she 1. Hypermetabolic right infrahilar lung mass and associated partial postobstructive collapse right lower lobe.  2. Small hypermetabolic focus right lower lobe possibly early metastasis.  3. Hypermetabolic masslike thickening ascending colon suspicious for neoplasm.  4. Multifocal FDG avid lissette metastases neck, chest, abdomen, and pelvis as noted.  5. Extensive FDG avid hepatic metastases.  6. Widespread FDG avid osseous metastases.     02/06/2020 liver biopsy  Pathology:  Consistent with hepatocellular carcinoma, moderate to poorly  differentiated     The biopsy shows nests of tumor cells with deeply eosinophilic granular   cytoplasm. Immunostains performed shows results as:   HepPar - Focal granular positive   CK7 and TTF-1 - Negative    2020 cycle 1 day 1 nivolumab Q 2 weeks palliative immunotherapy    2020 cycle 2 day 1 nivolumab    2020 cycle 3 day 1 nivolumab, note: dose of 480 mg h6ffroe to limit clinic visits due to COVID-19 concerns    2020 cycle 5 day 1 nivolumab    20 inteval CT scan with response in lymphadenopathy, see below    3/2020 initiated lenvatinib; held, decreased dose due to rash side effect currently on 8 mg daily    2021 cycle 1 day 1 atezolizumab and bevacizumab    2022 cycle 1 day 1 cabozantinib 40 mg daily    INTERVAL EVENTS  On cabozantinib with recurrent severe diarrhea 5BM/hr at some points over last couple weeks, held with mild improvement and recur on reinitiation. Using imodiuma dn lomotil. Using pedialyte. Over last couple days new pain mild edema in right calf.      Review of Systems    ?   A comprehensive 14-point review of systems was reviewed with patient and was negative other than as specified above.   ?      Objective:      Physical Exam      ?   Vitals:    23 1325   BP: (!) 156/69   Pulse: (!) 59   Temp: 97.1 °F (36.2 °C)        ?   ECO  General appearance: Generally well appearing, in no acute distress, hard of hearing.   Head, eyes, ears, nose, and throat: moist mucous membranes.   Respiratory:  Normal work of breathing  Abdomen: nontender, nondistended.   Extremities: Warm, trace asymmetry edema right greater than left, tenderness calf  Neurologic: Alert and oriented.   Skin: No rashes, ecchymoses or petechial lesion.   Psychiatric:  Normal mood and affect.    Laboratory:  ?   No visits with results within 1 Day(s) from this visit.   Latest known visit with results is:   Lab Visit on 2023   Component Date Value Ref Range Status    TSH  01/26/2023 1.451  0.400 - 4.000 uIU/mL Final    WBC 01/26/2023 4.19  3.90 - 12.70 K/uL Final    RBC 01/26/2023 3.82 (L)  4.00 - 5.40 M/uL Final    Hemoglobin 01/26/2023 12.6  12.0 - 16.0 g/dL Final    Hematocrit 01/26/2023 38.7  37.0 - 48.5 % Final    MCV 01/26/2023 101 (H)  82 - 98 fL Final    MCH 01/26/2023 33.0 (H)  27.0 - 31.0 pg Final    MCHC 01/26/2023 32.6  32.0 - 36.0 g/dL Final    RDW 01/26/2023 18.1 (H)  11.5 - 14.5 % Final    Platelets 01/26/2023 204  150 - 450 K/uL Final    MPV 01/26/2023 9.2  9.2 - 12.9 fL Final    Immature Granulocytes 01/26/2023 0.2  0.0 - 0.5 % Final    Gran # (ANC) 01/26/2023 2.3  1.8 - 7.7 K/uL Final    Immature Grans (Abs) 01/26/2023 0.01  0.00 - 0.04 K/uL Final    Lymph # 01/26/2023 1.3  1.0 - 4.8 K/uL Final    Mono # 01/26/2023 0.4  0.3 - 1.0 K/uL Final    Eos # 01/26/2023 0.1  0.0 - 0.5 K/uL Final    Baso # 01/26/2023 0.02  0.00 - 0.20 K/uL Final    nRBC 01/26/2023 0  0 /100 WBC Final    Gran % 01/26/2023 54.4  38.0 - 73.0 % Final    Lymph % 01/26/2023 32.0  18.0 - 48.0 % Final    Mono % 01/26/2023 10.5  4.0 - 15.0 % Final    Eosinophil % 01/26/2023 2.4  0.0 - 8.0 % Final    Basophil % 01/26/2023 0.5  0.0 - 1.9 % Final    Differential Method 01/26/2023 Automated   Final    Sodium 01/26/2023 141  136 - 145 mmol/L Final    Potassium 01/26/2023 4.1  3.5 - 5.1 mmol/L Final    Chloride 01/26/2023 107  95 - 110 mmol/L Final    CO2 01/26/2023 25  23 - 29 mmol/L Final    Glucose 01/26/2023 85  70 - 110 mg/dL Final    BUN 01/26/2023 15  8 - 23 mg/dL Final    Creatinine 01/26/2023 0.8  0.5 - 1.4 mg/dL Final    Calcium 01/26/2023 9.1  8.7 - 10.5 mg/dL Final    Total Protein 01/26/2023 6.6  6.0 - 8.4 g/dL Final    Albumin 01/26/2023 3.0 (L)  3.5 - 5.2 g/dL Final    Total Bilirubin 01/26/2023 0.5  0.1 - 1.0 mg/dL Final    Alkaline Phosphatase 01/26/2023 59  55 - 135 U/L Final    AST 01/26/2023 24  10 - 40 U/L Final    ALT 01/26/2023 24  10 - 44 U/L  Final    Anion Gap 01/26/2023 9  8 - 16 mmol/L Final    eGFR 01/26/2023 >60  >60 mL/min/1.73 m^2 Final      ?   Tumor markers   AFP   Date Value Ref Range Status   08/24/2021 <2.0 0.0 - 8.4 ng/mL Final   02/21/2020 1.8 0.0 - 8.4 ng/mL Final       ?   Imaging:    Results for orders placed or performed during the hospital encounter of 01/26/23 (from the past 2160 hour(s))   NM PET CT Routine FDG    Impression    1. Mixed response with continued markedly FDG avid lymphadenopathy in the neck, thorax, and pelvis.  Some of the lymph nodes have decreased in size and some have increased in size.  2. No abnormal focus of FDG avidity in the cirrhotic liver.  3. No interval change in the non FDG avid bilateral pulmonary nodules and infiltrates.  All CT scans at this facility are performed  using dose modulation techniques as appropriate to performed exam including the following:  automated exposure control; adjustment of mA and/or kV according to the patients size (this includes techniques or standardized protocols for targeted exams where dose is matched to indication/reason for exam: i.e. extremities or head);  iterative reconstruction technique.      Electronically signed by: Stanislav Grover MD  Date:    01/26/2023  Time:    10:55     *Note: Due to a large number of results and/or encounters for the requested time period, some results have not been displayed. A complete set of results can be found in Results Review.         Pathology:        02/06/2020 liver biopsy  LIVER, BIOPSY:   Consistent with hepatocellular carcinoma, moderate to poorly differentiated     The biopsy shows nests of tumor cells with deeply eosinophilic granular   cytoplasm. Immunostains performed shows results as:   HepPar - Focal granular positive   CK7 and TTF-1 - Negative    ?   Assessment/Plan:       1. Right leg pain    2. HCC (hepatocellular carcinoma)    3. Encounter for follow-up examination after completed treatment for conditions other than  malignant neoplasm                Plan:     # metastatic hepatocellular carcinoma:  initiated first-line palliative immunotherapy with nivolumab 03/02/2020.  The last to restaging scans showing improvement particularly in liver lesions.  Reviewed today together restaging PET-CT.  Of note radiology read is in comparison to prior PET-CT February 2020 interval scans have been with CT which had shown improvement in liver lesions.  This most recent PET-CT does show new right cervical, mandibular and multiple bilateral lung lesions.  Mixed response in lymph nodes in abdomen/pelvis with continued improvement in liver lesions.  Repeat biopsy left groin lymph node consistent with same primary, hepatocellular carcinoma.  On lenvatinib initiated early March 2021, due to rash/pruritus/odynophagia complications lenvatinib on hold with resolution and restarted up titrated to 8 mg tolerating well.  Restaging PET 08/20/2021 reviewed with mixed response resolution of pulmonary area of avidity however other areas with findings consistent with progression with enlargement and new lymphadenopathy in neck chest abdomen and pelvis.  I discussed options including best supportive care or can consider bevacizumab and atezolizumab.  She did have prior immunotherapy however she had initial excellent response and may be consideration given combination with bevacizumab which she has not yet tried.  - 08/26/2021 cycle 1 day 1 atezolizumab and bevacizumab, tolerated exceptionally well.  Presents for cycle 2 today labs reviewed no concerning findings and will proceed with treatment today.  - pain well controlled on Xtampza 13.5mER bid (refilled 12/30/21) and 3-4 percocet/day  Per patient preference to wait until after holidays for repeat scan recently performed January 2022 and we review in detail results today unfortunately showing evidence of progressive disease.  We discussed she has had already several lines of therapy and limited further  options.  Previously on lenvatinib unable to achieve typical doses due to side effects with rash primarily; I did discuss consideration of cabozantinib also used hepatocellular carcinoma but may have similar side effects and potential for limited impact on her disease.  She is interested in trialing this.  We decided to start dose reduced given prior intolerance is cabozantinib 40 mg  She has now been on cabozantinib 40 mg initiated 2/4/22 with initial excellent response.  Restaging scans January 2023 with mixed response, see above.  Reviewed imaging results in detail today.  Unfortunately she is also had poor tolerance with diarrhea exacerbation and dehydration.  Therefore we discussed she will need to discontinue cabozantinib.  We discussed limited alternative options given prior lines of therapy.  We discussed consideration re-trial immunotherapy (prior nivolumab with excellent response and prior atezolizumab with bevacizumab in past) with pembrolizumab she may prefer as Q 6 weeks.  She understands given prior progression on immunotherapy in past may have attenuated response.  We discuss alternative option of no further therapy and supportive care only.  She is interested in trial of pembrolizumab.  Potential toxicities reviewed information written given to patient/daughter and consent signed today.    Right lower extremity pain/edema:  Doppler scan negative for deep venous thrombosis however redemonstration of lymphadenopathy in pelvic region likely contributing.  Systemic therapy per above, follow-up with palliative Care, narcotic refill given, discussed high risk for thrombosis regardless and encourage mobility as much as possible    Follow-Up:   Patient Instructions   Give info on pembrolizumab  Urgent RLE Doppler  File consent form  RV for C1D1 immunotherapy pembrolizumab with tsh, cbc, cmp prior        Route Chart for Scheduling    Med Onc Chart Routing      Follow up with physician 1 week.   Follow up with  HEATHER    Infusion scheduling note New chemo start pembrolizumab   Injection scheduling note    Labs CBC, CMP and TSH   Lab interval: every 6 weeks     Imaging    Pharmacy appointment    Other referrals        Treatment Plan Information   OP PEMBROLIZUMAB 400MG Q6W   Gill Burgess MD   Upcoming Treatment Dates - OP PEMBROLIZUMAB 400MG Q6W    2/6/2023       Chemotherapy       pembrolizumab (KEYTRUDA) 400 mg in sodium chloride 0.9% SolP 116 mL infusion  3/20/2023       Chemotherapy       pembrolizumab (KEYTRUDA) 400 mg in sodium chloride 0.9% SolP 116 mL infusion  5/1/2023       Chemotherapy       pembrolizumab (KEYTRUDA) 400 mg in sodium chloride 0.9% SolP 116 mL infusion  6/12/2023       Chemotherapy       pembrolizumab (KEYTRUDA) 400 mg in sodium chloride 0.9% SolP 116 mL infusion    Supportive Plan Information  IV FLUIDS AND ELECTROLYTES   Ana Maria Lopez NP   Upcoming Treatment Dates - IV FLUIDS AND ELECTROLYTES    No upcoming days in selected categories.

## 2023-01-30 NOTE — PATIENT INSTRUCTIONS
Give info on pembrolizumab  Urgent RLE Doppler  File consent form  RV for C1D1 immunotherapy pembrolizumab with tsh, cbc, cmp prior

## 2023-01-30 NOTE — PLAN OF CARE
DISCONTINUE OFF PATHWAY REGIMEN - Other            Atezolizumab (Tecentriq)       Bevacizumab-xxxx           Additional Orders: Serious immune-mediated adverse events can occur with   atezolizumab. Please monitor your patient and refer to the linked   immune-mediated adverse reaction management materials for more information.    **Always confirm dose/schedule in your pharmacy ordering system**    REASON: Disease Progression  PRIOR TREATMENT:   TREATMENT RESPONSE: Progressive Disease (PD)    START OFF PATHWAY REGIMEN - Other            Pembrolizumab (Keytruda)           Additional Orders: Serious immune-mediated adverse events can occur with   pembrolizumab. Please monitor your patient and refer to the linked   immune-mediated adverse reaction management materials for more information.    **Always confirm dose/schedule in your pharmacy ordering system**    Patient Characteristics:  Intent of Therapy:  Non-Curative / Palliative Intent, Discussed with Patient

## 2023-01-31 NOTE — NURSING
1517pm: Outgoing call to pt regarding new drug setup. Spoke with pt daughter, Lilian. Pt prefers afternoon appts at . Pt will be given drug info on Keytruda at chairside. Pt scheduled for labs on  at 4 pm at [facility], for Dr. Burgess on 2/10 at 1 pm at , and for chemo infusion on 2/10 at 230 pm at . Pt daughter verbalized understanding. Pt plan to report to appt as scheduled. SW and FC notified via msg of pt chemo start date.   Oncology Navigation   Intake  Initial Nurse Navigator Contact: 23  Date Worked: 23  Start of Treatment: 02/10/23     Treatment  Current Status: Active       Medical Oncologist: Dr. Gill Burgess  Immunotherapy: Planned  Immunotherapy Name: Pembrolizumab  Start Date: 02/10/23          General Referrals: Social Work  Social Work: notified via in-basket msg  Social Work Referral Date: 23          Support Systems: Children     Acuity  Stage: 2  Systemic Treatment - predicted or initiated: Immunotherapy (+2)  Treatment Tolerability: Has not started treatment yet/treatment fully completed and side effects resolved  ECO  Hospitalization Within the Past Month: 0   Needed: 0  Support: 0  Transportation: 0  History of noncompliance/frequent no shows and cancellations: 0  Verbalizes the need for more education: 1  Navigation Acuity: 6     Follow Up  No follow-ups on file.

## 2023-02-01 NOTE — TELEPHONE ENCOUNTER
Incoming call from daughter returning [facility] call for Cabometyx refill. Daughter states pt no longer is on the medication due to poor intolerance with diarrhea exacerbation and dehydration. MD has d/c'd med. Routing to assigned pharmacist to inform and to close out referral.

## 2023-02-02 NOTE — PROGRESS NOTES
Subjective:      Patient ID: Maura Singh is a 83 y.o. female.    Chief Complaint: Follow-up      Patient is a 83 y.o. female coming in today  has a past medical history of Arthritis, Atherosclerosis of aorta, Benign cyst of left kidney, Chronic diastolic congestive heart failure, Chronic obstructive pulmonary disease, Deaf, Dermatitis, drug-induced, Drug-induced hypothyroidism, Essential hypertension, Gastroesophageal reflux disease, HCC (hepatocellular carcinoma), History of steroid-induced diabetes mellitus, Hypertension, Immunodeficiency due to chemotherapy, Kidney stone on left side, Lung disease, Lung nodules, Mediastinal lymphadenopathy, and Mixed hyperlipidemia.    Pt presents to clinic today for BP f/u. Pt accompanied by daughter Lilian Maier today.  Has been f/u with Dr. Burgess, Hem/Onc. Currently on Levothyroxine, Lisinopril, Amlodipine, and Zofran. Pt continues to have elevated BP, though it has down trending in the last readings. Family reports pt to stop chemotherapy to start new infusion medication. Pt reports bilateral foot pain with associated swelling at night. Recently had Doppler test. No dvt.   Starting infusion next week for chemo treatments for metastatic HCC. Bp improving.     [facility] Peq Documents    2/1/2023  6:50 PM CST - Filed by Patient   Would you like a copy of [facility]'s Financial Assistance Policy Summary? No, I would not like a copy.     [facility] Peq [facility]    2/1/2023  6:52 PM CST - Filed by Patient   On average, how many days per week do you engage in moderate to strenuous exercise (like a brisk walk)? 0 days   On average, how many minutes do you engage in exercise at this level? 0 min   Do you feel stress - tense, restless, nervous, or anxious, or unable to sleep at night because your mind is troubled all the time - these days? To some extent   Do you belong to any clubs or organizations such as Sikh groups, unions, fraternal or athletic groups, or school groups? No   How often do you  attend meetings of the clubs or organizations you belong to? Never   In a typical week, how many times do you talk on the phone with family, friends, or neighbors? More than three times a week   How often do you get together with friends or relatives? Twice a week   Are you , , , , never , or living with a partner?    How hard is it for you to pay for the very basics like food, housing, medical care, and heating? Very hard   Within the past 12 months, you worried that your food would run out before you got the money to buy more. Often true   Within the past 12 months, the food you bought just didnt last and you didnt have money to get more. Often true   In the past 12 months, has lack of transportation kept you from medical appointments or from getting medications? No   In the past 12 months, has lack of transportation kept you from meetings, work, or from getting things needed for daily living? No   How often do you have a drink containing alcohol? Never   How many drinks containing alcohol do you have on a typical day when you are drinking? Patient does not drink   How often do you have six or more drinks on one occasion? Never   In the last 12 months, was there a time when you were not able to pay the mortgage or rent on time? No   In the last 12 months, how many places have you lived? (range: at least 0) 1   In the last 12 months, was there a time when you did not have a steady place to sleep or slept in a shelter (including now)? No         Pmh, Psh, Family Hx, Social Hx, HM updated in Epic Tabs today.   Review of Systems   Constitutional:  Negative for chills, fatigue and fever.   HENT:  Positive for hearing loss. Negative for ear pain and trouble swallowing.    Eyes:  Negative for pain and visual disturbance.   Respiratory:  Negative for cough and shortness of breath.    Cardiovascular:  Negative for chest pain and leg swelling.   Gastrointestinal:  Negative for  abdominal pain, blood in stool and nausea.   Endocrine: Negative for cold intolerance and heat intolerance.   Genitourinary:  Negative for dysuria and frequency.   Musculoskeletal:  Positive for arthralgias and gait problem. Negative for joint swelling, myalgias and neck pain.   Skin:  Negative for color change and rash.   Neurological:  Negative for dizziness and headaches.   Psychiatric/Behavioral:  Negative for behavioral problems and sleep disturbance.    Objective:     Vitals:    02/02/23 1421   BP: 139/65   Pulse: 62   Temp: 98.6 °F (37 °C)   Weight: 61.6 kg (135 lb 12.9 oz)   Height: 5' (1.524 m)     Wt Readings from Last 10 Encounters:   02/02/23 61.6 kg (135 lb 12.9 oz)   01/30/23 61.8 kg (136 lb 3.9 oz)   01/24/23 62 kg (136 lb 11 oz)   11/17/22 62.4 kg (137 lb 9.1 oz)   10/18/22 58.3 kg (128 lb 10.2 oz)   08/26/22 59.4 kg (130 lb 15.3 oz)   08/05/22 58.3 kg (128 lb 8.5 oz)   08/02/22 60.3 kg (132 lb 15 oz)   06/24/22 61.5 kg (135 lb 9.3 oz)   06/20/22 61.6 kg (135 lb 12.9 oz)     Physical Exam  Vitals and nursing note reviewed.   Constitutional:       General: She is awake.      Appearance: Normal appearance. She is well-developed, well-groomed and overweight.   HENT:      Head: Normocephalic and atraumatic.      Right Ear: External ear normal.      Left Ear: External ear normal.      Nose: Nose normal.      Mouth/Throat:      Mouth: Mucous membranes are moist.   Eyes:      Conjunctiva/sclera: Conjunctivae normal.   Neck:      Thyroid: No thyromegaly or thyroid tenderness.   Cardiovascular:      Rate and Rhythm: Normal rate and regular rhythm.      Heart sounds: Normal heart sounds.   Pulmonary:      Effort: Pulmonary effort is normal. No accessory muscle usage.      Breath sounds: Normal breath sounds.   Abdominal:      General: There is distension.      Palpations: There is mass.      Tenderness: There is abdominal tenderness. There is no guarding or rebound.   Musculoskeletal:      Cervical back:  Normal range of motion and neck supple.   Feet:      Comments: Bilateral foot varicose veins with discoloration   Skin:         Neurological:      General: No focal deficit present.      Mental Status: She is alert. Mental status is at baseline.   Psychiatric:         Attention and Perception: Attention normal.         Mood and Affect: Mood normal.         Speech: Speech normal.         Behavior: Behavior normal. Behavior is cooperative.         Thought Content: Thought content normal.         Judgment: Judgment normal.     Assessment:     1. Essential hypertension    2. 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 Secondary malignant neoplasm of bone    4. Drug-induced hypothyroidism    5. Atherosclerosis of aorta    6. Chronic diastolic congestive heart failure    7. Tortuous aorta    8. Chronic obstructive pulmonary disease, unspecified COPD type    9. HCC (hepatocellular carcinoma)    10. Liver masses    11. Mediastinal lymphadenopathy        LABS:   Lab Results   Component Value Date    HGBA1C 5.6 06/17/2021    HGBA1C 5.7 (H) 09/05/2019    HGBA1C 6.1 (H) 01/22/2018      Lab Results   Component Value Date    CHOL 220 (H) 06/17/2021    CHOL 250 (H) 09/05/2019    CHOL 272 (H) 05/02/2018     Lab Results   Component Value Date    LDLCALC 149.8 06/17/2021    LDLCALC 146.2 09/05/2019    LDLCALC 173.6 (H) 05/02/2018     Lab Results   Component Value Date    WBC 4.19 01/26/2023    HGB 12.6 01/26/2023    HCT 38.7 01/26/2023     01/26/2023    CHOL 220 (H) 06/17/2021    TRIG 191 (H) 06/17/2021    HDL 32 (L) 06/17/2021    ALT 24 01/26/2023    AST 24 01/26/2023     01/26/2023    K 4.1 01/26/2023     01/26/2023    CREATININE 0.8 01/26/2023    BUN 15 01/26/2023    CO2 25 01/26/2023    TSH 1.451 01/26/2023    INR 1.0 08/24/2021    HGBA1C 5.6 06/17/2021       The ASCVD Risk score (Vannessa DK, et al., 2019) failed to calculate for the following reasons:    The 2019 ASCVD risk score is only  valid for ages 40 to 79    US Lower Extremity Veins Right  Narrative: EXAMINATION:  US LOWER EXTREMITY VEINS RIGHT    CLINICAL HISTORY:  Pain in right legswelling; liver cancer    TECHNIQUE:  Multiple static images are submitted for interpretation with color flow and spectral doppler imaging.    COMPARISON:  03/21/2022    FINDINGS:  The veins in the right lower extremity are normal in appearance and have normal compressibility. There are normal venous waveforms seen with augmentation during the compression of the veins. The right common femoral vein has a velocity of 20 cm/sec.  There are several oval-shaped hypoechoic masses in the proximal portion of the right lower extremity.  The larger 1 measures 3.9 cm by 1.4 cm by 3.0 cm.  The other 1 measures 2.8 cm by 1.9 cm by 2.7 cm.  Impression: 1. There are several oval-shaped hypoechoic masses in the proximal portion of the right lower extremity.  The larger 1 measures 3.9 cm by 1.4 cm by 3.0 cm.  The other 1 measures 2.8 cm by 1.9 cm by 2.7 cm.  This is consistent with the patient's history and characteristic of metastatic disease.  If clinically indicated, I recommend consideration of a biopsy.  2. No deep venous thrombosis was visualized.    Electronically signed by: Harris Castaneda MD  Date:    01/30/2023  Time:    16:20      Plan:   Maura was seen today for follow-up.    Diagnoses and all orders for this visit:    Essential hypertension  Comments:  chronic, cont. lisinopril and amlodipine with adjusting dose based on amlodipine 2.5mg doing bid currently can increase to tid if needed.     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 The conditions are stable.  Continue with current medications and interventions.   Instructed to increase amlodipine 2.5mg from bid to tid if needed with starting back on Infusion for chemo/ cancer treatment.   Thyroid stable.   Pain managed through oncology.   Instructed to continue f/u with Dr. Burgess.  Instructed to f/u in 6 months.     There are no Patient Instructions on file for this visit.    Follow up in about 6 months (around 8/2/2023) for f/u office visit Dr. Andrade.  Scribe Attestation:   I, Mitch Willis, am scribing for, and in the presence of, Dr. Yessy Andrade MD. I performed the above scribed service and the documentation accurately describes the services I performed. I attest to the accuracy of the note.    I, Dr. Yessy Andrade MD, reviewed documentation as scribed above. I personally performed the services described in this documentation.  I agree that the record reflects my personal performance and is accurate and complete. Yessy Andrade MD.    02/02/2023

## 2023-02-03 NOTE — TELEPHONE ENCOUNTER
Called patient's daughter to  her on disposal options. She agreed to return unused cabometyx to her local [facility] for disposal. She did not expressed any further questions or concerns.

## 2023-02-10 NOTE — PROGRESS NOTES
Subjective:      DATE OF VISIT: 2/10/23     ?  Patient ID:?Maura Singh is a 83 y.o. female.?? MR#: [id number]   ?   PRIMARY ONCOLOGIST: Dr. Burgess    ? Primary Care Providers:  Yessy Andrade MD, MD (General)     CHIEF COMPLAINT: ???  Follow-up  ?   ONCOLOGIC DIAGNOSIS:  Metastatic hepatocellular carcinoma  ?   CURRENT TREATMENT:  pembrolizumab 400mg q6wks, C1D1 2/10/23    PAST TREATMENT:    Nivolumab 480mg q4 weeks   lenvatinib  Bevacizumab and atezolizumab, C1D1 8/26/21  Cabozantinib 40 mg daily, had been on hold since 08/05/2022 due to diarrhea toxicity, restarted 10/2022; d/c 1/30/23    ONCOLOGIC HISTORY:    Ms. Singh was admitted to [facility] on 02/04/2020 with gradually progressive shortness of breath, cough with hemoptysis along with intermittent abdominal pain, anorexia and fatigue.  In the emergency room she had workup including abdominal ultrasound showing multiple liver lesions concerning for metastatic disease.  CTA was negative for pulmonary embolism but revealed mediastinal adenopathy with right hilar soft tissue mass cannot exclude lymphadenopathy along with postobstructive collapse of right anterior segment lower lobe.  Hepatomegaly was noted with multiple low-density hypoenhancing lesions consistent with hepatic metastatic disease.    2/19/20 PET-CT:    To tele I was thinking to me that you already have 1 extra due as thinking last Monday for she 1. Hypermetabolic right infrahilar lung mass and associated partial postobstructive collapse right lower lobe.  2. Small hypermetabolic focus right lower lobe possibly early metastasis.  3. Hypermetabolic masslike thickening ascending colon suspicious for neoplasm.  4. Multifocal FDG avid lissette metastases neck, chest, abdomen, and pelvis as noted.  5. Extensive FDG avid hepatic metastases.  6. Widespread FDG avid osseous metastases.     02/06/2020 liver biopsy  Pathology:  Consistent with hepatocellular carcinoma, moderate to poorly  differentiated     The biopsy shows nests of tumor cells with deeply eosinophilic granular   cytoplasm. Immunostains performed shows results as:   HepPar - Focal granular positive   CK7 and TTF-1 - Negative    2020 cycle 1 day 1 nivolumab Q 2 weeks palliative immunotherapy    2020 cycle 2 day 1 nivolumab    2020 cycle 3 day 1 nivolumab, note: dose of 480 mg a4ivmss to limit clinic visits due to COVID-19 concerns    2020 cycle 5 day 1 nivolumab    20 inteval CT scan with response in lymphadenopathy, see below    3/2020 initiated lenvatinib; held, decreased dose due to rash side effect currently on 8 mg daily    2021 cycle 1 day 1 atezolizumab and bevacizumab    2022 cycle 1 day 1 cabozantinib 40 mg daily    INTERVAL EVENTS  On cabozantinib with recurrent severe diarrhea 5BM/hr at some points over last couple weeks, held with mild improvement and recur on reinitiation. Using imodiuma dn lomotil. Using pedialyte. Over last couple days new pain mild edema in right calf.      Review of Systems    ?   A comprehensive 14-point review of systems was reviewed with patient and was negative other than as specified above.   ?      Objective:      Physical Exam      ?   Vitals:    02/10/23 1251   BP: 128/63   Pulse: (!) 52   Resp: 18   Temp: 97.7 °F (36.5 °C)          ?   ECO  General appearance: Generally well appearing, in no acute distress, hard of hearing.   Head, eyes, ears, nose, and throat: moist mucous membranes.   Respiratory:  Normal work of breathing  Abdomen: nontender, nondistended.   Extremities: Warm, no appreciable edema Neurologic: Alert and oriented.   Skin: No rashes, ecchymoses or petechial lesion.   Psychiatric:  Normal mood and affect.    Laboratory:  ?   No visits with results within 1 Day(s) from this visit.   Latest known visit with results is:   Lab Visit on 2023   Component Date Value Ref Range Status    TSH 2023 1.451  0.400 - 4.000 uIU/mL Final     WBC 01/26/2023 4.19  3.90 - 12.70 K/uL Final    RBC 01/26/2023 3.82 (L)  4.00 - 5.40 M/uL Final    Hemoglobin 01/26/2023 12.6  12.0 - 16.0 g/dL Final    Hematocrit 01/26/2023 38.7  37.0 - 48.5 % Final    MCV 01/26/2023 101 (H)  82 - 98 fL Final    MCH 01/26/2023 33.0 (H)  27.0 - 31.0 pg Final    MCHC 01/26/2023 32.6  32.0 - 36.0 g/dL Final    RDW 01/26/2023 18.1 (H)  11.5 - 14.5 % Final    Platelets 01/26/2023 204  150 - 450 K/uL Final    MPV 01/26/2023 9.2  9.2 - 12.9 fL Final    Immature Granulocytes 01/26/2023 0.2  0.0 - 0.5 % Final    Gran # (ANC) 01/26/2023 2.3  1.8 - 7.7 K/uL Final    Immature Grans (Abs) 01/26/2023 0.01  0.00 - 0.04 K/uL Final    Lymph # 01/26/2023 1.3  1.0 - 4.8 K/uL Final    Mono # 01/26/2023 0.4  0.3 - 1.0 K/uL Final    Eos # 01/26/2023 0.1  0.0 - 0.5 K/uL Final    Baso # 01/26/2023 0.02  0.00 - 0.20 K/uL Final    nRBC 01/26/2023 0  0 /100 WBC Final    Gran % 01/26/2023 54.4  38.0 - 73.0 % Final    Lymph % 01/26/2023 32.0  18.0 - 48.0 % Final    Mono % 01/26/2023 10.5  4.0 - 15.0 % Final    Eosinophil % 01/26/2023 2.4  0.0 - 8.0 % Final    Basophil % 01/26/2023 0.5  0.0 - 1.9 % Final    Differential Method 01/26/2023 Automated   Final    Sodium 01/26/2023 141  136 - 145 mmol/L Final    Potassium 01/26/2023 4.1  3.5 - 5.1 mmol/L Final    Chloride 01/26/2023 107  95 - 110 mmol/L Final    CO2 01/26/2023 25  23 - 29 mmol/L Final    Glucose 01/26/2023 85  70 - 110 mg/dL Final    BUN 01/26/2023 15  8 - 23 mg/dL Final    Creatinine 01/26/2023 0.8  0.5 - 1.4 mg/dL Final    Calcium 01/26/2023 9.1  8.7 - 10.5 mg/dL Final    Total Protein 01/26/2023 6.6  6.0 - 8.4 g/dL Final    Albumin 01/26/2023 3.0 (L)  3.5 - 5.2 g/dL Final    Total Bilirubin 01/26/2023 0.5  0.1 - 1.0 mg/dL Final    Alkaline Phosphatase 01/26/2023 59  55 - 135 U/L Final    AST 01/26/2023 24  10 - 40 U/L Final    ALT 01/26/2023 24  10 - 44 U/L Final    Anion Gap 01/26/2023 9  8 - 16 mmol/L Final    eGFR 01/26/2023 >60  >60  mL/min/1.73 m^2 Final      ?   Tumor markers   AFP   Date Value Ref Range Status   08/24/2021 <2.0 0.0 - 8.4 ng/mL Final   02/21/2020 1.8 0.0 - 8.4 ng/mL Final       ?   Imaging:    Results for orders placed or performed during the hospital encounter of 01/26/23 (from the past 2160 hour(s))   NM PET CT Routine FDG    Impression    1. Mixed response with continued markedly FDG avid lymphadenopathy in the neck, thorax, and pelvis.  Some of the lymph nodes have decreased in size and some have increased in size.  2. No abnormal focus of FDG avidity in the cirrhotic liver.  3. No interval change in the non FDG avid bilateral pulmonary nodules and infiltrates.  All CT scans at this facility are performed  using dose modulation techniques as appropriate to performed exam including the following:  automated exposure control; adjustment of mA and/or kV according to the patients size (this includes techniques or standardized protocols for targeted exams where dose is matched to indication/reason for exam: i.e. extremities or head);  iterative reconstruction technique.      Electronically signed by: Stanislav Grover MD  Date:    01/26/2023  Time:    10:55     *Note: Due to a large number of results and/or encounters for the requested time period, some results have not been displayed. A complete set of results can be found in Results Review.         Pathology:        02/06/2020 liver biopsy  LIVER, BIOPSY:   Consistent with hepatocellular carcinoma, moderate to poorly differentiated     The biopsy shows nests of tumor cells with deeply eosinophilic granular   cytoplasm. Immunostains performed shows results as:   HepPar - Focal granular positive   CK7 and TTF-1 - Negative    ?   Assessment/Plan:       No diagnosis found.              Plan:     # metastatic hepatocellular carcinoma:  initiated first-line palliative immunotherapy with nivolumab 03/02/2020.  The last to restaging scans showing improvement particularly in liver lesions.   Reviewed today together restaging PET-CT.  Of note radiology read is in comparison to prior PET-CT February 2020 interval scans have been with CT which had shown improvement in liver lesions.  This most recent PET-CT does show new right cervical, mandibular and multiple bilateral lung lesions.  Mixed response in lymph nodes in abdomen/pelvis with continued improvement in liver lesions.  Repeat biopsy left groin lymph node consistent with same primary, hepatocellular carcinoma.  On lenvatinib initiated early March 2021, due to rash/pruritus/odynophagia complications lenvatinib on hold with resolution and restarted up titrated to 8 mg tolerating well.  Restaging PET 08/20/2021 reviewed with mixed response resolution of pulmonary area of avidity however other areas with findings consistent with progression with enlargement and new lymphadenopathy in neck chest abdomen and pelvis.  I discussed options including best supportive care or can consider bevacizumab and atezolizumab.  She did have prior immunotherapy however she had initial excellent response and may be consideration given combination with bevacizumab which she has not yet tried.  - 08/26/2021 cycle 1 day 1 atezolizumab and bevacizumab, tolerated exceptionally well.  Presents for cycle 2 today labs reviewed no concerning findings and will proceed with treatment today.  - pain well controlled on Xtampza 13.5mER bid (refilled 12/30/21) and 3-4 percocet/day  Per patient preference to wait until after holidays for repeat scan recently performed January 2022 and we review in detail results today unfortunately showing evidence of progressive disease.  We discussed she has had already several lines of therapy and limited further options.  Previously on lenvatinib unable to achieve typical doses due to side effects with rash primarily; I did discuss consideration of cabozantinib also used hepatocellular carcinoma but may have similar side effects and potential for  limited impact on her disease.  She is interested in trialing this.  We decided to start dose reduced given prior intolerance is cabozantinib 40 mg  She has now been on cabozantinib 40 mg initiated 2/4/22 with initial excellent response.  Restaging scans January 2023 with mixed response, see above.  Reviewed imaging results in detail today.  Unfortunately she is also had poor tolerance with diarrhea exacerbation and dehydration.  Therefore we discussed she will need to discontinue cabozantinib.  We discussed limited alternative options given prior lines of therapy.  We discussed consideration re-trial immunotherapy (prior nivolumab with excellent response and prior atezolizumab with bevacizumab in past) with pembrolizumab she may prefer as Q 6 weeks.  She understands given prior progression on immunotherapy in past may have attenuated response.  We discuss alternative option of no further therapy and supportive care only.  She is interested in trial of pembrolizumab.  Potential toxicities reviewed information written given to patient/daughter and consent signed.      02/10/2023 cycle 1 day 1 pembrolizumab.  Labs from last visit within normal limits no interval treatment or clinical concerns okay to proceed today.  Labs obtained in [facility] location will need to be drawn 2 days prior to infusions in future due to time to process longer.    Hypothyroidism on levothyroxine TSH T4 will need to continue to be monitored especially on immunotherapy, orders placed.  Last thyroid function within normal limits.    Right lower extremity pain/edema:  Doppler scan negative for deep venous thrombosis however redemonstration of lymphadenopathy in pelvic region likely contributing.  Systemic therapy per above, follow-up with palliative Care, narcotic refill given, discussed high risk for thrombosis regardless and encourage mobility as much as possible    Follow-Up:   There are no Patient Instructions on file for this  visit.        Route Chart for Scheduling    Med Onc Chart Routing      Follow up with physician    Follow up with [facility] 1 week. monday 2/20/23 in afternoon, no labs needed prior   Infusion scheduling note pembro   Injection scheduling note    Labs CBC and CMP   Lab interval:  in 6 wks 2 day prior to infusion visit in [facility]   Imaging    Pharmacy appointment    Other referrals        Treatment Plan Information   OP PEMBROLIZUMAB 400MG Q6W   Gill Burgess MD   Upcoming Treatment Dates - OP PEMBROLIZUMAB 400MG Q6W    2/6/2023       Chemotherapy       pembrolizumab (KEYTRUDA) 400 mg in sodium chloride 0.9% SolP 116 mL infusion  3/20/2023       Chemotherapy       pembrolizumab (KEYTRUDA) 400 mg in sodium chloride 0.9% SolP 116 mL infusion  5/1/2023       Chemotherapy       pembrolizumab (KEYTRUDA) 400 mg in sodium chloride 0.9% SolP 116 mL infusion  6/12/2023       Chemotherapy       pembrolizumab (KEYTRUDA) 400 mg in sodium chloride 0.9% SolP 116 mL infusion    Supportive Plan Information  IV FLUIDS AND ELECTROLYTES   Ana Maria Lopez NP   Upcoming Treatment Dates - IV FLUIDS AND ELECTROLYTES    No upcoming days in selected categories.

## 2023-02-10 NOTE — PROGRESS NOTES
Outpatient Care Management  Patient Does Not Consent    Patient: Maura Singh  MRN:  [id number]  Date of Service:  2/10/2023  Completed by:  Allyssa Vitale RN    Chief Complaint   Patient presents with    OPCM Chart Review    OPCM Enrollment Call    Case Closure     2-10-23   referral for SDOH only       Patient Summary     Program:  OPCM High Risk     Consent Received:  Decline            Email sent to Maria T Cardenas [facility] for referral for SDOH.

## 2023-02-10 NOTE — PROGRESS NOTES
2/10/2023      1st Attempt to complete Social Work Assessment for Outpatient Care Management; caregiver requested a return call.  LCSW will reattempt at a later date.

## 2023-02-10 NOTE — PLAN OF CARE
Problem: Adult Inpatient Plan of Care  Goal: Plan of Care Review  Outcome: Ongoing, Progressing  Flowsheets (Taken 2/10/2023 1430)  Plan of Care Reviewed With: patient  Goal: Patient-Specific Goal (Individualized)  Outcome: Ongoing, Progressing  Flowsheets (Taken 2/10/2023 1430)  Anxieties, Fears or Concerns: none  Individualized Care Needs: legs up, blanket, pillow  Goal: Optimal Comfort and Wellbeing  Outcome: Ongoing, Progressing

## 2023-02-10 NOTE — DISCHARGE INSTRUCTIONS
THANKS FOR ALLOWING ME TO CARE FOR YOU TODAY!!! ~Sonia          THANKS FOR CHOOSING [facility]!!!      [facility]  [address] [facility]  [id number] [place] [address] Drive  [phone] phone     [phone] fax  Hours of Operation: Monday- Friday 8:00am- 5:00pm  After hours phone  [phone]  Hematology / Oncology Physicians on call      KLAUDIA Sharma Dr., Dr., NP Sydney Prescott, MISAEL Lopez, KE Rojas    Please call with any concerns regarding your appointment today.

## 2023-02-13 NOTE — PROGRESS NOTES
Outpatient Care Management   - Low Risk Patient Assessment    Patient: Maura Singh  MRN:  [id number]  Date of Service:  2/13/2023  Completed by:  Becky Kwok LCSW  Referral Date: 01/24/2023    Reason for Visit   Patient presents with    OPCM Chart Review    Social Work Assessment - Low/Mod Risk    Plan Of Care     2/13/2023  Brief Summary:  telephoned patient identified and spoke with her caregiver, identifying the following psycho-social needs assistance with dental care and food. Care plan was created in collaboration with patient/caregiver input. SW will follow up in one week.

## 2023-02-14 NOTE — PROGRESS NOTES
2/13/2023     Attempt to complete Social Work Assessment for Outpatient Care Management; left message requesting a return call.  LCSW will reattempt at a later date.

## 2023-02-24 NOTE — PROGRESS NOTES
Subjective:      DATE OF VISIT: 2/24/23     ?  Patient ID:?Maura Singh is a 83 y.o. female.?? MR#: [id number]   ?   PRIMARY ONCOLOGIST: Dr. Burgess    ? Primary Care Providers:  Yessy Andrade MD, MD (General)     CHIEF COMPLAINT: ???  Follow-up  ?   ONCOLOGIC DIAGNOSIS:  Metastatic hepatocellular carcinoma  ?   CURRENT TREATMENT:  pembrolizumab 400mg q6wks, C1D1 2/10/23    PAST TREATMENT:    Nivolumab 480mg q4 weeks   lenvatinib  Bevacizumab and atezolizumab, C1D1 8/26/21  Cabozantinib 40 mg daily, had been on hold since 08/05/2022 due to diarrhea toxicity, restarted 10/2022; d/c 1/30/23    ONCOLOGIC HISTORY:    Ms. Singh was admitted to [facility] on 02/04/2020 with gradually progressive shortness of breath, cough with hemoptysis along with intermittent abdominal pain, anorexia and fatigue.  In the emergency room she had workup including abdominal ultrasound showing multiple liver lesions concerning for metastatic disease.  CTA was negative for pulmonary embolism but revealed mediastinal adenopathy with right hilar soft tissue mass cannot exclude lymphadenopathy along with postobstructive collapse of right anterior segment lower lobe.  Hepatomegaly was noted with multiple low-density hypoenhancing lesions consistent with hepatic metastatic disease.    2/19/20 PET-CT:    To tele I was thinking to me that you already have 1 extra due as thinking last Monday for she 1. Hypermetabolic right infrahilar lung mass and associated partial postobstructive collapse right lower lobe.  2. Small hypermetabolic focus right lower lobe possibly early metastasis.  3. Hypermetabolic masslike thickening ascending colon suspicious for neoplasm.  4. Multifocal FDG avid lissette metastases neck, chest, abdomen, and pelvis as noted.  5. Extensive FDG avid hepatic metastases.  6. Widespread FDG avid osseous metastases.     02/06/2020 liver biopsy  Pathology:  Consistent with hepatocellular carcinoma, moderate to poorly  differentiated     The biopsy shows nests of tumor cells with deeply eosinophilic granular   cytoplasm. Immunostains performed shows results as:   HepPar - Focal granular positive   CK7 and TTF-1 - Negative    2020 cycle 1 day 1 nivolumab Q 2 weeks palliative immunotherapy    2020 cycle 2 day 1 nivolumab    2020 cycle 3 day 1 nivolumab, note: dose of 480 mg [id number] to limit clinic visits due to COVID-19 concerns    2020 cycle 5 day 1 nivolumab    20 inteval CT scan with response in lymphadenopathy, see below    3/2020 initiated lenvatinib; held, decreased dose due to rash side effect currently on 8 mg daily    2021 cycle 1 day 1 atezolizumab and bevacizumab    2022 cycle 1 day 1 cabozantinib 40 mg daily    INTERVAL EVENTS  She had her 1st infusion of pembrolizumab 2 weeks ago tolerated exceptionally well and feels great improved energy.  Resolution of prior diarrhea.  No notable edema lower extremities.      Review of Systems    ?   A comprehensive 14-point review of systems was reviewed with patient and was negative other than as specified above.   ?      Objective:      Physical Exam      ?   Vitals:    23 1533   BP: 108/65   Pulse: (!) 59   Resp: 16   Temp: 98.1 °F (36.7 °C)          ?   ECO  General appearance: Generally well appearing, in no acute distress, hard of hearing.   Head, eyes, ears, nose, and throat: moist mucous membranes.   Respiratory:  Normal work of breathing  Abdomen: nontender, nondistended.   Extremities: Warm, no appreciable edema Neurologic: Alert and oriented.   Skin: No rashes, ecchymoses or petechial lesion.   Psychiatric:  Normal mood and affect.    Laboratory:  ?   No visits with results within 1 Day(s) from this visit.   Latest known visit with results is:   Lab Visit on 2023   Component Date Value Ref Range Status    TSH 2023 1.451  0.400 - 4.000 uIU/mL Final    WBC 2023 4.19  3.90 - 12.70 K/uL Final    RBC  01/26/2023 3.82 (L)  4.00 - 5.40 M/uL Final    Hemoglobin 01/26/2023 12.6  12.0 - 16.0 g/dL Final    Hematocrit 01/26/2023 38.7  37.0 - 48.5 % Final    MCV 01/26/2023 101 (H)  82 - 98 fL Final    MCH 01/26/2023 33.0 (H)  27.0 - 31.0 pg Final    MCHC 01/26/2023 32.6  32.0 - 36.0 g/dL Final    RDW 01/26/2023 18.1 (H)  11.5 - 14.5 % Final    Platelets 01/26/2023 204  150 - 450 K/uL Final    MPV 01/26/2023 9.2  9.2 - 12.9 fL Final    Immature Granulocytes 01/26/2023 0.2  0.0 - 0.5 % Final    Gran # (ANC) 01/26/2023 2.3  1.8 - 7.7 K/uL Final    Immature Grans (Abs) 01/26/2023 0.01  0.00 - 0.04 K/uL Final    Lymph # 01/26/2023 1.3  1.0 - 4.8 K/uL Final    Mono # 01/26/2023 0.4  0.3 - 1.0 K/uL Final    Eos # 01/26/2023 0.1  0.0 - 0.5 K/uL Final    Baso # 01/26/2023 0.02  0.00 - 0.20 K/uL Final    nRBC 01/26/2023 0  0 /100 WBC Final    Gran % 01/26/2023 54.4  38.0 - 73.0 % Final    Lymph % 01/26/2023 32.0  18.0 - 48.0 % Final    Mono % 01/26/2023 10.5  4.0 - 15.0 % Final    Eosinophil % 01/26/2023 2.4  0.0 - 8.0 % Final    Basophil % 01/26/2023 0.5  0.0 - 1.9 % Final    Differential Method 01/26/2023 Automated   Final    Sodium 01/26/2023 141  136 - 145 mmol/L Final    Potassium 01/26/2023 4.1  3.5 - 5.1 mmol/L Final    Chloride 01/26/2023 107  95 - 110 mmol/L Final    CO2 01/26/2023 25  23 - 29 mmol/L Final    Glucose 01/26/2023 85  70 - 110 mg/dL Final    BUN 01/26/2023 15  8 - 23 mg/dL Final    Creatinine 01/26/2023 0.8  0.5 - 1.4 mg/dL Final    Calcium 01/26/2023 9.1  8.7 - 10.5 mg/dL Final    Total Protein 01/26/2023 6.6  6.0 - 8.4 g/dL Final    Albumin 01/26/2023 3.0 (L)  3.5 - 5.2 g/dL Final    Total Bilirubin 01/26/2023 0.5  0.1 - 1.0 mg/dL Final    Alkaline Phosphatase 01/26/2023 59  55 - 135 U/L Final    AST 01/26/2023 24  10 - 40 U/L Final    ALT 01/26/2023 24  10 - 44 U/L Final    Anion Gap 01/26/2023 9  8 - 16 mmol/L Final    eGFR 01/26/2023 >60  >60 mL/min/1.73 m^2 Final      ?   Tumor markers   AFP   Date  Value Ref Range Status   08/24/2021 <2.0 0.0 - 8.4 ng/mL Final   02/21/2020 1.8 0.0 - 8.4 ng/mL Final       ?   Imaging:    Results for orders placed or performed during the hospital encounter of 01/26/23 (from the past 2160 hour(s))   NM PET CT Routine FDG    Impression    1. Mixed response with continued markedly FDG avid lymphadenopathy in the neck, thorax, and pelvis.  Some of the lymph nodes have decreased in size and some have increased in size.  2. No abnormal focus of FDG avidity in the cirrhotic liver.  3. No interval change in the non FDG avid bilateral pulmonary nodules and infiltrates.  All CT scans at this facility are performed  using dose modulation techniques as appropriate to performed exam including the following:  automated exposure control; adjustment of mA and/or kV according to the patients size (this includes techniques or standardized protocols for targeted exams where dose is matched to indication/reason for exam: i.e. extremities or head);  iterative reconstruction technique.      Electronically signed by: Stanislav Grover MD  Date:    01/26/2023  Time:    10:55     *Note: Due to a large number of results and/or encounters for the requested time period, some results have not been displayed. A complete set of results can be found in Results Review.         Pathology:        02/06/2020 liver biopsy  LIVER, BIOPSY:   Consistent with hepatocellular carcinoma, moderate to poorly differentiated     The biopsy shows nests of tumor cells with deeply eosinophilic granular   cytoplasm. Immunostains performed shows results as:   HepPar - Focal granular positive   CK7 and TTF-1 - Negative    ?   Assessment/Plan:       No diagnosis found.              Plan:     # metastatic hepatocellular carcinoma:  initiated first-line palliative immunotherapy with nivolumab 03/02/2020.  The last to restaging scans showing improvement particularly in liver lesions.  Reviewed today together restaging PET-CT.  Of note  radiology read is in comparison to prior PET-CT February 2020 interval scans have been with CT which had shown improvement in liver lesions.  This most recent PET-CT does show new right cervical, mandibular and multiple bilateral lung lesions.  Mixed response in lymph nodes in abdomen/pelvis with continued improvement in liver lesions.  Repeat biopsy left groin lymph node consistent with same primary, hepatocellular carcinoma.  On lenvatinib initiated early March 2021, due to rash/pruritus/odynophagia complications lenvatinib on hold with resolution and restarted up titrated to 8 mg tolerating well.  Restaging PET 08/20/2021 reviewed with mixed response resolution of pulmonary area of avidity however other areas with findings consistent with progression with enlargement and new lymphadenopathy in neck chest abdomen and pelvis.  I discussed options including best supportive care or can consider bevacizumab and atezolizumab.  She did have prior immunotherapy however she had initial excellent response and may be consideration given combination with bevacizumab which she has not yet tried.  - 08/26/2021 cycle 1 day 1 atezolizumab and bevacizumab, tolerated exceptionally well.  Presents for cycle 2 today labs reviewed no concerning findings and will proceed with treatment today.  - pain well controlled on Xtampza 13.5mER bid (refilled 12/30/21) and 3-4 percocet/day  Per patient preference to wait until after holidays for repeat scan recently performed January 2022 and we review in detail results today unfortunately showing evidence of progressive disease.  We discussed she has had already several lines of therapy and limited further options.  Previously on lenvatinib unable to achieve typical doses due to side effects with rash primarily; I did discuss consideration of cabozantinib also used hepatocellular carcinoma but may have similar side effects and potential for limited impact on her disease.  She is interested in  trialing this.  We decided to start dose reduced given prior intolerance is cabozantinib 40 mg  She has now been on cabozantinib 40 mg initiated 2/4/22 with initial excellent response.  Restaging scans January 2023 with mixed response, see above.  Reviewed imaging results in detail today.  Unfortunately she is also had poor tolerance with diarrhea exacerbation and dehydration.  Therefore we discussed she will need to discontinue cabozantinib.  We discussed limited alternative options given prior lines of therapy.  We discussed consideration re-trial immunotherapy (prior nivolumab with excellent response and prior atezolizumab with bevacizumab in past) with pembrolizumab she may prefer as Q 6 weeks.  She understands given prior progression on immunotherapy in past may have attenuated response.  We discuss alternative option of no further therapy and supportive care only.  She is interested in trial of pembrolizumab.  Potential toxicities reviewed information written given to patient/daughter and consent signed.      02/10/2023 cycle 1 day 1 pembrolizumab.  Tolerated very well.  Will proceed with next infusion in 4 weeks will get labs same day prior in our clinic.    Hypothyroidism on levothyroxine TSH T4 will need to continue to be monitored especially on immunotherapy, orders placed.  Last thyroid function within normal limits.    Right lower extremity pain/edema:  Doppler scan negative for deep venous thrombosis however redemonstration of lymphadenopathy in pelvic region likely contributing.  Systemic therapy per above, follow-up with palliative Care, narcotic refill given, discussed high risk for thrombosis regardless and encourage mobility as much as possible    Follow-Up:           Route Chart for Scheduling    Med Onc Chart Routing      Follow up with physician 4 weeks.   Follow up with HEATHER    Infusion scheduling note Pembrolizumab Q 6 weeks next in 4 weeks   Injection scheduling note    Labs CBC, CMP and TSH    Lab interval:  prior to md visit   Imaging    Pharmacy appointment    Other referrals          Treatment Plan Information   OP PEMBROLIZUMAB 400MG Q6W   Gill Burgess MD   Upcoming Treatment Dates - OP PEMBROLIZUMAB 400MG Q6W    3/20/2023       Chemotherapy       pembrolizumab (KEYTRUDA) 400 mg in sodium chloride 0.9% SolP 116 mL infusion  5/1/2023       Chemotherapy       pembrolizumab (KEYTRUDA) 400 mg in sodium chloride 0.9% SolP 116 mL infusion  6/12/2023       Chemotherapy       pembrolizumab (KEYTRUDA) 400 mg in sodium chloride 0.9% SolP 116 mL infusion  7/24/2023       Chemotherapy       pembrolizumab (KEYTRUDA) 400 mg in sodium chloride 0.9% SolP 116 mL infusion    Supportive Plan Information  IV FLUIDS AND ELECTROLYTES   Ana Maria Lopez NP   Upcoming Treatment Dates - IV FLUIDS AND ELECTROLYTES    No upcoming days in selected categories.

## 2023-03-16 NOTE — TELEPHONE ENCOUNTER
NONA intern attempted to call pt to f/u w/ pt for any needs she may have. Pt's dtr Lilian answered and stated she communicates for the pt since the pt cannot hear. Lilian will call SW dept back after she speaks w/ pt. NONA banuelos provided phone # to SW dept. NONA intern will remain available.

## 2023-03-24 PROBLEM — C78.02 MALIGNANT NEOPLASM METASTATIC TO BOTH LUNGS: Status: ACTIVE | Noted: 2023-01-01

## 2023-03-24 PROBLEM — N17.9 AKI (ACUTE KIDNEY INJURY): Status: ACTIVE | Noted: 2023-01-01

## 2023-03-24 PROBLEM — C78.01 MALIGNANT NEOPLASM METASTATIC TO BOTH LUNGS: Status: ACTIVE | Noted: 2023-01-01

## 2023-03-24 NOTE — NURSING
"1530pm: Outgoing call to pt regarding 2nd chemo infusion today at [facility]. Spoke with pt daughter, Lilian. Pt daughter stated,"Pt doing fine we're leaving treatment now as we speak. Everything went fine today. Everyone was nice over here." No further concerns, complaints, needs, and/or questions noted at this time. Pt encouraged to contact me directly should any issues arise. Will fup with pt in 1 mth. Pt daughter verbalized understanding.   Oncology Navigation   Intake  Initial Nurse Navigator Contact: 23  Date Worked: 23  Start of Treatment: 02/10/23     Treatment  Current Status: Active       Medical Oncologist: Dr. Gill Burgess  Immunotherapy: Planned  Immunotherapy Name: Pembrolizumab  Start Date: 02/10/23          General Referrals: Social Work  Social Work: notified via in-basket msg  Social Work Referral Date: 23          Support Systems: Children     Acuity  Stage: 2  Systemic Treatment - predicted or initiated: Immunotherapy (+2)  Treatment Tolerability: Has not started treatment yet/treatment fully completed and side effects resolved  ECO  Hospitalization Within the Past Month: 0   Needed: 0  Support: 0  Transportation: 0  History of noncompliance/frequent no shows and cancellations: 0  Verbalizes the need for more education: 1  Navigation Acuity: 6     Follow Up  No follow-ups on file.       "

## 2023-03-24 NOTE — DISCHARGE INSTRUCTIONS
Thank you for letting me take care of YOU!!    [facility]  [address] [facility]  [address] [place] [address] Drive  [phone] phone     [phone] fax  Hours of Operation: Monday- Friday 8:00am- 5:00pm  After hours phone  [phone]  Hematology / Oncology Physicians on call:    Dr. Santiago Olvera        Nurse Practitioners:    Lois Jimenez, MISAEL Callejas, MISAEL Hahn, VICKY Lopez, MISAEL Jensen, MISAEL Webber, NP    Please don't hesitate to call, if you have any concerns.                                                                                                                                                                                                                                                      Discharge Instructions for Chemotherapy     Your doctor prescribed a type of medication therapy for you called chemotherapy. Doctors prescribe chemotherapy for many different types of illnesses, including cancer. There are many types of chemotherapy. This sheet provides general guidelines on how you can take care of yourself after your chemotherapy.   Mouth Care   Dont be discouraged if you get mouth sores, even if you are following all your doctors instructions. Many people get mouth sores as a side effect of chemotherapy. Heres what you can do to prevent mouth sores:   Keep your mouth clean. Brush your teeth with a soft-bristle toothbrush after every meal.   Use an oral swab or special soft toothbrush if your gums bleed during regular brushing.   Dont use dental floss if your platelet count is below 50,000. Your doctor or nurse will tell you if this is the case.   Use any mouthwashes given to you as directed.   If you cant tolerate regular methods, use salt and baking soda to clean your mouth. Mix 1 teaspoon of salt and 1 teaspoon of baking soda into an 8-ounce glass of warm water. Swish and spit.    Watch your mouth and tongue for white patches. This may be a sign of fungal infection, a common side effect of chemotherapy. Be sure to tell your doctor about these patches. Medication can be prescribed to help you fight the fungal infection.  Other Home Care   Try to exercise. Exercise keeps you strong and keeps your heart and lungs active. Walk as much as you can without becoming dizzy or weak.   Keep clean. During chemotherapy, your body cant fight infection very well. Take short baths or showers.   Use moisturizing soap. Chemotherapy can make your skin dry.   Apply moisturizing lotion several times a day to help relieve dry skin.   Dont take very hot or very cold showers or baths.  Dont be surprised if your chemotherapy causes slight burns to your skin -- usually on the hands and feet. Some drugs used in high doses cause this to happen. Ask for a special cream to help relieve the burn and protect your skin.   Let your doctor know if your throat is sore. You may have an infection that needs treatment.   Remember, many patients feel sick and lose their appetites during treatment. Eat small meals several times a day to keep your strength up.   Choose bland foods with little taste or smell if you are reacting strongly to food.   Be sure to cook all food thoroughly. This kills bacteria and helps you avoid infection.   Eat foods that are soft. Soft foods are less likely to cause stomach irritation.  When to Call Your Doctor   Call your doctor right away if you have any of the following:   Unexplained bleeding   Trouble concentrating   Ongoing fatigue   Shortness of breath, wheezing, or trouble breathing   Rapid, irregular heartbeat; chest pain   Dizziness, lightheadedness   Constant feeling of being cold   Hives or a cut or rash that swells, turns red, feels hot or painful, or begins to ooze   Fever of 100.4°F (38°C) or higher, or chills            WAYS TO HELP PREVENT INFECTION      WASH YOUR HANDS OFTEN DURING  THE DAY, ESPECIALLY BEFORE YOU EAT, AFTER USING THE BATHROOM, AND AFTER TOUCHING ANIMALS    STAY AWAY FROM PEOPLE WHO HAVE ILLNESSES YOU CAN CATCH; SUCH AS COLDS, FLU, CHICKEN POX    TRY TO AVOID CROWDS    STAY AWAY FROM CHILDREN WHO RECENTLY HAVE RECEIVED LIVE VIRUS VACCINES    MAINTAIN GOOD MOUTH CARE    DO NOT SQUEEZE OR SCRATCH PIMPLES    CLEAN CUTS & SCRAPES RIGHT AWAY AND DAILY UNTIL HEALED WITH WARM WATER, SOAP & AN ANTISEPTIC    AVOID CONTACT WITH LITTER BOXES, BIRD CAGES, & FISH TANKS    AVOID STANDING WATER, IE., BIRD BATHS, FLOWER POTS/VASES, OR HUMIDIFIERS    WEAR GLOVES WHEN GARDENING OR CLEANING UP AFTER OTHERS, ESPECIALLY BABIES & SMALL CHILDREN    DO NOT EAT RAW FISH, SEAFOOD, MEAT, OR EGGS                                                                                                                                                     FALL PREVENTION     Falls often occur due to slipping, tripping or losing your balance. Here are ways to reduce your risk of falling again.   Was there anything that caused your fall that can be fixed, removed or replaced?   Make your home safe by keeping walkways clear of objects you may trip over.   Use non-slip pads under rugs.   Do not walk in poorly lit areas.   Do not stand on chairs or wobbly ladders.   Use caution when reaching overhead or looking upward. This position can cause a loss of balance.   Be sure your shoes fit properly, have non-slip bottoms and are in good condition.   Be cautious when going up and down stairs, curbs, and when walking on uneven sidewalks.   If your balance is poor, consider using a cane or walker.   If your fall was related to alcohol use, stop or limit alcohol intake.   If your fall was related to use of sleeping medicines, talk to your doctor about this. You may need to reduce your dosage at bedtime if you awaken during the night to go to the bathroom.   To reduce the need for nighttime bathroom trips:   Avoid drinking fluids  for several hours before going to bed   Empty your bladder before going to bed   Men can keep a urinal at the bedside         Booster Info:    Moderna and Pfizer vaccine booster shots are approved for adults at increased risk at least six months after their initial immunization and Yared & Yared (J&J) COVID-19 vaccine booster shots are approved for all adults at least two months after their initial immunization.    Booster doses for all three COVID-19 vaccines, Pfizer, Moderna and Yared & Yared, are now available to the public and are being administered at [facility] vaccination locations.     If you received an mRNA vaccine (Pfizer or Moderna) it is recommended that you receive the same booster dose as your original vaccine series. However, all patients eligible for a booster dose may decide to mix and match your booster dose.     Below are the current mix and match options [facility] currently offers:    Pfizer Vaccine Recipients:  Booster Dose 6 months following 2nd dose can be, in order of recommendation:  Pfizer Booster Dose,  Moderna Booster Dose, or  Yared & Yared Booster Dose    Yared & Yared Vaccine Recipients:   Booster Dose 2 months following initial dose can be, in order of recommendation:  Pfizer Booster Dose,  Moderna Booster Dose, or  Yared & Yared Booster Dose    Both Pfizer and Yared & Yared boosters have the same dosage as the original vaccine regimen.    Moderna Vaccine Recipients:   Booster Dose 6 months following 2nd dose can be, in order of recommendation:  Moderna Booster Dose,  Pfizer Booster Dose, or  Yared & Yared Booster Dose    The Moderna booster is half the dosage of the original two-dose Moderna series, and [facility] will be following this guidance as outlined by the FDA and CDC.    International patients who have received a non-[place] approved COVID-19 vaccine are eligible for either a Pfizer booster dose or a Yared & Yared booster dose 28 days  following their original vaccine dose.     Please schedule your appointment via [facility] or by calling [phone]. Walk-ins will also be accepted as supply allows.    To learn more about COVID-19 vaccination and community vaccine locations, please visit [email or web address].

## 2023-03-24 NOTE — PROGRESS NOTES
Subjective:       Patient ID: Maura Singh is a 83 y.o. female.    Chief Complaint:   1. HCC (hepatocellular carcinoma)        2. Secondary malignant neoplasm of bone        3. 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 Malignant neoplasm metastatic to both lungs          Current Treatment:  OP PEMBROLIZUMAB 400MG Q6W     Treatment History:  Nivolumab 480mg q2 weeks started 3/2/2020; changed to q4 weeks with C3D1; completed 13/15 cycles on 2/10/2021  Lenvatinib discontinued due to progression  Bevacizumab and atezolizumab started 8/26/2021; completed 7/17 cycles on 12/30/2021  Cabozantinib 40 mg daily; on hold since 8/5/2022 due to diarrhea toxicity; restarted 10/2022; d/c 1/30/2023    HPI: This is an 83 year old with medical history significant for diabetes, HTN, arthritis, COPD, and deafness who is seen in Hem/Onc for metastatic hepatocellular carcinoma. She was admitted to [facility] on 2/4/2020 with gradually progressive shortness of breath, cough with hemoptysis along with intermittent abdominal pain, anorexia and fatigue.  In the ER, she had workup including abdominal US showing multiple liver lesions concerning for metastatic disease.  CTA was negative for pulmonary embolism but revealed mediastinal adenopathy with right hilar soft tissue mass cannot exclude lymphadenopathy along with postobstructive collapse of right anterior segment lower lobe. Hepatomegaly was noted with multiple low-density hypoenhancing lesions consistent with hepatic metastatic disease.     Biopsy was positive for moderate to poorly differentiated hepatocellular carcinoma, CK7 and TTF-1 - negative. Staging PET on 2/19/2020 showed hypermetabolic right infrahilar lung mass and associated partial postobstructive collapse right lower lobe; small hypermetabolic focus right lower lobe possibly early metastasis;  hypermetabolic masslike thickening ascending colon suspicious for neoplasm;  multifocal  FDG avid lissette metastases neck, chest, abdomen, and pelvis as noted; extensive FDG avid hepatic metastases; and widespread FDG avid osseous metastases.    She was started on palliative Nivolumab every 2 weeks; this was changed to every 4 weeks with C3D1 to limit clinic visits due to COVID-19 concerns. Interval CT scan after 5 cycles showed mixed response. Repeat PET in 1/2021 showed new bilateral hypermetabolic pulmonary nodules, complete resolution or right infrahilar mass, improvement in bulky hypermetabolic hepatic masses, resolution of the majority of prior hypermetabolic intraosseous lesions, variable change in whole body LAD with interval development or resolution of multiple FDG avid Lns in the neck, chest, abdomen, and pelvis, and new hypermetabolic left back nodule. Biopsy of left groin was positive for metastatic hepatocellular carcinoma.     She was started on Lenvatinib in 3/2021. She developed a rash, and lenvatinib was held then dose-reduced. PET in 8/2021 demonstrated new and enlarging FDG avid Lns throughout the neck, chest, abdomen, and pelvis with interval decrease and resolution of FDG uptake in the pulmonary nodules and aortacaval/pericaval Lns. Treatment was changed to Avastin/Tecentriq. Follow up PET in 1/2022 showed new lung opacities with low level FDG uptake, increased osseous uptake, and increased FDG uptake in the left adrenal gland. She was started on cabozantinib 60mg which was eventually dose-reduced.     Repeat PET in 6/2022 revealed decreased uptake in most of the Lns in the neck, chest, and pelvis; right axillary uptake had increased as well as right inguinal uptake. Also showed persistent bilateral parenchymal opacities with no significant change and slight interval decrease in uptake in proximal right femoral lesion. Cabozantinib was stopped in 8/2022 due to diarrhea. Follow up PET in 9/2022 demonstrated interval worsening of FDG avidity in cervical, thoracic, and pelvic adenopathy  and unchanged appearance of left adrenal nodule and bilateral lung opacities. Cabozantinib was resumed at 40mg with close monitoring for recurrent diarrhea.     PET performed in 1/2023 showed mixed response. She was still having diarrhea, so cabozantinib was discontinued. Treatment was changed to Keytruda every 6 weeks which she is currently receiving.     Her primary Hematologist/Oncologist is Dr. Burgess.    Interval History: Patient presents for follow up on Keytruda; She is scheduled to receive C2D1 today. She presents with her daughter and reports feeling good today. She states she aches and recently had constipation but that was resolved with OTC stool softener. She has no other complaints today. Labs reveal decreased RBC and drop in Hct to just below normal. CMP reveals significant increase in BUN with corresponding drop in eGFR. After some discussion, patient admits that she has sacrificed her water intake due to rising costs of rent. Her daughter states she provides bottled water and will continue to do so. Advised the patient to inform her daughter when her water supply is getting low so that it can be replenished. Will give IVFs today to improve kidney function. TSH drawn today pending at time of visit; however, most recent TSH WNL.    Reviewed labs with patient:   CBC:   Recent Labs   Lab 03/24/23  1225   WBC 8.68   RBC 3.45 L   Hemoglobin 12.0   Hematocrit 36.9 L   Platelets 270    H   MCH 34.8 H   MCHC 32.5     CMP:  Recent Labs   Lab 03/24/23  1225   Glucose 135 H   Calcium 9.8   Albumin 3.7   Total Protein 8.0   Sodium 138   Potassium 5.2 H   CO2 24   Chloride 105   BUN 44 H   Creatinine 1.4   Alkaline Phosphatase 56   ALT 11   AST 16   Total Bilirubin 0.2     Lab Results   Component Value Date    TSH 1.451 01/26/2023     Social History     Socioeconomic History    Marital status:     Number of children: 5   Occupational History    Occupation: homemaker   Tobacco Use    Smoking  status: Former     Packs/day: 0.50     Years: 48.00     Pack years: 24.00     Types: Cigarettes     Start date: 1955     Quit date: 2003     Years since quittin.2    Smokeless tobacco: Never   Substance and Sexual Activity    Alcohol use: No    Drug use: No    Sexual activity: Never     Social Determinants of Health     Financial Resource Strain: High Risk    Difficulty of Paying Living Expenses: Very hard   Food Insecurity: Food Insecurity Present    Worried About Running Out of Food in the Last Year: Often true    Ran Out of Food in the Last Year: Often true   Transportation Needs: No Transportation Needs    Lack of Transportation (Medical): No    Lack of Transportation (Non-Medical): No   Physical Activity: Inactive    Days of Exercise per Week: 0 days    Minutes of Exercise per Session: 0 min   Stress: Stress Concern Present    Feeling of Stress : Rather much   Social Connections: Moderately Isolated    Frequency of Communication with Friends and Family: Three times a week    Frequency of Social Gatherings with Friends and Family: Twice a week    Attends Buddhist Services: More than 4 times per year    Active Member of Clubs or Organizations: No    Attends Club or Organization Meetings: Never    Marital Status:    Housing Stability: Low Risk     Unable to Pay for Housing in the Last Year: No    Number of Places Lived in the Last Year: 1    Unstable Housing in the Last Year: No     Past Medical History:   Diagnosis Date    Arthritis     Atherosclerosis of aorta 10/27/2021    Benign cyst of left kidney 3/20/2018    on imaging from ct [facility].    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     Lung disease     copd    Lung nodules 2/5/2020    Mediastinal lymphadenopathy 2/5/2020    Mixed hyperlipidemia 5/2/2018     Family History   Problem Relation Age of Onset    Colon cancer Mother     Cancer Mother     ALS Father     Retinal detachment Son      Past Surgical History:   Procedure Laterality Date    APPENDECTOMY      CATARACT EXTRACTION      EYE SURGERY  2018    MIDDLE EAR SURGERY      TONSILLECTOMY  1955     Review of Systems   Constitutional:  Negative for appetite change and fatigue.   HENT:  Negative for mouth sores, rhinorrhea and sore throat.    Eyes: Negative.    Respiratory: Negative.     Cardiovascular: Negative.    Gastrointestinal:  Positive for constipation (relieved by OTC stool softener). Negative for diarrhea, nausea and vomiting.   Endocrine: Negative.    Genitourinary: Negative.    Musculoskeletal: Negative.    Integumentary:  Negative.   Allergic/Immunologic: Negative.    Neurological:  Negative for weakness and numbness.   Hematological: Negative.    Psychiatric/Behavioral: Negative.         Medication List with Changes/Refills   Current Medications    (MAGIC MOUTHWASH) 1:1:1 BENADRYL 12.5MG/5ML LIQ, ALUMINUM & MAGNESIUM HYDROXIDE-SIMEHTICONE (MAALOX), LIDOCAINE VISCOUS 2%    Take 10 mL PO every 4-6 hours as needed for throat or esophageal pain.    AMLODIPINE (NORVASC) 2.5 MG TABLET    Take 1 tablet (2.5 mg total) by mouth 3 (three) times daily.    CLOBETASOL 0.05% (TEMOVATE) 0.05 % OINT    Apply topically 2 (two) times daily.    DIPHENOXYLATE-ATROPINE 2.5-0.025 MG (LOMOTIL) 2.5-0.025 MG PER TABLET    Take 1 tablet by mouth 4 (four) times daily as needed for Diarrhea.    LEVOTHYROXINE (TIROSINT) 50 MCG CAP    Take 1 tablet (50 mcg) by mouth before breakfast.     LISINOPRIL (PRINIVIL,ZESTRIL) 40 MG TABLET    Take 1 tablet (40 mg total) by mouth once daily.    MAGNESIUM HYDROXIDE (MILK OF MAGNESIA ORAL)    Take by mouth once daily.    MUPIROCIN (BACTROBAN) 2 % OINTMENT    Apply topically 3 (three) times daily.    NEOMYCIN-POLYMYXIN-HYDROCORTISONE (CORTISPORIN) 3.5-10,000-1 MG/ML-UNIT/ML-% OTIC SUSPENSION    Place 3 drops into the right ear 4 (four) times daily.    ONDANSETRON (ZOFRAN-ODT) 8 MG TBDL    Take 1 tablet (8 mg total) by mouth every 12 (twelve) hours as needed.    OXYCODONE (OXYCONTIN) 15 MG TR12 12 HR TABLET    Take 1 tablet (15 mg total) by mouth every 12 (twelve) hours.    OXYCODONE-ACETAMINOPHEN (PERCOCET)  MG PER TABLET    Take 1 tablet by mouth 4 (four) times daily as needed (severe pain).    PULSE OXIMETER (PULSE OXIMETER) DEVICE    by Apply Externally route 2 (two) times a day. Use twice daily at 8 AM and 3 PM and record the value in MyChart as directed.    SENNA (SENOKOT) 8.6 MG TABLET    Take 1 tablet by mouth once daily.    TACROLIMUS (PROTOPIC) 0.1 % OINTMENT    Apply topically 2 (two) times daily.    TRIAMCINOLONE ACETONIDE 0.1% (KENALOG) 0.1 % CREAM    Apply topically 2 (two) times daily. for 10 days     Objective:     Vitals:    03/24/23 1316   BP: (!) 133/59   Pulse: (!) 53   Temp: 98.3 °F (36.8 °C)     Physical Exam  Vitals reviewed.   Constitutional:       Appearance: Normal appearance.   HENT:      Head: Normocephalic.      Ears:      Comments: Hard of hearing     Mouth/Throat:      Comments: Wearing mask    Eyes:      Extraocular Movements: Extraocular movements intact.      Pupils: Pupils are equal, round, and reactive to light.   Cardiovascular:      Rate and Rhythm: Normal rate and regular rhythm.      Heart sounds: Normal heart sounds.   Pulmonary:      Effort: Pulmonary effort is normal.      Breath sounds: Normal breath sounds.   Abdominal:      General: Bowel sounds are normal.      Palpations: Abdomen is soft.      Comments:  rounded     Genitourinary:     Comments: deferred    Musculoskeletal:         General: Normal range of motion.      Cervical back: Normal range of motion and neck supple.   Skin:     General: Skin is warm and dry.   Neurological:      Mental Status: She is alert and oriented to person, place, and time.   Psychiatric:         Behavior: Behavior normal.         Thought Content: Thought content normal.        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   Ok to proceed with C2D1 of Keytruda today.  Follow up in 6+ weeks with CBC, Comprehensive Metabolic Panel, and TSH prior to C3D1.    Route Chart for Scheduling    Med Onc Chart Routing      Follow up with physician    Follow up with HEATHER 6 weeks.   Infusion scheduling note    Injection scheduling note Keytruda every 6 weeks   Labs CBC, CMP and TSH   Scheduling: Labs same day as infusion  Preferred lab:  Lab interval:  in 6 weeks   Imaging None      Pharmacy appointment No pharmacy appointment needed      Other referrals  No additional referrals needed         I will review assessment/plan with collaborating physician.      KE Rothman

## 2023-03-24 NOTE — PLAN OF CARE
Patient tolerated Keytruda well today; no adverse reaction noted.  No significant new complaints voiced.  IV discontinued with catheter intact and pressure dressing applied to the site.  Has f/u appt(s) scheduled per MD request.  No questions or concerns voiced.  NAD noted upon discharge.

## 2023-04-10 NOTE — PROGRESS NOTES
Outpatient Care Management   - Care Plan Follow Up    Patient: Maura Singh  MRN:  [id number]  Date of Service:  2/24/2023  Completed by:  Becky Kwok LCSW  Referral Date: 01/24/2023    Reason for Visit   Patient presents with    OPCM Chart Review    Case Closure    OPCM SW FOLLOW-UP     2/24/2023    Brief Summary: SW telephoned the pt for a follow up and spoke with her caregiver. SW and caregiver discussed case closure and the caregiver is in agreement with case closure.     Complex Care Plan     Care plan was discussed and completed today with input from patient and/or caregiver.    Patient Instructions     No follow-ups on file.

## 2023-04-26 NOTE — ED NOTES
Bed: 14  Expected date:   Expected time:   Means of arrival: Personal Transportation  Comments:  When clean

## 2023-04-26 NOTE — ED PROVIDER NOTES
"SCRIBE #1 NOTE: I, Lamar Yared, am scribing for, and in the presence of, Jamel Steiner MD. I have scribed the entire note.      History      Chief Complaint   Patient presents with    Abdominal Pain     Mid abd pain, N/V onset 1 week ago. Unable Can't keep anything down since Sunday, now feeling weak. Hx cancer.       Review of patient's allergies indicates:   Allergen Reactions    Pravastatin Other (See Comments)     Burning/flushing    Xgeva [denosumab] Other (See Comments)     Mouth and diffuse pain    Allopurinol analogues Other (See Comments)     "makes me sick and feel crazy"        HPI   HPI    4/26/2023, 10:56 AM   History obtained from the patient and the pt's daughter at bedside      History of Present Illness: Maura Singh is a 83 y.o. female patient with a PMHx of atherosclerosis of aorta, chronic diastolic CHF, COPD, HCC, GERD, HTN, and mixed HLD who presents to the Emergency Department for epigastric abdominal pain which onset 1 week ago. Per the pt's daughter, the pt has been experiencing N/V and epigastric abdominal pain for 1 week and is now showing signs of dehydration. Pt cannot keep water down. Symptoms are constant and moderate in severity. No mitigating or exacerbating factors reported. Associated sxs include generalized weakness, N/V, belching, SOB, and a reduced appetite. Patient denies any fever, chills, constipation, CP, blood in stool, hematemesis, dizziness, and all other sxs at this time. No prior Tx reported. Pt's Oncologist is Dr. Burgess. No further complaints or concerns at this time.         Arrival mode: Personal vehicle    PCP: Yessy Andrade MD       Past Medical History:  Past Medical History:   Diagnosis Date    Arthritis     Atherosclerosis of aorta 10/27/2021    Benign cyst of left kidney 3/20/2018    on imaging from ct scan at [facility].     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     Lung disease     copd    Lung nodules 2020    Mediastinal lymphadenopathy 2020    Mixed hyperlipidemia 2018       Past Surgical History:  Past Surgical History:   Procedure Laterality Date    APPENDECTOMY      CATARACT EXTRACTION      EYE SURGERY  2018    MIDDLE EAR SURGERY      TONSILLECTOMY           Family History:  Family History   Problem Relation Age of Onset    Colon cancer Mother     Cancer Mother     ALS Father     Retinal detachment Son        Social History:  Social History     Tobacco Use    Smoking status: Former     Packs/day: 0.50     Years: 48.00     Pack years: 24.00     Types: Cigarettes     Start date: 1955     Quit date: 2003     Years since quittin.3    Smokeless tobacco: Never   Substance and Sexual Activity    Alcohol use: No    Drug use: No    Sexual activity: Never       ROS   Review of Systems   Constitutional:  Positive for appetite change (reduced). Negative for chills and fever.   HENT:  Negative for sore throat.    Respiratory:  Positive for shortness of breath.    Cardiovascular:  Negative for chest pain.   Gastrointestinal:  Positive for abdominal pain (epigastric), nausea and vomiting. Negative for blood in stool and constipation.        (+) belching  (-) hematemesis   Genitourinary:  Negative for dysuria.   Musculoskeletal:  Negative for back pain.   Skin:  Negative for rash.   Neurological:  Positive for weakness (generalized). Negative for dizziness.   Hematological:  Does not bruise/bleed easily.   All other systems reviewed and are negative.    Physical Exam       Initial Vitals [04/26/23 1003]   BP Pulse Resp Temp SpO2   (!) 123/58 (!) 50 14 98.2 °F (36.8 °C) 98 %      MAP       --          Physical Exam  Nursing Notes and Vital Signs Reviewed.  Constitutional: Patient is in no acute distress. Well-developed and well-nourished.  Head: Atraumatic. Normocephalic.  Eyes: PERRL. EOM intact. Conjunctivae are not pale. No scleral icterus.  ENT: Mucous membranes are dry. Oropharynx is clear and symmetric.    Neck: Supple. Full ROM. No lymphadenopathy.  Cardiovascular: Regular rate. Regular rhythm. No murmurs, rubs, or gallops. Distal pulses are 2+ and symmetric.  Pulmonary/Chest: No respiratory distress. Clear to auscultation bilaterally. No wheezing or rales.  Abdominal: Soft and non-distended.  There is no tenderness.  No rebound, guarding, or rigidity.   Musculoskeletal: Moves all extremities. No obvious deformities. No edema.  Skin: Warm and dry.  Neurological:  Alert, awake, and appropriate.  Normal speech.  No acute focal neurological deficits are appreciated.  Psychiatric: Normal affect. Good eye contact. Appropriate in content.    ED Course    Procedures  ED Vital Signs:  Vitals:    04/26/23 1003 04/26/23 1120 04/26/23 1135 04/26/23 1315   BP: (!) 123/58 (!) 148/63  (!) 168/71   Pulse: (!) 50 (!) 49  (!) 55   Resp: 14 19 16 (!) 27   Temp: 98.2 °F (36.8 °C)      TempSrc: Oral      SpO2: 98% 99%  97%    04/26/23 1430 04/26/23 1530   BP:     Pulse: 65 (!) 58   Resp: 17 (!) 23   Temp:     TempSrc:     SpO2: (!) 93% 98%       Abnormal Lab Results:  Labs Reviewed   CBC W/ AUTO DIFFERENTIAL - Abnormal; Notable for the following components:       Result Value    MCV 99 (*)     MCH 31.8 (*)     All other components within normal limits   COMPREHENSIVE METABOLIC PANEL - Abnormal; Notable for the following components:    BUN 24 (*)     eGFR 37 (*)     All other components within normal limits   LIPASE   APTT   PROTIME-INR   AMMONIA   TROPONIN I   B-TYPE NATRIURETIC PEPTIDE    LACTIC ACID, PLASMA        All Lab Results:  Results for orders placed or performed during the hospital encounter of 04/26/23   CBC auto differential   Result Value Ref Range    WBC 7.88 3.90 - 12.70 K/uL    RBC 4.03 4.00 - 5.40 M/uL    Hemoglobin 12.8 12.0 - 16.0 g/dL    Hematocrit 39.9 37.0 - 48.5 %    MCV 99 (H) 82 - 98 fL    MCH 31.8 (H) 27.0 - 31.0 pg    MCHC 32.1 32.0 - 36.0 g/dL    RDW 12.5 11.5 - 14.5 %    Platelets 265 150 - 450 K/uL    MPV 9.2 9.2 - 12.9 fL    Immature Granulocytes 0.3 0.0 - 0.5 %    Gran # (ANC) 5.5 1.8 - 7.7 K/uL    Immature Grans (Abs) 0.02 0.00 - 0.04 K/uL    Lymph # 1.4 1.0 - 4.8 K/uL    Mono # 0.8 0.3 - 1.0 K/uL    Eos # 0.1 0.0 - 0.5 K/uL    Baso # 0.06 0.00 - 0.20 K/uL    nRBC 0 0 /100 WBC    Gran % 70.1 38.0 - 73.0 %    Lymph % 18.0 18.0 - 48.0 %    Mono % 9.5 4.0 - 15.0 %    Eosinophil % 1.3 0.0 - 8.0 %    Basophil % 0.8 0.0 - 1.9 %    Differential Method Automated    Comprehensive metabolic panel   Result Value Ref Range    Sodium 136 136 - 145 mmol/L    Potassium 4.9 3.5 - 5.1 mmol/L    Chloride 102 95 - 110 mmol/L    CO2 25 23 - 29 mmol/L    Glucose 87 70 - 110 mg/dL    BUN 24 (H) 8 - 23 mg/dL    Creatinine 1.4 0.5 - 1.4 mg/dL    Calcium 10.0 8.7 - 10.5 mg/dL    Total Protein 8.3 6.0 - 8.4 g/dL    Albumin 3.8 3.5 - 5.2 g/dL    Total Bilirubin 0.5 0.1 - 1.0 mg/dL    Alkaline Phosphatase 63 55 - 135 U/L    AST 17 10 - 40 U/L    ALT 12 10 - 44 U/L    Anion Gap 9 8 - 16 mmol/L    eGFR 37 (A) >60 mL/min/1.73 m^2   Lipase   Result Value Ref Range    Lipase 9 4 - 60 U/L   APTT   Result Value Ref Range    aPTT 27.7 21.0 - 32.0 sec   Protime-INR   Result Value Ref Range    Prothrombin Time 11.3 9.0 - 12.5 sec    INR 1.1 0.8 - 1.2   Ammonia   Result Value Ref Range    Ammonia 19 10 - 50 umol/L   Troponin I   Result Value Ref Range    Troponin I 0.006 0.000 - 0.026 ng/mL   Brain natriuretic peptide   Result Value Ref Range    BNP 80 0 - 99 pg/mL   Lactic acid, plasma   Result Value Ref  Range    Lactate (Lactic Acid) 1.0 0.5 - 2.2 mmol/L     *Note: Due to a large number of results and/or encounters for the requested time period, some results have not been displayed. A complete set of results can be found in Results Review.         Imaging Results:  Imaging Results              CT Abdomen Pelvis With Contrast (Final result)  Result time 04/26/23 14:31:25      Final result by Dani Sharpe MD (04/26/23 14:31:25)                   Impression:      Somewhat limited exam due to motion artifact.  No acute finding in the abdomen or pelvis.    Known metastatic disease, similar to January 2023.      Electronically signed by: Dani Sharpe  Date:    04/26/2023  Time:    14:31               Narrative:    EXAMINATION:  CT ABDOMEN PELVIS WITH CONTRAST    CLINICAL HISTORY:  Bowel obstruction suspected;    COMPARISON:  PET-CT January 26, 2023.    TECHNIQUE:  Axial CT images were obtained of the abdomen and pelvis following administration of 100 mL Omnipaque 350 intravenously.  Iterative reconstruction technique was used. The CT exam was performed using one or more of the following dose reduction techniques- Automated exposure control, adjustment of the mA and/or kV according to patient size, and/or use of iterative reconstructed technique.  IV contrast was administered.  Oral contrast was administered.    FINDINGS:  Motion artifact degrades evaluation of the lung bases and upper abdomen.    Liver: Cirrhotic liver morphology.    Gallbladder: No acute finding.    Bile Ducts: No evidence of dilated ducts.    Pancreas: No mass or peripancreatic fat stranding.    Spleen: Unremarkable.    Adrenals: Unremarkable.    Kidneys/ Ureters: Motion artifact degrades evaluation kidneys.  Nonobstructing left renal calculi, as seen on comparison PET-CT, the largest in the lower pole measures up to 1.3 cm.  No hydronephrosis on either side.    Bladder: No evidence of wall thickening.    Reproductive organs: Unremarkable.    GI  Tract/Mesentery: Moderate amount of stool in the rectal vault.  Numerous colonic diverticula without CT evidence of acute diverticulitis.  No bowel obstruction.    Peritoneal Space: No ascites. No free air.    Retroperitoneum: Known left iliac chain, right iliac chain, and right pelvic sidewall lymphadenopathy as seen on comparison PET-CT.    Abdominal wall: Inguinal lymphadenopathy is redemonstrated.    Vasculature: Atherosclerotic calcifications in the aorta.  No abdominal aortic aneurysm.    Bones: No acute fracture.                                              The Emergency Provider reviewed the vital signs and test results, which are outlined above.    ED Discussion     3:00 PM: Reassessed pt at this time.  Discussed with pt all pertinent ED information and results. Discussed pt dx and plan of tx. Gave pt all f/u and return to the ED instructions. All questions and concerns were addressed at this time. Pt expresses understanding of information and instructions, and is comfortable with plan to discharge. Pt is stable for discharge.    I discussed with patient and/or family/caretaker that evaluation in the ED does not suggest any emergent or life threatening medical conditions requiring immediate intervention beyond what was provided in the ED, and I believe patient is safe for discharge.  Regardless, an unremarkable evaluation in the ED does not preclude the development or presence of a serious of life threatening condition. As such, patient was instructed to return immediately for any worsening or change in current symptoms.           ED Medication(s):  Medications   sodium chloride 0.9% bolus 1,000 mL 1,000 mL (0 mLs Intravenous Stopped 4/26/23 1527)   morphine injection 4 mg (4 mg Intravenous Given 4/26/23 1135)   ondansetron injection 4 mg (4 mg Intravenous Given 4/26/23 1135)   iohexoL (OMNIPAQUE 350) injection 100 mL (100 mLs Intravenous Given 4/26/23 1339)   iohexoL (OMNIPAQUE 350) injection 30 mL (30 mLs  Oral Given 4/26/23 8990)   aluminum-magnesium hydroxide-simethicone 200-200-20 mg/5 mL suspension 30 mL (30 mLs Oral Given 4/26/23 6640)     And   LIDOcaine HCl 2% oral solution 15 mL (15 mLs Oral Given 4/26/23 1530)     Discharge Medication List as of 4/26/2023  2:59 PM        START taking these medications    Details   pantoprazole (PROTONIX) 40 MG tablet Take 1 tablet (40 mg total) by mouth once daily., Starting Wed 4/26/2023, Until Fri 5/26/2023, Print              Medication List        START taking these medications      pantoprazole 40 MG tablet  Commonly known as: PROTONIX  Take 1 tablet (40 mg total) by mouth once daily            CHANGE how you take these medications      ondansetron 4 MG Tbdl  Commonly known as: ZOFRAN-ODT  Dissolve 1 tablet (4 mg total) by mouth every 8 (eight) hours as needed (nausea).  What changed:   medication strength  how much to take  when to take this  reasons to take this            ASK your doctor about these medications      (MAGIC MOUTHWASH) 1:1:1 BENADRYL 12.5MG/5ML LIQ, ALUMINUM & MAGNESIUM  Take 10 mL PO every 4-6 hours as needed for throat or esophageal pain.     amLODIPine 2.5 MG tablet  Commonly known as: NORVASC  Take 1 tablet (2.5 mg total) by mouth 3 (three) times daily.     clobetasol 0.05% 0.05 % Oint  Commonly known as: TEMOVATE  Apply topically 2 (two) times daily.     diphenoxylate-atropine 2.5-0.025 mg 2.5-0.025 mg per tablet  Commonly known as: LomotiL  Take 1 tablet by mouth 4 (four) times daily as needed for Diarrhea.     lisinopriL 40 MG tablet  Commonly known as: PRINIVIL,ZESTRIL  Take 1 tablet (40 mg total) by mouth once daily.     MILK OF MAGNESIA ORAL     mupirocin 2 % ointment  Commonly known as: BACTROBAN  Apply topically 3 (three) times daily.     neomycin-polymyxin-hydrocortisone 3.5-10,000-1 mg/mL-unit/mL-% otic suspension  Commonly known as: CORTISPORIN  Place 3 drops into the right ear 4 (four) times daily.     oxyCODONE-acetaminophen  mg  per tablet  Commonly known as: PERCOCET  Take 1 tablet by mouth 4 (four) times daily as needed (severe pain).     OxyCONTIN 15 mg Tr12 12 hr tablet  Generic drug: oxyCODONE  Take 1 tablet (15 mg total) by mouth every 12 (twelve) hours.     pulse oximeter device  Commonly known as: pulse oximeter  by Apply Externally route 2 (two) times a day. Use twice daily at 8 AM and 3 PM and record the value in [facility] as directed.     senna 8.6 mg tablet  Commonly known as: SENOKOT     tacrolimus 0.1 % ointment  Commonly known as: PROTOPIC  Apply topically 2 (two) times daily.     TIROSINT 50 mcg Cap  Generic drug: levothyroxine  Take 1 tablet (50 mcg) by mouth before breakfast.     triamcinolone acetonide 0.1% 0.1 % cream  Commonly known as: KENALOG  Apply topically 2 (two) times daily. for 10 days               Where to Get Your Medications        These medications were sent to [facility] [address] [address]      Hours: Mon-Fri, 8a-5:30p Phone: [phone]   ondansetron 4 MG Tbdl  pantoprazole 40 MG tablet          Follow-up Information       Yessy Andrade MD. Schedule an appointment as soon as possible for a visit in 2 days.    Specialty: Family Medicine  Why: For re-evaluation and further treatment  Contact information:  [address]  [address]  [facility] [address] [phone]               [facility] - Emergency Dept.. Go today.    Specialty: Emergency Medicine  Why: If symptoms worsen, For re-evaluation and further treatment, As needed  Contact information:  [address]  [facility] [address] [phone]                             Medical Decision Making    Medical Decision Making:   Clinical Tests:   Lab Tests: Ordered and Reviewed  Radiological Study: Ordered and Reviewed       DDx includes: SBO, Gastritis,  GERD, Pancreatitis, Metastatic disease, ACS, gastroenteritis.  Scribe Attestation:   Scribe #1: I performed the above scribed service and the  documentation accurately describes the services I performed. I attest to the accuracy of the note.    Attending:   Physician Attestation Statement for Scribe #1: I, Jamel Steiner MD, personally performed the services described in this documentation, as scribed by Lamar Vail, in my presence, and it is both accurate and complete.          Clinical Impression       ICD-10-CM ICD-9-CM   1. Epigastric abdominal pain  R10.13 789.06   2. Nausea and vomiting, unspecified vomiting type  R11.2 787.01       Disposition:   Disposition: Discharged  Condition: Stable       Jamel Steiner MD  04/26/23 2205

## 2023-04-29 PROBLEM — R55 SYNCOPE: Status: ACTIVE | Noted: 2023-01-01

## 2023-04-29 PROBLEM — R00.1 BRADYCARDIA: Status: ACTIVE | Noted: 2023-01-01

## 2023-04-29 NOTE — HPI
The patient is a 84 yo female with Metastatic hepatocellular carcinoma with mets to both lungs, bones, and lymph nodes-currently on palliative chemo, HTN, DM, COPD, Hypothyroid, Diastolic CHF, and Hard of hearing who presented to ED with syncope. Pt reports she was walking to her chair and felt dizzy. She then had a syncopal episode and fell into her chair. When her daughter was assisting her into the car to go to the ED, she had another episode of dizziness followed by syncope. She then had another syncopal episode getting into wheelchair outside the ED. Pt did not take home meds this am    IN the ED, BP mildly elevated 191/85, HR 60 but dropped to 40s. EKG showed junctional rhythm, low voltage QRS. CT head showed nothing acute, CTA chest showed no PE or dissection, COPD. CT cervical spine showed nothing cute. Troponin  normal, labs unremarkable.     The patient will be placed in observation under hospital medicine for syncope

## 2023-04-29 NOTE — H&P
[facility]  Hospital Medicine  History & Physical    Patient Name: Maura Singh  MRN: [id number]  Patient Class: OP- Observation  Admission Date: 4/29/2023  Attending Physician: Dr. Barroso Primary Care Provider: Yessy Andrade MD         Patient information was obtained from patient and ER records.     Subjective:     Principal Problem:Syncope    Chief Complaint:   Chief Complaint   Patient presents with    Loss of Consciousness     2 syncopal episodes today, seen here for same issue 3 days ago. C/o pain to head and neck.        HPI: The patient is a 84 yo female with Metastatic hepatocellular carcinoma with mets to both lungs, bones, and lymph nodes-currently on palliative chemo, HTN, DM, COPD, Hypothyroid, Diastolic CHF, and Hard of hearing who presented to ED with syncope. Pt reports she was walking to her chair and felt dizzy. She then had a syncopal episode and fell into her chair. When her daughter was assisting her into the car to go to the ED, she had another episode of dizziness followed by syncope. She then had another syncopal episode getting into wheelchair outside the ED. Pt did not take home meds this am    IN the ED, BP mildly elevated 191/85, HR 60 but dropped to 40s. EKG showed junctional rhythm, low voltage QRS. CT head showed nothing acute, CTA chest showed no PE or dissection, COPD. CT cervical spine showed nothing cute. Troponin normal, labs unremarkable.     The patient will be placed in observation under hospital medicine for syncope         Past Medical History:   Diagnosis Date    Arthritis     Atherosclerosis of aorta 10/27/2021    Benign cyst of left kidney 3/20/2018    on imaging from ct [facility].    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.     Lung disease     copd    Lung nodules 2/5/2020    Mediastinal lymphadenopathy 2/5/2020    Mixed hyperlipidemia 5/2/2018       Past Surgical History:   Procedure Laterality Date    APPENDECTOMY      CATARACT EXTRACTION      EYE SURGERY  2018    MIDDLE EAR SURGERY      TONSILLECTOMY  1955       Review of patient's allergies indicates:   Allergen Reactions    Pravastatin Other (See Comments)     Burning/flushing    Xgeva [denosumab] Other (See Comments)     Mouth and diffuse pain    Allopurinol analogues Other (See Comments)     "makes me sick and feel crazy"       No current facility-administered medications on file prior to encounter.     Current Outpatient Medications on File Prior to Encounter   Medication Sig    (Magic mouthwash) 1:1:1 Benadryl 12.5mg/5ml liq, aluminum & magnesium hydroxide-simehticone (Maalox), LIDOcaine viscous 2% Take 10 mL PO every 4-6 hours as needed for throat or esophageal pain.    amLODIPine (NORVASC) 2.5 MG tablet Take 1 tablet (2.5 mg total) by mouth 3 (three) times daily.    clobetasol 0.05% (TEMOVATE) 0.05 % Oint Apply topically 2 (two) times daily.    diphenoxylate-atropine 2.5-0.025 mg (LOMOTIL) 2.5-0.025 mg per tablet Take 1 tablet by mouth 4 (four) times daily as needed for Diarrhea.    levothyroxine (TIROSINT) 50 mcg Cap Take 1 tablet (50 mcg) by mouth before breakfast.    lisinopriL (PRINIVIL,ZESTRIL) 40 MG tablet Take 1 tablet (40 mg total) by mouth once daily.    magnesium hydroxide (MILK OF MAGNESIA ORAL) Take by mouth once daily.    mupirocin (BACTROBAN) 2 % ointment Apply topically 3 (three) times daily.    neomycin-polymyxin-hydrocortisone (CORTISPORIN) 3.5-10,000-1 mg/mL-unit/mL-% otic suspension " Place 3 drops into the right ear 4 (four) times daily.    ondansetron (ZOFRAN-ODT) 4 MG TbDL Dissolve 1 tablet (4 mg total) by mouth every 8 (eight) hours as needed (nausea).    oxyCODONE (OXYCONTIN) 15 mg TR12 12 hr tablet Take 1 tablet (15 mg total) by mouth every 12 (twelve) hours.    oxyCODONE-acetaminophen (PERCOCET)  mg per tablet Take 1 tablet by mouth 4 (four) times daily as needed (severe pain).    pantoprazole (PROTONIX) 40 MG tablet Take 1 tablet (40 mg total) by mouth once daily    pulse oximeter (PULSE OXIMETER) device by Apply Externally route 2 (two) times a day. Use twice daily at 8 AM and 3 PM and record the value in MyChart as directed.    senna (SENOKOT) 8.6 mg tablet Take 1 tablet by mouth once daily.    tacrolimus (PROTOPIC) 0.1 % ointment Apply topically 2 (two) times daily.    triamcinolone acetonide 0.1% (KENALOG) 0.1 % cream Apply topically 2 (two) times daily. for 10 days     Family History       Problem Relation (Age of Onset)    ALS Father    Cancer Mother    Colon cancer Mother    Retinal detachment Son          Tobacco Use    Smoking status: Former     Packs/day: 0.50     Years: 48.00     Pack years: 24.00     Types: Cigarettes     Start date: 1955     Quit date: 2003     Years since quittin.3    Smokeless tobacco: Never   Substance and Sexual Activity    Alcohol use: No    Drug use: No    Sexual activity: Never     Review of Systems   Constitutional:  Negative for appetite change, chills, diaphoresis, fatigue, fever and unexpected weight change.   HENT:  Positive for hearing loss. Negative for congestion, nosebleeds, sinus pressure and sore throat.    Eyes:  Negative for pain, discharge and visual disturbance.   Respiratory:  Negative for cough, chest tightness, shortness of breath, wheezing and stridor.    Cardiovascular:  Negative for chest pain, palpitations and leg swelling.   Gastrointestinal:  Negative for abdominal distention, abdominal pain,  blood in stool, constipation, diarrhea, nausea and vomiting.   Endocrine: Negative for cold intolerance and heat intolerance.   Genitourinary:  Negative for difficulty urinating, dysuria, flank pain, frequency and urgency.   Musculoskeletal:  Negative for arthralgias, back pain, joint swelling, myalgias, neck pain and neck stiffness.   Skin:  Negative for rash and wound.   Allergic/Immunologic: Positive for immunocompromised state. Negative for food allergies.   Neurological:  Positive for dizziness and syncope. Negative for seizures, facial asymmetry, speech difficulty, weakness, light-headedness, numbness and headaches.   Hematological:  Negative for adenopathy.   Psychiatric/Behavioral:  Negative for agitation, confusion and hallucinations.    Objective:     Vital Signs (Most Recent):  Temp: 97.7 °F (36.5 °C) (04/29/23 1240)  Pulse: (!) 49 (04/29/23 1610)  Resp: 20 (04/29/23 1610)  BP: (!) 182/80 (04/29/23 1610)  SpO2: 99 % (04/29/23 1610)   Vital Signs (24h Range):  Temp:  [97.7 °F (36.5 °C)] 97.7 °F (36.5 °C)  Pulse:  [47-63] 49  Resp:  [20-24] 20  SpO2:  [98 %-99 %] 99 %  BP: (160-191)/(80-86) 182/80     Weight: 63 kg (139 lb)  Body mass index is 27.15 kg/m².    Physical Exam  Vitals and nursing note reviewed.   Constitutional:       General: She is not in acute distress.     Appearance: She is well-developed. She is not diaphoretic.   HENT:      Head: Normocephalic and atraumatic.      Right Ear: Decreased hearing noted.      Left Ear: Decreased hearing noted.      Nose: Nose normal.   Eyes:      General: No scleral icterus.     Conjunctiva/sclera: Conjunctivae normal.   Neck:      Trachea: No tracheal deviation.   Cardiovascular:      Rate and Rhythm: Regular rhythm. Bradycardia present.      Heart sounds: Normal heart sounds. No murmur heard.    No friction rub. No gallop.   Pulmonary:      Effort: Pulmonary effort is normal. No respiratory distress.      Breath sounds: Normal breath sounds. No stridor. No  wheezing or rales.   Chest:      Chest wall: No tenderness.   Abdominal:      General: Bowel sounds are normal. There is no distension.      Palpations: Abdomen is soft. There is no mass.      Tenderness: There is no abdominal tenderness. There is no guarding or rebound.   Musculoskeletal:         General: No tenderness or deformity. Normal range of motion.      Cervical back: Normal range of motion and neck supple.   Skin:     General: Skin is warm and dry.      Coloration: Skin is not pale.      Findings: No erythema or rash.   Neurological:      Mental Status: She is alert and oriented to person, place, and time.      Cranial Nerves: No cranial nerve deficit.      Motor: No abnormal muscle tone.      Coordination: Coordination normal.   Psychiatric:         Behavior: Behavior normal.         Thought Content: Thought content normal.           Significant Labs: All pertinent labs within the past 24 hours have been reviewed.    Significant Imaging: I have reviewed all pertinent imaging results/findings within the past 24 hours.    Assessment/Plan:     * Syncope  CT head showed nothing acute   Troponin normal   Bradycardia noted -cardiac monitor  Orthostatics   Echo   Carotid U/S   Neuro checks           Bradycardia  Not on BB  Cardiac monitor       Hepatocellular carcinoma  Metastatic to bones, lymph nodes, and both lungs   Currently on palliative chemo- last dose 5 weeks ago   Followed by Dr. Burgess       Essential hypertension  BP mildly elevated - did not take home meds this am   Cont home meds Lisinopril and Norvasc  IV Hydralazine prn       Drug-induced hypothyroidism  Last TSH 0.383, normal Free T4  Cont Levothyroxine       Chronic obstructive pulmonary disease  No wheezing   Neb txs prn       Chronic diastolic congestive heart failure  Patient is identified as having Diastolic (HFpEF) heart failure that is Chronic. CHF is currently controlled. Latest ECHO performed and demonstrates- No results found for this  or any previous visit.  . Continue ACE/ARB and monitor clinical status closely. Monitor on telemetry. Patient is off CHF pathway.  Monitor strict Is&Os and daily weights.  Place on fluid restriction of 1.5 L. Continue to stress to patient importance of self efficacy and  on diet for CHF. Last BNP reviewed- and noted below   Recent Labs   Lab 04/29/23  1320   *   .      Gastroesophageal reflux disease  Cont Protonix         VTE Risk Mitigation (From admission, onward)         Ordered     enoxaparin injection 40 mg  Daily         04/29/23 1813     IP VTE HIGH RISK PATIENT  Once         04/29/23 1811     Place sequential compression device  Until discontinued         04/29/23 1811                     On 04/29/2023, patient should be placed in hospital observation services under my care in collaboration with Dr. Barroso.      Lindsey Tracy NP  Department of Hospital Medicine  [facility] - Emergency Dept.

## 2023-04-29 NOTE — ASSESSMENT & PLAN NOTE
CT head showed nothing acute   Troponin normal   Bradycardia noted -cardiac monitor  Orthostatics   Echo   Carotid U/S   Neuro checks

## 2023-04-29 NOTE — ED PROVIDER NOTES
"SCRIBE #1 NOTE: I, Alma Singleton, am scribing for, and in the presence of, No att. providers found. I have scribed the entire note.       History     Chief Complaint   Patient presents with    Loss of Consciousness     2 syncopal episodes today, seen here for same issue 3 days ago. C/o pain to head and neck.     Review of patient's allergies indicates:   Allergen Reactions    Pravastatin Other (See Comments)     Burning/flushing    Xgeva [denosumab] Other (See Comments)     Mouth and diffuse pain    Allopurinol analogues Other (See Comments)     "makes me sick and feel crazy"         History of Present Illness     HPI    4/29/2023, 3:13 PM  History obtained from the patient      History of Present Illness: Maura Singh is a 83 y.o. female patient with a PMHx of atherosclerosis of aorta, chronic diastolic CHF, COPD, HCC, GERD, HTN, and mixed HLD who presents to the Emergency Department for evaluation of LOC which occurred today. Pt reports 2 syncopal episodes today. Pt reports the first time she fell back into her chair, and the second time she was getting into a car and fell back into the car seat. Pt reports LOC presents itself when she leans her head forwards or backwards. Symptoms are episodic and moderate in severity. No mitigating actors reported. Associated sxs include HA, bilateral ear pain, left-sided neck pain, dizziness, mid sternal CP, SOB, nausea, vomiting, and aphasia. Pt denies head injury. Left-sided neck pain onset 2 days PTA. Son witnessed multiple episodes of aphasia. Pt reports SOB onset 3 to 4 days PTA. Patient denies any diarrhea, fever, CP, SOB, chills, and all other sxs at this time. Pt denies head injury. Pt has chemotherapy infusions of pembrolizumab 400 mg every 6 weeks. Her last infusion was 3/24/2023. No further complaints or concerns at this time.       Arrival mode: Personal vehicle      PCP: Yessy Andrade MD        Past Medical History:  Past Medical History:   Diagnosis Date "    Arthritis     Atherosclerosis of aorta 10/27/2021    Benign cyst of left kidney 3/20/2018    on imaging from ct scan at [facility].     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     Lung disease     copd    Lung nodules 2020    Mediastinal lymphadenopathy 2020    Mixed hyperlipidemia 2018       Past Surgical History:  Past Surgical History:   Procedure Laterality Date    APPENDECTOMY      CATARACT EXTRACTION      EYE SURGERY      MIDDLE EAR SURGERY      TONSILLECTOMY           Family History:  Family History   Problem Relation Age of Onset    Colon cancer Mother     Cancer Mother     ALS Father     Retinal detachment Son        Social History:  Social History     Tobacco Use    Smoking status: Former     Packs/day: 0.50     Years: 48.00     Pack years: 24.00     Types: Cigarettes     Start date: 1955     Quit date: 2003     Years since quittin.3    Smokeless tobacco: Never   Substance and Sexual Activity    Alcohol use: No    Drug use: No    Sexual activity: Never        Review of Systems     Review of Systems   Constitutional:  Negative for chills and fever.   Eyes:         (+) diplopia   Respiratory:  Negative for shortness of breath.    Cardiovascular:  Negative for chest pain.   Gastrointestinal:  Positive for nausea and vomiting. Negative for diarrhea.   Genitourinary:  Negative for dysuria.   Musculoskeletal:  Positive for neck pain (left-sided). Negative for back pain.   Neurological:  Positive for syncope and  headaches. Negative for weakness.        (+) aphasia      Physical Exam     Initial Vitals [04/29/23 1240]   BP Pulse Resp Temp SpO2   (!) 167/82 63 (!) 24 97.7 °F (36.5 °C) 98 %      MAP       --          Physical Exam   Nursing Notes and Vital Signs Reviewed.  Constitutional: Patient is in no acute distress. Well-developed and well-nourished. Extremely hard of hearing.  Head: Atraumatic. Normocephalic.  Eyes: PERRL. EOM intact. Conjunctivae are not pale. No scleral icterus.  ENT: Mucous membranes are moist. Oropharynx is clear and symmetric. [place]. Hearing aid present in right ear. Left cerumen impaction; unable to visualize TM. Stenosis of right IAC, unable to visualize TM.  Neck: Supple. Full ROM. No lymphadenopathy. No carotid bruits. No meningeal signs. No spinal tenderness  Cardiovascular: Regular rate. Regular rhythm. No murmurs, rubs, or gallops. Distal pulses are 2+ and symmetric.   Pulmonary/Chest: No respiratory distress. Lung sounds diminished bilaterally.  Bibasilar rhonchi.  Abdominal: Soft and non-distended.  There is no tenderness.  No rebound, guarding, or rigidity. Good bowel sounds.  Musculoskeletal: Moves all extremities. No obvious deformities. No edema. No calf tenderness. Muscular tenderness to neck, diffuse. No thoracic or lumbar spinal ttp.   Skin: Warm and dry.  Neurological:  Alert, awake, and appropriate.  Normal speech.  No acute focal neurological deficits are appreciated. Cranial nerves 2-12 intact. No ataxia. Strength 5/5 in all extremities.   Psychiatric: Normal affect. Good eye contact. Appropriate in content.     ED Course   Critical Care    Date/Time: 4/29/2023 5:30 PM  Performed by: Alma Singleton DO  Authorized by: Alma Singleton DO   Direct patient critical care time: 5 minutes  Additional history critical care time: 5 minutes  Ordering / reviewing critical care time: 5 minutes  Documentation critical care time: 5 minutes  Consulting other physicians critical care time: 5  minutes  Consult with family critical care time: 5 minutes  Total critical care time (exclusive of procedural time) : 30 minutes  Critical care time was exclusive of separately billable procedures and treating other patients and teaching time.  Critical care was necessary to treat or prevent imminent or life-threatening deterioration of the following conditions: cardiac failure.  Critical care was time spent personally by me on the following activities: development of treatment plan with patient or surrogate, discussions with consultants, obtaining history from patient or surrogate, evaluation of patient's response to treatment, examination of patient, ordering and performing treatments and interventions, ordering and review of laboratory studies, ordering and review of radiographic studies, pulse oximetry and re-evaluation of patient's condition.      ED Vital Signs:  Vitals:    04/30/23 0914 04/30/23 0922 04/30/23 1209 04/30/23 1516   BP:   (!) 120/58 136/60   Pulse:  (!) 59 (!) 58 (!) 57   Resp: 18 18 18 18   Temp:   98.2 °F (36.8 °C) 98 °F (36.7 °C)   TempSrc:   Oral Oral   SpO2:  96% 98% 97%   Weight:       Height:        04/30/23 2145 04/30/23 2147 05/01/23 0104 05/01/23 0120   BP:  (!) 107/53 (!) 121/59    Pulse:  72 63    Resp: 18 18 18    Temp:  98.6 °F (37 °C) (!) 93.9 °F (34.4 °C) 97 °F (36.1 °C)   TempSrc:  Oral Oral Oral   SpO2:  (!) 92% (!) 94%    Weight:       Height:        05/01/23 0437 05/01/23 0743 05/01/23 0920 05/01/23 1148   BP: 135/64 (!) 167/70     Pulse: 62 (!) 52 (!) 57 (!) 58   Resp: 17 16 16    Temp: 97.9 °F (36.6 °C) 97.4 °F (36.3 °C)     TempSrc:  Oral     SpO2: (!) 94% 97%     Weight:       Height:        05/01/23 1234 05/01/23 1327 05/01/23 1623   BP: (!) 155/68  134/63   Pulse: (!) 56 67 (!) 56   Resp: 15  16   Temp: 98 °F (36.7 °C)  97.6 °F (36.4 °C)   TempSrc:      SpO2: 96%  96%   Weight:      Height:          Abnormal Lab Results:  Labs Reviewed   CBC W/ AUTO DIFFERENTIAL -  Abnormal; Notable for the following components:       Result Value    MCH 31.3 (*)     Gran % 73.8 (*)     Lymph % 16.3 (*)     All other components within normal limits   B-TYPE NATRIURETIC PEPTIDE - Abnormal; Notable for the following components:     (*)     All other components within normal limits   COMPREHENSIVE METABOLIC PANEL - Abnormal; Notable for the following components:    CO2 21 (*)     BUN 27 (*)     eGFR 37 (*)     All other components within normal limits   TSH - Abnormal; Notable for the following components:    TSH 0.383 (*)     All other components within normal limits   URINALYSIS, REFLEX TO URINE CULTURE - Abnormal; Notable for the following components:    Specific Gravity, UA >1.030 (*)     Protein, UA Trace (*)     Ketones, UA 2+ (*)     Leukocytes, UA Trace (*)     All other components within normal limits    Narrative:     Specimen Source->Urine   URINALYSIS MICROSCOPIC - Abnormal; Notable for the following components:    WBC, UA 12 (*)     All other components within normal limits    Narrative:     Specimen Source->Urine   TROPONIN I   LACTIC ACID, PLASMA   T4, FREE        All Lab Results:  Results for orders placed or performed during the hospital encounter of 04/29/23   Blood culture #1 **CANNOT BE ORDERED STAT**    Specimen: Peripheral, Antecubital, Left; Blood   Result Value Ref Range    Blood Culture, Routine No Growth to date     Blood Culture, Routine No Growth to date     Blood Culture, Routine No Growth to date     Blood Culture, Routine No Growth to date     Blood Culture, Routine No Growth to date    Blood culture #2 **CANNOT BE ORDERED STAT**    Specimen: Peripheral, Antecubital, Right; Blood   Result Value Ref Range    Blood Culture, Routine No Growth to date     Blood Culture, Routine No Growth to date     Blood Culture, Routine No Growth to date     Blood Culture, Routine No Growth to date     Blood Culture, Routine No Growth to date    Urine culture    Specimen: Urine    Result Value Ref Range    Urine Culture, Routine       Multiple organisms isolated. None in predominance.  Repeat if    Urine Culture, Routine clinically necessary.    CBC auto differential   Result Value Ref Range    WBC 8.26 3.90 - 12.70 K/uL    RBC 4.12 4.00 - 5.40 M/uL    Hemoglobin 12.9 12.0 - 16.0 g/dL    Hematocrit 39.6 37.0 - 48.5 %    MCV 96 82 - 98 fL    MCH 31.3 (H) 27.0 - 31.0 pg    MCHC 32.6 32.0 - 36.0 g/dL    RDW 12.2 11.5 - 14.5 %    Platelets 296 150 - 450 K/uL    MPV 9.4 9.2 - 12.9 fL    Immature Granulocytes 0.4 0.0 - 0.5 %    Gran # (ANC) 6.1 1.8 - 7.7 K/uL    Immature Grans (Abs) 0.03 0.00 - 0.04 K/uL    Lymph # 1.4 1.0 - 4.8 K/uL    Mono # 0.7 0.3 - 1.0 K/uL    Eos # 0.0 0.0 - 0.5 K/uL    Baso # 0.03 0.00 - 0.20 K/uL    nRBC 0 0 /100 WBC    Gran % 73.8 (H) 38.0 - 73.0 %    Lymph % 16.3 (L) 18.0 - 48.0 %    Mono % 9.0 4.0 - 15.0 %    Eosinophil % 0.1 0.0 - 8.0 %    Basophil % 0.4 0.0 - 1.9 %    Differential Method Automated    Brain natriuretic peptide   Result Value Ref Range     (H) 0 - 99 pg/mL   Comprehensive metabolic panel   Result Value Ref Range    Sodium 139 136 - 145 mmol/L    Potassium 4.2 3.5 - 5.1 mmol/L    Chloride 103 95 - 110 mmol/L    CO2 21 (L) 23 - 29 mmol/L    Glucose 82 70 - 110 mg/dL    BUN 27 (H) 8 - 23 mg/dL    Creatinine 1.4 0.5 - 1.4 mg/dL    Calcium 9.9 8.7 - 10.5 mg/dL    Total Protein 8.3 6.0 - 8.4 g/dL    Albumin 3.9 3.5 - 5.2 g/dL    Total Bilirubin 0.6 0.1 - 1.0 mg/dL    Alkaline Phosphatase 65 55 - 135 U/L    AST 18 10 - 40 U/L    ALT 10 10 - 44 U/L    Anion Gap 15 8 - 16 mmol/L    eGFR 37 (A) >60 mL/min/1.73 m^2   Troponin I   Result Value Ref Range    Troponin I 0.008 0.000 - 0.026 ng/mL   TSH   Result Value Ref Range    TSH 0.383 (L) 0.400 - 4.000 uIU/mL   Lactic acid, plasma   Result Value Ref Range    Lactate (Lactic Acid) 1.1 0.5 - 2.2 mmol/L   T4, Free   Result Value Ref Range    Free T4 1.34 0.71 - 1.51 ng/dL   Urinalysis, Reflex to Urine Culture  Urine, Clean Catch    Specimen: Urine   Result Value Ref Range    Specimen UA Urine, Clean Catch     Color, UA Yellow Yellow, Straw, Tiffanie    Appearance, UA Clear Clear    pH, UA 8.0 5.0 - 8.0    Specific Gravity, UA >1.030 (A) 1.005 - 1.030    Protein, UA Trace (A) Negative    Glucose, UA Negative Negative    Ketones, UA 2+ (A) Negative    Bilirubin (UA) Negative Negative    Occult Blood UA Negative Negative    Nitrite, UA Negative Negative    Urobilinogen, UA Negative <2.0 EU/dL    Leukocytes, UA Trace (A) Negative   Urinalysis Microscopic   Result Value Ref Range    RBC, UA 2 0 - 4 /hpf    WBC, UA 12 (H) 0 - 5 /hpf    Bacteria Rare None-Occ /hpf    Squam Epithel, UA 4 /hpf    Microscopic Comment SEE COMMENT    Basic metabolic panel   Result Value Ref Range    Sodium 136 136 - 145 mmol/L    Potassium 3.8 3.5 - 5.1 mmol/L    Chloride 103 95 - 110 mmol/L    CO2 20 (L) 23 - 29 mmol/L    Glucose 100 70 - 110 mg/dL    BUN 24 (H) 8 - 23 mg/dL    Creatinine 1.2 0.5 - 1.4 mg/dL    Calcium 9.3 8.7 - 10.5 mg/dL    Anion Gap 13 8 - 16 mmol/L    eGFR 45 (A) >60 mL/min/1.73 m^2   CBC Auto Differential   Result Value Ref Range    WBC 8.05 3.90 - 12.70 K/uL    RBC 3.91 (L) 4.00 - 5.40 M/uL    Hemoglobin 12.4 12.0 - 16.0 g/dL    Hematocrit 38.3 37.0 - 48.5 %    MCV 98 82 - 98 fL    MCH 31.7 (H) 27.0 - 31.0 pg    MCHC 32.4 32.0 - 36.0 g/dL    RDW 12.7 11.5 - 14.5 %    Platelets 278 150 - 450 K/uL    MPV 9.9 9.2 - 12.9 fL    Immature Granulocytes 0.4 0.0 - 0.5 %    Gran # (ANC) 5.5 1.8 - 7.7 K/uL    Immature Grans (Abs) 0.03 0.00 - 0.04 K/uL    Lymph # 1.5 1.0 - 4.8 K/uL    Mono # 0.8 0.3 - 1.0 K/uL    Eos # 0.1 0.0 - 0.5 K/uL    Baso # 0.04 0.00 - 0.20 K/uL    nRBC 0 0 /100 WBC    Gran % 68.8 38.0 - 73.0 %    Lymph % 18.8 18.0 - 48.0 %    Mono % 10.3 4.0 - 15.0 %    Eosinophil % 1.2 0.0 - 8.0 %    Basophil % 0.5 0.0 - 1.9 %    Differential Method Automated    Renal Function Panel   Result Value Ref Range    Glucose 96 70 - 110  mg/dL    Sodium 135 (L) 136 - 145 mmol/L    Potassium 3.7 3.5 - 5.1 mmol/L    Chloride 103 95 - 110 mmol/L    CO2 22 (L) 23 - 29 mmol/L    BUN 23 8 - 23 mg/dL    Calcium 9.4 8.7 - 10.5 mg/dL    Creatinine 1.4 0.5 - 1.4 mg/dL    Albumin 3.4 (L) 3.5 - 5.2 g/dL    Phosphorus 3.5 2.7 - 4.5 mg/dL    eGFR 37 (A) >60 mL/min/1.73 m^2    Anion Gap 10 8 - 16 mmol/L   Magnesium   Result Value Ref Range    Magnesium 2.1 1.6 - 2.6 mg/dL   Echo   Result Value Ref Range    BSA 1.8 m2    TDI SEPTAL 0.09 m/s    LV LATERAL E/E' RATIO 10.29 m/s    LV SEPTAL E/E' RATIO 8.00 m/s    IVC diameter 1.28 cm    Left Ventricular Outflow Tract Mean Velocity 0.61 cm/s    Left Ventricular Outflow Tract Mean Gradient 1.72 mmHg    Pulmonary Valve Mean Velocity 0.57 m/s    TDI LATERAL 0.07 m/s    PV PEAK VELOCITY 0.77 cm/s    LVIDd 4.77 3.5 - 6.0 cm    IVS 0.95 0.6 - 1.1 cm    Posterior Wall 0.88 0.6 - 1.1 cm    LVIDs 3.64 2.1 - 4.0 cm    FS 24 28 - 44 %    STJ 2.15 cm    LV mass 149.50 g    LA size 2.96 cm    Left Ventricle Relative Wall Thickness 0.37 cm    AV mean gradient 3 mmHg    AV valve area 2.11 cm2    AV Velocity Ratio 0.68     AV index (prosthetic) 0.69     E/A ratio 1.09     Mean e' 0.08 m/s    E wave deceleration time 202.46 msec    IVRT 121.79 msec    LVOT diameter 1.97 cm    LVOT area 3.0 cm2    LVOT peak justin 0.91 m/s    LVOT peak VTI 23.30 cm    Ao peak justin 1.34 m/s    Ao VTI 33.6 cm    LVOT stroke volume 70.98 cm3    AV peak gradient 7 mmHg    E/E' ratio 9.00 m/s    MV Peak E Justin 0.72 m/s    MV Peak A Justin 0.66 m/s    LV Systolic Volume 56.06 mL    LV Systolic Volume Index 31.3 mL/m2    LV Diastolic Volume 106.10 mL    LV Diastolic Volume Index 59.27 mL/m2    LV Mass Index 84 g/m2    RA Major Axis 5.38 cm    Left Atrium Minor Axis 5.26 cm    Left Atrium Major Axis 5.31 cm    Right Atrial Pressure (from IVC) 3 mmHg    EF 60 %     *Note: Due to a large number of results and/or encounters for the requested time period, some results have  not been displayed. A complete set of results can be found in Results Review.         Imaging Results:  Imaging Results              US Carotid Bilateral (Final result)  Result time 04/29/23 18:42:57      Final result by Alirio Knowles MD (04/29/23 18:42:57)                   Impression:      No evidence of a hemodynamically significant carotid bifurcation stenosis.      Electronically signed by: Alirio Knowles  Date:    04/29/2023  Time:    18:42               Narrative:    EXAMINATION:  US CAROTID BILATERAL    CLINICAL HISTORY:  syncope;    TECHNIQUE:  Grayscale and color Doppler ultrasound examination of the carotid and vertebral artery systems bilaterally.  Stenosis estimates are per the NASCET measurement criteria.    COMPARISON:  None.    FINDINGS:  Right:    Internal Carotid Artery (ICA) peak systolic velocity 47 cm/sec    ICA/CCA peak systolic velocity ratio: 1.9    Plaque formation: Homogeneous    Vertebral artery: Antegrade flow and normal waveform.    Left:    Internal Carotid Artery (ICA)  peak systolic velocity 93 cm/sec    ICA/CCA peak systolic velocity ratio: 1.2    Plaque formation: Homogeneous    Vertebral artery: Antegrade flow and normal waveform.                                       CT Cervical Spine Without Contrast (Final result)  Result time 04/29/23 17:07:13      Final result by Alirio Knowles MD (04/29/23 17:07:13)                   Impression:      No acute fracture or dislocation.    Mild degenerative joint disease.    Emphysematous changes.    All CT scans   are performed using dose optimization techniques including the following: automated exposure control; adjustment of the mA and/or kV; use of iterative reconstruction technique.  Dose modulation was employed for ALARA by means of: Automated exposure control; adjustment of the mA and/or kV according to patient size (this includes techniques or standardized protocols for targeted exams where dose is matched to indication/reason for exam;  i.e. extremities or head); and/or use of iterative reconstructive technique.      Electronically signed by: Alirio Knowles  Date:    04/29/2023  Time:    17:07               Narrative:    EXAMINATION:  CT CERVICAL SPINE WITHOUT CONTRAST    CLINICAL HISTORY:  Neck pain, acute, no red flags;    TECHNIQUE:  Low dose axial images, sagittal and coronal reformations were performed though the cervical spine.  Contrast was not administered.    COMPARISON:  None    FINDINGS:  Normal vertebral body heights without evidence for spondylolisthesis.  Mild degenerative joint disease.  No prevertebral soft tissue swelling.  Facet joints are congruent.  Decreased bone mineral density.                                       CTA Chest Non-Coronary (PE Studies) (Final result)  Result time 04/29/23 17:13:02      Final result by Alirio Knowles MD (04/29/23 17:13:02)                   Impression:      No pulmonary embolism.  No dissection.    Emphysema and pulmonary nodules.  Early malignancy may be suspected.  Recommend clinical correlation and short-term follow-up.  Additional consideration includes atypical infection.    Left axillary adenopathy.  Consider PET CT for further evaluation    Posterior subcutaneous cyst.    All CT scans   are performed using dose optimization techniques including the following: automated exposure control; adjustment of the mA and/or kV; use of iterative reconstruction technique.  Dose modulation was employed for ALARA by means of: Automated exposure control; adjustment of the mA and/or kV according to patient size (this includes techniques or standardized protocols for targeted exams where dose is matched to indication/reason for exam; i.e. extremities or head); and/or use of iterative reconstructive technique.      Electronically signed by: Alirio Knowles  Date:    04/29/2023  Time:    17:13               Narrative:    EXAMINATION:  CTA CHEST NON CORONARY (PE STUDIES)    CLINICAL HISTORY:  Pulmonary embolism  (PE) suspected, unknown D-dimer;    TECHNIQUE:  Low dose axial images, sagittal and coronal reformations were obtained from the thoracic inlet to the lung bases following the IV administration of 100 mL of Omnipaque 350.  Contrast timing was optimized to evaluate the pulmonary arteries.  MIP images were performed.    COMPARISON:  None    FINDINGS:  No evidence for pulmonary embolism.  No evidence for aortic dissection or aneurysm.  Atherosclerotic changes.  Cardiomegaly.  Mild motion artifacts.  Mild subsegmental atelectasis.  14 mm nodule along the fissure at the level of the bronchus intermedius.  Nodular opacities left upper lobe measuring 11 mm.  Aline fissural nodule left upper lobe 10 mm.    Left axillary adenopathy.    Posterior subcutaneous cyst.                                       X-Ray Chest 1 View (Final result)  Result time 04/29/23 13:49:52      Final result by Harris Weeks MD (04/29/23 13:49:52)                   Impression:      Questionable small bilateral pleural effusions and mild bibasilar infiltrates.  Aspiration or pneumonia.  Recommend continued follow-up.      Electronically signed by: Harris Weeks  Date:    04/29/2023  Time:    13:49               Narrative:    EXAMINATION:  XR CHEST 1 VIEW    CLINICAL HISTORY:  Unspecified coma    TECHNIQUE:  Single frontal view of the chest was performed.    COMPARISON:  02/04/2020 chest radiograph.    FINDINGS:  Mild bibasilar infiltrates.  Upper lungs are clear.  Questionable small bilateral pleural effusions.  No pneumothorax.    The cardiac silhouette is normal in size. The hilar and mediastinal contours are unremarkable.    Bones are intact.                                       CT Head Without Contrast (Final result)  Result time 04/29/23 13:30:07      Final result by Harris Weeks MD (04/29/23 13:30:07)                   Impression:      No evidence of acute intracranial pathology.  Consider further follow-up/evaluation as  warranted.      Electronically signed by: Harris Micky  Date:    04/29/2023  Time:    13:30               Narrative:    EXAMINATION:  CT HEAD WITHOUT CONTRAST    CLINICAL HISTORY:  Syncope, recurrent;    TECHNIQUE:  Low dose axial CT images obtained throughout the head without the use of intravenous contrast.  Axial, sagittal and coronal reconstructions were performed. All CT scans at this location are performed using dose modulation techniques as appropriate to a performed exam including the following: Automated exposure control; adjustment of the mA and/or kV according to patient size (this includes techniques or standardized protocols for targeted exams where dose is matched to indication/reason for exam; i. e. extremities or head); use of iterative reconstruction technique.    COMPARISON:  None.    FINDINGS:  Intracranial compartment:    Generalized cerebral volume loss.  No parenchymal mass, hemorrhage, edema or major vascular distribution infarct. No evidence of hydrocephalus.    No extra-axial blood or fluid collections.    Skull/extracranial contents (limited evaluation):    No fracture. Mastoid air cells and paranasal sinuses are essentially clear.                                       The EKG was ordered, reviewed, and independently interpreted by the ED provider.  Interpretation time: 12:43  Rate: 51 BPM  Rhythm: sinus bradycardia   Interpretation: low voltage QRS. Septal infarct (cited on or before 04-FEB-2020). Abnormal ECG. No STEMI.  When compared to EKG performed 04-FEB-2020 19:14, junctional rhythm has replaced sinus rhythm. Vent. Rate has decreased by 30 BPM.   Confirmed by RIN KANG MD ([id number]) on 4/29/2023 2:56 PM    The EKG was ordered, reviewed, and independently interpreted by the ED provider.  Interpretation time: 17:41  Rate: 64 BPM  Rhythm: sinus rhythm with premature atrial complexes with aberrant conduction  Interpretation: low voltage QRS. Cannot rule out anterior infarct (cited on or  before 04-FEB-2020). Abnormal ECG. No STEMI.           The Emergency Provider reviewed the vital signs and test results, which are outlined above.     ED Discussion       6:05 PM: Discussed case with Dr. Oxana Barroso MD ([facility]). Dr. Barroso agrees with current care and management of pt and accepts admission.   Admitting Service: [facility]  Admitting Physician: Dr. Barroso  Admit to: Obs    6:05 PM: Re-evaluated pt. I have discussed test results, shared treatment plan, and the need for admission with patient and family at bedside. Pt and family express understanding at this time and agree with all information. All questions answered. Pt and family have no further questions or concerns at this time. Pt is ready for admit.      ED Course as of 05/04/23 1423   Sat Apr 29, 2023   1738 83-year-old female with a history of CHF, COPD, GERD, HTN, HLD, and hepatocellular carcinoma on chemotherapy every 6 weeks presents with 3 syncopal episodes today.  Associated nausea and vomiting with possible aspiration pneumonia on chest x-ray.  No PE on CTA chest.  CT head shows no intracranial hemorrhage.  CT cervical spine shows no spinal fractures.  UA shows signs concerning for infection including protein, leukocyte esterase, and white blood cells.  Blood in urine cultures sent prior to patient receiving IV Rocephin and azithromycin.  CBC shows no leukocytosis and lactic acid is normal, nothing to indicate infection or significant anemia to indicate cause of syncope.  CMP showed no signs of liver failure, renal failure, or electrolyte abnormality.  BNP elevated but no CHF on CXR. First EKG showed sinus bradycardia but repeat EKG showed a normal rate.  No signs of ischemia or malignant dysrhythmia.  Troponin was negative for any cardiac ischemia.  TSH was low but free T4 was normal, likely subclinical. Symptoms worse with head movement which could be indicative of carotid artery disease or cranial artery dissection.   This was discussed with Hospital Medicine and they will evaluate further and placed in observation for multiple syncopal episodes.  Additionally blood pressure elevated and treated with IV hydralazine given the bradycardia. [NF]   1748 Repeat EKG shows sinus rhythm with PACs and aberrancy.  NV interval 208.  QRS 86.  .  No ST or T-wave changes.  Poor R-wave progression.  Normal axis. [NF]      ED Course User Index  [NF] Alma Singleton,      Medical Decision Making:   History:   I obtained history from: someone other than patient.       <> Summary of History: daughter  Clinical Tests:   Lab Tests: Ordered and Reviewed  Radiological Study: Ordered and Reviewed  Medical Tests: Ordered and Reviewed  Other:   I have discussed this case with another health care provider.  With no         ED Medication(s):  Medications   iohexoL (OMNIPAQUE 350) injection 100 mL (100 mLs Intravenous Given 4/29/23 1654)   hydrALAZINE injection 10 mg (10 mg Intravenous Given 4/29/23 1730)   cefTRIAXone (ROCEPHIN) 1 g in dextrose 5 % in water (D5W) 5 % 50 mL IVPB (MB+) (0 g Intravenous Stopped 4/29/23 2001)   azithromycin (ZITHROMAX) 500 mg in dextrose 5 % (D5W) 250 mL IVPB (Vial-Mate) (0 mg Intravenous Stopped 4/29/23 2032)       Discharge Medication List as of 5/1/2023  5:11 PM        START taking these medications    Details   sucralfate (CARAFATE) 1 gram tablet Take 1 tablet (1 g total) by mouth every 6 (six) hours. for 7 days, Starting Mon 5/1/2023, Until Mon 5/8/2023, Normal              Follow-up Information       Yessy Andrade MD Follow up in 3 day(s).    Specialty: Family Medicine  Contact information:  [address]  [address]  [place] [address] [phone]               Carlos Stinson MD Follow up in 2 week(s).    Specialties: Interventional Cardiology, Cardiology  Contact information:  [address] [place] [address] [address] [place] [id number] [phone]                                 Scribe Attestation:   Scribe #1:  I performed the above scribed service and the documentation accurately describes the services I performed. I attest to the accuracy of the note.     Attending:   Physician Attestation Statement for Scribe #1: I, Alma Singleton DO, personally performed the services described in this documentation, as scribed by Talia Wynne, in my presence, and it is both accurate and complete.           Clinical Impression       ICD-10-CM ICD-9-CM   1. Syncope, unspecified syncope type  R55 780.2   2. Loss of consciousness  R40.20 780.09   3. Bradycardia  R00.1 427.89   4. Syncope  R55 780.2   5. Syncope and collapse  R55 780.2   6. Urinary tract infection without hematuria, site unspecified  N39.0 599.0   7. Aspiration pneumonia of both lower lobes due to gastric secretions  J69.0 507.0       Disposition:   Disposition: Placed in Observation  Condition: Stable       Alma Singleton DO  05/04/23 1424

## 2023-04-29 NOTE — ASSESSMENT & PLAN NOTE
Patient is identified as having Diastolic (HFpEF) heart failure that is Chronic. CHF is currently controlled. Latest ECHO performed and demonstrates- No results found for this or any previous visit.  . Continue ACE/ARB and monitor clinical status closely. Monitor on telemetry. Patient is off CHF pathway.  Monitor strict Is&Os and daily weights.  Place on fluid restriction of 1.5 L. Continue to stress to patient importance of self efficacy and  on diet for CHF. Last BNP reviewed- and noted below   Recent Labs   Lab 04/29/23  1320   *   .

## 2023-04-29 NOTE — SUBJECTIVE & OBJECTIVE
"Past Medical History:   Diagnosis Date    Arthritis     Atherosclerosis of aorta 10/27/2021    Benign cyst of left kidney 3/20/2018    on imaging from ct scan at [facility].     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     Lung disease     copd    Lung nodules 2/5/2020    Mediastinal lymphadenopathy 2/5/2020    Mixed hyperlipidemia 5/2/2018       Past Surgical History:   Procedure Laterality Date    APPENDECTOMY      CATARACT EXTRACTION      EYE SURGERY  2018    MIDDLE EAR SURGERY      TONSILLECTOMY  1955       Review of patient's allergies indicates:   Allergen Reactions    Pravastatin Other (See Comments)     Burning/flushing    Xgeva [denosumab] Other (See Comments)     Mouth and diffuse pain    Allopurinol analogues Other (See Comments)     "makes me sick and feel crazy"       No current facility-administered medications on file prior to encounter.     Current Outpatient Medications on File Prior to Encounter   Medication Sig    (Magic mouthwash) 1:1:1 Benadryl 12.5mg/5ml liq, aluminum & magnesium hydroxide-simehticone (Maalox), LIDOcaine viscous 2% Take 10 mL PO every 4-6 hours as needed for throat or esophageal pain.    amLODIPine (NORVASC) 2.5 MG tablet Take 1 tablet (2.5 mg total) by mouth 3 (three) times daily.    clobetasol 0.05% (TEMOVATE) 0.05 % Oint Apply topically 2 (two) times daily.    diphenoxylate-atropine 2.5-0.025 mg (LOMOTIL) 2.5-0.025 mg per tablet Take 1 tablet by mouth 4 (four) times daily as needed for Diarrhea.    levothyroxine " (TIROSINT) 50 mcg Cap Take 1 tablet (50 mcg) by mouth before breakfast.    lisinopriL (PRINIVIL,ZESTRIL) 40 MG tablet Take 1 tablet (40 mg total) by mouth once daily.    magnesium hydroxide (MILK OF MAGNESIA ORAL) Take by mouth once daily.    mupirocin (BACTROBAN) 2 % ointment Apply topically 3 (three) times daily.    neomycin-polymyxin-hydrocortisone (CORTISPORIN) 3.5-10,000-1 mg/mL-unit/mL-% otic suspension Place 3 drops into the right ear 4 (four) times daily.    ondansetron (ZOFRAN-ODT) 4 MG TbDL Dissolve 1 tablet (4 mg total) by mouth every 8 (eight) hours as needed (nausea).    oxyCODONE (OXYCONTIN) 15 mg TR12 12 hr tablet Take 1 tablet (15 mg total) by mouth every 12 (twelve) hours.    oxyCODONE-acetaminophen (PERCOCET)  mg per tablet Take 1 tablet by mouth 4 (four) times daily as needed (severe pain).    pantoprazole (PROTONIX) 40 MG tablet Take 1 tablet (40 mg total) by mouth once daily    pulse oximeter (PULSE OXIMETER) device by Apply Externally route 2 (two) times a day. Use twice daily at 8 AM and 3 PM and record the value in MyChart as directed.    senna (SENOKOT) 8.6 mg tablet Take 1 tablet by mouth once daily.    tacrolimus (PROTOPIC) 0.1 % ointment Apply topically 2 (two) times daily.    triamcinolone acetonide 0.1% (KENALOG) 0.1 % cream Apply topically 2 (two) times daily. for 10 days     Family History       Problem Relation (Age of Onset)    ALS Father    Cancer Mother    Colon cancer Mother    Retinal detachment Son          Tobacco Use    Smoking status: Former     Packs/day: 0.50     Years: 48.00     Pack years: 24.00     Types: Cigarettes     Start date: 1955     Quit date: 2003     Years since quittin.3    Smokeless tobacco: Never   Substance and Sexual Activity    Alcohol use: No    Drug use: No    Sexual activity: Never     Review of Systems   Constitutional:  Negative for appetite change, chills, diaphoresis, fatigue, fever and unexpected weight change.   HENT:   Positive for hearing loss. Negative for congestion, nosebleeds, sinus pressure and sore throat.    Eyes:  Negative for pain, discharge and visual disturbance.   Respiratory:  Negative for cough, chest tightness, shortness of breath, wheezing and stridor.    Cardiovascular:  Negative for chest pain, palpitations and leg swelling.   Gastrointestinal:  Negative for abdominal distention, abdominal pain, blood in stool, constipation, diarrhea, nausea and vomiting.   Endocrine: Negative for cold intolerance and heat intolerance.   Genitourinary:  Negative for difficulty urinating, dysuria, flank pain, frequency and urgency.   Musculoskeletal:  Negative for arthralgias, back pain, joint swelling, myalgias, neck pain and neck stiffness.   Skin:  Negative for rash and wound.   Allergic/Immunologic: Positive for immunocompromised state. Negative for food allergies.   Neurological:  Positive for dizziness and syncope. Negative for seizures, facial asymmetry, speech difficulty, weakness, light-headedness, numbness and headaches.   Hematological:  Negative for adenopathy.   Psychiatric/Behavioral:  Negative for agitation, confusion and hallucinations.    Objective:     Vital Signs (Most Recent):  Temp: 97.7 °F (36.5 °C) (04/29/23 1240)  Pulse: (!) 49 (04/29/23 1610)  Resp: 20 (04/29/23 1610)  BP: (!) 182/80 (04/29/23 1610)  SpO2: 99 % (04/29/23 1610)   Vital Signs (24h Range):  Temp:  [97.7 °F (36.5 °C)] 97.7 °F (36.5 °C)  Pulse:  [47-63] 49  Resp:  [20-24] 20  SpO2:  [98 %-99 %] 99 %  BP: (160-191)/(80-86) 182/80     Weight: 63 kg (139 lb)  Body mass index is 27.15 kg/m².    Physical Exam  Vitals and nursing note reviewed.   Constitutional:       General: She is not in acute distress.     Appearance: She is well-developed. She is not diaphoretic.   HENT:      Head: Normocephalic and atraumatic.      Right Ear: Decreased hearing noted.      Left Ear: Decreased hearing noted.      Nose: Nose normal.   Eyes:      General: No  scleral icterus.     Conjunctiva/sclera: Conjunctivae normal.   Neck:      Trachea: No tracheal deviation.   Cardiovascular:      Rate and Rhythm: Regular rhythm. Bradycardia present.      Heart sounds: Normal heart sounds. No murmur heard.    No friction rub. No gallop.   Pulmonary:      Effort: Pulmonary effort is normal. No respiratory distress.      Breath sounds: Normal breath sounds. No stridor. No wheezing or rales.   Chest:      Chest wall: No tenderness.   Abdominal:      General: Bowel sounds are normal. There is no distension.      Palpations: Abdomen is soft. There is no mass.      Tenderness: There is no abdominal tenderness. There is no guarding or rebound.   Musculoskeletal:         General: No tenderness or deformity. Normal range of motion.      Cervical back: Normal range of motion and neck supple.   Skin:     General: Skin is warm and dry.      Coloration: Skin is not pale.      Findings: No erythema or rash.   Neurological:      Mental Status: She is alert and oriented to person, place, and time.      Cranial Nerves: No cranial nerve deficit.      Motor: No abnormal muscle tone.      Coordination: Coordination normal.   Psychiatric:         Behavior: Behavior normal.         Thought Content: Thought content normal.           Significant Labs: All pertinent labs within the past 24 hours have been reviewed.    Significant Imaging: I have reviewed all pertinent imaging results/findings within the past 24 hours.

## 2023-04-29 NOTE — PHARMACY MED REC
"Admission Medication History     The home medication history was taken by Kirit Wilburn.    You may go to "Admission" then "Reconcile Home Medications" tabs to review and/or act upon these items.     The home medication list has been updated by the Pharmacy department.   Please read ALL comments highlighted in yellow.   Please address this information as you see fit.    Feel free to contact us if you have any questions or require assistance.      Medications listed below were obtained from: Anal[employer] software- [employer] and Medical records  (Not in a hospital admission)      Kirit Wilburn  [id number]    Current Outpatient Medications on File Prior to Encounter   Medication Sig Dispense Refill Last Dose    amLODIPine (NORVASC) 2.5 MG tablet Take 1 tablet (2.5 mg total) by mouth 3 (three) times daily. 90 tablet 11     diphenoxylate-atropine 2.5-0.025 mg (LOMOTIL) 2.5-0.025 mg per tablet Take 1 tablet by mouth 4 (four) times daily as needed for Diarrhea. 30 tablet 1     levothyroxine (TIROSINT) 50 mcg Cap Take 1 tablet (50 mcg) by mouth before breakfast. 90 capsule 3     lisinopriL (PRINIVIL,ZESTRIL) 40 MG tablet Take 1 tablet (40 mg total) by mouth once daily. 90 tablet 3     mupirocin (BACTROBAN) 2 % ointment Apply topically 3 (three) times daily. 22 g 1     ondansetron (ZOFRAN-ODT) 4 MG TbDL Dissolve 1 tablet (4 mg total) by mouth every 8 (eight) hours as needed (nausea). 15 tablet 0     oxyCODONE (OXYCONTIN) 15 mg TR12 12 hr tablet Take 1 tablet (15 mg total) by mouth every 12 (twelve) hours. 60 each 0     oxyCODONE-acetaminophen (PERCOCET)  mg per tablet Take 1 tablet by mouth 4 (four) times daily as needed (severe pain). 120 tablet 0     pantoprazole (PROTONIX) 40 MG tablet Take 1 tablet (40 mg total) by mouth once daily 30 tablet 0     senna (SENOKOT) 8.6 mg tablet Take 1 tablet by mouth once daily.                            .        "

## 2023-04-29 NOTE — ASSESSMENT & PLAN NOTE
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 Burgess

## 2023-04-29 NOTE — ASSESSMENT & PLAN NOTE
BP mildly elevated - did not take home meds this am   Cont home meds Lisinopril and Norvasc  IV Hydralazine prn

## 2023-04-29 NOTE — FIRST PROVIDER EVALUATION
Mag Oxide 400 mg once a day    ----------------------------------------------------------------------  CALCIUM 600 MG+ Vit D 800 units   Twice a day  -----------------------------------------------------------------------    Vit D 50,000 units once a week.    -----------------------------------------------------------------------------  Let your dentist know you are taking Fosamax.  ----------------------------------------------------------------------------    Visit your PCP to discuss taking you off Fosamax   Medical screening examination initiated.  I have conducted a focused provider triage encounter, findings are as follows:    Brief history of present illness:  Patient presents to ER for multiple syncopal episodes.  Patient reports posterior head/neck pain.    Vitals:    04/29/23 1240   BP: (!) 167/82   BP Location: Right arm   Patient Position: Sitting   Pulse: 63   Resp: (!) 24   Temp: 97.7 °F (36.5 °C)   TempSrc: Oral   SpO2: 98%       Pertinent physical exam:  awake, alert, oriented. Anxious appearing    Brief workup plan:  workup    Preliminary workup initiated; this workup will be continued and followed by the physician or advanced practice provider that is assigned to the patient when roomed.

## 2023-04-29 NOTE — PROGRESS NOTES
Pharmacist Renal Dose Adjustment Note    Maura Singh is a 83 y.o. female being treated with the medication enoxaparin.    Patient Data:    Vital Signs (Most Recent):  Temp: 97.7 °F (36.5 °C) (04/29/23 1240)  Pulse: (!) 49 (04/29/23 1610)  Resp: 20 (04/29/23 1610)  BP: (!) 182/80 (04/29/23 1610)  SpO2: 99 % (04/29/23 1610)   Vital Signs (72h Range):  Temp:  [97.7 °F (36.5 °C)]   Pulse:  [47-63]   Resp:  [20-24]   BP: (160-191)/(80-86)   SpO2:  [98 %-99 %]      Recent Labs   Lab 04/26/23  1140 04/29/23  1400   CREATININE 1.4 1.4     Serum creatinine: 1.4 mg/dL 04/29/23 1400  Estimated creatinine clearance: 24.8 mL/min  Enoxaparin 40 mg daily was changed to 30 mg daily according to [facility]'s renal dose adjustment policy        Pharmacist's Name:Mary Garay, PharmD 4/29/2023 6:14 PM    Pharmacist's Extension: [phone]

## 2023-04-30 PROBLEM — R94.31 ABNORMAL ECG: Status: ACTIVE | Noted: 2023-01-01

## 2023-04-30 NOTE — CONSULTS
[facility]  Cardiology  Consult Note    Patient Name: Maura Singh  MRN: [id number]  Admission Date: 4/29/2023  Hospital Length of Stay: 0 days  Code Status: Full Code   Attending Provider: Tammy Langford MD   Consulting Provider: Rodolfo Galvan MD  Primary Care Physician: Yessy Andrade MD  Principal Problem:Syncope    Patient information was obtained from patient, relative(s), past medical records, ER records and primary team.     Inpatient consult to Cardiology  Consult performed by: Rodolfo Galvan MD  Consult ordered by: Tammy Langford MD  Reason for consult: SYNCOPE        Subjective:     Chief Complaint:  SYNCOPE     HPI:   Cardiology consulted for syncope.  No prior h/o CV disease  She has metastatic liver cancer, DM, HTN, obesity.  Saw Dr. Stinson in 2018 and had - stress MPI and echo 2018 normal LV function.  Ecgs in past have shown NSR, possible septal infarct.    Now admitted w syncope.  She passed out 6 - 7 times yesterday.   Seemed positional overall.  Short lasting.  No associated acute CP, dyspnea.  Family states she did have some atypical CP last week.  On admit, ecg showed possible junctional bradycardia (possible discrete p waves though so could be sinus sarath) 51 bpm, old septal infarct, low voltage.  2nd ecg showed NSR.  Daughter states HR has been running in 40's at home over last month.  No prior h/o syncope.  She also has been having abdominal pain issues with her cancer, was put on abs last week then stopped and syncope could be vasovagal in origin.  Received IV fluids in ER last week per family.  Echo this admission normal EF.  - troponin.    Carotid u/s no stenosis.  CTA chest - for PE.          Past Medical History:   Diagnosis Date    Arthritis     Atherosclerosis of aorta 10/27/2021    Benign cyst of left kidney 3/20/2018    on imaging from ct scan at [facility].    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     Lung disease     copd    Lung nodules 2/5/2020    Mediastinal lymphadenopathy 2/5/2020    Mixed hyperlipidemia 5/2/2018       Past Surgical History:   Procedure Laterality Date    APPENDECTOMY      CATARACT EXTRACTION      EYE SURGERY  2018    MIDDLE EAR SURGERY      TONSILLECTOMY  1955       Review of patient's allergies indicates:   Allergen Reactions    Pravastatin Other (See Comments)     Burning/flushing    Xgeva [denosumab] Other (See Comments)     Mouth and diffuse pain    Allopurinol analogues Other (See Comments)     "makes me sick and feel crazy"       No current facility-administered medications on file prior to encounter.     Current Outpatient Medications on File Prior to Encounter   Medication Sig    amLODIPine (NORVASC) 2.5 MG tablet Take 1 tablet (2.5 mg total) by mouth 3 (three) times daily.    diphenoxylate-atropine 2.5-0.025 mg (LOMOTIL) 2.5-0.025 mg per tablet Take 1 tablet by mouth 4 (four) times daily as needed for Diarrhea.    levothyroxine (TIROSINT) 50 mcg Cap Take 1 tablet (50 mcg) by mouth before breakfast.    lisinopriL (PRINIVIL,ZESTRIL) 40 MG tablet Take 1 tablet (40 mg total) by mouth once daily.    mupirocin (BACTROBAN) 2 % ointment Apply topically 3 (three) times daily.    ondansetron (ZOFRAN-ODT) 4 MG TbDL Dissolve 1 tablet (4 mg total) by mouth every 8 (eight) hours as needed (nausea).    oxyCODONE (OXYCONTIN) 15 mg TR12 12 hr tablet Take 1 tablet (15 mg total) by mouth every 12 (twelve) hours.    oxyCODONE-acetaminophen (PERCOCET) "  mg per tablet Take 1 tablet by mouth 4 (four) times daily as needed (severe pain).    pantoprazole (PROTONIX) 40 MG tablet Take 1 tablet (40 mg total) by mouth once daily    senna (SENOKOT) 8.6 mg tablet Take 1 tablet by mouth once daily.     Family History       Problem Relation (Age of Onset)    ALS Father    Cancer Mother    Colon cancer Mother    Retinal detachment Son          Tobacco Use    Smoking status: Former     Packs/day: 0.50     Years: 48.00     Pack years: 24.00     Types: Cigarettes     Start date: 1955     Quit date: 2003     Years since quittin.3    Smokeless tobacco: Never   Substance and Sexual Activity    Alcohol use: No    Drug use: No    Sexual activity: Never     Review of Systems   Constitutional: Positive for malaise/fatigue.   HENT: Negative.     Eyes: Negative.    Cardiovascular:  Positive for chest pain, dyspnea on exertion and syncope.   Respiratory:  Positive for shortness of breath.    Endocrine: Negative.    Hematologic/Lymphatic: Negative.    Skin: Negative.    Musculoskeletal: Negative.    Gastrointestinal:  Positive for abdominal pain, change in bowel habit and nausea.   Genitourinary: Negative.    Neurological:  Positive for weakness.   Psychiatric/Behavioral: Negative.     Allergic/Immunologic: Negative.    Objective:     Vital Signs (Most Recent):  Temp: 98 °F (36.7 °C) (23 0750)  Pulse: (!) 59 (23 09)  Resp: 18 (23 09)  BP: 135/65 (23 0750)  SpO2: 96 % (23 09)   Vital Signs (24h Range):  Temp:  [97.5 °F (36.4 °C)-98.8 °F (37.1 °C)] 98 °F (36.7 °C)  Pulse:  [47-90] 59  Resp:  [17-22] 18  SpO2:  [93 %-100 %] 96 %  BP: (110-192)/(54-86) 135/65     Weight: 77.7 kg (171 lb 4.8 oz)  Body mass index is 31.33 kg/m².    SpO2: 96 %         Intake/Output Summary (Last 24 hours) at 2023 1332  Last data filed at 2023  Gross per 24 hour   Intake 300 ml   Output --   Net 300 ml       Lines/Drains/Airways        Peripheral Intravenous Line  Duration                  Peripheral IV - Single Lumen 04/29/23 1322 20 G Anterior;Right Forearm 1 day         Peripheral IV - Single Lumen 04/29/23 1633 20 G Left Antecubital <1 day                    Physical Exam  Vitals and nursing note reviewed.   Constitutional:       General: She is not in acute distress.     Appearance: Normal appearance. She is well-developed. She is not ill-appearing or diaphoretic.   HENT:      Head: Normocephalic.   Neck:      Thyroid: No thyromegaly.      Vascular: No carotid bruit or JVD.   Cardiovascular:      Rate and Rhythm: Normal rate and regular rhythm.      Pulses: Normal pulses.           Radial pulses are 2+ on the right side and 2+ on the left side.      Heart sounds: Normal heart sounds, S1 normal and S2 normal. No murmur heard.    No friction rub. No gallop.   Pulmonary:      Effort: Pulmonary effort is normal.      Breath sounds: Normal breath sounds. No wheezing or rales.   Abdominal:      General: Bowel sounds are normal. There is no abdominal bruit.      Palpations: Abdomen is soft.      Tenderness: There is no abdominal tenderness.   Musculoskeletal:      Cervical back: Neck supple.   Lymphadenopathy:      Cervical: No cervical adenopathy.   Skin:     General: Skin is warm.   Neurological:      Mental Status: She is alert.   Psychiatric:         Behavior: Behavior normal. Behavior is cooperative.       Significant Labs: ABG: No results for input(s): PH, PCO2, HCO3, POCSATURATED, BE in the last 48 hours., Blood Culture:   Recent Labs   Lab 04/29/23  1629 04/29/23  1630   LABBLOO No Growth to date No Growth to date   , BMP:   Recent Labs   Lab 04/29/23  1400 04/30/23  0658   GLU 82 100    136   K 4.2 3.8    103   CO2 21* 20*   BUN 27* 24*   CREATININE 1.4 1.2   CALCIUM 9.9 9.3   , CMP   Recent Labs   Lab 04/29/23  1400 04/30/23  0658    136   K 4.2 3.8    103   CO2 21* 20*   GLU 82 100   BUN 27* 24*   CREATININE 1.4  1.2   CALCIUM 9.9 9.3   PROT 8.3  --    ALBUMIN 3.9  --    BILITOT 0.6  --    ALKPHOS 65  --    AST 18  --    ALT 10  --    ANIONGAP 15 13   , CBC   Recent Labs   Lab 04/29/23  1320 04/30/23  0658   WBC 8.26 8.05   HGB 12.9 12.4   HCT 39.6 38.3    278   , INR No results for input(s): INR, PROTIME in the last 48 hours., Lipid Panel No results for input(s): CHOL, HDL, LDLCALC, TRIG, CHOLHDL in the last 48 hours., and Troponin   Recent Labs   Lab 04/29/23  1400   TROPONINI 0.008       Significant Imaging: Echocardiogram: Transthoracic echo (TTE) complete (Cupid Only):   Results for orders placed or performed during the hospital encounter of 04/29/23   Echo   Result Value Ref Range    BSA 1.8 m2    TDI SEPTAL 0.09 m/s    LV LATERAL E/E' RATIO 10.29 m/s    LV SEPTAL E/E' RATIO 8.00 m/s    IVC diameter 1.28 cm    Left Ventricular Outflow Tract Mean Velocity 0.61 cm/s    Left Ventricular Outflow Tract Mean Gradient 1.72 mmHg    Pulmonary Valve Mean Velocity 0.57 m/s    TDI LATERAL 0.07 m/s    PV PEAK VELOCITY 0.77 cm/s    LVIDd 4.77 3.5 - 6.0 cm    IVS 0.95 0.6 - 1.1 cm    Posterior Wall 0.88 0.6 - 1.1 cm    LVIDs 3.64 2.1 - 4.0 cm    FS 24 28 - 44 %    STJ 2.15 cm    LV mass 149.50 g    LA size 2.96 cm    Left Ventricle Relative Wall Thickness 0.37 cm    AV mean gradient 3 mmHg    AV valve area 2.11 cm2    AV Velocity Ratio 0.68     AV index (prosthetic) 0.69     E/A ratio 1.09     Mean e' 0.08 m/s    E wave deceleration time 202.46 msec    IVRT 121.79 msec    LVOT diameter 1.97 cm    LVOT area 3.0 cm2    LVOT peak justin 0.91 m/s    LVOT peak VTI 23.30 cm    Ao peak justin 1.34 m/s    Ao VTI 33.6 cm    LVOT stroke volume 70.98 cm3    AV peak gradient 7 mmHg    E/E' ratio 9.00 m/s    MV Peak E Justin 0.72 m/s    MV Peak A Justin 0.66 m/s    LV Systolic Volume 56.06 mL    LV Systolic Volume Index 31.3 mL/m2    LV Diastolic Volume 106.10 mL    LV Diastolic Volume Index 59.27 mL/m2    LV Mass Index 84 g/m2    RA Major Axis 5.38  cm    Left Atrium Minor Axis 5.26 cm    Left Atrium Major Axis 5.31 cm    Right Atrial Pressure (from IVC) 3 mmHg    EF 60 %    Narrative    · The left ventricle is normal in size with normal systolic function.  · The estimated ejection fraction is 60%.  · Normal left ventricular diastolic function.  · Normal right ventricular size with normal right ventricular systolic   function.  · Normal central venous pressure (3 mmHg).        Assessment and Plan:     * Syncope  Syncope:  Multiple issues in patient with her liver cancer, dehydration last week, abd pain etc.  Syncope seems positional suggestive of orthostatic syncope; also has abd pain issues and vasovagal syncope is in differential.    Also has bradycardia in 40's per daughter at home over last month.  Admitting ECG with HR 51 bpm, possible junctional bradycardia.    Needs 2 week outpatient even Holter.    Recommend keeping pt in another 24 hours for observation on telemetry and EP consult tomorrow advised.    Abnormal ECG  Chronic septal infarct on ecgs over the years.  Monitor.  Rest as noted in syncope section.    Bradycardia  See syncope section.    Hepatocellular carcinoma  F/u with heme/onc for mgt.    Chronic diastolic congestive heart failure  Stable.  Compensated.  Diurese prn for any acute CHF sxs.      Essential hypertension  Monitor BP.  Adjust meds as needed.        VTE Risk Mitigation (From admission, onward)         Ordered     enoxaparin injection 40 mg  Daily         04/29/23 1813     IP VTE HIGH RISK PATIENT  Once         04/29/23 1811     Place sequential compression device  Until discontinued         04/29/23 1811                Thank you for your consult.    Rodolfo Galvan MD  Cardiology   [facility]

## 2023-04-30 NOTE — SUBJECTIVE & OBJECTIVE
Interval History:  Patient seen and examined with family at bedside.  She has no complaints today end-stage she wishes to go home.  Patient is severely hard of hearing.    Review of Systems   Constitutional:  Negative for fatigue and fever.   Respiratory:  Negative for cough and shortness of breath.    Cardiovascular:  Negative for chest pain and leg swelling.   Gastrointestinal:  Negative for nausea and vomiting.   Neurological:  Positive for syncope.   All other systems reviewed and are negative.  Objective:     Vital Signs (Most Recent):  Temp: 98 °F (36.7 °C) (04/30/23 1516)  Pulse: (!) 57 (04/30/23 1516)  Resp: 18 (04/30/23 1516)  BP: 136/60 (04/30/23 1516)  SpO2: 97 % (04/30/23 1516)   Vital Signs (24h Range):  Temp:  [97.5 °F (36.4 °C)-98.8 °F (37.1 °C)] 98 °F (36.7 °C)  Pulse:  [47-90] 57  Resp:  [17-22] 18  SpO2:  [93 %-100 %] 97 %  BP: (110-192)/(54-85) 136/60     Weight: 77.7 kg (171 lb 4.8 oz)  Body mass index is 31.33 kg/m².    Intake/Output Summary (Last 24 hours) at 4/30/2023 1538  Last data filed at 4/29/2023 2032  Gross per 24 hour   Intake 300 ml   Output --   Net 300 ml      Physical Exam  Vitals reviewed.   Constitutional:       Appearance: Normal appearance.   HENT:      Head: Normocephalic and atraumatic.      Mouth/Throat:      Mouth: Mucous membranes are moist.      Pharynx: Oropharynx is clear.   Eyes:      Extraocular Movements: Extraocular movements intact.      Conjunctiva/sclera: Conjunctivae normal.   Cardiovascular:      Rate and Rhythm: Regular rhythm. Bradycardia present.      Pulses: Normal pulses.      Heart sounds: Normal heart sounds.   Pulmonary:      Effort: Pulmonary effort is normal.      Breath sounds: Normal breath sounds.   Abdominal:      General: Bowel sounds are normal.      Palpations: Abdomen is soft.      Tenderness: There is abdominal tenderness.   Musculoskeletal:         General: Normal range of motion.      Cervical back: Normal range of motion and neck supple.    Skin:     General: Skin is warm and dry.   Neurological:      General: No focal deficit present.      Mental Status: She is alert and oriented to person, place, and time. Mental status is at baseline.   Psychiatric:         Mood and Affect: Mood normal.         Behavior: Behavior normal.         Thought Content: Thought content normal.       Significant Labs: All pertinent labs within the past 24 hours have been reviewed.    CBC:   Recent Labs   Lab 04/29/23  1320 04/30/23  0658   WBC 8.26 8.05   HGB 12.9 12.4   HCT 39.6 38.3    278     CMP:   Recent Labs   Lab 04/29/23  1400 04/30/23  0658    136   K 4.2 3.8    103   CO2 21* 20*   GLU 82 100   BUN 27* 24*   CREATININE 1.4 1.2   CALCIUM 9.9 9.3   PROT 8.3  --    ALBUMIN 3.9  --    BILITOT 0.6  --    ALKPHOS 65  --    AST 18  --    ALT 10  --    ANIONGAP 15 13     Cardiac Markers:   Recent Labs   Lab 04/29/23  1320   *     Lactic Acid:   Recent Labs   Lab 04/29/23  1627   LACTATE 1.1     Troponin:   Recent Labs   Lab 04/29/23  1400   TROPONINI 0.008     TSH:   Recent Labs   Lab 04/29/23  1627   TSH 0.383*   T4 normal    Significant Imaging: I have reviewed all pertinent imaging results/findings within the past 24 hours.

## 2023-04-30 NOTE — SUBJECTIVE & OBJECTIVE
"Past Medical History:   Diagnosis Date    Arthritis     Atherosclerosis of aorta 10/27/2021    Benign cyst of left kidney 3/20/2018    on imaging from ct scan at [facility].     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     Lung disease     copd    Lung nodules 2/5/2020    Mediastinal lymphadenopathy 2/5/2020    Mixed hyperlipidemia 5/2/2018       Past Surgical History:   Procedure Laterality Date    APPENDECTOMY      CATARACT EXTRACTION      EYE SURGERY  2018    MIDDLE EAR SURGERY      TONSILLECTOMY  1955       Review of patient's allergies indicates:   Allergen Reactions    Pravastatin Other (See Comments)     Burning/flushing    Xgeva [denosumab] Other (See Comments)     Mouth and diffuse pain    Allopurinol analogues Other (See Comments)     "makes me sick and feel crazy"       No current facility-administered medications on file prior to encounter.     Current Outpatient Medications on File Prior to Encounter   Medication Sig    amLODIPine (NORVASC) 2.5 MG tablet Take 1 tablet (2.5 mg total) by mouth 3 (three) times daily.    diphenoxylate-atropine 2.5-0.025 mg (LOMOTIL) 2.5-0.025 mg per tablet Take 1 tablet by mouth 4 (four) times daily as needed for Diarrhea.    levothyroxine (TIROSINT) 50 mcg Cap Take 1 tablet (50 mcg) by mouth before breakfast.    lisinopriL (PRINIVIL,ZESTRIL) 40 MG tablet Take 1 tablet (40 mg total) by mouth once daily.    mupirocin (BACTROBAN) 2 % ointment Apply topically 3 (three) times daily.    ondansetron (ZOFRAN-ODT) 4 MG TbDL " Dissolve 1 tablet (4 mg total) by mouth every 8 (eight) hours as needed (nausea).    oxyCODONE (OXYCONTIN) 15 mg TR12 12 hr tablet Take 1 tablet (15 mg total) by mouth every 12 (twelve) hours.    oxyCODONE-acetaminophen (PERCOCET)  mg per tablet Take 1 tablet by mouth 4 (four) times daily as needed (severe pain).    pantoprazole (PROTONIX) 40 MG tablet Take 1 tablet (40 mg total) by mouth once daily    senna (SENOKOT) 8.6 mg tablet Take 1 tablet by mouth once daily.     Family History       Problem Relation (Age of Onset)    ALS Father    Cancer Mother    Colon cancer Mother    Retinal detachment Son          Tobacco Use    Smoking status: Former     Packs/day: 0.50     Years: 48.00     Pack years: 24.00     Types: Cigarettes     Start date: 1955     Quit date: 2003     Years since quittin.3    Smokeless tobacco: Never   Substance and Sexual Activity    Alcohol use: No    Drug use: No    Sexual activity: Never     Review of Systems   Constitutional: Positive for malaise/fatigue.   HENT: Negative.     Eyes: Negative.    Cardiovascular:  Positive for chest pain, dyspnea on exertion and syncope.   Respiratory:  Positive for shortness of breath.    Endocrine: Negative.    Hematologic/Lymphatic: Negative.    Skin: Negative.    Musculoskeletal: Negative.    Gastrointestinal:  Positive for abdominal pain, change in bowel habit and nausea.   Genitourinary: Negative.    Neurological:  Positive for weakness.   Psychiatric/Behavioral: Negative.     Allergic/Immunologic: Negative.    Objective:     Vital Signs (Most Recent):  Temp: 98 °F (36.7 °C) (23 0750)  Pulse: (!) 59 (23 09)  Resp: 18 (23 09)  BP: 135/65 (23 0750)  SpO2: 96 % (23 0922)   Vital Signs (24h Range):  Temp:  [97.5 °F (36.4 °C)-98.8 °F (37.1 °C)] 98 °F (36.7 °C)  Pulse:  [47-90] 59  Resp:  [17-22] 18  SpO2:  [93 %-100 %] 96 %  BP: (110-192)/(54-86) 135/65     Weight: 77.7 kg (171 lb 4.8 oz)  Body mass index is  31.33 kg/m².    SpO2: 96 %         Intake/Output Summary (Last 24 hours) at 4/30/2023 1332  Last data filed at 4/29/2023 2032  Gross per 24 hour   Intake 300 ml   Output --   Net 300 ml       Lines/Drains/Airways       Peripheral Intravenous Line  Duration                  Peripheral IV - Single Lumen 04/29/23 1322 20 G Anterior;Right Forearm 1 day         Peripheral IV - Single Lumen 04/29/23 1633 20 G Left Antecubital <1 day                    Physical Exam  Vitals and nursing note reviewed.   Constitutional:       General: She is not in acute distress.     Appearance: Normal appearance. She is well-developed. She is not ill-appearing or diaphoretic.   HENT:      Head: Normocephalic.   Neck:      Thyroid: No thyromegaly.      Vascular: No carotid bruit or JVD.   Cardiovascular:      Rate and Rhythm: Normal rate and regular rhythm.      Pulses: Normal pulses.           Radial pulses are 2+ on the right side and 2+ on the left side.      Heart sounds: Normal heart sounds, S1 normal and S2 normal. No murmur heard.    No friction rub. No gallop.   Pulmonary:      Effort: Pulmonary effort is normal.      Breath sounds: Normal breath sounds. No wheezing or rales.   Abdominal:      General: Bowel sounds are normal. There is no abdominal bruit.      Palpations: Abdomen is soft.      Tenderness: There is no abdominal tenderness.   Musculoskeletal:      Cervical back: Neck supple.   Lymphadenopathy:      Cervical: No cervical adenopathy.   Skin:     General: Skin is warm.   Neurological:      Mental Status: She is alert.   Psychiatric:         Behavior: Behavior normal. Behavior is cooperative.       Significant Labs: ABG: No results for input(s): PH, PCO2, HCO3, POCSATURATED, BE in the last 48 hours., Blood Culture:   Recent Labs   Lab 04/29/23  1629 04/29/23  1630   LABBLOO No Growth to date No Growth to date   , BMP:   Recent Labs   Lab 04/29/23  1400 04/30/23  0658   GLU 82 100    136   K 4.2 3.8    103    CO2 21* 20*   BUN 27* 24*   CREATININE 1.4 1.2   CALCIUM 9.9 9.3   , CMP   Recent Labs   Lab 04/29/23  1400 04/30/23  0658    136   K 4.2 3.8    103   CO2 21* 20*   GLU 82 100   BUN 27* 24*   CREATININE 1.4 1.2   CALCIUM 9.9 9.3   PROT 8.3  --    ALBUMIN 3.9  --    BILITOT 0.6  --    ALKPHOS 65  --    AST 18  --    ALT 10  --    ANIONGAP 15 13   , CBC   Recent Labs   Lab 04/29/23  1320 04/30/23  0658   WBC 8.26 8.05   HGB 12.9 12.4   HCT 39.6 38.3    278   , INR No results for input(s): INR, PROTIME in the last 48 hours., Lipid Panel No results for input(s): CHOL, HDL, LDLCALC, TRIG, CHOLHDL in the last 48 hours., and Troponin   Recent Labs   Lab 04/29/23  1400   TROPONINI 0.008       Significant Imaging: Echocardiogram: Transthoracic echo (TTE) complete (Cupid Only):   Results for orders placed or performed during the hospital encounter of 04/29/23   Echo   Result Value Ref Range    BSA 1.8 m2    TDI SEPTAL 0.09 m/s    LV LATERAL E/E' RATIO 10.29 m/s    LV SEPTAL E/E' RATIO 8.00 m/s    IVC diameter 1.28 cm    Left Ventricular Outflow Tract Mean Velocity 0.61 cm/s    Left Ventricular Outflow Tract Mean Gradient 1.72 mmHg    Pulmonary Valve Mean Velocity 0.57 m/s    TDI LATERAL 0.07 m/s    PV PEAK VELOCITY 0.77 cm/s    LVIDd 4.77 3.5 - 6.0 cm    IVS 0.95 0.6 - 1.1 cm    Posterior Wall 0.88 0.6 - 1.1 cm    LVIDs 3.64 2.1 - 4.0 cm    FS 24 28 - 44 %    STJ 2.15 cm    LV mass 149.50 g    LA size 2.96 cm    Left Ventricle Relative Wall Thickness 0.37 cm    AV mean gradient 3 mmHg    AV valve area 2.11 cm2    AV Velocity Ratio 0.68     AV index (prosthetic) 0.69     E/A ratio 1.09     Mean e' 0.08 m/s    E wave deceleration time 202.46 msec    IVRT 121.79 msec    LVOT diameter 1.97 cm    LVOT area 3.0 cm2    LVOT peak justin 0.91 m/s    LVOT peak VTI 23.30 cm    Ao peak justin 1.34 m/s    Ao VTI 33.6 cm    LVOT stroke volume 70.98 cm3    AV peak gradient 7 mmHg    E/E' ratio 9.00 m/s    MV Peak E Justin 0.72  m/s    MV Peak A Justin 0.66 m/s    LV Systolic Volume 56.06 mL    LV Systolic Volume Index 31.3 mL/m2    LV Diastolic Volume 106.10 mL    LV Diastolic Volume Index 59.27 mL/m2    LV Mass Index 84 g/m2    RA Major Axis 5.38 cm    Left Atrium Minor Axis 5.26 cm    Left Atrium Major Axis 5.31 cm    Right Atrial Pressure (from IVC) 3 mmHg    EF 60 %    Narrative    · The left ventricle is normal in size with normal systolic function.  · The estimated ejection fraction is 60%.  · Normal left ventricular diastolic function.  · Normal right ventricular size with normal right ventricular systolic   function.  · Normal central venous pressure (3 mmHg).

## 2023-04-30 NOTE — PROGRESS NOTES
[facility] [place] Medicine  Progress Note    Patient Name: Maura Singh  MRN: [id number]  Patient Class: OP- Observation   Admission Date: 4/29/2023  Length of Stay: 0 days  Attending Physician: Tammy Langford MD  Primary Care Provider: Yessy Andrade MD        Subjective:     Principal Problem:Syncope        HPI:  The patient is a 84 yo female with Metastatic hepatocellular carcinoma with mets to both lungs, bones, and lymph nodes-currently on palliative chemo, HTN, DM, COPD, Hypothyroid, Diastolic CHF, and Hard of hearing who presented to ED with syncope. Pt reports she was walking to her chair and felt dizzy. She then had a syncopal episode and fell into her chair. When her daughter was assisting her into the car to go to the ED, she had another episode of dizziness followed by syncope. She then had another syncopal episode getting into wheelchair outside the ED. Pt did not take home meds this am    IN the ED, BP mildly elevated 191/85, HR 60 but dropped to 40s. EKG showed junctional rhythm, low voltage QRS. CT head showed nothing acute, CTA chest showed no PE or dissection, COPD. CT cervical spine showed nothing cute. Troponin  normal, labs unremarkable.     The patient will be placed in observation under hospital medicine for syncope         Overview/Hospital Course:  No notes on file    Interval History:  Patient seen and examined with family at bedside.  She has no complaints today end-stage she wishes to go home.  Patient is severely hard of hearing.    Review of Systems   Constitutional:  Negative for fatigue and fever.   Respiratory:  Negative for cough and shortness of breath.    Cardiovascular:  Negative for chest pain and leg swelling.   Gastrointestinal:  Negative for nausea and vomiting.   Neurological:  Positive for syncope.   All other systems reviewed and are negative.  Objective:     Vital Signs (Most Recent):  Temp: 98 °F (36.7 °C) (04/30/23 1516)  Pulse: (!) 57 (04/30/23  1516)  Resp: 18 (04/30/23 1516)  BP: 136/60 (04/30/23 1516)  SpO2: 97 % (04/30/23 1516)   Vital Signs (24h Range):  Temp:  [97.5 °F (36.4 °C)-98.8 °F (37.1 °C)] 98 °F (36.7 °C)  Pulse:  [47-90] 57  Resp:  [17-22] 18  SpO2:  [93 %-100 %] 97 %  BP: (110-192)/(54-85) 136/60     Weight: 77.7 kg (171 lb 4.8 oz)  Body mass index is 31.33 kg/m².    Intake/Output Summary (Last 24 hours) at 4/30/2023 1538  Last data filed at 4/29/2023 2032  Gross per 24 hour   Intake 300 ml   Output --   Net 300 ml      Physical Exam  Vitals reviewed.   Constitutional:       Appearance: Normal appearance.   HENT:      Head: Normocephalic and atraumatic.      Mouth/Throat:      Mouth: Mucous membranes are moist.      Pharynx: Oropharynx is clear.   Eyes:      Extraocular Movements: Extraocular movements intact.      Conjunctiva/sclera: Conjunctivae normal.   Cardiovascular:      Rate and Rhythm: Regular rhythm. Bradycardia present.      Pulses: Normal pulses.      Heart sounds: Normal heart sounds.   Pulmonary:      Effort: Pulmonary effort is normal.      Breath sounds: Normal breath sounds.   Abdominal:      General: Bowel sounds are normal.      Palpations: Abdomen is soft.      Tenderness: There is abdominal tenderness.   Musculoskeletal:         General: Normal range of motion.      Cervical back: Normal range of motion and neck supple.   Skin:     General: Skin is warm and dry.   Neurological:      General: No focal deficit present.      Mental Status: She is alert and oriented to person, place, and time. Mental status is at baseline.   Psychiatric:         Mood and Affect: Mood normal.         Behavior: Behavior normal.         Thought Content: Thought content normal.       Significant Labs: All pertinent labs within the past 24 hours have been reviewed.    CBC:   Recent Labs   Lab 04/29/23  1320 04/30/23  0658   WBC 8.26 8.05   HGB 12.9 12.4   HCT 39.6 38.3    278     CMP:   Recent Labs   Lab 04/29/23  1400 04/30/23  0658   NA  139 136   K 4.2 3.8    103   CO2 21* 20*   GLU 82 100   BUN 27* 24*   CREATININE 1.4 1.2   CALCIUM 9.9 9.3   PROT 8.3  --    ALBUMIN 3.9  --    BILITOT 0.6  --    ALKPHOS 65  --    AST 18  --    ALT 10  --    ANIONGAP 15 13     Cardiac Markers:   Recent Labs   Lab 04/29/23  1320   *     Lactic Acid:   Recent Labs   Lab 04/29/23  1627   LACTATE 1.1     Troponin:   Recent Labs   Lab 04/29/23  1400   TROPONINI 0.008     TSH:   Recent Labs   Lab 04/29/23  1627   TSH 0.383*   T4 normal    Significant Imaging: I have reviewed all pertinent imaging results/findings within the past 24 hours.      Assessment/Plan:      * Syncope  CT head showed nothing acute   Troponin normal   Bradycardia noted -cardiac monitor  Orthostatics   Echo    The left ventricle is normal in size with normal systolic function.   The estimated ejection fraction is 60%.   Normal left ventricular diastolic function.   Normal right ventricular size with normal right ventricular systolic function.   Normal central venous pressure (3 mmHg).       Carotid U/S no hemodynamically significant lesions  Neuro checks   We will consult Cardiology to evaluate heart rhythm and for event monitor.          Abnormal ECG  EKG with nonspecific STT wave changes and bradycardia  On admission EKG revealed junctional rhythm  We will ask Cardiology to evaluate      Bradycardia  Not on BB  Cardiac monitor       Drug-induced hypothyroidism  Last TSH 0.383, normal Free T4  Cont Levothyroxine       Hepatocellular carcinoma  Metastatic to bones, lymph nodes, and both lungs   Currently on palliative chemo- last dose 5 weeks ago   Followed by Dr. Burgess       Chronic obstructive pulmonary disease  No wheezing   Neb txs prn       Chronic diastolic congestive heart failure  Patient is identified as having Diastolic (HFpEF) heart failure that is Chronic. CHF is currently controlled. Latest ECHO performed and demonstrates- No results found for this or any previous  visit.  . Continue ACE/ARB and monitor clinical status closely. Monitor on telemetry. Patient is off CHF pathway.  Monitor strict Is&Os and daily weights.  Place on fluid restriction of 1.5 L. Continue to stress to patient importance of self efficacy and  on diet for CHF. Last BNP reviewed- and noted below   Recent Labs   Lab 04/29/23  1320   *   .      Gastroesophageal reflux disease  Cont Protonix       Essential hypertension    Cont home meds Lisinopril and Norvasc  IV Hydralazine prn         VTE Risk Mitigation (From admission, onward)         Ordered     enoxaparin injection 40 mg  Daily         04/29/23 1813     IP VTE HIGH RISK PATIENT  Once         04/29/23 1811     Place sequential compression device  Until discontinued         04/29/23 1811                Discharge Planning   JAY JAY:      Code Status: Full Code   Is the patient medically ready for discharge?:     Reason for patient still in hospital (select all that apply): Patient trending condition  Discharge Plan A: Home Health                  Tammy Leal MD  Department of Hospital Medicine   [facility]

## 2023-04-30 NOTE — HPI
Cardiology consulted for syncope.  No prior h/o CV disease  She has metastatic liver cancer, DM, HTN, obesity.  Saw Dr. Stinson in 2018 and had - stress MPI and echo 2018 normal LV function.  Ecgs in past have shown NSR, possible septal infarct.    Now admitted w syncope.  She passed out 6 - 7 times yesterday.   Seemed positional overall.  Short lasting.  No associated acute CP, dyspnea.  Family states she did have some atypical CP last week.  On admit, ecg showed possible junctional bradycardia (possible discrete p waves though so could be sinus sarath) 51 bpm, old septal infarct, low voltage.  2nd ecg showed NSR.  Daughter states HR has been running in 40's at home over last month.  No prior h/o syncope.  She also has been having abdominal pain issues with her cancer, was put on abs last week then stopped and syncope could be vasovagal in origin.  Received IV fluids in ER last week per family.  Echo this admission normal EF.  - troponin.    Carotid u/s no stenosis.  CTA chest - for PE.

## 2023-04-30 NOTE — ASSESSMENT & PLAN NOTE
CT head showed nothing acute   Troponin normal   Bradycardia noted -cardiac monitor  Orthostatics   Echo    The left ventricle is normal in size with normal systolic function.   The estimated ejection fraction is 60%.   Normal left ventricular diastolic function.   Normal right ventricular size with normal right ventricular systolic function.   Normal central venous pressure (3 mmHg).       Carotid U/S no hemodynamically significant lesions  Neuro checks   We will consult Cardiology to evaluate heart rhythm and for event monitor.

## 2023-04-30 NOTE — ASSESSMENT & PLAN NOTE
EKG with nonspecific STT wave changes and bradycardia  On admission EKG revealed junctional rhythm  We will ask Cardiology to evaluate

## 2023-04-30 NOTE — PLAN OF CARE
[facility]  Discharge Assessment    Primary Care Provider: Yessy Andrade MD     Discharge Assessment (most recent)       BRIEF DISCHARGE ASSESSMENT - 04/30/23 1503          Discharge Planning    Assessment Type Discharge Planning Brief Assessment     Resource/Environmental Concerns none     Support Systems Children     Equipment Currently Used at Home rollator;shower chair;bedside commode;wheelchair     Current Living Arrangements home     Patient/Family Anticipates Transition to home     Patient/Family Anticipated Services at Transition home health care     DME Needed Upon Discharge  none     Discharge Plan A Home Health     Discharge Plan B Home with family                   Requests home health nurse at discharge, does not want OT/PT.

## 2023-04-30 NOTE — PLAN OF CARE
Patient remains free from injury this shift. Safety precautions maintained. No s/s of acute distress noted. Pain controlled per MD order.Cardiac monitoring in place.  Chart and orders review complete. Patient education about care complete.       Problem: Adult Inpatient Plan of Care  Goal: Plan of Care Review  Outcome: Ongoing, Progressing  Goal: Patient-Specific Goal (Individualized)  Outcome: Ongoing, Progressing  Goal: Absence of Hospital-Acquired Illness or Injury  Outcome: Ongoing, Progressing  Goal: Optimal Comfort and Wellbeing  Outcome: Ongoing, Progressing  Goal: Readiness for Transition of Care  Outcome: Ongoing, Progressing     Problem: Fluid and Electrolyte Imbalance (Acute Kidney Injury/Impairment)  Goal: Fluid and Electrolyte Balance  Outcome: Ongoing, Progressing     Problem: Oral Intake Inadequate (Acute Kidney Injury/Impairment)  Goal: Optimal Nutrition Intake  Outcome: Ongoing, Progressing     Problem: Renal Function Impairment (Acute Kidney Injury/Impairment)  Goal: Effective Renal Function  Outcome: Ongoing, Progressing

## 2023-04-30 NOTE — ASSESSMENT & PLAN NOTE
Syncope:  Multiple issues in patient with her liver cancer, dehydration last week, abd pain etc.  Syncope seems positional suggestive of orthostatic syncope; also has abd pain issues and vasovagal syncope is in differential.    Also has bradycardia in 40's per daughter at home over last month.  Admitting ECG with HR 51 bpm, possible junctional bradycardia.    Needs 2 week outpatient even Holter.    Recommend keeping pt in another 24 hours for observation on telemetry and EP consult tomorrow advised.

## 2023-05-01 NOTE — PLAN OF CARE
05/01/23 1430   Discharge Reassessment   Assessment Type Discharge Planning Reassessment   Did the patient's condition or plan change since previous assessment? No   Discharge Plan discussed with:   (Attending MD, Dr. Langford)   Communicated JAY JAY with patient/caregiver Date not available/Unable to determine   Discharge Plan A Home Health   DME Needed Upon Discharge  none   Discharge Barriers Identified None   Why the patient remains in the hospital Requires continued medical care   Post-Acute Status   Discharge Delays None known at this time

## 2023-05-01 NOTE — PLAN OF CARE
Patient ready for discharge, IV and telemetry removed as ordered. Discharge instructions reviewed with the patient and daughter, verbalized understanding.

## 2023-05-01 NOTE — HOSPITAL COURSE
5/1/23 Pt seen and examined today, anxious to go home. States she thinks this is all GI related and that she still has abd pain. Family at bedside reports she passes out whenever she turns or head or moves quickly. He also states she reports blurred vision/double vision when she has these episodes. Labs reviewed, chart reviewed. No syncopal episodes reported over night. Tele monitor room reports heart rates in 50s currently with lowest at 43 overnight. EP consulted

## 2023-05-01 NOTE — SUBJECTIVE & OBJECTIVE
Review of Systems   Constitutional: Positive for malaise/fatigue.   HENT: Negative.     Eyes:  Positive for blurred vision and double vision.   Cardiovascular:  Positive for near-syncope and syncope.   Respiratory: Negative.     Skin: Negative.    Musculoskeletal: Negative.    Gastrointestinal:  Positive for abdominal pain.   Genitourinary: Negative.    Neurological:  Positive for weakness.   Psychiatric/Behavioral:  The patient is nervous/anxious.    Objective:     Vital Signs (Most Recent):  Temp: 97.4 °F (36.3 °C) (05/01/23 0743)  Pulse: (!) 57 (05/01/23 0920)  Resp: 16 (05/01/23 0920)  BP: (!) 167/70 (05/01/23 0743)  SpO2: 97 % (05/01/23 0743)   Vital Signs (24h Range):  Temp:  [93.9 °F (34.4 °C)-98.6 °F (37 °C)] 97.4 °F (36.3 °C)  Pulse:  [52-72] 57  Resp:  [16-18] 16  SpO2:  [92 %-98 %] 97 %  BP: (107-167)/(53-70) 167/70     Weight: 77.7 kg (171 lb 4.8 oz)  Body mass index is 31.33 kg/m².     SpO2: 97 %       No intake or output data in the 24 hours ending 05/01/23 1007    Lines/Drains/Airways       Peripheral Intravenous Line  Duration                  Peripheral IV - Single Lumen 04/29/23 1322 20 G Anterior;Right Forearm 1 day                    Physical Exam  Vitals and nursing note reviewed.   Constitutional:       Appearance: Normal appearance.   HENT:      Head: Normocephalic.   Eyes:      Pupils: Pupils are equal, round, and reactive to light.   Cardiovascular:      Rate and Rhythm: Regular rhythm. Bradycardia present.      Heart sounds: Normal heart sounds, S1 normal and S2 normal. No murmur heard.    No S3 or S4 sounds.   Pulmonary:      Effort: Pulmonary effort is normal.      Breath sounds: Normal breath sounds.   Abdominal:      General: Bowel sounds are normal.      Palpations: Abdomen is soft.   Musculoskeletal:         General: Normal range of motion.      Cervical back: Normal range of motion.   Skin:     Capillary Refill: Capillary refill takes less than 2 seconds.   Neurological:       General: No focal deficit present.      Mental Status: She is alert and oriented to person, place, and time.   Psychiatric:         Mood and Affect: Mood is anxious.         Behavior: Behavior normal.         Thought Content: Thought content normal.       Significant Labs: BMP:   Recent Labs   Lab 04/29/23  1400 04/30/23  0658 05/01/23  0639   GLU 82 100 96    136 135*   K 4.2 3.8 3.7    103 103   CO2 21* 20* 22*   BUN 27* 24* 23   CREATININE 1.4 1.2 1.4   CALCIUM 9.9 9.3 9.4   MG  --   --  2.1   , CMP   Recent Labs   Lab 04/29/23  1400 04/30/23  0658 05/01/23  0639    136 135*   K 4.2 3.8 3.7    103 103   CO2 21* 20* 22*   GLU 82 100 96   BUN 27* 24* 23   CREATININE 1.4 1.2 1.4   CALCIUM 9.9 9.3 9.4   PROT 8.3  --   --    ALBUMIN 3.9  --  3.4*   BILITOT 0.6  --   --    ALKPHOS 65  --   --    AST 18  --   --    ALT 10  --   --    ANIONGAP 15 13 10   , CBC   Recent Labs   Lab 04/29/23  1320 04/30/23  0658   WBC 8.26 8.05   HGB 12.9 12.4   HCT 39.6 38.3    278   , INR No results for input(s): INR, PROTIME in the last 48 hours., Lipid Panel No results for input(s): CHOL, HDL, LDLCALC, TRIG, CHOLHDL in the last 48 hours., Troponin   Recent Labs   Lab 04/29/23  1400   TROPONINI 0.008   , and All pertinent lab results from the last 24 hours have been reviewed.    Significant Imaging: Echocardiogram: Transthoracic echo (TTE) complete (Cupid Only):   Results for orders placed or performed during the hospital encounter of 04/29/23   Echo   Result Value Ref Range    BSA 1.8 m2    TDI SEPTAL 0.09 m/s    LV LATERAL E/E' RATIO 10.29 m/s    LV SEPTAL E/E' RATIO 8.00 m/s    IVC diameter 1.28 cm    Left Ventricular Outflow Tract Mean Velocity 0.61 cm/s    Left Ventricular Outflow Tract Mean Gradient 1.72 mmHg    Pulmonary Valve Mean Velocity 0.57 m/s    TDI LATERAL 0.07 m/s    PV PEAK VELOCITY 0.77 cm/s    LVIDd 4.77 3.5 - 6.0 cm    IVS 0.95 0.6 - 1.1 cm    Posterior Wall 0.88 0.6 - 1.1 cm    LVIDs  3.64 2.1 - 4.0 cm    FS 24 28 - 44 %    STJ 2.15 cm    LV mass 149.50 g    LA size 2.96 cm    Left Ventricle Relative Wall Thickness 0.37 cm    AV mean gradient 3 mmHg    AV valve area 2.11 cm2    AV Velocity Ratio 0.68     AV index (prosthetic) 0.69     E/A ratio 1.09     Mean e' 0.08 m/s    E wave deceleration time 202.46 msec    IVRT 121.79 msec    LVOT diameter 1.97 cm    LVOT area 3.0 cm2    LVOT peak justin 0.91 m/s    LVOT peak VTI 23.30 cm    Ao peak justin 1.34 m/s    Ao VTI 33.6 cm    LVOT stroke volume 70.98 cm3    AV peak gradient 7 mmHg    E/E' ratio 9.00 m/s    MV Peak E Justin 0.72 m/s    MV Peak A Justin 0.66 m/s    LV Systolic Volume 56.06 mL    LV Systolic Volume Index 31.3 mL/m2    LV Diastolic Volume 106.10 mL    LV Diastolic Volume Index 59.27 mL/m2    LV Mass Index 84 g/m2    RA Major Axis 5.38 cm    Left Atrium Minor Axis 5.26 cm    Left Atrium Major Axis 5.31 cm    Right Atrial Pressure (from IVC) 3 mmHg    EF 60 %    Narrative    · The left ventricle is normal in size with normal systolic function.  · The estimated ejection fraction is 60%.  · Normal left ventricular diastolic function.  · Normal right ventricular size with normal right ventricular systolic   function.  · Normal central venous pressure (3 mmHg).      , EKG: reviewed, and X-Ray: CXR: X-Ray Chest 1 View (CXR):   Results for orders placed or performed during the hospital encounter of 04/29/23   X-Ray Chest 1 View    Narrative    EXAMINATION:  XR CHEST 1 VIEW    CLINICAL HISTORY:  Unspecified coma    TECHNIQUE:  Single frontal view of the chest was performed.    COMPARISON:  02/04/2020 chest radiograph.    FINDINGS:  Mild bibasilar infiltrates.  Upper lungs are clear.  Questionable small bilateral pleural effusions.  No pneumothorax.    The cardiac silhouette is normal in size. The hilar and mediastinal contours are unremarkable.    Bones are intact.      Impression    Questionable small bilateral pleural effusions and mild bibasilar  infiltrates.  Aspiration or pneumonia.  Recommend continued follow-up.      Electronically signed by: Harris Weeks  Date:    04/29/2023  Time:    13:49

## 2023-05-01 NOTE — PROGRESS NOTES
[facility]  Cardiology  Progress Note    Patient Name: Maura Singh  MRN: [id number]  Admission Date: 4/29/2023  Hospital Length of Stay: 0 days  Code Status: Full Code   Attending Physician: Tammy Langford MD   Primary Care Physician: Yessy Andrade MD  Expected Discharge Date:   Principal Problem:Syncope    Subjective:   Chief Complaint:  SYNCOPE      HPI:   Cardiology consulted for syncope.  No prior h/o CV disease  She has metastatic liver cancer, DM, HTN, obesity.  Saw Dr. Stinson in 2018 and had - stress MPI and echo 2018 normal LV function.  Ecgs in past have shown NSR, possible septal infarct.     Now admitted w syncope.  She passed out 6 - 7 times yesterday.   Seemed positional overall.  Short lasting.  No associated acute CP, dyspnea.  Family states she did have some atypical CP last week.  On admit, ecg showed possible junctional bradycardia (possible discrete p waves though so could be sinus sarath) 51 bpm, old septal infarct, low voltage.  2nd ecg showed NSR.  Daughter states HR has been running in 40's at home over last month.  No prior h/o syncope.  She also has been having abdominal pain issues with her cancer, was put on abs last week then stopped and syncope could be vasovagal in origin.  Received IV fluids in ER last week per family.  Echo this admission normal EF.  - troponin.    Carotid u/s no stenosis.  CTA chest - for PE.  Hospital Course:   5/1/23 Pt seen and examined today, anxious to go home. States she thinks this is all GI related and that she still has abd pain. Family at bedside reports she passes out whenever she turns or head or moves quickly. He also states she reports blurred vision/double vision when she has these episodes. Labs reviewed, chart reviewed. No syncopal episodes reported over night. Tele monitor room reports heart rates in 50s currently with lowest at 43 overnight. EP consulted          Review of Systems   Constitutional: Positive for  malaise/fatigue.   HENT: Negative.     Eyes:  Positive for blurred vision and double vision.   Cardiovascular:  Positive for near-syncope and syncope.   Respiratory: Negative.     Skin: Negative.    Musculoskeletal: Negative.    Gastrointestinal:  Positive for abdominal pain.   Genitourinary: Negative.    Neurological:  Positive for weakness.   Psychiatric/Behavioral:  The patient is nervous/anxious.    Objective:     Vital Signs (Most Recent):  Temp: 97.4 °F (36.3 °C) (05/01/23 0743)  Pulse: (!) 57 (05/01/23 0920)  Resp: 16 (05/01/23 0920)  BP: (!) 167/70 (05/01/23 0743)  SpO2: 97 % (05/01/23 0743)   Vital Signs (24h Range):  Temp:  [93.9 °F (34.4 °C)-98.6 °F (37 °C)] 97.4 °F (36.3 °C)  Pulse:  [52-72] 57  Resp:  [16-18] 16  SpO2:  [92 %-98 %] 97 %  BP: (107-167)/(53-70) 167/70     Weight: 77.7 kg (171 lb 4.8 oz)  Body mass index is 31.33 kg/m².     SpO2: 97 %       No intake or output data in the 24 hours ending 05/01/23 1007    Lines/Drains/Airways       Peripheral Intravenous Line  Duration                  Peripheral IV - Single Lumen 04/29/23 1322 20 G Anterior;Right Forearm 1 day                    Physical Exam  Vitals and nursing note reviewed.   Constitutional:       Appearance: Normal appearance.   HENT:      Head: Normocephalic.   Eyes:      Pupils: Pupils are equal, round, and reactive to light.   Cardiovascular:      Rate and Rhythm: Regular rhythm. Bradycardia present.      Heart sounds: Normal heart sounds, S1 normal and S2 normal. No murmur heard.    No S3 or S4 sounds.   Pulmonary:      Effort: Pulmonary effort is normal.      Breath sounds: Normal breath sounds.   Abdominal:      General: Bowel sounds are normal.      Palpations: Abdomen is soft.   Musculoskeletal:         General: Normal range of motion.      Cervical back: Normal range of motion.   Skin:     Capillary Refill: Capillary refill takes less than 2 seconds.   Neurological:      General: No focal deficit present.      Mental Status:  She is alert and oriented to person, place, and time.   Psychiatric:         Mood and Affect: Mood is anxious.         Behavior: Behavior normal.         Thought Content: Thought content normal.       Significant Labs: BMP:   Recent Labs   Lab 04/29/23  1400 04/30/23  0658 05/01/23  0639   GLU 82 100 96    136 135*   K 4.2 3.8 3.7    103 103   CO2 21* 20* 22*   BUN 27* 24* 23   CREATININE 1.4 1.2 1.4   CALCIUM 9.9 9.3 9.4   MG  --   --  2.1   , CMP   Recent Labs   Lab 04/29/23  1400 04/30/23  0658 05/01/23  0639    136 135*   K 4.2 3.8 3.7    103 103   CO2 21* 20* 22*   GLU 82 100 96   BUN 27* 24* 23   CREATININE 1.4 1.2 1.4   CALCIUM 9.9 9.3 9.4   PROT 8.3  --   --    ALBUMIN 3.9  --  3.4*   BILITOT 0.6  --   --    ALKPHOS 65  --   --    AST 18  --   --    ALT 10  --   --    ANIONGAP 15 13 10   , CBC   Recent Labs   Lab 04/29/23  1320 04/30/23  0658   WBC 8.26 8.05   HGB 12.9 12.4   HCT 39.6 38.3    278   , INR No results for input(s): INR, PROTIME in the last 48 hours., Lipid Panel No results for input(s): CHOL, HDL, LDLCALC, TRIG, CHOLHDL in the last 48 hours., Troponin   Recent Labs   Lab 04/29/23  1400   TROPONINI 0.008   , and All pertinent lab results from the last 24 hours have been reviewed.    Significant Imaging: Echocardiogram: Transthoracic echo (TTE) complete ([facility]upid Only):   Results for orders placed or performed during the hospital encounter of 04/29/23   Echo   Result Value Ref Range    BSA 1.8 m2    TDI SEPTAL 0.09 m/s    LV LATERAL E/E' RATIO 10.29 m/s    LV SEPTAL E/E' RATIO 8.00 m/s    IVC diameter 1.28 cm    Left Ventricular Outflow Tract Mean Velocity 0.61 cm/s    Left Ventricular Outflow Tract Mean Gradient 1.72 mmHg    Pulmonary Valve Mean Velocity 0.57 m/s    TDI LATERAL 0.07 m/s    PV PEAK VELOCITY 0.77 cm/s    LVIDd 4.77 3.5 - 6.0 cm    IVS 0.95 0.6 - 1.1 cm    Posterior Wall 0.88 0.6 - 1.1 cm    LVIDs 3.64 2.1 - 4.0 cm    FS 24 28 - 44 %    STJ 2.15 cm    LV  mass 149.50 g    LA size 2.96 cm    Left Ventricle Relative Wall Thickness 0.37 cm    AV mean gradient 3 mmHg    AV valve area 2.11 cm2    AV Velocity Ratio 0.68     AV index (prosthetic) 0.69     E/A ratio 1.09     Mean e' 0.08 m/s    E wave deceleration time 202.46 msec    IVRT 121.79 msec    LVOT diameter 1.97 cm    LVOT area 3.0 cm2    LVOT peak justin 0.91 m/s    LVOT peak VTI 23.30 cm    Ao peak justin 1.34 m/s    Ao VTI 33.6 cm    LVOT stroke volume 70.98 cm3    AV peak gradient 7 mmHg    E/E' ratio 9.00 m/s    MV Peak E Justin 0.72 m/s    MV Peak A Justin 0.66 m/s    LV Systolic Volume 56.06 mL    LV Systolic Volume Index 31.3 mL/m2    LV Diastolic Volume 106.10 mL    LV Diastolic Volume Index 59.27 mL/m2    LV Mass Index 84 g/m2    RA Major Axis 5.38 cm    Left Atrium Minor Axis 5.26 cm    Left Atrium Major Axis 5.31 cm    Right Atrial Pressure (from IVC) 3 mmHg    EF 60 %    Narrative    · The left ventricle is normal in size with normal systolic function.  · The estimated ejection fraction is 60%.  · Normal left ventricular diastolic function.  · Normal right ventricular size with normal right ventricular systolic   function.  · Normal central venous pressure (3 mmHg).      , EKG: reviewed, and X-Ray: CXR: X-Ray Chest 1 View (CXR):   Results for orders placed or performed during the hospital encounter of 04/29/23   X-Ray Chest 1 View    Narrative    EXAMINATION:  XR CHEST 1 VIEW    CLINICAL HISTORY:  Unspecified coma    TECHNIQUE:  Single frontal view of the chest was performed.    COMPARISON:  02/04/2020 chest radiograph.    FINDINGS:  Mild bibasilar infiltrates.  Upper lungs are clear.  Questionable small bilateral pleural effusions.  No pneumothorax.    The cardiac silhouette is normal in size. The hilar and mediastinal contours are unremarkable.    Bones are intact.      Impression    Questionable small bilateral pleural effusions and mild bibasilar infiltrates.  Aspiration or pneumonia.  Recommend continued  follow-up.      Electronically signed by: Harris Micky  Date:    04/29/2023  Time:    13:49     Assessment and Plan:         * Syncope  Syncope:  Multiple issues in patient with her liver cancer, dehydration last week, abd pain etc.  Syncope seems positional suggestive of orthostatic syncope; also has abd pain issues and vasovagal syncope is in differential.    Also has bradycardia in 40's per daughter at home over last month.  Admitting ECG with HR 51 bpm, possible junctional bradycardia.    Needs 2 week outpatient even Holter.    Recommend keeping pt in another 24 hours for observation on telemetry and EP consult tomorrow advised.    5/1/23  Family at bedside reports today her episodes happen when she turns her head and are associated with syncope, blurred vision/double vision  EP consulted, further recs to follow  Likely 2 week holter    Abnormal ECG  Chronic septal infarct on ecgs over the years.  Monitor.  Rest as noted in syncope section.    Bradycardia  See syncope section.    Hepatocellular carcinoma  F/u with heme/onc for mgt.    Chronic diastolic congestive heart failure  Stable.  Compensated.  Diurese prn for any acute CHF sxs.      Essential hypertension  Monitor BP.  Adjust meds as needed.        VTE Risk Mitigation (From admission, onward)         Ordered     enoxaparin injection 40 mg  Daily         04/29/23 1813     IP VTE HIGH RISK PATIENT  Once         04/29/23 1811     Place sequential compression device  Until discontinued         04/29/23 1811                Sailaja Jara, NP  Cardiology  [facility]

## 2023-05-01 NOTE — ASSESSMENT & PLAN NOTE
Syncope:  Multiple issues in patient with her liver cancer, dehydration last week, abd pain etc.  Syncope seems positional suggestive of orthostatic syncope; also has abd pain issues and vasovagal syncope is in differential.    Also has bradycardia in 40's per daughter at home over last month.  Admitting ECG with HR 51 bpm, possible junctional bradycardia.    Needs 2 week outpatient even Holter.    Recommend keeping pt in another 24 hours for observation on telemetry and EP consult tomorrow advised.    5/1/23  Family at bedside reports today her episodes happen when she turns her head and are associated with syncope, blurred vision/double vision  EP consulted, further recs to follow  Likely 2 week holter

## 2023-05-01 NOTE — PLAN OF CARE
[facility]  Discharge Final Note    Primary Care Provider: Yessy Andrade MD    Expected Discharge Date: 5/1/2023    Final Discharge Note (most recent)       Final Note - 05/01/23 1754          Final Note    Assessment Type Final Discharge Note     Anticipated Discharge Disposition Home-Health Care Svc        Post-Acute Status    Post-Acute Authorization Home Health     Home Health Status Referrals Sent     Discharge Delays None known at this time                     Important Message from Medicare             Contact Info       Yessy Andrade MD   Specialty: Family Medicine   Relationship: PCP - General    [id number] [facility]  [address]  [place] [address]   Phone: [phone]       Next Steps: Follow up in 3 day(s)    Carlos Stinson MD   Specialty: Interventional Cardiology, Cardiology    [address] [place] [address] [address] [place] [id number]   Phone: [phone]       Next Steps: Follow up in 2 week(s)          Referral sent to [facility], no dme orders noted.

## 2023-05-01 NOTE — PLAN OF CARE
Patient resting in bed, remains free of falls and injuries this shift. VSS. No obvious s/sx of distress noted. Patient ambulated to the bathroom with a walker, slight complaints of weakness but no syncopal episode noted. Pain managed with scheduled medications. POC reviewed with the patient, verbalized understanding. No further needs at this time, call light within reach. Continue POC as ordered, chart check completed and all orders reviewed.

## 2023-05-01 NOTE — PLAN OF CARE
Plan of care reviewed with patient with verbal understanding. Chart and orders reviewed.  Pt remains free from falls this shift, Safety precautions in place.   Pt currently resting comfortably in bed. Pain controlled with po pain med (see emar for pain admin).  Hourly rounding with bed in lowest position, side rails up, call light in reach.  Will continue to monitor until end of shift.       Problem: Fall Injury Risk  Goal: Absence of Fall and Fall-Related Injury  Outcome: Ongoing, Progressing     Problem: Adult Inpatient Plan of Care  Goal: Plan of Care Review  Flowsheets (Taken 5/1/2023 0320)  Plan of Care Reviewed With:   patient   daughter  Goal: Patient-Specific Goal (Individualized)  Flowsheets (Taken 5/1/2023 0320)  Anxieties, Fears or Concerns: none  Individualized Care Needs: help to the bedside commode

## 2023-05-03 NOTE — ASSESSMENT & PLAN NOTE
Bradycardia and syncopal episodes prompting recent hospital encounter  Pulse 67 during visit  Cardiology recommended holter monitoring  F/U with cardiology  Continue current medication  Monitor

## 2023-05-03 NOTE — ASSESSMENT & PLAN NOTE
CT head nothing acute, CTA chest negative for PE, EKG with junctional rhythm  Patient reports no episodes since hospital discharge  Still with double vision  F/U with cardiology  Continue current plan

## 2023-05-03 NOTE — ASSESSMENT & PLAN NOTE
CT Abd/pelvis revealed moderate amount of stool  Patient reports abdominal discomfort and gas/bloating  Was taking bisacodyl OTC with no relief  Instructed to add Miralax and suppository in addition to bisacodyl  Monitor

## 2023-05-03 NOTE — PROGRESS NOTES
[facility]  Transition of Care Home Visit    Visit Date: 5/3/2023  Encounter Provider: Kendall Cordero   PCP:  Yessy Andrade MD    PRESENTING HISTORY      Patient ID: Maura Singh is a 83 y.o. female.    Consult Requested By:  Dr. Tammy Langford  Reason for Consult:  Hospital Follow Up.    Maura is being seen at home due to being seen at home due to physical debility that presents a taxing effort to leave the home, to mitigate high risk of hospital readmission and/or due to the limited availability of reliable or safe options for transportation to the point of access to the provider. Prior to treatment on this visit the chart was reviewed and patient verbal consent was obtained.      Chief Complaint: Transitional Care        History of Present Illness: Ms. Maura Singh is a 83 y.o. female who was recently admitted to the hospital.    4/29-5/1 admission with a diagnosis of Metastatic hepatocellular carcinoma with mets to both lungs, bones, and lymph nodes-currently on palliative chemo, HTN, DM, COPD, Hypothyroid, Diastolic CHF, and Hard of hearing who presented to ED with syncope. Pt reports she was walking to her chair and felt dizzy. She then had a syncopal episode and fell into her chair. When her daughter was assisting her into the car to go to the ED, she had another episode of dizziness followed by syncope. She then had another syncopal episode getting into wheelchair outside the ED. Pt did not take home meds this am     IN the ED, BP mildly elevated 191/85, HR 60 but dropped to 40s. EKG showed junctional rhythm, low voltage QRS. CT head showed nothing acute, CTA chest showed no PE or dissection, COPD. CT cervical spine showed nothing cute. Troponin normal, labs unremarkable.      The patient will be placed in observation under hospital medicine for syncope   ___________________________________________________________________    Today:    HPI:  Patient is a 83 y.o. female being seen today  for a post hospital discharge assessment following a recent admit for evaluation of syncope. Patient presented with a diagnosis of Metastatic hepatocellular carcinoma with mets to both lungs, bones, and lymph nodes-currently on palliative chemo, HTN, DM, COPD, Hypothyroid, Diastolic CHF, and Hard of hearing.  She was hospitalized 4/29-5/1 with syncope. CT head showed nothing acute, CTA chest showed no PE or dissection, COPD. CT cervical spine showed nothing cute. Troponin normal, labs unremarkable. See hospital course.     Patient is found in their home today, in no acute distress and agreeable to this visit.  Her son is present during visit. Patient reports improvement in her symptoms in which prompted the hospital stay.  Denies dizziness, syncope, falls, chest pain since hospital discharge.  She is ambulating in her home with a walker during visit.  Reports gas/bloating. Has been taking simethicone with some relief.  Reports she has not had a bowel movement since prior to hospital stay.  This am she reported taking 2 bisacodyl to aid in relief.  CT A/P showed moderate stool. Instruced to continue bisacodyl and add Miralax to regimen. Reports some nausea yesterday and did not take prn ondansetron. Denies nausea during visit. Patient reports compliance with medications.  Her daughter fixes medication in pill container. Patient has no further complaints or concerns at this time.   Questions elicited. Time allowed for questions, all issues addressed. Contact info given for any concerns or changes.          Review of Systems   Constitutional:  Positive for fatigue.   HENT: Negative.     Eyes: Negative.    Respiratory: Negative.  Negative for chest tightness.    Cardiovascular: Negative.  Negative for leg swelling.   Gastrointestinal:  Positive for abdominal pain, constipation and nausea.   Endocrine: Negative.    Genitourinary: Negative.    Musculoskeletal: Negative.    Skin:  Positive for color change.    Allergic/Immunologic: Negative.    Neurological:  Positive for weakness.   Hematological: Negative.    Psychiatric/Behavioral: Negative.  Negative for agitation.    All other systems reviewed and are negative.    Assessments:  Environmental: Patient lives in a mobile home with steps at the entrance.  There is adequate lighting and the temperature is comfortable.    Functional Status: Ambulates with walker.   Safety: Fall precautions.  Nutritional: Adequate food in the home.   Home Health/DME/Supplies: [employer] . DME: walker    PAST HISTORY:     Past Medical History:   Diagnosis Date    Arthritis     Atherosclerosis of aorta 10/27/2021    Benign cyst of left kidney 3/20/2018    on imaging from ct scan at [facility].     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     Lung disease     copd    Lung nodules 2/5/2020    Mediastinal lymphadenopathy 2/5/2020    Mixed hyperlipidemia 5/2/2018       Past Surgical History:   Procedure Laterality Date    APPENDECTOMY      CATARACT EXTRACTION      EYE SURGERY  2018    MIDDLE EAR SURGERY      TONSILLECTOMY  1955       Family History   Problem Relation Age of Onset    Colon cancer Mother     Cancer Mother     ALS Father     Retinal detachment Son        Social History     Socioeconomic History    Marital status:     Number of children: 5   Occupational History    Occupation: homemaker   Tobacco Use    Smoking status: Former     Packs/day: 0.50     Years: 48.00     Pack years: 24.00     Types: Cigarettes     Start  date: 1955     Quit date: 2003     Years since quittin.3    Smokeless tobacco: Never   Substance and Sexual Activity    Alcohol use: No    Drug use: No    Sexual activity: Never     Social Determinants of Health     Financial Resource Strain: High Risk    Difficulty of Paying Living Expenses: Very hard   Food Insecurity: Food Insecurity Present    Worried About Running Out of Food in the Last Year: Often true    Ran Out of Food in the Last Year: Often true   Transportation Needs: No Transportation Needs    Lack of Transportation (Medical): No    Lack of Transportation (Non-Medical): No   Physical Activity: Inactive    Days of Exercise per Week: 0 days    Minutes of Exercise per Session: 0 min   Stress: No Stress Concern Present    Feeling of Stress : Only a little   Social Connections: Moderately Isolated    Frequency of Communication with Friends and Family: More than three times a week    Frequency of Social Gatherings with Friends and Family: Three times a week    Attends Shinto Services: More than 4 times per year    Active Member of Clubs or Organizations: No    Attends Club or Organization Meetings: Never    Marital Status:    Housing Stability: Low Risk     Unable to Pay for Housing in the Last Year: No    Number of Places Lived in the Last Year: 1    Unstable Housing in the Last Year: No       MEDICATIONS & ALLERGIES:     Current Outpatient Medications on File Prior to Visit   Medication Sig Dispense Refill    amLODIPine (NORVASC) 2.5 MG tablet Take 1 tablet (2.5 mg total) by mouth 3 (three) times daily. 90 tablet 11    diphenoxylate-atropine 2.5-0.025 mg (LOMOTIL) 2.5-0.025 mg per tablet Take 1 tablet by mouth 4 (four) times daily as needed for Diarrhea. 30 tablet 1    levothyroxine (TIROSINT) 50 mcg Cap Take 1 tablet (50 mcg) by mouth before breakfast. 90 capsule 3    lisinopriL (PRINIVIL,ZESTRIL) 40 MG tablet Take 1 tablet (40 mg total) by mouth once daily. 90 tablet 3    mupirocin  "(BACTROBAN) 2 % ointment Apply topically 3 (three) times daily. 22 g 1    [] ondansetron (ZOFRAN-ODT) 4 MG TbDL Dissolve 1 tablet (4 mg total) by mouth every 8 (eight) hours as needed (nausea). 15 tablet 0    oxyCODONE (OXYCONTIN) 15 mg TR12 12 hr tablet Take 1 tablet (15 mg total) by mouth every 12 (twelve) hours. 60 each 0    oxyCODONE-acetaminophen (PERCOCET)  mg per tablet Take 1 tablet by mouth 4 (four) times daily as needed (severe pain). 120 tablet 0    pantoprazole (PROTONIX) 40 MG tablet Take 1 tablet (40 mg total) by mouth once daily 30 tablet 0    senna (SENOKOT) 8.6 mg tablet Take 1 tablet by mouth once daily.      sucralfate (CARAFATE) 1 gram tablet Take 1 tablet (1 g total) by mouth every 6 (six) hours. for 7 days 28 tablet 0     No current facility-administered medications on file prior to visit.        Review of patient's allergies indicates:   Allergen Reactions    Pravastatin Other (See Comments)     Burning/flushing    Xgeva [denosumab] Other (See Comments)     Mouth and diffuse pain    Allopurinol analogues Other (See Comments)     "makes me sick and feel crazy"       OBJECTIVE:     Vital Signs:  Vitals:    23 1552   BP: (!) 140/64   Pulse: 67   Resp: 18     There is no height or weight on file to calculate BMI.     Physical Exam:  Physical Exam  Vitals reviewed.   Constitutional:       General: She is not in acute distress.     Appearance: Normal appearance. She is well-developed.   HENT:      Head: Normocephalic and atraumatic.      Right Ear: Decreased hearing noted.      Left Ear: Decreased hearing noted.      Nose: Nose normal.      Mouth/Throat:      Mouth: Mucous membranes are dry.      Pharynx: Oropharynx is clear.   Eyes:      Pupils: Pupils are equal, round, and reactive to light.   Cardiovascular:      Rate and Rhythm: Normal rate and regular rhythm.      Pulses: Normal pulses.      Heart sounds: Normal heart sounds.   Pulmonary:      Effort: Pulmonary effort is " normal.      Breath sounds: Normal breath sounds.   Abdominal:      General: Bowel sounds are normal.      Palpations: Abdomen is soft.   Musculoskeletal:         General: Normal range of motion.      Cervical back: Normal range of motion and neck supple.   Skin:     General: Skin is warm and dry.      Findings: Bruising (left arm) and erythema (bilateral feet) present.   Neurological:      General: No focal deficit present.      Mental Status: She is alert and oriented to person, place, and time. Mental status is at baseline.      Motor: Weakness present.   Psychiatric:         Mood and Affect: Mood normal.         Behavior: Behavior normal.         Thought Content: Thought content normal.         Judgment: Judgment normal.       Laboratory  Lab Results   Component Value Date    WBC 8.05 04/30/2023    HGB 12.4 04/30/2023    HCT 38.3 04/30/2023    MCV 98 04/30/2023     04/30/2023     Lab Results   Component Value Date    INR 1.1 04/26/2023    INR 1.0 08/24/2021    INR 1.1 02/04/2020     Lab Results   Component Value Date    HGBA1C 5.6 06/17/2021     No results for input(s): POCTGLUCOSE in the last 72 hours.    Diagnostic Results:      TRANSITION OF CARE:     [facility] Contact Note: 5/2    Family and/or Caretaker present at visit?  Yes.  Diagnostic tests reviewed/disposition: No diagnosic tests pending after this hospitalization.  Disease/illness education: Take all medication as prescribed. Activity as tolerated. Keep all upcoming appts.   Home health/community services discussion/referrals: Patient has home health established at [facility] .   Establishment or re-establishment of referral orders for community resources: No other necessary community resources.   Discussion with other health care providers: No discussion with other health care providers necessary.     Transition of Care Visit:  I have reviewed and updated the history and problem list.  I have reconciled the medication list.  I have  discussed the hospitalization and current medical issues, prognosis and plans with the patient/family.  I  spent more than 50% of time discussing the care with the patient/family.  Total Face-to-Face Encounter: 60 minutes.    Medications Reconciliation:   I have reconciled the patient's home medications and discharge medications with the patient/family. I have updated all changes.  Refer to After-Visit Medication List.    ASSESSMENT & PLAN:     HIGH RISK CONDITION(S):      Problem List Items Addressed This Visit          Cardiac/Vascular    Bradycardia    Current Assessment & Plan     Bradycardia and syncopal episodes prompting recent hospital encounter  Pulse 67 during visit  Cardiology recommended holter monitoring  F/U with cardiology  Continue current medication  Monitor                Oncology    HCC (hepatocellular carcinoma)    Current Assessment & Plan     F/U with heme/onc outpatient 5/5              GI    Gastroesophageal reflux disease    Current Assessment & Plan     Stable   Continue PPI           Drug-induced constipation    Current Assessment & Plan     CT Abd/pelvis revealed moderate amount of stool  Patient reports abdominal discomfort and gas/bloating  Was taking bisacodyl OTC with no relief  Instructed to add Miralax and suppository in addition to bisacodyl  Monitor                Other    Syncope and collapse    Current Assessment & Plan     CT head nothing acute, CTA chest negative for PE, EKG with junctional rhythm  Patient reports no episodes since hospital discharge  Still with double vision  F/U with cardiology  Continue current plan            Other Visit Diagnoses       Hospital discharge follow-up    -  Primary             Were controlled substances prescribed?  No    Instructions for the patient:  - Continue all medications, treatments and therapies as ordered.   - Follow all instructions, recommendations as discussed.  - Maintain Safety Precautions at all times.  - Attend all medical  appointments as scheduled.  - For worsening symptoms: call Primary Care Physician or Nurse Practitioner.  - For emergencies, call 911 or immediately report to the nearest emergency room.  - Limit Risks of environmental exposure to coronavirus/COVID-19 as discussed including: social distancing, hand hygiene, and limiting departures from the home for necessities only.     Scheduled Follow-up :  Future Appointments   Date Time Provider Department Center   [facility]2023  [facility]   5/5/2023  1:20 PM Gill Burgess MD [facility] HEM ONC [facility]   5/5/2023  2:00 PM CHAIR 02 [facility] INFSN [facility]   8/2/2023  2:40 PM Yessy Andrade MD [facility]       After Visit Medication List :     Medication List            Accurate as of May 3, 2023  4:16 PM. If you have any questions, ask your nurse or doctor.                CONTINUE taking these medications      amLODIPine 2.5 MG tablet  Commonly known as: NORVASC  Take 1 tablet (2.5 mg total) by mouth 3 (three) times daily.     diphenoxylate-atropine 2.5-0.025 mg 2.5-0.025 mg per tablet  Commonly known as: LomotiL  Take 1 tablet by mouth 4 (four) times daily as needed for Diarrhea.     lisinopriL 40 MG tablet  Commonly known as: PRINIVIL,ZESTRIL  Take 1 tablet (40 mg total) by mouth once daily.     mupirocin 2 % ointment  Commonly known as: BACTROBAN  Apply topically 3 (three) times daily.     oxyCODONE-acetaminophen  mg per tablet  Commonly known as: PERCOCET  Take 1 tablet by mouth 4 (four) times daily as needed (severe pain).     OxyCONTIN 15 mg Tr12 12 hr tablet  Generic drug: oxyCODONE  Take 1 tablet (15 mg total) by mouth every 12 (twelve) hours.     pantoprazole 40 MG tablet  Commonly known as: PROTONIX  Take 1 tablet (40 mg total) by mouth once daily     senna 8.6 mg tablet  Commonly known as: SENOKOT     sucralfate 1 gram tablet  Commonly known as: CARAFATE  Take 1 tablet (1 g total) by mouth every 6 (six) hours. for 7 days      TIROSINT 50 mcg Cap  Generic drug: levothyroxine  Take 1 tablet (50 mcg) by mouth before breakfast.              Signature: Kendall Cordero NP

## 2023-05-05 NOTE — DISCHARGE INSTRUCTIONS
THANKS FOR ALLOWING ME TO CARE FOR YOU TODAY!!!           THANKS FOR CHOOSING [facility]!!!      [facility]  [address] [address]  [phone] phone     [phone] fax  Hours of Operation: Monday- Friday 8:00am- 5:00pm  After hours phone  [phone]  Hematology / Oncology Physicians on call      KLAUDIA Chou Dr., NP Sydney Prescott, MISAEL Lopez, KE Rojas    Please call with any concerns regarding your appointment today.

## 2023-05-05 NOTE — PLAN OF CARE
Discussed plan of care with pt. Addressed any and ongoing concerns. Pt denies   Problem: Adult Inpatient Plan of Care  Goal: Plan of Care Review  Outcome: Ongoing, Progressing  Goal: Patient-Specific Goal (Individualized)  Outcome: Ongoing, Progressing  Flowsheets (Taken 5/5/2023 1331)  Anxieties, Fears or Concerns: None  Goal: Absence of Hospital-Acquired Illness or Injury  Outcome: Ongoing, Progressing  Goal: Optimal Comfort and Wellbeing  Outcome: Ongoing, Progressing  Intervention: Provide Person-Centered Care  Flowsheets (Taken 5/5/2023 1331)  Trust Relationship/Rapport:   care explained   choices provided   emotional support provided   empathic listening provided   questions answered   questions encouraged   reassurance provided   thoughts/feelings acknowledged

## 2023-05-05 NOTE — PROGRESS NOTES
01/23/23      Isabella Serrano  [address] [facility] [address]    Dear Isabella,    We look forward to seeing you on 3/7/2023 at 1:30 p.m. with an arrival time of 12:30 p.m.  for your procedure.    To better prepare you, please observe the following:     Preparing for GI Procedure  ? There is no need to schedule an appointment with your primary care physician      You will receive a call from our Pre-Admission Testing department prior to your surgical procedure. This call is necessary to get important information about your health history, to ensure you are properly prepared for surgery, and minimize the chance of having your procedure delayed or rescheduled.    WHAT TO DO ABOUT YOUR PRESCRIPTION MEDICATIONS  You must continue taking all your previously prescribed medications as directed unless specifically told not to by your doctor, or if you find them on the list below    STOP THESE MEDICATIONS   • Aspirin stop 3 days prior unless instructed otherwise by your doctor or Cardiologist.   • Erectile dysfunction medications stop 2 days prior    • Herbal medications/ Vitamins/ Fish Oil stop 7 days prior unless otherwise directed by your doctor.    • Phentermine stop 7 days prior   • Selegiline patches stop 21 days prior   Your doctor will have given you instructions about modifying your Insulin regimen 1 day prior.  If on a blood thinner, ask your doctor, Cardiologist and/or surgeon if and when it should be stopped.    You will be contacted by phone or email by a company called [email or web address]. This is an online educational platform that will provide education regarding your procedure. Please take the time to review this video.    Day before Procedure  Please follow any specific instructions given to you regarding any prep needed related to your procedure.              Day of Procedure  Take your normal morning medications with a sip of water first thing in the morning prior to surgery, except those medications    Subjective:      DATE OF VISIT: 5/5/23     ?  Patient ID:?Maura Singh is a 83 y.o. female.?? MR#: [id number]   ?   PRIMARY ONCOLOGIST: Dr. Burgess    ? Primary Care Providers:  Yessy Andrade MD, MD (General)     CHIEF COMPLAINT: ???  Follow-up  ?   ONCOLOGIC DIAGNOSIS:  Metastatic hepatocellular carcinoma  ?   CURRENT TREATMENT:  pembrolizumab 400mg q6wks, C1D1 2/10/23    PAST TREATMENT:    Nivolumab 480mg q4 weeks   lenvatinib  Bevacizumab and atezolizumab, C1D1 8/26/21  Cabozantinib 40 mg daily, had been on hold since 08/05/2022 due to diarrhea toxicity, restarted 10/2022; d/c 1/30/23    ONCOLOGIC HISTORY:    Ms. Singh was admitted to [facility] on 02/04/2020 with gradually progressive shortness of breath, cough with hemoptysis along with intermittent abdominal pain, anorexia and fatigue.  In the emergency room she had workup including abdominal ultrasound showing multiple liver lesions concerning for metastatic disease.  CTA was negative for pulmonary embolism but revealed mediastinal adenopathy with right hilar soft tissue mass cannot exclude lymphadenopathy along with postobstructive collapse of right anterior segment lower lobe.  Hepatomegaly was noted with multiple low-density hypoenhancing lesions consistent with hepatic metastatic disease.    2/19/20 PET-CT:    To tele I was thinking to me that you already have 1 extra due as thinking last Monday for she 1. Hypermetabolic right infrahilar lung mass and associated partial postobstructive collapse right lower lobe.  2. Small hypermetabolic focus right lower lobe possibly early metastasis.  3. Hypermetabolic masslike thickening ascending colon suspicious for neoplasm.  4. Multifocal FDG avid lissette metastases neck, chest, abdomen, and pelvis as noted.  5. Extensive FDG avid hepatic metastases.  6. Widespread FDG avid osseous metastases.     02/06/2020 liver biopsy  Pathology:  Consistent with hepatocellular carcinoma, moderate to poorly  differentiated     The biopsy shows nests of tumor cells with deeply eosinophilic granular   cytoplasm. Immunostains performed shows results as:   HepPar - Focal granular positive   CK7 and TTF-1 - Negative    2020 cycle 1 day 1 nivolumab Q 2 weeks palliative immunotherapy    2020 cycle 2 day 1 nivolumab    2020 cycle 3 day 1 nivolumab, note: dose of 480 mg [id number] to limit clinic visits due to COVID-19 concerns    2020 cycle 5 day 1 nivolumab    20 inteval CT scan with response in lymphadenopathy, see below    3/2020 initiated lenvatinib; held, decreased dose due to rash side effect currently on 8 mg daily    2021 cycle 1 day 1 atezolizumab and bevacizumab    2022 cycle 1 day 1 cabozantinib 40 mg daily    INTERVAL EVENTS  Recent emergency room visits for syncopal episode she notes prior GI upset and limited p.o. intake proceeding events.  She notes having cardiac workup that was unrevealing.  She is started to improve with respect to GI illness with some increase in  p.o. intake    Review of Systems    ?   A comprehensive 14-point review of systems was reviewed with patient and was negative other than as specified above.   ?      Objective:      Physical Exam      ?   Vitals:    23 1329   BP: (!) 146/71   Pulse: (!) 53   Temp: 98.5 °F (36.9 °C)          ?   ECO  General appearance: Generally well appearing, in no acute distress, hard of hearing.   Head, eyes, ears, nose, and throat: moist mucous membranes.   Respiratory:  Normal work of breathing  Abdomen: nontender, nondistended.   Extremities: Warm, no appreciable edema Neurologic: Alert and oriented.   Skin: No rashes, ecchymoses or petechial lesion.   Psychiatric:  Normal mood and affect.    Laboratory:  ?   Lab Visit on 2023   Component Date Value Ref Range Status    WBC 2023 9.00  3.90 - 12.70 K/uL Final    RBC 2023 4.08  4.00 - 5.40 M/uL Final    Hemoglobin 2023 12.9  12.0 - 16.0 g/dL  your doctor or our Pre-Admission Testing office has specifically told you not to take.    Other than a sip of water to wash down medications, DO NOT PUT ANYTHING IN YOUR MOUTH FOR AT LEAST FOUR HOURS PRIOR TO YOUR PROCEDURE.  This includes gum, candy, cigarettes, and chewing tobacco.       If you choose, for your convenience we have an outpatient pharmacy on-site and can arrange to have your discharge prescription filled before you leave, to save you a trip. You may want to bring enough money to cover the cost of this prescription.      Each patient that comes through the GI department is given individualized care and attention. The doctors and nurses spend the time necessary with each patient to ensure the best care. Sometimes, this can cause delays. You may want to bring a book, puzzle or music player to keep you busy if you experience a delay.       Procedures usually involve giving medications that put you to sleep or ease anxiety. For this reason, you must have a ride home from the hospital and someone (over age 16) should stay with you for the first 24 hours after your procedure. The procedure will be canceled if you do not have a responsible person with you. You are not allowed to drive after receiving sedation of any type.     Visitor Restrictions are currently in place due to COVID-19.  You can bring one designated adult (18 yr or older) with you to your surgery. Additional COVID related restrictions may apply.     Bring a photo ID, your insurance card and any copay due at time of service. Leave all valuables at home. Do not wear jewelry, makeup, body lotion, or fingernail polish.        If you have any questions or concerns before the day of your procedure, please call [phone] to have them answered.     After your procedure you may get a survey via email or text message. Please take the opportunity to fill it out letting us know what we did well or what we could improve on. Our goal is to provide our  Final    Hematocrit 05/05/2023 40.1  37.0 - 48.5 % Final    MCV 05/05/2023 98  82 - 98 fL Final    MCH 05/05/2023 31.6 (H)  27.0 - 31.0 pg Final    MCHC 05/05/2023 32.2  32.0 - 36.0 g/dL Final    RDW 05/05/2023 12.4  11.5 - 14.5 % Final    Platelets 05/05/2023 266  150 - 450 K/uL Final    MPV 05/05/2023 9.5  9.2 - 12.9 fL Final    Immature Granulocytes 05/05/2023 0.2  0.0 - 0.5 % Final    Gran # (ANC) 05/05/2023 6.5  1.8 - 7.7 K/uL Final    Immature Grans (Abs) 05/05/2023 0.02  0.00 - 0.04 K/uL Final    Lymph # 05/05/2023 1.4  1.0 - 4.8 K/uL Final    Mono # 05/05/2023 1.0  0.3 - 1.0 K/uL Final    Eos # 05/05/2023 0.1  0.0 - 0.5 K/uL Final    Baso # 05/05/2023 0.04  0.00 - 0.20 K/uL Final    nRBC 05/05/2023 0  0 /100 WBC Final    Gran % 05/05/2023 72.1  38.0 - 73.0 % Final    Lymph % 05/05/2023 15.3 (L)  18.0 - 48.0 % Final    Mono % 05/05/2023 10.6  4.0 - 15.0 % Final    Eosinophil % 05/05/2023 1.4  0.0 - 8.0 % Final    Basophil % 05/05/2023 0.4  0.0 - 1.9 % Final    Differential Method 05/05/2023 Automated   Final    Sodium 05/05/2023 128 (L)  136 - 145 mmol/L Final    Potassium 05/05/2023 3.8  3.5 - 5.1 mmol/L Final    Chloride 05/05/2023 99  95 - 110 mmol/L Final    CO2 05/05/2023 22 (L)  23 - 29 mmol/L Final    Glucose 05/05/2023 89  70 - 110 mg/dL Final    BUN 05/05/2023 18  8 - 23 mg/dL Final    Creatinine 05/05/2023 1.1  0.5 - 1.4 mg/dL Final    Calcium 05/05/2023 9.8  8.7 - 10.5 mg/dL Final    Total Protein 05/05/2023 7.3  6.0 - 8.4 g/dL Final    Albumin 05/05/2023 3.8  3.5 - 5.2 g/dL Final    Total Bilirubin 05/05/2023 0.5  0.1 - 1.0 mg/dL Final    Alkaline Phosphatase 05/05/2023 64  55 - 135 U/L Final    AST 05/05/2023 18  10 - 40 U/L Final    ALT 05/05/2023 14  10 - 44 U/L Final    Anion Gap 05/05/2023 7 (L)  8 - 16 mmol/L Final    eGFR 05/05/2023 50 (A)  >60 mL/min/1.73 m^2 Final    TSH 05/05/2023 0.774  0.400 - 4.000 uIU/mL Final      ?   Tumor markers   AFP   Date Value Ref Range Status   08/24/2021  patients with the best possible care and we couldn’t do it without your input.     We look forward to seeing you,  Your GI Services Team   <2.0 0.0 - 8.4 ng/mL Final   02/21/2020 1.8 0.0 - 8.4 ng/mL Final       ?   Imaging:    No results found. However, due to the size of the patient record, not all encounters were searched. Please check Results Review for a complete set of results.        Pathology:        02/06/2020 liver biopsy  LIVER, BIOPSY:   Consistent with hepatocellular carcinoma, moderate to poorly differentiated     The biopsy shows nests of tumor cells with deeply eosinophilic granular   cytoplasm. Immunostains performed shows results as:   HepPar - Focal granular positive   CK7 and TTF-1 - Negative    ?   Assessment/Plan:       1. Liver disease, unspecified    2. HCC (hepatocellular carcinoma)    3. Secondary malignant neoplasm of bone    4. Hyponatremia    5. Syncope, unspecified syncope type                  Plan:     # metastatic hepatocellular carcinoma:  initiated first-line palliative immunotherapy with nivolumab 03/02/2020.  The last to restaging scans showing improvement particularly in liver lesions.  Reviewed today together restaging PET-CT.  Of note radiology read is in comparison to prior PET-CT February 2020 interval scans have been with CT which had shown improvement in liver lesions.  This most recent PET-CT does show new right cervical, mandibular and multiple bilateral lung lesions.  Mixed response in lymph nodes in abdomen/pelvis with continued improvement in liver lesions.  Repeat biopsy left groin lymph node consistent with same primary, hepatocellular carcinoma.  On lenvatinib initiated early March 2021, due to rash/pruritus/odynophagia complications lenvatinib on hold with resolution and restarted up titrated to 8 mg tolerating well.  Restaging PET 08/20/2021 reviewed with mixed response resolution of pulmonary area of avidity however other areas with findings consistent with progression with enlargement and new lymphadenopathy in neck chest abdomen and pelvis.  I discussed options including best supportive care or  can consider bevacizumab and atezolizumab.  She did have prior immunotherapy however she had initial excellent response and may be consideration given combination with bevacizumab which she has not yet tried.  - 08/26/2021 cycle 1 day 1 atezolizumab and bevacizumab, tolerated exceptionally well.  Presents for cycle 2 today labs reviewed no concerning findings and will proceed with treatment today.  - pain well controlled on Xtampza 13.5mER bid (refilled 12/30/21) and 3-4 percocet/day  Per patient preference to wait until after holidays for repeat scan recently performed January 2022 and we review in detail results today unfortunately showing evidence of progressive disease.  We discussed she has had already several lines of therapy and limited further options.  Previously on lenvatinib unable to achieve typical doses due to side effects with rash primarily; I did discuss consideration of cabozantinib also used hepatocellular carcinoma but may have similar side effects and potential for limited impact on her disease.  She is interested in trialing this.  We decided to start dose reduced given prior intolerance is cabozantinib 40 mg  She has now been on cabozantinib 40 mg initiated 2/4/22 with initial excellent response.  Restaging scans January 2023 with mixed response, see above.  Reviewed imaging results in detail today.  Unfortunately she is also had poor tolerance with diarrhea exacerbation and dehydration.  Therefore we discussed she will need to discontinue cabozantinib.  We discussed limited alternative options given prior lines of therapy.  We discussed consideration re-trial immunotherapy (prior nivolumab with excellent response and prior atezolizumab with bevacizumab in past) with pembrolizumab she may prefer as Q 6 weeks.  She understands given prior progression on immunotherapy in past may have attenuated response.  We discuss alternative option of no further therapy and supportive care only.  She is  interested in trial of pembrolizumab.  Potential toxicities reviewed information written given to patient/daughter and consent signed.      02/10/2023 cycle 1 day 1 pembrolizumab.      Has been tolerating treatment exceptionally well recent episodes of gastritis likely leading to dehydration and syncopal episode negative cardiac workup for patient report/ED visits improving gradually p.o. intake.  Will give additional 500 cc IV fluid with her continued pembrolizumab today.  She did get CT abdomen pelvis along with CTA negative for pulmonary embolism did not note notable progressive metastatic disease within limits of imaging modality as compared to January 2023.  Recommend restaging scans prior to next cycle in 6 weeks.  PET scan ordered.    Hypothyroidism on levothyroxine TSH T4 will need to continue to be monitored especially on immunotherapy, orders placed.  Last thyroid function within normal limits.    Right lower extremity pain/edema:  Doppler scan negative for deep venous thrombosis however redemonstration of lymphadenopathy in pelvic region likely contributing.  Systemic therapy per above, follow-up with palliative Care, narcotic refill given, discussed high risk for thrombosis regardless and encourage mobility as much as possible    Follow-Up:           Route Chart for Scheduling    Med Onc Chart Routing      Follow up with physician 6 weeks.   Follow up with [facility]    Infusion scheduling note pembrolizumab q6wks   Injection scheduling note    Labs CBC, CMP and TSH   Scheduling:  Preferred lab:  Lab interval:     Imaging PET scan   couple days prior to fup in 6 wks   Pharmacy appointment    Other referrals           Treatment Plan Information   OP PEMBROLIZUMAB 400MG Q6W   Gill Burgess MD   Upcoming Treatment Dates - OP PEMBROLIZUMAB 400MG Q6W    5/5/2023       Chemotherapy       pembrolizumab (KEYTRUDA) 400 mg in sodium chloride 0.9% SolP 116 mL infusion       sodium chloride 0.9% bolus 500 mL 500  mL  6/16/2023       Chemotherapy       pembrolizumab (KEYTRUDA) 400 mg in sodium chloride 0.9% SolP 116 mL infusion  7/28/2023       Chemotherapy       pembrolizumab (KEYTRUDA) 400 mg in sodium chloride 0.9% SolP 116 mL infusion  9/8/2023       Chemotherapy       pembrolizumab (KEYTRUDA) 400 mg in sodium chloride 0.9% SolP 116 mL infusion    Supportive Plan Information  IV FLUIDS AND ELECTROLYTES   Ana Maria Lopez NP   Upcoming Treatment Dates - IV FLUIDS AND ELECTROLYTES    No upcoming days in selected categories.    Therapy Plan Information  IV Fluids  sodium chloride 0.9% bolus 500 mL 500 mL  500 mL, Intravenous, Every visit  Flushes  sodium chloride 0.9% flush 10 mL  10 mL, Intravenous, PRN  heparin, porcine (PF) 100 unit/mL injection flush 500 Units  500 Units, Intravenous, PRN

## 2023-05-10 NOTE — TELEPHONE ENCOUNTER
----- Message from Norma Kemp sent at 5/10/2023  1:50 PM CDT -----  Contact: Patient  [email or web address]     Patient is calling for Medical Advice regarding:tummy problem//throwing up eater/cannot keep water or food down    How long has patient had these symptoms:2 days     Pharmacy name and phone#:[facility]      Would like response via Sciod[employer]hart:  ##    Comments:

## 2023-05-14 NOTE — DISCHARGE SUMMARY
[facility]  Hospital Medicine  Discharge Summary      Patient Name: Maura Singh  MRN: [id number]  KENAN: [id number]  Patient Class: OP- Observation  Admission Date: 4/29/2023  Hospital Length of Stay: 0 days  Discharge Date and Time: 5/1/2023  6:12 PM  Attending Physician: No att. providers found   Discharging Provider: Tammy Leal MD  Primary Care Provider: Yessy Andrade MD    Primary Care Team: [facility] MEDICINE D    HPI:   The patient is a 82 yo female with Metastatic hepatocellular carcinoma with mets to both lungs, bones, and lymph nodes-currently on palliative chemo, HTN, DM, COPD, Hypothyroid, Diastolic CHF, and Hard of hearing who presented to ED with syncope. Pt reports she was walking to her chair and felt dizzy. She then had a syncopal episode and fell into her chair. When her daughter was assisting her into the car to go to the ED, she had another episode of dizziness followed by syncope. She then had another syncopal episode getting into wheelchair outside the ED. Pt did not take home meds this am    IN the ED, BP mildly elevated 191/85, HR 60 but dropped to 40s. EKG showed junctional rhythm, low voltage QRS. CT head showed nothing acute, CTA chest showed no PE or dissection, COPD. CT cervical spine showed nothing cute. Troponin  normal, labs unremarkable.     The patient will be placed in observation under hospital medicine for syncope         * No surgery found *      Hospital Course:   Patient admitted with syncopal episode.  Her EKG was read as junctional rhythm however upon review by Cardiology was felt it was sinus rhythm.  She had no further episodes while in the hospital was not orthostatic.  Echocardiogram revealed an EF of 60% with normal diastolic function, carotid ultrasound revealed no significant stenosis.  Patient was observed another 48 hours heart rate 50s to 60s.  EP was consulted who felt that patient was stable for discharge she will have event monitor follow up  with Cardiology outpatient setting.  Patient seen and examined with family on day of discharge and all questions answered.       Goals of Care Treatment Preferences:  Code Status: Full Code       LaPOST: Yes           Consults:   Consults (From admission, onward)        Status Ordering Provider     Inpatient consult to Cardiology  Once        Provider:  Rodolfo aGlvan MD    Completed SIRISHA ESPOSITO          Pulmonary  Chronic obstructive pulmonary disease  No wheezing   Neb txs prn       Cardiac/Vascular  Abnormal ECG  EKG with nonspecific STT wave changes and bradycardia  On admission EKG revealed junctional rhythm  We will ask Cardiology to evaluate      Bradycardia  Not on BB  Cardiac monitor       Chronic diastolic congestive heart failure  Patient is identified as having Diastolic (HFpEF) heart failure that is Chronic. CHF is currently controlled. Latest ECHO performed and demonstrates- No results found for this or any previous visit.  . Continue ACE/ARB and monitor clinical status closely. Monitor on telemetry. Patient is off CHF pathway.  Monitor strict Is&Os and daily weights.  Place on fluid restriction of 1.5 L. Continue to stress to patient importance of self efficacy and  on diet for CHF. Last BNP reviewed- and noted below         Essential hypertension    Cont home meds Lisinopril and Norvasc  IV Hydralazine prn       Oncology  Hepatocellular carcinoma  Metastatic to bones, lymph nodes, and both lungs   Currently on palliative chemo- last dose 5 weeks ago   Followed by Dr. Burgess       Endocrine  Drug-induced hypothyroidism  Last TSH 0.383, normal Free T4  Cont Levothyroxine       GI  Gastroesophageal reflux disease  Cont Protonix       Other  * Syncope and collapse  CT head showed nothing acute   Troponin normal   Bradycardia noted -cardiac monitor  Orthostatics   Echo    The left ventricle is normal in size with normal systolic function.   The estimated ejection fraction is  60%.   Normal left ventricular diastolic function.   Normal right ventricular size with normal right ventricular systolic function.   Normal central venous pressure (3 mmHg).       Carotid U/S no hemodynamically significant lesions  Neuro checks    Cardiology recommended event monitor as an outpatient.          Final Active Diagnoses:    Diagnosis Date Noted POA    PRINCIPAL PROBLEM:  Syncope and collapse [R55] 04/29/2023 Yes    Abnormal ECG [R94.31] 04/30/2023 Yes    Bradycardia [R00.1] 04/29/2023 Yes    Drug-induced hypothyroidism [E03.2] 06/17/2021 Yes    Hepatocellular carcinoma [C22.0] 02/17/2020 Yes    Chronic obstructive pulmonary disease [J44.9] 02/10/2020 Yes    Chronic diastolic congestive heart failure [I50.32] 02/10/2020 Yes    Gastroesophageal reflux disease [K21.9] 05/02/2018 Yes    Essential hypertension [I10] 02/08/2018 Yes      Problems Resolved During this Admission:       Discharged Condition: fair    Disposition: Home or Self Care    Follow Up:   Follow-up Information     Yessy Andrade MD Follow up in 3 day(s).    Specialty: Family Medicine  Contact information:  [address]  [address] [place] [id number]  [phone]             Carlos Stinson MD Follow up in 2 week(s).    Specialties: Interventional Cardiology, Cardiology  Contact information:  [address] [place] [address] [address] [place] [id number] [phone]                       Patient Instructions:      Ambulatory referral/consult to [facility]   Standing Status: Future   Referral Priority: Routine Referral Type: Consultation   Referral Reason: Specialty Services Required   Number of Visits Requested: 1     Ambulatory referral/consult to Home Health   Standing Status: Future   Referral Priority: Routine Referral Type: Home Health   Referral Reason: Specialty Services Required   Requested Specialty: Home Health Services   Number of Visits Requested: 1     Diet Adult Regular     Notify your health care  provider if you experience any of the following:  temperature >100.4     Notify your health care provider if you experience any of the following:  persistent nausea and vomiting or diarrhea     Notify your health care provider if you experience any of the following:  severe uncontrolled pain     Notify your health care provider if you experience any of the following:  persistent dizziness, light-headedness, or visual disturbances     Notify your health care provider if you experience any of the following:  increased confusion or weakness     Activity as tolerated       Significant Diagnostic Studies: all labs reviewed over the last 24 hrs    Pending Diagnostic Studies:     None         Medications:  Reconciled Home Medications:      Medication List      CONTINUE taking these medications    amLODIPine 2.5 MG tablet  Commonly known as: NORVASC  Take 1 tablet (2.5 mg total) by mouth 3 (three) times daily.     diphenoxylate-atropine 2.5-0.025 mg 2.5-0.025 mg per tablet  Commonly known as: LomotiL  Take 1 tablet by mouth 4 (four) times daily as needed for Diarrhea.     lisinopriL 40 MG tablet  Commonly known as: PRINIVIL,ZESTRIL  Take 1 tablet (40 mg total) by mouth once daily.     mupirocin 2 % ointment  Commonly known as: BACTROBAN  Apply topically 3 (three) times daily.     oxyCODONE-acetaminophen  mg per tablet  Commonly known as: PERCOCET  Take 1 tablet by mouth 4 (four) times daily as needed (severe pain).     OxyCONTIN 15 mg Tr12 12 hr tablet  Generic drug: oxyCODONE  Take 1 tablet (15 mg total) by mouth every 12 (twelve) hours.     pantoprazole 40 MG tablet  Commonly known as: PROTONIX  Take 1 tablet (40 mg total) by mouth once daily     senna 8.6 mg tablet  Commonly known as: SENOKOT  Take 1 tablet by mouth once daily.     TIROSINT 50 mcg Cap  Generic drug: levothyroxine  Take 1 tablet (50 mcg) by mouth before breakfast.        ASK your doctor about these medications    ondansetron 4 MG Tbdl  Commonly  known as: ZOFRAN-ODT  Dissolve 1 tablet (4 mg total) by mouth every 8 (eight) hours as needed (nausea).  Ask about: Should I take this medication?     sucralfate 1 gram tablet  Commonly known as: CARAFATE  Take 1 tablet (1 g total) by mouth every 6 (six) hours. for 7 days  Ask about: Should I take this medication?            Indwelling Lines/Drains at time of discharge:   Lines/Drains/Airways     None                 Time spent on the discharge of patient: 42 minutes         Tammy Leal MD  Department of Hospital Medicine  [facility]

## 2023-05-14 NOTE — ASSESSMENT & PLAN NOTE
CT head showed nothing acute   Troponin normal   Bradycardia noted -cardiac monitor  Orthostatics   Echo    The left ventricle is normal in size with normal systolic function.   The estimated ejection fraction is 60%.   Normal left ventricular diastolic function.   Normal right ventricular size with normal right ventricular systolic function.   Normal central venous pressure (3 mmHg).       Carotid U/S no hemodynamically significant lesions  Neuro checks    Cardiology recommended event monitor as an outpatient.

## 2023-05-14 NOTE — HOSPITAL COURSE
Patient admitted with syncopal episode.  Her EKG was read as junctional rhythm however upon review by Cardiology was felt it was sinus rhythm.  She had no further episodes while in the hospital was not orthostatic.  Echocardiogram revealed an EF of 60% with normal diastolic function, carotid ultrasound revealed no significant stenosis.  Patient was observed another 48 hours heart rate 50s to 60s.  EP was consulted who felt that patient was stable for discharge she will have event monitor follow up with Cardiology outpatient setting.  Patient seen and examined with family on day of discharge and all questions answered.

## 2023-05-14 NOTE — ASSESSMENT & PLAN NOTE
Patient is identified as having Diastolic (HFpEF) heart failure that is Chronic. CHF is currently controlled. Latest ECHO performed and demonstrates- No results found for this or any previous visit.  . Continue ACE/ARB and monitor clinical status closely. Monitor on telemetry. Patient is off CHF pathway.  Monitor strict Is&Os and daily weights.  Place on fluid restriction of 1.5 L. Continue to stress to patient importance of self efficacy and  on diet for CHF. Last BNP reviewed- and noted below

## 2023-05-18 PROBLEM — I50.32 CHRONIC DIASTOLIC CONGESTIVE HEART FAILURE: Status: RESOLVED | Noted: 2020-02-10 | Resolved: 2023-01-01

## 2023-05-18 NOTE — PROGRESS NOTES
Assessment & Plan  Problem List Items Addressed This Visit          Cardiac/Vascular    Bradycardia  -Bradycardia likely due to dehydration and aggressive CA treatment as reported by daughter from both hospital stays     Relevant Orders    Ambulatory referral/consult to [facility]       Immunology/Multi System    Immunodeficiency due to chemotherapy  - As below        Oncology    Hepatocellular carcinoma  - Followed by [facility]    Overview     Consistent with hepatocellular carcinoma, moderate to poorly differentiated     The biopsy shows nests of tumor cells with deeply eosinophilic granular   cytoplasm. Immunostains performed shows results as:   HepPar - Focal granular positive   CK7 and TTF-1 - Negative             Other Visit Diagnoses       Hospital discharge follow-up    -  Primary  - We discussed current disease process  - We discussed safety as it relates to ADLs  - We discussed likely follow-up with [facility] for physical assessment an a newer Home Health order tailored to patient's needs     Syncope, unspecified syncope type      - As above    Relevant Orders    Ambulatory referral/consult to [facility]    Constipation, unspecified constipation type        Dehydration symptoms    - We discussed forcing fluid  - I explained I would touch base with Dr. Burgess to discuss a possible PICC line order for rehydration efforts give decreased appetite       Relevant Orders    Ambulatory referral/consult to [facility]              Health Maintenance reviewed: Deferred at this time    The patient location is:  Home  The chief complaint leading to consultation is: noted below  Visit type: Virtual visit with synchronous audio and video  Total time spent with patient: 48 minutes  Each patient to whom he or she provides medical services by telemedicine is:  (1) informed of the relationship between the physician and  patient and the respective role of any other health care provider with respect to management of the patient; and (2) notified that he or she may decline to receive medical services by telemedicine and may withdraw from such care at any time.    Transitional Care Note    Family and/or Caretaker present at visit?  Yes.  Diagnostic tests reviewed/disposition: No diagnosic tests pending after this hospitalization.  Disease/illness education: Yes   Home health/community services discussion/referrals: Patient does not have home health established from hospital visit.  They do need home health.  If needed, we will set up home health for the patient.   Establishment or re-establishment of referral orders for community resources: No other necessary community resources.   Discussion with other health care providers:  Dr. Andrade and Dr. Burgess .          Follow-up: 3 months with PCP; 1 month with [facility]        Chief Complaint  No chief complaint on file.      HPI  Maura Singh is a 83 y.o. female with multiple medical diagnoses as listed in the medical history and problem list that presents for Hospital Discharge Follow-up via virtual visit.  Their last appointment 2/2/2023. AAXO to own ability with NAD. Daughter present during virtual visit Lilian SHAYLA     Hospital Discharge Follow-up: Hospitalized 04/26/2023 with a 3 day hospital stay due to Syncope at [facility].  Daughter reports patient had been complaining of abdominal pain days prior. Cardiology workup was unremarkable during hospital stay. Daughter reports cardiology and medicine team believes patient had been experiencing gastritis and constipation induced by liver cancer treatment. She continued to experienced near syncopal episodes over there next couple weeks that prompt a visit to [facility] again due to Bradycardia, approx 05/10/2023. Cardiac workup was unremarkable at that visit as well. Medicine team at [facility] explained current CA treatment, decreased  "appetite and constipation are yielding near syncopal episodes and dehydration. Received Lactulose during last hospital stay that likely. No surveillance on [facility] progress notes at this time. Unable to view BMP for latest renal function and electrolyte imbalance. She visits with [facility] regularly. Appetite is remains decreased. Denies constipation at this time as she has approx two bowel movements a day. Unable to assess vitals given virtual visit.       ALLERGIES AND MEDICATIONS: updated and reviewed.  Review of patient's allergies indicates:   Allergen Reactions    Pravastatin Other (See Comments)     Burning/flushing    Xgeva [denosumab] Other (See Comments)     Mouth and diffuse pain    Allopurinol analogues Other (See Comments)     "makes me sick and feel crazy"     Current Outpatient Medications   Medication Sig Dispense Refill    amLODIPine (NORVASC) 2.5 MG tablet Take 1 tablet (2.5 mg total) by mouth 3 (three) times daily. 90 tablet 11    diphenoxylate-atropine 2.5-0.025 mg (LOMOTIL) 2.5-0.025 mg per tablet Take 1 tablet by mouth 4 (four) times daily as needed for Diarrhea. 30 tablet 1    levothyroxine (TIROSINT) 50 mcg Cap Take 1 tablet (50 mcg) by mouth before breakfast. 90 capsule 3    lisinopriL (PRINIVIL,ZESTRIL) 40 MG tablet Take 1 tablet (40 mg total) by mouth once daily. 90 tablet 3    mupirocin (BACTROBAN) 2 % ointment Apply topically 3 (three) times daily. 22 g 1    ondansetron (ZOFRAN-ODT) 4 MG TbDL Take 1 tablet by mouth every 6 hours as needed for nausea and vomiting 16 tablet 0    oxyCODONE (OXYCONTIN) 15 mg TR12 12 hr tablet Take 1 tablet (15 mg total) by mouth every 12 (twelve) hours. 60 each 0    oxyCODONE-acetaminophen (PERCOCET)  mg per tablet Take 1 tablet by mouth 4 (four) times daily as needed (severe pain). 120 tablet 0    pantoprazole (PROTONIX) 40 MG tablet Take 1 tablet (40 mg total) by mouth once daily 30 tablet 0    polyethylene glycol (GLYCOLAX) 17 gram/dose powder Take 17 gm ( " 1 capful) mixed in liquid once daily 238 g 0    senna (SENOKOT) 8.6 mg tablet Take 1 tablet by mouth once daily.      SENNA 8.6 mg tablet Take 1 tablet by mouth 2 times daily as needed for constipation 40 tablet 0     No current facility-administered medications for this visit.         ROS  Review of Systems   Constitutional:  Positive for activity change. Negative for unexpected weight change.   HENT:  Positive for hearing loss and trouble swallowing. Negative for rhinorrhea.    Eyes:  Positive for visual disturbance. Negative for discharge.   Respiratory:  Negative for chest tightness and wheezing.    Cardiovascular:  Negative for chest pain and palpitations.   Gastrointestinal:  Positive for vomiting. Negative for blood in stool, constipation and diarrhea.   Endocrine: Negative for polydipsia and polyuria.   Genitourinary:  Negative for difficulty urinating, dysuria, hematuria and menstrual problem.   Musculoskeletal:  Positive for arthralgias. Negative for joint swelling and neck pain.   Neurological:  Positive for weakness. Negative for headaches.   Psychiatric/Behavioral:  Negative for confusion and dysphoric mood.          Physical Exam  Physical Exam  Constitutional:       Appearance: Normal appearance.   HENT:      Head: Normocephalic and atraumatic.   Pulmonary:      Effort: Pulmonary effort is normal.   Neurological:      Mental Status: She is alert.   Psychiatric:         Mood and Affect: Mood normal.

## 2023-05-19 NOTE — PROGRESS NOTES
Subjective:      DATE OF VISIT: 5/19/23     ?The patient location is:   [place], [place]  The chief complaint leading to consultation is:  postdischarge follow-up    Visit type: audiovisual    Face to Face time with patient:  10 minutes   40 minutes of total time spent on the encounter, which includes face to face time and non-face to face time preparing to see the patient (eg, review of tests), Obtaining and/or reviewing separately obtained history, Documenting clinical information in the electronic or other health record, Independently interpreting results (not separately reported) and communicating results to the patient/family/caregiver, or Care coordination (not separately reported).         Each patient to whom he or she provides medical services by telemedicine is:  (1) informed of the relationship between the physician and patient and the respective role of any other health care provider with respect to management of the patient; and (2) notified that he or she may decline to receive medical services by telemedicine and may withdraw from such care at any time.    Notes:     Patient ID:?Maura Singh is a 83 y.o. female.?? MR#: [id number]   ?   PRIMARY ONCOLOGIST: Dr. Burgess    ? Primary Care Providers:  Yessy Andrade MD, MD (General)     CHIEF COMPLAINT: ???  Follow-up  ?   ONCOLOGIC DIAGNOSIS:  Metastatic hepatocellular carcinoma  ?   CURRENT TREATMENT:  pembrolizumab 400mg q6wks, C1D1 2/10/23, on hold    PAST TREATMENT:    Nivolumab 480mg q4 weeks   lenvatinib  Bevacizumab and atezolizumab, C1D1 8/26/21  Cabozantinib 40 mg daily, had been on hold since 08/05/2022 due to diarrhea toxicity, restarted 10/2022; d/c 1/30/23    ONCOLOGIC HISTORY:    Ms. Singh was admitted to [facility] on 02/04/2020 with gradually progressive shortness of breath, cough with hemoptysis along with intermittent abdominal pain, anorexia and fatigue.  In the emergency room she had workup including abdominal ultrasound  showing multiple liver lesions concerning for metastatic disease.  CTA was negative for pulmonary embolism but revealed mediastinal adenopathy with right hilar soft tissue mass cannot exclude lymphadenopathy along with postobstructive collapse of right anterior segment lower lobe.  Hepatomegaly was noted with multiple low-density hypoenhancing lesions consistent with hepatic metastatic disease.    2/19/20 PET-CT:    To tele I was thinking to me that you already have 1 extra due as thinking last Monday for she 1. Hypermetabolic right infrahilar lung mass and associated partial postobstructive collapse right lower lobe.  2. Small hypermetabolic focus right lower lobe possibly early metastasis.  3. Hypermetabolic masslike thickening ascending colon suspicious for neoplasm.  4. Multifocal FDG avid lissette metastases neck, chest, abdomen, and pelvis as noted.  5. Extensive FDG avid hepatic metastases.  6. Widespread FDG avid osseous metastases.     02/06/2020 liver biopsy  Pathology:  Consistent with hepatocellular carcinoma, moderate to poorly differentiated     The biopsy shows nests of tumor cells with deeply eosinophilic granular   cytoplasm. Immunostains performed shows results as:   HepPar - Focal granular positive   CK7 and TTF-1 - Negative    03/02/2020 cycle 1 day 1 nivolumab Q 2 weeks palliative immunotherapy    03/16/2020 cycle 2 day 1 nivolumab    03/30/2020 cycle 3 day 1 nivolumab, note: dose of 480 mg [id number] to limit clinic visits due to COVID-19 concerns    05/25/2020 cycle 5 day 1 nivolumab    6/16/20 inteval CT scan with response in lymphadenopathy, see below    3/2020 initiated lenvatinib; held, decreased dose due to rash side effect currently on 8 mg daily    08/26/2021 cycle 1 day 1 atezolizumab and bevacizumab    02/04/2022 cycle 1 day 1 cabozantinib 40 mg daily    INTERVAL EVENTS    Since our last visit unfortunately she has had further functional decline with additional emergency room visit at [facility] (awaiting receipt of outside records ) per patient family report she is had persistent nausea vomiting dehydration unable to use Zofran ODT due to severe nausea.  She is too weak to come into clinic today.  I discussed with her PICC line and IV fluids and IV antiemetics however ultimately given functional status recommend consideration of hospice; patient and family not accepting of hospice at this time but amenable to home palliative care.  I will work with palliative care providers who have seen her in outpatient setting as well as social work to try to arrange this and follow-up with patient and family for ongoing goals of care discussions.    Review of Systems    ?   A comprehensive 14-point review of systems was reviewed with patient and was negative other than as specified above.   ?      Objective:      Physical Exam      ?   There were no vitals filed for this visit.         ?   ECO  General appearance: Generally well appearing, in no acute distress, hard of hearing.   Head, eyes, ears, nose, and throat: moist mucous membranes.   Respiratory:  Normal work of breathing  Abdomen: nontender, nondistended.   Extremities: Warm, no appreciable edema Neurologic: Alert and oriented.   Skin: No rashes, ecchymoses or petechial lesion.   Psychiatric:  Normal mood and affect.    Laboratory:  ?   No visits with results within 1 Day(s) from this visit.   Latest known visit with results is:   Lab Visit on 2023   Component Date Value Ref Range Status    WBC 2023 9.00  3.90 - 12.70 K/uL Final    RBC 2023 4.08  4.00 - 5.40 M/uL Final    Hemoglobin 2023 12.9  12.0 - 16.0 g/dL Final    Hematocrit 2023 40.1  37.0 - 48.5 % Final    MCV 2023 98  82 - 98 fL Final    MCH 2023 31.6 (H)  27.0 - 31.0 pg Final    MCHC 2023 32.2  32.0 - 36.0 g/dL Final    RDW 2023 12.4  11.5 - 14.5 % Final    Platelets 2023 266  150 - 450 K/uL Final    MPV 2023 9.5  9.2 -  12.9 fL Final    Immature Granulocytes 05/05/2023 0.2  0.0 - 0.5 % Final    Gran # (ANC) 05/05/2023 6.5  1.8 - 7.7 K/uL Final    Immature Grans (Abs) 05/05/2023 0.02  0.00 - 0.04 K/uL Final    Lymph # 05/05/2023 1.4  1.0 - 4.8 K/uL Final    Mono # 05/05/2023 1.0  0.3 - 1.0 K/uL Final    Eos # 05/05/2023 0.1  0.0 - 0.5 K/uL Final    Baso # 05/05/2023 0.04  0.00 - 0.20 K/uL Final    nRBC 05/05/2023 0  0 /100 WBC Final    Gran % 05/05/2023 72.1  38.0 - 73.0 % Final    Lymph % 05/05/2023 15.3 (L)  18.0 - 48.0 % Final    Mono % 05/05/2023 10.6  4.0 - 15.0 % Final    Eosinophil % 05/05/2023 1.4  0.0 - 8.0 % Final    Basophil % 05/05/2023 0.4  0.0 - 1.9 % Final    Differential Method 05/05/2023 Automated   Final    Sodium 05/05/2023 128 (L)  136 - 145 mmol/L Final    Potassium 05/05/2023 3.8  3.5 - 5.1 mmol/L Final    Chloride 05/05/2023 99  95 - 110 mmol/L Final    CO2 05/05/2023 22 (L)  23 - 29 mmol/L Final    Glucose 05/05/2023 89  70 - 110 mg/dL Final    BUN 05/05/2023 18  8 - 23 mg/dL Final    Creatinine 05/05/2023 1.1  0.5 - 1.4 mg/dL Final    Calcium 05/05/2023 9.8  8.7 - 10.5 mg/dL Final    Total Protein 05/05/2023 7.3  6.0 - 8.4 g/dL Final    Albumin 05/05/2023 3.8  3.5 - 5.2 g/dL Final    Total Bilirubin 05/05/2023 0.5  0.1 - 1.0 mg/dL Final    Alkaline Phosphatase 05/05/2023 64  55 - 135 U/L Final    AST 05/05/2023 18  10 - 40 U/L Final    ALT 05/05/2023 14  10 - 44 U/L Final    Anion Gap 05/05/2023 7 (L)  8 - 16 mmol/L Final    eGFR 05/05/2023 50 (A)  >60 mL/min/1.73 m^2 Final    TSH 05/05/2023 0.774  0.400 - 4.000 uIU/mL Final      ?   Tumor markers   AFP   Date Value Ref Range Status   08/24/2021 <2.0 0.0 - 8.4 ng/mL Final   02/21/2020 1.8 0.0 - 8.4 ng/mL Final       ?   Imaging:    No results found. However, due to the size of the patient record, not all encounters were searched. Please check Results Review for a complete set of results.        Pathology:        02/06/2020 liver biopsy  LIVER, BIOPSY:    Consistent with hepatocellular carcinoma, moderate to poorly differentiated     The biopsy shows nests of tumor cells with deeply eosinophilic granular   cytoplasm. Immunostains performed shows results as:   HepPar - Focal granular positive   CK7 and TTF-1 - Negative    ?   Assessment/Plan:       1. Liver disease, unspecified    2. Nausea and vomiting, unspecified vomiting type    3. HCC (hepatocellular carcinoma)    4. Secondary malignant neoplasm of bone                  Plan:     # metastatic hepatocellular carcinoma:  initiated first-line palliative immunotherapy with nivolumab 03/02/2020.  The last to restaging scans showing improvement particularly in liver lesions.  Reviewed today together restaging PET-CT.  Of note radiology read is in comparison to prior PET-CT February 2020 interval scans have been with CT which had shown improvement in liver lesions.  This most recent PET-CT does show new right cervical, mandibular and multiple bilateral lung lesions.  Mixed response in lymph nodes in abdomen/pelvis with continued improvement in liver lesions.  Repeat biopsy left groin lymph node consistent with same primary, hepatocellular carcinoma.  On lenvatinib initiated early March 2021, due to rash/pruritus/odynophagia complications lenvatinib on hold with resolution and restarted up titrated to 8 mg tolerating well.  Restaging PET 08/20/2021 reviewed with mixed response resolution of pulmonary area of avidity however other areas with findings consistent with progression with enlargement and new lymphadenopathy in neck chest abdomen and pelvis.  I discussed options including best supportive care or can consider bevacizumab and atezolizumab.  She did have prior immunotherapy however she had initial excellent response and may be consideration given combination with bevacizumab which she has not yet tried.  - 08/26/2021 cycle 1 day 1 atezolizumab and bevacizumab, tolerated exceptionally well.  Presents for cycle 2  today labs reviewed no concerning findings and will proceed with treatment today.  - pain well controlled on Xtampza 13.5mER bid (refilled 12/30/21) and 3-4 percocet/day  Per patient preference to wait until after holidays for repeat scan recently performed January 2022 and we review in detail results today unfortunately showing evidence of progressive disease.  We discussed she has had already several lines of therapy and limited further options.  Previously on lenvatinib unable to achieve typical doses due to side effects with rash primarily; I did discuss consideration of cabozantinib also used hepatocellular carcinoma but may have similar side effects and potential for limited impact on her disease.  She is interested in trialing this.  We decided to start dose reduced given prior intolerance is cabozantinib 40 mg  She has now been on cabozantinib 40 mg initiated 2/4/22 with initial excellent response.  Restaging scans January 2023 with mixed response, see above.  Reviewed imaging results in detail today.  Unfortunately she is also had poor tolerance with diarrhea exacerbation and dehydration.  Therefore we discussed she will need to discontinue cabozantinib.  We discussed limited alternative options given prior lines of therapy.  We discussed consideration re-trial immunotherapy (prior nivolumab with excellent response and prior atezolizumab with bevacizumab in past) with pembrolizumab she may prefer as Q 6 weeks.  She understands given prior progression on immunotherapy in past may have attenuated response.  We discuss alternative option of no further therapy and supportive care only.  She is interested in trial of pembrolizumab.  Potential toxicities reviewed information written given to patient/daughter and consent signed.      02/10/2023 cycle 1 day 1 pembrolizumab.      Has been tolerating treatment exceptionally well recent episodes of gastritis likely leading to dehydration and syncopal episode negative  cardiac workup for patient report/ED visits improving gradually p.o. intake.  Will give additional 500 cc IV fluid with her continued pembrolizumab today.  She did get CT abdomen pelvis along with CTA negative for pulmonary embolism did not note notable progressive metastatic disease within limits of imaging modality as compared to January 2023.      Since our last visit unfortunately she has had further functional decline with additional emergency room visit at [facility] (awaiting receipt of outside records ) per patient family report she is had persistent nausea vomiting dehydration unable to use Zofran ODT due to severe nausea.  She is too weak to come into clinic today.  I discussed with her PICC line and IV fluids and IV antiemetics however ultimately given functional status recommend consideration of hospice; patient and family not accepting of hospice at this time but amenable to home palliative care.  I will work with palliative care providers who have seen her in outpatient setting as well as social work to try to arrange this and follow-up with patient and family for ongoing goals of care discussions.      Hypothyroidism on levothyroxine TSH T4 will need to continue to be monitored especially on immunotherapy, orders placed.  Last thyroid function within normal limits.    Right lower extremity pain/edema:  Doppler scan negative for deep venous thrombosis however redemonstration of lymphadenopathy in pelvic region likely contributing.  Systemic therapy per above, follow-up with palliative Care, narcotic refill given, discussed high risk for thrombosis regardless and encourage mobility as much as possible    Follow-Up:           Route Chart for Scheduling    Med Onc Chart Routing      Follow up with physician 2 weeks. virtual ok   Follow up with [facility]    Infusion scheduling note    Injection scheduling note    Labs    Imaging    Pharmacy appointment    Other referrals  Additional referrals needed            Treatment Plan Information   OP PEMBROLIZUMAB 400MG Q6W   Gill Burgess MD   Upcoming Treatment Dates - OP PEMBROLIZUMAB 400MG Q6W    6/16/2023       Chemotherapy       pembrolizumab (KEYTRUDA) 400 mg in sodium chloride 0.9% SolP 116 mL infusion  7/28/2023       Chemotherapy       pembrolizumab (KEYTRUDA) 400 mg in sodium chloride 0.9% SolP 116 mL infusion  9/8/2023       Chemotherapy       pembrolizumab (KEYTRUDA) 400 mg in sodium chloride 0.9% SolP 116 mL infusion  10/20/2023       Chemotherapy       pembrolizumab (KEYTRUDA) 400 mg in sodium chloride 0.9% SolP 116 mL infusion    Supportive Plan Information  IV FLUIDS AND ELECTROLYTES   Ana Maria Lopez NP   Upcoming Treatment Dates - IV FLUIDS AND ELECTROLYTES    No upcoming days in selected categories.    Therapy Plan Information  IV Fluids  sodium chloride 0.9% bolus 500 mL 500 mL  500 mL, Intravenous, Every visit  Flushes  sodium chloride 0.9% flush 10 mL  10 mL, Intravenous, PRN  heparin, porcine (PF) 100 unit/mL injection flush 500 Units  500 Units, Intravenous, PRN

## 2023-05-19 NOTE — Clinical Note
Hi all,   Unfortuantely she has had significant functional status decline nausea vomiting needing fluids and IV nausea medication.  I discussed recommendation for hospice however family not accepting at this time.  Would recommend palliative care see them virtual if possible to help guide goals of care discussion as well.  At this time I at least recommended if we can get home palliative care to give IV fluid at home and IV antiemetics would appreciate this.  I put in order for PICC.    Yuliya: She had interval hospitalization at [facility].  can you please get these records including scans.  We did have her planned for scans here in coming weeks would hold on that until we see previously performed scans and goals of care discussion.

## 2023-05-22 NOTE — TELEPHONE ENCOUNTER
8:57 am - Swer was consulted to assist with pt.'s coordination of in-home palliative program, home health and infusion company to assist with pt.'s picc maintenance and in-home supportive care. Swer called pt.'s daughter, Lilian Obrien ([phone]). Lilina reports pt. is currently under the care of [facility]. Lilian reports she is currently at work. Swer inquired if it were an ok time. Lilian reports her work is very understanding. Swer inquired about palliative and infusion company preference, however Lilian reports none. Swer to fax Lilian list of palliative and infusion companies servicing [employer] Hernandez. Swer pulled list from medicare.gov for palliative agencies and searched/called for infusion companies. Swer faxed lists to (fax#[phone]). Lilian reports to return swer call once decision is made on preferred company. Swer discussed PICC placement appt with nurse navigator. Nurse navigator reports to reach out to IR for call regarding appt. for placement and procedure details.   10:07 am - Swer returned Lilian's call. Swer reported IR will reach out for PICC placement appt. Swer also reports having faxed list of palliative and infusion companies. Lilian reports she feels pt will need hospice. Lilian reports to return swer call regarding final decision. Lilian has swer office number. Swer will remain available.

## 2023-05-22 NOTE — TELEPHONE ENCOUNTER
3:19 pm - Swer spoke with pt.'s daughter and HCPOA, Lilian. Lilian reports she is currently with pt. at home. Lilian reports having discussed hospice vs palliative with pt. Lilian inquired about the process for hospice referral. Swer discussed this. Lilian reports [facility] is an agency she is familiar with. Lilian reports her sister is traveling to meet pt. and herself to discuss further. Lilian reports to return swer call in the am regarding final decision. Swer to update MD and providers regarding pt. final decision on 5/23/23. Swer will remain available.

## 2023-05-22 NOTE — TELEPHONE ENCOUNTER
Jeison returned Lilian's call. Lilian reports she will need more time for decision regarding hospice agency. Swer will remain available. Providers updated.

## 2023-05-23 NOTE — TELEPHONE ENCOUNTER
7:44am - Jeison received call from pt.'s daughter and HCPOALilian. Lilian reports pt. and family have decided to pursue hospice services through [facility]. Swer to update providers and obtain order form MD. Mchugh to fax pt. referral pending order signature. Lilian and pt. have swer contact information if any needs arise. Jeison called [facility] for (fax#[phone]). [facility]  to return swer call. Swer will remain available.

## 2023-05-23 NOTE — TELEPHONE ENCOUNTER
Carlynr noted Dr. Burgess out of clinic today. Carlynr obtained hospice order from dept chair, Dr. Ankur Henderson. Jeison spoke with [facility] intake as well as , Shameka. Shameka reports to notify swer when referral is received as well as when pt. completes intake. Shameka noted pt. contact and HCPOA as Lilian Obrien. Swer faxed pt. referral to [facility] (fax#[phone]). Swer will remain available.   9:32 am - Swer received return call from Shameka. Shameka reports referral has not yet been received. Jeison faxed referral for second time. Shameka reports to return swer call. Shameka obtained Lilian's telephone number and reports to call Lilian to setup services. Swer will remain available.   12:52 pm - Jeison called [facility]. Rep confirms fax was received. Swer will remain available.   1:22 pm - Swer received return call from Shameka. Shameka reports having spoken to Lilian. Shameka reports meeting is scheduled for 5/23/23 from 5-6pm. Shameka reports to provide updates on pt.'s status. Swer will remain available.

## 2023-07-31 ENCOUNTER — EXTERNAL CHRONIC CARE MANAGEMENT (OUTPATIENT)
Dept: PRIMARY CARE CLINIC | Facility: CLINIC | Age: 83
End: 2023-07-31
Payer: MEDICARE

## 2023-07-31 PROCEDURE — 99490 CHRNC CARE MGMT STAFF 1ST 20: CPT | Mod: PBBFAC,PN | Performed by: FAMILY MEDICINE

## 2023-08-01 ENCOUNTER — TELEPHONE (OUTPATIENT)
Dept: HEMATOLOGY/ONCOLOGY | Facility: CLINIC | Age: 83
End: 2023-08-01
Payer: MEDICARE

## 2023-08-01 NOTE — TELEPHONE ENCOUNTER
Jeison received notification from [facility] today, pt. passed 7/28/23. Carlynr updated pt. chart accordingly and notified appropriate staff.
